# Patient Record
Sex: MALE | Race: BLACK OR AFRICAN AMERICAN | Employment: OTHER | URBAN - METROPOLITAN AREA
[De-identification: names, ages, dates, MRNs, and addresses within clinical notes are randomized per-mention and may not be internally consistent; named-entity substitution may affect disease eponyms.]

---

## 2017-11-13 ENCOUNTER — HOSPITAL ENCOUNTER (OUTPATIENT)
Age: 37
Setting detail: SPECIMEN
Discharge: HOME | End: 2017-11-13
Payer: MEDICARE

## 2017-11-13 LAB
ABSOLUTE EOS #: 0.18 K/UL (ref 0–0.44)
ABSOLUTE IMMATURE GRANULOCYTE: 0.03 K/UL (ref 0–0.3)
ABSOLUTE LYMPH #: 1.77 K/UL (ref 1.1–3.7)
ABSOLUTE MONO #: 0.84 K/UL (ref 0.1–1.2)
ANION GAP SERPL CALCULATED.3IONS-SCNC: 9 MMOL/L (ref 9–17)
BASOPHILS # BLD: 0 % (ref 0–2)
BASOPHILS ABSOLUTE: 0.03 K/UL (ref 0–0.2)
BUN BLDV-MCNC: 11 MG/DL (ref 6–20)
BUN/CREAT BLD: ABNORMAL (ref 9–20)
CALCIUM SERPL-MCNC: 8.6 MG/DL (ref 8.6–10.4)
CHLORIDE BLD-SCNC: 100 MMOL/L (ref 98–107)
CHOLESTEROL/HDL RATIO: 4.2
CHOLESTEROL: 191 MG/DL
CO2: 28 MMOL/L (ref 20–31)
CREAT SERPL-MCNC: 0.83 MG/DL (ref 0.7–1.2)
DIFFERENTIAL TYPE: ABNORMAL
EOSINOPHILS RELATIVE PERCENT: 2 % (ref 1–4)
GFR AFRICAN AMERICAN: >60 ML/MIN
GFR NON-AFRICAN AMERICAN: >60 ML/MIN
GFR SERPL CREATININE-BSD FRML MDRD: ABNORMAL ML/MIN/{1.73_M2}
GFR SERPL CREATININE-BSD FRML MDRD: ABNORMAL ML/MIN/{1.73_M2}
GLUCOSE BLD-MCNC: 69 MG/DL (ref 70–99)
HCT VFR BLD CALC: 36.9 % (ref 40.7–50.3)
HDLC SERPL-MCNC: 45 MG/DL
HEMOGLOBIN: 11 G/DL (ref 13–17)
IMMATURE GRANULOCYTES: 0 %
LDL CHOLESTEROL: 131 MG/DL (ref 0–130)
LYMPHOCYTES # BLD: 24 % (ref 24–43)
MCH RBC QN AUTO: 29.3 PG (ref 25.2–33.5)
MCHC RBC AUTO-ENTMCNC: 29.8 G/DL (ref 28.4–34.8)
MCV RBC AUTO: 98.4 FL (ref 82.6–102.9)
MONOCYTES # BLD: 11 % (ref 3–12)
PDW BLD-RTO: 14.4 % (ref 11.8–14.4)
PLATELET # BLD: 311 K/UL (ref 138–453)
PLATELET ESTIMATE: ABNORMAL
PMV BLD AUTO: 9.2 FL (ref 8.1–13.5)
POTASSIUM SERPL-SCNC: 4.5 MMOL/L (ref 3.7–5.3)
RBC # BLD: 3.75 M/UL (ref 4.21–5.77)
RBC # BLD: ABNORMAL 10*6/UL
SEG NEUTROPHILS: 62 % (ref 36–65)
SEGMENTED NEUTROPHILS ABSOLUTE COUNT: 4.63 K/UL (ref 1.5–8.1)
SODIUM BLD-SCNC: 137 MMOL/L (ref 135–144)
TRIGL SERPL-MCNC: 73 MG/DL
VLDLC SERPL CALC-MCNC: ABNORMAL MG/DL (ref 1–30)
WBC # BLD: 7.5 K/UL (ref 3.5–11.3)
WBC # BLD: ABNORMAL 10*3/UL

## 2017-11-13 PROCEDURE — 36415 COLL VENOUS BLD VENIPUNCTURE: CPT

## 2017-11-13 PROCEDURE — P9603 ONE-WAY ALLOW PRORATED MILES: HCPCS

## 2017-11-13 PROCEDURE — 80048 BASIC METABOLIC PNL TOTAL CA: CPT

## 2017-11-13 PROCEDURE — 80061 LIPID PANEL: CPT

## 2017-11-13 PROCEDURE — 85025 COMPLETE CBC W/AUTO DIFF WBC: CPT

## 2017-12-21 ENCOUNTER — HOSPITAL ENCOUNTER (OUTPATIENT)
Age: 37
Setting detail: SPECIMEN
Discharge: HOME | End: 2017-12-21
Payer: MEDICARE

## 2017-12-21 LAB
LITHIUM DATE LAST DOSE: NORMAL
LITHIUM DOSE AMOUNT: NORMAL
LITHIUM DOSE TIME: NORMAL
LITHIUM LEVEL: 1.1 MMOL/L (ref 0.6–1.2)

## 2017-12-21 PROCEDURE — 36415 COLL VENOUS BLD VENIPUNCTURE: CPT

## 2017-12-21 PROCEDURE — 80178 ASSAY OF LITHIUM: CPT

## 2017-12-21 PROCEDURE — P9603 ONE-WAY ALLOW PRORATED MILES: HCPCS

## 2018-02-19 ENCOUNTER — HOSPITAL ENCOUNTER (OUTPATIENT)
Age: 38
Setting detail: SPECIMEN
Discharge: HOME | End: 2018-02-19
Payer: MEDICARE

## 2018-02-19 LAB
LITHIUM DATE LAST DOSE: NORMAL
LITHIUM DOSE AMOUNT: NORMAL
LITHIUM DOSE TIME: NORMAL
LITHIUM LEVEL: 0.7 MMOL/L (ref 0.6–1.2)

## 2018-02-19 PROCEDURE — 80178 ASSAY OF LITHIUM: CPT

## 2018-02-19 PROCEDURE — 36415 COLL VENOUS BLD VENIPUNCTURE: CPT

## 2018-02-19 PROCEDURE — P9603 ONE-WAY ALLOW PRORATED MILES: HCPCS

## 2018-04-16 ENCOUNTER — HOSPITAL ENCOUNTER (OUTPATIENT)
Age: 38
Setting detail: SPECIMEN
Discharge: HOME | End: 2018-04-16
Payer: MEDICARE

## 2018-04-16 LAB
ANION GAP SERPL CALCULATED.3IONS-SCNC: 11 MMOL/L (ref 9–17)
BUN BLDV-MCNC: 10 MG/DL (ref 6–20)
BUN/CREAT BLD: ABNORMAL (ref 9–20)
CALCIUM SERPL-MCNC: 9.4 MG/DL (ref 8.6–10.4)
CHLORIDE BLD-SCNC: 101 MMOL/L (ref 98–107)
CO2: 27 MMOL/L (ref 20–31)
CREAT SERPL-MCNC: 0.88 MG/DL (ref 0.7–1.2)
GFR AFRICAN AMERICAN: >60 ML/MIN
GFR NON-AFRICAN AMERICAN: >60 ML/MIN
GFR SERPL CREATININE-BSD FRML MDRD: ABNORMAL ML/MIN/{1.73_M2}
GFR SERPL CREATININE-BSD FRML MDRD: ABNORMAL ML/MIN/{1.73_M2}
GLUCOSE BLD-MCNC: 67 MG/DL (ref 70–99)
HCT VFR BLD CALC: 37.4 % (ref 40.7–50.3)
HEMOGLOBIN: 11.4 G/DL (ref 13–17)
MCH RBC QN AUTO: 29.2 PG (ref 25.2–33.5)
MCHC RBC AUTO-ENTMCNC: 30.5 G/DL (ref 28.4–34.8)
MCV RBC AUTO: 95.7 FL (ref 82.6–102.9)
NRBC AUTOMATED: 0 PER 100 WBC
PDW BLD-RTO: 14.2 % (ref 11.8–14.4)
PLATELET # BLD: 304 K/UL (ref 138–453)
PMV BLD AUTO: 8.8 FL (ref 8.1–13.5)
POTASSIUM SERPL-SCNC: 4.5 MMOL/L (ref 3.7–5.3)
RBC # BLD: 3.91 M/UL (ref 4.21–5.77)
SODIUM BLD-SCNC: 139 MMOL/L (ref 135–144)
WBC # BLD: 8.4 K/UL (ref 3.5–11.3)

## 2018-04-16 PROCEDURE — 85027 COMPLETE CBC AUTOMATED: CPT

## 2018-04-16 PROCEDURE — P9603 ONE-WAY ALLOW PRORATED MILES: HCPCS

## 2018-04-16 PROCEDURE — 80048 BASIC METABOLIC PNL TOTAL CA: CPT

## 2018-04-16 PROCEDURE — 36415 COLL VENOUS BLD VENIPUNCTURE: CPT

## 2019-03-21 ENCOUNTER — HOSPITAL ENCOUNTER (OUTPATIENT)
Age: 39
Setting detail: SPECIMEN
Discharge: HOME | End: 2019-03-21
Payer: MEDICARE

## 2019-03-21 LAB
LITHIUM DATE LAST DOSE: NORMAL
LITHIUM DOSE AMOUNT: NORMAL
LITHIUM DOSE TIME: NORMAL
LITHIUM LEVEL: 1.2 MMOL/L (ref 0.6–1.2)

## 2019-03-21 PROCEDURE — 36415 COLL VENOUS BLD VENIPUNCTURE: CPT

## 2019-03-21 PROCEDURE — 80178 ASSAY OF LITHIUM: CPT

## 2019-03-21 PROCEDURE — P9603 ONE-WAY ALLOW PRORATED MILES: HCPCS

## 2019-06-21 ENCOUNTER — HOSPITAL ENCOUNTER (OUTPATIENT)
Age: 39
Setting detail: SPECIMEN
Discharge: HOME | End: 2019-06-21
Payer: MEDICARE

## 2019-06-21 LAB
LITHIUM DATE LAST DOSE: NORMAL
LITHIUM DOSE AMOUNT: NORMAL
LITHIUM DOSE TIME: NORMAL
LITHIUM LEVEL: 1 MMOL/L (ref 0.6–1.2)

## 2019-06-21 PROCEDURE — 80178 ASSAY OF LITHIUM: CPT

## 2019-06-21 PROCEDURE — P9603 ONE-WAY ALLOW PRORATED MILES: HCPCS

## 2019-06-21 PROCEDURE — 36415 COLL VENOUS BLD VENIPUNCTURE: CPT

## 2019-10-25 ENCOUNTER — HOSPITAL ENCOUNTER (OUTPATIENT)
Age: 39
Setting detail: SPECIMEN
Discharge: HOME | End: 2019-10-25
Payer: COMMERCIAL

## 2019-10-25 LAB
LITHIUM DATE LAST DOSE: NORMAL
LITHIUM DOSE AMOUNT: NORMAL
LITHIUM DOSE TIME: NORMAL
LITHIUM LEVEL: 0.8 MMOL/L (ref 0.6–1.2)

## 2019-10-25 PROCEDURE — P9603 ONE-WAY ALLOW PRORATED MILES: HCPCS

## 2019-10-25 PROCEDURE — 36415 COLL VENOUS BLD VENIPUNCTURE: CPT

## 2019-10-25 PROCEDURE — 80178 ASSAY OF LITHIUM: CPT

## 2020-01-21 ENCOUNTER — HOSPITAL ENCOUNTER (OUTPATIENT)
Age: 40
Setting detail: SPECIMEN
Discharge: HOME | End: 2020-01-21
Payer: COMMERCIAL

## 2020-01-21 PROCEDURE — 36415 COLL VENOUS BLD VENIPUNCTURE: CPT

## 2020-01-21 PROCEDURE — 80178 ASSAY OF LITHIUM: CPT

## 2020-01-21 PROCEDURE — P9603 ONE-WAY ALLOW PRORATED MILES: HCPCS

## 2020-02-14 ENCOUNTER — HOSPITAL ENCOUNTER (OUTPATIENT)
Age: 40
Setting detail: SPECIMEN
Discharge: HOME | End: 2020-02-14
Payer: COMMERCIAL

## 2020-02-14 LAB
ABSOLUTE EOS #: 0.35 K/UL (ref 0–0.44)
ABSOLUTE IMMATURE GRANULOCYTE: 0.03 K/UL (ref 0–0.3)
ABSOLUTE LYMPH #: 2.59 K/UL (ref 1.1–3.7)
ABSOLUTE MONO #: 1.02 K/UL (ref 0.1–1.2)
BASOPHILS # BLD: 1 % (ref 0–2)
BASOPHILS ABSOLUTE: 0.05 K/UL (ref 0–0.2)
DIFFERENTIAL TYPE: ABNORMAL
EOSINOPHILS RELATIVE PERCENT: 4 % (ref 1–4)
HCT VFR BLD CALC: 35.6 % (ref 40.7–50.3)
HEMOGLOBIN: 10.8 G/DL (ref 13–17)
IMMATURE GRANULOCYTES: 0 %
LYMPHOCYTES # BLD: 28 % (ref 24–43)
MCH RBC QN AUTO: 28.4 PG (ref 25.2–33.5)
MCHC RBC AUTO-ENTMCNC: 30.3 G/DL (ref 28.4–34.8)
MCV RBC AUTO: 93.7 FL (ref 82.6–102.9)
MONOCYTES # BLD: 11 % (ref 3–12)
NRBC AUTOMATED: 0 PER 100 WBC
PDW BLD-RTO: 13.8 % (ref 11.8–14.4)
PLATELET # BLD: 447 K/UL (ref 138–453)
PLATELET ESTIMATE: ABNORMAL
PMV BLD AUTO: 8.6 FL (ref 8.1–13.5)
RBC # BLD: 3.8 M/UL (ref 4.21–5.77)
RBC # BLD: ABNORMAL 10*6/UL
SEG NEUTROPHILS: 56 % (ref 36–65)
SEGMENTED NEUTROPHILS ABSOLUTE COUNT: 5.21 K/UL (ref 1.5–8.1)
WBC # BLD: 9.3 K/UL (ref 3.5–11.3)
WBC # BLD: ABNORMAL 10*3/UL

## 2020-02-14 PROCEDURE — 36415 COLL VENOUS BLD VENIPUNCTURE: CPT

## 2020-02-14 PROCEDURE — P9603 ONE-WAY ALLOW PRORATED MILES: HCPCS

## 2020-02-14 PROCEDURE — 85025 COMPLETE CBC W/AUTO DIFF WBC: CPT

## 2020-02-20 ENCOUNTER — HOSPITAL ENCOUNTER (OUTPATIENT)
Age: 40
Setting detail: SPECIMEN
Discharge: HOME | End: 2020-02-20
Payer: COMMERCIAL

## 2020-02-20 LAB
ABSOLUTE EOS #: 0.1 K/UL (ref 0–0.44)
ABSOLUTE IMMATURE GRANULOCYTE: 0.1 K/UL (ref 0–0.3)
ABSOLUTE LYMPH #: 1.94 K/UL (ref 1.1–3.7)
ABSOLUTE MONO #: 0.67 K/UL (ref 0.1–1.2)
BASOPHILS # BLD: 1 % (ref 0–2)
BASOPHILS ABSOLUTE: 0.05 K/UL (ref 0–0.2)
DIFFERENTIAL TYPE: ABNORMAL
EOSINOPHILS RELATIVE PERCENT: 2 % (ref 1–4)
HCT VFR BLD CALC: 39.3 % (ref 40.7–50.3)
HEMOGLOBIN: 12.4 G/DL (ref 13–17)
IMMATURE GRANULOCYTES: 2 %
LYMPHOCYTES # BLD: 31 % (ref 24–43)
MCH RBC QN AUTO: 32.7 PG (ref 25.2–33.5)
MCHC RBC AUTO-ENTMCNC: 31.6 G/DL (ref 28.4–34.8)
MCV RBC AUTO: 103.7 FL (ref 82.6–102.9)
MONOCYTES # BLD: 11 % (ref 3–12)
NRBC AUTOMATED: 0 PER 100 WBC
PDW BLD-RTO: 14.5 % (ref 11.8–14.4)
PLATELET # BLD: 192 K/UL (ref 138–453)
PLATELET ESTIMATE: ABNORMAL
PMV BLD AUTO: 11.1 FL (ref 8.1–13.5)
RBC # BLD: 3.79 M/UL (ref 4.21–5.77)
RBC # BLD: ABNORMAL 10*6/UL
SEG NEUTROPHILS: 54 % (ref 36–65)
SEGMENTED NEUTROPHILS ABSOLUTE COUNT: 3.36 K/UL (ref 1.5–8.1)
WBC # BLD: 6.2 K/UL (ref 3.5–11.3)
WBC # BLD: ABNORMAL 10*3/UL

## 2020-02-20 PROCEDURE — 36415 COLL VENOUS BLD VENIPUNCTURE: CPT

## 2020-02-20 PROCEDURE — 85025 COMPLETE CBC W/AUTO DIFF WBC: CPT

## 2020-02-20 PROCEDURE — P9603 ONE-WAY ALLOW PRORATED MILES: HCPCS

## 2020-02-24 ENCOUNTER — HOSPITAL ENCOUNTER (OUTPATIENT)
Age: 40
Setting detail: SPECIMEN
Discharge: HOME | End: 2020-02-24
Payer: COMMERCIAL

## 2020-02-24 LAB
ABSOLUTE EOS #: 0.32 K/UL (ref 0–0.44)
ABSOLUTE IMMATURE GRANULOCYTE: 0.04 K/UL (ref 0–0.3)
ABSOLUTE LYMPH #: 2.42 K/UL (ref 1.1–3.7)
ABSOLUTE MONO #: 0.89 K/UL (ref 0.1–1.2)
BASOPHILS # BLD: 0 % (ref 0–2)
BASOPHILS ABSOLUTE: 0.03 K/UL (ref 0–0.2)
DIFFERENTIAL TYPE: ABNORMAL
EOSINOPHILS RELATIVE PERCENT: 4 % (ref 1–4)
HCT VFR BLD CALC: 34.9 % (ref 40.7–50.3)
HEMOGLOBIN: 10.1 G/DL (ref 13–17)
IMMATURE GRANULOCYTES: 0 %
LYMPHOCYTES # BLD: 27 % (ref 24–43)
MCH RBC QN AUTO: 27.3 PG (ref 25.2–33.5)
MCHC RBC AUTO-ENTMCNC: 28.9 G/DL (ref 28.4–34.8)
MCV RBC AUTO: 94.3 FL (ref 82.6–102.9)
MONOCYTES # BLD: 10 % (ref 3–12)
NRBC AUTOMATED: 0 PER 100 WBC
PDW BLD-RTO: 13.8 % (ref 11.8–14.4)
PLATELET # BLD: 425 K/UL (ref 138–453)
PLATELET ESTIMATE: ABNORMAL
PMV BLD AUTO: 8.6 FL (ref 8.1–13.5)
RBC # BLD: 3.7 M/UL (ref 4.21–5.77)
RBC # BLD: ABNORMAL 10*6/UL
SEG NEUTROPHILS: 59 % (ref 36–65)
SEGMENTED NEUTROPHILS ABSOLUTE COUNT: 5.35 K/UL (ref 1.5–8.1)
WBC # BLD: 9.1 K/UL (ref 3.5–11.3)
WBC # BLD: ABNORMAL 10*3/UL

## 2020-02-24 PROCEDURE — 36415 COLL VENOUS BLD VENIPUNCTURE: CPT

## 2020-02-24 PROCEDURE — 85025 COMPLETE CBC W/AUTO DIFF WBC: CPT

## 2020-02-24 PROCEDURE — P9603 ONE-WAY ALLOW PRORATED MILES: HCPCS

## 2020-02-27 ENCOUNTER — HOSPITAL ENCOUNTER (OUTPATIENT)
Age: 40
Setting detail: SPECIMEN
Discharge: HOME | End: 2020-02-27
Payer: COMMERCIAL

## 2020-02-27 LAB
ABSOLUTE EOS #: 0.28 K/UL (ref 0–0.44)
ABSOLUTE IMMATURE GRANULOCYTE: 0.03 K/UL (ref 0–0.3)
ABSOLUTE LYMPH #: 2.25 K/UL (ref 1.1–3.7)
ABSOLUTE MONO #: 1.13 K/UL (ref 0.1–1.2)
BASOPHILS # BLD: 1 % (ref 0–2)
BASOPHILS ABSOLUTE: 0.05 K/UL (ref 0–0.2)
DIFFERENTIAL TYPE: ABNORMAL
EOSINOPHILS RELATIVE PERCENT: 3 % (ref 1–4)
HCT VFR BLD CALC: 35.4 % (ref 40.7–50.3)
HEMOGLOBIN: 10.7 G/DL (ref 13–17)
IMMATURE GRANULOCYTES: 0 %
LYMPHOCYTES # BLD: 23 % (ref 24–43)
MCH RBC QN AUTO: 28.2 PG (ref 25.2–33.5)
MCHC RBC AUTO-ENTMCNC: 30.2 G/DL (ref 28.4–34.8)
MCV RBC AUTO: 93.2 FL (ref 82.6–102.9)
MONOCYTES # BLD: 11 % (ref 3–12)
NRBC AUTOMATED: 0 PER 100 WBC
PDW BLD-RTO: 13.7 % (ref 11.8–14.4)
PLATELET # BLD: 424 K/UL (ref 138–453)
PLATELET ESTIMATE: ABNORMAL
PMV BLD AUTO: 8.6 FL (ref 8.1–13.5)
RBC # BLD: 3.8 M/UL (ref 4.21–5.77)
RBC # BLD: ABNORMAL 10*6/UL
SEG NEUTROPHILS: 62 % (ref 36–65)
SEGMENTED NEUTROPHILS ABSOLUTE COUNT: 6.27 K/UL (ref 1.5–8.1)
WBC # BLD: 10 K/UL (ref 3.5–11.3)
WBC # BLD: ABNORMAL 10*3/UL

## 2020-02-27 PROCEDURE — 85025 COMPLETE CBC W/AUTO DIFF WBC: CPT

## 2020-02-27 PROCEDURE — 36415 COLL VENOUS BLD VENIPUNCTURE: CPT

## 2020-02-27 PROCEDURE — P9603 ONE-WAY ALLOW PRORATED MILES: HCPCS

## 2020-03-02 ENCOUNTER — HOSPITAL ENCOUNTER (OUTPATIENT)
Age: 40
Setting detail: SPECIMEN
Discharge: HOME | End: 2020-03-02
Payer: COMMERCIAL

## 2020-03-02 LAB
ABSOLUTE EOS #: 0.44 K/UL (ref 0–0.44)
ABSOLUTE IMMATURE GRANULOCYTE: 0.04 K/UL (ref 0–0.3)
ABSOLUTE LYMPH #: 2.18 K/UL (ref 1.1–3.7)
ABSOLUTE MONO #: 1.27 K/UL (ref 0.1–1.2)
BASOPHILS # BLD: 1 % (ref 0–2)
BASOPHILS ABSOLUTE: 0.05 K/UL (ref 0–0.2)
DIFFERENTIAL TYPE: ABNORMAL
EOSINOPHILS RELATIVE PERCENT: 4 % (ref 1–4)
HCT VFR BLD CALC: 34.3 % (ref 40.7–50.3)
HEMOGLOBIN: 10.1 G/DL (ref 13–17)
IMMATURE GRANULOCYTES: 0 %
LYMPHOCYTES # BLD: 21 % (ref 24–43)
MCH RBC QN AUTO: 27.7 PG (ref 25.2–33.5)
MCHC RBC AUTO-ENTMCNC: 29.4 G/DL (ref 28.4–34.8)
MCV RBC AUTO: 94.2 FL (ref 82.6–102.9)
MONOCYTES # BLD: 12 % (ref 3–12)
NRBC AUTOMATED: 0 PER 100 WBC
PDW BLD-RTO: 13.9 % (ref 11.8–14.4)
PLATELET # BLD: 407 K/UL (ref 138–453)
PLATELET ESTIMATE: ABNORMAL
PMV BLD AUTO: 8.6 FL (ref 8.1–13.5)
RBC # BLD: 3.64 M/UL (ref 4.21–5.77)
RBC # BLD: ABNORMAL 10*6/UL
SEG NEUTROPHILS: 62 % (ref 36–65)
SEGMENTED NEUTROPHILS ABSOLUTE COUNT: 6.63 K/UL (ref 1.5–8.1)
WBC # BLD: 10.6 K/UL (ref 3.5–11.3)
WBC # BLD: ABNORMAL 10*3/UL

## 2020-03-02 PROCEDURE — 85025 COMPLETE CBC W/AUTO DIFF WBC: CPT

## 2020-03-02 PROCEDURE — 36415 COLL VENOUS BLD VENIPUNCTURE: CPT

## 2020-03-02 PROCEDURE — P9603 ONE-WAY ALLOW PRORATED MILES: HCPCS

## 2020-03-09 ENCOUNTER — HOSPITAL ENCOUNTER (OUTPATIENT)
Age: 40
Setting detail: SPECIMEN
Discharge: HOME | End: 2020-03-09
Payer: COMMERCIAL

## 2020-03-09 LAB
ABSOLUTE EOS #: 0.39 K/UL (ref 0–0.44)
ABSOLUTE IMMATURE GRANULOCYTE: 0.04 K/UL (ref 0–0.3)
ABSOLUTE LYMPH #: 2.56 K/UL (ref 1.1–3.7)
ABSOLUTE MONO #: 1.19 K/UL (ref 0.1–1.2)
BASOPHILS # BLD: 1 % (ref 0–2)
BASOPHILS ABSOLUTE: 0.06 K/UL (ref 0–0.2)
DIFFERENTIAL TYPE: ABNORMAL
EOSINOPHILS RELATIVE PERCENT: 4 % (ref 1–4)
HCT VFR BLD CALC: 33.1 % (ref 40.7–50.3)
HEMOGLOBIN: 9.8 G/DL (ref 13–17)
IMMATURE GRANULOCYTES: 0 %
LYMPHOCYTES # BLD: 24 % (ref 24–43)
MCH RBC QN AUTO: 27.8 PG (ref 25.2–33.5)
MCHC RBC AUTO-ENTMCNC: 29.6 G/DL (ref 28.4–34.8)
MCV RBC AUTO: 94 FL (ref 82.6–102.9)
MONOCYTES # BLD: 11 % (ref 3–12)
NRBC AUTOMATED: 0 PER 100 WBC
PDW BLD-RTO: 14.2 % (ref 11.8–14.4)
PLATELET # BLD: 423 K/UL (ref 138–453)
PLATELET ESTIMATE: ABNORMAL
PMV BLD AUTO: 8.6 FL (ref 8.1–13.5)
RBC # BLD: 3.52 M/UL (ref 4.21–5.77)
RBC # BLD: ABNORMAL 10*6/UL
SEG NEUTROPHILS: 60 % (ref 36–65)
SEGMENTED NEUTROPHILS ABSOLUTE COUNT: 6.37 K/UL (ref 1.5–8.1)
WBC # BLD: 10.6 K/UL (ref 3.5–11.3)
WBC # BLD: ABNORMAL 10*3/UL

## 2020-03-09 PROCEDURE — P9603 ONE-WAY ALLOW PRORATED MILES: HCPCS

## 2020-03-09 PROCEDURE — 36415 COLL VENOUS BLD VENIPUNCTURE: CPT

## 2020-03-09 PROCEDURE — 85025 COMPLETE CBC W/AUTO DIFF WBC: CPT

## 2020-03-16 ENCOUNTER — HOSPITAL ENCOUNTER (OUTPATIENT)
Age: 40
Setting detail: SPECIMEN
Discharge: HOME | End: 2020-03-16
Payer: COMMERCIAL

## 2020-03-16 LAB
ABSOLUTE EOS #: 0.3 K/UL (ref 0–0.44)
ABSOLUTE IMMATURE GRANULOCYTE: 0.03 K/UL (ref 0–0.3)
ABSOLUTE LYMPH #: 2.29 K/UL (ref 1.1–3.7)
ABSOLUTE MONO #: 1.12 K/UL (ref 0.1–1.2)
BASOPHILS # BLD: 1 % (ref 0–2)
BASOPHILS ABSOLUTE: 0.05 K/UL (ref 0–0.2)
DIFFERENTIAL TYPE: ABNORMAL
EOSINOPHILS RELATIVE PERCENT: 3 % (ref 1–4)
HCT VFR BLD CALC: 36 % (ref 40.7–50.3)
HEMOGLOBIN: 10.5 G/DL (ref 13–17)
IMMATURE GRANULOCYTES: 0 %
LYMPHOCYTES # BLD: 21 % (ref 24–43)
MCH RBC QN AUTO: 27.1 PG (ref 25.2–33.5)
MCHC RBC AUTO-ENTMCNC: 29.2 G/DL (ref 28.4–34.8)
MCV RBC AUTO: 92.8 FL (ref 82.6–102.9)
MONOCYTES # BLD: 10 % (ref 3–12)
NRBC AUTOMATED: 0 PER 100 WBC
PDW BLD-RTO: 13.8 % (ref 11.8–14.4)
PLATELET # BLD: 484 K/UL (ref 138–453)
PLATELET ESTIMATE: ABNORMAL
PMV BLD AUTO: 8.7 FL (ref 8.1–13.5)
RBC # BLD: 3.88 M/UL (ref 4.21–5.77)
RBC # BLD: ABNORMAL 10*6/UL
SEG NEUTROPHILS: 65 % (ref 36–65)
SEGMENTED NEUTROPHILS ABSOLUTE COUNT: 7.15 K/UL (ref 1.5–8.1)
WBC # BLD: 10.9 K/UL (ref 3.5–11.3)
WBC # BLD: ABNORMAL 10*3/UL

## 2020-03-16 PROCEDURE — 36415 COLL VENOUS BLD VENIPUNCTURE: CPT

## 2020-03-16 PROCEDURE — P9603 ONE-WAY ALLOW PRORATED MILES: HCPCS

## 2020-03-16 PROCEDURE — 85025 COMPLETE CBC W/AUTO DIFF WBC: CPT

## 2020-03-23 ENCOUNTER — HOSPITAL ENCOUNTER (OUTPATIENT)
Age: 40
Setting detail: SPECIMEN
Discharge: HOME | End: 2020-03-23
Payer: COMMERCIAL

## 2020-03-23 LAB
ABSOLUTE EOS #: 0.32 K/UL (ref 0–0.44)
ABSOLUTE IMMATURE GRANULOCYTE: 0.03 K/UL (ref 0–0.3)
ABSOLUTE LYMPH #: 2.61 K/UL (ref 1.1–3.7)
ABSOLUTE MONO #: 1.05 K/UL (ref 0.1–1.2)
BASOPHILS # BLD: 1 % (ref 0–2)
BASOPHILS ABSOLUTE: 0.05 K/UL (ref 0–0.2)
DIFFERENTIAL TYPE: ABNORMAL
EOSINOPHILS RELATIVE PERCENT: 3 % (ref 1–4)
HCT VFR BLD CALC: 32.8 % (ref 40.7–50.3)
HEMOGLOBIN: 9.6 G/DL (ref 13–17)
IMMATURE GRANULOCYTES: 0 %
LYMPHOCYTES # BLD: 28 % (ref 24–43)
MCH RBC QN AUTO: 27.3 PG (ref 25.2–33.5)
MCHC RBC AUTO-ENTMCNC: 29.3 G/DL (ref 28.4–34.8)
MCV RBC AUTO: 93.2 FL (ref 82.6–102.9)
MONOCYTES # BLD: 11 % (ref 3–12)
NRBC AUTOMATED: 0 PER 100 WBC
PDW BLD-RTO: 14 % (ref 11.8–14.4)
PLATELET # BLD: 469 K/UL (ref 138–453)
PLATELET ESTIMATE: ABNORMAL
PMV BLD AUTO: 8.5 FL (ref 8.1–13.5)
RBC # BLD: 3.52 M/UL (ref 4.21–5.77)
RBC # BLD: ABNORMAL 10*6/UL
SEG NEUTROPHILS: 57 % (ref 36–65)
SEGMENTED NEUTROPHILS ABSOLUTE COUNT: 5.35 K/UL (ref 1.5–8.1)
WBC # BLD: 9.4 K/UL (ref 3.5–11.3)
WBC # BLD: ABNORMAL 10*3/UL

## 2020-03-23 PROCEDURE — 36415 COLL VENOUS BLD VENIPUNCTURE: CPT

## 2020-03-23 PROCEDURE — 85025 COMPLETE CBC W/AUTO DIFF WBC: CPT

## 2020-03-23 PROCEDURE — P9603 ONE-WAY ALLOW PRORATED MILES: HCPCS

## 2020-03-30 ENCOUNTER — HOSPITAL ENCOUNTER (OUTPATIENT)
Age: 40
Setting detail: SPECIMEN
Discharge: HOME | End: 2020-03-30
Payer: COMMERCIAL

## 2020-03-30 LAB
ABSOLUTE EOS #: 0.31 K/UL (ref 0–0.44)
ABSOLUTE IMMATURE GRANULOCYTE: 0.04 K/UL (ref 0–0.3)
ABSOLUTE LYMPH #: 1.81 K/UL (ref 1.1–3.7)
ABSOLUTE MONO #: 1.11 K/UL (ref 0.1–1.2)
BASOPHILS # BLD: 0 % (ref 0–2)
BASOPHILS ABSOLUTE: 0.04 K/UL (ref 0–0.2)
DIFFERENTIAL TYPE: ABNORMAL
EOSINOPHILS RELATIVE PERCENT: 3 % (ref 1–4)
HCT VFR BLD CALC: 36.3 % (ref 40.7–50.3)
HEMOGLOBIN: 10.6 G/DL (ref 13–17)
IMMATURE GRANULOCYTES: 0 %
LYMPHOCYTES # BLD: 18 % (ref 24–43)
MCH RBC QN AUTO: 27.2 PG (ref 25.2–33.5)
MCHC RBC AUTO-ENTMCNC: 29.2 G/DL (ref 28.4–34.8)
MCV RBC AUTO: 93.3 FL (ref 82.6–102.9)
MONOCYTES # BLD: 11 % (ref 3–12)
NRBC AUTOMATED: 0 PER 100 WBC
PDW BLD-RTO: 14 % (ref 11.8–14.4)
PLATELET # BLD: 485 K/UL (ref 138–453)
PLATELET ESTIMATE: ABNORMAL
PMV BLD AUTO: 8.7 FL (ref 8.1–13.5)
RBC # BLD: 3.89 M/UL (ref 4.21–5.77)
RBC # BLD: ABNORMAL 10*6/UL
SEG NEUTROPHILS: 68 % (ref 36–65)
SEGMENTED NEUTROPHILS ABSOLUTE COUNT: 6.76 K/UL (ref 1.5–8.1)
WBC # BLD: 10.1 K/UL (ref 3.5–11.3)
WBC # BLD: ABNORMAL 10*3/UL

## 2020-03-30 PROCEDURE — 36415 COLL VENOUS BLD VENIPUNCTURE: CPT

## 2020-03-30 PROCEDURE — 85025 COMPLETE CBC W/AUTO DIFF WBC: CPT

## 2020-03-30 PROCEDURE — P9603 ONE-WAY ALLOW PRORATED MILES: HCPCS

## 2020-04-07 ENCOUNTER — HOSPITAL ENCOUNTER (OUTPATIENT)
Age: 40
Setting detail: SPECIMEN
Discharge: HOME | End: 2020-04-07
Payer: COMMERCIAL

## 2020-04-07 LAB
ABSOLUTE EOS #: 0 K/UL (ref 0–0.4)
ABSOLUTE IMMATURE GRANULOCYTE: 0 K/UL (ref 0–0.3)
ABSOLUTE LYMPH #: 0.39 K/UL (ref 1–4.8)
ABSOLUTE MONO #: 0.78 K/UL (ref 0.1–0.8)
BASOPHILS # BLD: 1 % (ref 0–2)
BASOPHILS ABSOLUTE: 0.1 K/UL (ref 0–0.2)
DIFFERENTIAL TYPE: ABNORMAL
EOSINOPHILS RELATIVE PERCENT: 0 % (ref 1–4)
HCT VFR BLD CALC: 33 % (ref 40.7–50.3)
HEMOGLOBIN: 9.9 G/DL (ref 13–17)
IMMATURE GRANULOCYTES: 0 %
LYMPHOCYTES # BLD: 4 % (ref 24–44)
MCH RBC QN AUTO: 27.1 PG (ref 25.2–33.5)
MCHC RBC AUTO-ENTMCNC: 30 G/DL (ref 28.4–34.8)
MCV RBC AUTO: 90.4 FL (ref 82.6–102.9)
MONOCYTES # BLD: 8 % (ref 1–7)
MORPHOLOGY: NORMAL
NRBC AUTOMATED: 0 PER 100 WBC
PDW BLD-RTO: 14.1 % (ref 11.8–14.4)
PLATELET # BLD: 430 K/UL (ref 138–453)
PLATELET ESTIMATE: ABNORMAL
PMV BLD AUTO: 8.6 FL (ref 8.1–13.5)
RBC # BLD: 3.65 M/UL (ref 4.21–5.77)
RBC # BLD: ABNORMAL 10*6/UL
SEG NEUTROPHILS: 87 % (ref 36–66)
SEGMENTED NEUTROPHILS ABSOLUTE COUNT: 8.43 K/UL (ref 1.8–7.7)
WBC # BLD: 9.7 K/UL (ref 3.5–11.3)
WBC # BLD: ABNORMAL 10*3/UL

## 2020-04-07 PROCEDURE — 36415 COLL VENOUS BLD VENIPUNCTURE: CPT

## 2020-04-07 PROCEDURE — P9603 ONE-WAY ALLOW PRORATED MILES: HCPCS

## 2020-04-07 PROCEDURE — 85025 COMPLETE CBC W/AUTO DIFF WBC: CPT

## 2020-04-09 ENCOUNTER — HOSPITAL ENCOUNTER (OUTPATIENT)
Age: 40
Setting detail: SPECIMEN
Discharge: HOME | End: 2020-04-09
Payer: COMMERCIAL

## 2020-04-09 LAB
LITHIUM DATE LAST DOSE: NORMAL
LITHIUM DOSE AMOUNT: NORMAL
LITHIUM DOSE TIME: NORMAL
LITHIUM LEVEL: 0.6 MMOL/L (ref 0.6–1.2)

## 2020-04-09 PROCEDURE — 36415 COLL VENOUS BLD VENIPUNCTURE: CPT

## 2020-04-09 PROCEDURE — P9603 ONE-WAY ALLOW PRORATED MILES: HCPCS

## 2020-04-09 PROCEDURE — 80178 ASSAY OF LITHIUM: CPT

## 2020-04-14 ENCOUNTER — HOSPITAL ENCOUNTER (OUTPATIENT)
Age: 40
Setting detail: SPECIMEN
Discharge: HOME | End: 2020-04-14
Payer: COMMERCIAL

## 2020-04-14 LAB
ABSOLUTE EOS #: 0.13 K/UL (ref 0–0.44)
ABSOLUTE IMMATURE GRANULOCYTE: 0.1 K/UL (ref 0–0.3)
ABSOLUTE LYMPH #: 2.05 K/UL (ref 1.1–3.7)
ABSOLUTE MONO #: 1.07 K/UL (ref 0.1–1.2)
BASOPHILS # BLD: 0 % (ref 0–2)
BASOPHILS ABSOLUTE: <0.03 K/UL (ref 0–0.2)
DIFFERENTIAL TYPE: ABNORMAL
EOSINOPHILS RELATIVE PERCENT: 2 % (ref 1–4)
HCT VFR BLD CALC: 32.8 % (ref 40.7–50.3)
HEMOGLOBIN: 9.6 G/DL (ref 13–17)
IMMATURE GRANULOCYTES: 1 %
LYMPHOCYTES # BLD: 27 % (ref 24–43)
MCH RBC QN AUTO: 26.7 PG (ref 25.2–33.5)
MCHC RBC AUTO-ENTMCNC: 29.3 G/DL (ref 28.4–34.8)
MCV RBC AUTO: 91.1 FL (ref 82.6–102.9)
MONOCYTES # BLD: 14 % (ref 3–12)
NRBC AUTOMATED: 0 PER 100 WBC
PDW BLD-RTO: 14.2 % (ref 11.8–14.4)
PLATELET # BLD: 362 K/UL (ref 138–453)
PLATELET ESTIMATE: ABNORMAL
PMV BLD AUTO: 9 FL (ref 8.1–13.5)
RBC # BLD: 3.6 M/UL (ref 4.21–5.77)
RBC # BLD: ABNORMAL 10*6/UL
SEG NEUTROPHILS: 56 % (ref 36–65)
SEGMENTED NEUTROPHILS ABSOLUTE COUNT: 4.3 K/UL (ref 1.5–8.1)
WBC # BLD: 7.7 K/UL (ref 3.5–11.3)
WBC # BLD: ABNORMAL 10*3/UL

## 2020-04-14 PROCEDURE — 85025 COMPLETE CBC W/AUTO DIFF WBC: CPT

## 2020-04-14 PROCEDURE — 36415 COLL VENOUS BLD VENIPUNCTURE: CPT

## 2020-04-14 PROCEDURE — P9603 ONE-WAY ALLOW PRORATED MILES: HCPCS

## 2020-04-21 ENCOUNTER — HOSPITAL ENCOUNTER (OUTPATIENT)
Age: 40
Setting detail: SPECIMEN
Discharge: HOME | End: 2020-04-21
Payer: COMMERCIAL

## 2020-04-21 LAB
ABSOLUTE EOS #: 0 K/UL (ref 0–0.4)
ABSOLUTE IMMATURE GRANULOCYTE: 0.31 K/UL (ref 0–0.3)
ABSOLUTE LYMPH #: 1.03 K/UL (ref 1–4.8)
ABSOLUTE MONO #: 1.03 K/UL (ref 0.1–0.8)
BASOPHILS # BLD: 0 % (ref 0–2)
BASOPHILS ABSOLUTE: 0 K/UL (ref 0–0.2)
DIFFERENTIAL TYPE: ABNORMAL
EOSINOPHILS RELATIVE PERCENT: 0 % (ref 1–4)
HCT VFR BLD CALC: 29.1 % (ref 40.7–50.3)
HEMOGLOBIN: 8.4 G/DL (ref 13–17)
IMMATURE GRANULOCYTES: 3 %
LYMPHOCYTES # BLD: 10 % (ref 24–44)
MCH RBC QN AUTO: 26.5 PG (ref 25.2–33.5)
MCHC RBC AUTO-ENTMCNC: 28.9 G/DL (ref 28.4–34.8)
MCV RBC AUTO: 91.8 FL (ref 82.6–102.9)
MONOCYTES # BLD: 10 % (ref 1–7)
MORPHOLOGY: ABNORMAL
NRBC AUTOMATED: 0.6 PER 100 WBC
PDW BLD-RTO: 14.9 % (ref 11.8–14.4)
PLATELET # BLD: 600 K/UL (ref 138–453)
PLATELET ESTIMATE: ABNORMAL
PMV BLD AUTO: 8.3 FL (ref 8.1–13.5)
RBC # BLD: 3.17 M/UL (ref 4.21–5.77)
RBC # BLD: ABNORMAL 10*6/UL
SEG NEUTROPHILS: 77 % (ref 36–66)
SEGMENTED NEUTROPHILS ABSOLUTE COUNT: 7.93 K/UL (ref 1.8–7.7)
WBC # BLD: 10.3 K/UL (ref 3.5–11.3)
WBC # BLD: ABNORMAL 10*3/UL

## 2020-04-21 PROCEDURE — 85025 COMPLETE CBC W/AUTO DIFF WBC: CPT

## 2020-04-21 PROCEDURE — 36415 COLL VENOUS BLD VENIPUNCTURE: CPT

## 2020-04-21 PROCEDURE — P9603 ONE-WAY ALLOW PRORATED MILES: HCPCS

## 2020-04-28 ENCOUNTER — HOSPITAL ENCOUNTER (OUTPATIENT)
Age: 40
Setting detail: SPECIMEN
Discharge: HOME | End: 2020-04-28
Payer: COMMERCIAL

## 2020-04-28 LAB
ABSOLUTE EOS #: 0.19 K/UL (ref 0–0.44)
ABSOLUTE IMMATURE GRANULOCYTE: 0.06 K/UL (ref 0–0.3)
ABSOLUTE LYMPH #: 2.02 K/UL (ref 1.1–3.7)
ABSOLUTE MONO #: 0.86 K/UL (ref 0.1–1.2)
BASOPHILS # BLD: 0 % (ref 0–2)
BASOPHILS ABSOLUTE: <0.03 K/UL (ref 0–0.2)
DIFFERENTIAL TYPE: ABNORMAL
EOSINOPHILS RELATIVE PERCENT: 2 % (ref 1–4)
HCT VFR BLD CALC: 33.4 % (ref 40.7–50.3)
HEMOGLOBIN: 9.8 G/DL (ref 13–17)
IMMATURE GRANULOCYTES: 1 %
LYMPHOCYTES # BLD: 24 % (ref 24–43)
MCH RBC QN AUTO: 27.6 PG (ref 25.2–33.5)
MCHC RBC AUTO-ENTMCNC: 29.3 G/DL (ref 28.4–34.8)
MCV RBC AUTO: 94.1 FL (ref 82.6–102.9)
MONOCYTES # BLD: 10 % (ref 3–12)
NRBC AUTOMATED: 0 PER 100 WBC
PDW BLD-RTO: 18.3 % (ref 11.8–14.4)
PLATELET # BLD: 629 K/UL (ref 138–453)
PLATELET ESTIMATE: ABNORMAL
PMV BLD AUTO: 8.6 FL (ref 8.1–13.5)
RBC # BLD: 3.55 M/UL (ref 4.21–5.77)
RBC # BLD: ABNORMAL 10*6/UL
SEG NEUTROPHILS: 63 % (ref 36–65)
SEGMENTED NEUTROPHILS ABSOLUTE COUNT: 5.39 K/UL (ref 1.5–8.1)
WBC # BLD: 8.5 K/UL (ref 3.5–11.3)
WBC # BLD: ABNORMAL 10*3/UL

## 2020-04-28 PROCEDURE — 85025 COMPLETE CBC W/AUTO DIFF WBC: CPT

## 2020-04-28 PROCEDURE — P9603 ONE-WAY ALLOW PRORATED MILES: HCPCS

## 2020-05-05 ENCOUNTER — HOSPITAL ENCOUNTER (OUTPATIENT)
Age: 40
Setting detail: SPECIMEN
Discharge: HOME | End: 2020-05-05
Payer: COMMERCIAL

## 2020-05-05 LAB
ABSOLUTE EOS #: 0.2 K/UL (ref 0–0.44)
ABSOLUTE IMMATURE GRANULOCYTE: <0.03 K/UL (ref 0–0.3)
ABSOLUTE LYMPH #: 1.97 K/UL (ref 1.1–3.7)
ABSOLUTE MONO #: 0.72 K/UL (ref 0.1–1.2)
BASOPHILS # BLD: 0 % (ref 0–2)
BASOPHILS ABSOLUTE: <0.03 K/UL (ref 0–0.2)
DIFFERENTIAL TYPE: ABNORMAL
EOSINOPHILS RELATIVE PERCENT: 3 % (ref 1–4)
HCT VFR BLD CALC: 36.4 % (ref 40.7–50.3)
HEMOGLOBIN: 10.5 G/DL (ref 13–17)
IMMATURE GRANULOCYTES: 0 %
LYMPHOCYTES # BLD: 31 % (ref 24–43)
MCH RBC QN AUTO: 27.5 PG (ref 25.2–33.5)
MCHC RBC AUTO-ENTMCNC: 28.8 G/DL (ref 28.4–34.8)
MCV RBC AUTO: 95.3 FL (ref 82.6–102.9)
MONOCYTES # BLD: 12 % (ref 3–12)
NRBC AUTOMATED: 0 PER 100 WBC
PDW BLD-RTO: 19.5 % (ref 11.8–14.4)
PLATELET # BLD: 475 K/UL (ref 138–453)
PLATELET ESTIMATE: ABNORMAL
PMV BLD AUTO: 8.8 FL (ref 8.1–13.5)
RBC # BLD: 3.82 M/UL (ref 4.21–5.77)
RBC # BLD: ABNORMAL 10*6/UL
SEG NEUTROPHILS: 54 % (ref 36–65)
SEGMENTED NEUTROPHILS ABSOLUTE COUNT: 3.34 K/UL (ref 1.5–8.1)
WBC # BLD: 6.3 K/UL (ref 3.5–11.3)
WBC # BLD: ABNORMAL 10*3/UL

## 2020-05-05 PROCEDURE — P9603 ONE-WAY ALLOW PRORATED MILES: HCPCS

## 2020-05-05 PROCEDURE — 85025 COMPLETE CBC W/AUTO DIFF WBC: CPT

## 2020-05-05 PROCEDURE — 36415 COLL VENOUS BLD VENIPUNCTURE: CPT

## 2020-05-12 ENCOUNTER — HOSPITAL ENCOUNTER (OUTPATIENT)
Age: 40
Setting detail: SPECIMEN
Discharge: HOME | End: 2020-05-12
Payer: COMMERCIAL

## 2020-05-12 LAB
ABSOLUTE EOS #: 0.39 K/UL (ref 0–0.44)
ABSOLUTE IMMATURE GRANULOCYTE: <0.03 K/UL (ref 0–0.3)
ABSOLUTE LYMPH #: 1.85 K/UL (ref 1.1–3.7)
ABSOLUTE MONO #: 1.01 K/UL (ref 0.1–1.2)
BASOPHILS # BLD: 1 % (ref 0–2)
BASOPHILS ABSOLUTE: 0.04 K/UL (ref 0–0.2)
DIFFERENTIAL TYPE: ABNORMAL
EOSINOPHILS RELATIVE PERCENT: 6 % (ref 1–4)
HCT VFR BLD CALC: 35.6 % (ref 40.7–50.3)
HEMOGLOBIN: 10.6 G/DL (ref 13–17)
IMMATURE GRANULOCYTES: 0 %
LYMPHOCYTES # BLD: 27 % (ref 24–43)
MCH RBC QN AUTO: 28.3 PG (ref 25.2–33.5)
MCHC RBC AUTO-ENTMCNC: 29.8 G/DL (ref 28.4–34.8)
MCV RBC AUTO: 94.9 FL (ref 82.6–102.9)
MONOCYTES # BLD: 15 % (ref 3–12)
NRBC AUTOMATED: 0 PER 100 WBC
PDW BLD-RTO: 18.8 % (ref 11.8–14.4)
PLATELET # BLD: 374 K/UL (ref 138–453)
PLATELET ESTIMATE: ABNORMAL
PMV BLD AUTO: 8.9 FL (ref 8.1–13.5)
RBC # BLD: 3.75 M/UL (ref 4.21–5.77)
RBC # BLD: ABNORMAL 10*6/UL
SEG NEUTROPHILS: 51 % (ref 36–65)
SEGMENTED NEUTROPHILS ABSOLUTE COUNT: 3.62 K/UL (ref 1.5–8.1)
WBC # BLD: 6.9 K/UL (ref 3.5–11.3)
WBC # BLD: ABNORMAL 10*3/UL

## 2020-05-12 PROCEDURE — P9603 ONE-WAY ALLOW PRORATED MILES: HCPCS

## 2020-05-12 PROCEDURE — 85025 COMPLETE CBC W/AUTO DIFF WBC: CPT

## 2020-05-12 PROCEDURE — 36415 COLL VENOUS BLD VENIPUNCTURE: CPT

## 2020-05-19 ENCOUNTER — HOSPITAL ENCOUNTER (OUTPATIENT)
Age: 40
Setting detail: SPECIMEN
Discharge: HOME | End: 2020-05-19
Payer: COMMERCIAL

## 2020-05-19 LAB
ABSOLUTE EOS #: 0.27 K/UL (ref 0–0.44)
ABSOLUTE IMMATURE GRANULOCYTE: 0.03 K/UL (ref 0–0.3)
ABSOLUTE LYMPH #: 2.18 K/UL (ref 1.1–3.7)
ABSOLUTE MONO #: 0.59 K/UL (ref 0.1–1.2)
BASOPHILS # BLD: 0 % (ref 0–2)
BASOPHILS ABSOLUTE: 0.04 K/UL (ref 0–0.2)
DIFFERENTIAL TYPE: ABNORMAL
EOSINOPHILS RELATIVE PERCENT: 3 % (ref 1–4)
HCT VFR BLD CALC: 39.5 % (ref 40.7–50.3)
HEMOGLOBIN: 11.6 G/DL (ref 13–17)
IMMATURE GRANULOCYTES: 0 %
LITHIUM DATE LAST DOSE: NORMAL
LITHIUM DOSE AMOUNT: NORMAL
LITHIUM DOSE TIME: NORMAL
LITHIUM LEVEL: 1 MMOL/L (ref 0.6–1.2)
LYMPHOCYTES # BLD: 21 % (ref 24–43)
MCH RBC QN AUTO: 27.7 PG (ref 25.2–33.5)
MCHC RBC AUTO-ENTMCNC: 29.4 G/DL (ref 28.4–34.8)
MCV RBC AUTO: 94.3 FL (ref 82.6–102.9)
MONOCYTES # BLD: 6 % (ref 3–12)
NRBC AUTOMATED: 0 PER 100 WBC
PDW BLD-RTO: 17.8 % (ref 11.8–14.4)
PLATELET # BLD: 520 K/UL (ref 138–453)
PLATELET ESTIMATE: ABNORMAL
PMV BLD AUTO: 8.7 FL (ref 8.1–13.5)
RBC # BLD: 4.19 M/UL (ref 4.21–5.77)
RBC # BLD: ABNORMAL 10*6/UL
SEG NEUTROPHILS: 70 % (ref 36–65)
SEGMENTED NEUTROPHILS ABSOLUTE COUNT: 7.13 K/UL (ref 1.5–8.1)
WBC # BLD: 10.2 K/UL (ref 3.5–11.3)
WBC # BLD: ABNORMAL 10*3/UL

## 2020-05-19 PROCEDURE — 36415 COLL VENOUS BLD VENIPUNCTURE: CPT

## 2020-05-19 PROCEDURE — 80178 ASSAY OF LITHIUM: CPT

## 2020-05-19 PROCEDURE — P9603 ONE-WAY ALLOW PRORATED MILES: HCPCS

## 2020-05-19 PROCEDURE — 85025 COMPLETE CBC W/AUTO DIFF WBC: CPT

## 2020-05-29 ENCOUNTER — HOSPITAL ENCOUNTER (OUTPATIENT)
Age: 40
Setting detail: SPECIMEN
Discharge: HOME | End: 2020-05-29
Payer: COMMERCIAL

## 2020-05-29 LAB
ABSOLUTE EOS #: 0.3 K/UL (ref 0–0.44)
ABSOLUTE IMMATURE GRANULOCYTE: 0.03 K/UL (ref 0–0.3)
ABSOLUTE LYMPH #: 1.94 K/UL (ref 1.1–3.7)
ABSOLUTE MONO #: 0.94 K/UL (ref 0.1–1.2)
BASOPHILS # BLD: 0 % (ref 0–2)
BASOPHILS ABSOLUTE: 0.04 K/UL (ref 0–0.2)
DIFFERENTIAL TYPE: ABNORMAL
EOSINOPHILS RELATIVE PERCENT: 3 % (ref 1–4)
HCT VFR BLD CALC: 36 % (ref 40.7–50.3)
HEMOGLOBIN: 10.7 G/DL (ref 13–17)
IMMATURE GRANULOCYTES: 0 %
LYMPHOCYTES # BLD: 18 % (ref 24–43)
MCH RBC QN AUTO: 27.2 PG (ref 25.2–33.5)
MCHC RBC AUTO-ENTMCNC: 29.7 G/DL (ref 28.4–34.8)
MCV RBC AUTO: 91.4 FL (ref 82.6–102.9)
MONOCYTES # BLD: 9 % (ref 3–12)
NRBC AUTOMATED: 0 PER 100 WBC
PDW BLD-RTO: 16.7 % (ref 11.8–14.4)
PLATELET # BLD: 578 K/UL (ref 138–453)
PLATELET ESTIMATE: ABNORMAL
PMV BLD AUTO: 8.6 FL (ref 8.1–13.5)
RBC # BLD: 3.94 M/UL (ref 4.21–5.77)
RBC # BLD: ABNORMAL 10*6/UL
SEG NEUTROPHILS: 70 % (ref 36–65)
SEGMENTED NEUTROPHILS ABSOLUTE COUNT: 7.62 K/UL (ref 1.5–8.1)
WBC # BLD: 10.9 K/UL (ref 3.5–11.3)
WBC # BLD: ABNORMAL 10*3/UL

## 2020-05-29 PROCEDURE — 85025 COMPLETE CBC W/AUTO DIFF WBC: CPT

## 2020-05-29 PROCEDURE — 36415 COLL VENOUS BLD VENIPUNCTURE: CPT

## 2020-05-29 PROCEDURE — P9603 ONE-WAY ALLOW PRORATED MILES: HCPCS

## 2020-06-05 ENCOUNTER — HOSPITAL ENCOUNTER (OUTPATIENT)
Age: 40
Setting detail: SPECIMEN
Discharge: HOME | End: 2020-06-05
Payer: COMMERCIAL

## 2020-06-05 LAB
ABSOLUTE EOS #: 0.39 K/UL (ref 0–0.44)
ABSOLUTE IMMATURE GRANULOCYTE: 0.04 K/UL (ref 0–0.3)
ABSOLUTE LYMPH #: 2.29 K/UL (ref 1.1–3.7)
ABSOLUTE MONO #: 1.25 K/UL (ref 0.1–1.2)
BASOPHILS # BLD: 1 % (ref 0–2)
BASOPHILS ABSOLUTE: 0.06 K/UL (ref 0–0.2)
DIFFERENTIAL TYPE: ABNORMAL
EOSINOPHILS RELATIVE PERCENT: 3 % (ref 1–4)
HCT VFR BLD CALC: 34.6 % (ref 40.7–50.3)
HEMOGLOBIN: 10.2 G/DL (ref 13–17)
IMMATURE GRANULOCYTES: 0 %
LYMPHOCYTES # BLD: 20 % (ref 24–43)
MCH RBC QN AUTO: 27 PG (ref 25.2–33.5)
MCHC RBC AUTO-ENTMCNC: 29.5 G/DL (ref 28.4–34.8)
MCV RBC AUTO: 91.5 FL (ref 82.6–102.9)
MONOCYTES # BLD: 11 % (ref 3–12)
NRBC AUTOMATED: 0 PER 100 WBC
PDW BLD-RTO: 16.7 % (ref 11.8–14.4)
PLATELET # BLD: 552 K/UL (ref 138–453)
PLATELET ESTIMATE: ABNORMAL
PMV BLD AUTO: 8.7 FL (ref 8.1–13.5)
RBC # BLD: 3.78 M/UL (ref 4.21–5.77)
RBC # BLD: ABNORMAL 10*6/UL
SEG NEUTROPHILS: 65 % (ref 36–65)
SEGMENTED NEUTROPHILS ABSOLUTE COUNT: 7.64 K/UL (ref 1.5–8.1)
WBC # BLD: 11.7 K/UL (ref 3.5–11.3)
WBC # BLD: ABNORMAL 10*3/UL

## 2020-06-05 PROCEDURE — 85025 COMPLETE CBC W/AUTO DIFF WBC: CPT

## 2020-06-05 PROCEDURE — 36415 COLL VENOUS BLD VENIPUNCTURE: CPT

## 2020-06-05 PROCEDURE — P9603 ONE-WAY ALLOW PRORATED MILES: HCPCS

## 2020-06-12 ENCOUNTER — HOSPITAL ENCOUNTER (OUTPATIENT)
Age: 40
Setting detail: SPECIMEN
Discharge: HOME | End: 2020-06-12
Payer: COMMERCIAL

## 2020-06-12 LAB
ABSOLUTE EOS #: 0.41 K/UL (ref 0–0.44)
ABSOLUTE IMMATURE GRANULOCYTE: 0.06 K/UL (ref 0–0.3)
ABSOLUTE LYMPH #: 2.26 K/UL (ref 1.1–3.7)
ABSOLUTE MONO #: 1.13 K/UL (ref 0.1–1.2)
BASOPHILS # BLD: 0 % (ref 0–2)
BASOPHILS ABSOLUTE: 0.04 K/UL (ref 0–0.2)
DIFFERENTIAL TYPE: ABNORMAL
EOSINOPHILS RELATIVE PERCENT: 4 % (ref 1–4)
HCT VFR BLD CALC: 34.8 % (ref 40.7–50.3)
HEMOGLOBIN: 10.2 G/DL (ref 13–17)
IMMATURE GRANULOCYTES: 1 %
LYMPHOCYTES # BLD: 21 % (ref 24–43)
MCH RBC QN AUTO: 26.9 PG (ref 25.2–33.5)
MCHC RBC AUTO-ENTMCNC: 29.3 G/DL (ref 28.4–34.8)
MCV RBC AUTO: 91.8 FL (ref 82.6–102.9)
MONOCYTES # BLD: 11 % (ref 3–12)
NRBC AUTOMATED: 0 PER 100 WBC
PDW BLD-RTO: 16.5 % (ref 11.8–14.4)
PLATELET # BLD: 518 K/UL (ref 138–453)
PLATELET ESTIMATE: ABNORMAL
PMV BLD AUTO: 8.5 FL (ref 8.1–13.5)
RBC # BLD: 3.79 M/UL (ref 4.21–5.77)
RBC # BLD: ABNORMAL 10*6/UL
SEG NEUTROPHILS: 63 % (ref 36–65)
SEGMENTED NEUTROPHILS ABSOLUTE COUNT: 6.79 K/UL (ref 1.5–8.1)
WBC # BLD: 10.7 K/UL (ref 3.5–11.3)
WBC # BLD: ABNORMAL 10*3/UL

## 2020-06-12 PROCEDURE — 85025 COMPLETE CBC W/AUTO DIFF WBC: CPT

## 2020-06-12 PROCEDURE — 36415 COLL VENOUS BLD VENIPUNCTURE: CPT

## 2020-06-12 PROCEDURE — P9603 ONE-WAY ALLOW PRORATED MILES: HCPCS

## 2020-06-17 ENCOUNTER — HOSPITAL ENCOUNTER (OUTPATIENT)
Age: 40
Setting detail: SPECIMEN
Discharge: HOME | End: 2020-06-17
Payer: COMMERCIAL

## 2020-06-17 LAB
ANION GAP SERPL CALCULATED.3IONS-SCNC: 16 MMOL/L (ref 9–17)
BUN BLDV-MCNC: 9 MG/DL (ref 6–20)
BUN/CREAT BLD: ABNORMAL (ref 9–20)
CALCIUM SERPL-MCNC: 9.5 MG/DL (ref 8.6–10.4)
CHLORIDE BLD-SCNC: 101 MMOL/L (ref 98–107)
CO2: 22 MMOL/L (ref 20–31)
CREAT SERPL-MCNC: 0.82 MG/DL (ref 0.7–1.2)
GFR AFRICAN AMERICAN: >60 ML/MIN
GFR NON-AFRICAN AMERICAN: >60 ML/MIN
GFR SERPL CREATININE-BSD FRML MDRD: ABNORMAL ML/MIN/{1.73_M2}
GFR SERPL CREATININE-BSD FRML MDRD: ABNORMAL ML/MIN/{1.73_M2}
GLUCOSE BLD-MCNC: 117 MG/DL (ref 70–99)
POTASSIUM SERPL-SCNC: 4.6 MMOL/L (ref 3.7–5.3)
SODIUM BLD-SCNC: 139 MMOL/L (ref 135–144)

## 2020-06-17 PROCEDURE — 36415 COLL VENOUS BLD VENIPUNCTURE: CPT

## 2020-06-17 PROCEDURE — P9603 ONE-WAY ALLOW PRORATED MILES: HCPCS

## 2020-06-17 PROCEDURE — 80048 BASIC METABOLIC PNL TOTAL CA: CPT

## 2020-06-19 ENCOUNTER — HOSPITAL ENCOUNTER (OUTPATIENT)
Age: 40
Setting detail: SPECIMEN
Discharge: HOME | End: 2020-06-19
Payer: COMMERCIAL

## 2020-06-19 LAB
ABSOLUTE EOS #: 0.33 K/UL (ref 0–0.44)
ABSOLUTE IMMATURE GRANULOCYTE: 0.04 K/UL (ref 0–0.3)
ABSOLUTE LYMPH #: 1.86 K/UL (ref 1.1–3.7)
ABSOLUTE MONO #: 0.9 K/UL (ref 0.1–1.2)
BASOPHILS # BLD: 0 % (ref 0–2)
BASOPHILS ABSOLUTE: 0.04 K/UL (ref 0–0.2)
DIFFERENTIAL TYPE: ABNORMAL
EOSINOPHILS RELATIVE PERCENT: 3 % (ref 1–4)
HCT VFR BLD CALC: 33.7 % (ref 40.7–50.3)
HEMOGLOBIN: 9.9 G/DL (ref 13–17)
IMMATURE GRANULOCYTES: 0 %
LITHIUM DATE LAST DOSE: NORMAL
LITHIUM DOSE AMOUNT: NORMAL
LITHIUM DOSE TIME: NORMAL
LITHIUM LEVEL: 1 MMOL/L (ref 0.6–1.2)
LYMPHOCYTES # BLD: 18 % (ref 24–43)
MCH RBC QN AUTO: 26.6 PG (ref 25.2–33.5)
MCHC RBC AUTO-ENTMCNC: 29.4 G/DL (ref 28.4–34.8)
MCV RBC AUTO: 90.6 FL (ref 82.6–102.9)
MONOCYTES # BLD: 9 % (ref 3–12)
NRBC AUTOMATED: 0 PER 100 WBC
PDW BLD-RTO: 16.7 % (ref 11.8–14.4)
PLATELET # BLD: 504 K/UL (ref 138–453)
PLATELET ESTIMATE: ABNORMAL
PMV BLD AUTO: 8.5 FL (ref 8.1–13.5)
RBC # BLD: 3.72 M/UL (ref 4.21–5.77)
RBC # BLD: ABNORMAL 10*6/UL
SEG NEUTROPHILS: 70 % (ref 36–65)
SEGMENTED NEUTROPHILS ABSOLUTE COUNT: 7.23 K/UL (ref 1.5–8.1)
WBC # BLD: 10.4 K/UL (ref 3.5–11.3)
WBC # BLD: ABNORMAL 10*3/UL

## 2020-06-19 PROCEDURE — 36415 COLL VENOUS BLD VENIPUNCTURE: CPT

## 2020-06-19 PROCEDURE — P9603 ONE-WAY ALLOW PRORATED MILES: HCPCS

## 2020-06-19 PROCEDURE — 80178 ASSAY OF LITHIUM: CPT

## 2020-06-19 PROCEDURE — 85025 COMPLETE CBC W/AUTO DIFF WBC: CPT

## 2020-06-26 ENCOUNTER — HOSPITAL ENCOUNTER (OUTPATIENT)
Age: 40
Setting detail: SPECIMEN
Discharge: HOME | End: 2020-06-26
Payer: COMMERCIAL

## 2020-06-26 LAB
ABSOLUTE EOS #: 0.47 K/UL (ref 0–0.44)
ABSOLUTE IMMATURE GRANULOCYTE: 0.05 K/UL (ref 0–0.3)
ABSOLUTE LYMPH #: 2.53 K/UL (ref 1.1–3.7)
ABSOLUTE MONO #: 1.11 K/UL (ref 0.1–1.2)
BASOPHILS # BLD: 0 % (ref 0–2)
BASOPHILS ABSOLUTE: 0.05 K/UL (ref 0–0.2)
DIFFERENTIAL TYPE: ABNORMAL
EOSINOPHILS RELATIVE PERCENT: 4 % (ref 1–4)
HCT VFR BLD CALC: 37.8 % (ref 40.7–50.3)
HEMOGLOBIN: 11.1 G/DL (ref 13–17)
IMMATURE GRANULOCYTES: 0 %
LYMPHOCYTES # BLD: 22 % (ref 24–43)
MCH RBC QN AUTO: 26.2 PG (ref 25.2–33.5)
MCHC RBC AUTO-ENTMCNC: 29.4 G/DL (ref 28.4–34.8)
MCV RBC AUTO: 89.2 FL (ref 82.6–102.9)
MONOCYTES # BLD: 9 % (ref 3–12)
NRBC AUTOMATED: 0 PER 100 WBC
PDW BLD-RTO: 16.6 % (ref 11.8–14.4)
PLATELET # BLD: 630 K/UL (ref 138–453)
PLATELET ESTIMATE: ABNORMAL
PMV BLD AUTO: 8.4 FL (ref 8.1–13.5)
RBC # BLD: 4.24 M/UL (ref 4.21–5.77)
RBC # BLD: ABNORMAL 10*6/UL
SEG NEUTROPHILS: 65 % (ref 36–65)
SEGMENTED NEUTROPHILS ABSOLUTE COUNT: 7.55 K/UL (ref 1.5–8.1)
WBC # BLD: 11.8 K/UL (ref 3.5–11.3)
WBC # BLD: ABNORMAL 10*3/UL

## 2020-06-26 PROCEDURE — 85025 COMPLETE CBC W/AUTO DIFF WBC: CPT

## 2020-06-26 PROCEDURE — P9603 ONE-WAY ALLOW PRORATED MILES: HCPCS

## 2020-06-26 PROCEDURE — 36415 COLL VENOUS BLD VENIPUNCTURE: CPT

## 2020-07-15 ENCOUNTER — HOSPITAL ENCOUNTER (OUTPATIENT)
Dept: WOUND CARE | Age: 40
Discharge: HOME | End: 2020-07-15

## 2020-07-16 ENCOUNTER — HOSPITAL ENCOUNTER (OUTPATIENT)
Dept: WOUND CARE | Age: 40
Discharge: HOME | End: 2020-07-16
Payer: COMMERCIAL

## 2020-07-16 VITALS
DIASTOLIC BLOOD PRESSURE: 73 MMHG | WEIGHT: 211 LBS | BODY MASS INDEX: 27.84 KG/M2 | HEART RATE: 101 BPM | TEMPERATURE: 98.2 F | SYSTOLIC BLOOD PRESSURE: 100 MMHG

## 2020-07-16 PROBLEM — S31.109A WOUND OF LEFT GROIN: Status: ACTIVE | Noted: 2020-07-16

## 2020-07-16 PROBLEM — Z72.0 TOBACCO ABUSE: Status: ACTIVE | Noted: 2020-07-16

## 2020-07-16 PROBLEM — S31.109A WOUND OF RIGHT GROIN: Status: ACTIVE | Noted: 2020-07-16

## 2020-07-16 PROCEDURE — 11042 DBRDMT SUBQ TIS 1ST 20SQCM/<: CPT | Performed by: NURSE PRACTITIONER

## 2020-07-16 PROCEDURE — 11042 DBRDMT SUBQ TIS 1ST 20SQCM/<: CPT

## 2020-07-16 PROCEDURE — 99203 OFFICE O/P NEW LOW 30 MIN: CPT | Performed by: NURSE PRACTITIONER

## 2020-07-16 PROCEDURE — 99213 OFFICE O/P EST LOW 20 MIN: CPT

## 2020-07-16 RX ORDER — QUETIAPINE FUMARATE 200 MG/1
200 TABLET, FILM COATED ORAL 2 TIMES DAILY
Status: ON HOLD | COMMUNITY
End: 2022-08-12 | Stop reason: HOSPADM

## 2020-07-16 RX ORDER — LIDOCAINE HYDROCHLORIDE 40 MG/ML
SOLUTION TOPICAL ONCE
Status: CANCELLED | OUTPATIENT
Start: 2020-07-16

## 2020-07-16 RX ORDER — LIDOCAINE 50 MG/G
OINTMENT TOPICAL ONCE
Status: CANCELLED | OUTPATIENT
Start: 2020-07-16

## 2020-07-16 RX ORDER — LIDOCAINE 40 MG/G
CREAM TOPICAL ONCE
Status: CANCELLED | OUTPATIENT
Start: 2020-07-16

## 2020-07-16 RX ORDER — LITHIUM CARBONATE 300 MG
450 TABLET ORAL 3 TIMES DAILY
Status: ON HOLD | COMMUNITY
End: 2020-10-28

## 2020-07-16 RX ORDER — LIDOCAINE HYDROCHLORIDE 20 MG/ML
JELLY TOPICAL PRN
Status: DISCONTINUED | OUTPATIENT
Start: 2020-07-16 | End: 2020-07-17 | Stop reason: HOSPADM

## 2020-07-16 RX ORDER — QUETIAPINE 300 MG/1
300 TABLET, FILM COATED, EXTENDED RELEASE ORAL DAILY
Status: ON HOLD | COMMUNITY
End: 2022-08-12 | Stop reason: HOSPADM

## 2020-07-16 RX ORDER — LIDOCAINE HYDROCHLORIDE 20 MG/ML
JELLY TOPICAL ONCE
Status: DISCONTINUED | OUTPATIENT
Start: 2020-07-16 | End: 2020-07-17 | Stop reason: HOSPADM

## 2020-07-16 RX ORDER — CITALOPRAM 10 MG/1
10 TABLET ORAL DAILY
COMMUNITY
End: 2020-09-09

## 2020-07-16 RX ORDER — LIDOCAINE HYDROCHLORIDE 20 MG/ML
JELLY TOPICAL ONCE
Status: CANCELLED
Start: 2020-07-16

## 2020-07-16 RX ORDER — ACETAMINOPHEN 325 MG/1
650 TABLET ORAL EVERY 6 HOURS PRN
Status: ON HOLD | COMMUNITY
End: 2022-08-12 | Stop reason: HOSPADM

## 2020-07-16 NOTE — PROGRESS NOTES
status: Current Every Day Smoker     Packs/day: 1.00     Years: 22.00     Pack years: 22.00     Types: Cigarettes    Smokeless tobacco: Never Used   Substance Use Topics    Alcohol use: Yes     Comment: occasional    Drug use: Yes     Comment: history of marijuana & Opiate abue, claims that he is currently clean & sober. ALLERGIES    Allergies   Allergen Reactions    Depakene [Valproic Acid]     Restoril [Temazepam]     Risperidone And Related      Seizure      Thorazine [Chlorpromazine]        MEDICATIONS    Current Outpatient Medications on File Prior to Encounter   Medication Sig Dispense Refill    acetaminophen (TYLENOL) 325 MG tablet Take 650 mg by mouth every 6 hours as needed for Pain      QUEtiapine (SEROQUEL) 200 MG tablet Take 200 mg by mouth 2 times daily      QUEtiapine (SEROQUEL XR) 300 MG extended release tablet Take 300 mg by mouth daily      lithium 300 MG tablet Take 300 mg by mouth 3 times daily      citalopram (CELEXA) 10 MG tablet Take 10 mg by mouth daily      benztropine (COGENTIN) 1 MG tablet Take 1 tablet by mouth 2 times daily 60 tablet 0    docusate sodium (COLACE, DULCOLAX) 100 MG CAPS Take 100 mg by mouth 2 times daily 60 capsule 0    pantoprazole (PROTONIX) 40 MG tablet Take 1 tablet by mouth every morning (before breakfast) 30 tablet 3    Multiple Vitamins-Minerals (THERAPEUTIC MULTIVITAMIN-MINERALS) tablet Take 1 tablet by mouth daily. 30 tablet 0    albuterol (PROVENTIL HFA;VENTOLIN HFA) 108 (90 BASE) MCG/ACT inhaler Inhale 2 puffs into the lungs 4 times daily as needed for Wheezing.       lamoTRIgine (LAMICTAL) 100 MG tablet Take 1 tablet by mouth nightly 30 tablet 0    traZODone (DESYREL) 100 MG tablet Take 1 tablet by mouth nightly as needed for Sleep 30 tablet 0    ARIPiprazole (ABILIFY) 30 MG tablet Take 1 tablet by mouth daily 30 tablet 0    Cariprazine HCl 3 MG CAPS Take 2 capsules by mouth daily 30 capsule 0    ferrous sulfate 325 (65 FE) MG tablet Take 1 tablet by mouth 2 times daily (with meals) 30 tablet 3     No current facility-administered medications on file prior to encounter. REVIEW OF SYSTEMS    Pertinent items are noted in HPI.     Objective:      /73   Pulse 101   Temp 98.2 °F (36.8 °C) (Oral)   Wt 211 lb (95.7 kg)   BMI 27.84 kg/m²     Wt Readings from Last 3 Encounters:   07/16/20 211 lb (95.7 kg)   08/30/16 165 lb (74.8 kg)   07/12/16 160 lb (72.6 kg)       PHYSICAL EXAM    General Appearance: alert and oriented to person, place and time, well developed and well- nourished, in no acute distress  Skin: warm and dry, no rash or erythema, bilateral groin wounds   Head: normocephalic and atraumatic  Eyes: pupils equal, round, and conjunctivae normal  Pulmonary/Chest: normal air movement, no respiratory distress  Cardiovascular: normal rate, regular rhythm, normal S1 and S2, no murmurs  Abdomen: soft, non-tender, non-distended, normal bowel sounds  Extremities: no cyanosis, clubbing or edema  Musculoskeletal: no joint swelling, deformity or tenderness  Neurologic: gait, coordination and speech normal      Assessment:     Problem List Items Addressed This Visit     Hidradenitis suppurativa    Relevant Medications    lidocaine (XYLOCAINE) 2 % uro-jet (Start on 7/16/2020 11:15 AM)    Other Relevant Orders    Supply: Wound Cleanser    Supply: Wound Dressings    Hidradenitis axillaris    Schizoaffective disorder, bipolar type (HCC)    Relevant Medications    lidocaine (XYLOCAINE) 2 % uro-jet (Start on 7/16/2020 11:15 AM)    Other Relevant Orders    Supply: Wound Cleanser    Supply: Wound Dressings    Tobacco abuse    Relevant Medications    lidocaine (XYLOCAINE) 2 % uro-jet (Start on 7/16/2020 11:15 AM)    Other Relevant Orders    Supply: Wound Cleanser    Supply: Wound Dressings    Wound of left groin    Relevant Medications    lidocaine (XYLOCAINE) 2 % uro-jet (Start on 7/16/2020 11:15 AM)    Other Relevant Orders    Supply: Wound Cleanser    Supply: Wound Dressings    Wound of right groin - Primary    Relevant Medications    lidocaine (XYLOCAINE) 2 % uro-jet (Start on 7/16/2020 11:15 AM)    Other Relevant Orders    Supply: Wound Cleanser    Supply: Wound Dressings           Procedure Note  Indications:  Based on my examination of this patient's wound(s)/ulcer(s) today, debridement is required to promote healing and evaluate the wound base. Performed by: APURVA Hartmann CNP    Consent obtained:  Yes    Time out taken:  Yes    Pain Control:   Anesthetic  Anesthetic: 2% Lidocaine Gel Topical    Debridement:Excisional Debridement    Using curette the wound(s)/ulcer(s) was/were sharply debrided down through and including the removal of subcutaneous tissue. Devitalized Tissue Debrided:  fibrin, biofilm and slough    Pre Debridement Measurements:  Are located in the [place]  Documentation Flow Sheet    Wound/Ulcer #: 1, 2 and 3    Post Debridement Measurements:  Wound/Ulcer Descriptions are Pre Debridement except measurements:    Wound 03/31/14 Coccyx Medial (Active)   Number of days: 3889       Wound 03/31/14 Left (Active)   Number of days: 2299       Incision 11/01/13 Back Lower (Active)   Number of days: 5155       Incision 11/01/13 Groin Left (Active)   Number of days: 1332       Incision 04/11/14 Sacrum (Active)   Number of days: 2287       Wound 07/16/20 Groin Upper (Active)   Wound Image   07/16/20 1014   Wound Other 07/16/20 1014   Dressing Status New drainage; Old drainage 07/16/20 1014   Dressing Changed Changed/New 07/16/20 1014   Wound Cleansed Soap and water 07/16/20 1014   Wound Length (cm) 9.5 cm 07/16/20 1014   Wound Width (cm) 16 cm 07/16/20 1014   Wound Depth (cm) 0.2 cm 07/16/20 1014   Wound Surface Area (cm^2) 152 cm^2 07/16/20 1014   Wound Volume (cm^3) 30.4 cm^3 07/16/20 1014   Post-Procedure Length (cm) 9.5 cm 07/16/20 1014   Post-Procedure Width (cm) 16 cm 07/16/20 1014   Post-Procedure Depth (cm) 0.2 cm 07/16/20 1014   Post-Procedure Surface Area (cm^2) 152 cm^2 07/16/20 1014   Post-Procedure Volume (cm^3) 30.4 cm^3 07/16/20 1014   Wound Assessment Drainage;Red;Pink;Painful 07/16/20 1014   Drainage Amount Large 07/16/20 1014   Drainage Description Serous 07/16/20 1014   Odor Mild 07/16/20 1014   Margins Defined edges 07/16/20 1014   Blaire-wound Assessment Painful; Intact 07/16/20 1014   [place]%Wound Bed 50 07/16/20 1014   Red%Wound Bed 50 07/16/20 1014   Number of days: 0       Wound 07/16/20 Thigh Right;Upper #3 (Active)   Wound Image   07/16/20 1014   Wound Other 07/16/20 1014   Dressing Status New drainage; Old drainage 07/16/20 1014   Dressing Changed Changed/New 07/16/20 1014   Wound Length (cm) 11 cm 07/16/20 1014   Wound Width (cm) 11 cm 07/16/20 1014   Wound Depth (cm) 0.1 cm 07/16/20 1014   Wound Surface Area (cm^2) 121 cm^2 07/16/20 1014   Wound Volume (cm^3) 12.1 cm^3 07/16/20 1014   Post-Procedure Length (cm) 11 cm 07/16/20 1014   Post-Procedure Width (cm) 11 cm 07/16/20 1014   Post-Procedure Depth (cm) 0.1 cm 07/16/20 1014   Post-Procedure Surface Area (cm^2) 121 cm^2 07/16/20 1014   Post-Procedure Volume (cm^3) 12.1 cm^3 07/16/20 1014   Wound Assessment Painful;Pink;Red 07/16/20 1014   Drainage Amount Moderate 07/16/20 1014   Drainage Description Serous 07/16/20 1014   Odor Mild 07/16/20 1014   Margins Defined edges 07/16/20 1014   Blaire-wound Assessment Painful; Intact 07/16/20 1014   [place]%Wound Bed 50 07/16/20 1014   Red%Wound Bed 50 07/16/20 1014   Number of days: 0       Wound 07/16/20 Thigh Left;Upper #2 (Active)   Wound Image   07/16/20 1014   Wound Other 07/16/20 1014   Dressing Status New drainage; Old drainage 07/16/20 1014   Dressing Changed Changed/New 07/16/20 1014   Wound Cleansed Soap and water 07/16/20 1014   Wound Length (cm) 0.5 cm 07/16/20 1014   Wound Width (cm) 1.5 cm 07/16/20 1014   Wound Depth (cm) 0.5 cm 07/16/20 1014   Wound Surface Area (cm^2) 0.75 cm^2 07/16/20 1014   Wound Volume (cm^3) 0.38 cm^3 07/16/20 1014   Post-Procedure Length (cm) 0.5 cm 07/16/20 1014   Post-Procedure Width (cm) 1.5 cm 07/16/20 1014   Post-Procedure Depth (cm) 0.5 cm 07/16/20 1014   Post-Procedure Surface Area (cm^2) 0.75 cm^2 07/16/20 1014   Post-Procedure Volume (cm^3) 0.38 cm^3 07/16/20 1014   Wound Assessment Drainage;Painful;Pink;Red 07/16/20 1014   Drainage Amount Moderate 07/16/20 1014   Drainage Description Serous 07/16/20 1014   Odor Mild 07/16/20 1014   Blaire-wound Assessment Intact;Painful 07/16/20 1014   [place]%Wound Bed 50 07/16/20 1014   Red%Wound Bed 50 07/16/20 1014   Number of days: 0        Percent of Wound(s)/Ulcer(s) Debrided: 10%    Total Surface Area Debrided: 10.00  sq cm       Diabetic/Pressure/Non Pressure Ulcers only:  Ulcer: Non-Pressure ulcer, fat layer exposed      Estimated Blood Loss:  Minimal    Hemostasis Achieved:  by pressure    Procedural Pain:  0  / 10     Post Procedural Pain:  0 / 10     Response to treatment:  Well tolerated by patient. Plan:     Treatment Note please see attached Discharge Instructions    Written patient dismissal instructions given to patient and signed by patient or POA. Discharge Instructions          Devante Sanford -Phone: [phone] Fax: [phone]             Visit  Discharge Instructions / Physician Orders     DATE: 7/16/2020     Home Care:  [facility]-Daily dressing change     SUPPLIES ORDERED THRU: Home Care     Wound Location:  Groin, Lower Abdomen     Cleanse with: Liquid antibacterial soap and water, rinse well      Dressing Orders: Silvercel to wounds, cover with ABD, Mefix Tape     Frequency: Change Daily     Additional Orders: Increase protein to diet (meat, cheese, eggs, fish, peanut butter, nuts and beans)  Multivitamin daily  ELEVATE LEGS AS MUCH AS POSSIBLE     Your next appointment with [address] [place] [address] is in 1 week                                                                                                 ROOM TYPE   []? CHAIR     [x]?  STRETCHER  []? EITHER             (Please note your next appointment above and if you are unable to keep, kindly give a 24 hour notice. Thank you.)     If you experience any of the following, please call the [facility] during business hours:  [phone]     * Increase in Pain  * Temperature over 101  * Increase in drainage from your wound  * Drainage with a foul odor  * Bleeding  * Increase in swelling  * Need for compression bandage changes due to slippage, breakthrough drainage.     If you need medical attention outside of the business hours of the [facility] please contact your PCP or go to the nearest emergency room. The information contained in the After Visit Summary has been reviewed with me, the patient and/or responsible adult, by my health care provider(s). I had the opportunity to ask questions regarding this information. I have elected to receive;      []? After Visit Summary  [x]? Comprehensive Discharge Instruction        Patient signature______________________________________Date:________  Electronically signed by Dyana Durand RN on 7/16/2020 at 10:27 AM  Electronically signed by APURVA Tavares CNP on 7/16/2020 at 10:37 AM          Electronically signed by APURVA Tavares CNP on 7/16/2020 at 10:46 AM

## 2020-07-22 ENCOUNTER — HOSPITAL ENCOUNTER (OUTPATIENT)
Dept: WOUND CARE | Age: 40
Discharge: HOME | End: 2020-07-22
Payer: COMMERCIAL

## 2020-07-22 VITALS
BODY MASS INDEX: 27.84 KG/M2 | HEART RATE: 99 BPM | SYSTOLIC BLOOD PRESSURE: 103 MMHG | DIASTOLIC BLOOD PRESSURE: 52 MMHG | WEIGHT: 211 LBS | TEMPERATURE: 97.9 F

## 2020-07-22 PROBLEM — S31.104D NON-HEALING LEFT GROIN OPEN WOUND, SUBSEQUENT ENCOUNTER: Status: ACTIVE | Noted: 2020-07-16

## 2020-07-22 PROBLEM — S31.109D RIGHT GROIN WOUND, SUBSEQUENT ENCOUNTER: Status: ACTIVE | Noted: 2020-07-16

## 2020-07-22 PROCEDURE — 11042 DBRDMT SUBQ TIS 1ST 20SQCM/<: CPT | Performed by: NURSE PRACTITIONER

## 2020-07-22 PROCEDURE — 11042 DBRDMT SUBQ TIS 1ST 20SQCM/<: CPT

## 2020-07-22 PROCEDURE — 6370000000 HC RX 637 (ALT 250 FOR IP): Performed by: NURSE PRACTITIONER

## 2020-07-22 RX ORDER — LIDOCAINE 40 MG/G
CREAM TOPICAL ONCE
Status: CANCELLED | OUTPATIENT
Start: 2020-07-22

## 2020-07-22 RX ORDER — LIDOCAINE 50 MG/G
OINTMENT TOPICAL ONCE
Status: CANCELLED | OUTPATIENT
Start: 2020-07-22

## 2020-07-22 RX ORDER — LIDOCAINE HYDROCHLORIDE 20 MG/ML
JELLY TOPICAL ONCE
Status: CANCELLED
Start: 2020-07-22

## 2020-07-22 RX ORDER — LIDOCAINE HYDROCHLORIDE 40 MG/ML
SOLUTION TOPICAL ONCE
Status: CANCELLED | OUTPATIENT
Start: 2020-07-22

## 2020-07-22 RX ORDER — LIDOCAINE HYDROCHLORIDE 20 MG/ML
JELLY TOPICAL ONCE
Status: COMPLETED | OUTPATIENT
Start: 2020-07-22 | End: 2020-07-22

## 2020-07-22 RX ADMIN — LIDOCAINE HYDROCHLORIDE: 20 JELLY TOPICAL at 14:17

## 2020-07-22 NOTE — PROGRESS NOTES
[address]   Progress Note and Procedure Note      [address] RECORD NUMBER:  [id number]  AGE: 41 y.o. GENDER: male  : 1980  EPISODE DATE:  2020    Subjective:     Chief Complaint   Patient presents with    Wound Check         HISTORY of PRESENT ILLNESS HPI     Waleska Pagan is a 41 y.o. male who presents today for wound/ulcer evaluation.    History of Wound Context: bilateral groin wounds d/t hidradenitis suppurativa  He saw PCP since last visit and was started on ambien which is helping with his sleep    Wound/Ulcer Pain Timing/Severity: none  Quality of pain: N/A  Severity:  0 / 10   Modifying Factors: None  Associated Signs/Symptoms: none    Ulcer Identification:  Ulcer Type: non-healing/non-surgical and hidradenitis suppurativa   Contributing Factors: smoking, poor hygiene and mental health    Wound: N/A        PAST MEDICAL HISTORY        Diagnosis Date    Bipolar 1 disorder ([address]     GERD (gastroesophageal reflux disease) 2016    Hidradenitis suppurativa     [facility]     Schizoaffective disorder ([facility]        PAST SURGICAL HISTORY    Past Surgical History:   Procedure Laterality Date    ABSCESS DRAINAGE  2013    left inguinal hydrodenitis/pilondal cyst    INFECTED SKIN DEBRIDEMENT  2011       FAMILY HISTORY    Family History   Problem Relation Age of Onset    Cancer Mother         breast    High Blood Pressure Maternal Grandfather     Mental Illness Paternal Aunt     Substance Abuse Paternal Uncle     Substance Abuse Brother     Alcohol Abuse Father     Cancer Father         pancrease       SOCIAL HISTORY    Social History     Tobacco Use    Smoking status: Current Every Day Smoker     Packs/day: 1.00     Years: 22.00     Pack years: 22.00     Types: Cigarettes    Smokeless tobacco: Never Used   Substance Use Topics    Alcohol use: Yes     Comment: occasional    Drug use: Yes     Comment: history of marijuana & Opiate abue, claims that he is currently clean & sober. ALLERGIES    Allergies   Allergen Reactions    Depakene [Valproic Acid]     Restoril [Temazepam]     Risperidone And Related      Seizure      Thorazine [Chlorpromazine]        MEDICATIONS    Current Outpatient Medications on File Prior to Encounter   Medication Sig Dispense Refill    ZOLPIDEM TARTRATE PO Take by mouth nightly as needed for Sleep      acetaminophen (TYLENOL) 325 MG tablet Take 650 mg by mouth every 6 hours as needed for Pain      QUEtiapine (SEROQUEL) 200 MG tablet Take 200 mg by mouth 2 times daily      QUEtiapine (SEROQUEL XR) 300 MG extended release tablet Take 300 mg by mouth daily      lithium 300 MG tablet Take 300 mg by mouth 3 times daily      citalopram (CELEXA) 10 MG tablet Take 10 mg by mouth daily      lamoTRIgine (LAMICTAL) 100 MG tablet Take 1 tablet by mouth nightly 30 tablet 0    traZODone (DESYREL) 100 MG tablet Take 1 tablet by mouth nightly as needed for Sleep 30 tablet 0    benztropine (COGENTIN) 1 MG tablet Take 1 tablet by mouth 2 times daily 60 tablet 0    ARIPiprazole (ABILIFY) 30 MG tablet Take 1 tablet by mouth daily 30 tablet 0    Cariprazine HCl 3 MG CAPS Take 2 capsules by mouth daily 30 capsule 0    docusate sodium (COLACE, DULCOLAX) 100 MG CAPS Take 100 mg by mouth 2 times daily 60 capsule 0    ferrous sulfate 325 (65 FE) MG tablet Take 1 tablet by mouth 2 times daily (with meals) 30 tablet 3    pantoprazole (PROTONIX) 40 MG tablet Take 1 tablet by mouth every morning (before breakfast) 30 tablet 3    Multiple Vitamins-Minerals (THERAPEUTIC MULTIVITAMIN-MINERALS) tablet Take 1 tablet by mouth daily. 30 tablet 0    albuterol (PROVENTIL HFA;VENTOLIN HFA) 108 (90 BASE) MCG/ACT inhaler Inhale 2 puffs into the lungs 4 times daily as needed for Wheezing. No current facility-administered medications on file prior to encounter.         REVIEW OF SYSTEMS    Pertinent items are noted in HPI. Objective:      BP (!) 103/52   Pulse 99   Temp 97.9 °F (36.6 °C) (Oral)   Wt 211 lb (95.7 kg)   BMI 27.84 kg/m²     Wt Readings from Last 3 Encounters:   07/22/20 211 lb (95.7 kg)   07/16/20 211 lb (95.7 kg)   08/30/16 165 lb (74.8 kg)       PHYSICAL EXAM    General Appearance: alert and oriented to person, place and time, well-developed and well-nourished, in no acute distress  Skin: warm and dry, no rash or erythema, bilateral groin wounds   Head: normocephalic and atraumatic  Eyes: pupils equal, round, and conjunctivae normal  Pulmonary/Chest: normal air movement, no respiratory distress  Extremities: no cyanosis and no clubbing or edema   Musculoskeletal: no joint swelling, deformity or tenderness  Neurologic: gait, coordination normal and speech normal      Assessment:     Problem List Items Addressed This Visit     Hidradenitis suppurativa    Relevant Orders    Supply: Wound Cleanser    Supply: Wound Dressings    Supply: Cover and Secure    Schizoaffective disorder, bipolar type (HCC)    Relevant Orders    Supply: Wound Cleanser    Supply: Wound Dressings    Supply: Cover and Secure    Tobacco abuse - Primary    Relevant Orders    Supply: Wound Cleanser    Supply: Wound Dressings    Supply: Cover and Secure    Non-healing left groin open wound, subsequent encounter    Right groin wound, subsequent encounter           Procedure Note  Indications:  Based on my examination of this patient's wound(s)/ulcer(s) today, debridement is required to promote healing and evaluate the wound base. Performed by: APURVA Hurd CNP    Consent obtained:  Yes    Time out taken:  Yes    Pain Control: Anesthetic  Anesthetic: 2% Lidocaine Gel Topical        Debridement:Excisional Debridement    Using curette the wound(s)/ulcer(s) was/were sharply debrided down through and including the removal of subcutaneous tissue.         Devitalized Tissue Debrided:  fibrin, biofilm and slough    Pre Debridement Measurements:  Are located in the [place]  Documentation Flow Sheet    Wound/Ulcer #: 1, 2, 3 and 4    Post Debridement Measurements:  Wound/Ulcer Descriptions are Pre Debridement except measurements:    Wound 03/31/14 Coccyx Medial (Active)   Number of days: 4811       Wound 03/31/14 Left (Active)   Number of days: 3047       Incision 11/01/13 Back Lower (Active)   Number of days: 0497       Incision 11/01/13 Groin Left (Active)   Number of days: 7385       Incision 04/11/14 Sacrum (Active)   Number of days: 7226       Wound 07/16/20 Groin Upper #1 (Active)   Wound Image   07/16/20 1014   Wound Other 07/22/20 1355   Dressing Status New drainage; Old drainage 07/22/20 1355   Dressing Changed Changed/New 07/22/20 1355   Wound Cleansed Soap and water 07/22/20 1355   Wound Length (cm) 2.5 cm 07/22/20 1355   Wound Width (cm) 13.2 cm 07/22/20 1355   Wound Depth (cm) 0.2 cm 07/22/20 1355   Wound Surface Area (cm^2) 33 cm^2 07/22/20 1355   Change in Wound Size % (l*w) 78.29 07/22/20 1355   Wound Volume (cm^3) 6.6 cm^3 07/22/20 1355   Wound Healing % 78 07/22/20 1355   Post-Procedure Length (cm) 2.5 cm 07/22/20 1355   Post-Procedure Width (cm) 13.2 cm 07/22/20 1355   Post-Procedure Depth (cm) 0.2 cm 07/22/20 1355   Post-Procedure Surface Area (cm^2) 33 cm^2 07/22/20 1355   Post-Procedure Volume (cm^3) 6.6 cm^3 07/22/20 1355   Wound Assessment Drainage;Red;Pink;Painful 07/22/20 1355   Drainage Amount Moderate 07/22/20 1355   Drainage Description Serosanguinous 07/22/20 1355   Odor None 07/22/20 1355   Margins Defined edges 07/22/20 1355   Blaire-wound Assessment Painful; Intact 07/22/20 1355   [place]%Wound Bed 50 07/22/20 1355   Red%Wound Bed 50 07/22/20 1355   Number of days: 6       Wound 07/16/20 Thigh Right;Upper #3 (Active)   Wound Image   07/16/20 1014   Wound Other 07/22/20 1355   Dressing Status New drainage; Old drainage 07/22/20 1355   Dressing Changed Changed/New 07/22/20 1355   Wound Cleansed Soap and Assessment Intact;Painful 07/22/20 1355   [place]%Wound Bed 50 07/22/20 1355   Red%Wound Bed 50 07/22/20 1355   Number of days: 6       Wound 07/22/20 Groin Lower;Right #4 (Active)   Wound Image   07/22/20 1355   Wound Other 07/22/20 1355   Dressing Status New drainage; Old drainage 07/22/20 1355   Dressing Changed Changed/New 07/22/20 1355   Wound Cleansed Soap and water 07/22/20 1355   Wound Length (cm) 0.6 cm 07/22/20 1355   Wound Width (cm) 0.6 cm 07/22/20 1355   Wound Depth (cm) 1 cm 07/22/20 1355   Wound Surface Area (cm^2) 0.36 cm^2 07/22/20 1355   Wound Volume (cm^3) 0.36 cm^3 07/22/20 1355   Post-Procedure Length (cm) 0.6 cm 07/22/20 1355   Post-Procedure Width (cm) 0.6 cm 07/22/20 1355   Post-Procedure Depth (cm) 1 cm 07/22/20 1355   Post-Procedure Surface Area (cm^2) 0.36 cm^2 07/22/20 1355   Post-Procedure Volume (cm^3) 0.36 cm^3 07/22/20 1355   Wound Assessment Drainage;Yellow 07/22/20 1355   Drainage Amount Moderate 07/22/20 1355   Drainage Description Serosanguinous 07/22/20 1355   Odor None 07/22/20 1355   Margins Defined edges 07/22/20 1355   Bla[place]-wound Assessment Painful; Intact 07/22/20 1355   [place]%Wound Bed 100 07/22/20 1355   Number of days: 0          Percent of Wound(s)/Ulcer(s) Debrided: 10%    Total Surface Area Debrided:  8.00 sq cm       Diabetic/Pressure/Non Pressure Ulcers only:  Ulcer: Non-Pressure ulcer, fat layer exposed      Estimated Blood Loss:  Minimal    Hemostasis Achieved:  by pressure    Procedural Pain:  0  / 10     Post Procedural Pain:  0 / 10     Response to treatment:  Well tolerated by patient. Plan:     Treatment Note please see attached Discharge Instructions    Written patient dismissal instructions given to patient and signed by patient or POA. Discharge Instructions          [address]: [phone] Fax: [phone]             Visit Adrian Instructions / Physician Orders     DATE: 7/22/2020     Home Care:  [facility] dressing change     SUPPLIES ORDERED THRU: Home Care     Wound Location:  Groin, Lower Abdomen     Cleanse with: Liquid antibacterial soap and water, rinse well      Dressing Orders: Silvercel to wounds, cover with ABD, Mefix Tape     Frequency: Change Daily     Additional Orders: Increase protein to diet (meat, cheese, eggs, fish, peanut butter, nuts and beans)  Multivitamin daily  ELEVATE LEGS AS MUCH AS POSSIBLE     Your next appointment with [facility] is in 1 week                                                                                                 ROOM TYPE   []? ? CHAIR     [x]? ? STRETCHER  []?? EITHER             (Please note your next appointment above and if you are unable to keep, kindly give a 24 hour notice. Thank you.)     If you experience any of the following, please call the [facility] during business hours:  [phone]     * Increase in Pain  * Temperature over 101  * Increase in drainage from your wound  * Drainage with a foul odor  * Bleeding  * Increase in swelling  * Need for compression bandage changes due to slippage, breakthrough drainage.     If you need medical attention outside of the business hours of the [facility] please contact your PCP or go to the nearest emergency room.     The information contained in the After Visit Summary has been reviewed with me, the patient and/or responsible adult, by my health care provider(s). I had the opportunity to ask questions regarding this information. I have elected to receive;      []? ? After Visit Summary  [x]? ? Comprehensive Discharge Instruction        Patient signature______________________________________Date:________  Electronically signed by Theron Yao RN on 7/22/2020 at 2:25 PM  Electronically signed by APURVA Anders CNP on 7/22/2020 at 2:27 PM          Electronically signed by APURVA Anders CNP on 7/22/2020 at 2:37 PM

## 2020-07-29 ENCOUNTER — TELEPHONE (OUTPATIENT)
Dept: WOUND CARE | Age: 40
End: 2020-07-29

## 2020-07-29 ENCOUNTER — HOSPITAL ENCOUNTER (OUTPATIENT)
Dept: WOUND CARE | Age: 40
Discharge: HOME | End: 2020-07-29

## 2020-07-29 NOTE — TELEPHONE ENCOUNTER
Patient's mother calling and leaving voice mail canceling patients sta wound care appointment for today stating that he was admitted to the hospital.

## 2020-09-02 ENCOUNTER — HOSPITAL ENCOUNTER (EMERGENCY)
Age: 40
Discharge: HOME | End: 2020-09-02
Attending: EMERGENCY MEDICINE
Payer: COMMERCIAL

## 2020-09-02 VITALS
TEMPERATURE: 97.2 F | DIASTOLIC BLOOD PRESSURE: 70 MMHG | RESPIRATION RATE: 18 BRPM | OXYGEN SATURATION: 99 % | BODY MASS INDEX: 31.99 KG/M2 | HEART RATE: 113 BPM | WEIGHT: 216 LBS | HEIGHT: 69 IN | SYSTOLIC BLOOD PRESSURE: 112 MMHG

## 2020-09-02 LAB
ABSOLUTE EOS #: 0.26 K/UL (ref 0–0.44)
ABSOLUTE IMMATURE GRANULOCYTE: 0.07 K/UL (ref 0–0.3)
ABSOLUTE LYMPH #: 2.55 K/UL (ref 1.1–3.7)
ABSOLUTE MONO #: 1.1 K/UL (ref 0.1–1.2)
ANION GAP SERPL CALCULATED.3IONS-SCNC: 9 MMOL/L (ref 9–17)
BASOPHILS # BLD: 0 % (ref 0–2)
BASOPHILS ABSOLUTE: 0.05 K/UL (ref 0–0.2)
BUN BLDV-MCNC: 12 MG/DL (ref 6–20)
BUN/CREAT BLD: 12 (ref 9–20)
CALCIUM SERPL-MCNC: 9.2 MG/DL (ref 8.6–10.4)
CHLORIDE BLD-SCNC: 101 MMOL/L (ref 98–107)
CO2: 27 MMOL/L (ref 20–31)
CREAT SERPL-MCNC: 1.04 MG/DL (ref 0.7–1.2)
DIFFERENTIAL TYPE: ABNORMAL
EOSINOPHILS RELATIVE PERCENT: 2 % (ref 1–4)
GFR AFRICAN AMERICAN: >60 ML/MIN
GFR NON-AFRICAN AMERICAN: >60 ML/MIN
GFR SERPL CREATININE-BSD FRML MDRD: ABNORMAL ML/MIN/{1.73_M2}
GFR SERPL CREATININE-BSD FRML MDRD: ABNORMAL ML/MIN/{1.73_M2}
GLUCOSE BLD-MCNC: 106 MG/DL (ref 70–99)
HCT VFR BLD CALC: 32.6 % (ref 40.7–50.3)
HEMOGLOBIN: 9.4 G/DL (ref 13–17)
IMMATURE GRANULOCYTES: 1 %
LACTIC ACID: 1.2 MMOL/L (ref 0.5–2.2)
LYMPHOCYTES # BLD: 20 % (ref 24–43)
MCH RBC QN AUTO: 25.1 PG (ref 25.2–33.5)
MCHC RBC AUTO-ENTMCNC: 28.8 G/DL (ref 28.4–34.8)
MCV RBC AUTO: 87.2 FL (ref 82.6–102.9)
MONOCYTES # BLD: 9 % (ref 3–12)
NRBC AUTOMATED: 0 PER 100 WBC
PDW BLD-RTO: 16.2 % (ref 11.8–14.4)
PLATELET # BLD: 450 K/UL (ref 138–453)
PLATELET ESTIMATE: ABNORMAL
PMV BLD AUTO: 7.8 FL (ref 8.1–13.5)
POTASSIUM SERPL-SCNC: 3.8 MMOL/L (ref 3.7–5.3)
RBC # BLD: 3.74 M/UL (ref 4.21–5.77)
RBC # BLD: ABNORMAL 10*6/UL
SEG NEUTROPHILS: 68 % (ref 36–65)
SEGMENTED NEUTROPHILS ABSOLUTE COUNT: 8.82 K/UL (ref 1.5–8.1)
SODIUM BLD-SCNC: 137 MMOL/L (ref 135–144)
WBC # BLD: 12.9 K/UL (ref 3.5–11.3)
WBC # BLD: ABNORMAL 10*3/UL

## 2020-09-02 PROCEDURE — 2580000003 HC RX 258: Performed by: EMERGENCY MEDICINE

## 2020-09-02 PROCEDURE — 6370000000 HC RX 637 (ALT 250 FOR IP): Performed by: EMERGENCY MEDICINE

## 2020-09-02 PROCEDURE — 99283 EMERGENCY DEPT VISIT LOW MDM: CPT

## 2020-09-02 PROCEDURE — 80048 BASIC METABOLIC PNL TOTAL CA: CPT

## 2020-09-02 PROCEDURE — 85025 COMPLETE CBC W/AUTO DIFF WBC: CPT

## 2020-09-02 PROCEDURE — 83605 ASSAY OF LACTIC ACID: CPT

## 2020-09-02 RX ORDER — DOXYCYCLINE 100 MG/1
100 CAPSULE ORAL ONCE
Status: COMPLETED | OUTPATIENT
Start: 2020-09-02 | End: 2020-09-02

## 2020-09-02 RX ORDER — 0.9 % SODIUM CHLORIDE 0.9 %
1000 INTRAVENOUS SOLUTION INTRAVENOUS ONCE
Status: COMPLETED | OUTPATIENT
Start: 2020-09-02 | End: 2020-09-02

## 2020-09-02 RX ORDER — DOXYCYCLINE 100 MG/1
100 TABLET ORAL 2 TIMES DAILY
Qty: 20 TABLET | Refills: 0 | Status: SHIPPED | OUTPATIENT
Start: 2020-09-02 | End: 2020-09-12

## 2020-09-02 RX ADMIN — DOXYCYCLINE 100 MG: 100 CAPSULE ORAL at 15:09

## 2020-09-02 RX ADMIN — SODIUM CHLORIDE 1000 ML: 9 INJECTION, SOLUTION INTRAVENOUS at 13:01

## 2020-09-02 ASSESSMENT — ENCOUNTER SYMPTOMS
COUGH: 0
COLOR CHANGE: 0
SHORTNESS OF BREATH: 0
SORE THROAT: 0
EYE REDNESS: 0
VOMITING: 0
DIARRHEA: 0
NAUSEA: 0
RHINORRHEA: 0
EYE DISCHARGE: 0

## 2020-09-02 ASSESSMENT — PAIN DESCRIPTION - PAIN TYPE: TYPE: ACUTE PAIN

## 2020-09-02 ASSESSMENT — PAIN DESCRIPTION - ONSET: ONSET: SUDDEN

## 2020-09-02 NOTE — CARE COORDINATION
Patient was going to [address] [place] [address] wound care until 2 weeks ago when he was admitted to [address] [place] [address] inpatient psych. Patient is needing continued wound care. Patient and mother agreeable to come back to [facility]. Message left with wound care, awaiting call back.
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
## 2020-09-08 ENCOUNTER — TELEPHONE (OUTPATIENT)
Dept: WOUND CARE | Age: 40
End: 2020-09-08

## 2020-09-08 ENCOUNTER — HOSPITAL ENCOUNTER (OUTPATIENT)
Dept: WOUND CARE | Age: 40
Discharge: HOME | End: 2020-09-08

## 2020-09-09 ENCOUNTER — HOSPITAL ENCOUNTER (OUTPATIENT)
Dept: WOUND CARE | Age: 40
Discharge: HOME | End: 2020-09-09
Payer: COMMERCIAL

## 2020-09-09 VITALS
DIASTOLIC BLOOD PRESSURE: 65 MMHG | HEIGHT: 69 IN | RESPIRATION RATE: 18 BRPM | HEART RATE: 84 BPM | WEIGHT: 216 LBS | BODY MASS INDEX: 31.99 KG/M2 | SYSTOLIC BLOOD PRESSURE: 93 MMHG | TEMPERATURE: 98.3 F

## 2020-09-09 PROBLEM — S01.302A OPEN WOUND OF LEFT EAR: Status: ACTIVE | Noted: 2020-09-09

## 2020-09-09 PROCEDURE — 11042 DBRDMT SUBQ TIS 1ST 20SQCM/<: CPT | Performed by: NURSE PRACTITIONER

## 2020-09-09 PROCEDURE — 11042 DBRDMT SUBQ TIS 1ST 20SQCM/<: CPT

## 2020-09-09 RX ORDER — LIDOCAINE HYDROCHLORIDE 20 MG/ML
JELLY TOPICAL ONCE
Status: CANCELLED
Start: 2020-09-09

## 2020-09-09 RX ORDER — LIDOCAINE HYDROCHLORIDE 20 MG/ML
JELLY TOPICAL ONCE
Status: COMPLETED | OUTPATIENT
Start: 2020-09-09 | End: 2020-09-09

## 2020-09-09 RX ORDER — FLUPHENAZINE HYDROCHLORIDE 2.5 MG/1
2.5 TABLET ORAL 2 TIMES DAILY
Status: ON HOLD | COMMUNITY
End: 2020-10-28

## 2020-09-09 RX ORDER — LIDOCAINE 40 MG/G
CREAM TOPICAL ONCE
Status: CANCELLED | OUTPATIENT
Start: 2020-09-09

## 2020-09-09 RX ORDER — LIDOCAINE HYDROCHLORIDE 40 MG/ML
SOLUTION TOPICAL ONCE
Status: CANCELLED | OUTPATIENT
Start: 2020-09-09

## 2020-09-09 RX ORDER — LIDOCAINE 50 MG/G
OINTMENT TOPICAL ONCE
Status: CANCELLED | OUTPATIENT
Start: 2020-09-09

## 2020-09-09 RX ADMIN — LIDOCAINE HYDROCHLORIDE: 20 JELLY TOPICAL at 15:10

## 2020-09-09 ASSESSMENT — PAIN DESCRIPTION - LOCATION: LOCATION: ABDOMEN

## 2020-09-09 ASSESSMENT — PAIN - FUNCTIONAL ASSESSMENT: PAIN_FUNCTIONAL_ASSESSMENT: ACTIVITIES ARE NOT PREVENTED

## 2020-09-09 ASSESSMENT — PAIN DESCRIPTION - ONSET: ONSET: ON-GOING

## 2020-09-09 ASSESSMENT — PAIN SCALES - GENERAL: PAINLEVEL_OUTOF10: 8

## 2020-09-09 ASSESSMENT — PAIN DESCRIPTION - FREQUENCY: FREQUENCY: CONTINUOUS

## 2020-09-09 ASSESSMENT — PAIN DESCRIPTION - DESCRIPTORS: DESCRIPTORS: SHARP;STABBING

## 2020-09-09 ASSESSMENT — PAIN DESCRIPTION - ORIENTATION: ORIENTATION: MID

## 2020-09-09 ASSESSMENT — PAIN DESCRIPTION - PROGRESSION: CLINICAL_PROGRESSION: NOT CHANGED

## 2020-09-16 ENCOUNTER — HOSPITAL ENCOUNTER (OUTPATIENT)
Dept: WOUND CARE | Age: 40
Discharge: HOME | End: 2020-09-16
Payer: COMMERCIAL

## 2020-09-16 ENCOUNTER — HOSPITAL ENCOUNTER (OUTPATIENT)
Dept: WOUND CARE | Age: 40
Discharge: HOME | End: 2020-09-16

## 2020-09-16 VITALS
SYSTOLIC BLOOD PRESSURE: 105 MMHG | TEMPERATURE: 97.7 F | WEIGHT: 216 LBS | HEART RATE: 92 BPM | DIASTOLIC BLOOD PRESSURE: 70 MMHG | BODY MASS INDEX: 31.99 KG/M2 | HEIGHT: 69 IN | RESPIRATION RATE: 18 BRPM

## 2020-09-16 PROCEDURE — 99214 OFFICE O/P EST MOD 30 MIN: CPT

## 2020-09-16 RX ORDER — LIDOCAINE HYDROCHLORIDE 20 MG/ML
JELLY TOPICAL ONCE
Status: CANCELLED
Start: 2020-09-16

## 2020-09-16 RX ORDER — NICOTINE 21 MG/24HR
1 PATCH, TRANSDERMAL 24 HOURS TRANSDERMAL DAILY
Qty: 14 PATCH | Refills: 0 | Status: ON HOLD | OUTPATIENT
Start: 2020-09-16 | End: 2020-10-28

## 2020-09-16 RX ORDER — LIDOCAINE 50 MG/G
OINTMENT TOPICAL ONCE
Status: CANCELLED | OUTPATIENT
Start: 2020-09-16

## 2020-09-16 RX ORDER — LIDOCAINE HYDROCHLORIDE 20 MG/ML
JELLY TOPICAL ONCE
Status: DISCONTINUED | OUTPATIENT
Start: 2020-09-16 | End: 2020-09-17 | Stop reason: HOSPADM

## 2020-09-16 RX ORDER — LIDOCAINE HYDROCHLORIDE 40 MG/ML
SOLUTION TOPICAL ONCE
Status: CANCELLED | OUTPATIENT
Start: 2020-09-16

## 2020-09-16 RX ORDER — CLINDAMYCIN PHOSPHATE 10 MG/G
GEL TOPICAL
Qty: 60 G | Refills: 5 | Status: SHIPPED | OUTPATIENT
Start: 2020-09-16 | End: 2020-09-23

## 2020-09-16 RX ORDER — LIDOCAINE HYDROCHLORIDE 40 MG/ML
SOLUTION TOPICAL ONCE
Status: COMPLETED | OUTPATIENT
Start: 2020-09-16 | End: 2020-09-16

## 2020-09-16 RX ORDER — LIDOCAINE 40 MG/G
CREAM TOPICAL ONCE
Status: CANCELLED | OUTPATIENT
Start: 2020-09-16

## 2020-09-16 RX ADMIN — LIDOCAINE HYDROCHLORIDE 20 ML: 40 SOLUTION TOPICAL at 10:28

## 2020-09-16 ASSESSMENT — PAIN DESCRIPTION - DESCRIPTORS: DESCRIPTORS: SHARP;BURNING

## 2020-09-16 ASSESSMENT — PAIN DESCRIPTION - PAIN TYPE: TYPE: CHRONIC PAIN

## 2020-09-16 ASSESSMENT — PAIN SCALES - GENERAL: PAINLEVEL_OUTOF10: 10

## 2020-09-16 ASSESSMENT — PAIN DESCRIPTION - PROGRESSION: CLINICAL_PROGRESSION: NOT CHANGED

## 2020-09-16 ASSESSMENT — PAIN DESCRIPTION - ORIENTATION: ORIENTATION: RIGHT;LEFT

## 2020-09-16 ASSESSMENT — PAIN DESCRIPTION - LOCATION: LOCATION: ABDOMEN;GROIN

## 2020-09-16 ASSESSMENT — PAIN DESCRIPTION - FREQUENCY: FREQUENCY: CONTINUOUS

## 2020-09-16 ASSESSMENT — PAIN DESCRIPTION - ONSET: ONSET: ON-GOING

## 2020-09-16 NOTE — PROGRESS NOTES
erythema  Head: normocephalic and atraumatic  Eyes: pupils equal, round, and reactive to light, extraocular eye movements intact, conjunctivae normal  ENT: tympanic membrane, external ear and ear canal normal bilaterally, nose without deformity, nasal mucosa and turbinates normal without polyps  Abdomen: soft, non-tender, non-distended, normal bowel sounds, no masses or organomegaly  Extremities: no cyanosis, clubbing or edema  Musculoskeletal: normal range of motion, no joint swelling, deformity or tenderness  Neurologic: reflexes normal and symmetric, no cranial nerve deficit, gait, coordination and speech normal      Assessment:     Problem List Items Addressed This Visit     Hidradenitis suppurativa    Relevant Orders    Supply: Wound Cleanser    External Referral To Dermatology    Schizoaffective disorder, bipolar type ([facility] [address]    Relevant Orders    Supply: Wound Cleanser    Tobacco abuse - Primary    Relevant Orders    Supply: Wound Cleanser    Non-healing left groin open wound, subsequent encounter    Relevant Orders    Supply: Wound Cleanser    Right groin wound, subsequent encounter    Relevant Orders    Supply: Wound Cleanser    Open wound of left ear    Relevant Orders    Supply: Wound Cleanser           Procedure Note  Indications:  Based on my examination of this patient's wound(s)/ulcer(s) today, debridement is required to promote healing and evaluate the wound base. Performed by: Alina Fleming MD    Consent obtained:  Yes    Time out taken:  Yes    Pain Control: Anesthetic  Anesthetic: 4% Lidocaine Liquid Topical       Debridement:Excisional Debridement    Using curette the wound(s)/ulcer(s) was/were sharply debrided down through and including the removal of subcutaneous tissue.         Devitalized Tissue Debrided:  fibrin, biofilm and slough    Pre Debridement Measurements:  Are located in the [place]  Documentation Flow Sheet    Wound/Ulcer #: 1    Post Debridement Measurements:  Wound/Ulcer Descriptions are Pre Debridement except measurements:    Wound 07/16/20 Groin Upper #1 (cluster) (Active)   Wound Image   09/09/20 1441   Wound Other 09/16/20 1020   Dressing Status New drainage; Old drainage 09/16/20 1020   Dressing Changed Changed/New 09/16/20 1020   Wound Cleansed Rinsed/Irrigated with saline 09/16/20 1020   Wound Length (cm) 5.5 cm 09/16/20 1020   Wound Width (cm) 15 cm 09/16/20 1020   Wound Depth (cm) 0.8 cm 09/16/20 1020   Wound Surface Area (cm^2) 82.5 cm^2 09/16/20 1020   Change in Wound Size % (l*w) 45.72 09/16/20 1020   Wound Volume (cm^3) 66 cm^3 09/16/20 1020   Wound Healing % -117 09/16/20 1020   Post-Procedure Length (cm) 5.5 cm 09/16/20 1020   Post-Procedure Width (cm) 15 cm 09/16/20 1020   Post-Procedure Depth (cm) 0.8 cm 09/16/20 1020   Post-Procedure Surface Area (cm^2) 82.5 cm^2 09/16/20 1020   Post-Procedure Volume (cm^3) 66 cm^3 09/16/20 1020   Wound Assessment Drainage;Red;Pink;Painful 09/16/20 1020   Drainage Amount Moderate 09/16/20 1020   Drainage Description Serosanguinous 09/16/20 1020   Odor Mild 09/16/20 1020   Margins Defined edges 09/16/20 1020   Blaire-wound Assessment Painful; Intact 09/16/20 1020   Pink%Wound Bed 0 09/09/20 1441   Red%Wound Bed 100 09/16/20 1020   Number of days: 62       Wound 07/16/20 Thigh Right;Upper #3 (Active)   Wound Image   09/09/20 1441   Wound Other 09/16/20 1020   Dressing Status New drainage; Old drainage 09/16/20 1020   Dressing Changed Changed/New 09/16/20 1020   Wound Cleansed Rinsed/Irrigated with saline 09/16/20 1020   Wound Length (cm) 0.1 cm 09/16/20 1020   Wound Width (cm) 0.1 cm 09/16/20 1020   Wound Depth (cm) 0.1 cm 09/16/20 1020   Wound Surface Area (cm^2) 0.01 cm^2 09/16/20 1020   Change in Wound Size % (l*w) 99.99 09/16/20 1020   Wound Volume (cm^3) 0 cm^3 09/16/20 1020   Wound Healing % 100 09/16/20 1020   Post-Procedure Length (cm) 0.1 cm 09/16/20 1020   Post-Procedure Width (cm) 0.1 cm 09/16/20 1020   Post-Procedure Depth (cm) 0.1 cm 09/16/20 1020   Post-Procedure Surface Area (cm^2) 0.01 cm^2 09/16/20 1020   Post-Procedure Volume (cm^3) 0 cm^3 09/16/20 1020   Wound Assessment Painful;Red 09/16/20 1020   Drainage Amount Moderate 09/16/20 1020   Drainage Description Serosanguinous 09/16/20 1020   Odor Mild 09/16/20 1020   Margins Defined edges 09/16/20 1020   Blaire-wound Assessment Painful; Intact 09/16/20 1020   Pink%Wound Bed 0 09/09/20 1441   Red%Wound Bed 100 09/16/20 1020   Yellow%Wound Bed 0 09/16/20 1020   Number of days: 62       Wound 07/16/20 Thigh Left;Upper #2 (Active)   Wound Image   09/09/20 1441   Wound Other 09/16/20 1020   Dressing Status New drainage; Old drainage 09/16/20 1020   Dressing Changed Changed/New 09/16/20 1020   Wound Cleansed Rinsed/Irrigated with saline 09/16/20 1020   Wound Length (cm) 0.5 cm 09/16/20 1020   Wound Width (cm) 0.9 cm 09/16/20 1020   Wound Depth (cm) 0.1 cm 09/16/20 1020   Wound Surface Area (cm^2) 0.45 cm^2 09/16/20 1020   Change in Wound Size % (l*w) 40 09/16/20 1020   Wound Volume (cm^3) 0.04 cm^3 09/16/20 1020   Wound Healing % 89 09/16/20 1020   Post-Procedure Length (cm) 0.5 cm 09/16/20 1020   Post-Procedure Width (cm) 0.9 cm 09/16/20 1020   Post-Procedure Depth (cm) 0.1 cm 09/16/20 1020   Post-Procedure Surface Area (cm^2) 0.45 cm^2 09/16/20 1020   Post-Procedure Volume (cm^3) 0.04 cm^3 09/16/20 1020   Wound Assessment Drainage;Painful;Pink;Red 09/16/20 1020   Drainage Amount Moderate 09/16/20 1020   Drainage Description Serosanguinous 09/16/20 1020   Odor Mild 09/16/20 1020   Blaire-wound Assessment Intact;Painful 09/16/20 1020   Pink%Wound Bed 0 09/09/20 1441   Red%Wound Bed 50 09/16/20 1020   Yellow%Wound Bed 50 09/16/20 1020   Number of days: 62       Wound 07/22/20 Groin Lower;Right #4 (Active)   Wound Image   09/09/20 1441   Wound Other 09/16/20 1020   Dressing Status New drainage; Old drainage; Changed 09/16/20 1020   Dressing Changed Changed/New 09/16/20 1020   Wound Cleansed Rinsed/Irrigated with saline 09/16/20 1020   Wound Length (cm) 0.5 cm 09/16/20 1020   Wound Width (cm) 0.4 cm 09/16/20 1020   Wound Depth (cm) 0.1 cm 09/16/20 1020   Wound Surface Area (cm^2) 0.2 cm^2 09/16/20 1020   Change in Wound Size % (l*w) 44.44 09/16/20 1020   Wound Volume (cm^3) 0.02 cm^3 09/16/20 1020   Wound Healing % 94 09/16/20 1020   Post-Procedure Length (cm) 0.5 cm 09/16/20 1020   Post-Procedure Width (cm) 0.4 cm 09/16/20 1020   Post-Procedure Depth (cm) 0.1 cm 09/16/20 1020   Post-Procedure Surface Area (cm^2) 0.2 cm^2 09/16/20 1020   Post-Procedure Volume (cm^3) 0.02 cm^3 09/16/20 1020   Wound Assessment Drainage;Yellow 09/16/20 1020   Drainage Amount Moderate 09/16/20 1020   Drainage Description Serosanguinous 09/16/20 1020   Odor Mild 09/16/20 1020   Margins Defined edges 09/16/20 1020   Blaire-wound Assessment Painful; Intact 09/16/20 1020   Yellow%Wound Bed 100 09/16/20 1020   Number of days: 55       Wound 09/09/20 Groin Anterior;Left;Lower #5 (Active)   Wound Image   09/09/20 1441   Wound Other 09/16/20 1020   Dressing Status New drainage; Old drainage; Changed 09/16/20 1020   Dressing Changed Changed/New 09/16/20 1020   Wound Cleansed Rinsed/Irrigated with saline 09/16/20 1020   Wound Length (cm) 0.5 cm 09/16/20 1020   Wound Width (cm) 0.3 cm 09/16/20 1020   Wound Depth (cm) 0.4 cm 09/16/20 1020   Wound Surface Area (cm^2) 0.15 cm^2 09/16/20 1020   Change in Wound Size % (l*w) 40 09/16/20 1020   Wound Volume (cm^3) 0.06 cm^3 09/16/20 1020   Wound Healing % -20 09/16/20 1020   Post-Procedure Length (cm) 0.5 cm 09/16/20 1020   Post-Procedure Width (cm) 0.3 cm 09/16/20 1020   Post-Procedure Depth (cm) 0.4 cm 09/16/20 1020   Post-Procedure Surface Area (cm^2) 0.15 cm^2 09/16/20 1020   Post-Procedure Volume (cm^3) 0.06 cm^3 09/16/20 1020   Wound Assessment Intact; Red;Pink;Painful 09/16/20 1020   Drainage Amount Moderate 09/16/20 1020   Drainage Description Serosanguinous 09/16/20 1020   Odor Mild 09/16/20 1020   Margins Defined edges 09/16/20 1020   Blaire-wound Assessment Red; Intact 09/16/20 1020   Red%Wound Bed 100 09/16/20 1020   Number of days: 6       Wound 09/09/20 Ear Left;Posterior #8 (Active)   Wound Other 09/16/20 1020   Dressing Status Old drainage; Changed 09/16/20 1020   Dressing Changed Changed/New 09/16/20 1020   Wound Cleansed Rinsed/Irrigated with saline 09/16/20 1020   Wound Length (cm) 2.5 cm 09/16/20 1020   Wound Width (cm) 1 cm 09/16/20 1020   Wound Depth (cm) 0.3 cm 09/16/20 1020   Wound Surface Area (cm^2) 2.5 cm^2 09/16/20 1020   Change in Wound Size % (l*w) 24.24 09/16/20 1020   Wound Volume (cm^3) 0.75 cm^3 09/16/20 1020   Wound Healing % -14 09/16/20 1020   Post-Procedure Length (cm) 2.5 cm 09/16/20 1020   Post-Procedure Width (cm) 1 cm 09/16/20 1020   Post-Procedure Depth (cm) 0.3 cm 09/16/20 1020   Post-Procedure Surface Area (cm^2) 2.5 cm^2 09/16/20 1020   Post-Procedure Volume (cm^3) 0.75 cm^3 09/16/20 1020   Wound Assessment Drainage;[place] 09/16/20 1020   Drainage Amount Moderate 09/16/20 1020   Drainage Description Serosanguinous 09/16/20 1020   Odor None 09/16/20 1020   Margins Defined edges 09/16/20 1020   Blaire-wound Assessment Clean;Dry; Intact 09/16/20 1020   [place]%Wound Bed 100 09/16/20 1020   Number of days: 7          Percent of Wound(s)/Ulcer(s) Debrided: 0%      Plan:     Treatment Note please see attached Discharge Instructions       Patient and mother are counseled extensively on smoking cessation and it correlation to hidradenitis suppurative. I would even be willing to operate on this area if he can quit smoking. Discussed topical antibiotics, clindagel bid. Discussed dermatology consult to consider Humira therapy if he is a candidate. Written patient dismissal instructions given to patient and signed by patient or POA.          Discharge [facility] [address] [facility]      Visit  Discharge Instructions / Physician Abimael De La Cruz RN on 9/16/2020 at 10:46 AM              Electronically signed by Donna Simmons MD on 9/16/2020 at 10:55 AM

## 2020-09-16 NOTE — PROGRESS NOTES
7400 [facility],3Rd Floor:     101 Dates  [address] 1215 [facility] p: [phone] f: 1925 [facility],5Th Floor:     [address] [address]  [phone]  WOUND CARE Dept: [facility]1 [address] NUMBER [phone]    Patient Information:      Jose Mercury  [address]26 [facility]  [address]  [address]   [phone]   : 1980  AGE: 41 y.o.      GENDER: male   EPISODE DATE: 2020    Insurance:      PRIMARY INSURANCE:  Plan: [employer] [place] DUAL  Coverage: [employer] [place]  Effective Date: 10/1/2019  Group Number: [unfilled]  Subscriber Number: [id number] - (Medicare Managed)    SECONDARY INSURANCE:  Plan:   Coverage:   Effective Date:   [unfilled]    [unfilled]   [unfilled]     Patient Wound Information:      Problem List Items Addressed This Visit     Hidradenitis suppurativa    Relevant Medications    lidocaine (XYLOCAINE) 2 % uro-jet    Other Relevant Orders    Supply: Wound Cleanser    External Referral To Dermatology    Supply: Wound Dressings    Supply: Pack Wound    Supply: Cover and Secure    Albumin    Prealbumin    Protein, Total    Glycosylated HgB    Schizoaffective disorder, bipolar type (HCC)    Relevant Medications    lidocaine (XYLOCAINE) 2 % uro-jet    Other Relevant Orders    Supply: Wound Cleanser    Supply: Wound Dressings    Supply: Pack Wound    Supply: Cover and Secure    Tobacco abuse - Primary    Relevant Medications    lidocaine (XYLOCAINE) 2 % uro-jet    Other Relevant Orders    Supply: Wound Cleanser    Supply: Wound Dressings    Supply: Pack Wound    Supply: Cover and Secure    Non-healing left groin open wound, subsequent encounter    Relevant Medications    lidocaine (XYLOCAINE) 2 % uro-jet    Other Relevant Orders    Supply: Wound Cleanser    Supply: Wound Dressings    Supply: Pack Wound    Supply: Cover and Secure    Right groin wound, subsequent encounter    Relevant Medications    lidocaine (XYLOCAINE) 2 % uro-jet    Other Relevant Orders    Supply: Wound Cleanser    Supply: Wound Dressings    Supply: Pack Wound    Supply: Cover and Secure    Open wound of left ear    Relevant Medications    lidocaine (XYLOCAINE) 2 % uro-jet    Other Relevant Orders    Supply: Wound Cleanser    Supply: Wound Dressings    Supply: Pack Wound    Supply: Cover and Secure          WOUNDS REQUIRING DRESSING SUPPLIES:     Wound 07/16/20 Groin Upper #1 (cluster) (Active)   Wound Image   09/09/20 1441   Wound Other 09/16/20 1020   Dressing Status New drainage; Old drainage 09/16/20 1020   Dressing Changed Changed/New 09/16/20 1020   Wound Cleansed Rinsed/Irrigated with saline 09/16/20 1020   Wound Length (cm) 5.5 cm 09/16/20 1020   Wound Width (cm) 15 cm 09/16/20 1020   Wound Depth (cm) 0.8 cm 09/16/20 1020   Wound Surface Area (cm^2) 82.5 cm^2 09/16/20 1020   Change in Wound Size % (l*w) 45.72 09/16/20 1020   Wound Volume (cm^3) 66 cm^3 09/16/20 1020   Wound Healing % -117 09/16/20 1020   Post-Procedure Length (cm) 5.5 cm 09/16/20 1020   Post-Procedure Width (cm) 15 cm 09/16/20 1020   Post-Procedure Depth (cm) 0.8 cm 09/16/20 1020   Post-Procedure Surface Area (cm^2) 82.5 cm^2 09/16/20 1020   Post-Procedure Volume (cm^3) 66 cm^3 09/16/20 1020   Wound Assessment Drainage;Red;Pink;Painful 09/16/20 1020   Drainage Amount Moderate 09/16/20 1020   Drainage Description Serosanguinous 09/16/20 1020   Odor Mild 09/16/20 1020   Margins Defined edges 09/16/20 1020   Blaire-wound Assessment Painful; Intact 09/16/20 1020   Pink%Wound Bed 0 09/09/20 1441   Red%Wound Bed 100 09/16/20 1020   Number of days: 62       Wound 07/16/20 Thigh Right;Upper #3 (Active)   Wound Image   09/09/20 1441   Wound Other 09/16/20 1020   Dressing Status New drainage; Old drainage 09/16/20 1020   Dressing Changed Changed/New 09/16/20 1020   Wound Cleansed Rinsed/Irrigated with saline 09/16/20 1020   Wound Length (cm) 0.1 cm 09/16/20 1020 Wound Width (cm) 0.1 cm 09/16/20 1020   Wound Depth (cm) 0.1 cm 09/16/20 1020   Wound Surface Area (cm^2) 0.01 cm^2 09/16/20 1020   Change in Wound Size % (l*w) 99.99 09/16/20 1020   Wound Volume (cm^3) 0 cm^3 09/16/20 1020   Wound Healing % 100 09/16/20 1020   Post-Procedure Length (cm) 0.1 cm 09/16/20 1020   Post-Procedure Width (cm) 0.1 cm 09/16/20 1020   Post-Procedure Depth (cm) 0.1 cm 09/16/20 1020   Post-Procedure Surface Area (cm^2) 0.01 cm^2 09/16/20 1020   Post-Procedure Volume (cm^3) 0 cm^3 09/16/20 1020   Wound Assessment Painful;Red 09/16/20 1020   Drainage Amount Moderate 09/16/20 1020   Drainage Description Serosanguinous 09/16/20 1020   Odor Mild 09/16/20 1020   Margins Defined edges 09/16/20 1020   Blaire-wound Assessment Painful; Intact 09/16/20 1020   Pink%Wound Bed 0 09/09/20 1441   Red%Wound Bed 100 09/16/20 1020   Yellow%Wound Bed 0 09/16/20 1020   Number of days: 62       Wound 07/16/20 Thigh Left;Upper #2 (Active)   Wound Image   09/09/20 1441   Wound Other 09/16/20 1020   Dressing Status New drainage; Old drainage 09/16/20 1020   Dressing Changed Changed/New 09/16/20 1020   Wound Cleansed Rinsed/Irrigated with saline 09/16/20 1020   Wound Length (cm) 0.5 cm 09/16/20 1020   Wound Width (cm) 0.9 cm 09/16/20 1020   Wound Depth (cm) 0.1 cm 09/16/20 1020   Wound Surface Area (cm^2) 0.45 cm^2 09/16/20 1020   Change in Wound Size % (l*w) 40 09/16/20 1020   Wound Volume (cm^3) 0.04 cm^3 09/16/20 1020   Wound Healing % 89 09/16/20 1020   Post-Procedure Length (cm) 0.5 cm 09/16/20 1020   Post-Procedure Width (cm) 0.9 cm 09/16/20 1020   Post-Procedure Depth (cm) 0.1 cm 09/16/20 1020   Post-Procedure Surface Area (cm^2) 0.45 cm^2 09/16/20 1020   Post-Procedure Volume (cm^3) 0.04 cm^3 09/16/20 1020   Wound Assessment Drainage;Painful;Pink;Red 09/16/20 1020   Drainage Amount Moderate 09/16/20 1020   Drainage Description Serosanguinous 09/16/20 1020   Odor Mild 09/16/20 1020   Blaire-wound Assessment Intact;Painful 09/16/20 1020   [place]%Wound Bed 0 09/09/20 1441   Red%Wound Bed 50 09/16/20 1020   Yellow%Wound Bed 50 09/16/20 1020   Number of days: 62       Wound 07/22/20 Groin Lower;Right #4 (Active)   Wound Image   09/09/20 1441   Wound Other 09/16/20 1020   Dressing Status New drainage; Old drainage; Changed 09/16/20 1020   Dressing Changed Changed/New 09/16/20 1020   Wound Cleansed Rinsed/Irrigated with saline 09/16/20 1020   Wound Length (cm) 0.5 cm 09/16/20 1020   Wound Width (cm) 0.4 cm 09/16/20 1020   Wound Depth (cm) 0.1 cm 09/16/20 1020   Wound Surface Area (cm^2) 0.2 cm^2 09/16/20 1020   Change in Wound Size % (l*w) 44.44 09/16/20 1020   Wound Volume (cm^3) 0.02 cm^3 09/16/20 1020   Wound Healing % 94 09/16/20 1020   Post-Procedure Length (cm) 0.5 cm 09/16/20 1020   Post-Procedure Width (cm) 0.4 cm 09/16/20 1020   Post-Procedure Depth (cm) 0.1 cm 09/16/20 1020   Post-Procedure Surface Area (cm^2) 0.2 cm^2 09/16/20 1020   Post-Procedure Volume (cm^3) 0.02 cm^3 09/16/20 1020   Wound Assessment Drainage;Yellow 09/16/20 1020   Drainage Amount Moderate 09/16/20 1020   Drainage Description Serosanguinous 09/16/20 1020   Odor Mild 09/16/20 1020   Margins Defined edges 09/16/20 1020   Blaire-wound Assessment Painful; Intact 09/16/20 1020   Yellow%Wound Bed 100 09/16/20 1020   Number of days: 55       Wound 09/09/20 Groin Anterior;Left;Lower #5 (Active)   Wound Image   09/09/20 1441   Wound Other 09/16/20 1020   Dressing Status New drainage; Old drainage; Changed 09/16/20 1020   Dressing Changed Changed/New 09/16/20 1020   Wound Cleansed Rinsed/Irrigated with saline 09/16/20 1020   Wound Length (cm) 0.5 cm 09/16/20 1020   Wound Width (cm) 0.3 cm 09/16/20 1020   Wound Depth (cm) 0.4 cm 09/16/20 1020   Wound Surface Area (cm^2) 0.15 cm^2 09/16/20 1020   Change in Wound Size % (l*w) 40 09/16/20 1020   Wound Volume (cm^3) 0.06 cm^3 09/16/20 1020   Wound Healing % -20 09/16/20 1020   Post-Procedure Length (cm) 0.5 cm 09/16/20 1020   Post-Procedure Width (cm) 0.3 cm 09/16/20 1020   Post-Procedure Depth (cm) 0.4 cm 09/16/20 1020   Post-Procedure Surface Area (cm^2) 0.15 cm^2 09/16/20 1020   Post-Procedure Volume (cm^3) 0.06 cm^3 09/16/20 1020   Wound Assessment Intact; Red;Pink;Painful 09/16/20 1020   Drainage Amount Moderate 09/16/20 1020   Drainage Description Serosanguinous 09/16/20 1020   Odor Mild 09/16/20 1020   Margins Defined edges 09/16/20 1020   Blaire-wound Assessment Red; Intact 09/16/20 1020   Red%Wound Bed 100 09/16/20 1020   Number of days: 6       Wound 09/09/20 Ear Left;Posterior #8 (Active)   Wound Other 09/16/20 1020   Dressing Status Old drainage; Changed 09/16/20 1020   Dressing Changed Changed/New 09/16/20 1020   Wound Cleansed Rinsed/Irrigated with saline 09/16/20 1020   Wound Length (cm) 2.5 cm 09/16/20 1020   Wound Width (cm) 1 cm 09/16/20 1020   Wound Depth (cm) 0.3 cm 09/16/20 1020   Wound Surface Area (cm^2) 2.5 cm^2 09/16/20 1020   Change in Wound Size % (l*w) 24.24 09/16/20 1020   Wound Volume (cm^3) 0.75 cm^3 09/16/20 1020   Wound Healing % -14 09/16/20 1020   Post-Procedure Length (cm) 2.5 cm 09/16/20 1020   Post-Procedure Width (cm) 1 cm 09/16/20 1020   Post-Procedure Depth (cm) 0.3 cm 09/16/20 1020   Post-Procedure Surface Area (cm^2) 2.5 cm^2 09/16/20 1020   Post-Procedure Volume (cm^3) 0.75 cm^3 09/16/20 1020   Wound Assessment Drainage;[place] 09/16/20 1020   Drainage Amount Moderate 09/16/20 1020   Drainage Description Serosanguinous 09/16/20 1020   Odor None 09/16/20 1020   Margins Defined edges 09/16/20 1020   Blaire-wound Assessment Clean;Dry; Intact 09/16/20 1020   [place]%Wound Bed 100 09/16/20 1020   Number of days: 7          Supplies Requested :      WOUND #: 1, 2, 3, 4, 5 and 8   PRIMARY DRESSING:  Alginate with silver pad silvercel   Cover and Secure with: ABD pad     FREQUENCY OF DRESSING CHANGES:  Daily Due to drainage and location         ADDITIONAL ITEMS:  [] Gloves Small  [x] Gloves Medium [] Gloves Large [] Gloves Meghan Masker  [] Tape 1\" [x] Tape 2\" [] Tape 3\"  [] Medipore Tape  [x] Saline  [] Skin Prep   [] Adhesive Remover   [] Cotton Tip Applicators   [] Other:    Patient Wound(s) Debrided: [] Yes if yes please add date    [x] No    Debribement Type:  Other none due to wound type    Patient currently being seen by Home Health: [] Yes   [x] No    Duration for needed supplies:  []15  [x]30  []60  []90 Days    Electronically signed by Shirley Valdovinos RN on 9/16/2020 at 11:15 AM     Provider Information:      PROVIDER'S NAME: Jess Mcwilliams MD  [id number]

## 2020-09-16 NOTE — DISCHARGE INSTR - COC
Continuity of Care Form    Patient Name: Akira Csasidy   :  1980  MRN:  [id number]    Admit date:  2020  Discharge date:  ***    Code Status Order: Prior   Advance Directives:   [facility] Documentation     Date/Time Healthcare Directive Type of Healthcare Directive Copy in [facility] [address] Agent's Name Healthcare Agent's Phone Number    20 1036  No, patient does not have an advance directive for healthcare treatment -- -- -- -- --          Admitting Physician:  No admitting provider for patient encounter. PCP: Jonatan Kim MD    Discharging Nurse: [facility] Unit/Room#: No information available for this encounter. Discharging Unit Phone Number: ***    Emergency Contact:   Extended Emergency Contact Information  Primary Emergency Contact: Petar Dubon  Address: [address], [place] [address]  Home Phone: [phone]  Relation: Parent    Past Surgical History:  Past Surgical History:   Procedure Laterality Date    ABSCESS DRAINAGE  2013    left inguinal hydrodenitis/pilondal cyst   [facility] INFECTED SKIN DEBRIDEMENT  2011       Immunization History: There is no immunization history on file for this patient.     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    Hidradenitis axillaris L73.2    Schizoaffective disorder, bipolar type ([facility]) F25.0    Encounter for ostomy nurse consultation Z71.89    Paranoid schizophrenia, chronic condition with acute exacerbation ([facility]) F20.0    Iron deficiency anemia D50.9    GERD (gastroesophageal reflux disease) K21.9    Tobacco abuse Z72.0    Non-healing left groin open wound, subsequent encounter S31.104D    Right groin wound, subsequent encounter S31.109D    Open wound of left ear S01.302A       Isolation/Infection:   Isolation          No Isolation        Patient Infection Status     Infection Onset Added Last Indicated Last Indicated By Review Planned Expiration Resolved Resolved By    MDRO (multi-drug resistant organism)  03/21/16 03/21/16 PHILIP Cárdenas - 3/18/2016 urine          Nurse Assessment:  Last Vital Signs: /70   Pulse 92   Temp 97.7 °F (36.5 °C) (Tympanic)   Resp 18   Ht 5' 9\" (1.753 m)   Wt 216 lb (98 kg)   BMI 31.90 kg/m²     Last documented pain score (0-10 scale): Pain Level: 10  Last Weight:   Wt Readings from Last 1 Encounters:   09/16/20 216 lb (98 kg)     Mental Status:  {IP PT MENTAL STATUS:55630}    IV Access:  { HANNAH IV ACCESS:200804944}    Nursing Mobility/ADLs:  Walking   {P DME KKUO:978937940}  Transfer  {[facility] DME UKOW:175464731}  Bathing  {P DME URMZ:025277122}  Dressing  {P DME SFYK:090093710}  Toileting  {P DME DHRO:410206725}  Feeding  {[facility] DME QIUS:576218268}  Med Admin  {[facility] DME AWJM:252309859}  Med Delivery   {[facility] MED Delivery:608094108}    Wound Care Documentation and Therapy:  Wound 07/16/20 Groin Upper #1 (cluster) (Active)   Wound Image   09/09/20 1441   Wound Other 09/16/20 1020   Dressing Status New drainage; Old drainage 09/16/20 1020   Dressing Changed Changed/New 09/16/20 1020   Wound Cleansed Rinsed/Irrigated with saline 09/16/20 1020   Wound Length (cm) 5.5 cm 09/16/20 1020   Wound Width (cm) 15 cm 09/16/20 1020   Wound Depth (cm) 0.8 cm 09/16/20 1020   Wound Surface Area (cm^2) 82.5 cm^2 09/16/20 1020   Change in Wound Size % (l*w) 45.72 09/16/20 1020   Wound Volume (cm^3) 66 cm^3 09/16/20 1020   Wound Healing % -117 09/16/20 1020   Post-Procedure Length (cm) 5.5 cm 09/16/20 1020   Post-Procedure Width (cm) 15 cm 09/16/20 1020   Post-Procedure Depth (cm) 0.8 cm 09/16/20 1020   Post-Procedure Surface Area (cm^2) 82.5 cm^2 09/16/20 1020   Post-Procedure Volume (cm^3) 66 Yellow%Wound Bed 100 09/16/20 1020   Number of days: 55       Wound 09/09/20 Groin Anterior;Left;Lower #5 (Active)   Wound Image   09/09/20 1441   Wound Other 09/16/20 1020   Dressing Status New drainage; Old drainage; Changed 09/16/20 1020   Dressing Changed Changed/New 09/16/20 1020   Wound Cleansed Rinsed/Irrigated with saline 09/16/20 1020   Wound Length (cm) 0.5 cm 09/16/20 1020   Wound Width (cm) 0.3 cm 09/16/20 1020   Wound Depth (cm) 0.4 cm 09/16/20 1020   Wound Surface Area (cm^2) 0.15 cm^2 09/16/20 1020   Change in Wound Size % (l*w) 40 09/16/20 1020   Wound Volume (cm^3) 0.06 cm^3 09/16/20 1020   Wound Healing % -20 09/16/20 1020   Post-Procedure Length (cm) 0.5 cm 09/16/20 1020   Post-Procedure Width (cm) 0.3 cm 09/16/20 1020   Post-Procedure Depth (cm) 0.4 cm 09/16/20 1020   Post-Procedure Surface Area (cm^2) 0.15 cm^2 09/16/20 1020   Post-Procedure Volume (cm^3) 0.06 cm^3 09/16/20 1020   Wound Assessment Intact; Red;Pink;Painful 09/16/20 1020   Drainage Amount Moderate 09/16/20 1020   Drainage Description Serosanguinous 09/16/20 1020   Odor Mild 09/16/20 1020   Margins Defined edges 09/16/20 1020   Blaire-wound Assessment Red; Intact 09/16/20 1020   Red%Wound Bed 100 09/16/20 1020   Number of days: 6       Wound 09/09/20 Ear Left;Posterior #8 (Active)   Wound Other 09/16/20 1020   Dressing Status Old drainage; Changed 09/16/20 1020   Dressing Changed Changed/New 09/16/20 1020   Wound Cleansed Rinsed/Irrigated with saline 09/16/20 1020   Wound Length (cm) 2.5 cm 09/16/20 1020   Wound Width (cm) 1 cm 09/16/20 1020   Wound Depth (cm) 0.3 cm 09/16/20 1020   Wound Surface Area (cm^2) 2.5 cm^2 09/16/20 1020   Change in Wound Size % (l*w) 24.24 09/16/20 1020   Wound Volume (cm^3) 0.75 cm^3 09/16/20 1020   Wound Healing % -14 09/16/20 1020   Post-Procedure Length (cm) 2.5 cm 09/16/20 1020   Post-Procedure Width (cm) 1 cm 09/16/20 1020   Post-Procedure Depth (cm) 0.3 cm 09/16/20 1020   Post-Procedure Surface Area (cm^2) 2.5 cm^2 20 1020   Post-Procedure Volume (cm^3) 0.75 cm^3 20 1020   Wound Assessment Drainage;[place] 20 1020   Drainage Amount Moderate 20 1020   Drainage Description Serosanguinous 20 1020   Odor None 20 1020   Margins Defined edges 20 1020   Blaire-wound Assessment Clean;Dry; Intact 20 1020   [place]%Wound Bed 100 20 1020   Number of days: 7        Elimination:  Continence:   · Bowel: {YES / UO:11960}  · Bladder: {YES / GQ:86618}  Urinary Catheter: {Urinary Catheter:522752479}   Colostomy/Ileostomy/Ileal Conduit: {YES / KM:59904}       Date of Last BM: ***  No intake or output data in the 24 hours ending 20 1102  No intake/output data recorded.     Safety Concerns:     [address] [email or web address] Safety Concerns:448185409}    Impairments/Disabilities:      [address] [email or web address] Impairments/Disabilities:385757679}    Nutrition Therapy:  Current Nutrition Therapy:   [employer] Diet List:521389008}    Routes of Feeding: {CHP DME Other Feedings:556998937}  Liquids: {Slp liquid thickness:24823}  Daily Fluid Restriction: {CHP DME Yes amt example:316535166}  Last Modified Barium Swallow with Video (Video Swallowing Test): {Done Not Done OUQS:608415687}    Treatments at the Time of Hospital Discharge:   Respiratory Treatments: ***  Oxygen Therapy:  {Therapy; copd oxygen:69060}  Ventilator:    { CC Vent RZFI:040604741}    Rehab Therapies: {THERAPEUTIC INTERVENTION:3464755010}  Weight Bearing Status/Restrictions: [address] [employer] Weight Bearin}  Other Medical Equipment (for information only, NOT a DME order):  {EQUIPMENT:173657679}  Other Treatments: ***    Patient's personal belongings (please select all that are sent with patient):  {CHP DME Belongings:998736913}    RN SIGNATURE:  {Esignature:088775546}    CASE MANAGEMENT/SOCIAL WORK SECTION    Inpatient Status Date: ***    Readmission Risk Assessment Score:  Readmission Risk              Risk of Unplanned Readmission:        0 Discharging to Facility/ Agency   · Name:   · Address:  · Phone:  · Fax:    Dialysis Facility (if applicable)   · Name:  · Address:  · Dialysis Schedule:  · Phone:  · Fax:    / signature: {Esignature:964045604}    PHYSICIAN SECTION    Prognosis: {Prognosis:3460177046}    Condition at Discharge: Kendall Bobo Patient Condition:936993785}    Rehab Potential (if transferring to Rehab): {Prognosis:9553580305}    Recommended Labs or Other Treatments After Discharge: ***    Physician Certification: I certify the above information and transfer of Prema Huggins  is necessary for the continuing treatment of the diagnosis listed and that he requires {Admit to Appropriate Level of Care:28426} for {GREATER/LESS:740868321} 30 days.      Update Admission H&P: {CHP DME Changes in GHVGV:536534832}    PHYSICIAN SIGNATURE:  {Esignature:611235505}

## 2020-10-14 ENCOUNTER — HOSPITAL ENCOUNTER (OUTPATIENT)
Dept: WOUND CARE | Age: 40
Discharge: HOME | End: 2020-10-14
Payer: COMMERCIAL

## 2020-10-14 VITALS
SYSTOLIC BLOOD PRESSURE: 106 MMHG | HEART RATE: 93 BPM | HEIGHT: 69 IN | BODY MASS INDEX: 31.99 KG/M2 | WEIGHT: 216 LBS | RESPIRATION RATE: 18 BRPM | TEMPERATURE: 97.5 F | DIASTOLIC BLOOD PRESSURE: 68 MMHG

## 2020-10-14 PROCEDURE — 11042 DBRDMT SUBQ TIS 1ST 20SQCM/<: CPT | Performed by: NURSE PRACTITIONER

## 2020-10-14 PROCEDURE — 11042 DBRDMT SUBQ TIS 1ST 20SQCM/<: CPT

## 2020-10-14 RX ORDER — LIDOCAINE HYDROCHLORIDE 20 MG/ML
JELLY TOPICAL ONCE
Status: DISCONTINUED | OUTPATIENT
Start: 2020-10-14 | End: 2020-10-15 | Stop reason: HOSPADM

## 2020-10-14 RX ORDER — LIDOCAINE HYDROCHLORIDE 20 MG/ML
JELLY TOPICAL ONCE
Status: CANCELLED
Start: 2020-10-14

## 2020-10-14 RX ORDER — LIDOCAINE HYDROCHLORIDE 40 MG/ML
SOLUTION TOPICAL ONCE
Status: CANCELLED | OUTPATIENT
Start: 2020-10-14

## 2020-10-14 RX ORDER — LIDOCAINE 40 MG/G
CREAM TOPICAL ONCE
Status: CANCELLED | OUTPATIENT
Start: 2020-10-14

## 2020-10-14 RX ORDER — LIDOCAINE 50 MG/G
OINTMENT TOPICAL ONCE
Status: CANCELLED | OUTPATIENT
Start: 2020-10-14

## 2020-10-14 RX ORDER — LIDOCAINE HYDROCHLORIDE 40 MG/ML
SOLUTION TOPICAL ONCE
Status: COMPLETED | OUTPATIENT
Start: 2020-10-14 | End: 2020-10-14

## 2020-10-14 RX ADMIN — LIDOCAINE HYDROCHLORIDE 30 ML: 40 SOLUTION TOPICAL at 11:34

## 2020-10-14 ASSESSMENT — PAIN SCALES - GENERAL: PAINLEVEL_OUTOF10: 0

## 2020-10-14 NOTE — PROGRESS NOTES
[address]   Progress Note and Procedure Note      [address] RECORD NUMBER:  [id number]  AGE: 41 y.o. GENDER: male  : 1980  EPISODE DATE:  10/14/2020    Subjective:     Chief Complaint   Patient presents with    Wound Check     bilateral groin, bilateral thighs, buttocks, left ear         HISTORY of PRESENT ILLNESS REBECA Rosales is a 41 y.o. male who presents today for wound/ulcer evaluation.    History of Wound Context: hidradenitis suppurativa - wounds in bilateral groin, bilateral thighs, buttocks and left ear   Wound/Ulcer Pain Timing/Severity: intermittent  Quality of pain: burning  Severity:  3 / 10   Modifying Factors: Pain worsens with touching  Associated Signs/Symptoms: edema    Ulcer Identification:  Ulcer Type: hidradenitis suppurativa   Contributing Factors: smoking    Wound: N/A        PAST MEDICAL HISTORY        Diagnosis Date    Bipolar 1 disorder (HCC)     GERD (gastroesophageal reflux disease) 2016    Hidradenitis suppurativa     Polysubstance abuse ([address]     Schizoaffective disorder ([address]        PAST SURGICAL HISTORY    Past Surgical History:   Procedure Laterality Date    ABSCESS DRAINAGE  2013    left inguinal hydrodenitis/pilondal cyst    INFECTED SKIN DEBRIDEMENT  2011       FAMILY HISTORY    Family History   Problem Relation Age of Onset    Cancer Mother         breast    High Blood Pressure Maternal Grandfather     Mental Illness Paternal Aunt     Substance Abuse Paternal Uncle     Substance Abuse Brother     Alcohol Abuse Father     Cancer Father         pancrease       SOCIAL HISTORY    Social History     Tobacco Use    Smoking status: Current Every Day Smoker     Packs/day: 1.00     Years: 22.00     Pack years: 22.00     Types: Cigarettes    Smokeless tobacco: Never Used   Substance Use Topics    Alcohol use: Yes     Comment: occasional    Drug use: Yes     Comment: history of marijuana & Opiate abue, claims that he is currently clean & sober. ALLERGIES    Allergies   Allergen Reactions    Depakene [Valproic Acid]     Restoril [Temazepam]     Risperidone And Related      Seizure      Thorazine [Chlorpromazine]        MEDICATIONS    Current Outpatient Medications on File Prior to Encounter   Medication Sig Dispense Refill    nicotine (NICODERM CQ) 14 MG/24HR Place 1 patch onto the skin daily for 14 days 14 patch 0    nicotine (NICODERM CQ) 7 MG/24HR Place 1 patch onto the skin every 24 hours for 14 days 14 patch 0    fluPHENAZine HCl (PROLIXIN) 2.5 MG tablet Take 2.5 mg by mouth 2 times daily      ZOLPIDEM TARTRATE PO Take by mouth nightly as needed for Sleep      acetaminophen (TYLENOL) 325 MG tablet Take 650 mg by mouth every 6 hours as needed for Pain      QUEtiapine (SEROQUEL) 200 MG tablet Take 200 mg by mouth 2 times daily      QUEtiapine (SEROQUEL XR) 300 MG extended release tablet Take 300 mg by mouth daily      lithium 300 MG tablet Take 450 mg by mouth 3 times daily       benztropine (COGENTIN) 1 MG tablet Take 1 tablet by mouth 2 times daily (Patient taking differently: Take 0.5 mg by mouth 2 times daily ) 60 tablet 0    docusate sodium (COLACE, DULCOLAX) 100 MG CAPS Take 100 mg by mouth 2 times daily 60 capsule 0    ferrous sulfate 325 (65 FE) MG tablet Take 1 tablet by mouth 2 times daily (with meals) 30 tablet 3    pantoprazole (PROTONIX) 40 MG tablet Take 1 tablet by mouth every morning (before breakfast) 30 tablet 3    Multiple Vitamins-Minerals (THERAPEUTIC MULTIVITAMIN-MINERALS) tablet Take 1 tablet by mouth daily. 30 tablet 0     No current facility-administered medications on file prior to encounter. REVIEW OF SYSTEMS    Pertinent items are noted in HPI.     Objective:      /68   Pulse 93   Temp 97.5 °F (36.4 °C) (Tympanic)   Resp 18   Ht 5' 9\" (1.753 m)   Wt 216 lb (98 kg)   BMI 31.90 kg/m²     Wt Readings from Last 3 Encounters:   10/14/20 Post-Procedure Volume (cm^3) 24.42 cm^3 10/14/20 1127   Wound Assessment Fibrin;Subcutaneous 10/14/20 1127   Drainage Amount Moderate 10/14/20 1127   Drainage Description Serosanguinous 10/14/20 1127   Odor Moderate 10/14/20 1127   Blaire-wound Assessment Intact 10/14/20 1127   Margins Defined edges 10/14/20 1127   Wound Thickness Description not for Pressure Injury Full thickness 10/14/20 1127   Number of days: 90       Wound 07/16/20 Thigh Right;Upper #3 (Active)   Wound Image   09/09/20 1441   Wound Etiology Other 10/14/20 1127   Dressing Status Old drainage noted 10/14/20 1127   Wound Cleansed Irrigated with saline 10/14/20 1127   Wound Length (cm) 0.1 cm 10/14/20 1127   Wound Width (cm) 0.1 cm 10/14/20 1127   Wound Depth (cm) 0.1 cm 10/14/20 1127   Wound Surface Area (cm^2) 0.01 cm^2 10/14/20 1127   Change in Wound Size % (l*w) 99.99 10/14/20 1127   Wound Volume (cm^3) 0 cm^3 10/14/20 1127   Wound Healing % 100 10/14/20 1127   Post-Procedure Length (cm) 0.1 cm 10/14/20 1127   Post-Procedure Width (cm) 0.1 cm 10/14/20 1127   Post-Procedure Depth (cm) 0.1 cm 10/14/20 1127   Post-Procedure Surface Area (cm^2) 0.01 cm^2 10/14/20 1127   Post-Procedure Volume (cm^3) 0 cm^3 10/14/20 1127   Drainage Amount Moderate 10/14/20 1127   Drainage Description Serosanguinous 10/14/20 1127   Odor Moderate 10/14/20 1127   Blaire-wound Assessment Intact 10/14/20 1127   Margins Defined edges 10/14/20 1127   Wound Thickness Description not for Pressure Injury Partial thickness 10/14/20 1127   Number of days: 90       Wound 07/16/20 Thigh Left;Upper #2 (Active)   Wound Image   09/09/20 1441   Wound Etiology Other 10/14/20 1127   Dressing Status Old drainage noted 10/14/20 1127   Wound Cleansed Irrigated with saline 10/14/20 1127   Wound Length (cm) 1.3 cm 10/14/20 1127   Wound Width (cm) 1.5 cm 10/14/20 1127   Wound Depth (cm) 0.1 cm 10/14/20 1127   Wound Surface Area (cm^2) 1.95 cm^2 10/14/20 1127   Change in Wound Size % (l*w) -160 10/14/20 1127   Wound Volume (cm^3) 0.2 cm^3 10/14/20 1127   Wound Healing % 47 10/14/20 1127   Post-Procedure Length (cm) 1.3 cm 10/14/20 1127   Post-Procedure Width (cm) 1.5 cm 10/14/20 1127   Post-Procedure Depth (cm) 0.1 cm 10/14/20 1127   Post-Procedure Surface Area (cm^2) 1.95 cm^2 10/14/20 1127   Post-Procedure Volume (cm^3) 0.2 cm^3 10/14/20 1127   Wound Assessment Epithelialization 10/14/20 1127   Drainage Amount Moderate 10/14/20 1127   Drainage Description Serosanguinous 10/14/20 1127   Odor Moderate 10/14/20 1127   Blaire-wound Assessment Intact 10/14/20 1127   Margins Defined edges 10/14/20 1127   Number of days: 90       Wound 07/22/20 Groin Lower;Right #4 (Active)   Wound Image   09/09/20 1441   Wound Etiology Other 10/14/20 1127   Dressing Status Old drainage noted 10/14/20 1127   Wound Cleansed Irrigated with saline 10/14/20 1127   Wound Length (cm) 0 cm 10/14/20 1127   Wound Width (cm) 0 cm 10/14/20 1127   Wound Depth (cm) 0 cm 10/14/20 1127   Wound Surface Area (cm^2) 0 cm^2 10/14/20 1127   Change in Wound Size % (l*w) 100 10/14/20 1127   Wound Volume (cm^3) 0 cm^3 10/14/20 1127   Wound Healing % 100 10/14/20 1127   Post-Procedure Length (cm) 0 cm 10/14/20 1127   Post-Procedure Width (cm) 0 cm 10/14/20 1127   Post-Procedure Depth (cm) 0 cm 10/14/20 1127   Post-Procedure Surface Area (cm^2) 0 cm^2 10/14/20 1127   Post-Procedure Volume (cm^3) 0 cm^3 10/14/20 1127   Number of days: 83       Wound 09/09/20 Groin Anterior;Left;Lower #5 (Active)   Wound Image   09/09/20 1441   Wound Etiology Other 10/14/20 1127   Dressing Status Old drainage noted 10/14/20 1127   Wound Cleansed Irrigated with saline 10/14/20 1127   Wound Length (cm) 0 cm 10/14/20 1127   Wound Width (cm) 0 cm 10/14/20 1127   Wound Depth (cm) 0 cm 10/14/20 1127   Wound Surface Area (cm^2) 0 cm^2 10/14/20 1127   Change in Wound Size % (l*w) 100 10/14/20 1127   Wound Volume (cm^3) 0 cm^3 10/14/20 1127   Wound Healing % 100 10/14/20 1127 Post-Procedure Length (cm) 0 cm 10/14/20 1127   Post-Procedure Width (cm) 0 cm 10/14/20 1127   Post-Procedure Depth (cm) 0 cm 10/14/20 1127   Post-Procedure Surface Area (cm^2) 0 cm^2 10/14/20 1127   Post-Procedure Volume (cm^3) 0 cm^3 10/14/20 1127   Number of days: 34       Wound 09/09/20 Ear Left;Posterior #8 (Active)   Wound Etiology Other 10/14/20 1127   Dressing Status Old drainage noted 10/14/20 1127   Wound Cleansed Irrigated with saline 10/14/20 1127   Wound Length (cm) 1.2 cm 10/14/20 1127   Wound Width (cm) 0.5 cm 10/14/20 1127   Wound Depth (cm) 1.1 cm 10/14/20 1127   Wound Surface Area (cm^2) 0.6 cm^2 10/14/20 1127   Change in Wound Size % (l*w) 81.82 10/14/20 1127   Wound Volume (cm^3) 0.66 cm^3 10/14/20 1127   Wound Healing % 0 10/14/20 1127   Post-Procedure Length (cm) 1.2 cm 10/14/20 1127   Post-Procedure Width (cm) 0.5 cm 10/14/20 1127   Post-Procedure Depth (cm) 1.1 cm 10/14/20 1127   Post-Procedure Surface Area (cm^2) 0.6 cm^2 10/14/20 1127   Post-Procedure Volume (cm^3) 0.66 cm^3 10/14/20 1127   Wound Assessment Devitalized tissue; Subcutaneous 10/14/20 1127   Drainage Amount Moderate 10/14/20 1127   Drainage Description Serosanguinous 10/14/20 1127   Odor Moderate 10/14/20 1127   Blaire-wound Assessment Dry/flaky 10/14/20 1127   Margins Defined edges 10/14/20 1127   Wound Thickness Description not for Pressure Injury Full thickness 10/14/20 1127   Number of days: 35       Wound 10/14/20 Buttocks Right #6 (Active)   Wound Image   10/14/20 1127   Wound Etiology Other 10/14/20 1127   Dressing Status Old drainage noted 10/14/20 1127   Wound Cleansed Irrigated with saline 10/14/20 1127   Wound Length (cm) 0.7 cm 10/14/20 1127   Wound Width (cm) 0.7 cm 10/14/20 1127   Wound Depth (cm) 0.5 cm 10/14/20 1127   Wound Surface Area (cm^2) 0.49 cm^2 10/14/20 1127   Wound Volume (cm^3) 0.24 cm^3 10/14/20 1127   Post-Procedure Length (cm) 0.7 cm 10/14/20 1127   Post-Procedure Width (cm) 0.7 cm 10/14/20 1127 Post-Procedure Depth (cm) 0.5 cm 10/14/20 1127   Post-Procedure Surface Area (cm^2) 0.49 cm^2 10/14/20 1127   Post-Procedure Volume (cm^3) 0.24 cm^3 10/14/20 1127   Undermining Starts ___ O'Clock 3 10/14/20 1127   Undermining Ends___ O'Clock 7 10/14/20 1127   Undermining Maxium Distance (cm) [email or web address] 10/14/20 1127   Wound Assessment Devitalized tissue; Subcutaneous; Fibrin 10/14/20 1127   Drainage Amount Moderate 10/14/20 1127   Drainage Description Serosanguinous 10/14/20 1127   Odor Moderate 10/14/20 1127   Blaire-wound Assessment Intact 10/14/20 1127   Margins Defined edges 10/14/20 1127   Wound Thickness Description not for Pressure Injury Full thickness 10/14/20 1127   Number of days: 0          Percent of Wound(s)/Ulcer(s) Debrided: 100%    Total Surface Area Debrided:  3.05 sq cm       Diabetic/Pressure/Non Pressure Ulcers only:  Ulcer: Non-Pressure ulcer, fat layer exposed      Estimated Blood Loss:  Minimal    Hemostasis Achieved:  by pressure    Procedural Pain:  3  / 10     Post Procedural Pain:  0 / 10     Response to treatment:  Well tolerated by patient. Plan:     Treatment Note please see attached Discharge Instructions    Written patient dismissal instructions given to patient and signed by patient or POA. Discharge Instructions         [id number]                                 Visit  Discharge Instructions / Physician Orders  DATE:10/14/20     Home Care: [facility]     SUPPLIES ORDERED THRU:[facility] medical supplies ordered 9/16/20     Wound Location:  Left Groin, Right groin, Right upper thigh                                 Left thigh, left ear     Cleanse with: Liquid antibacterial soap and water, rinse well      Dressing Orders:  Primary dressing  Silvercel to areas       Secondary dressing     abd                      secure with    mefix tape       x 30days     Frequency:  Daily due to drainage     Additional Orders: Increase protein to diet (meat, cheese, eggs, fish, peanut butter, nuts and beans)  Multivitamin daily  Dr. [facility] for Possible HS treatment  Nicoderm patches as directed  Clindamycin Gel to groin, thighs and behind ears 2 x per day  Lab work prior to next visit  MY CHART []?     Smart Device  []?      HYPERBARIC TREATMENT-                [id number] #     Your next appointment with the [address] [place] [address] is in 2 week with Cristina Chery or [facility]                          ROOM TYPE   []? CHAIR     []? BED   []? EITHER        TIME []? 45 MIN     []? 60 MIN     (Please note your next appointment above and if you are unable to keep, kindly give a 24 hour notice. Thank you.)     If you experience any of the following, please call the [address] [place] [address] during business hours:  [phone]     * Increase in Pain  * Temperature over 101  * Increase in drainage from your wound  * Drainage with a foul odor  * Bleeding  * Increase in swelling  * Need for compression bandage changes due to slippage, breakthrough drainage.     If you need medical attention outside of the business hours of the [address] [place] [address] please contact your PCP or go to the nearest emergency room. The information contained in the After Visit Summary has been reviewed with me, the patient and/or responsible adult, by my health care provider(s). I had the opportunity to ask questions regarding this information. I have elected to receive;      []? After Visit Summary  [x]? Comprehensive Discharge Instruction        Patient signature______________________________________Date:________  Electronically signed by Falguni Rowe RN on 10/14/2020 at 11:28 AM  Electronically signed by APURVA Ahumada CNP on 10/14/2020 at 11:41 AM          Electronically signed by APURVA Ahumada CNP on 10/14/2020 at 11:58 AM

## 2020-10-14 NOTE — PLAN OF CARE
Problem: Pain:  Goal: Pain level will decrease  Description: Pain level will decrease  Outcome: Ongoing  Goal: Control of acute pain  Description: Control of acute pain  Outcome: Ongoing  Goal: Control of chronic pain  Description: Control of chronic pain  Outcome: Ongoing     Problem: Wound:  Goal: Will show signs of wound healing; wound closure and no evidence of infection  Description: Will show signs of wound healing; wound closure and no evidence of infection  Outcome: Ongoing     Problem: Falls - Risk of:  Goal: Will remain free from falls  Description: Will remain free from falls  Outcome: Ongoing     Problem: Pain:  Goal: Pain level will decrease  Description: Pain level will decrease  Outcome: Ongoing  Goal: Control of acute pain  Description: Control of acute pain  Outcome: Ongoing  Goal: Control of chronic pain  Description: Control of chronic pain  Outcome: Ongoing

## 2020-10-16 NOTE — PROGRESS NOTES
Call from [facility] stating that Mr. Maikel Silva has been discharged due to compliance issues with patients mother.

## 2020-10-21 NOTE — PROGRESS NOTES
Volume (cm^3) 24.42 cm^3 10/14/20 1127   Wound Assessment Fibrin;Subcutaneous 10/14/20 1127   Drainage Amount Moderate 10/14/20 1127   Drainage Description Serosanguinous 10/14/20 1127   Odor Moderate 10/14/20 1127   Blaire-wound Assessment Intact 10/14/20 1127   Margins Defined edges 10/14/20 1127   Wound Thickness Description not for Pressure Injury Full thickness 10/14/20 1127   Number of days: 97       Wound 07/16/20 Thigh Right;Upper #3 (Active)   Wound Image   09/09/20 1441   Wound Etiology Other 10/14/20 1127   Dressing Status Old drainage noted 10/14/20 1127   Wound Cleansed Irrigated with saline 10/14/20 1127   Wound Length (cm) 0.1 cm 10/14/20 1127   Wound Width (cm) 0.1 cm 10/14/20 1127   Wound Depth (cm) 0.1 cm 10/14/20 1127   Wound Surface Area (cm^2) 0.01 cm^2 10/14/20 1127   Change in Wound Size % (l*w) 99.99 10/14/20 1127   Wound Volume (cm^3) 0 cm^3 10/14/20 1127   Wound Healing % 100 10/14/20 1127   Post-Procedure Length (cm) 0.1 cm 10/14/20 1127   Post-Procedure Width (cm) 0.1 cm 10/14/20 1127   Post-Procedure Depth (cm) 0.1 cm 10/14/20 1127   Post-Procedure Surface Area (cm^2) 0.01 cm^2 10/14/20 1127   Post-Procedure Volume (cm^3) 0 cm^3 10/14/20 1127   Drainage Amount Moderate 10/14/20 1127   Drainage Description Serosanguinous 10/14/20 1127   Odor Moderate 10/14/20 1127   Blaire-wound Assessment Intact 10/14/20 1127   Margins Defined edges 10/14/20 1127   Wound Thickness Description not for Pressure Injury Partial thickness 10/14/20 1127   Number of days: 97       Wound 07/16/20 Thigh Left;Upper #2 (Active)   Wound Image   09/09/20 1441   Wound Etiology Other 10/14/20 1127   Dressing Status Old drainage noted 10/14/20 1127   Wound Cleansed Irrigated with saline 10/14/20 1127   Wound Length (cm) 1.3 cm 10/14/20 1127   Wound Width (cm) 1.5 cm 10/14/20 1127   Wound Depth (cm) 0.1 cm 10/14/20 1127   Wound Surface Area (cm^2) 1.95 cm^2 10/14/20 1127   Change in Wound Size % (l*w) -160 10/14/20 1127 Wound Volume (cm^3) 0.2 cm^3 10/14/20 1127   Wound Healing % 47 10/14/20 1127   Post-Procedure Length (cm) 1.3 cm 10/14/20 1127   Post-Procedure Width (cm) 1.5 cm 10/14/20 1127   Post-Procedure Depth (cm) 0.1 cm 10/14/20 1127   Post-Procedure Surface Area (cm^2) 1.95 cm^2 10/14/20 1127   Post-Procedure Volume (cm^3) 0.2 cm^3 10/14/20 1127   Wound Assessment Epithelialization 10/14/20 1127   Drainage Amount Moderate 10/14/20 1127   Drainage Description Serosanguinous 10/14/20 1127   Odor Moderate 10/14/20 1127   Blaire-wound Assessment Intact 10/14/20 1127   Margins Defined edges 10/14/20 1127   Number of days: 97       Wound 07/22/20 Groin Lower;Right #4 (Active)   Wound Image   09/09/20 1441   Wound Etiology Other 10/14/20 1127   Dressing Status Old drainage noted 10/14/20 1127   Wound Cleansed Irrigated with saline 10/14/20 1127   Wound Length (cm) 0 cm 10/14/20 1127   Wound Width (cm) 0 cm 10/14/20 1127   Wound Depth (cm) 0 cm 10/14/20 1127   Wound Surface Area (cm^2) 0 cm^2 10/14/20 1127   Change in Wound Size % (l*w) 100 10/14/20 1127   Wound Volume (cm^3) 0 cm^3 10/14/20 1127   Wound Healing % 100 10/14/20 1127   Post-Procedure Length (cm) 0 cm 10/14/20 1127   Post-Procedure Width (cm) 0 cm 10/14/20 1127   Post-Procedure Depth (cm) 0 cm 10/14/20 1127   Post-Procedure Surface Area (cm^2) 0 cm^2 10/14/20 1127   Post-Procedure Volume (cm^3) 0 cm^3 10/14/20 1127   Number of days: 90       Wound 09/09/20 Groin Anterior;Left;Lower #5 (Active)   Wound Image   09/09/20 1441   Wound Etiology Other 10/14/20 1127   Dressing Status Old drainage noted 10/14/20 1127   Wound Cleansed Irrigated with saline 10/14/20 1127   Wound Length (cm) 0 cm 10/14/20 1127   Wound Width (cm) 0 cm 10/14/20 1127   Wound Depth (cm) 0 cm 10/14/20 1127   Wound Surface Area (cm^2) 0 cm^2 10/14/20 1127   Change in Wound Size % (l*w) 100 10/14/20 1127   Wound Volume (cm^3) 0 cm^3 10/14/20 1127   Wound Healing % 100 10/14/20 1127   Post-Procedure Length (cm) 0 cm 10/14/20 1127   Post-Procedure Width (cm) 0 cm 10/14/20 1127   Post-Procedure Depth (cm) 0 cm 10/14/20 1127   Post-Procedure Surface Area (cm^2) 0 cm^2 10/14/20 1127   Post-Procedure Volume (cm^3) 0 cm^3 10/14/20 1127   Number of days: 41       Wound 09/09/20 Ear Left;Posterior #8 (Active)   Wound Etiology Other 10/14/20 1127   Dressing Status Old drainage noted 10/14/20 1127   Wound Cleansed Irrigated with saline 10/14/20 1127   Wound Length (cm) 1.2 cm 10/14/20 1127   Wound Width (cm) 0.5 cm 10/14/20 1127   Wound Depth (cm) 1.1 cm 10/14/20 1127   Wound Surface Area (cm^2) 0.6 cm^2 10/14/20 1127   Change in Wound Size % (l*w) 81.82 10/14/20 1127   Wound Volume (cm^3) 0.66 cm^3 10/14/20 1127   Wound Healing % 0 10/14/20 1127   Post-Procedure Length (cm) 1.2 cm 10/14/20 1127   Post-Procedure Width (cm) 0.5 cm 10/14/20 1127   Post-Procedure Depth (cm) 1.1 cm 10/14/20 1127   Post-Procedure Surface Area (cm^2) 0.6 cm^2 10/14/20 1127   Post-Procedure Volume (cm^3) 0.66 cm^3 10/14/20 1127   Wound Assessment Devitalized tissue; Subcutaneous 10/14/20 1127   Drainage Amount Moderate 10/14/20 1127   Drainage Description Serosanguinous 10/14/20 1127   Odor Moderate 10/14/20 1127   Blaire-wound Assessment Dry/flaky 10/14/20 1127   Margins Defined edges 10/14/20 1127   Wound Thickness Description not for Pressure Injury Full thickness 10/14/20 1127   Number of days: 42       Wound 10/14/20 Buttocks Right #6 (Active)   Wound Image   10/14/20 1127   Wound Etiology Other 10/14/20 1127   Dressing Status Old drainage noted 10/14/20 1127   Wound Cleansed Irrigated with saline 10/14/20 1127   Wound Length (cm) 0.7 cm 10/14/20 1127   Wound Width (cm) 0.7 cm 10/14/20 1127   Wound Depth (cm) 0.5 cm 10/14/20 1127   Wound Surface Area (cm^2) 0.49 cm^2 10/14/20 1127   Wound Volume (cm^3) 0.24 cm^3 10/14/20 1127   Post-Procedure Length (cm) 0.7 cm 10/14/20 1127   Post-Procedure Width (cm) 0.7 cm 10/14/20 1127   Post-Procedure

## 2020-10-28 ENCOUNTER — HOSPITAL ENCOUNTER (INPATIENT)
Age: 40
LOS: 5 days | Discharge: HOME | DRG: 581 | End: 2020-11-02
Attending: STUDENT IN AN ORGANIZED HEALTH CARE EDUCATION/TRAINING PROGRAM | Admitting: INTERNAL MEDICINE
Payer: COMMERCIAL

## 2020-10-28 ENCOUNTER — APPOINTMENT (OUTPATIENT)
Dept: GENERAL RADIOLOGY | Age: 40
DRG: 581 | End: 2020-10-28
Payer: COMMERCIAL

## 2020-10-28 ENCOUNTER — APPOINTMENT (OUTPATIENT)
Dept: CT IMAGING | Age: 40
DRG: 581 | End: 2020-10-28
Payer: COMMERCIAL

## 2020-10-28 PROBLEM — L02.91 ABSCESS: Status: ACTIVE | Noted: 2020-10-28

## 2020-10-28 LAB
ABSOLUTE EOS #: 0.32 K/UL (ref 0–0.4)
ABSOLUTE IMMATURE GRANULOCYTE: ABNORMAL K/UL (ref 0–0.3)
ABSOLUTE LYMPH #: 2.35 K/UL (ref 1–4.8)
ABSOLUTE MONO #: 0.96 K/UL (ref 0.1–1.3)
ANION GAP SERPL CALCULATED.3IONS-SCNC: 15 MMOL/L (ref 9–17)
BASOPHILS # BLD: 1 % (ref 0–2)
BASOPHILS ABSOLUTE: 0.11 K/UL (ref 0–0.2)
BILIRUBIN URINE: NEGATIVE
BUN BLDV-MCNC: 12 MG/DL (ref 6–20)
BUN/CREAT BLD: ABNORMAL (ref 9–20)
CALCIUM SERPL-MCNC: 8.8 MG/DL (ref 8.6–10.4)
CHLORIDE BLD-SCNC: 95 MMOL/L (ref 98–107)
CO2: 23 MMOL/L (ref 20–31)
COLOR: YELLOW
COMMENT UA: NORMAL
CREAT SERPL-MCNC: 1 MG/DL (ref 0.7–1.2)
DIFFERENTIAL TYPE: ABNORMAL
EOSINOPHILS RELATIVE PERCENT: 3 % (ref 0–4)
GFR AFRICAN AMERICAN: >60 ML/MIN
GFR NON-AFRICAN AMERICAN: >60 ML/MIN
GFR SERPL CREATININE-BSD FRML MDRD: ABNORMAL ML/MIN/{1.73_M2}
GFR SERPL CREATININE-BSD FRML MDRD: ABNORMAL ML/MIN/{1.73_M2}
GLUCOSE BLD-MCNC: 93 MG/DL (ref 70–99)
GLUCOSE URINE: NEGATIVE
HCT VFR BLD CALC: 28.7 % (ref 41–53)
HEMOGLOBIN: 9 G/DL (ref 13.5–17.5)
IMMATURE GRANULOCYTES: ABNORMAL %
KETONES, URINE: NEGATIVE
LACTIC ACID: 1.3 MMOL/L (ref 0.5–2.2)
LACTIC ACID: 4.4 MMOL/L (ref 0.5–2.2)
LEUKOCYTE ESTERASE, URINE: NEGATIVE
LYMPHOCYTES # BLD: 22 % (ref 24–44)
MCH RBC QN AUTO: 25.8 PG (ref 26–34)
MCHC RBC AUTO-ENTMCNC: 31.5 G/DL (ref 31–37)
MCV RBC AUTO: 81.8 FL (ref 80–100)
MONOCYTES # BLD: 9 % (ref 1–7)
MORPHOLOGY: ABNORMAL
MORPHOLOGY: ABNORMAL
NITRITE, URINE: NEGATIVE
NRBC AUTOMATED: ABNORMAL PER 100 WBC
PDW BLD-RTO: 16.4 % (ref 11.5–14.9)
PH UA: 6.5 (ref 5–8)
PLATELET # BLD: 571 K/UL (ref 150–450)
PLATELET ESTIMATE: ABNORMAL
PMV BLD AUTO: 5.8 FL (ref 6–12)
POTASSIUM SERPL-SCNC: 3.9 MMOL/L (ref 3.7–5.3)
PROTEIN UA: NEGATIVE
RBC # BLD: 3.51 M/UL (ref 4.5–5.9)
RBC # BLD: ABNORMAL 10*6/UL
SEG NEUTROPHILS: 65 % (ref 36–66)
SEGMENTED NEUTROPHILS ABSOLUTE COUNT: 6.96 K/UL (ref 1.3–9.1)
SODIUM BLD-SCNC: 133 MMOL/L (ref 135–144)
SPECIFIC GRAVITY UA: 1.02 (ref 1–1.03)
TURBIDITY: CLEAR
URINE HGB: NEGATIVE
UROBILINOGEN, URINE: NORMAL
WBC # BLD: 10.7 K/UL (ref 3.5–11)
WBC # BLD: ABNORMAL 10*3/UL

## 2020-10-28 PROCEDURE — 99284 EMERGENCY DEPT VISIT MOD MDM: CPT

## 2020-10-28 PROCEDURE — 80048 BASIC METABOLIC PNL TOTAL CA: CPT

## 2020-10-28 PROCEDURE — 96372 THER/PROPH/DIAG INJ SC/IM: CPT

## 2020-10-28 PROCEDURE — 85025 COMPLETE CBC W/AUTO DIFF WBC: CPT

## 2020-10-28 PROCEDURE — 6360000004 HC RX CONTRAST MEDICATION: Performed by: STUDENT IN AN ORGANIZED HEALTH CARE EDUCATION/TRAINING PROGRAM

## 2020-10-28 PROCEDURE — 81003 URINALYSIS AUTO W/O SCOPE: CPT

## 2020-10-28 PROCEDURE — 2580000003 HC RX 258: Performed by: STUDENT IN AN ORGANIZED HEALTH CARE EDUCATION/TRAINING PROGRAM

## 2020-10-28 PROCEDURE — 36415 COLL VENOUS BLD VENIPUNCTURE: CPT

## 2020-10-28 PROCEDURE — 6360000002 HC RX W HCPCS: Performed by: STUDENT IN AN ORGANIZED HEALTH CARE EDUCATION/TRAINING PROGRAM

## 2020-10-28 PROCEDURE — 6370000000 HC RX 637 (ALT 250 FOR IP): Performed by: STUDENT IN AN ORGANIZED HEALTH CARE EDUCATION/TRAINING PROGRAM

## 2020-10-28 PROCEDURE — 87086 URINE CULTURE/COLONY COUNT: CPT

## 2020-10-28 PROCEDURE — 74177 CT ABD & PELVIS W/CONTRAST: CPT

## 2020-10-28 PROCEDURE — 71045 X-RAY EXAM CHEST 1 VIEW: CPT

## 2020-10-28 PROCEDURE — 83605 ASSAY OF LACTIC ACID: CPT

## 2020-10-28 PROCEDURE — 2060000000 HC ICU INTERMEDIATE R&B

## 2020-10-28 RX ORDER — LITHIUM CARBONATE 450 MG
450 TABLET, EXTENDED RELEASE ORAL 2 TIMES DAILY
Status: DISCONTINUED | OUTPATIENT
Start: 2020-10-28 | End: 2020-11-02 | Stop reason: HOSPADM

## 2020-10-28 RX ORDER — SODIUM CHLORIDE 9 MG/ML
INJECTION, SOLUTION INTRAVENOUS CONTINUOUS
Status: DISCONTINUED | OUTPATIENT
Start: 2020-10-28 | End: 2020-11-02 | Stop reason: HOSPADM

## 2020-10-28 RX ORDER — POTASSIUM CHLORIDE 7.45 MG/ML
10 INJECTION INTRAVENOUS PRN
Status: DISCONTINUED | OUTPATIENT
Start: 2020-10-28 | End: 2020-11-02 | Stop reason: HOSPADM

## 2020-10-28 RX ORDER — ACETAMINOPHEN 650 MG/1
650 SUPPOSITORY RECTAL EVERY 6 HOURS PRN
Status: DISCONTINUED | OUTPATIENT
Start: 2020-10-28 | End: 2020-11-02 | Stop reason: HOSPADM

## 2020-10-28 RX ORDER — 0.9 % SODIUM CHLORIDE 0.9 %
1000 INTRAVENOUS SOLUTION INTRAVENOUS ONCE
Status: COMPLETED | OUTPATIENT
Start: 2020-10-28 | End: 2020-10-28

## 2020-10-28 RX ORDER — QUETIAPINE 300 MG/1
300 TABLET, FILM COATED, EXTENDED RELEASE ORAL NIGHTLY
Status: DISCONTINUED | OUTPATIENT
Start: 2020-10-28 | End: 2020-11-02 | Stop reason: HOSPADM

## 2020-10-28 RX ORDER — POLYETHYLENE GLYCOL 3350 17 G/17G
17 POWDER, FOR SOLUTION ORAL DAILY PRN
Status: DISCONTINUED | OUTPATIENT
Start: 2020-10-28 | End: 2020-11-02 | Stop reason: HOSPADM

## 2020-10-28 RX ORDER — SODIUM CHLORIDE 0.9 % (FLUSH) 0.9 %
10 SYRINGE (ML) INJECTION PRN
Status: DISCONTINUED | OUTPATIENT
Start: 2020-10-28 | End: 2020-11-02 | Stop reason: HOSPADM

## 2020-10-28 RX ORDER — ONDANSETRON 2 MG/ML
4 INJECTION INTRAMUSCULAR; INTRAVENOUS EVERY 6 HOURS PRN
Status: DISCONTINUED | OUTPATIENT
Start: 2020-10-28 | End: 2020-11-02 | Stop reason: HOSPADM

## 2020-10-28 RX ORDER — HALOPERIDOL 5 MG/ML
5 INJECTION INTRAMUSCULAR ONCE
Status: COMPLETED | OUTPATIENT
Start: 2020-10-28 | End: 2020-10-28

## 2020-10-28 RX ORDER — ACETAMINOPHEN 325 MG/1
650 TABLET ORAL EVERY 6 HOURS PRN
Status: DISCONTINUED | OUTPATIENT
Start: 2020-10-28 | End: 2020-11-02 | Stop reason: HOSPADM

## 2020-10-28 RX ORDER — M-VIT,TX,IRON,MINS/CALC/FOLIC 27MG-0.4MG
1 TABLET ORAL DAILY
Status: DISCONTINUED | OUTPATIENT
Start: 2020-10-29 | End: 2020-11-02 | Stop reason: HOSPADM

## 2020-10-28 RX ORDER — CITALOPRAM 20 MG/1
20 TABLET ORAL DAILY
Status: ON HOLD | COMMUNITY
End: 2022-09-16 | Stop reason: HOSPADM

## 2020-10-28 RX ORDER — CLINDAMYCIN PHOSPHATE 11.9 MG/ML
SOLUTION TOPICAL 2 TIMES DAILY
Status: DISCONTINUED | OUTPATIENT
Start: 2020-10-28 | End: 2020-11-02 | Stop reason: HOSPADM

## 2020-10-28 RX ORDER — CITALOPRAM 20 MG/1
20 TABLET ORAL DAILY
Status: DISCONTINUED | OUTPATIENT
Start: 2020-10-29 | End: 2020-11-02 | Stop reason: HOSPADM

## 2020-10-28 RX ORDER — DOXYCYCLINE 100 MG/1
100 CAPSULE ORAL EVERY 12 HOURS SCHEDULED
Status: DISCONTINUED | OUTPATIENT
Start: 2020-10-28 | End: 2020-10-29

## 2020-10-28 RX ORDER — DOCUSATE SODIUM 100 MG/1
100 CAPSULE, LIQUID FILLED ORAL 2 TIMES DAILY
Status: DISCONTINUED | OUTPATIENT
Start: 2020-10-28 | End: 2020-11-02 | Stop reason: HOSPADM

## 2020-10-28 RX ORDER — NICOTINE 21 MG/24HR
1 PATCH, TRANSDERMAL 24 HOURS TRANSDERMAL DAILY
Status: DISCONTINUED | OUTPATIENT
Start: 2020-10-28 | End: 2020-11-02 | Stop reason: HOSPADM

## 2020-10-28 RX ORDER — NICOTINE 21 MG/24HR
1 PATCH, TRANSDERMAL 24 HOURS TRANSDERMAL DAILY PRN
Status: DISCONTINUED | OUTPATIENT
Start: 2020-10-28 | End: 2020-10-29 | Stop reason: SDUPTHER

## 2020-10-28 RX ORDER — BENZTROPINE MESYLATE 0.5 MG/1
0.5 TABLET ORAL 2 TIMES DAILY
Status: DISCONTINUED | OUTPATIENT
Start: 2020-10-28 | End: 2020-11-02 | Stop reason: HOSPADM

## 2020-10-28 RX ORDER — QUETIAPINE FUMARATE 200 MG/1
200 TABLET, FILM COATED ORAL 2 TIMES DAILY
Status: DISCONTINUED | OUTPATIENT
Start: 2020-10-28 | End: 2020-11-02 | Stop reason: HOSPADM

## 2020-10-28 RX ORDER — LITHIUM CARBONATE 450 MG
450 TABLET, EXTENDED RELEASE ORAL 2 TIMES DAILY
Status: ON HOLD | COMMUNITY
End: 2022-08-12 | Stop reason: HOSPADM

## 2020-10-28 RX ORDER — ZOLPIDEM TARTRATE 5 MG/1
5 TABLET ORAL NIGHTLY PRN
Status: DISCONTINUED | OUTPATIENT
Start: 2020-10-29 | End: 2020-11-02 | Stop reason: HOSPADM

## 2020-10-28 RX ORDER — 0.9 % SODIUM CHLORIDE 0.9 %
80 INTRAVENOUS SOLUTION INTRAVENOUS ONCE
Status: COMPLETED | OUTPATIENT
Start: 2020-10-28 | End: 2020-10-28

## 2020-10-28 RX ORDER — 0.9 % SODIUM CHLORIDE 0.9 %
1000 INTRAVENOUS SOLUTION INTRAVENOUS ONCE
Status: DISCONTINUED | OUTPATIENT
Start: 2020-10-28 | End: 2020-10-28

## 2020-10-28 RX ORDER — DIPHENHYDRAMINE HYDROCHLORIDE 50 MG/ML
50 INJECTION INTRAMUSCULAR; INTRAVENOUS ONCE
Status: COMPLETED | OUTPATIENT
Start: 2020-10-28 | End: 2020-10-28

## 2020-10-28 RX ORDER — LORAZEPAM 2 MG/ML
2 INJECTION INTRAMUSCULAR ONCE
Status: COMPLETED | OUTPATIENT
Start: 2020-10-28 | End: 2020-10-28

## 2020-10-28 RX ORDER — DIPHENHYDRAMINE HCL 25 MG
25 TABLET ORAL NIGHTLY PRN
Status: DISCONTINUED | OUTPATIENT
Start: 2020-10-29 | End: 2020-11-02 | Stop reason: HOSPADM

## 2020-10-28 RX ORDER — POTASSIUM CHLORIDE 20 MEQ/1
40 TABLET, EXTENDED RELEASE ORAL PRN
Status: DISCONTINUED | OUTPATIENT
Start: 2020-10-28 | End: 2020-11-02 | Stop reason: HOSPADM

## 2020-10-28 RX ORDER — MAGNESIUM SULFATE 1 G/100ML
1 INJECTION INTRAVENOUS PRN
Status: DISCONTINUED | OUTPATIENT
Start: 2020-10-28 | End: 2020-11-02 | Stop reason: HOSPADM

## 2020-10-28 RX ORDER — CLINDAMYCIN PHOSPHATE 10 MG/G
GEL TOPICAL 2 TIMES DAILY
Status: ON HOLD | COMMUNITY
End: 2022-08-12 | Stop reason: HOSPADM

## 2020-10-28 RX ORDER — DIPHENHYDRAMINE HCL 25 MG
25 CAPSULE ORAL NIGHTLY PRN
Status: ON HOLD | COMMUNITY
End: 2022-08-12 | Stop reason: HOSPADM

## 2020-10-28 RX ADMIN — DIPHENHYDRAMINE HYDROCHLORIDE 50 MG: 50 INJECTION, SOLUTION INTRAMUSCULAR; INTRAVENOUS at 19:04

## 2020-10-28 RX ADMIN — LORAZEPAM 2 MG: 2 INJECTION INTRAMUSCULAR; INTRAVENOUS at 19:03

## 2020-10-28 RX ADMIN — Medication 10 ML: at 20:33

## 2020-10-28 RX ADMIN — HALOPERIDOL LACTATE 5 MG: 5 INJECTION, SOLUTION INTRAMUSCULAR at 19:03

## 2020-10-28 RX ADMIN — SODIUM CHLORIDE 1000 ML: 9 INJECTION, SOLUTION INTRAVENOUS at 19:29

## 2020-10-28 RX ADMIN — SODIUM CHLORIDE 80 ML: 9 INJECTION, SOLUTION INTRAVENOUS at 20:33

## 2020-10-28 RX ADMIN — DOXYCYCLINE 100 MG: 100 CAPSULE ORAL at 22:10

## 2020-10-28 RX ADMIN — IOVERSOL 75 ML: 741 INJECTION INTRA-ARTERIAL; INTRAVENOUS at 20:32

## 2020-10-28 ASSESSMENT — PAIN SCALES - GENERAL: PAINLEVEL_OUTOF10: 0

## 2020-10-28 NOTE — ED PROVIDER NOTES
[address] [facility]  Emergency Department Encounter  EmergencyMedicine Resident     Pt Name:Naveen Hanson  MRN: [id number]  [place] 1980  Date of evaluation: 10/28/20  PCP:  MD Horacio Valles       Chief Complaint   Patient presents with    Wound Check       HISTORY OF PRESENT ILLNESS  (Location/Symptom, Timing/Onset, Context/Setting, Quality, Duration, Modifying Factors, Severity.)      Alirio Neely is a 41 y.o. male who presents with chief complaint wound check over anterior pelvis. History of hidradenitis suppurativa requiring surgical treatment in 2016 of the suprapubic area, surgery of the bilateral axilla in 2013 skin grafting, chronic severely decompensated schizophrenia. Home health was during her visit and directed the patient to be evaluated emergency department due to concerns for wound infection. Presents with mother who  has POA paperwork for the son as he is not able to make his own decisions. Mother says patient has been incontinent of urine for the past 3 weeks, has normal bowel movements. Patient has had worsening of his hallucinations and overall global function, mother thinks this may be due to the wounds. States his appetite is strong with food intake and liquid intake. Patient himself denies any issues says he feels fine but he does not want to be here. Not taking any p.o. antibiotics, being treated with topical clindamycin. PAST MEDICAL / SURGICAL / SOCIAL / FAMILY HISTORY      has a past medical history of Bipolar 1 disorder ([facility] [address] GERD (gastroesophageal reflux disease), Hidradenitis suppurativa, Polysubstance abuse ([address] and Schizoaffective disorder ([facility] [address] has a past surgical history that includes Infected skin debridement (july 2011) and Abscess Drainage (11/1/2013).     Social History     Socioeconomic History    Marital status: Single     Spouse name: Not on file    Number of children: Not on file    Years of education: Not on file  Highest education level: Not on file   Occupational History    Not on file   Social Needs    Financial resource strain: Not on file    Food insecurity     Worry: Not on file     Inability: Not on file    Transportation needs     Medical: Not on file     Non-medical: Not on file   Tobacco Use    Smoking status: Current Every Day Smoker     Packs/day: 1.00     Years: 22.00     Pack years: 22.00     Types: Cigarettes    Smokeless tobacco: Never Used   Substance and Sexual Activity    Alcohol use: Yes     Comment: occasional    Drug use: Yes     Comment: history of marijuana & Opiate abue, claims that he is currently clean & sober.  Sexual activity: Not on file   Lifestyle    Physical activity     Days per week: Not on file     Minutes per session: Not on file    Stress: Not on file   Relationships    Social connections     Talks on phone: Not on file     Gets together: Not on file     Attends Hindu service: Not on file     Active member of club or organization: Not on file     Attends meetings of clubs or organizations: Not on file     Relationship status: Not on file    Intimate partner violence     Fear of current or ex partner: Not on file     Emotionally abused: Not on file     Physically abused: Not on file     Forced sexual activity: Not on file   Other Topics Concern    Not on file   Social History Narrative    Not on file       Family History   Problem Relation Age of Onset    Cancer Mother         breast    High Blood Pressure Maternal Grandfather     Mental Illness Paternal Aunt     Substance Abuse Paternal Uncle     Substance Abuse Brother     Alcohol Abuse Father     Cancer Father         pancrease       Allergies:  Depakene [valproic acid]; Restoril [temazepam]; Risperidone and related; and Thorazine [chlorpromazine]    Home Medications:  Prior to Admission medications    Medication Sig Start Date End Date Taking?  Authorizing Provider   nicotine (NICODERM CQ) 14 MG/24HR Place 1 patch onto the skin daily for 14 days 9/16/20 9/30/20  Elaine Hand MD   nicotine (NICODERM CQ) 7 MG/24HR Place 1 patch onto the skin every 24 hours for 14 days 9/16/20 9/30/20  Elaine Hand MD   fluPHENAZine HCl (PROLIXIN) 2.5 MG tablet Take 2.5 mg by mouth 2 times daily    Historical Provider, MD   ZOLPIDEM TARTRATE PO Take by mouth nightly as needed for Sleep    Historical Provider, MD   acetaminophen (TYLENOL) 325 MG tablet Take 650 mg by mouth every 6 hours as needed for Pain    Historical Provider, MD   QUEtiapine (SEROQUEL) 200 MG tablet Take 200 mg by mouth 2 times daily    Historical Provider, MD   QUEtiapine (SEROQUEL XR) 300 MG extended release tablet Take 300 mg by mouth daily    Historical Provider, MD   lithium 300 MG tablet Take 450 mg by mouth 3 times daily     Historical Provider, MD   benztropine (COGENTIN) 1 MG tablet Take 1 tablet by mouth 2 times daily  Patient taking differently: Take 0.5 mg by mouth 2 times daily  8/4/16   Angelia Yanes MD   docusate sodium (COLACE, DULCOLAX) 100 MG CAPS Take 100 mg by mouth 2 times daily 7/11/16   Bill Neely MD   ferrous sulfate 325 (65 FE) MG tablet Take 1 tablet by mouth 2 times daily (with meals) 5/16/16   Armando Zuniga MD   pantoprazole (PROTONIX) 40 MG tablet Take 1 tablet by mouth every morning (before breakfast) 5/16/16   Armando Zuniga MD   Multiple Vitamins-Minerals (THERAPEUTIC MULTIVITAMIN-MINERALS) tablet Take 1 tablet by mouth daily. 4/18/14   Shannon Marroquin       REVIEW OF SYSTEMS    (2-9 systems for level 4, 10 or more for level 5)      Review of Systems   Constitutional: Negative for fever. HENT: Negative for congestion. Eyes: Negative for photophobia. Respiratory: Negative for shortness of breath. Cardiovascular: Negative for chest pain. Gastrointestinal: Negative for abdominal pain and vomiting. Endocrine: Negative for polyuria. Genitourinary: Urinary incontinence  Musculoskeletal: Negative for arthralgias. Skin: Positive for wound  Allergic/Immunologic: Negative for immunocompromised state. Neurological: Negative for dizziness. Hematological: Does not bruise/bleed easily. Psychiatric/Behavioral: Negative for agitation. PHYSICAL EXAM   (up to 7 for level 4, 8 or more for level 5)      INITIAL VITALS:   /75   Pulse 105   Temp 98.9 °F (37.2 °C) (Oral)   Resp 13   Ht 5' 9\" (1.753 m)   Wt 216 lb (98 kg)   SpO2 98%   BMI 31.90 kg/m²     Physical Exam  Constitutional:       General: He/She is not in acute distress. Appearance: Normal appearance. He/She is normal weight. He/She is not toxic-appearing. HENT:      Head: Normocephalic and atraumatic. Nose: Nose normal.      Mouth/Throat: Mucous membranes are moist.  Uvula midline no oropharyngeal edema. Pharynx: Oropharynx is clear. Eyes:      Extraocular Movements: Extraocular movements intact. Conjunctiva/sclera: Conjunctivae normal.      Pupils: Pupils are equal, round, and reactive to light. Neck:      Musculoskeletal: Normal range of motion and neck supple. No neck rigidity. Cardiovascular:      Rate and Rhythm: Normal rate and regular rhythm. Pulses: Normal pulses. Heart sounds: Normal heart sounds. No murmur. Pulmonary:      Effort: Pulmonary effort is normal.      Breath sounds: Normal breath sounds. No wheezing. Abdominal:      General: Abdomen is flat. Bowel sounds are normal.      Tenderness: There is no abdominal tenderness. Musculoskeletal: Normal range of motion. General: No swelling or tenderness. No LE edema  Skin:     General: Skin is warm. Suprapubic area under pannus is concerning for tracking abscess, multiple skin tracks that are draining. Perianal area also has abscess skin tract, cellulitis. Scrotum absent. Capillary Refill: Capillary refill takes less than 2 seconds. Coloration: Skin is not jaundiced. Neurological:      General: No focal deficit present.       Mental Status: He is alert and oriented to person, place, and time. Mental status is at baseline. Motor: No weakness. Psychiatric: Having active visual and auditory hallucinations.     DIFFERENTIAL  DIAGNOSIS     PLAN (LABS / IMAGING / EKG):  Orders Placed This Encounter   Procedures    Culture, Urine    CT ABDOMEN PELVIS W IV CONTRAST Additional Contrast? None    XR CHEST PORTABLE    CBC Auto Differential    Basic Metabolic Panel w/ Reflex to MG    Lactic Acid    Urinalysis, reflex to microscopic    Lactic Acid    Inpatient consult to General Surgery    Contact isolation    Insert peripheral IV    PATIENT STATUS (FROM ED OR OR/PROCEDURAL) Inpatient    Restraints violent or self-destructive adult (older than 18yo)       MEDICATIONS ORDERED:  Orders Placed This Encounter   Medications    0.9 % sodium chloride bolus    haloperidol lactate (HALDOL) injection 5 mg    diphenhydrAMINE (BENADRYL) injection 50 mg    LORazepam (ATIVAN) injection 2 mg    0.9 % sodium chloride bolus    sodium chloride flush 0.9 % injection 10 mL    ioversol (OPTIRAY) 74 % injection 75 mL    doxycycline monohydrate (MONODOX) capsule 100 mg    DISCONTD: 0.9 % sodium chloride bolus           DIAGNOSTIC RESULTS / EMERGENCY DEPARTMENT COURSE / MDM     LABS:  Results for orders placed or performed during the hospital encounter of 10/28/20   CBC Auto Differential   Result Value Ref Range    WBC 10.7 3.5 - 11.0 k/uL    RBC 3.51 (L) 4.5 - 5.9 m/uL    Hemoglobin 9.0 (L) 13.5 - 17.5 g/dL    Hematocrit 28.7 (L) 41 - 53 %    MCV 81.8 80 - 100 fL    MCH 25.8 (L) 26 - 34 pg    MCHC 31.5 31 - 37 g/dL    RDW 16.4 (H) 11.5 - 14.9 %    Platelets 501 (H) 122 - 450 k/uL    MPV 5.8 (L) 6.0 - 12.0 fL    NRBC Automated NOT REPORTED per 100 WBC    Differential Type NOT REPORTED     Immature Granulocytes NOT REPORTED 0 %    Absolute Immature Granulocyte NOT REPORTED 0.00 - 0.30 k/uL    WBC Morphology NOT REPORTED     RBC Morphology NOT REPORTED Platelet Estimate NOT REPORTED     Seg Neutrophils 65 36 - 66 %    Lymphocytes 22 (L) 24 - 44 %    Monocytes 9 (H) 1 - 7 %    Eosinophils % 3 0 - 4 %    Basophils 1 0 - 2 %    Segs Absolute 6.96 1.3 - 9.1 k/uL    Absolute Lymph # 2.35 1.0 - 4.8 k/uL    Absolute Mono # 0.96 0.1 - 1.3 k/uL    Absolute Eos # 0.32 0.0 - 0.4 k/uL    Basophils Absolute 0.11 0.0 - 0.2 k/uL    Morphology ANISOCYTOSIS PRESENT     Morphology HYPOCHROMIA PRESENT    Basic Metabolic Panel w/ Reflex to MG   Result Value Ref Range    Glucose 93 70 - 99 mg/dL    BUN 12 6 - 20 mg/dL    CREATININE 1.00 0.70 - 1.20 mg/dL    Bun/Cre Ratio NOT REPORTED 9 - 20    Calcium 8.8 8.6 - 10.4 mg/dL    Sodium 133 (L) 135 - 144 mmol/L    Potassium 3.9 3.7 - 5.3 mmol/L    Chloride 95 (L) 98 - 107 mmol/L    CO2 23 20 - 31 mmol/L    Anion Gap 15 9 - 17 mmol/L    GFR Non-African American >60 >60 mL/min    GFR African American >60 >60 mL/min    GFR Comment          GFR Staging NOT REPORTED    Lactic Acid   Result Value Ref Range    Lactic Acid 4.4 (H) 0.5 - 2.2 mmol/L   Urinalysis, reflex to microscopic   Result Value Ref Range    Color, UA YELLOW YELLOW    Turbidity UA CLEAR CLEAR    Glucose, Ur NEGATIVE NEGATIVE    Bilirubin Urine NEGATIVE NEGATIVE    Ketones, Urine NEGATIVE NEGATIVE    Specific [place], UA 1.016 1.000 - 1.030    Urine Hgb NEGATIVE NEGATIVE    pH, UA 6.5 5.0 - 8.0    Protein, UA NEGATIVE NEGATIVE    Urobilinogen, Urine Normal Normal    Nitrite, Urine NEGATIVE NEGATIVE    Leukocyte Esterase, Urine NEGATIVE NEGATIVE    Urinalysis Comments       Microscopic exam not performed based on chemical results unless requested in original order.    Lactic Acid   Result Value Ref Range    Lactic Acid 1.3 0.5 - 2.2 mmol/L         RADIOLOGY:  None    EKG  None    All EKG's are interpreted by the Emergency Department Physician who either signs or Co-signs this chart in the absence of a cardiologist.    EMERGENCY DEPARTMENT COURSE:  Patient is alert and oriented x3, breathing quietly and unlabored on room air. Speech is normal and speaking in full sentences without requiring to pause to take a breath. Recommended that the patient do blood work and have CT abdomen pelvis. Patient did not want to see immediately got up from the bed and left to go to the ED lobby. Verified mother's paperwork that she is POA and has custody over the patient's decision making, notified security and the patient was returned to the ED room. He was agitated and combative, required four-point restraint, chemical restraint with 5 mg Haldol IM, 50 mg Benadryl IM, 2 mg Ativan IM. Will complete blood work CT abdomen pelvis to rule out infection, poor understanding. Lactate elevated, likely due to agitation. White count normal.  Will recheck lactate after IV fluids. Will consult surgery. Spoke with Dr. Roel De La Garza, he said he will see the patient in the morning. Repeat lactate normal.  Will treat with doxycycline and admit to medicine. PROCEDURES:  None    CONSULTS:  IP CONSULT TO GENERAL SURGERY    CRITICAL CARE:  None    FINAL IMPRESSION      1. Hidradenitis suppurativa          DISPOSITION / PLAN     DISPOSITION Admitted 10/28/2020 10:01:31 PM      PATIENT REFERRED TO:  No follow-up provider specified.     DISCHARGE MEDICATIONS:  Current Discharge Medication List          Jack Jones MD  Emergency Medicine Resident    (Please note that portions of thisnote were completed with a voice recognition program.  Efforts were made to edit the dictations but occasionally words are mis-transcribed.)       Jack Jones MD  Resident  10/28/20 9090

## 2020-10-28 NOTE — ED NOTES
Patient was brought to ED by his mother, she reports that patients homehealth nurse advised her to bring pt to ED with concerns for worsening infection of his wounds. Once patient was brought to room 8 from waiting room, he stated that he wanted to go home and doesn't want anything done. Patient is alert and oriented x3 (disoriented to year) and his mother reports this is normal for him. Patient's mother is his legal guardian and she states that with the concerns for an infection she does not feel comfortable taking him home and wants him to be evaluated. She reports that when patient has an infection he becomes increasingly agitated and mumbles frequently to himself. Patient walked out of room and to waiting room, security was called to help get patient back to his room. Charge nurse Dennys Maurer., this RN , and security go to waiting room to talk to patient. While attempting to talk and redirect patient he becomes agitated/aggressive and then threatens staff. Patient was threatening lethal violence as evidence by patient threatening staff, stating to [employer] \" Do you want your fucking jaw broke? \" and clenching hands into fist. Prior to patient threatening lethal violence we attempted to verbally deescalate patient. Were IM/PO emergency medications given? yes    Was the patient placed in restraints? Four-point Bilateral wrists and Bilateral ankles    Patient behavior is now resting in bed, mumbling to himself.        Early Greenhouse 7:22 PM 10/28/2020      Dany River RN  10/28/20 1939

## 2020-10-29 LAB
ANION GAP SERPL CALCULATED.3IONS-SCNC: 8 MMOL/L (ref 9–17)
BUN BLDV-MCNC: 9 MG/DL (ref 6–20)
BUN/CREAT BLD: ABNORMAL (ref 9–20)
CALCIUM SERPL-MCNC: 8.8 MG/DL (ref 8.6–10.4)
CHLORIDE BLD-SCNC: 104 MMOL/L (ref 98–107)
CO2: 25 MMOL/L (ref 20–31)
CREAT SERPL-MCNC: 0.77 MG/DL (ref 0.7–1.2)
CULTURE: NORMAL
FERRITIN: 41 UG/L (ref 30–400)
GFR AFRICAN AMERICAN: >60 ML/MIN
GFR NON-AFRICAN AMERICAN: >60 ML/MIN
GFR SERPL CREATININE-BSD FRML MDRD: ABNORMAL ML/MIN/{1.73_M2}
GFR SERPL CREATININE-BSD FRML MDRD: ABNORMAL ML/MIN/{1.73_M2}
GLUCOSE BLD-MCNC: 95 MG/DL (ref 70–99)
HCT VFR BLD CALC: 27.5 % (ref 41–53)
HEMOGLOBIN: 8.8 G/DL (ref 13.5–17.5)
INR BLD: 1.1
IRON SATURATION: 5 % (ref 20–55)
IRON: 13 UG/DL (ref 59–158)
Lab: NORMAL
MCH RBC QN AUTO: 25.7 PG (ref 26–34)
MCHC RBC AUTO-ENTMCNC: 31.8 G/DL (ref 31–37)
MCV RBC AUTO: 80.9 FL (ref 80–100)
NRBC AUTOMATED: ABNORMAL PER 100 WBC
PDW BLD-RTO: 16.5 % (ref 11.5–14.9)
PLATELET # BLD: 579 K/UL (ref 150–450)
PMV BLD AUTO: 6 FL (ref 6–12)
POTASSIUM SERPL-SCNC: 4.2 MMOL/L (ref 3.7–5.3)
PROTHROMBIN TIME: 13.7 SEC (ref 11.8–14.6)
RBC # BLD: 3.4 M/UL (ref 4.5–5.9)
SODIUM BLD-SCNC: 137 MMOL/L (ref 135–144)
SPECIMEN DESCRIPTION: NORMAL
TOTAL IRON BINDING CAPACITY: 269 UG/DL (ref 250–450)
UNSATURATED IRON BINDING CAPACITY: 256 UG/DL (ref 112–347)
WBC # BLD: 10.7 K/UL (ref 3.5–11)

## 2020-10-29 PROCEDURE — 83540 ASSAY OF IRON: CPT

## 2020-10-29 PROCEDURE — 6360000002 HC RX W HCPCS: Performed by: INTERNAL MEDICINE

## 2020-10-29 PROCEDURE — 82728 ASSAY OF FERRITIN: CPT

## 2020-10-29 PROCEDURE — 85610 PROTHROMBIN TIME: CPT

## 2020-10-29 PROCEDURE — 2580000003 HC RX 258: Performed by: NURSE PRACTITIONER

## 2020-10-29 PROCEDURE — 36415 COLL VENOUS BLD VENIPUNCTURE: CPT

## 2020-10-29 PROCEDURE — 83550 IRON BINDING TEST: CPT

## 2020-10-29 PROCEDURE — 2060000000 HC ICU INTERMEDIATE R&B

## 2020-10-29 PROCEDURE — 80048 BASIC METABOLIC PNL TOTAL CA: CPT

## 2020-10-29 PROCEDURE — 87040 BLOOD CULTURE FOR BACTERIA: CPT

## 2020-10-29 PROCEDURE — 85027 COMPLETE CBC AUTOMATED: CPT

## 2020-10-29 PROCEDURE — 99223 1ST HOSP IP/OBS HIGH 75: CPT | Performed by: INTERNAL MEDICINE

## 2020-10-29 PROCEDURE — 6370000000 HC RX 637 (ALT 250 FOR IP): Performed by: NURSE PRACTITIONER

## 2020-10-29 PROCEDURE — 2580000003 HC RX 258: Performed by: INTERNAL MEDICINE

## 2020-10-29 RX ADMIN — QUETIAPINE FUMARATE 300 MG: 300 TABLET, EXTENDED RELEASE ORAL at 00:39

## 2020-10-29 RX ADMIN — CLINDAMYCIN PHOSPHATE: 10 SOLUTION TOPICAL at 20:16

## 2020-10-29 RX ADMIN — LITHIUM CARBONATE 450 MG: 450 TABLET, EXTENDED RELEASE ORAL at 00:39

## 2020-10-29 RX ADMIN — CEFEPIME HYDROCHLORIDE 2 G: 2 INJECTION, POWDER, FOR SOLUTION INTRAVENOUS at 21:12

## 2020-10-29 RX ADMIN — LITHIUM CARBONATE 450 MG: 450 TABLET, EXTENDED RELEASE ORAL at 20:15

## 2020-10-29 RX ADMIN — VANCOMYCIN HYDROCHLORIDE 2500 MG: 1.5 INJECTION, POWDER, LYOPHILIZED, FOR SOLUTION INTRAVENOUS at 17:32

## 2020-10-29 RX ADMIN — QUETIAPINE FUMARATE 300 MG: 300 TABLET, EXTENDED RELEASE ORAL at 20:16

## 2020-10-29 RX ADMIN — SODIUM CHLORIDE: 9 INJECTION, SOLUTION INTRAVENOUS at 01:12

## 2020-10-29 RX ADMIN — BENZTROPINE MESYLATE 0.5 MG: 0.5 TABLET ORAL at 20:16

## 2020-10-29 RX ADMIN — QUETIAPINE FUMARATE 200 MG: 200 TABLET ORAL at 20:15

## 2020-10-29 RX ADMIN — DOCUSATE SODIUM 100 MG: 100 CAPSULE ORAL at 00:39

## 2020-10-29 RX ADMIN — CLINDAMYCIN PHOSPHATE: 10 SOLUTION TOPICAL at 00:40

## 2020-10-29 RX ADMIN — SODIUM CHLORIDE: 9 INJECTION, SOLUTION INTRAVENOUS at 13:31

## 2020-10-29 RX ADMIN — QUETIAPINE FUMARATE 200 MG: 200 TABLET ORAL at 00:39

## 2020-10-29 RX ADMIN — CITALOPRAM HYDROBROMIDE 20 MG: 20 TABLET ORAL at 20:15

## 2020-10-29 RX ADMIN — BENZTROPINE MESYLATE 0.5 MG: 0.5 TABLET ORAL at 00:40

## 2020-10-29 RX ADMIN — DOCUSATE SODIUM 100 MG: 100 CAPSULE ORAL at 20:15

## 2020-10-29 ASSESSMENT — ENCOUNTER SYMPTOMS
VOMITING: 0
SORE THROAT: 0
NAUSEA: 0
DIARRHEA: 0
ABDOMINAL PAIN: 0
WHEEZING: 0
COUGH: 0
SHORTNESS OF BREATH: 0
CONSTIPATION: 0
BACK PAIN: 0

## 2020-10-29 NOTE — ED NOTES
Pt taken out of all restraints- pt mother states he seems back to his baseline. Pt is cooperating with RN. Pt was incont of urine, pt cleaned and bedding changed.         Milly Del Rio RN  10/28/20 4847

## 2020-10-29 NOTE — CARE COORDINATION
DISCHARGE PLANNING NOTE:    Attempted to call pt's mother to discuss discharge planning, however had to leave voicemail message. Upon investigation of the chart, it appears that pt lives at home with his mother. Follows at [place] [id number], and was discharged from [facility] on 10/12/2020 d/t compliance issues.     Electronically signed by Loren Sevilla RN on 10/29/2020 at 4:56 PM

## 2020-10-29 NOTE — ED NOTES
Pt back from [address] [place] [address], [address] [place] [address]  10/28/20 5896

## 2020-10-29 NOTE — PROGRESS NOTES
Pharmacy Note  Vancomycin Consult    Rohit Askew is a 41 y.o. male started on Vancomycin for abdominal wall/anterior pelvis abscesses; consult received from Dr. Marielle Amaya to manage therapy. Also receiving the following antibiotics: cefepime. Patient Active Problem List   Diagnosis    Hidradenitis suppurativa    Abscess of axilla    Abscess of buttock    Blood per rectum    Hidradenitis    Trochanteric bursitis    Adrenal mass, right (HCC)    Hidradenitis axillaris    Schizoaffective disorder, bipolar type ([facility] [address]    Encounter for ostomy nurse consultation    Paranoid schizophrenia, chronic condition with acute exacerbation ([facility] [address]    Iron deficiency anemia    GERD (gastroesophageal reflux disease)    Tobacco abuse    Non-healing left groin open wound, subsequent encounter    Right groin wound, subsequent encounter    Open wound of left ear    Abscess       Allergies:  Depakene [valproic acid]; Restoril [temazepam]; Risperidone and related; and Thorazine [chlorpromazine]     Temp max: 99.1    Recent Labs     10/28/20  1923 10/29/20  0410   BUN 12 9       Recent Labs     10/28/20  1923 10/29/20  0410   CREATININE 1.00 0.77       Recent Labs     10/28/20  1923 10/29/20  0410   WBC 10.7 10.7         Intake/Output Summary (Last 24 hours) at 10/29/2020 1419  Last data filed at 10/29/2020 1158  Gross per 24 hour   Intake 1327 ml   Output 650 ml   Net 677 ml       Culture Date      Source                       Results  See micro    Ht Readings from Last 1 Encounters:   10/28/20 5' 9\" (1.753 m)        Wt Readings from Last 1 Encounters:   10/29/20 218 lb 1.6 oz (98.9 kg)         Body mass index is 32.21 kg/m². Estimated Creatinine Clearance: 145 mL/min (based on SCr of 0.77 mg/dL). Goal Trough Level: 10-20 mcg/mL    Assessment/Plan:  Will initiate Vancomycin with a one time loading dose of 2500 mg x1, followed by 1500 mg IV every 12 hours.   Timing of trough level will be determined based on culture results, renal function, and clinical response. Thank you for the consult. Will continue to follow.    Ramon Mcgowan RPh  10/29/2020  2:20 PM

## 2020-10-29 NOTE — ED NOTES
Report given to Jodee Matute from [facility]. Report method by phone   The following was reviewed with receiving RN:   Current vital signs:  /75   Pulse 105   Temp 98.9 °F (37.2 °C) (Oral)   Resp 13   Ht 5' 9\" (1.753 m)   Wt 216 lb (98 kg)   SpO2 98%   BMI 31.90 kg/m²                MEWS Score: 1     Any medication or safety alerts were reviewed. Any pending diagnostics and notifications were also reviewed, as well as any safety concerns or issues, abnormal labs, abnormal imaging, and abnormal assessment findings. Questions were answered.           Regis Mukherjee RN  10/28/20 0960

## 2020-10-29 NOTE — PLAN OF CARE
Problem: Restraint Use - Violent/Self-Destructive Behavior:  Goal: Absence of restraint indications  Description: Absence of restraint indications  10/29/2020 1738 by Ashley Miller RN  Outcome: Met This Shift  10/29/2020 1738 by Ashley Miller RN  Outcome: Ongoing  10/29/2020 0526 by Edie Akins RN  Outcome: Met This Shift  Goal: Absence of restraint-related injury  Description: Absence of restraint-related injury  10/29/2020 1738 by Ashley Miller RN  Outcome: Met This Shift  10/29/2020 1738 by Ashley Miller RN  Outcome: Ongoing  10/29/2020 0526 by Edie Akins RN  Outcome: Met This Shift     Problem: Pain:  Goal: Patient's pain/discomfort is manageable  Description: Patient's pain/discomfort is manageable  10/29/2020 1738 by Ashley Miller RN  Outcome: Met This Shift  10/29/2020 1738 by Ashley Miller RN  Outcome: Ongoing  10/29/2020 0526 by Edie Akins RN  Outcome: Met This Shift

## 2020-10-29 NOTE — CONSULTS
General Surgery Consult      Pt Name: Manny Braga  MRN: [id number]  YOB: 1980  Date of evaluation: 10/29/2020  Primary Care Physician: Raul Hunter MD   Patient evaluated at the request of  Dr. Marcos Rushing  Reason for evaluation: Abdominal wound    SUBJECTIVE:   History of Chief Complaint:    Manny Braga is a 41 y.o. male with PMHx hidradenitis suppurativa, schizoaffective disorder, and polysubstance abuse who presents with abdominopelvic wound. Patient has had several abscesses drained in the past due to his HS, including low anterior pelvis. Patient has home health nurse who noticed drainage and redness of anterior pelvis, recommending ED evaluation. Mother accompanied patient to ED as she is POA. Per mother, hallucinations have been worsening over past couple of weeks as well. Denies fevers/chills, SOB, abdominal pain, melena, hematochezia, change in bowel habits, dysuria. Per ED notes perianal abscess also present. CT abdomen/pelvis revealed large stool burden in rectal vault and 2.3cm fluid collection in midline anterior pelvic soft tissues. Symptom onset has been acute on chronic for a time period of unknown. Severity is described as moderate. Course of his symptoms over time is recurrent. Past Medical History   has a past medical history of Bipolar 1 disorder ([facility] [address] GERD (gastroesophageal reflux disease), Hidradenitis suppurativa, Polysubstance abuse ([facility] [address] and Schizoaffective disorder ([facility] [address] Past Surgical History   has a past surgical history that includes Infected skin debridement (july 2011) and Abscess Drainage (11/1/2013). Medications  Prior to Admission medications    Medication Sig Start Date End Date Taking?  Authorizing Provider   diphenhydrAMINE (BENADRYL) 25 MG capsule Take 25 mg by mouth nightly as needed for Sleep   Yes Historical Provider, MD   citalopram (CELEXA) 20 MG tablet Take 20 mg by mouth daily   Yes Historical Provider, MD   lithium (ESKALITH) 450 MG extended release tablet Take 450 mg by mouth 2 times daily   Yes Historical Provider, MD   clindamycin (CLINDAGEL) 1 % gel Apply topically 2 times daily Apply topically 2 times daily. Yes Historical Provider, MD   ZOLPIDEM TARTRATE PO Take 5 mg by mouth nightly as needed (sleep)    Yes Historical Provider, MD   QUEtiapine (SEROQUEL) 200 MG tablet Take 200 mg by mouth 2 times daily Bid -  with 300 mg at bedtime to equal 500 mg at hs   Yes Historical Provider, MD   QUEtiapine (SEROQUEL XR) 300 MG extended release tablet Take 300 mg by mouth daily   Yes Historical Provider, MD   benztropine (COGENTIN) 1 MG tablet Take 1 tablet by mouth 2 times daily  Patient taking differently: Take 0.5 mg by mouth 2 times daily  8/4/16  Yes Gisele Capellan MD   docusate sodium (COLACE, DULCOLAX) 100 MG CAPS Take 100 mg by mouth 2 times daily 7/11/16  Yes Madelyn TRIMBLE MD   Multiple Vitamins-Minerals (THERAPEUTIC MULTIVITAMIN-MINERALS) tablet Take 1 tablet by mouth daily. 4/18/14  Yes Shannon Marroquin   acetaminophen (TYLENOL) 325 MG tablet Take 650 mg by mouth every 6 hours as needed for Pain    Historical Provider, MD   ferrous sulfate 325 (65 FE) MG tablet Take 1 tablet by mouth 2 times daily (with meals) 5/16/16   Hector Perry MD     Allergies  is allergic to depakene [valproic acid]; restoril [temazepam]; risperidone and related; and thorazine [chlorpromazine]. Family History  family history includes Alcohol Abuse in his father; Cancer in his father and mother; High Blood Pressure in his maternal grandfather; Mental Illness in his paternal aunt; Substance Abuse in his brother and paternal uncle. Social History   reports that he has been smoking cigarettes. He has a 22.00 pack-year smoking history. He has never used smokeless tobacco. He reports current alcohol use. He reports current drug use.     Review of Systems:  General Denies any fever or chills  HEENT Denies any diplopia, tinnitus or vertigo  Resp Denies any shortness of breath, cough or wheezing  Cardiac Denies any chest pain, palpitations, claudication or edema  GI Denies any melena, hematochezia, hematemesis or pyrosis   Denies any frequency, urgency, hesitancy or incontinence  Heme Denies bruising or bleeding easily  Endocrine Denies any history of diabetes or thyroid disease  Neuro Denies any focal motor or sensory deficits    OBJECTIVE:   VITALS:  height is 5' 9\" (1.753 m) and weight is 218 lb 1.6 oz (98.9 kg). His oral temperature is 98.1 °F (36.7 °C). His blood pressure is 107/71 and his pulse is 96. His respiration is 16 and oxygen saturation is 97%. CONSTITUTIONAL: Alert and oriented times 3, no acute distress and cooperative to majority of examination. Flat affect. SKIN: Anterior pelvic wound with purulent drainage. Surrounding erythema. No other wounds identified at this time. Patient supposedly has gluteal/perineal draining wounds however refused examination. LYMPH: no cervical nodes, no inguinal nodes  HEENT: Head is normocephalic, atraumatic. EOMI, PERRLA  NECK: Supple, symmetrical, trachea midline, no adenopathy, thyroid symmetric, not enlarged and no tenderness, skin normal  CHEST/LUNGS: chest symmetric with normal A/P diameter, normal respiratory rate and rhythm, lungs clear to auscultation without wheezes, rales or rhonchi. No accessory muscle use. Scars None   CARDIOVASCULAR: Heart regular rate and rhythm Normal S1 and S2. . Carotid and femoral pulses 2+/4 and equal bilaterally  ABDOMEN: Normal shape. . No scar(s) present. Normal bowel sounds. No bruits. Soft, nondistended, no masses or organomegaly. no evidence of hernia. Percussion: Normal without hepatosplenomegally. Tenderness: over abdominal wounds  RECTAL: deferred, not clinically indicated  NEUROLOGIC: There are no focalizing motor or sensory deficits. CN II-XII are grossly intact.   EXTREMITIES: no cyanosis, no clubbing and no edema    LABS:   CBC with Differential:    Lab Results   Component Value Date    WBC 10.7 10/29/2020    RBC 3.40 10/29/2020    RBC 3.46 02/09/2012    HGB 8.8 10/29/2020    HCT 27.5 10/29/2020     10/29/2020     02/09/2012    MCV 80.9 10/29/2020    MCH 25.7 10/29/2020    MCHC 31.8 10/29/2020    RDW 16.5 10/29/2020    LYMPHOPCT 22 10/28/2020    MONOPCT 9 10/28/2020    BASOPCT 1 10/28/2020    MONOSABS 0.96 10/28/2020    LYMPHSABS 2.35 10/28/2020    EOSABS 0.32 10/28/2020    BASOSABS 0.11 10/28/2020    DIFFTYPE NOT REPORTED 10/28/2020     BMP:    Lab Results   Component Value Date     10/29/2020    K 4.2 10/29/2020     10/29/2020    CO2 25 10/29/2020    BUN 9 10/29/2020    LABALBU 3.9 08/30/2016    CREATININE 0.77 10/29/2020    CALCIUM 8.8 10/29/2020    GFRAA >60 10/29/2020    LABGLOM >60 10/29/2020    GLUCOSE 95 10/29/2020    GLUCOSE 89 02/09/2012     Hepatic Function Panel:    Lab Results   Component Value Date    ALKPHOS 113 08/30/2016    ALT 20 08/30/2016    AST 20 08/30/2016    PROT 7.9 08/30/2016    BILITOT 0.31 08/30/2016    LABALBU 3.9 08/30/2016     Calcium:    Lab Results   Component Value Date    CALCIUM 8.8 10/29/2020     Magnesium:    Lab Results   Component Value Date    MG 1.7 02/16/2016     Phosphorus:  No results found for: PHOS  PT/INR:    Lab Results   Component Value Date    PROTIME 13.7 10/29/2020    INR 1.1 10/29/2020     ABG:  No results found for: PHART, PH, EQR2FRR, PCO2, PO2ART, PO2, YCZ8QGO, HCO3, BEART, BE, THGBART, THB, YVY6HCP, B1HIZUCE, O2SAT  Urine Culture:  No components found for: CURINE  Blood Culture:  No components found for: CBLOOD, CFUNGUSBL  Stool Culture:  No components found for: CSTOOL    RADIOLOGY:   I have personally reviewed the following films:  Ct Abdomen Pelvis W Iv Contrast Additional Contrast? None    Result Date: 10/29/2020  EXAMINATION: CT OF THE ABDOMEN AND PELVIS WITH CONTRAST 10/28/2020 8:21 pm TECHNIQUE: CT of the abdomen and pelvis was performed with the administration of intravenous contrast. Multiplanar reformatted images are provided for review. Dose modulation, iterative reconstruction, and/or weight based adjustment of the mA/kV was utilized to reduce the radiation dose to as low as reasonably achievable. COMPARISON: 07/26/2012 HISTORY: ORDERING SYSTEM PROVIDED HISTORY: Hiadrenitis suppurativa TECHNOLOGIST PROVIDED HISTORY: Hiadrenitis suppurativa Reason for Exam: patient has a wound under belly button that he has had from a surgery one year ago , patients home nurse has noticed increased drainage from wound FINDINGS: Lower Chest:  Visualized portion of the lower chest demonstrates no acute abnormality. Organs: The liver, spleen, pancreas, kidneys and adrenal glands are without acute findings. Unchanged asymmetric right renal atrophy with peripherally calcified renal cysts. GI/Bowel: There is a large colonic stool burden with a large amount of stool in the rectal vault. No mechanical bowel obstruction. No evidence of appendicitis. Pelvis: The urinary bladder is partially distended without contour abnormality. The prostate and seminal vesicles are within normal limits. Peritoneum/Retroperitoneum: No retroperitoneal or mesenteric lymphadenopathy. Mild atherosclerosis. Bones/Soft Tissues: There is anterior low abdomen skin thickening with a 2.3 x 1.2 x 1.6 cm rim enhancing fluid collection in the anterior pelvic soft tissues at midline. There is adjacent scarring but no additional fluid collections within the imaged soft tissues. No acute or aggressive osseous lesions. Small fat containing umbilical hernia. 1. 2.3 cm rim enhancing fluid collection in the midline anterior pelvic soft tissues with overlying skin thickening. 2. Large colonic stool burden with a significant amount of stool in the rectal vault. The patient may benefit from enema or disimpaction. 3. Unchanged asymmetric right renal atrophy with peripherally calcified right renal cysts.      Xr Chest Portable    Result Date: 10/28/2020  EXAMINATION: ONE XRAY VIEW OF THE CHEST 10/28/2020 6:45 pm COMPARISON: 03/23/2014 HISTORY: ORDERING SYSTEM PROVIDED HISTORY: tachycardia, ams TECHNOLOGIST PROVIDED HISTORY: tachycardia, ams Reason for Exam: Altered mental status, tachycardia, wound check. Acuity: Unknown Type of Exam: Unknown Additional signs and symptoms: Altered mental status, tachycardia, wound check. FINDINGS: Cardiomediastinal silhouette is upper limits of normal in size. No pulmonary consolidation, pleural effusion, or pneumothorax. No acute osseous abnormality. No acute cardiopulmonary abnormality. IMPRESSION:   Hidradenitis suppurativa   Abdominal wall/anterior pelvis abscesses  Schizoaffective disorder    does not have any pertinent problems on file. PLAN:   Diet as tolerated  Daily CBC, BMP  IV antibiotics and fluids  GI and DVT prophylaxis  Pain and nausea management  Patient will likely need I&D of abscesses however will likely need to be performed at tertiary center by plastic surgery  Continue local wound care for now  Continue medical management   Patient was seen and examined. Family at the bedside. Significant hidradenitis lower abdomen suprapubic region. Some drainage. CT scan results noted. Blood work noted. Patient needs plastic surgery evaluation for definitive surgical intervention. There is nothing I can offer from my standpoint other than local care IV antibiotics and wound care referral.  Discussed with POA. Will start IV antibiotics. Will consult wound care. Thank you for this interesting consult and for allowing us to participate in the care of this patient. If you have any questions please don't hesitate to call.       Electronically signed by BALAJI Paiz  on 10/29/2020 at 8:24 AM

## 2020-10-29 NOTE — H&P
[address] [facility]     HISTORY AND PHYSICAL EXAMINATION            Date:   10/29/2020  Patientname:  Juan Antonio Randall  Date of admission:  10/28/2020  6:27 PM  MRN:   [id number]  Account:  [de-identified]  YOB: 1980  PCP:    Hedy Turcios MD  Room:   2125/2125-  Code Status:    Full Code    CHIEF COMPLAINT     Chief Complaint   Patient presents with    Wound Check       HISTORY OF PRESENT ILLNESS  (Character, Onset, Location, Duration,  Exacerbating/RelievingFactors, Radiation,   Associated Symptoms, Severity )      The patient is a 41 y.o. -American male, with a history of hydradenitis suppurativa, paranoid schizophrenia, schizoaffective disorder, iron deficiency anemia, and polysubstance abuse, who presents for a wound check. At time of exam, patient appears to be actively hallucinating and provides very little input into HPI. According to patient's mother, he has a longstanding history of hydradenitis and has had skin graft to bilateral axilla in the past.  States that patient had been in a nursing facility but has now been living with her for the past several months with home care nursing services. Mother reports that nurse was concerned about open abscess over patient's symphysis pubis area and recommended patient come to the ED for an evaluation. Symptoms are associated with multiple small open wounds in groin/buttocks. Denies fever, chills, chest pain, cough, abdominal pain, nausea, vomiting, diarrhea, and urinary symptoms, the mother reports that patient has been intermittently incontinent of urine for the past 3 weeks  There are no known aggravating or alleviating factors. Symptoms are reported as constant and moderate to severe. PAST MEDICAL HISTORY   Patient  has a past medical history of Bipolar 1 disorder ([address] GERD (gastroesophageal reflux disease), Hidradenitis suppurativa, Polysubstance abuse ([facility] [address] and Schizoaffective disorder ([address]     PAST SURGICAL HISTORY    Patient  has a past surgical history that includes Infected skin debridement (july 2011) and Abscess Drainage (11/1/2013). FAMILY HISTORY    Patient family history includes Alcohol Abuse in his father; Cancer in his father and mother; High Blood Pressure in his maternal grandfather; Mental Illness in his paternal aunt; Substance Abuse in his brother and paternal uncle. SOCIAL HISTORY    Patient  reports that he has been smoking cigarettes. He has a 22.00 pack-year smoking history. He has never used smokeless tobacco. He reports current alcohol use. He reports current drug use. HOME MEDICATIONS        Prior to Admission medications    Medication Sig Start Date End Date Taking? Authorizing Provider   diphenhydrAMINE (BENADRYL) 25 MG capsule Take 25 mg by mouth nightly as needed for Sleep   Yes Historical Provider, MD   citalopram (CELEXA) 20 MG tablet Take 20 mg by mouth daily   Yes Historical Provider, MD   lithium (ESKALITH) 450 MG extended release tablet Take 450 mg by mouth 2 times daily   Yes Historical Provider, MD   clindamycin (CLINDAGEL) 1 % gel Apply topically 2 times daily Apply topically 2 times daily. Yes Historical Provider, MD   ZOLPIDEM TARTRATE PO Take 5 mg by mouth nightly as needed (sleep)    Yes Historical Provider, MD   QUEtiapine (SEROQUEL) 200 MG tablet Take 200 mg by mouth 2 times daily Bid -  with 300 mg at bedtime to equal 500 mg at hs   Yes Historical Provider, MD   QUEtiapine (SEROQUEL XR) 300 MG extended release tablet Take 300 mg by mouth daily   Yes Historical Provider, MD   benztropine (COGENTIN) 1 MG tablet Take 1 tablet by mouth 2 times daily  Patient taking differently: Take 0.5 mg by mouth 2 times daily  8/4/16  Yes Bennie Mckeon MD   docusate sodium (COLACE, DULCOLAX) 100 MG CAPS Take 100 mg by mouth 2 times daily 7/11/16  Yes Aisha TRIMBLE MD   Multiple Vitamins-Minerals (THERAPEUTIC MULTIVITAMIN-MINERALS) tablet Take 1 tablet by mouth daily. 4/18/14  Yes Shannon Marroquin   acetaminophen (TYLENOL) 325 MG tablet Take 650 mg by mouth every 6 hours as needed for Pain    Historical Provider, MD   ferrous sulfate 325 (65 FE) MG tablet Take 1 tablet by mouth 2 times daily (with meals) 5/16/16   Sri Bonilla MD       ALLERGIES      Depakene [valproic acid]; Restoril [temazepam]; Risperidone and related; and Thorazine [chlorpromazine]    REVIEW OF SYSTEMS     Review of Systems   Constitutional: Negative for chills, diaphoresis and fever. HENT: Negative for congestion and sore throat. Open draining wound behind left ear   Respiratory: Negative for cough, shortness of breath and wheezing. Cardiovascular: Negative for chest pain, palpitations and leg swelling. Gastrointestinal: Negative for abdominal pain, constipation, diarrhea, nausea and vomiting. Genitourinary: Negative for dysuria, frequency and urgency. Musculoskeletal: Negative for back pain and myalgias. Skin: Positive for wound. Negative for rash. Neurological: Negative for dizziness, weakness and headaches. Psychiatric/Behavioral: Positive for hallucinations. The patient is not nervous/anxious. PHYSICAL EXAM      /74   Pulse 106   Temp 99.1 °F (37.3 °C) (Oral)   Resp 16   Ht 5' 9\" (1.753 m)   Wt 218 lb 1.6 oz (98.9 kg)   SpO2 97%   BMI 32.21 kg/m²  Body mass index is 32.21 kg/m². Physical Exam  Constitutional:       General: He is not in acute distress. Appearance: He is well-developed. He is not diaphoretic. HENT:      Head: Normocephalic and atraumatic. Eyes:      Conjunctiva/sclera: Conjunctivae normal.      Pupils: Pupils are equal, round, and reactive to light. Neck:      Musculoskeletal: Normal range of motion and neck supple. Trachea: No tracheal deviation. Cardiovascular:      Rate and Rhythm: Normal rate and regular rhythm. Heart sounds: Normal heart sounds. No murmur. No friction rub. No gallop.     Pulmonary:      Effort: Pulmonary effort is normal. No respiratory distress. Breath sounds: Normal breath sounds. No wheezing or rales. Chest:      Chest wall: No tenderness. Abdominal:      General: Bowel sounds are normal. There is no distension. Palpations: Abdomen is soft. Tenderness: There is no abdominal tenderness. There is no guarding. Musculoskeletal: Normal range of motion. General: No tenderness. Lymphadenopathy:      Cervical: No cervical adenopathy. Skin:     General: Skin is warm and dry. Coloration: Skin is not pale. Findings: Lesion present. No erythema or rash. Comments: Multiple open cystlike areas over symphysis pubis, groin and buttocks, consistent with hydradenitis. Foul-smelling purulent drainage noted from wounds. Neurological:      Mental Status: He is alert. Mental status is at baseline. Motor: No seizure activity. Coordination: Coordination normal.      Comments: Actively responding to internal stimuli   Psychiatric:         Behavior: Behavior normal.         Thought Content: Thought content normal.       DIAGNOSTICS      EKG: None    Labs:  CBC:   Recent Labs     10/28/20  1923 10/29/20  0410   WBC 10.7 10.7   HGB 9.0* 8.8*   * 579*     BMP:    Recent Labs     10/28/20  1923 10/29/20  0410   * 137   K 3.9 4.2   CL 95* 104   CO2 23 25   BUN 12 9   CREATININE 1.00 0.77   GLUCOSE 93 95     S. Calcium:  Recent Labs     10/29/20  0410   CALCIUM 8.8     S. Ionized Calcium:No results for input(s): IONCA in the last 72 hours. S. Magnesium:No results for input(s): MG in the last 72 hours. S. Phosphorus:No results for input(s): PHOS in the last 72 hours. S. Glucose:No results for input(s): POCGLU in the last 72 hours. Glycosylated hemoglobin A1C:   Lab Results   Component Value Date    LABA1C 5.6 03/03/2014     Hepatic: No results for input(s): AST, ALT, ALB, ALKPHOS in the last 72 hours.     Invalid input(s):  PROT,  BILITOT,  BILIDIR  CARDIAC ENZY: No results for input(s): CKTOTAL, CKMB, CKMBINDEX, TROPHS, MYOGLOBIN in the last 72 hours. INR:   Recent Labs     10/29/20  0410   INR 1.1     BNP: No results for input(s): PROBNP in the last 72 hours. ABGs: No results for input(s): PH, PCO2, PO2, HCO3, O2SAT in the last 72 hours. Lipids: No results for input(s): CHOL, TRIG, HDL, LDLCALC in the last 72 hours. Invalid input(s): LDL  Pancreatic functions:No results for input(s): LIPASE, AMYLASE in the last 72 hours. Lorrane Arn:   Recent Labs     10/28/20  1923 10/28/20  2149   LACTA 4.4* 1.3     Thyroid functions:   Lab Results   Component Value Date    TSH 0.75 03/03/2014      U/A:  Recent Labs     10/28/20  [facility] 1.016   LEUKOCYTESUR NEGATIVE   GLUCOSEU NEGATIVE       Imaging/Diagonstics:     Ct Abdomen Pelvis W Iv Contrast Additional Contrast? None    Result Date: 10/28/2020  EXAMINATION: CT OF THE ABDOMEN AND PELVIS WITH CONTRAST 10/28/2020 8:21 pm TECHNIQUE: CT of the abdomen and pelvis was performed with the administration of intravenous contrast. Multiplanar reformatted images are provided for review. Dose modulation, iterative reconstruction, and/or weight based adjustment of the mA/kV was utilized to reduce the radiation dose to as low as reasonably achievable. COMPARISON: 07/26/2012 HISTORY: ORDERING SYSTEM PROVIDED HISTORY: Hiadrenitis suppurativa TECHNOLOGIST PROVIDED HISTORY: Hiadrenitis suppurativa Reason for Exam: patient has a wound under belly button that he has had from a surgery one year ago , patients home nurse has noticed increased drainage from wound FINDINGS: Lower Chest:  Visualized portion of the lower chest demonstrates no acute abnormality. Organs: The liver, spleen, pancreas, kidneys and adrenal glands are without acute findings. Unchanged asymmetric right renal atrophy with peripherally calcified renal cysts. GI/Bowel:  There is a large colonic stool burden with a large amount of stool in the rectal vault. No mechanical bowel obstruction. No evidence of appendicitis. Pelvis: The urinary bladder is partially distended without contour abnormality. The prostate and seminal vesicles are within normal limits. Peritoneum/Retroperitoneum: No retroperitoneal or mesenteric lymphadenopathy. Mild atherosclerosis. Bones/Soft Tissues: There is anterior low abdomen soft tissue thickening with a 2.3 x 1.2 x 1.6 cm rim enhancing fluid collection in the anterior pelvic soft tissues at midline. There is adjacent scarring but no additional fluid collections within the imaged soft tissues. No acute or aggressive osseous lesions. Small fat containing umbilical hernia. 1. 2.3 cm rim enhancing fluid collection in the midline anterior pelvic soft tissues with overlying skin thickening. 2. Large colonic stool burden with a significant amount of stool in the rectal vault. The patient may benefit from enema or disimpaction. 3. Unchanged asymmetric right renal atrophy with peripherally calcified right renal cyst.     Xr Chest Portable    Result Date: 10/28/2020  EXAMINATION: ONE XRAY VIEW OF THE CHEST 10/28/2020 6:45 pm COMPARISON: 03/23/2014 HISTORY: ORDERING SYSTEM PROVIDED HISTORY: tachycardia, ams TECHNOLOGIST PROVIDED HISTORY: tachycardia, ams Reason for Exam: Altered mental status, tachycardia, wound check. Acuity: Unknown Type of Exam: Unknown Additional signs and symptoms: Altered mental status, tachycardia, wound check. FINDINGS: Cardiomediastinal silhouette is upper limits of normal in size. No pulmonary consolidation, pleural effusion, or pneumothorax. No acute osseous abnormality. No acute cardiopulmonary abnormality. ASSESSMENT  and  PLAN     Principal Problem:    Abscess  Active Problems:    Hidradenitis suppurativa    Schizoaffective disorder, bipolar type ([facility] [address]    Tobacco abuse  Resolved Problems:    * No resolved hospital problems.  *    Plan:    Abscess  -Hidradenitis suppurativa  -Multiple open draining areas in groin and buttocks  -Continue home dose clindamycin gel  -Doxycycline 100 mg p.o. every 12 hours initiated in ED  -General surgery consult    Schizoaffective disorder, bipolar type  -Continue home meds  -Patient actively hallucinating  --Mother (POA) reports this is patient's baseline  --- Does not feel that patient needs inpatient psych treatment at this time    Tobacco use   -smoking cessation education  -nicotine patch daily    Consultations:     [address] [facility], APURVA - CNP   10/29/2020  5:02 AM    Aleja Noel [address] [place] [address] [address], [address] [facility].    Phone [phone]

## 2020-10-29 NOTE — PROGRESS NOTES
Patient is arousable but sleepy. He does not wish to participate in care at this time. Patient incontinent of urine. Bed found wet; linens changed.

## 2020-10-29 NOTE — FLOWSHEET NOTE
It was hard to carry a conversation as he was mumbling, but he asked the writer when he would be released.         10/29/20 1538   Encounter Summary   Services provided to: Patient   Referral/Consult From: [facility]   (10-29-20)   Complexity of Encounter Low   Length of Encounter 15 minutes   Spiritual Assessment Completed Yes   Spiritual/Scientology   Type Spiritual support   Assessment Anxious   Intervention Explored feelings, thoughts, concerns;Prayer;Provided reading materials/devotional materials;Sustaining presence/ Ministry of presence   Outcome Engaged in conversation
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
## 2020-10-30 LAB
ANION GAP SERPL CALCULATED.3IONS-SCNC: 8 MMOL/L (ref 9–17)
BUN BLDV-MCNC: 10 MG/DL (ref 6–20)
BUN/CREAT BLD: ABNORMAL (ref 9–20)
CALCIUM SERPL-MCNC: 8.8 MG/DL (ref 8.6–10.4)
CHLORIDE BLD-SCNC: 103 MMOL/L (ref 98–107)
CO2: 26 MMOL/L (ref 20–31)
CREAT SERPL-MCNC: 0.87 MG/DL (ref 0.7–1.2)
GFR AFRICAN AMERICAN: >60 ML/MIN
GFR NON-AFRICAN AMERICAN: >60 ML/MIN
GFR SERPL CREATININE-BSD FRML MDRD: ABNORMAL ML/MIN/{1.73_M2}
GFR SERPL CREATININE-BSD FRML MDRD: ABNORMAL ML/MIN/{1.73_M2}
GLUCOSE BLD-MCNC: 94 MG/DL (ref 70–99)
HCT VFR BLD CALC: 27 % (ref 41–53)
HEMOGLOBIN: 8.6 G/DL (ref 13.5–17.5)
MCH RBC QN AUTO: 25.7 PG (ref 26–34)
MCHC RBC AUTO-ENTMCNC: 31.8 G/DL (ref 31–37)
MCV RBC AUTO: 81 FL (ref 80–100)
NRBC AUTOMATED: ABNORMAL PER 100 WBC
PDW BLD-RTO: 16.2 % (ref 11.5–14.9)
PLATELET # BLD: 528 K/UL (ref 150–450)
PMV BLD AUTO: 5.8 FL (ref 6–12)
POTASSIUM SERPL-SCNC: 3.8 MMOL/L (ref 3.7–5.3)
RBC # BLD: 3.33 M/UL (ref 4.5–5.9)
SODIUM BLD-SCNC: 137 MMOL/L (ref 135–144)
WBC # BLD: 10.4 K/UL (ref 3.5–11)

## 2020-10-30 PROCEDURE — 99213 OFFICE O/P EST LOW 20 MIN: CPT

## 2020-10-30 PROCEDURE — 1200000000 HC SEMI PRIVATE

## 2020-10-30 PROCEDURE — 99233 SBSQ HOSP IP/OBS HIGH 50: CPT | Performed by: INTERNAL MEDICINE

## 2020-10-30 PROCEDURE — 6360000002 HC RX W HCPCS: Performed by: INTERNAL MEDICINE

## 2020-10-30 PROCEDURE — 80048 BASIC METABOLIC PNL TOTAL CA: CPT

## 2020-10-30 PROCEDURE — 85027 COMPLETE CBC AUTOMATED: CPT

## 2020-10-30 PROCEDURE — 6370000000 HC RX 637 (ALT 250 FOR IP): Performed by: NURSE PRACTITIONER

## 2020-10-30 PROCEDURE — 2580000003 HC RX 258: Performed by: INTERNAL MEDICINE

## 2020-10-30 PROCEDURE — 2500000003 HC RX 250 WO HCPCS: Performed by: INTERNAL MEDICINE

## 2020-10-30 PROCEDURE — 99222 1ST HOSP IP/OBS MODERATE 55: CPT | Performed by: INTERNAL MEDICINE

## 2020-10-30 PROCEDURE — 36415 COLL VENOUS BLD VENIPUNCTURE: CPT

## 2020-10-30 PROCEDURE — 2580000003 HC RX 258: Performed by: NURSE PRACTITIONER

## 2020-10-30 RX ADMIN — DOCUSATE SODIUM 100 MG: 100 CAPSULE ORAL at 11:05

## 2020-10-30 RX ADMIN — QUETIAPINE FUMARATE 200 MG: 200 TABLET ORAL at 22:52

## 2020-10-30 RX ADMIN — BENZTROPINE MESYLATE 0.5 MG: 0.5 TABLET ORAL at 22:53

## 2020-10-30 RX ADMIN — LITHIUM CARBONATE 450 MG: 450 TABLET, EXTENDED RELEASE ORAL at 22:52

## 2020-10-30 RX ADMIN — SODIUM CHLORIDE: 9 INJECTION, SOLUTION INTRAVENOUS at 11:08

## 2020-10-30 RX ADMIN — QUETIAPINE FUMARATE 200 MG: 200 TABLET ORAL at 11:05

## 2020-10-30 RX ADMIN — CLINDAMYCIN PHOSPHATE: 10 SOLUTION TOPICAL at 11:04

## 2020-10-30 RX ADMIN — CEFEPIME HYDROCHLORIDE 2 G: 2 INJECTION, POWDER, FOR SOLUTION INTRAVENOUS at 03:21

## 2020-10-30 RX ADMIN — QUETIAPINE FUMARATE 300 MG: 300 TABLET, EXTENDED RELEASE ORAL at 22:53

## 2020-10-30 RX ADMIN — VANCOMYCIN HYDROCHLORIDE 1500 MG: 1.5 INJECTION, POWDER, LYOPHILIZED, FOR SOLUTION INTRAVENOUS at 22:51

## 2020-10-30 RX ADMIN — LITHIUM CARBONATE 450 MG: 450 TABLET, EXTENDED RELEASE ORAL at 11:07

## 2020-10-30 RX ADMIN — DOCUSATE SODIUM 100 MG: 100 CAPSULE ORAL at 22:51

## 2020-10-30 RX ADMIN — METRONIDAZOLE 500 MG: 500 INJECTION, SOLUTION INTRAVENOUS at 18:10

## 2020-10-30 RX ADMIN — MULTIPLE VITAMINS W/ MINERALS TAB 1 TABLET: TAB at 11:05

## 2020-10-30 RX ADMIN — CITALOPRAM HYDROBROMIDE 20 MG: 20 TABLET ORAL at 11:05

## 2020-10-30 RX ADMIN — BENZTROPINE MESYLATE 0.5 MG: 0.5 TABLET ORAL at 11:07

## 2020-10-30 RX ADMIN — VANCOMYCIN HYDROCHLORIDE 1500 MG: 1.5 INJECTION, POWDER, LYOPHILIZED, FOR SOLUTION INTRAVENOUS at 06:04

## 2020-10-30 RX ADMIN — CEFEPIME HYDROCHLORIDE 2 G: 2 INJECTION, POWDER, FOR SOLUTION INTRAVENOUS at 17:00

## 2020-10-30 RX ADMIN — SODIUM CHLORIDE 200 MG: 9 INJECTION, SOLUTION INTRAVENOUS at 19:23

## 2020-10-30 NOTE — PROGRESS NOTES
Dr Alvino Resendez notified by phone of consult and will see tomorrow 10/31    Called at 12:24pm 10/30

## 2020-10-30 NOTE — PROGRESS NOTES
Patient seen and examined. Afebrile, VSS. Hemoglobin stable. Patient appears to be responding to internal stimuli, not answering questions. He was cooperative with examination. Abdomen soft, benign. Abdominal wounds still draining purulent fluid. Continue local wound care. WOC RN to assess today. Antibiotics per ID. Surgically stable. Continue medical management.      Pari Watson PA-C  [facility]

## 2020-10-30 NOTE — PLAN OF CARE
Safety maintained, call light is within reach, no s/s or c/o distress, bed is low/locked, side rails are up x2, bed alarm remains on  Problem: Restraint Use - Violent/Self-Destructive Behavior:  Goal: Absence of restraint indications  Description: Absence of restraint indications  Outcome: Ongoing  Goal: Absence of restraint-related injury  Description: Absence of restraint-related injury  Outcome: Ongoing     Problem: Skin Integrity:  Goal: Will show no infection signs and symptoms  Description: Will show no infection signs and symptoms  10/30/2020 1715 by Lakshmi Lomas RN  Outcome: Ongoing  10/30/2020 0359 by Yaritza Dumont RN  Outcome: Ongoing  Note: Pt shows no signs or symptoms of infection at this time. VS stable, alert and oriented x4. Hand hygiene utilized upon entry and exit. Will continue to monitor. Goal: Absence of new skin breakdown  Description: Absence of new skin breakdown  10/30/2020 1715 by Lakshmi Lomas RN  Outcome: Ongoing  10/30/2020 0359 by Yaritza Dumont RN  Outcome: Ongoing  Note: RN noted no new skin breakdown on patient. Will continue to monitor      Problem: Infection:  Goal: Will remain free from infection  Description: Will remain free from infection  Outcome: Ongoing     Problem: Safety:  Goal: Free from accidental physical injury  Description: Free from accidental physical injury  10/30/2020 1715 by Lakshmi Lomas RN  Outcome: Ongoing  10/30/2020 0359 by Yaritza Dumont RN  Outcome: Ongoing  Note: Pt remained free from any accident injury for this RN.  Will continue to monitor   Goal: Free from intentional harm  Description: Free from intentional harm  Outcome: Ongoing     Problem: Daily Care:  Goal: Daily care needs are met  Description: Daily care needs are met  Outcome: Ongoing     Problem: Pain:  Goal: Patient's pain/discomfort is manageable  Description: Patient's pain/discomfort is manageable  10/30/2020 1715 by Lakshim Lomas RN  Outcome: Ongoing  10/30/2020 0359 by Yovany Calvillo RN  Outcome: Ongoing  Note: Pt has not complained of any pain for this RN.  Will monitor      Problem: Discharge Planning:  Goal: Patients continuum of care needs are met  Description: Patients continuum of care needs are met  Outcome: Ongoing

## 2020-10-30 NOTE — CARE COORDINATION
CASE MANAGEMENT NOTE:    Admission Date:  10/28/2020 Felipa Fountain is a 41 y.o.  male    Admitted for : Abscess [L02.91]    Met with: Mother theodosia    PCP:  Dr Heather Velez:  Arianna Hearing:  at home dependent on family care             Current Services PTA:  Yes, [address]    Is patient agreeable to [facility]: Yes    Freedom of choice provided:  Yes    List of [address] provided: No    [facility] chosen:  Yes    DME:  none    Home Oxygen: No    Nebulizer: No    CPAP/BIPAP: No    Supplier: N/A    Potential Assistance Needed: No    SNF needed: No    Freedom of choice and list provided: NA    Pharmacy:  [address]       Does Patient want to use MEDS to BEDS? No    Is patient currently receiving oral anticoagulation therapy? No    Is the Patient an [facility] with Readmission Risk Score greater than 14%? No  If yes, pt needs a follow up appointment made within 7 days. Family Members/Caregivers that pt would like involved in their care:    Yes    If yes, list name here:  brad    Transportation Provider:  Family             Is patient in Isolation/One on One/Altered Mental Status? Yes  If yes, skip next question. If no, would they like an I-Pad to  use? No  If yes, call [phone]. Discharge Plan:  10/30/20 - Jacob Kent - Patient from home lives with his mother. Current with [facility] and wants to resume services. Has no DME:s,   HX of schizophrenia and bipolar and hidradenitis suppurativa. , Plan is to return home with [facility], HANNAH needs signed and completed. .will follow . //pf              Electronically signed by: Eros Vega RN on 10/30/2020 at 12:02 PM

## 2020-10-30 NOTE — CONSULTS
Depakene [Valproic Acid]     Restoril [Temazepam]     Risperidone And Related      Seizure      Thorazine [Chlorpromazine]            Objective:      /80   Pulse 91   Temp 98.1 °F (36.7 °C) (Oral)   Resp 18   Ht 5' 9\" (1.753 m)   Wt 218 lb 1.6 oz (98.9 kg)   SpO2 97%   BMI 32.21 kg/m²       LABS    CBC:   Lab Results   Component Value Date    WBC 10.4 10/30/2020    RBC 3.33 10/30/2020    RBC 3.46 02/09/2012    HGB 8.6 10/30/2020     CMP:  Albumin:    Lab Results   Component Value Date    LABALBU 3.9 08/30/2016     PT/INR:    Lab Results   Component Value Date    PROTIME 13.7 10/29/2020    INR 1.1 10/29/2020     HgBA1c:    Lab Results   Component Value Date    LABA1C 5.6 03/03/2014     PTT: No components found for: LABPTT    Review of Systems    Assessment:     Physical Exam      Roosevelt Risk Score: Roosevelt Scale Score: 20    Patient Active Problem List   Diagnosis Code    Hidradenitis suppurativa L73.2    Abscess of axilla L02.419    Abscess of buttock L02.31    Blood per rectum K62.5    Hidradenitis L73.2    Trochanteric bursitis M70.60    Adrenal mass, 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    Hidradenitis axillaris L73.2    Schizoaffective disorder, bipolar type ([facility]) F25.0    Encounter for ostomy nurse consultation Z71.89    Paranoid schizophrenia, chronic condition with acute exacerbation ([facility]) F20.0    Iron deficiency anemia D50.9    GERD (gastroesophageal reflux disease) K21.9    Tobacco abuse Z72.0    Non-healing left groin open wound, subsequent encounter S31.104D    Right groin wound, subsequent encounter S31.109D    Open wound of left ear S01.302A    Abscess L02.91       Patient Active Problem List   Diagnosis    Hidradenitis suppurativa    Abscess of axilla    Abscess of buttock    Blood per rectum    Hidradenitis    Trochanteric bursitis    Adrenal mass, right (HCC)    Hidradenitis axillaris    Schizoaffective disorder, bipolar type ([facility] [address]    Encounter for ostomy nurse consultation  Paranoid schizophrenia, chronic condition with acute exacerbation (HCC)    Iron deficiency anemia    GERD (gastroesophageal reflux disease)    Tobacco abuse    Non-healing left groin open wound, subsequent encounter    Right groin wound, subsequent encounter    Open wound of left ear    Abscess       Multiple open wound noted to left ear, suprapubic area, right and left groin areas, posterior upper thighs, perianal. See pictures. Adequate assessment was difficult due to patient discomfort. The patient would benefit most from using CHG cleanser and managing the moisture adequately. Silver (Opticell) dressing can be used in areas that are not exposed to incontinence. ABD pads or brief can be used to manage moisture in other areas. Response to treatment:  Well tolerated by patient. Plan:     Plan of Care:     -Cleanse open wound areas with CHG foam and dry well. Use ordered antibiotic liquid to open wound areas.    -Use Opticell to wounds in areas that are not affected by incontinence. Use ABD pads/brief to manage moisture in other areas.    -Turn every 2 hours    -Float heels of of bed with pillows under      -Routine incontinence care with Aleksandr Kessler barrier cloths and zinc oxide cream. Apply zinc oxide cream BID and prn incontinence. Demi moisture wicking under pads vs cloth backed briefs    -Static air overlay. Check inflation each shift by sliding hand under the air overlay. Feel for the patient's heaviest/ most dependant body part. Ideally 1/2 to 1\" of air will be between your hand and the patient's body. Add air prn.     Specialty Bed Required : Yes   [] Low Air Loss   [x] Pressure Redistribution  [] Fluid Immersion  [] Bariatric  [] Total Pressure Relief  [] Other:     Current Diet: DIET GENERAL;  Dietician consult:  Yes    Discharge Plan:  Placement for patient upon discharge: home with support   Patient appropriate for Outpatient [address] [place] [address] Street: Yes    Patient/Caregiver Teaching:  Level of patientunderstanding able to:     [x] Indicates understanding       [] Needs reinforcement  [] Unsuccessful      [x] Verbal Understanding  [] Demonstrated understanding       [] No evidence of learning  [] Refused teaching         [] N/A       Electronically signed by Reynaldo Montano RN on  10/30/2020 at 12:14 PM

## 2020-10-30 NOTE — PLAN OF CARE
Problem: Skin Integrity:  Goal: Will show no infection signs and symptoms  Description: Will show no infection signs and symptoms  10/30/2020 0359 by Nohemi eWbb RN  Outcome: Ongoing  Note: Pt shows no signs or symptoms of infection at this time. VS stable, alert and oriented x4. Hand hygiene utilized upon entry and exit. Will continue to monitor. Problem: Skin Integrity:  Goal: Absence of new skin breakdown  Description: Absence of new skin breakdown  10/30/2020 0359 by Nohemi Webb RN  Outcome: Ongoing  Note: RN noted no new skin breakdown on patient. Will continue to monitor      Problem: Safety:  Goal: Free from accidental physical injury  Description: Free from accidental physical injury  10/30/2020 0359 by Nohemi Webb RN  Outcome: Ongoing  Note: Pt remained free from any accidental injury for this RN. Will continue to monitor      Problem: Pain:  Goal: Patient's pain/discomfort is manageable  Description: Patient's pain/discomfort is manageable  10/30/2020 0359 by Nohemi Webb RN  Outcome: Ongoing  Note: Pt has not complained of any pain for this RN.  Will monitor

## 2020-10-30 NOTE — DISCHARGE INSTR - COC
Continuity of Care Form    Patient Name: Manny Braga   :  1980  MRN:  [id number]    Admit date:  10/28/2020  Discharge date:  2020    Code Status Order: Full Code   Advance Directives:   [facility] Documentation       Date/Time Healthcare Directive Type of Healthcare Directive Copy in [facility] [address] Agent's Name Healthcare Agent's Phone Number    10/28/20 2336  No, patient does not have an advance directive for healthcare treatment -- -- -- -- --            Admitting Physician:  Meagan Eddy MD  PCP: Raul Hunter MD    Discharging Nurse: [facility] Unit/Room#: 2125/2125-01  Discharging Unit Phone Number: [phone]    Emergency Contact:   Extended Emergency Contact Information  Primary Emergency Contact: Qamar Shirley  Address: [address] [place] [id number]  Home Phone: [phone]  Relation: Parent    Past Surgical History:  Past Surgical History:   Procedure Laterality Date    ABSCESS DRAINAGE  2013    left inguinal hydrodenitis/pilondal cyst   [place] [facility] INFECTED SKIN DEBRIDEMENT  2011       Immunization History: There is no immunization history on file for this patient.     Active Problems:  Patient Active Problem List   Diagnosis Code    Hidradenitis suppurativa L73.2    Abscess of axilla L02.419    Abscess of buttock L02.31    Blood per rectum K62.5    Hidradenitis L73.2    Trochanteric bursitis M70.60    Adrenal mass, right ([facility]) E27.8    Hidradenitis axillaris L73.2    Schizoaffective disorder, bipolar type ([facility]) F25.0    Encounter for ostomy nurse consultation Z71.89    Paranoid schizophrenia, chronic condition with acute exacerbation ([facility]) F20.0    Iron deficiency anemia D50.9    GERD (gastroesophageal reflux disease) K21.9    Tobacco abuse Z72.0    Non-healing left groin open wound, subsequent encounter S31.104D    Right groin wound, subsequent encounter S31.109D    Open wound of left ear S01.302A    Abscess L02.91       Isolation/Infection:   Isolation            Contact          Patient Infection Status       Infection Onset Added Last Indicated Last Indicated By Review Planned Expiration Resolved Resolved By    MDRO (multi-drug resistant organism)  03/21/16 03/21/16 PHIILP Gallardo - 3/18/2016 urine            Nurse Assessment:  Last Vital Signs: /80   Pulse 91   Temp 98.1 °F (36.7 °C) (Oral)   Resp 18   Ht 5' 9\" (1.753 m)   Wt 218 lb 1.6 oz (98.9 kg)   SpO2 97%   BMI 32.21 kg/m²     Last documented pain score (0-10 scale): Pain Level: 0  Last Weight:   Wt Readings from Last 1 Encounters:   10/29/20 218 lb 1.6 oz (98.9 kg)     Mental Status:  alert    IV Access:  - None    Nursing Mobility/ADLs:  Walking   Assisted  Transfer  Assisted  Bathing  Assisted  Dressing  Assisted  Toileting  Assisted  Feeding  Independent  Med Admin  Assisted  Med Delivery   whole    Wound Care Documentation and Therapy:  Wound 07/16/20 Groin Upper #1 (cluster) (Active)   Wound Etiology Other 10/30/20 0058   Dressing Status Old drainage noted 10/30/20 1000   Wound Cleansed Irrigated with saline 10/14/20 1127   Wound Length (cm) 5.5 cm 10/14/20 1127   Wound Width (cm) 14.8 cm 10/14/20 1127   Wound Depth (cm) 0.3 cm 10/14/20 1127   Wound Surface Area (cm^2) 81.4 cm^2 10/14/20 1127   Change in Wound Size % (l*w) 46.45 10/14/20 1127   Wound Volume (cm^3) 24.42 cm^3 10/14/20 1127   Wound Healing % 20 10/14/20 1127   Post-Procedure Length (cm) 5.5 cm 10/14/20 1127   Post-Procedure Width (cm) 14.8 cm 10/14/20 1127   Post-Procedure Depth (cm) 0.3 cm 10/14/20 1127   Post-Procedure Surface Area (cm^2) 81.4 cm^2 10/14/20 1127   Post-Procedure Volume (cm^3) 24.42 cm^3 10/14/20 1127   Wound Assessment Fibrin;Subcutaneous 10/30/20 1000   Drainage Amount Small 10/30/20 1000   Drainage Description Serosanguinous 10/30/20 1000   Odor Moderate 10/30/20 1000   Blaire-wound Assessment Intact 10/30/20 1000 Margins Defined edges 10/14/20 1127   Wound Thickness Description not for Pressure Injury Full thickness 10/14/20 1127   Number of days: 106       Wound 07/16/20 Thigh Right;Upper #3 (Active)   Wound Etiology Other 10/30/20 0058   Dressing Status Old drainage noted 10/30/20 1000   Wound Cleansed Irrigated with saline 10/14/20 1127   Wound Length (cm) 0.1 cm 10/14/20 1127   Wound Width (cm) 0.1 cm 10/14/20 1127   Wound Depth (cm) 0.1 cm 10/14/20 1127   Wound Surface Area (cm^2) 0.01 cm^2 10/14/20 1127   Change in Wound Size % (l*w) 99.99 10/14/20 1127   Wound Volume (cm^3) 0 cm^3 10/14/20 1127   Wound Healing % 100 10/14/20 1127   Post-Procedure Length (cm) 0.1 cm 10/14/20 1127   Post-Procedure Width (cm) 0.1 cm 10/14/20 1127   Post-Procedure Depth (cm) 0.1 cm 10/14/20 1127   Post-Procedure Surface Area (cm^2) 0.01 cm^2 10/14/20 1127   Post-Procedure Volume (cm^3) 0 cm^3 10/14/20 1127   Drainage Amount Small 10/30/20 1000   Drainage Description Serosanguinous 10/30/20 1000   Odor Moderate 10/30/20 1000   Blaire-wound Assessment Intact 10/30/20 1000   Margins Defined edges 10/30/20 1000   Wound Thickness Description not for Pressure Injury Partial thickness 10/14/20 1127   Number of days: 106       Wound 07/16/20 Thigh Left;Upper #2 (Active)   Wound Etiology Other 10/30/20 0058   Dressing Status Old drainage noted 10/30/20 1000   Wound Cleansed Irrigated with saline 10/14/20 1127   Wound Length (cm) 1.3 cm 10/14/20 1127   Wound Width (cm) 1.5 cm 10/14/20 1127   Wound Depth (cm) 0.1 cm 10/14/20 1127   Wound Surface Area (cm^2) 1.95 cm^2 10/14/20 1127   Change in Wound Size % (l*w) -160 10/14/20 1127   Wound Volume (cm^3) 0.2 cm^3 10/14/20 1127   Wound Healing % 47 10/14/20 1127   Post-Procedure Length (cm) 1.3 cm 10/14/20 1127   Post-Procedure Width (cm) 1.5 cm 10/14/20 1127   Post-Procedure Depth (cm) 0.1 cm 10/14/20 1127   Post-Procedure Surface Area (cm^2) 1.95 cm^2 10/14/20 1127   Post-Procedure Volume (cm^3) 0.2 cm^3 10/14/20 1127   Wound Assessment Epithelialization 10/30/20 1000   Drainage Amount Small 10/30/20 1000   Drainage Description Serosanguinous 10/30/20 1000   Odor Moderate 10/30/20 1000   Blaire-wound Assessment Intact 10/30/20 1000   Margins Defined edges 10/30/20 1000   Number of days: 106       Wound 07/22/20 Groin Lower;Right #4 (Active)   Wound Etiology Other 10/30/20 0058   Dressing Status Old drainage noted 10/30/20 1000   Wound Cleansed Irrigated with saline 10/14/20 1127   Wound Length (cm) 0 cm 10/14/20 1127   Wound Width (cm) 0 cm 10/14/20 1127   Wound Depth (cm) 0 cm 10/14/20 1127   Wound Surface Area (cm^2) 0 cm^2 10/14/20 1127   Change in Wound Size % (l*w) 100 10/14/20 1127   Wound Volume (cm^3) 0 cm^3 10/14/20 1127   Wound Healing % 100 10/14/20 1127   Post-Procedure Length (cm) 0 cm 10/14/20 1127   Post-Procedure Width (cm) 0 cm 10/14/20 1127   Post-Procedure Depth (cm) 0 cm 10/14/20 1127   Post-Procedure Surface Area (cm^2) 0 cm^2 10/14/20 1127   Post-Procedure Volume (cm^3) 0 cm^3 10/14/20 1127   Number of days: 99       Wound 09/09/20 Groin Anterior;Left;Lower #5 (Active)   Wound Etiology Other 10/30/20 0058   Dressing Status Old drainage noted 10/30/20 1000   Wound Cleansed Irrigated with saline 10/14/20 1127   Wound Length (cm) 0 cm 10/14/20 1127   Wound Width (cm) 0 cm 10/14/20 1127   Wound Depth (cm) 0 cm 10/14/20 1127   Wound Surface Area (cm^2) 0 cm^2 10/14/20 1127   Change in Wound Size % (l*w) 100 10/14/20 1127   Wound Volume (cm^3) 0 cm^3 10/14/20 1127   Wound Healing % 100 10/14/20 1127   Post-Procedure Length (cm) 0 cm 10/14/20 1127   Post-Procedure Width (cm) 0 cm 10/14/20 1127   Post-Procedure Depth (cm) 0 cm 10/14/20 1127   Post-Procedure Surface Area (cm^2) 0 cm^2 10/14/20 1127   Post-Procedure Volume (cm^3) 0 cm^3 10/14/20 1127   Number of days: 50       Wound 09/09/20 Ear Left;Posterior #8 (Active)   Wound Etiology Other 10/30/20 0058   Dressing Status Old drainage noted 10/30/20 1000   Wound Cleansed Irrigated with saline 10/14/20 1127   Wound Length (cm) 1.2 cm 10/14/20 1127   Wound Width (cm) 0.5 cm 10/14/20 1127   Wound Depth (cm) 1.1 cm 10/14/20 1127   Wound Surface Area (cm^2) 0.6 cm^2 10/14/20 1127   Change in Wound Size % (l*w) 81.82 10/14/20 1127   Wound Volume (cm^3) 0.66 cm^3 10/14/20 1127   Wound Healing % 0 10/14/20 1127   Post-Procedure Length (cm) 1.2 cm 10/14/20 1127   Post-Procedure Width (cm) 0.5 cm 10/14/20 1127   Post-Procedure Depth (cm) 1.1 cm 10/14/20 1127   Post-Procedure Surface Area (cm^2) 0.6 cm^2 10/14/20 1127   Post-Procedure Volume (cm^3) 0.66 cm^3 10/14/20 1127   Wound Assessment Devitalized tissue; Subcutaneous 10/30/20 1000   Drainage Amount Moderate 10/30/20 1000   Drainage Description Serosanguinous 10/30/20 1000   Odor Moderate 10/30/20 1000   Blaire-wound Assessment Dry/flaky 10/30/20 1000   Margins Defined edges 10/30/20 1000   Wound Thickness Description not for Pressure Injury Full thickness 10/30/20 1000   Number of days: 51       Wound 10/14/20 Buttocks Right #6 (Active)   Wound Image   10/14/20 1127   Wound Etiology Other 10/30/20 0058   Dressing Status Old drainage noted 10/30/20 1000   Wound Cleansed Irrigated with saline 10/14/20 1127   Wound Length (cm) 0.7 cm 10/14/20 1127   Wound Width (cm) 0.7 cm 10/14/20 1127   Wound Depth (cm) 0.5 cm 10/14/20 1127   Wound Surface Area (cm^2) 0.49 cm^2 10/14/20 1127   Wound Volume (cm^3) 0.24 cm^3 10/14/20 1127   Post-Procedure Length (cm) 0.7 cm 10/14/20 1127   Post-Procedure Width (cm) 0.7 cm 10/14/20 1127   Post-Procedure Depth (cm) 0.5 cm 10/14/20 1127   Post-Procedure Surface Area (cm^2) 0.49 cm^2 10/14/20 1127   Post-Procedure Volume (cm^3) 0.24 cm^3 10/14/20 1127   Undermining Starts ___ O'Clock 3 10/14/20 1127   Undermining Ends___ O'Clock 7 10/14/20 1127   Undermining Maxium Distance (cm) [email or web address] 10/14/20 1127   Wound Assessment Devitalized tissue;Fibrin;Subcutaneous 10/30/20 1000   Drainage Amount Small 10/30/20 1000   Drainage Description Serosanguinous 10/30/20 1000   Odor Moderate 10/30/20 1000   Blaire-wound Assessment Intact 10/30/20 1000   Margins Defined edges 10/30/20 1000   Wound Thickness Description not for Pressure Injury Full thickness 10/14/20 1127   Number of days: 16          Safety Concerns: At Risk for Falls    Impairments/Disabilities:      None    Nutrition Therapy:  Current Nutrition Therapy:   - Oral Diet:  General    Routes of Feeding: Oral  Liquids: No Restrictions  Daily Fluid Restriction: no  Last Modified Barium Swallow with Video (Video Swallowing Test): not done    Treatments at the Time of Hospital Discharge:   Respiratory Treatments: n/a  Oxygen Therapy:  is not on home oxygen therapy. Ventilator:    - No ventilator support    Rehab Therapies: Physical Therapy and Occupational Therapy  Weight Bearing Status/Restrictions: No weight bearing restirctions  Other Medical Equipment (for information only, NOT a DME order): Other Treatments: Skilled Nursing assessment and monitoring,medication education,  Wound Care: -Cleanse open wound areas with CHG foam and dry well. Use ordered antibiotic liquid to open wound areas.                             -Use Opticell to wounds in areas that are not affected by incontinence. Use ABD pads/brief to manage moisture in other areas.     Patient's personal belongings (please select all that are sent with patient):  None    RN SIGNATURE:  Electronically signed by Johny Taveras RN on 11/2/20 at 2:01 PM EST    CASE MANAGEMENT/SOCIAL WORK SECTION    Inpatient Status Date: 10/28/2020    Readmission Risk Assessment Score:  Readmission Risk              Risk of Unplanned Readmission:        15           Discharging to Facility/ [address]  P: [phone]  F: [phone]    / signature: Electronically signed by Eros Vega RN on 10/30/20 at 12:06 PM EDT    PHYSICIAN SECTION    Prognosis: Fair    Condition at Discharge: Stable    Rehab Potential (if transferring to Rehab): Fair    Recommended Labs or Other Treatments After Discharge: see above    Physician Certification: I certify the above information and transfer of Soheila Shine  is necessary for the continuing treatment of the diagnosis listed and that he requires [address] for less 30 days.      Update Admission H&P: No change in H&P    PHYSICIAN SIGNATURE:  Electronically signed by Tatum Meza MD on 11/2/2020 at 11:26 AM

## 2020-10-30 NOTE — CONSULTS
[facility] -   Infectious diseases evaluation  admission date 10/28/2020    reason for consultation:   Abdominal wall abscess    Impression :   Current:  · Anterior pelvic abscesses  · Hidradenitis suppurativa  · Bipolar disorder  · Schizoaffective disorder  · Polysubstance abuse  · GERD      Recommendations   · Continue IV vancomycin and cefepime  · Add Flagyl  · Surgical debridement needed, followed by surgery who recommended transfer to tertiary center   · Follow CBC renal function  · Follow blood and wound cultures  · Covid 19 test pending        History of Present Illness:   Initial history:  Skinny Ferrara is a 41y.o.-year-old male was sent to the hospital by home health nurse due to increased redness and swelling to the anterior pelvic area with purulent drainage associated with increased hallucinations for 2 weeks. CT abdomen pelvis showed anterior pelvic abscess and large stool burden. He is very poor historian, denied pain, denied cough or shortness of breath, no complaints  History of hidradenitis suppurativa, history of abscesses drainage and skin grafts. Interval changes  10/30/2020             I have personally reviewed the past medical history, past surgical history, medications, social history, and family history, and I haveupdated the database accordingly.   Past Medical History:     Past Medical History:   Diagnosis Date    Bipolar 1 [facility] [address]     GERD (gastroesophageal reflux disease) 8/13/2016    Hidradenitis suppurativa     Polysubstance abuse ([facility] [address]     Schizoaffective disorder ([facility] [address]        Past Surgical  History:     Past Surgical History:   Procedure Laterality Date    ABSCESS DRAINAGE  11/1/2013    left inguinal hydrodenitis/pilondal cyst    INFECTED SKIN DEBRIDEMENT  july 2011       Medications:      cefepime  2 g Intravenous Q12H    vancomycin (VANCOCIN) intermittent dosing (placeholder)   Other RX Placeholder    vancomycin 1,500 mg Intravenous Q12H    benztropine  0.5 mg Oral BID    citalopram  20 mg Oral Daily    clindamycin   Topical BID    docusate sodium  100 mg Oral BID    lithium  450 mg Oral BID    therapeutic multivitamin-minerals  1 tablet Oral Daily    QUEtiapine  300 mg Oral Nightly    QUEtiapine  200 mg Oral BID    enoxaparin  40 mg Subcutaneous Daily    nicotine  1 patch Transdermal Daily       Social History:     Social History     Socioeconomic History    Marital status: Single     Spouse name: Not on file    Number of children: Not on file    Years of education: Not on file    Highest education level: Not on file   Occupational History    Not on file   Social Needs    Financial resource strain: Not on file    Food insecurity     Worry: Not on file     Inability: Not on file   [employer] needs     Medical: Not on file     Non-medical: Not on file   Tobacco Use    Smoking status: Current Every Day Smoker     Packs/day: 1.00     Years: 22.00     Pack years: 22.00     Types: Cigarettes    Smokeless tobacco: Never Used   Substance and Sexual Activity    Alcohol use: Yes     Comment: occasional    Drug use: Yes     Comment: history of marijuana & Opiate abue, claims that he is currently clean & sober.     Sexual activity: Not on file   Lifestyle    Physical activity     Days per week: Not on file     Minutes per session: Not on file    Stress: Not on file   Relationships    Social connections     Talks on phone: Not on file     Gets together: Not on file     Attends Islam service: Not on file     Active member of club or organization: Not on file     Attends meetings of clubs or organizations: Not on file     Relationship status: Not on file    Intimate partner violence     Fear of current or ex partner: Not on file     Emotionally abused: Not on file     Physically abused: Not on file     Forced sexual activity: Not on file   Other Topics Concern    Not on file   Social History Narrative  Not on file       Family History:     Family History   Problem Relation Age of Onset    Cancer Mother         breast    High Blood Pressure Maternal Grandfather     Mental Illness Paternal Aunt     Substance Abuse Paternal Uncle     Substance Abuse Brother     Alcohol Abuse Father     Cancer Father         pancrease        Allergies:   Depakene [valproic acid]; Restoril [temazepam]; Risperidone and related; and Thorazine [chlorpromazine]     Review of Systems:     Review of Systems  As per history recent illness, other than above 14 system reviewed were negative  Physical Examination :     Patient Vitals for the past 8 hrs:   BP Temp Temp src Pulse Resp SpO2   10/30/20 1000 116/80 98.1 °F (36.7 °C) Oral 91 18 97 %       Physical Exam  Constitutional:       General: He is not in acute distress. HENT:      Head: Normocephalic and atraumatic. Mouth/Throat:      Pharynx: Oropharynx is clear. No posterior oropharyngeal erythema. Eyes:      General: No scleral icterus. Conjunctiva/sclera: Conjunctivae normal.   Neck:      Musculoskeletal: Neck supple. No neck rigidity. Cardiovascular:      Rate and Rhythm: Normal rate and regular rhythm. Heart sounds: Normal heart sounds. Pulmonary:      Effort: Pulmonary effort is normal.      Breath sounds: Normal breath sounds. No wheezing. Abdominal:      General: There is no distension. Palpations: Abdomen is soft. Musculoskeletal:      Right lower leg: No edema. Left lower leg: No edema. Skin:     General: Skin is warm. Comments: Anterior pelvic open wound with purulent drainage, fluctuation and erythema, no crepitance  Multiple scars and old skin grafts. Neurological:      General: No focal deficit present. Mental Status: He is alert.            Medical Decision Making:   I have independently reviewed/ordered the following labs:    CBC with Differential:   Recent Labs     10/28/20  1923 10/29/20  0410 10/30/20  0413   WBC

## 2020-10-30 NOTE — PROGRESS NOTES
[place] [id number] [facility]    Progress Note     10/30/2020    11:47 AM    Name:   Aspen Smith  MRN:     [id number]     Acct:      [de-identified]   Room:   [address] [place] [address] Day:  2  Admit Date:  10/28/2020  6:27 PM    PCP:   Kasey Cabrera MD  Code Status:  Full Code    Subjective:     C/C:   Chief Complaint   Patient presents with    Wound Check     Principal Problem:    Abscess  Active Problems:    Hidradenitis suppurativa    Schizoaffective disorder, bipolar type ([address]    Tobacco abuse  Resolved Problems:    * No resolved hospital problems. *      Interval History Status: not changed. Patient has advanced schizophrenia, history is extremely limited  Has hidradenitis, underwent multiple surgeries  Patient was admitted since her home health aide, noticed increased drainage from pelvic area  Patient is actively hallucinating, history is extremely limited     Significant last 24 hr data reviewed ;   Vitals:    10/29/20 1737 10/29/20 1950 10/30/20 0055 10/30/20 1000   BP: 115/74 105/67 105/70 116/80   Pulse: 99 109 100 91   Resp: 16 16 18 18   Temp: 98 °F (36.7 °C) 98.3 °F (36.8 °C) 98.2 °F (36.8 °C) 98.1 °F (36.7 °C)   TempSrc:  Axillary Axillary Oral   SpO2: 98% 99% 97% 97%   Weight:       Height:          Recent Results (from the past 24 hour(s))   Culture, Blood 1    Collection Time: 10/29/20  2:10 PM    Specimen: Blood   Result Value Ref Range    Specimen Description . BLOOD     Special Requests L AC 10 RED 10 PURPLE     Culture NO GROWTH 15 HOURS    IRON AND TIBC    Collection Time: 10/29/20  2:15 PM   Result Value Ref Range    Iron 13 (L) 59 - 158 ug/dL    TIBC 269 250 - 450 ug/dL    Iron Saturation 5 (L) 20 - 55 %    UIBC 256 112 - 347 ug/dL   FERRITIN    Collection Time: 10/29/20  2:15 PM   Result Value Ref Range    Ferritin 41 30 - 400 ug/L   Basic Metabolic Panel w/ Reflex to MG    Collection Time: 10/30/20  4:13 AM   Result Value Ref Range    Glucose 94 70 - 99 mg/dL    BUN 10 6 - 20 mg/dL    CREATININE 0.87 0.70 - 1.20 mg/dL    Bun/Cre Ratio NOT REPORTED 9 - 20    Calcium 8.8 8.6 - 10.4 mg/dL    Sodium 137 135 - 144 mmol/L    Potassium 3.8 3.7 - 5.3 mmol/L    Chloride 103 98 - 107 mmol/L    CO2 26 20 - 31 mmol/L    Anion Gap 8 (L) 9 - 17 mmol/L    GFR Non-African American >60 >60 mL/min    GFR African American >60 >60 mL/min    GFR Comment          GFR Staging NOT REPORTED    CBC    Collection Time: 10/30/20  4:13 AM   Result Value Ref Range    WBC 10.4 3.5 - 11.0 k/uL    RBC 3.33 (L) 4.5 - 5.9 m/uL    Hemoglobin 8.6 (L) 13.5 - 17.5 g/dL    Hematocrit 27.0 (L) 41 - 53 %    MCV 81.0 80 - 100 fL    MCH 25.7 (L) 26 - 34 pg    MCHC 31.8 31 - 37 g/dL    RDW 16.2 (H) 11.5 - 14.9 %    Platelets 603 (H) 530 - 450 k/uL    MPV 5.8 (L) 6.0 - 12.0 fL    NRBC Automated NOT REPORTED per 100 WBC     No results for input(s): POCGLU in the last 72 hours. Ct Abdomen Pelvis W Iv Contrast Additional Contrast? None    Result Date: 10/29/2020  EXAMINATION: CT OF THE ABDOMEN AND PELVIS WITH CONTRAST 10/28/2020 8:21 pm TECHNIQUE: CT of the abdomen and pelvis was performed with the administration of intravenous contrast. Multiplanar reformatted images are provided for review. Dose modulation, iterative reconstruction, and/or weight based adjustment of the mA/kV was utilized to reduce the radiation dose to as low as reasonably achievable. COMPARISON: 07/26/2012 HISTORY: ORDERING SYSTEM PROVIDED HISTORY: Hiadrenitis suppurativa TECHNOLOGIST PROVIDED HISTORY: Hiadrenitis suppurativa Reason for Exam: patient has a wound under belly button that he has had from a surgery one year ago , patients home nurse has noticed increased drainage from wound FINDINGS: Lower Chest:  Visualized portion of the lower chest demonstrates no acute abnormality. Organs: The liver, spleen, pancreas, kidneys and adrenal glands are without acute findings.   Unchanged asymmetric right renal atrophy with peripherally calcified renal cysts. GI/Bowel: There is a large colonic stool burden with a large amount of stool in the rectal vault. No mechanical bowel obstruction. No evidence of appendicitis. Pelvis: The urinary bladder is partially distended without contour abnormality. The prostate and seminal vesicles are within normal limits. Peritoneum/Retroperitoneum: No retroperitoneal or mesenteric lymphadenopathy. Mild atherosclerosis. Bones/Soft Tissues: There is anterior low abdomen skin thickening with a 2.3 x 1.2 x 1.6 cm rim enhancing fluid collection in the anterior pelvic soft tissues at midline. There is adjacent scarring but no additional fluid collections within the imaged soft tissues. No acute or aggressive osseous lesions. Small fat containing umbilical hernia. 1. 2.3 cm rim enhancing fluid collection in the midline anterior pelvic soft tissues with overlying skin thickening. 2. Large colonic stool burden with a significant amount of stool in the rectal vault. The patient may benefit from enema or disimpaction. 3. Unchanged asymmetric right renal atrophy with peripherally calcified right renal cysts. Xr Chest Portable    Result Date: 10/28/2020  EXAMINATION: ONE XRAY VIEW OF THE CHEST 10/28/2020 6:45 pm COMPARISON: 03/23/2014 HISTORY: ORDERING SYSTEM PROVIDED HISTORY: tachycardia, ams TECHNOLOGIST PROVIDED HISTORY: tachycardia, ams Reason for Exam: Altered mental status, tachycardia, wound check. Acuity: Unknown Type of Exam: Unknown Additional signs and symptoms: Altered mental status, tachycardia, wound check. FINDINGS: Cardiomediastinal silhouette is upper limits of normal in size. No pulmonary consolidation, pleural effusion, or pneumothorax. No acute osseous abnormality. No acute cardiopulmonary abnormality.              HPI:   See history in H and P      Review of Systems:     Cannot be done due to patient condition  Data:     Past Medical History:  no change     Social History:  no change    Family History: @no change    Vitals:      I/O (24Hr):     Intake/Output Summary (Last 24 hours) at 10/30/2020 1147  Last data filed at 10/30/2020 0606  Gross per 24 hour   Intake 2267.5 ml   Output 1000 ml   Net 1267.5 ml       Labs:    URINE ANALYSIS: No results found for: LABURIN     CBC:  Lab Results   Component Value Date    WBC 10.4 10/30/2020    HGB 8.6 10/30/2020     10/30/2020     02/09/2012        BMP:    Lab Results   Component Value Date     10/30/2020    K 3.8 10/30/2020     10/30/2020    CO2 26 10/30/2020    BUN 10 10/30/2020    CREATININE 0.87 10/30/2020    GLUCOSE 94 10/30/2020    GLUCOSE 89 02/09/2012      LIVER PROFILE:  Lab Results   Component Value Date    ALT 20 08/30/2016    AST 20 08/30/2016    PROT 7.9 08/30/2016    BILITOT 0.31 08/30/2016    LABALBU 3.9 08/30/2016               Radiology:      Physical Examination:        General appearance: Schizophrenia, hallucinating  Mental Status: Not oriented to person, place and time and normal affect  Lungs:  clear to auscultation bilaterally, normal effort  Heart:  regular rate and rhythm, no murmur  Abdomen: Area, cystic lesions, present in pelvic area, consistent with hydradenitis, foul-smelling drainage present  Extremities:  no edema, redness, tenderness in the calves  Skin:  no gross lesions, rashes, induration    Assessment:        Primary Problem  Abscess    Active Hospital Problems    Diagnosis Date Noted    Abscess [L02.91] 10/28/2020    Tobacco abuse [Z72.0] 07/16/2020    Schizoaffective disorder, bipolar type ([facility] [F25.0] 05/04/2016    Hidradenitis suppurativa [L73.2] 03/06/2012       Plan:        Hydradenitis suppurativa, patient need incision and drainage, consulted plastic surgery   Schizophrenia, restarted home psych medication  On IV vancomycin, Flagyl and cefepime, ID following  DVT prophylaxis Lovenox  Iron deficiency anemia started on IV Venofer, will help in healing, need to follow-up with PCP outpatient for work-up of her injections anemia  On lithium, kidney functions are okay    Medications: Allergies:     Allergies   Allergen Reactions    Depakene [Valproic Acid]     Restoril [Temazepam]     Risperidone And Related      Seizure      Thorazine [Chlorpromazine]        Current Meds:   Scheduled Meds:    metroNIDAZOLE  500 mg Intravenous Q8H    iron sucrose  200 mg Intravenous Q24H    cefepime  2 g Intravenous Q12H    vancomycin (VANCOCIN) intermittent dosing (placeholder)   Other RX Placeholder    vancomycin  1,500 mg Intravenous Q12H    benztropine  0.5 mg Oral BID    citalopram  20 mg Oral Daily    clindamycin   Topical BID    docusate sodium  100 mg Oral BID    lithium  450 mg Oral BID    therapeutic multivitamin-minerals  1 tablet Oral Daily    QUEtiapine  300 mg Oral Nightly    QUEtiapine  200 mg Oral BID    enoxaparin  40 mg Subcutaneous Daily    nicotine  1 patch Transdermal Daily     Continuous Infusions:    sodium chloride 75 mL/hr at 10/30/20 1108     PRN Meds: sodium chloride flush, diphenhydrAMINE, zolpidem, potassium chloride **OR** potassium alternative oral replacement **OR** potassium chloride, magnesium sulfate, acetaminophen **OR** acetaminophen, polyethylene glycol, ondansetron       Lex Cowden, MD  10/30/2020  11:47 AM

## 2020-10-31 LAB
ANION GAP SERPL CALCULATED.3IONS-SCNC: 8 MMOL/L (ref 9–17)
BUN BLDV-MCNC: 7 MG/DL (ref 6–20)
BUN/CREAT BLD: ABNORMAL (ref 9–20)
CALCIUM SERPL-MCNC: 9 MG/DL (ref 8.6–10.4)
CHLORIDE BLD-SCNC: 105 MMOL/L (ref 98–107)
CO2: 26 MMOL/L (ref 20–31)
CREAT SERPL-MCNC: 0.89 MG/DL (ref 0.7–1.2)
GFR AFRICAN AMERICAN: >60 ML/MIN
GFR NON-AFRICAN AMERICAN: >60 ML/MIN
GFR SERPL CREATININE-BSD FRML MDRD: ABNORMAL ML/MIN/{1.73_M2}
GFR SERPL CREATININE-BSD FRML MDRD: ABNORMAL ML/MIN/{1.73_M2}
GLUCOSE BLD-MCNC: 97 MG/DL (ref 70–99)
HCT VFR BLD CALC: 26.8 % (ref 41–53)
HEMOGLOBIN: 8.6 G/DL (ref 13.5–17.5)
MCH RBC QN AUTO: 26 PG (ref 26–34)
MCHC RBC AUTO-ENTMCNC: 31.9 G/DL (ref 31–37)
MCV RBC AUTO: 81.3 FL (ref 80–100)
NRBC AUTOMATED: ABNORMAL PER 100 WBC
PDW BLD-RTO: 15.6 % (ref 11.5–14.9)
PLATELET # BLD: 503 K/UL (ref 150–450)
PMV BLD AUTO: 5.6 FL (ref 6–12)
POTASSIUM SERPL-SCNC: 4 MMOL/L (ref 3.7–5.3)
RBC # BLD: 3.3 M/UL (ref 4.5–5.9)
SODIUM BLD-SCNC: 139 MMOL/L (ref 135–144)
VANCOMYCIN TROUGH DATE LAST DOSE: NORMAL
VANCOMYCIN TROUGH DOSE AMOUNT: NORMAL
VANCOMYCIN TROUGH TIME LAST DOSE: 2251
VANCOMYCIN TROUGH: 12.9 UG/ML (ref 10–20)
WBC # BLD: 9.9 K/UL (ref 3.5–11)

## 2020-10-31 PROCEDURE — 2500000003 HC RX 250 WO HCPCS: Performed by: INTERNAL MEDICINE

## 2020-10-31 PROCEDURE — 99232 SBSQ HOSP IP/OBS MODERATE 35: CPT | Performed by: INTERNAL MEDICINE

## 2020-10-31 PROCEDURE — 99221 1ST HOSP IP/OBS SF/LOW 40: CPT | Performed by: PLASTIC SURGERY

## 2020-10-31 PROCEDURE — 1200000000 HC SEMI PRIVATE

## 2020-10-31 PROCEDURE — 6360000002 HC RX W HCPCS: Performed by: INTERNAL MEDICINE

## 2020-10-31 PROCEDURE — 6370000000 HC RX 637 (ALT 250 FOR IP): Performed by: NURSE PRACTITIONER

## 2020-10-31 PROCEDURE — 2580000003 HC RX 258: Performed by: INTERNAL MEDICINE

## 2020-10-31 PROCEDURE — 36415 COLL VENOUS BLD VENIPUNCTURE: CPT

## 2020-10-31 PROCEDURE — 80048 BASIC METABOLIC PNL TOTAL CA: CPT

## 2020-10-31 PROCEDURE — 85027 COMPLETE CBC AUTOMATED: CPT

## 2020-10-31 PROCEDURE — 2580000003 HC RX 258: Performed by: NURSE PRACTITIONER

## 2020-10-31 PROCEDURE — 80202 ASSAY OF VANCOMYCIN: CPT

## 2020-10-31 RX ADMIN — ACETAMINOPHEN 650 MG: 325 TABLET, FILM COATED ORAL at 20:36

## 2020-10-31 RX ADMIN — METRONIDAZOLE 500 MG: 500 INJECTION, SOLUTION INTRAVENOUS at 18:07

## 2020-10-31 RX ADMIN — CEFEPIME HYDROCHLORIDE 2 G: 2 INJECTION, POWDER, FOR SOLUTION INTRAVENOUS at 15:50

## 2020-10-31 RX ADMIN — MULTIPLE VITAMINS W/ MINERALS TAB 1 TABLET: TAB at 08:48

## 2020-10-31 RX ADMIN — CLINDAMYCIN PHOSPHATE: 10 SOLUTION TOPICAL at 03:22

## 2020-10-31 RX ADMIN — DOCUSATE SODIUM 100 MG: 100 CAPSULE ORAL at 20:36

## 2020-10-31 RX ADMIN — VANCOMYCIN HYDROCHLORIDE 1500 MG: 1.5 INJECTION, POWDER, LYOPHILIZED, FOR SOLUTION INTRAVENOUS at 23:23

## 2020-10-31 RX ADMIN — LITHIUM CARBONATE 450 MG: 450 TABLET, EXTENDED RELEASE ORAL at 20:36

## 2020-10-31 RX ADMIN — LITHIUM CARBONATE 450 MG: 450 TABLET, EXTENDED RELEASE ORAL at 08:49

## 2020-10-31 RX ADMIN — DOCUSATE SODIUM 100 MG: 100 CAPSULE ORAL at 08:48

## 2020-10-31 RX ADMIN — METRONIDAZOLE 500 MG: 500 INJECTION, SOLUTION INTRAVENOUS at 02:45

## 2020-10-31 RX ADMIN — SODIUM CHLORIDE: 9 INJECTION, SOLUTION INTRAVENOUS at 10:36

## 2020-10-31 RX ADMIN — VANCOMYCIN HYDROCHLORIDE 1500 MG: 1.5 INJECTION, POWDER, LYOPHILIZED, FOR SOLUTION INTRAVENOUS at 13:11

## 2020-10-31 RX ADMIN — METRONIDAZOLE 500 MG: 500 INJECTION, SOLUTION INTRAVENOUS at 10:36

## 2020-10-31 RX ADMIN — SODIUM CHLORIDE 200 MG: 9 INJECTION, SOLUTION INTRAVENOUS at 16:33

## 2020-10-31 RX ADMIN — QUETIAPINE FUMARATE 200 MG: 200 TABLET ORAL at 08:48

## 2020-10-31 RX ADMIN — CLINDAMYCIN PHOSPHATE: 10 SOLUTION TOPICAL at 12:52

## 2020-10-31 RX ADMIN — CLINDAMYCIN PHOSPHATE: 10 SOLUTION TOPICAL at 20:38

## 2020-10-31 RX ADMIN — CITALOPRAM HYDROBROMIDE 20 MG: 20 TABLET ORAL at 08:48

## 2020-10-31 RX ADMIN — BENZTROPINE MESYLATE 0.5 MG: 0.5 TABLET ORAL at 08:49

## 2020-10-31 RX ADMIN — QUETIAPINE FUMARATE 300 MG: 300 TABLET, EXTENDED RELEASE ORAL at 20:36

## 2020-10-31 RX ADMIN — BENZTROPINE MESYLATE 0.5 MG: 0.5 TABLET ORAL at 20:36

## 2020-10-31 RX ADMIN — QUETIAPINE FUMARATE 200 MG: 200 TABLET ORAL at 20:36

## 2020-10-31 RX ADMIN — CEFEPIME HYDROCHLORIDE 2 G: 2 INJECTION, POWDER, FOR SOLUTION INTRAVENOUS at 04:38

## 2020-10-31 ASSESSMENT — PAIN DESCRIPTION - DESCRIPTORS: DESCRIPTORS: BURNING

## 2020-10-31 ASSESSMENT — PAIN SCALES - GENERAL
PAINLEVEL_OUTOF10: 10
PAINLEVEL_OUTOF10: 10
PAINLEVEL_OUTOF10: 0

## 2020-10-31 ASSESSMENT — PAIN DESCRIPTION - LOCATION: LOCATION: OTHER (COMMENT)

## 2020-10-31 ASSESSMENT — PAIN DESCRIPTION - PAIN TYPE: TYPE: ACUTE PAIN

## 2020-10-31 NOTE — PLAN OF CARE
monitor. 10/31/2020 0527 by Jordyn Alfaro RN  Outcome: Ongoing  Note: Pt does not show signs of needing prn pain medication this shift.       Problem: Discharge Planning:  Goal: Patients continuum of care needs are met  Description: Patients continuum of care needs are met  Outcome: Ongoing

## 2020-10-31 NOTE — PLAN OF CARE
Problem: Skin Integrity:  Goal: Will show no infection signs and symptoms  Description: Will show no infection signs and symptoms  10/31/2020 0527 by Robin Salinas RN  Outcome: Ongoing  Note: Patient displays no new signs of infection during this shift. Problem: Skin Integrity:  Goal: Absence of new skin breakdown  Description: Absence of new skin breakdown  10/31/2020 0527 by Robin Salinas RN  Outcome: Ongoing  Note: No new occurrence of skin breakdown noted during this shift. Waffle mattress placed this shift. Problem: Pain:  Goal: Patient's pain/discomfort is manageable  Description: Patient's pain/discomfort is manageable  10/31/2020 0527 by Robin Salinas RN  Outcome: Ongoing  Note: Pt does not show signs of needing prn pain medication this shift.

## 2020-10-31 NOTE — PROGRESS NOTES
[facility] -   Infectious diseases evaluation  admission date 10/28/2020    reason for consultation:   Abdominal wall abscess    Impression :   Current:  · Anterior pelvic abscesses  · Hidradenitis suppurativa  · Bipolar disorder  · Schizoaffective disorder  · Polysubstance abuse  · GERD      Recommendations   · Continue IV vancomycin ,cefepime and Flagyl  · The patient was evaluated by plastic surgery Dr. Trent Nova who spoke to the patient's mother  POA and apparently she wishes for no surgical intervention   · Follow CBC renal function  · Follow blood  cultures          History of Present Illness:   Initial history:  Adarsh Guerrero is a 41y.o.-year-old male was sent to the hospital by home health nurse due to increased redness and swelling to the anterior pelvic area with purulent drainage associated with increased hallucinations for 2 weeks. CT abdomen pelvis showed anterior pelvic abscess and large stool burden. History of hidradenitis suppurativa, history of abscesses drainage and skin grafts. Interval changes  10/31/2020   Is poor historian, no acute events          I have personally reviewed the past medical history, past surgical history, medications, social history, and family history, and I haveupdated the database accordingly.   Past Medical History:     Past Medical History:   Diagnosis Date    Bipolar 1 disorder ([address]     GERD (gastroesophageal reflux disease) 8/13/2016    Hidradenitis suppurativa     [facility]ub[facility] ([facility]     Schizoaffective disorder (H[facility])        Past Surgical  History:     Past Surgical History:   Procedure Laterality Date    ABSCESS DRAINAGE  11/1/2013    left inguinal hydrodenitis/pilondal cyst    INFECTED SKIN DEBRIDEMENT  july 2011       Medications:      metroNIDAZOLE  500 mg Intravenous Q8H    iron sucrose  200 mg Intravenous Q24H    cefepime  2 g Intravenous Q12H    vancomycin (VANCOCIN) intermittent dosing (placeholder)   Other RX Placeholder    vancomycin  1,500 mg Intravenous Q12H    benztropine  0.5 mg Oral BID    citalopram  20 mg Oral Daily    clindamycin   Topical BID    docusate sodium  100 mg Oral BID    lithium  450 mg Oral BID    therapeutic multivitamin-minerals  1 tablet Oral Daily    QUEtiapine  300 mg Oral Nightly    QUEtiapine  200 mg Oral BID    enoxaparin  40 mg Subcutaneous Daily    nicotine  1 patch Transdermal Daily       Social History:     Social History     Socioeconomic History    Marital status: Single     Spouse name: Not on file    Number of children: Not on file    Years of education: Not on file    Highest education level: Not on file   Occupational History    Not on file   Social Needs    Financial resource strain: Not on file    Food insecurity     Worry: Not on file     Inability: Not on file   [employer] needs     Medical: Not on file     Non-medical: Not on file   Tobacco Use    Smoking status: Current Every Day Smoker     Packs/day: 1.00     Years: 22.00     Pack years: 22.00     Types: Cigarettes    Smokeless tobacco: Never Used   Substance and Sexual Activity    Alcohol use: Yes     Comment: occasional    Drug use: Yes     Comment: history of marijuana & Opiate abue, claims that he is currently clean & sober.     Sexual activity: Not on file   Lifestyle    Physical activity     Days per week: Not on file     Minutes per session: Not on file    Stress: Not on file   Relationships    Social connections     Talks on phone: Not on file     Gets together: Not on file     Attends Mandaen service: Not on file     Active member of club or organization: Not on file     Attends meetings of clubs or organizations: Not on file     Relationship status: Not on file    Intimate partner violence     Fear of current or ex partner: Not on file     Emotionally abused: Not on file     Physically abused: Not on file     Forced sexual activity: Not on file   Other Topics Concern    Not on file   Social History Narrative    Not on file       Family History:     Family History   Problem Relation Age of Onset    Cancer Mother         breast    High Blood Pressure Maternal Grandfather     Mental Illness Paternal Aunt     Substance Abuse Paternal Uncle     Substance Abuse Brother     Alcohol Abuse Father     Cancer Father         pancrease        Allergies:   Depakene [valproic acid]; Restoril [temazepam]; Risperidone and related; and Thorazine [chlorpromazine]     Review of Systems:     Review of Systems  As per history recent illness, other than above 14 system reviewed were negative  Physical Examination :     Patient Vitals for the past 8 hrs:   BP Temp Temp src Pulse Resp SpO2   10/31/20 1417 117/64 97.7 °F (36.5 °C) Oral 87 16 96 %       Physical Exam  Constitutional:       General: He is not in acute distress. HENT:      Head: Normocephalic and atraumatic. Mouth/Throat:      Pharynx: Oropharynx is clear. No posterior oropharyngeal erythema. Eyes:      General: No scleral icterus. Conjunctiva/sclera: Conjunctivae normal.   Neck:      Musculoskeletal: Neck supple. No neck rigidity. Cardiovascular:      Rate and Rhythm: Normal rate and regular rhythm. Heart sounds: Normal heart sounds. Pulmonary:      Effort: Pulmonary effort is normal.      Breath sounds: Normal breath sounds. No wheezing. Abdominal:      General: There is no distension. Palpations: Abdomen is soft. Musculoskeletal:      Right lower leg: No edema. Left lower leg: No edema. Skin:     General: Skin is warm. Comments: Anterior pelvic open wound with purulent drainage, fluctuation and erythema, no crepitance  Multiple scars and old skin grafts. Neurological:      General: No focal deficit present. Mental Status: He is alert.            Medical Decision Making:   I have independently reviewed/ordered the following labs:    CBC with Differential:   Recent

## 2020-10-31 NOTE — PROGRESS NOTES
[unfilled]       History and Physical     Chief Complaint   Patient presents with    Wound Check        HPI:   Codie Jiang is a 41 y.o. male who presents with a long history of hidradenitis. Patient has had multiple debridements and skin grafts. Patient has a flareup. I was asked to evaluate the patient for the new pelvic area with an abscess.     Medications:     Current Facility-Administered Medications   Medication Dose Route Frequency Provider Last Rate Last Dose    metronidazole (FLAGYL) 500 mg in NaCl 100 mL IVPB premix  500 mg Intravenous Q8H Matilda Landis MD   Stopped at 10/31/20 1136    iron sucrose (VENOFER) 200 mg in sodium chloride 0.9 % 100 mL IVPB  200 mg Intravenous Q24H Isaura Wilkinson MD   Stopped at 10/30/20 2023    cefepime (MAXIPIME) 2 g IVPB minibag  2 g Intravenous Q12H Isaura Wilkinson MD   Stopped at 10/31/20 0508    vancomycin (VANCOCIN) intermittent dosing (placeholder)   Other RX Placeholder Isaura Wilkinson MD        vancomycin (VANCOCIN) 1,500 mg in dextrose 5 % 250 mL IVPB (ADDAVIAL)  1,500 mg Intravenous Q12H Isaura Wilkinson MD   1,500 mg at 10/31/20 1311    sodium chloride flush 0.9 % injection 10 mL  10 mL Intravenous PRN Ralph Yanez MD   10 mL at 10/28/20 2033    benztropine (COGENTIN) tablet 0.5 mg  0.5 mg Oral BID APURVA Stratton - CNP   0.5 mg at 10/31/20 8078    citalopram (CELEXA) tablet 20 mg  20 mg Oral Daily APURVA Stratton - CNP   20 mg at 10/31/20 0848    clindamycin (CLEOCIN T) 1 % external solution   Topical BID APURVA Stratton CNP        diphenhydrAMINE (BENADRYL) tablet 25 mg  25 mg Oral Nightly PRN APURVA Stratton CNP        docusate sodium (COLACE) capsule 100 mg  100 mg Oral BID APURVA Stratton - CNP   100 mg at 10/31/20 0848    lithium (ESKALITH) extended release tablet 450 mg  450 mg Oral BID Jan Bennett APRN - CNP   450 mg at 10/31/20 0851    therapeutic multivitamin-minerals 1 tablet  1 tablet Oral Daily Sary Garcia Neto Bynum - CNP   1 tablet at 10/31/20 0848    QUEtiapine (SEROQUEL XR) extended release tablet 300 mg  300 mg Oral Nightly Cranberry Comas, APRN - CNP   300 mg at 10/30/20 2253    QUEtiapine (SEROQUEL) tablet 200 mg  200 mg Oral BID Cranberry Comas, APRN - CNP   200 mg at 10/31/20 0848    zolpidem (AMBIEN) tablet 5 mg  5 mg Oral Nightly PRN Cranberry Comas, APRN - CNP        potassium chloride (KLOR-CON M) extended release tablet 40 mEq  40 mEq Oral PRN Cranberry Comas, APRN - CNP        Or    potassium bicarb-citric acid (EFFER-K) effervescent tablet 40 mEq  40 mEq Oral PRN Cranberry Comas, APRN - CNP        Or    potassium chloride 10 mEq/100 mL IVPB (Peripheral Line)  10 mEq Intravenous PRN Cranberry Comas, APRN - CNP        magnesium sulfate 1 g in dextrose 5% 100 mL IVPB  1 g Intravenous PRN Cranberry Comas, APRN - CNP        acetaminophen (TYLENOL) tablet 650 mg  650 mg Oral Q6H PRN Cranberry Comas, APRN - CNP        Or    acetaminophen (TYLENOL) suppository 650 mg  650 mg Rectal Q6H PRN Cranberry Comas, APRN - CNP        polyethylene glycol (GLYCOLAX) packet 17 g  17 g Oral Daily PRN Cranberry Comas, APRN - CNP        [facility]ron [facility] injection 4 mg  4 mg Intravenous Q6H PRN Cranberry Comas, APRN - CNP        enoxaparin (LOVENOX) injection 40 mg  40 mg Subcutaneous Daily Cranberry Comas, APRN - CNP        0.9 % sodium chloride infusion   Intravenous Continuous Cranberry Comas, APRN - CNP 75 mL/hr at 10/31/20 1036      nicotine (NICODERM CQ) 21 MG/24HR 1 patch  1 patch Transdermal Daily Cranberry Comas, APRN - CNP   1 patch at 10/29/20 0797      Allergies: Allergies   Allergen Reactions    Depakene [Valproic Acid]     Restoril [Temazepam]     Risperidone And Related      Seizure      Thorazine [Chlorpromazine]      Review of Systems:   Constitutional: Negative for fever, chills, fatigue and unexpected weight change. HENT: Negative for hearing loss, sore throat and facial swelling.     Eyes: Negative for pain and discharge. Respiratory: Negative for cough and shortness of breath. Cardiovascular: Negative for chest pain. Gastrointestinal: Patient with a history of GERD. Skin: Negative for pallor and rash. Neurological: Negative for seizures, syncope and numbness. Hematological: Does not bruise/bleed easily. Psychiatric/Behavioral: Patient with schizophrenia. Patient with a history of polysubstance abuse. Patient with history of bipolar disorder. Past Medical History:   Diagnosis Date    Bipolar 1 disorder ([facility]     GERD (gastroesophageal reflux disease) 8/13/2016    Hidradenitis suppurativa     Polysubstance abuse ([facility]     Schizoaffective disorder (HCC)      Past Surgical History:   Procedure Laterality Date    ABSCESS DRAINAGE  11/1/2013    left inguinal hydrodenitis/pilondal cyst    INFECTED SKIN DEBRIDEMENT  july 2011     Social History     Socioeconomic History    Marital status: Single     Spouse name: Not on file    Number of children: Not on file    Years of education: Not on file    Highest education level: Not on file   Occupational History    Not on file   Social Needs    Financial resource strain: Not on file    Food insecurity     Worry: Not on file     Inability: Not on file    Transportation needs     Medical: Not on file     Non-medical: Not on file   Tobacco Use    Smoking status: Current Every Day Smoker     Packs/day: 1.00     Years: 22.00     Pack years: 22.00     Types: Cigarettes    Smokeless tobacco: Never Used   Substance and Sexual Activity    Alcohol use: Yes     Comment: occasional    Drug use: Yes     Comment: history of marijuana & Opiate abue, claims that he is currently clean & sober.     Sexual activity: Not on file   Lifestyle    Physical activity     Days per week: Not on file     Minutes per session: Not on file    Stress: Not on file   Relationships    Social connections     Talks on phone: Not on file     Gets together: Not on file     Attends Alevism service: Not on file     Active member of club or organization: Not on file     Attends meetings of clubs or organizations: Not on file     Relationship status: Not on file    Intimate partner violence     Fear of current or ex partner: Not on file     Emotionally abused: Not on file     Physically abused: Not on file     Forced sexual activity: Not on file   Other Topics Concern    Not on file   Social History Narrative    Not on file     Family History   Problem Relation Age of Onset    Cancer Mother         breast    High Blood Pressure Maternal Grandfather     Mental Illness Paternal Aunt     Substance Abuse Paternal Uncle     Substance Abuse Brother     Alcohol Abuse Father     Cancer Father         pancrease     Physical Exam:   /71   Pulse 86   Temp 97.4 °F (36.3 °C) (Axillary)   Resp 16   Ht 5' 9\" (1.753 m)   Wt 218 lb 3.2 oz (99 kg)   SpO2 94%   BMI 32.22 kg/m²    Body mass index is 32.22 kg/m². Physical Exam   Nursing note and vitals reviewed. Constitutional: Patient is awake and alert. Head: Normocephalic and atraumatic. Eyes: Conjunctivae and EOM are normal.   Pulmonary/Chest: Effort normal. No respiratory distress. Neurological: Alert and oriented to person, place, and time. Skin: There is hidradenitis present in the pelvic region above the pubic area with purulent drainage. Psychiatric: Patient is awake.     Imaging:   CT ABDOMEN PELVIS W IV CONTRAST Additional Contrast? None   Status: Final result    Order Providers     Authorizing  Billing    MD Qi Castillo MD            Signed by     Signed  Date/Time   Phone  Pager    [facility]  10/29/2020  07:12  [phone]     Reading Providers     Read  Date  Phone  Pager    [facility] Room  Oct 28, 2020  [phone]     Radiation Dose Estimates     No radiation information found for this patient    Narrative    EXAMINATION:    CT OF THE ABDOMEN AND PELVIS WITH CONTRAST 10/28/2020 8:21 pm      TECHNIQUE:    CT of the abdomen and pelvis was performed with the administration of    intravenous contrast. Multiplanar reformatted images are provided for review. Dose modulation, iterative reconstruction, and/or weight based adjustment of    the mA/kV was utilized to reduce the radiation dose to as low as reasonably    achievable.         COMPARISON:    07/26/2012         HISTORY:    ORDERING SYSTEM PROVIDED HISTORY: Hiadrenitis suppurativa    TECHNOLOGIST PROVIDED HISTORY:    Hiadrenitis suppurativa         Reason for Exam: patient has a wound under belly button that he has had from    a surgery one year ago , patients home nurse has noticed increased drainage    from wound         FINDINGS:    Lower Chest:  Visualized portion of the lower chest demonstrates no acute    abnormality.         Organs: The liver, spleen, pancreas, kidneys and adrenal glands are without    acute findings.  Unchanged asymmetric right renal atrophy with peripherally    calcified renal cysts.         GI/Bowel: There is a large colonic stool burden with a large amount of stool    in the rectal vault.  No mechanical bowel obstruction.  No evidence of    appendicitis.         Pelvis: The urinary bladder is partially distended without contour    abnormality.  The prostate and seminal vesicles are within normal limits.         Peritoneum/Retroperitoneum: No retroperitoneal or mesenteric lymphadenopathy. Mild atherosclerosis.         Bones/Soft Tissues:  There is anterior low abdomen skin thickening with a 2.3    x 1.2 x 1.6 cm rim enhancing fluid collection in the anterior pelvic soft    tissues at midline.  There is adjacent scarring but no additional fluid    collections within the imaged soft tissues.  No acute or aggressive osseous    lesions.  Small fat containing umbilical hernia.              Impression    1. 2.3 cm rim enhancing fluid collection in the midline anterior pelvic soft    tissues with overlying skin thickening. 2. Large colonic stool burden with a significant amount of stool in the    rectal vault.  The patient may benefit from enema or disimpaction. 3. Unchanged asymmetric right renal atrophy with peripherally calcified right    renal cysts. Impression/Plan:   [unfilled]  Patient Active Problem List   Diagnosis    Hidradenitis suppurativa    Abscess of axilla    Abscess of buttock    Blood per rectum    Hidradenitis    Trochanteric bursitis    Adrenal mass, right (HCC)    Hidradenitis axillaris    Schizoaffective disorder, bipolar type ([address]    Encounter for ostomy nurse consultation    Paranoid schizophrenia, chronic condition with acute exacerbation ([address]    Iron deficiency anemia    GERD (gastroesophageal reflux disease)    Tobacco abuse    Non-healing left groin open wound, subsequent encounter    Right groin wound, subsequent encounter    Open wound of left ear    Abscess       Plan:  Patient with a personal history of hidradenitis. Patient has had multiple debridements and skin grafts. Patient has a new flareup in the pelvic region. The CT scan indicates an abscess collection. I spoke to Mr. Colton Sotelo mother who is his power of . Several options were discussed with the mother Ms. Vern Akers at [phone]. Patient's mother wishes not to have any surgeries at this time. We will order Cleocin T.  I will follow the patient.        Electronically signed by:  Jamir Jones MD 10/31/2020

## 2020-10-31 NOTE — PROGRESS NOTES
[place] [id number] Internal Medicine    Progress Note     10/31/2020    2:13 PM    Name:   Chidi Nguyen  MRN:     [id number]     Acct:      [de-identified]   Room:   [facility]:  3  Admit Date:  10/28/2020  6:27 PM    PCP:   Jennifer Jean-Baptiste MD  Code Status:  Full Code    Subjective:     C/C:   Chief Complaint   Patient presents with    Wound Check     Principal Problem:    Abscess  Active Problems:    Hidradenitis suppurativa    Schizoaffective disorder, bipolar type ([address]    Tobacco abuse  Resolved Problems:    * No resolved hospital problems. *      Interval History Status: not changed.        Patient has advanced schizophrenia, history is extremely limited  Has hidradenitis, underwent multiple surgeries  Patient was admitted since her home health aide, noticed increased drainage from pelvic area  Patient is actively hallucinating, history is extremely limited     Significant last 24 hr data reviewed ;   Vitals:    10/30/20 1000 10/30/20 1500 10/30/20 1929 10/31/20 0644   BP: 116/80 112/68 123/72 110/71   Pulse: 91 94 89 86   Resp: 18 16 16 16   Temp: 98.1 °F (36.7 °C) 98.2 °F (36.8 °C) 98 °F (36.7 °C) 97.4 °F (36.3 °C)   TempSrc: Oral Oral Oral Axillary   SpO2: 97% 98% 100% 94%   Weight:    218 lb 3.2 oz (99 kg)   Height:          Recent Results (from the past 24 hour(s))   Basic Metabolic Panel w/ Reflex to MG    Collection Time: 10/31/20  5:41 AM   Result Value Ref Range    Glucose 97 70 - 99 mg/dL    BUN 7 6 - 20 mg/dL    CREATININE 0.89 0.70 - 1.20 mg/dL    Bun/Cre Ratio NOT REPORTED 9 - 20    Calcium 9.0 8.6 - 10.4 mg/dL    Sodium 139 135 - 144 mmol/L    Potassium 4.0 3.7 - 5.3 mmol/L    Chloride 105 98 - 107 mmol/L    CO2 26 20 - 31 mmol/L    Anion Gap 8 (L) 9 - 17 mmol/L    GFR Non-African American >60 >60 mL/min    GFR African American >60 >60 mL/min    GFR Comment          GFR Staging NOT REPORTED    CBC    Collection Time: 10/31/20  5:41 AM   Result Value Ref Range WBC 9.9 3.5 - 11.0 k/uL    RBC 3.30 (L) 4.5 - 5.9 m/uL    Hemoglobin 8.6 (L) 13.5 - 17.5 g/dL    Hematocrit 26.8 (L) 41 - 53 %    MCV 81.3 80 - 100 fL    MCH 26.0 26 - 34 pg    MCHC 31.9 31 - 37 g/dL    RDW 15.6 (H) 11.5 - 14.9 %    Platelets 525 (H) 863 - 450 k/uL    MPV 5.6 (L) 6.0 - 12.0 fL    NRBC Automated NOT REPORTED per 100 WBC   Vancomycin, trough    Collection Time: 10/31/20  9:48 AM   Result Value Ref Range    Vancomycin Tr 12.9 10.0 - 20.0 ug/mL    Vancomycin Trough Dose amount 1500 MG     Vancomycin Trough Date last dose 10,302,020     Vancomycin Trough Time last dose 2,251      No results for input(s): POCGLU in the last 72 hours. Ct Abdomen Pelvis W Iv Contrast Additional Contrast? None    Result Date: 10/29/2020  EXAMINATION: CT OF THE ABDOMEN AND PELVIS WITH CONTRAST 10/28/2020 8:21 pm TECHNIQUE: CT of the abdomen and pelvis was performed with the administration of intravenous contrast. Multiplanar reformatted images are provided for review. Dose modulation, iterative reconstruction, and/or weight based adjustment of the mA/kV was utilized to reduce the radiation dose to as low as reasonably achievable. COMPARISON: 07/26/2012 HISTORY: ORDERING SYSTEM PROVIDED HISTORY: Hiadrenitis suppurativa TECHNOLOGIST PROVIDED HISTORY: Hiadrenitis suppurativa Reason for Exam: patient has a wound under belly button that he has had from a surgery one year ago , patients home nurse has noticed increased drainage from wound FINDINGS: Lower Chest:  Visualized portion of the lower chest demonstrates no acute abnormality. Organs: The liver, spleen, pancreas, kidneys and adrenal glands are without acute findings. Unchanged asymmetric right renal atrophy with peripherally calcified renal cysts. GI/Bowel: There is a large colonic stool burden with a large amount of stool in the rectal vault. No mechanical bowel obstruction. No evidence of appendicitis. Pelvis:  The urinary bladder is partially distended without contour abnormality. The prostate and seminal vesicles are within normal limits. Peritoneum/Retroperitoneum: No retroperitoneal or mesenteric lymphadenopathy. Mild atherosclerosis. Bones/Soft Tissues: There is anterior low abdomen skin thickening with a 2.3 x 1.2 x 1.6 cm rim enhancing fluid collection in the anterior pelvic soft tissues at midline. There is adjacent scarring but no additional fluid collections within the imaged soft tissues. No acute or aggressive osseous lesions. Small fat containing umbilical hernia. 1. 2.3 cm rim enhancing fluid collection in the midline anterior pelvic soft tissues with overlying skin thickening. 2. Large colonic stool burden with a significant amount of stool in the rectal vault. The patient may benefit from enema or disimpaction. 3. Unchanged asymmetric right renal atrophy with peripherally calcified right renal cysts. Xr Chest Portable    Result Date: 10/28/2020  EXAMINATION: ONE XRAY VIEW OF THE CHEST 10/28/2020 6:45 pm COMPARISON: 03/23/2014 HISTORY: ORDERING SYSTEM PROVIDED HISTORY: tachycardia, ams TECHNOLOGIST PROVIDED HISTORY: tachycardia, ams Reason for Exam: Altered mental status, tachycardia, wound check. Acuity: Unknown Type of Exam: Unknown Additional signs and symptoms: Altered mental status, tachycardia, wound check. FINDINGS: Cardiomediastinal silhouette is upper limits of normal in size. No pulmonary consolidation, pleural effusion, or pneumothorax. No acute osseous abnormality. No acute cardiopulmonary abnormality. HPI:   See history in H and P      Review of Systems:     Cannot be done due to patient condition  Data:     Past Medical History:  no change     Social History:  no change    Family History: @no change    Vitals:      I/O (24Hr):     Intake/Output Summary (Last 24 hours) at 10/31/2020 1413  Last data filed at 10/31/2020 0741  Gross per 24 hour   Intake 1020 ml   Output --   Net 1020 ml       Labs:    URINE ANALYSIS: No results found for: LABURIN     CBC:  Lab Results   Component Value Date    WBC 9.9 10/31/2020    HGB 8.6 10/31/2020     10/31/2020     02/09/2012        BMP:    Lab Results   Component Value Date     10/31/2020    K 4.0 10/31/2020     10/31/2020    CO2 26 10/31/2020    BUN 7 10/31/2020    CREATININE 0.89 10/31/2020    GLUCOSE 97 10/31/2020    GLUCOSE 89 02/09/2012      LIVER PROFILE:  Lab Results   Component Value Date    ALT 20 08/30/2016    AST 20 08/30/2016    PROT 7.9 08/30/2016    BILITOT 0.31 08/30/2016    LABALBU 3.9 08/30/2016               Radiology:      Physical Examination:        General appearance: Schizophrenia, hallucinating  Mental Status: Not oriented to person, place and time and normal affect  Lungs:  clear to auscultation bilaterally, normal effort  Heart:  regular rate and rhythm, no murmur  Abdomen: Area, cystic lesions, present in pelvic area, consistent with hydradenitis, foul-smelling drainage present  Extremities:  no edema, redness, tenderness in the calves  Skin:  no gross lesions, rashes, induration    Assessment:        Primary Problem  Abscess    Active Hospital Problems    Diagnosis Date Noted    Abscess [L02.91] 10/28/2020    Tobacco abuse [Z72.0] 07/16/2020    [facility], [facility] [F25.0] 05/04/2016    Hidradenitis suppurativa [L73.2] 03/06/2012       Plan:        Hydradenitis suppurativa, patient need incision and drainage, consulted plastic surgery   Schizophrenia, restarted home psych medication  On IV vancomycin, Flagyl and cefepime, ID following  DVT prophylaxis Lovenox  Iron deficiency anemia started on IV Venofer, will help in healing, need to follow-up with PCP outpatient for work-up of her injections anemia  On lithium, kidney functions are okay    10/31  Discussed with Dr. Olga Sharpe for incision and drainage      Medications: Allergies:     Allergies   Allergen Reactions    Depakene [Valproic Acid]  Restoril [Temazepam]     Risperidone And Related      Seizure      Thorazine [Chlorpromazine]        Current Meds:   Scheduled Meds:    metroNIDAZOLE  500 mg Intravenous Q8H    iron sucrose  200 mg Intravenous Q24H    cefepime  2 g Intravenous Q12H    vancomycin (VANCOCIN) intermittent dosing (placeholder)   Other RX Placeholder    vancomycin  1,500 mg Intravenous Q12H    benztropine  0.5 mg Oral BID    citalopram  20 mg Oral Daily    clindamycin   Topical BID    docusate sodium  100 mg Oral BID    lithium  450 mg Oral BID    therapeutic multivitamin-minerals  1 tablet Oral Daily    QUEtiapine  300 mg Oral Nightly    QUEtiapine  200 mg Oral BID    enoxaparin  40 mg Subcutaneous Daily    nicotine  1 patch Transdermal Daily     Continuous Infusions:    sodium chloride 75 mL/hr at 10/31/20 1036     PRN Meds: sodium chloride flush, diphenhydrAMINE, zolpidem, potassium chloride **OR** potassium alternative oral replacement **OR** potassium chloride, magnesium sulfate, acetaminophen **OR** acetaminophen, polyethylene glycol, ondansetron       Seema Antonio MD  10/31/2020  2:13 PM

## 2020-10-31 NOTE — PROGRESS NOTES
Pharmacy Vancomycin Consult     Vancomycin Day: 3  Current Dosin mg IVPB Q12H    Temp max:  97.4    Recent Labs     10/30/20  0413 10/31/20  0541   BUN 10 7       Recent Labs     10/30/20  0413 10/31/20  0541   CREATININE 0.87 0.89       Recent Labs     10/30/20  0413 10/31/20  0541   WBC 10.4 9.9         Intake/Output Summary (Last 24 hours) at 10/31/2020 1148  Last data filed at 10/31/2020 0741  Gross per 24 hour   Intake 1020 ml   Output 300 ml   Net 720 ml       Culture Date      Source                       Results  10/28                   Urine                          Neg  10/29                   Blood                         Pending    Ht Readings from Last 1 Encounters:   10/28/20 5' 9\" (1.753 m)        Wt Readings from Last 1 Encounters:   10/31/20 218 lb 3.2 oz (99 kg)         Body mass index is 32.22 kg/m². Estimated Creatinine Clearance: 125 mL/min (based on SCr of 0.89 mg/dL).     Trough: 12.9    Assessment/Plan:  Cont Vanco 1500 mg IVPB Q12H  Ana Maria Schneider MS   10/31/2020  11:52 AM

## 2020-10-31 NOTE — CARE COORDINATION
DISCHARGE PLANNING NOTE:    Plan is for this patient to return to home and will have services through [facility] [address] resumed. Will need wound care. Wound care nurse on board. Per surgery, needs I&D and possible transfer to tertiary care center. ID on board - On IV cfepime, IV vanco.    Awaiting input from plastic surgery. Will continue to follow along.      Electronically signed by Raul Zamudio RN on 10/31/2020 at 10:28 AM

## 2020-10-31 NOTE — PROGRESS NOTES
Pharmacy Vancomycin Consult     Vancomycin Day: 3  Current Dosin mg IVPB Q12H    Temp max:  97.4    Recent Labs     10/30/20  0413 10/31/20  0541   BUN 10 7       Recent Labs     10/30/20  0413 10/31/20  0541   CREATININE 0.87 0.89       Recent Labs     10/30/20  0413 10/31/20  0541   WBC 10.4 9.9         Intake/Output Summary (Last 24 hours) at 10/31/2020 1148  Last data filed at 10/31/2020 0741  Gross per 24 hour   Intake 1020 ml   Output 300 ml   Net 720 ml       Culture Date      Source                       Results  10/28                   Urine                          Neg  10/29                   Blood                         Pending    Ht Readings from Last 1 Encounters:   10/28/20 5' 9\" (1.753 m)        Wt Readings from Last 1 Encounters:   10/31/20 218 lb 3.2 oz (99 kg)         Body mass index is 32.22 kg/m². Estimated Creatinine Clearance: 125 mL/min (based on SCr of 0.89 mg/dL).     Trough: 12.9    Assessment/Plan:  Cont Vanco 1500 mg IVPB Q12H  Aron Dahl, MS   10/31/2020  11:52 AM

## 2020-11-01 LAB
ANION GAP SERPL CALCULATED.3IONS-SCNC: 10 MMOL/L (ref 9–17)
BUN BLDV-MCNC: 7 MG/DL (ref 6–20)
BUN/CREAT BLD: NORMAL (ref 9–20)
CALCIUM SERPL-MCNC: 8.9 MG/DL (ref 8.6–10.4)
CHLORIDE BLD-SCNC: 103 MMOL/L (ref 98–107)
CO2: 24 MMOL/L (ref 20–31)
CREAT SERPL-MCNC: 0.77 MG/DL (ref 0.7–1.2)
GFR AFRICAN AMERICAN: >60 ML/MIN
GFR NON-AFRICAN AMERICAN: >60 ML/MIN
GFR SERPL CREATININE-BSD FRML MDRD: NORMAL ML/MIN/{1.73_M2}
GFR SERPL CREATININE-BSD FRML MDRD: NORMAL ML/MIN/{1.73_M2}
GLUCOSE BLD-MCNC: 83 MG/DL (ref 70–99)
HCT VFR BLD CALC: 26.9 % (ref 41–53)
HEMOGLOBIN: 8.5 G/DL (ref 13.5–17.5)
MCH RBC QN AUTO: 26.2 PG (ref 26–34)
MCHC RBC AUTO-ENTMCNC: 31.6 G/DL (ref 31–37)
MCV RBC AUTO: 82.8 FL (ref 80–100)
NRBC AUTOMATED: ABNORMAL PER 100 WBC
PDW BLD-RTO: 16.1 % (ref 11.5–14.9)
PLATELET # BLD: 514 K/UL (ref 150–450)
PMV BLD AUTO: 5.7 FL (ref 6–12)
POTASSIUM SERPL-SCNC: 3.7 MMOL/L (ref 3.7–5.3)
RBC # BLD: 3.25 M/UL (ref 4.5–5.9)
SODIUM BLD-SCNC: 137 MMOL/L (ref 135–144)
WBC # BLD: 11 K/UL (ref 3.5–11)

## 2020-11-01 PROCEDURE — 6370000000 HC RX 637 (ALT 250 FOR IP): Performed by: NURSE PRACTITIONER

## 2020-11-01 PROCEDURE — 2580000003 HC RX 258: Performed by: NURSE PRACTITIONER

## 2020-11-01 PROCEDURE — 6360000002 HC RX W HCPCS: Performed by: INTERNAL MEDICINE

## 2020-11-01 PROCEDURE — 2580000003 HC RX 258: Performed by: INTERNAL MEDICINE

## 2020-11-01 PROCEDURE — 99232 SBSQ HOSP IP/OBS MODERATE 35: CPT | Performed by: INTERNAL MEDICINE

## 2020-11-01 PROCEDURE — 80048 BASIC METABOLIC PNL TOTAL CA: CPT

## 2020-11-01 PROCEDURE — 36415 COLL VENOUS BLD VENIPUNCTURE: CPT

## 2020-11-01 PROCEDURE — 1200000000 HC SEMI PRIVATE

## 2020-11-01 PROCEDURE — 85027 COMPLETE CBC AUTOMATED: CPT

## 2020-11-01 PROCEDURE — 2500000003 HC RX 250 WO HCPCS: Performed by: INTERNAL MEDICINE

## 2020-11-01 RX ADMIN — CLINDAMYCIN PHOSPHATE: 10 SOLUTION TOPICAL at 08:41

## 2020-11-01 RX ADMIN — METRONIDAZOLE 500 MG: 500 INJECTION, SOLUTION INTRAVENOUS at 19:48

## 2020-11-01 RX ADMIN — LITHIUM CARBONATE 450 MG: 450 TABLET, EXTENDED RELEASE ORAL at 08:41

## 2020-11-01 RX ADMIN — METRONIDAZOLE 500 MG: 500 INJECTION, SOLUTION INTRAVENOUS at 10:37

## 2020-11-01 RX ADMIN — CEFEPIME HYDROCHLORIDE 2 G: 2 INJECTION, POWDER, FOR SOLUTION INTRAVENOUS at 17:40

## 2020-11-01 RX ADMIN — QUETIAPINE FUMARATE 200 MG: 200 TABLET ORAL at 08:41

## 2020-11-01 RX ADMIN — LITHIUM CARBONATE 450 MG: 450 TABLET, EXTENDED RELEASE ORAL at 21:43

## 2020-11-01 RX ADMIN — DOCUSATE SODIUM 100 MG: 100 CAPSULE ORAL at 08:41

## 2020-11-01 RX ADMIN — CLINDAMYCIN PHOSPHATE: 10 SOLUTION TOPICAL at 22:37

## 2020-11-01 RX ADMIN — CEFEPIME HYDROCHLORIDE 2 G: 2 INJECTION, POWDER, FOR SOLUTION INTRAVENOUS at 04:30

## 2020-11-01 RX ADMIN — DOCUSATE SODIUM 100 MG: 100 CAPSULE ORAL at 21:42

## 2020-11-01 RX ADMIN — QUETIAPINE FUMARATE 200 MG: 200 TABLET ORAL at 21:42

## 2020-11-01 RX ADMIN — ACETAMINOPHEN 650 MG: 325 TABLET, FILM COATED ORAL at 13:55

## 2020-11-01 RX ADMIN — QUETIAPINE FUMARATE 300 MG: 300 TABLET, EXTENDED RELEASE ORAL at 21:43

## 2020-11-01 RX ADMIN — MULTIPLE VITAMINS W/ MINERALS TAB 1 TABLET: TAB at 08:41

## 2020-11-01 RX ADMIN — BENZTROPINE MESYLATE 0.5 MG: 0.5 TABLET ORAL at 21:43

## 2020-11-01 RX ADMIN — BENZTROPINE MESYLATE 0.5 MG: 0.5 TABLET ORAL at 08:41

## 2020-11-01 RX ADMIN — METRONIDAZOLE 500 MG: 500 INJECTION, SOLUTION INTRAVENOUS at 01:05

## 2020-11-01 RX ADMIN — CITALOPRAM HYDROBROMIDE 20 MG: 20 TABLET ORAL at 08:41

## 2020-11-01 RX ADMIN — SODIUM CHLORIDE 200 MG: 9 INJECTION, SOLUTION INTRAVENOUS at 13:55

## 2020-11-01 RX ADMIN — VANCOMYCIN HYDROCHLORIDE 1500 MG: 1.5 INJECTION, POWDER, LYOPHILIZED, FOR SOLUTION INTRAVENOUS at 16:03

## 2020-11-01 RX ADMIN — SODIUM CHLORIDE: 9 INJECTION, SOLUTION INTRAVENOUS at 06:07

## 2020-11-01 ASSESSMENT — PAIN SCALES - GENERAL: PAINLEVEL_OUTOF10: 5

## 2020-11-01 NOTE — PROGRESS NOTES
Wounds below abdomen, groin and inner thigh cleansed as ordered and redressed as ordered. Old dressings with large amount purulent tan drainage.

## 2020-11-01 NOTE — PROGRESS NOTES
Vancomycin Day: 4    Patient's labs, cultures, vitals, and vancomycin regimen reviewed. No changes today.    Ana Leal, MS   11/1/2020  11:11 AM

## 2020-11-01 NOTE — PLAN OF CARE
Problem: Restraint Use - Violent/Self-Destructive Behavior:  Goal: Absence of restraint indications  Description: Absence of restraint indications  10/31/2020 2322 by Arnie Khan RN  Outcome: Ongoing     Problem: Restraint Use - Violent/Self-Destructive Behavior:  Goal: Absence of restraint-related injury  Description: Absence of restraint-related injury  Outcome: Ongoing     Problem: Skin Integrity:  Goal: Will show no infection signs and symptoms  Description: Will show no infection signs and symptoms  10/31/2020 2322 by Arnie Khan RN  Outcome: Ongoing     Problem: Skin Integrity:  Goal: Absence of new skin breakdown  Description: Absence of new skin breakdown  Outcome: Ongoing     Problem: Infection:  Goal: Will remain free from infection  Description: Will remain free from infection  10/31/2020 2322 by Arnie Khan RN  Outcome: Ongoing     Problem: Safety:  Goal: Free from accidental physical injury  Description: Free from accidental physical injury  10/31/2020 2322 by Arnie Khan RN  Outcome: Ongoing     Problem: Safety:  Goal: Free from intentional harm  Description: Free from intentional harm  Outcome: Ongoing     Problem: Daily Care:  Goal: Daily care needs are met  Description: Daily care needs are met  10/31/2020 2322 by Arnie Khan RN  Outcome: Ongoing     Problem: Pain:  Goal: Patient's pain/discomfort is manageable  Description: Patient's pain/discomfort is manageable  10/31/2020 2322 by Arnie Khan RN  Outcome: Ongoing

## 2020-11-01 NOTE — PROGRESS NOTES
[facility]    Progress Note     11/1/2020    11:54 AM    Name:   Yamile Whitney  MRN:     [id number]     Acct:      [de-identified]   Room:   2064/2064-01   Day:  4  Admit Date:  10/28/2020  6:27 PM    PCP:   Raegan Coburn MD  Code Status:  Full Code    Subjective:     C/C:   Chief Complaint   Patient presents with    Wound Check     Principal Problem:    Abscess  Active Problems:    Hidradenitis suppurativa    Schizoaffective disorder, bipolar type ([address]    Tobacco abuse  Resolved Problems:    * No resolved hospital problems. *      Interval History Status: not changed.        Patient has advanced schizophrenia, history is extremely limited  Has hidradenitis, underwent multiple surgeries  Patient was admitted since her home health aide, noticed increased drainage from pelvic area  Patient is actively hallucinating, history is extremely limited     Significant last 24 hr data reviewed ;   Vitals:    10/31/20 0644 10/31/20 1417 10/31/20 2123 11/01/20 0728   BP: 110/71 117/64 116/64 120/70   Pulse: 86 87 87 85   Resp: 16 16 16 16   Temp: 97.4 °F (36.3 °C) 97.7 °F (36.5 °C) 97.8 °F (36.6 °C) 98.1 °F (36.7 °C)   TempSrc: Axillary Oral Oral Oral   SpO2: 94% 96% 97% 98%   Weight: 218 lb 3.2 oz (99 kg)      Height:          Recent Results (from the past 24 hour(s))   Basic Metabolic Panel w/ Reflex to MG    Collection Time: 11/01/20  6:25 AM   Result Value Ref Range    Glucose 83 70 - 99 mg/dL    BUN 7 6 - 20 mg/dL    CREATININE 0.77 0.70 - 1.20 mg/dL    Bun/Cre Ratio NOT REPORTED 9 - 20    Calcium 8.9 8.6 - 10.4 mg/dL    Sodium 137 135 - 144 mmol/L    Potassium 3.7 3.7 - 5.3 mmol/L    Chloride 103 98 - 107 mmol/L    CO2 24 20 - 31 mmol/L    Anion Gap 10 9 - 17 mmol/L    GFR Non-African American >60 >60 mL/min    GFR African American >60 >60 mL/min    GFR Comment          GFR Staging NOT REPORTED    CBC    Collection Time: 11/01/20  6:25 AM   Result Value Ref Range WBC 11.0 3.5 - 11.0 k/uL    RBC 3.25 (L) 4.5 - 5.9 m/uL    Hemoglobin 8.5 (L) 13.5 - 17.5 g/dL    Hematocrit 26.9 (L) 41 - 53 %    MCV 82.8 80 - 100 fL    MCH 26.2 26 - 34 pg    MCHC 31.6 31 - 37 g/dL    RDW 16.1 (H) 11.5 - 14.9 %    Platelets 376 (H) 014 - 450 k/uL    MPV 5.7 (L) 6.0 - 12.0 fL    NRBC Automated NOT REPORTED per 100 WBC     No results for input(s): POCGLU in the last 72 hours. Ct Abdomen Pelvis W Iv Contrast Additional Contrast? None    Result Date: 10/29/2020  EXAMINATION: CT OF THE ABDOMEN AND PELVIS WITH CONTRAST 10/28/2020 8:21 pm TECHNIQUE: CT of the abdomen and pelvis was performed with the administration of intravenous contrast. Multiplanar reformatted images are provided for review. Dose modulation, iterative reconstruction, and/or weight based adjustment of the mA/kV was utilized to reduce the radiation dose to as low as reasonably achievable. COMPARISON: 07/26/2012 HISTORY: ORDERING SYSTEM PROVIDED HISTORY: Hiadrenitis suppurativa TECHNOLOGIST PROVIDED HISTORY: Hiadrenitis suppurativa Reason for Exam: patient has a wound under belly button that he has had from a surgery one year ago , patients home nurse has noticed increased drainage from wound FINDINGS: Lower Chest:  Visualized portion of the lower chest demonstrates no acute abnormality. Organs: The liver, spleen, pancreas, kidneys and adrenal glands are without acute findings. Unchanged asymmetric right renal atrophy with peripherally calcified renal cysts. GI/Bowel: There is a large colonic stool burden with a large amount of stool in the rectal vault. No mechanical bowel obstruction. No evidence of appendicitis. Pelvis: The urinary bladder is partially distended without contour abnormality. The prostate and seminal vesicles are within normal limits. Peritoneum/Retroperitoneum: No retroperitoneal or mesenteric lymphadenopathy. Mild atherosclerosis. Bones/Soft Tissues:  There is anterior low abdomen skin thickening with a 2.3 x 1.2 x 1.6 cm rim enhancing fluid collection in the anterior pelvic soft tissues at midline. There is adjacent scarring but no additional fluid collections within the imaged soft tissues. No acute or aggressive osseous lesions. Small fat containing umbilical hernia. 1. 2.3 cm rim enhancing fluid collection in the midline anterior pelvic soft tissues with overlying skin thickening. 2. Large colonic stool burden with a significant amount of stool in the rectal vault. The patient may benefit from enema or disimpaction. 3. Unchanged asymmetric right renal atrophy with peripherally calcified right renal cysts. Xr Chest Portable    Result Date: 10/28/2020  EXAMINATION: ONE XRAY VIEW OF THE CHEST 10/28/2020 6:45 pm COMPARISON: 03/23/2014 HISTORY: ORDERING SYSTEM PROVIDED HISTORY: tachycardia, ams TECHNOLOGIST PROVIDED HISTORY: tachycardia, ams Reason for Exam: Altered mental status, tachycardia, wound check. Acuity: Unknown Type of Exam: Unknown Additional signs and symptoms: Altered mental status, tachycardia, wound check. FINDINGS: Cardiomediastinal silhouette is upper limits of normal in size. No pulmonary consolidation, pleural effusion, or pneumothorax. No acute osseous abnormality. No acute cardiopulmonary abnormality. HPI:   See history in H and P      Review of Systems:     Cannot be done due to patient condition  Data:     Past Medical History:  no change     Social History:  no change    Family History: @no change    Vitals:      I/O (24Hr):     Intake/Output Summary (Last 24 hours) at 11/1/2020 1154  Last data filed at 10/31/2020 2200  Gross per 24 hour   Intake 1934 ml   Output --   Net 1934 ml       Labs:    URINE ANALYSIS: No results found for: LABURIN     CBC:  Lab Results   Component Value Date    WBC 11.0 11/01/2020    HGB 8.5 11/01/2020     11/01/2020     02/09/2012        BMP:    Lab Results   Component Value Date     11/01/2020    K 3.7 11/01/2020    CL 103 11/01/2020    CO2 24 11/01/2020    BUN 7 11/01/2020    CREATININE 0.77 11/01/2020    GLUCOSE 83 11/01/2020    GLUCOSE 89 02/09/2012      LIVER PROFILE:  Lab Results   Component Value Date    ALT 20 08/30/2016    AST 20 08/30/2016    PROT 7.9 08/30/2016    BILITOT 0.31 08/30/2016    LABALBU 3.9 08/30/2016               Radiology:      Physical Examination:        General appearance: Schizophrenia, hallucinating  Mental Status: Not oriented to person, place and time and normal affect  Lungs:  clear to auscultation bilaterally, normal effort  Heart:  regular rate and rhythm, no murmur  Abdomen: Area, cystic lesions, present in pelvic area, consistent with hydradenitis, foul-smelling drainage present  Extremities:  no edema, redness, tenderness in the calves  Skin:  no gross lesions, rashes, induration    Assessment:        Primary Problem  Abscess    Active Hospital Problems    Diagnosis Date Noted    Abscess [L02.91] 10/28/2020    Tobacco abuse [Z72.0] 07/16/2020    Schizoaffective disorder, bipolar type ([facility] [address] [F25.0] 05/04/2016    Hidradenitis suppurativa [L73.2] 03/06/2012       Plan:        Hydradenitis suppurativa, patient need incision and drainage, consulted plastic surgery Dr.Eslami Adorno, restarted home psych medication  On IV vancomycin, Flagyl and cefepime, ID following  DVT prophylaxis Lovenox  Iron deficiency anemia started on IV Venofer, will help in healing, need to follow-up with PCP outpatient for work-up of her injections anemia  On lithium, kidney functions are okay    10/31  Discussed with Dr. nA Mayberry for incision and drainage    11/1  Patient evaluated by plastic surgery   Patient mother, who is POA does not want any kind of surgery  We will discuss with ID  Likely discharge on oral antibiotics      Medications: Allergies:     Allergies   Allergen Reactions    Depakene [Valproic Acid]     Restoril [Temazepam]     Risperidone And Related      Seizure      Thorazine [Chlorpromazine]        Current Meds:   Scheduled Meds:    metroNIDAZOLE  500 mg Intravenous Q8H    iron sucrose  200 mg Intravenous Q24H    cefepime  2 g Intravenous Q12H    vancomycin (VANCOCIN) intermittent dosing (placeholder)   Other RX Placeholder    vancomycin  1,500 mg Intravenous Q12H    benztropine  0.5 mg Oral BID    citalopram  20 mg Oral Daily    clindamycin   Topical BID    docusate sodium  100 mg Oral BID    lithium  450 mg Oral BID    therapeutic multivitamin-minerals  1 tablet Oral Daily    QUEtiapine  300 mg Oral Nightly    QUEtiapine  200 mg Oral BID    enoxaparin  40 mg Subcutaneous Daily    nicotine  1 patch Transdermal Daily     Continuous Infusions:    sodium chloride 75 mL/hr at 11/01/20 0607     PRN Meds: sodium chloride flush, diphenhydrAMINE, zolpidem, potassium chloride **OR** potassium alternative oral replacement **OR** potassium chloride, magnesium sulfate, acetaminophen **OR** acetaminophen, polyethylene glycol, ondansetron       Carmita Jensen MD  11/1/2020  11:54 AM

## 2020-11-01 NOTE — CARE COORDINATION
DISCHARGE PLANNING NOTE:    Writer spoke with Dr. Erin Spain who states patient needs 1 more day of IV antibiotics and then will most likely discharge on PO. She would like gen surgery to consider bedside conchita of abscess  - Possibly on Monday as Dr. Merle Cruz is back on case.      Electronically signed by Marino Rosa RN on 11/1/2020 at 3:11 PM

## 2020-11-01 NOTE — CARE COORDINATION
DISCHARGE PLANNING NOTE:    I spoke with patients mother in regards to discharge plans. She states she is prepared for him to come home at any time and she will provide his transportation. They are current with [facility] Mendez and patient will be needing wound care - Will communicate this with Sanna Devine from [facility]. No surgical plans per plastic surgery. Remains on IV vanco, IV cefepime and IV flagyl.      Electronically signed by Em Krishna RN on 11/1/2020 at 11:02 AM

## 2020-11-01 NOTE — PROGRESS NOTES
metroNIDAZOLE  500 mg Intravenous Q8H    iron sucrose  200 mg Intravenous Q24H    cefepime  2 g Intravenous Q12H    vancomycin (VANCOCIN) intermittent dosing (placeholder)   Other RX Placeholder    vancomycin  1,500 mg Intravenous Q12H    benztropine  0.5 mg Oral BID    citalopram  20 mg Oral Daily    clindamycin   Topical BID    docusate sodium  100 mg Oral BID    lithium  450 mg Oral BID    therapeutic multivitamin-minerals  1 tablet Oral Daily    QUEtiapine  300 mg Oral Nightly    QUEtiapine  200 mg Oral BID    enoxaparin  40 mg Subcutaneous Daily    nicotine  1 patch Transdermal Daily       Social History:     Social History     Socioeconomic History    Marital status: Single     Spouse name: Not on file    Number of children: Not on file    Years of education: Not on file    Highest education level: Not on file   Occupational History    Not on file   Social Needs    Financial resource strain: Not on file    Food insecurity     Worry: Not on file     Inability: Not on file    Transportation needs     Medical: Not on file     Non-medical: Not on file   Tobacco Use    Smoking status: Current Every Day Smoker     Packs/day: 1.00     Years: 22.00     Pack years: 22.00     Types: Cigarettes    Smokeless tobacco: Never Used   Substance and Sexual Activity    Alcohol use: Yes     Comment: occasional    Drug use: Yes     Comment: history of marijuana & Opiate abue, claims that he is currently clean & sober.     Sexual activity: Not on file   Lifestyle    Physical activity     Days per week: Not on file     Minutes per session: Not on file    Stress: Not on file   Relationships    Social connections     Talks on phone: Not on file     Gets together: Not on file     Attends [employer] service: Not on file     Active member of club or organization: Not on file     Attends meetings of clubs or organizations: Not on file     Relationship status: Not on file    Intimate partner violence     Fear of current or ex partner: Not on file     Emotionally abused: Not on file     Physically abused: Not on file     Forced sexual activity: Not on file   Other Topics Concern    Not on file   Social History Narrative    Not on file       Family History:     Family History   Problem Relation Age of Onset    Cancer Mother         breast    High Blood Pressure Maternal Grandfather     Mental Illness Paternal Aunt     Substance Abuse Paternal Uncle     Substance Abuse Brother     Alcohol Abuse Father     Cancer Father         pancrease        Allergies:   Depakene [valproic acid]; Restoril [temazepam]; Risperidone and related; and Thorazine [chlorpromazine]     Review of Systems:     Review of Systems  Limited as per history recent illness, other than above 12 system reviewed were negative  Physical Examination :     Patient Vitals for the past 8 hrs:   BP Temp Temp src Pulse Resp SpO2   11/01/20 1253 115/61 97.6 °F (36.4 °C) Oral 93 18 97 %   11/01/20 0728 120/70 98.1 °F (36.7 °C) Oral 85 16 98 %       Physical Exam  Constitutional:       General: He is not in acute distress. HENT:      Head: Normocephalic and atraumatic. Mouth/Throat:      Pharynx: Oropharynx is clear. No posterior oropharyngeal erythema. Eyes:      General: No scleral icterus. Conjunctiva/sclera: Conjunctivae normal.   Neck:      Musculoskeletal: Neck supple. No neck rigidity. Cardiovascular:      Rate and Rhythm: Normal rate and regular rhythm. Heart sounds: Normal heart sounds. Pulmonary:      Effort: Pulmonary effort is normal.      Breath sounds: Normal breath sounds. No wheezing. Abdominal:      General: There is no distension. Palpations: Abdomen is soft. Musculoskeletal:      Right lower leg: No edema. Left lower leg: No edema. Skin:     General: Skin is warm. Comments: Anterior pelvic open wound with purulent drainage, fluctuation and erythema, no crepitance  Multiple scars and old skin grafts. Neurological:      General: No focal deficit present. Mental Status: He is alert. Medical Decision Making:   I have independently reviewed/ordered the following labs:    CBC with Differential:   Recent Labs     10/31/20  0541 11/01/20  0625   WBC 9.9 11.0   HGB 8.6* 8.5*   HCT 26.8* 26.9*   * 514*     BMP:  Recent Labs     10/31/20  0541 11/01/20  0625    137   K 4.0 3.7    103   CO2 26 24   BUN 7 7   CREATININE 0.89 0.77     Hepatic Function Panel: No results for input(s): PROT, LABALBU, BILIDIR, IBILI, BILITOT, ALKPHOS, ALT, AST in the last 72 hours. No results for input(s): RPR in the last 72 hours. No results for input(s): HIV in the last 72 hours. No results for input(s): BC in the last 72 hours. Lab Results   Component Value Date    CREATININE 0.77 11/01/2020    GLUCOSE 83 11/01/2020    GLUCOSE 89 02/09/2012       Detailed results: Thank you for allowing us to participate in the care of this patient. Please call with questions. This note is created with the assistance of a speech recognition program.  While intending to generate adocument that actually reflects the content of the visit, the document can still have some errors including those of syntax and sound a like substitutions which may escape proof reading. It such instances, actual meaningcan be extrapolated by contextual diversion.     Usha President, MD  Office: [phone]  Perfect serve / office [phone]

## 2020-11-02 VITALS
RESPIRATION RATE: 16 BRPM | WEIGHT: 228.6 LBS | TEMPERATURE: 98.5 F | OXYGEN SATURATION: 95 % | SYSTOLIC BLOOD PRESSURE: 117 MMHG | DIASTOLIC BLOOD PRESSURE: 80 MMHG | HEART RATE: 86 BPM | HEIGHT: 69 IN | BODY MASS INDEX: 33.86 KG/M2

## 2020-11-02 LAB
ANION GAP SERPL CALCULATED.3IONS-SCNC: 7 MMOL/L (ref 9–17)
BUN BLDV-MCNC: 9 MG/DL (ref 6–20)
BUN/CREAT BLD: ABNORMAL (ref 9–20)
CALCIUM SERPL-MCNC: 9 MG/DL (ref 8.6–10.4)
CHLORIDE BLD-SCNC: 103 MMOL/L (ref 98–107)
CO2: 26 MMOL/L (ref 20–31)
CREAT SERPL-MCNC: 0.87 MG/DL (ref 0.7–1.2)
GFR AFRICAN AMERICAN: >60 ML/MIN
GFR NON-AFRICAN AMERICAN: >60 ML/MIN
GFR SERPL CREATININE-BSD FRML MDRD: ABNORMAL ML/MIN/{1.73_M2}
GFR SERPL CREATININE-BSD FRML MDRD: ABNORMAL ML/MIN/{1.73_M2}
GLUCOSE BLD-MCNC: 102 MG/DL (ref 70–99)
HCT VFR BLD CALC: 27 % (ref 41–53)
HEMOGLOBIN: 8.5 G/DL (ref 13.5–17.5)
MCH RBC QN AUTO: 25.9 PG (ref 26–34)
MCHC RBC AUTO-ENTMCNC: 31.5 G/DL (ref 31–37)
MCV RBC AUTO: 82.3 FL (ref 80–100)
NRBC AUTOMATED: ABNORMAL PER 100 WBC
PDW BLD-RTO: 15.9 % (ref 11.5–14.9)
PLATELET # BLD: 519 K/UL (ref 150–450)
PMV BLD AUTO: 5.4 FL (ref 6–12)
POTASSIUM SERPL-SCNC: 3.7 MMOL/L (ref 3.7–5.3)
RBC # BLD: 3.28 M/UL (ref 4.5–5.9)
SODIUM BLD-SCNC: 136 MMOL/L (ref 135–144)
WBC # BLD: 11.6 K/UL (ref 3.5–11)

## 2020-11-02 PROCEDURE — 85027 COMPLETE CBC AUTOMATED: CPT

## 2020-11-02 PROCEDURE — 2500000003 HC RX 250 WO HCPCS: Performed by: INTERNAL MEDICINE

## 2020-11-02 PROCEDURE — 99232 SBSQ HOSP IP/OBS MODERATE 35: CPT | Performed by: INTERNAL MEDICINE

## 2020-11-02 PROCEDURE — 6370000000 HC RX 637 (ALT 250 FOR IP): Performed by: NURSE PRACTITIONER

## 2020-11-02 PROCEDURE — 2580000003 HC RX 258: Performed by: INTERNAL MEDICINE

## 2020-11-02 PROCEDURE — 80048 BASIC METABOLIC PNL TOTAL CA: CPT

## 2020-11-02 PROCEDURE — 6360000002 HC RX W HCPCS: Performed by: INTERNAL MEDICINE

## 2020-11-02 PROCEDURE — 2500000003 HC RX 250 WO HCPCS: Performed by: PLASTIC SURGERY

## 2020-11-02 PROCEDURE — 2580000003 HC RX 258: Performed by: NURSE PRACTITIONER

## 2020-11-02 PROCEDURE — 0J9C0ZZ DRAINAGE OF PELVIC REGION SUBCUTANEOUS TISSUE AND FASCIA, OPEN APPROACH: ICD-10-PCS | Performed by: PLASTIC SURGERY

## 2020-11-02 PROCEDURE — 10061 I&D ABSCESS COMP/MULTIPLE: CPT | Performed by: PLASTIC SURGERY

## 2020-11-02 PROCEDURE — 36415 COLL VENOUS BLD VENIPUNCTURE: CPT

## 2020-11-02 RX ORDER — DOXYCYCLINE HYCLATE 100 MG/1
100 CAPSULE ORAL 2 TIMES DAILY
Qty: 14 CAPSULE | Refills: 0 | Status: SHIPPED | OUTPATIENT
Start: 2020-11-02 | End: 2020-11-09

## 2020-11-02 RX ORDER — LIDOCAINE HYDROCHLORIDE AND EPINEPHRINE 10; 10 MG/ML; UG/ML
20 INJECTION, SOLUTION INFILTRATION; PERINEURAL ONCE
Status: COMPLETED | OUTPATIENT
Start: 2020-11-02 | End: 2020-11-02

## 2020-11-02 RX ORDER — AMOXICILLIN AND CLAVULANATE POTASSIUM 125; 31.25 MG/5ML; MG/5ML
875 POWDER, FOR SUSPENSION ORAL 2 TIMES DAILY
Qty: 490 ML | Refills: 0 | Status: SHIPPED | OUTPATIENT
Start: 2020-11-02 | End: 2020-11-09

## 2020-11-02 RX ADMIN — QUETIAPINE FUMARATE 200 MG: 200 TABLET ORAL at 09:15

## 2020-11-02 RX ADMIN — LIDOCAINE HYDROCHLORIDE AND EPINEPHRINE 20 ML: 10; 10 INJECTION, SOLUTION INFILTRATION; PERINEURAL at 14:37

## 2020-11-02 RX ADMIN — SODIUM CHLORIDE: 9 INJECTION, SOLUTION INTRAVENOUS at 04:22

## 2020-11-02 RX ADMIN — CITALOPRAM HYDROBROMIDE 20 MG: 20 TABLET ORAL at 09:15

## 2020-11-02 RX ADMIN — MULTIPLE VITAMINS W/ MINERALS TAB 1 TABLET: TAB at 09:15

## 2020-11-02 RX ADMIN — CLINDAMYCIN PHOSPHATE: 10 SOLUTION TOPICAL at 11:06

## 2020-11-02 RX ADMIN — VANCOMYCIN HYDROCHLORIDE 1500 MG: 1.5 INJECTION, POWDER, LYOPHILIZED, FOR SOLUTION INTRAVENOUS at 04:23

## 2020-11-02 RX ADMIN — METRONIDAZOLE 500 MG: 500 INJECTION, SOLUTION INTRAVENOUS at 09:15

## 2020-11-02 RX ADMIN — CEFEPIME HYDROCHLORIDE 2 G: 2 INJECTION, POWDER, FOR SOLUTION INTRAVENOUS at 03:46

## 2020-11-02 RX ADMIN — SODIUM CHLORIDE 200 MG: 9 INJECTION, SOLUTION INTRAVENOUS at 11:05

## 2020-11-02 RX ADMIN — LITHIUM CARBONATE 450 MG: 450 TABLET, EXTENDED RELEASE ORAL at 09:16

## 2020-11-02 RX ADMIN — BENZTROPINE MESYLATE 0.5 MG: 0.5 TABLET ORAL at 09:16

## 2020-11-02 RX ADMIN — DOCUSATE SODIUM 100 MG: 100 CAPSULE ORAL at 09:15

## 2020-11-02 RX ADMIN — METRONIDAZOLE 500 MG: 500 INJECTION, SOLUTION INTRAVENOUS at 02:30

## 2020-11-02 ASSESSMENT — PAIN SCALES - GENERAL
PAINLEVEL_OUTOF10: 0
PAINLEVEL_OUTOF10: 0

## 2020-11-02 NOTE — PROGRESS NOTES
D/c instructions discussed with pt's mother, all questions answered. Mother given scripts for abx and information regarding when next dose is due.

## 2020-11-02 NOTE — PROGRESS NOTES
chloride, magnesium sulfate, acetaminophen **OR** acetaminophen, polyethylene glycol, ondansetron    Data:     Past Medical History:   has a past medical history of Bipolar 1 disorder ([facility] [address] GERD (gastroesophageal reflux disease), Hidradenitis suppurativa, Polysubstance abuse ([facility] [address] and Schizoaffective disorder ([facility] [address] Social History:   reports that he has been smoking cigarettes. He has a 22.00 pack-year smoking history. He has never used smokeless tobacco. He reports current alcohol use. He reports current drug use. Family History:   Family History   Problem Relation Age of Onset   [facility] Cancer Mother         breast    High Blood Pressure Maternal Grandfather     Mental Illness Paternal Aunt     Substance Abuse Paternal Uncle     Substance Abuse Brother     Alcohol Abuse Father     Cancer Father         pancrease       Vitals:  /80   Pulse 86   Temp 98.5 °F (36.9 °C) (Oral)   Resp 16   Ht 5' 9\" (1.753 m)   Wt 228 lb 9.6 oz (103.7 kg)   SpO2 95%   BMI 33.76 kg/m²   Temp (24hrs), Av.1 °F (36.7 °C), Min:97.6 °F (36.4 °C), Max:98.5 °F (36.9 °C)    No results for input(s): POCGLU in the last 72 hours. I/O (24Hr):     Intake/Output Summary (Last 24 hours) at 2020 1119  Last data filed at 2020 0444  Gross per 24 hour   Intake 1090 ml   Output --   Net 1090 ml       Labs:    Lab Results   Component Value Date    WBC 11.6 (H) 2020    HGB 8.5 (L) 2020    HCT 27.0 (L) 2020    MCV 82.3 2020     (H) 2020     Lab Results   Component Value Date     2020    K 3.7 2020     2020    CO2 26 2020    BUN 9 2020    CREATININE 0.87 2020    GLUCOSE 102 2020    GLUCOSE 89 2012    CALCIUM 9.0 2020          Lab Results   Component Value Date/Time    SPECIAL L AC 10 RED 10 PURPLE 10/29/2020 02:10 PM     Lab Results   Component Value Date/Time    CULTURE NO GROWTH 4 DAYS 10/29/2020 02:10 PM Radiology:    Recent data reviewed    Physical Examination:        General appearance:  alert, cooperative and no distress  Eyes: Anicteric sclera. Pupils are equally round and reactive to light. Extraocular movements are intact. Lungs:  clear to auscultation bilaterally, normal effort  Heart:  regular rate and rhythm, no murmur  Abdomen: Soft nontender, lower abdominal skin sores  Extremities:  no edema, redness, tenderness in the calves  Skin: Hidradenitis lesions lower abdomen  Neuro:  Alert, oriented X 3, Gait normal. Non-focal.  Assessment:        Primary Problem  Abscess    Active Hospital Problems    Diagnosis Date Noted    Abscess [L02.91] 10/28/2020    Tobacco abuse [Z72.0] 07/16/2020    [facility], [facility] type [facility] [F25.0] 05/04/2016    Hidradenitis suppurativa [L73.2] 03/06/2012           DVT prophylaxis: Lovenox  Antibiotics:    Plan:          Anterior pelvic abscesses secondary to hidradenitis suppurativa-plastic surgery consulted, started on IV vancomycin Flagyl and cefepime, ID following. Iron deficiency anemia-started on IV Venofer-will need outpatient work-up. Schizophrenia-continue with lithium    11/2  Apparently surgery was discussed with patient and family, patient's mother who is POA did not want any kind of surgery-plan is to discharge on oral antibiotics     Discharge once we have final antibiotic recommendations per ID.     Dispo: home with mother who is POA,  Home care    Arie Swan MD  11/2/2020  11:19 AM

## 2020-11-02 NOTE — CARE COORDINATION
ONGOING DISCHARGE PLAN:    Plan remains for patient to be discharged home with Leonela Fischer. Pt will be discharged home today. Notified Mera Mustache from [address] of this. Awaiting antibiotic recommendations from ID. Currently on IV Cefepime, Flagyl, and Vanco.    Will continue to follow for additional discharge needs.     Electronically signed by Logan Patiño RN on 11/2/2020 at 12:30 PM

## 2020-11-02 NOTE — PLAN OF CARE
Problem: Skin Integrity:  Goal: Will show no infection signs and symptoms  Description: Will show no infection signs and symptoms  Outcome: Ongoing  Note: Patient displays no new signs of infection during this shift. Problem: Skin Integrity:  Goal: Absence of new skin breakdown  Description: Absence of new skin breakdown  Outcome: Ongoing  Note: No new occurrence of skin breakdown noted during this shift. Waffle in use. Pt turned at least every two hours. Problem: Pain:  Goal: Patient's pain/discomfort is manageable  Description: Patient's pain/discomfort is manageable  Outcome: Ongoing  Note: Pt shows no signs of needing pain medication this shift.

## 2020-11-02 NOTE — PROGRESS NOTES
[facility] -   Infectious diseases evaluation  admission date 10/28/2020    reason for consultation:   Abdominal wall abscess    Impression :   Current:  · Anterior pelvic abscesses  · Hidradenitis suppurativa  · Bipolar disorder  · Schizoaffective disorder  · Polysubstance abuse  · GERD      Recommendations   · Continue IV vancomycin ,cefepime and Flagyl  · Case was discussed with Dr. John Khanna regarding bedside debridement, she will  contact patient mother for consent. · Follow CBC renal function  · Follow blood  culture no growth to date          History of Present Illness:   Initial history:  Yesika Lizarraga is a 41y.o.-year-old male was sent to the hospital by home health nurse due to increased redness and swelling to the anterior pelvic area with purulent drainage associated with increased hallucinations for 2 weeks. CT abdomen pelvis showed anterior pelvic abscess and large stool burden. History of hidradenitis suppurativa, history of abscesses drainage and skin grafts. Interval changes  11/2/2020   He is poor historian, denied any fever or chills, denied pain, no new events          I have personally reviewed the past medical history, past surgical history, medications, social history, and family history, and I haveupdated the database accordingly.   Past Medical History:     Past Medical History:   Diagnosis Date    Bipolar 1 disorder ([facility] [place] [address]     GERD (gastroesophageal reflux disease) 8/13/2016    Hidradenitis suppurativa     Polysubstance abuse ([facility] [place] [address]     Schizoaffective disorder (HCC)        Past Surgical  History:     Past Surgical History:   Procedure Laterality Date    ABSCESS DRAINAGE  11/1/2013    left inguinal hydrodenitis/pilondal cyst    INFECTED SKIN DEBRIDEMENT  july 2011       Medications:      metroNIDAZOLE  500 mg Intravenous Q8H    iron sucrose  200 mg Intravenous Q24H    cefepime  2 g Intravenous Q12H    vancomycin (VANCOCIN) on file   Other Topics Concern    Not on file   Social History Narrative    Not on file       Family History:     Family History   Problem Relation Age of Onset    Cancer Mother         breast    High Blood Pressure Maternal Grandfather     Mental Illness Paternal Aunt     Substance Abuse Paternal Uncle     Substance Abuse Brother     Alcohol Abuse Father     Cancer Father         pancrease        Allergies:   Depakene [valproic acid]; Restoril [temazepam]; Risperidone and related; and Thorazine [chlorpromazine]     Review of Systems:     Review of Systems  Limited as per history recent illness, other than above 12 system reviewed were negative  Physical Examination :     No data found. Physical Exam  Constitutional:       General: He is not in acute distress. HENT:      Head: Normocephalic and atraumatic. Mouth/Throat:      Pharynx: Oropharynx is clear. No posterior oropharyngeal erythema. Eyes:      General: No scleral icterus. Conjunctiva/sclera: Conjunctivae normal.   Neck:      Musculoskeletal: Neck supple. No neck rigidity. Cardiovascular:      Rate and Rhythm: Normal rate and regular rhythm. Heart sounds: Normal heart sounds. Pulmonary:      Effort: Pulmonary effort is normal.      Breath sounds: Normal breath sounds. No wheezing. Abdominal:      General: There is no distension. Palpations: Abdomen is soft. Musculoskeletal:      Right lower leg: No edema. Left lower leg: No edema. Skin:     General: Skin is warm. Comments: Anterior pelvic open wound with purulent drainage, fluctuation and erythema, no crepitance  Multiple scars and old skin grafts. Neurological:      General: No focal deficit present. Mental Status: He is alert.            Medical Decision Making:   I have independently reviewed/ordered the following labs:    CBC with Differential:   Recent Labs     11/01/20  0625 11/02/20  0544   WBC 11.0 11.6*   HGB 8.5* 8.5*   HCT 26.9* 27.0* * 519*     BMP:  Recent Labs     11/01/20  0625 11/02/20  0544    136   K 3.7 3.7    103   CO2 24 26   BUN 7 9   CREATININE 0.77 0.87     Hepatic Function Panel: No results for input(s): PROT, LABALBU, BILIDIR, IBILI, BILITOT, ALKPHOS, ALT, AST in the last 72 hours. No results for input(s): RPR in the last 72 hours. No results for input(s): HIV in the last 72 hours. No results for input(s): BC in the last 72 hours. Lab Results   Component Value Date    CREATININE 0.87 11/02/2020    GLUCOSE 102 11/02/2020    GLUCOSE 89 02/09/2012       Detailed results: Thank you for allowing us to participate in the care of this patient. Please call with questions. This note is created with the assistance of a speech recognition program.  While intending to generate adocument that actually reflects the content of the visit, the document can still have some errors including those of syntax and sound a like substitutions which may escape proof reading. It such instances, actual meaningcan be extrapolated by contextual diversion.     Neno Wang MD  Office: [phone]  Perfect serve / office [phone]

## 2020-11-02 NOTE — PROGRESS NOTES
Writer spoke with dr. Zoe Nieves regarding d/c, ok for RN to enter d/c order and pt to go home this evening.

## 2020-11-02 NOTE — PROGRESS NOTES
[unfilled]        Surgery Note     Name: Tracey Zuniga Date/Time of Admission: 10/28/2020  6:27 PM   MRN: [id number] Attending Provider: Ambrose Koch MD   Room/Bed: 5282/4571-52 /Age: 1980/40 y.o. Date of Surgery: [unfilled] Surgeon: Joshua Oneal MD   Facility: [facility] PCP: Tara Alexander MD     Pre-Op DX:   Suprapubic abscess with purulent drainage    Post-Op DX:   Suprapubic abscess with purulent drainage    Operative Procedure:   I&D of complex suprapubic abscess. ANESTHESIA:  Local 4 cc of 1% lidocaine with epinephrine was used. ESTIMATED BLOOD LOSS: 40 cc  SPECIMENS:   None    INDICATION FOR PROCEDURE:  The patient is 41 y.o. male    All the risks, benefits, and alternative treatments were explained to the patient and an informed consent was obtained. DESCRIPTION OF PROCEDURE: This is a bedside procedure. The risks and the benefits were discussed with his mother who is his POA. This was a bedside procedure. All areas were prepped and draped in usual manner. A timeout was performed. Then local anesthetic was injected. All areas were tested. After it was determined there was adequate local anesthesia. The attention was turned to suprapubic area. Then an incision was made over the area. This was done using a #15 blade. Then dissection was made through the abscess. There was multiple loculated abscesses which were opened up. After the abscesses were opened up and drained. Hemostasis was achieved. Then Aquacel Ag 4 x 4 ABDs were placed as a pressure dressing. Patient tolerated the procedure well.

## 2020-11-02 NOTE — PROGRESS NOTES
Pt's Aretha Welsh are manager Jeny Freedman would like a copy of discharge orders sent to her at [phone].

## 2020-11-03 ENCOUNTER — CARE COORDINATION (OUTPATIENT)
Dept: CASE MANAGEMENT | Age: 40
End: 2020-11-03

## 2020-11-03 PROCEDURE — 1111F DSCHRG MED/CURRENT MED MERGE: CPT

## 2020-11-03 NOTE — CARE COORDINATION
parent who verbalized understanding. Parent given an opportunity to ask questions and does not have any further questions or concerns at this time. Were discharge instructions available to patient? Yes. Reviewed appropriate site of care based on symptoms and resources available to patient including: PCP, Specialist, Urgent care clinics, Home health and When to call 911. The parent agrees to contact the PCP office for questions related to their healthcare. Medication reconciliation was performed with parent, who verbalizes understanding of administration of home medications. Advised obtaining a 90-day supply of all daily and as-needed medications. Covid Risk Education    Patient has following risk factors of: no known risk factors. Education provided regarding infection prevention, and signs and symptoms of COVID-19 and when to seek medical attention with parent who verbalized understanding. Discussed exposure protocols and quarantine From CDC: Are you at higher risk for severe illness?   and given an opportunity for questions and concerns. The parent agrees to contact the COVID-19 hotline [phone] or PCP office for questions related to COVID-19. For more information on steps you can take to protect yourself, see CDC's How to Protect Yourself     Patient/family/caregiver given information for [facility] and agrees to enroll no  Patient's preferred e-mail: declines  Patient's preferred phone number: declines    Discussed follow-up appointments. If no appointment was previously scheduled, appointment scheduling offered: Yes. Is follow up appointment scheduled within 7 days of discharge? Yes  Non-[facility] follow up appointment(s):     Plan for follow-up call in 3-5 days based on severity of symptoms and risk factors. Plan for next call: routine follow up  CTN provided contact information for future needs.           Care Transitions 24 Hour Call    Schedule Follow Up Appointment with PCP:  Completed  Do you have any ongoing symptoms?:  Yes  Patient-reported symptoms:  Fatigue, Other (Comment: incontinent)  Do you have a copy of your discharge instructions?:  Yes  Do you have all of your prescriptions and are they filled?:  No  Have you been contacted by a [address]?:  No  Have you scheduled your follow up appointment?:  Yes  How are you going to get to your appointment?:  Car - family or friend to transport  Were you discharged with any Home Care or Post Acute Services:  Yes  Post Acute Services:  [address] [place] [address] Wilton Leroy (Comment: Naz Alejandro)  Do you feel like you have everything you need to keep you well at home?:  Yes  Care Transitions Interventions         Follow Up  Future Appointments   Date Time Provider Autumn Thakkar   11/10/2020 12:45 PM Frantz Kelley [facility]

## 2020-11-04 LAB
CULTURE: NORMAL
Lab: NORMAL
SPECIMEN DESCRIPTION: NORMAL

## 2020-11-06 ENCOUNTER — CARE COORDINATION (OUTPATIENT)
Dept: CASE MANAGEMENT | Age: 40
End: 2020-11-06

## 2020-11-06 NOTE — CARE COORDINATION
Date/Time:  11/6/2020 2:16 PM  Attempted to reach patient by telephone. Call within 2 business days of discharge: Yes Left HIPPA compliant message requesting a return call. Will attempt to reach patient again.

## 2020-11-06 NOTE — DISCHARGE SUMMARY
[place] [address] Internal Medicine    Discharge Summary     Patient ID: Roxie Gerardo  :  1980   MRN: [id number]     ACCOUNT:  [de-identified]   Patient's PCP: Maria T Russo MD  Admit Date: 10/28/2020   Discharge Date: 2020    Length of Stay: 5  Code Status:  Prior  Admitting Physician: Praveen Howard MD  Discharge Physician: Irene Nelson MD     Active Discharge Diagnoses:     Primary Problem  Abscess      [place] Problems    Diagnosis Date Noted    Abscess [L02.91] 10/28/2020    Tobacco abuse [Z72.0] 2020    Schizoaffective disorder, bipolar type ([facility] [address] [F25.0] 2016    Hidradenitis suppurativa [L73.2] 2012       Admission Condition:  fair     Discharged Condition: fair    Hospital Stay:     Hospital Course:  Roxie Gerardo is a 41 y.o. male who was admitted for the management of Abscess , presented to ER with Wound Check    Patient has advanced schizophrenia, history is extremely limited  Has hidradenitis, underwent multiple surgeries  Patient was admitted since her home health aide, noticed increased drainage from pelvic area  Anterior abdominal wall abscesses secondary to hidradenitis suppurativa-plastic surgery consulted, started on IV vancomycin Flagyl and cefepime, ID following. Iron deficiency anemia-started on IV Venofer-will need outpatient work-up. Schizophrenia-continue with lithium    On  bedside debridement was performed by Plastic surgery, and pt discharged on oral antibiotics.      Significant therapeutic interventions: As above    Significant Diagnostic Studies:   Labs / Micro:    Lab Results   Component Value Date    WBC 11.6 (H) 2020    HGB 8.5 (L) 2020    HCT 27.0 (L) 2020    MCV 82.3 2020     (H) 2020          Lab Results   Component Value Date     2020    K 3.7 2020     2020    CO2 26 2020    BUN 9 2020    CREATININE 0.87 11/02/2020    GLUCOSE 102 11/02/2020    GLUCOSE 89 02/09/2012    CALCIUM 9.0 11/02/2020          Radiology:    Ct Abdomen Pelvis W Iv Contrast Additional Contrast? None    Result Date: 10/29/2020  EXAMINATION: CT OF THE ABDOMEN AND PELVIS WITH CONTRAST 10/28/2020 8:21 pm TECHNIQUE: CT of the abdomen and pelvis was performed with the administration of intravenous contrast. Multiplanar reformatted images are provided for review. Dose modulation, iterative reconstruction, and/or weight based adjustment of the mA/kV was utilized to reduce the radiation dose to as low as reasonably achievable. COMPARISON: 07/26/2012 HISTORY: ORDERING SYSTEM PROVIDED HISTORY: Hiadrenitis suppurativa TECHNOLOGIST PROVIDED HISTORY: Hiadrenitis suppurativa Reason for Exam: patient has a wound under belly button that he has had from a surgery one year ago , patients home nurse has noticed increased drainage from wound FINDINGS: Lower Chest:  Visualized portion of the lower chest demonstrates no acute abnormality. Organs: The liver, spleen, pancreas, kidneys and adrenal glands are without acute findings. Unchanged asymmetric right renal atrophy with peripherally calcified renal cysts. GI/Bowel: There is a large colonic stool burden with a large amount of stool in the rectal vault. No mechanical bowel obstruction. No evidence of appendicitis. Pelvis: The urinary bladder is partially distended without contour abnormality. The prostate and seminal vesicles are within normal limits. Peritoneum/Retroperitoneum: No retroperitoneal or mesenteric lymphadenopathy. Mild atherosclerosis. Bones/Soft Tissues: There is anterior low abdomen skin thickening with a 2.3 x 1.2 x 1.6 cm rim enhancing fluid collection in the anterior pelvic soft tissues at midline. There is adjacent scarring but no additional fluid collections within the imaged soft tissues. No acute or aggressive osseous lesions. Small fat containing umbilical hernia.      1. 2.3 cm rim enhancing fluid collection in the midline anterior pelvic soft tissues with overlying skin thickening. 2. Large colonic stool burden with a significant amount of stool in the rectal vault. The patient may benefit from enema or disimpaction. 3. Unchanged asymmetric right renal atrophy with peripherally calcified right renal cysts. Xr Chest Portable    Result Date: 10/28/2020  EXAMINATION: ONE XRAY VIEW OF THE CHEST 10/28/2020 6:45 pm COMPARISON: 03/23/2014 HISTORY: ORDERING SYSTEM PROVIDED HISTORY: tachycardia, ams TECHNOLOGIST PROVIDED HISTORY: tachycardia, ams Reason for Exam: Altered mental status, tachycardia, wound check. Acuity: Unknown Type of Exam: Unknown Additional signs and symptoms: Altered mental status, tachycardia, wound check. FINDINGS: Cardiomediastinal silhouette is upper limits of normal in size. No pulmonary consolidation, pleural effusion, or pneumothorax. No acute osseous abnormality. No acute cardiopulmonary abnormality. Consultations:    Consults:     Final Specialist Recommendations/Findings:   IP CONSULT TO GENERAL SURGERY  PHARMACY TO DOSE VANCOMYCIN  IP CONSULT TO INFECTIOUS DISEASES  IP CONSULT TO PLASTIC SURGERY      The patient was seen and examined on day of discharge and this discharge summary is in conjunction with any daily progress note from day of discharge.     Discharge plan:     Disposition: home with [facility]    Instructions to Patient: Keep follow-up appointments      Requiring Further Evaluation/Follow Up POST HOSPITALIZATION/Incidental Findings:    Physician Follow Up:     [address]  [address] [place] [address] [address] [address]  In 1 week  For wound re-check       Diet: regular    Activity: As tolerated    Discharge Medications:      Medication List      START taking these medications    Augmentin 125-31.25 MG/5ML suspension  Generic drug: amoxicillin-clavulanate  Take 35 mLs by mouth 2 times daily for 7 days     doxycycline hyclate 100 MG capsule  Commonly known as:  VIBRAMYCIN  Take 1 capsule by mouth 2 times daily for 7 days        CHANGE how you take these medications    benztropine 1 MG tablet  Commonly known as:  COGENTIN  Take 1 tablet by mouth 2 times daily  What changed:  how much to take        CONTINUE taking these medications    acetaminophen 325 MG tablet  Commonly known as:  TYLENOL     citalopram 20 MG tablet  Commonly known as:  CELEXA     clindamycin 1 % gel  Commonly known as:  CLINDAGEL     diphenhydrAMINE 25 MG capsule  Commonly known as:  BENADRYL     docusate 100 MG Caps  Commonly known as:  COLACE, DULCOLAX  Take 100 mg by mouth 2 times daily     ferrous sulfate 325 (65 Fe) MG tablet  Commonly known as:  IRON 325  Take 1 tablet by mouth 2 times daily (with meals)     lithium 450 MG extended release tablet  Commonly known as:  ESKALITH     * QUEtiapine 200 MG tablet  Commonly known as:  SEROQUEL     * QUEtiapine 300 MG extended release tablet  Commonly known as:  SEROQUEL XR     therapeutic multivitamin-minerals tablet  Take 1 tablet by mouth daily. ZOLPIDEM TARTRATE PO         * This list has 2 medication(s) that are the same as other medications prescribed for you. Read the directions carefully, and ask your doctor or other care provider to review them with you. Where to Get Your Medications      You can get these medications from any pharmacy    Bring a paper prescription for each of these medications  Augmentin 125-31.25 MG/5ML suspension  doxycycline hyclate 100 MG capsule         Time Spent on discharge is  35 mins in patient examination, evaluation, counseling as well as medication reconciliation, prescriptions for required medications, discharge plan and follow up.     Electronically signed by   Irene Nelson MD  11/6/2020  8:48 AM      Thank you Dr. Maria T Russo MD for the opportunity to be

## 2020-11-10 ENCOUNTER — HOSPITAL ENCOUNTER (OUTPATIENT)
Dept: WOUND CARE | Age: 40
Discharge: HOME | End: 2020-11-10

## 2020-11-10 ENCOUNTER — TELEPHONE (OUTPATIENT)
Dept: WOUND CARE | Age: 40
End: 2020-11-10

## 2020-11-10 NOTE — TELEPHONE ENCOUNTER
Patient's mother calling to cancel his [facility] wound care appointment stating she could not get him in the car due to his mental state, she did not want to reschedule at this time due to his state and that he may go into the hospital.

## 2020-11-13 ENCOUNTER — CARE COORDINATION (OUTPATIENT)
Dept: CASE MANAGEMENT | Age: 40
End: 2020-11-13

## 2020-11-18 ENCOUNTER — TELEPHONE (OUTPATIENT)
Dept: WOUND CARE | Age: 40
End: 2020-11-18

## 2020-11-18 NOTE — TELEPHONE ENCOUNTER
Patient's mother calling to cancel patient's appointment for today stating he is refusing to come, will call back if he needs another appointment.

## 2020-11-20 ENCOUNTER — CARE COORDINATION (OUTPATIENT)
Dept: CASE MANAGEMENT | Age: 40
End: 2020-11-20

## 2020-11-20 NOTE — CARE COORDINATION
Attempted to reach pt for follow up call. Left message requesting call back.     Valery Maki RN  Care Transition Coordinator  [phone]

## 2020-11-23 ENCOUNTER — CARE COORDINATION (OUTPATIENT)
Dept: CASE MANAGEMENT | Age: 40
End: 2020-11-23

## 2020-11-23 NOTE — CARE COORDINATION
[facility] Transitions Follow Up Call    2020    Patient: Yesika Lizarraga  Patient : 1980   MRN: [id number]  Reason for Admission: Hydradenitis suppravata  Discharge Date: 20 RARS: Readmission Risk Score: 12         Spoke with: Patient's mother    Spoke with patients mother who said patient is doing ok. She is doing his dressing changes and states he has had no s/s of infection. She said that his PCP placed him on 2 more ATB but did not know the name of them and has to pick them up today. She said he has had some urinary incontinence for the past month as well. He has visiting physician that sees him. He has had no viral type symptoms. Needs to be reviewed by the provider   Additional needs identified to be addressed with provider No  none  Discussed COVID-19 related testing which was available at this time. Test results were negative. Patient informed of results, if available? Yes         Method of communication with provider : none    Care Transition Nurse (CTN) contacted the family by telephone to follow up after admission on 10/28. Verified name and  with parent as identifiers. Addressed changes since last contact: medication management-2 new ATB added  Discharged needs reviewed: none  Follow up appointment completed? Yes        CTN reviewed discharge instructions, medical action plan and red flags with parent and discussed any barriers to care and/or understanding of plan of care after discharge. Discussed appropriate site of care based on symptoms and resources available to patient including: PCP and Specialist. The parent agrees to contact the PCP office for questions related to their healthcare. Patients top risk factors for readmission: medical condition  Interventions to address risk factors: Obtained and reviewed discharge summary and/or continuity of care documents    Discussed follow-up appointments.    final calll based on severity of symptoms and risk factors. Plan for next call: final call  CTN provided contact information for future needs. Care Transitions Subsequent and Final Call    Schedule Follow Up Appointment with PCP:  Completed  Subsequent and Final Calls  Do you have any ongoing symptoms?:  Yes  Onset of Patient-reported symptoms: In the past 7 days  Patient-reported symptoms:  Other  Have your medications changed?:  Yes  Patient Reports:  2 new ATB, unsure of name, has not picked up yet  Do you have any questions related to your medications?:  No  Do you currently have any active services?:  Yes  Are you currently active with any services?:  Home Health  Do you have any needs or concerns that I can assist you with?:  No  Identified Barriers:  Lack of Education  Care Transitions Interventions  Other Interventions: Follow Up  No future appointments.     Gurvinder Dowell RN

## 2021-03-27 ENCOUNTER — HOSPITAL ENCOUNTER (EMERGENCY)
Age: 41
Discharge: HOME | End: 2021-03-27
Attending: EMERGENCY MEDICINE
Payer: COMMERCIAL

## 2021-03-27 VITALS
WEIGHT: 228 LBS | TEMPERATURE: 98 F | HEIGHT: 72 IN | HEART RATE: 80 BPM | OXYGEN SATURATION: 98 % | SYSTOLIC BLOOD PRESSURE: 127 MMHG | BODY MASS INDEX: 30.88 KG/M2 | RESPIRATION RATE: 18 BRPM | DIASTOLIC BLOOD PRESSURE: 88 MMHG

## 2021-03-27 DIAGNOSIS — R44.3 HALLUCINATIONS: Primary | ICD-10-CM

## 2021-03-27 DIAGNOSIS — L73.2 HIDRADENITIS SUPPURATIVA: ICD-10-CM

## 2021-03-27 LAB
ABSOLUTE EOS #: 0.1 K/UL (ref 0–0.4)
ABSOLUTE IMMATURE GRANULOCYTE: ABNORMAL K/UL (ref 0–0.3)
ABSOLUTE LYMPH #: 1.7 K/UL (ref 1–4.8)
ABSOLUTE MONO #: 1 K/UL (ref 0.1–1.3)
ANION GAP SERPL CALCULATED.3IONS-SCNC: 8 MMOL/L (ref 9–17)
BASOPHILS # BLD: 1 % (ref 0–2)
BASOPHILS ABSOLUTE: 0.1 K/UL (ref 0–0.2)
BUN BLDV-MCNC: 10 MG/DL (ref 6–20)
BUN/CREAT BLD: ABNORMAL (ref 9–20)
CALCIUM SERPL-MCNC: 9.4 MG/DL (ref 8.6–10.4)
CHLORIDE BLD-SCNC: 102 MMOL/L (ref 98–107)
CO2: 26 MMOL/L (ref 20–31)
CREAT SERPL-MCNC: 0.73 MG/DL (ref 0.7–1.2)
DIFFERENTIAL TYPE: ABNORMAL
EOSINOPHILS RELATIVE PERCENT: 1 % (ref 0–4)
GFR AFRICAN AMERICAN: >60 ML/MIN
GFR NON-AFRICAN AMERICAN: >60 ML/MIN
GFR SERPL CREATININE-BSD FRML MDRD: ABNORMAL ML/MIN/{1.73_M2}
GFR SERPL CREATININE-BSD FRML MDRD: ABNORMAL ML/MIN/{1.73_M2}
GLUCOSE BLD-MCNC: 101 MG/DL (ref 70–99)
HCT VFR BLD CALC: 34 % (ref 41–53)
HEMOGLOBIN: 11 G/DL (ref 13.5–17.5)
IMMATURE GRANULOCYTES: ABNORMAL %
LYMPHOCYTES # BLD: 14 % (ref 24–44)
MCH RBC QN AUTO: 27.5 PG (ref 26–34)
MCHC RBC AUTO-ENTMCNC: 32.3 G/DL (ref 31–37)
MCV RBC AUTO: 85 FL (ref 80–100)
MONOCYTES # BLD: 8 % (ref 1–7)
NRBC AUTOMATED: ABNORMAL PER 100 WBC
PDW BLD-RTO: 16.9 % (ref 11.5–14.9)
PLATELET # BLD: 444 K/UL (ref 150–450)
PLATELET ESTIMATE: ABNORMAL
PMV BLD AUTO: 6.3 FL (ref 6–12)
POTASSIUM SERPL-SCNC: 4.1 MMOL/L (ref 3.7–5.3)
RBC # BLD: 4 M/UL (ref 4.5–5.9)
RBC # BLD: ABNORMAL 10*6/UL
SEG NEUTROPHILS: 76 % (ref 36–66)
SEGMENTED NEUTROPHILS ABSOLUTE COUNT: 9.2 K/UL (ref 1.3–9.1)
SODIUM BLD-SCNC: 136 MMOL/L (ref 135–144)
WBC # BLD: 12 K/UL (ref 3.5–11)
WBC # BLD: ABNORMAL 10*3/UL

## 2021-03-27 PROCEDURE — 36415 COLL VENOUS BLD VENIPUNCTURE: CPT

## 2021-03-27 PROCEDURE — 80048 BASIC METABOLIC PNL TOTAL CA: CPT

## 2021-03-27 PROCEDURE — 85025 COMPLETE CBC W/AUTO DIFF WBC: CPT

## 2021-03-27 PROCEDURE — 6370000000 HC RX 637 (ALT 250 FOR IP): Performed by: EMERGENCY MEDICINE

## 2021-03-27 PROCEDURE — 99285 EMERGENCY DEPT VISIT HI MDM: CPT

## 2021-03-27 RX ORDER — SULFAMETHOXAZOLE AND TRIMETHOPRIM 800; 160 MG/1; MG/1
1 TABLET ORAL ONCE
Status: COMPLETED | OUTPATIENT
Start: 2021-03-27 | End: 2021-03-27

## 2021-03-27 RX ORDER — SULFAMETHOXAZOLE AND TRIMETHOPRIM 800; 160 MG/1; MG/1
1 TABLET ORAL 2 TIMES DAILY
Qty: 14 TABLET | Refills: 0 | Status: SHIPPED | OUTPATIENT
Start: 2021-03-27 | End: 2021-04-03

## 2021-03-27 RX ORDER — CEPHALEXIN 500 MG/1
500 CAPSULE ORAL 4 TIMES DAILY
Qty: 28 CAPSULE | Refills: 0 | Status: SHIPPED | OUTPATIENT
Start: 2021-03-27 | End: 2021-04-03

## 2021-03-27 RX ORDER — CEPHALEXIN 250 MG/1
500 CAPSULE ORAL ONCE
Status: COMPLETED | OUTPATIENT
Start: 2021-03-27 | End: 2021-03-27

## 2021-03-27 RX ADMIN — SULFAMETHOXAZOLE AND TRIMETHOPRIM 1 TABLET: 800; 160 TABLET ORAL at 13:48

## 2021-03-27 RX ADMIN — CEPHALEXIN 500 MG: 250 CAPSULE ORAL at 13:48

## 2021-03-27 ASSESSMENT — ENCOUNTER SYMPTOMS
COLOR CHANGE: 0
BACK PAIN: 0
VOMITING: 0
BLOOD IN STOOL: 0
DIARRHEA: 0
COUGH: 0
TROUBLE SWALLOWING: 0
NAUSEA: 0
ABDOMINAL PAIN: 0
SHORTNESS OF BREATH: 0
SORE THROAT: 0
CONSTIPATION: 0
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
## 2021-03-27 NOTE — ED PROVIDER NOTES
[address]  EMERGENCY DEPARTMENT ENCOUNTER    Pt Name: Kalyan Hassan  MRN: [id number]  YOB: 1980  Date of evaluation:3/27/21  PCP: Belén Milan MD    [address] [place] [address]       Chief Complaint   Patient presents with   [facility] Wound Check    Psychiatric Evaluation       HISTORY OF PRESENT ILLNESS    Kalyan Hassan is a 35 y.o. male who presents for an evaluation. Patient was apparently just released from [facility]. He tells me that he was sent in because he \"needs a shot. \"  He is not sure what kind of shot he needs. He has no real complaints right now. No thoughts of suicide or homicide. He does acknowledge a wound on his lower abdomen but states this has been there chronically. No abdominal pain. He has been eating and drinking as normally. Having normal bowel movements. No fevers or chills. Symptoms are chronic. Symptoms are mild. Nothing makes symptoms better or worse. Patient has no other complaints at this time. REVIEW OF SYSTEMS       Review of Systems   Constitutional: Negative for chills, fatigue and fever. HENT: Negative for congestion, ear pain, sore throat and trouble swallowing. Eyes: Negative for visual disturbance. Respiratory: Negative for cough and shortness of breath. Cardiovascular: Negative for chest pain, palpitations and leg swelling. Gastrointestinal: Negative for abdominal pain, blood in stool, constipation, diarrhea, nausea and vomiting. Genitourinary: Negative for dysuria and flank pain. Musculoskeletal: Negative for arthralgias, back pain, myalgias and neck pain. Skin: Positive for wound. Negative for color change and rash. Neurological: Negative for dizziness, weakness, light-headedness, numbness and headaches. Psychiatric/Behavioral: Negative for confusion. All other systems reviewed and are negative. Negative in 10 essential Systems except as mentioned above and in the HPI.         PAST MEDICAL HISTORY     Past Medical History: Diagnosis Date    Bipolar 1 disorder ([facility]     GERD (gastroesophageal reflux disease) 8/13/2016    Hidradenitis suppurativa     Polysubstance abuse ([facility]     Schizoaffective disorder ([facility]          SURGICAL HISTORY      has a past surgical history that includes Infected skin debridement (july 2011) and Abscess Drainage (11/1/2013). CURRENT MEDICATIONS       Previous Medications    ACETAMINOPHEN (TYLENOL) 325 MG TABLET    Take 650 mg by mouth every 6 hours as needed for Pain    BENZTROPINE (COGENTIN) 1 MG TABLET    Take 1 tablet by mouth 2 times daily    CITALOPRAM (CELEXA) 20 MG TABLET    Take 20 mg by mouth daily    CLINDAMYCIN (CLINDAGEL) 1 % GEL    Apply topically 2 times daily Apply topically 2 times daily. DIPHENHYDRAMINE (BENADRYL) 25 MG CAPSULE    Take 25 mg by mouth nightly as needed for Sleep    DOCUSATE SODIUM (COLACE, DULCOLAX) 100 MG CAPS    Take 100 mg by mouth 2 times daily    FERROUS SULFATE 325 (65 FE) MG TABLET    Take 1 tablet by mouth 2 times daily (with meals)    LITHIUM (ESKALITH) 450 MG EXTENDED RELEASE TABLET    Take 450 mg by mouth 2 times daily    MULTIPLE VITAMINS-MINERALS (THERAPEUTIC MULTIVITAMIN-MINERALS) TABLET    Take 1 tablet by mouth daily. QUETIAPINE (SEROQUEL XR) 300 MG EXTENDED RELEASE TABLET    Take 300 mg by mouth daily    QUETIAPINE (SEROQUEL) 200 MG TABLET    Take 200 mg by mouth 2 times daily Bid -  with 300 mg at bedtime to equal 500 mg at hs    ZOLPIDEM TARTRATE PO    Take 5 mg by mouth nightly as needed (sleep)        ALLERGIES     is allergic to depakene [valproic acid]; restoril [temazepam]; risperidone and related; and thorazine [chlorpromazine]. FAMILY HISTORY     He indicated that the status of his mother is unknown. He indicated that the status of his father is unknown. He indicated that the status of his brother is unknown. He indicated that the status of his maternal grandfather is unknown.  He indicated that the status of his paternal aunt is unknown. He indicated that the status of his paternal uncle is unknown.     family history includes Alcohol Abuse in his father; Cancer in his father and mother; High Blood Pressure in his maternal grandfather; Mental Illness in his paternal aunt; Substance Abuse in his brother and paternal uncle. SOCIAL HISTORY      reports that he has been smoking cigarettes. He has a 22.00 pack-year smoking history. He has never used smokeless tobacco. He reports current alcohol use. He reports current drug use. PHYSICAL EXAM     INITIAL VITALS:  height is 6' (1.829 m) and weight is 228 lb (103.4 kg). His oral temperature is 98.6 °F (37 °C). His blood pressure is 116/69 and his pulse is 87. His respiration is 18 and oxygen saturation is 97%. Physical Exam  Vitals signs and nursing note reviewed. Constitutional:       General: He is not in acute distress. HENT:      Head: Normocephalic and atraumatic. Eyes:      Conjunctiva/sclera: Conjunctivae normal.      Pupils: Pupils are equal, round, and reactive to light. Neck:      Musculoskeletal: Neck supple. Cardiovascular:      Rate and Rhythm: Normal rate and regular rhythm. Heart sounds: Normal heart sounds. No murmur. Pulmonary:      Effort: Pulmonary effort is normal. No respiratory distress. Breath sounds: Normal breath sounds. Abdominal:      General: Bowel sounds are normal. There is no distension. Palpations: Abdomen is soft. Tenderness: There is no abdominal tenderness. Musculoskeletal:         General: No tenderness. Lymphadenopathy:      Cervical: No cervical adenopathy. Skin:     General: Skin is warm and dry. Findings: Lesion (Multiple open wounds under pannus, foul-smelling and purulent drainage) present. No rash. Neurological:      Mental Status: He is alert and oriented to person, place, and time.    Psychiatric:         Judgment: Judgment normal.           DIFFERENTIAL DIAGNOSIS/MDM:   61-year-old male presents for an evaluation after being [facility] from [facility] care. He tells me he needs a shot. Currently is afebrile, nontoxic, normal vital signs. No acute distress. Has no thoughts of suicide or homicide. He is alert and orient x4. Does not seem to be hallucinating. We will have  contact his guardian. As far as the wound on his abdomen it appears chronic. Has a long history of hidradenitis. He was admitted for the same thing back in October. Based on chart review wounds appear to be chronic. There is some purulent drainage right now but there is no significant erythema, no crepitance, really no tenderness on exam.  He is unsure how long its been draining like this. He is not on antibiotics at this time. Does not appear to be septic. He appears very comfortable. At minimum I do want to start him on oral antibiotics for this. He most likely does need follow-up with general surgery as an outpatient. DIAGNOSTIC RESULTS     EKG: All EKG's are interpreted by the Emergency Department Physician who either signs or Co-signs this chart in the absence of a cardiologist.        RADIOLOGY:   I directly visualized the following  images and reviewed the radiologist interpretations:  No orders to display           ED BEDSIDE ULTRASOUND:      LABS:  Labs Reviewed - No data to display      EMERGENCY DEPARTMENT COURSE:   Vitals:    Vitals:    03/27/21 1305   BP: 116/69   Pulse: 87   Resp: 18   Temp: 98.6 °F (37 °C)   TempSrc: Oral   SpO2: 97%   Weight: 228 lb (103.4 kg)   Height: 6' (1.829 m)     1:44 PM EDT   spoke with the patient's guardian and also evaluated patient. There are concerns by the guardian that the patient has been responding to internal stimuli, has been hallucinating but this has been going on for at least the past 8 months. Does not seem to be a new issue today. After discussion we do not think the patient needs admission to the [facility] today.   I am going to start him on oral antibiotics for his hidradenitis. Strongly advised close follow-up with PCP as well as general surgery as an outpatient. Recommended that he make a follow-up appointment with [facility] surgery clinic. Patient does have psychiatric follow-up next week. Advised patient to return if any symptoms worsen. He is agreeable with plan will be discharged home today. 2:35 PM EDT  Updated by  that patient's mother is requesting that he have basic labs performed as he has been refusing to go and get this done. We will send his basic labs so they have them available when he follows up. CRITICALCARE:    CONSULTS:  None      PROCEDURES:      FINAL IMPRESSION      1. Hallucinations    2.  Hidradenitis suppurativa            DISPOSITION/PLAN   DISPOSITION Decision To Discharge 03/27/2021 02:01:50 PM          PATIENT REFERRED TO:  Corrinne Lease, MD  [place] [phone]    Schedule an appointment as soon as possible for a visit       [address] [address] [phone]  Schedule an appointment as soon as possible for a visit       [facility]  [place] [id number] [phone]    As needed, If symptoms worsen      DISCHARGE MEDICATIONS:  New Prescriptions    CEPHALEXIN (KEFLEX) 500 MG CAPSULE    Take 1 capsule by mouth 4 times daily for 7 days    SULFAMETHOXAZOLE-TRIMETHOPRIM (BACTRIM DS) 800-160 MG PER TABLET    Take 1 tablet by mouth 2 times daily for 7 days       (Please note that portions ofthis note were completed with a voice recognition program.  Efforts were made to edit the dictations but occasionally words are mis-transcribed.)    Noemí Khanna DO  Attending Emergency Physician          Noemí Khanna, DO  03/27/21 [address], DO  03/27/21 [address], DO  03/27/21 [id number]

## 2021-11-03 ENCOUNTER — HOSPITAL ENCOUNTER (OUTPATIENT)
Age: 41
Setting detail: SPECIMEN
Discharge: HOME | End: 2021-11-03
Payer: MEDICAID

## 2021-11-03 LAB
ABSOLUTE EOS #: 0.2 K/UL (ref 0–0.4)
ABSOLUTE IMMATURE GRANULOCYTE: 0 K/UL (ref 0–0.3)
ABSOLUTE LYMPH #: 3.04 K/UL (ref 1–4.8)
ABSOLUTE MONO #: 0.78 K/UL (ref 0.2–0.8)
ALBUMIN SERPL-MCNC: 3.7 G/DL (ref 3.5–5.2)
ALBUMIN/GLOBULIN RATIO: ABNORMAL (ref 1–2.5)
ALP BLD-CCNC: 111 U/L (ref 40–129)
ALT SERPL-CCNC: 23 U/L (ref 5–41)
ANION GAP SERPL CALCULATED.3IONS-SCNC: 12 MMOL/L (ref 9–17)
AST SERPL-CCNC: 15 U/L
BASOPHILS # BLD: 1 %
BASOPHILS ABSOLUTE: 0.1 K/UL (ref 0–0.2)
BILIRUB SERPL-MCNC: 0.2 MG/DL (ref 0.3–1.2)
BUN BLDV-MCNC: 11 MG/DL (ref 6–20)
BUN/CREAT BLD: 14 (ref 9–20)
CALCIUM SERPL-MCNC: 9.2 MG/DL (ref 8.6–10.4)
CHLORIDE BLD-SCNC: 103 MMOL/L (ref 98–107)
CO2: 23 MMOL/L (ref 20–31)
CREAT SERPL-MCNC: 0.78 MG/DL (ref 0.7–1.2)
DIFFERENTIAL TYPE: ABNORMAL
EOSINOPHILS RELATIVE PERCENT: 2 % (ref 1–4)
GFR AFRICAN AMERICAN: >60 ML/MIN
GFR NON-AFRICAN AMERICAN: >60 ML/MIN
GFR SERPL CREATININE-BSD FRML MDRD: ABNORMAL ML/MIN/{1.73_M2}
GFR SERPL CREATININE-BSD FRML MDRD: ABNORMAL ML/MIN/{1.73_M2}
GLUCOSE BLD-MCNC: 93 MG/DL (ref 70–99)
HCT VFR BLD CALC: 42.7 % (ref 40.7–50.3)
HEMOGLOBIN: 13.4 G/DL (ref 13–17)
IMMATURE GRANULOCYTES: 0 %
LYMPHOCYTES # BLD: 31 % (ref 24–44)
MCH RBC QN AUTO: 29.1 PG (ref 25.2–33.5)
MCHC RBC AUTO-ENTMCNC: 31.4 G/DL (ref 28.4–34.8)
MCV RBC AUTO: 92.8 FL (ref 82.6–102.9)
MONOCYTES # BLD: 8 % (ref 1–7)
NRBC AUTOMATED: 0 PER 100 WBC
PDW BLD-RTO: 14.4 % (ref 11.8–14.4)
PLATELET # BLD: 313 K/UL (ref 138–453)
PLATELET ESTIMATE: ABNORMAL
PMV BLD AUTO: 8.8 FL (ref 8.1–13.5)
POTASSIUM SERPL-SCNC: 4.3 MMOL/L (ref 3.7–5.3)
RBC # BLD: 4.6 M/UL (ref 4.21–5.77)
RBC # BLD: ABNORMAL 10*6/UL
SEG NEUTROPHILS: 58 % (ref 36–66)
SEGMENTED NEUTROPHILS ABSOLUTE COUNT: 5.68 K/UL (ref 1.8–7.7)
SODIUM BLD-SCNC: 138 MMOL/L (ref 135–144)
TOTAL PROTEIN: 7.9 G/DL (ref 6.4–8.3)
WBC # BLD: 9.8 K/UL (ref 3.5–11.3)
WBC # BLD: ABNORMAL 10*3/UL

## 2021-11-03 PROCEDURE — 85025 COMPLETE CBC W/AUTO DIFF WBC: CPT

## 2021-11-03 PROCEDURE — 80053 COMPREHEN METABOLIC PANEL: CPT

## 2022-01-06 ENCOUNTER — HOSPITAL ENCOUNTER (OUTPATIENT)
Age: 42
Setting detail: SPECIMEN
Discharge: HOME | End: 2022-01-06
Payer: MEDICAID

## 2022-01-06 LAB
-: NORMAL
AMORPHOUS: NORMAL
BACTERIA: NORMAL
BILIRUBIN URINE: NEGATIVE
CASTS UA: NORMAL /LPF (ref 0–8)
COLOR: YELLOW
COMMENT UA: ABNORMAL
CRYSTALS, UA: NORMAL /HPF
EPITHELIAL CELLS UA: NORMAL /HPF (ref 0–5)
GLUCOSE URINE: NEGATIVE
KETONES, URINE: NEGATIVE
LEUKOCYTE ESTERASE, URINE: ABNORMAL
MUCUS: NORMAL
NITRITE, URINE: NEGATIVE
OTHER OBSERVATIONS UA: NORMAL
PH UA: 6.5 (ref 5–8)
PROTEIN UA: NEGATIVE
RBC UA: NORMAL /HPF (ref 0–4)
RENAL EPITHELIAL, UA: NORMAL /HPF
SPECIFIC GRAVITY UA: 1.02 (ref 1–1.03)
TRICHOMONAS: NORMAL
TURBIDITY: CLEAR
URINE HGB: NEGATIVE
UROBILINOGEN, URINE: NORMAL
WBC UA: NORMAL /HPF (ref 0–5)
YEAST: NORMAL

## 2022-01-06 PROCEDURE — 81001 URINALYSIS AUTO W/SCOPE: CPT

## 2022-08-10 ENCOUNTER — APPOINTMENT (OUTPATIENT)
Dept: CT IMAGING | Age: 42
DRG: 872 | End: 2022-08-10
Payer: COMMERCIAL

## 2022-08-10 ENCOUNTER — HOSPITAL ENCOUNTER (INPATIENT)
Age: 42
LOS: 1 days | Discharge: HOME | DRG: 872 | End: 2022-08-12
Attending: EMERGENCY MEDICINE | Admitting: STUDENT IN AN ORGANIZED HEALTH CARE EDUCATION/TRAINING PROGRAM
Payer: COMMERCIAL

## 2022-08-10 DIAGNOSIS — L02.31 ABSCESS AND CELLULITIS OF GLUTEAL REGION: Primary | ICD-10-CM

## 2022-08-10 DIAGNOSIS — L03.317 ABSCESS AND CELLULITIS OF GLUTEAL REGION: Primary | ICD-10-CM

## 2022-08-10 LAB
ABSOLUTE EOS #: 0.23 K/UL (ref 0–0.44)
ABSOLUTE IMMATURE GRANULOCYTE: 0.05 K/UL (ref 0–0.3)
ABSOLUTE LYMPH #: 3.66 K/UL (ref 1.1–3.7)
ABSOLUTE MONO #: 1.23 K/UL (ref 0.1–1.2)
ALBUMIN SERPL-MCNC: 3.2 G/DL (ref 3.5–5.2)
ALBUMIN/GLOBULIN RATIO: 0.7 (ref 1–2.5)
ALP BLD-CCNC: 108 U/L (ref 40–129)
ALT SERPL-CCNC: 38 U/L (ref 5–41)
ANION GAP SERPL CALCULATED.3IONS-SCNC: 8 MMOL/L (ref 9–17)
AST SERPL-CCNC: 18 U/L
BASOPHILS # BLD: 0 % (ref 0–2)
BASOPHILS ABSOLUTE: 0.04 K/UL (ref 0–0.2)
BILIRUB SERPL-MCNC: <0.1 MG/DL (ref 0.3–1.2)
BILIRUBIN URINE: NEGATIVE
BUN BLDV-MCNC: 12 MG/DL (ref 6–20)
C-REACTIVE PROTEIN: 35.5 MG/L (ref 0–5)
CALCIUM SERPL-MCNC: 9 MG/DL (ref 8.6–10.4)
CHLORIDE BLD-SCNC: 104 MMOL/L (ref 98–107)
CO2: 27 MMOL/L (ref 20–31)
COLOR: YELLOW
CREAT SERPL-MCNC: 1.09 MG/DL (ref 0.7–1.2)
EOSINOPHILS RELATIVE PERCENT: 2 % (ref 1–4)
EPITHELIAL CELLS UA: ABNORMAL /HPF (ref 0–5)
GFR AFRICAN AMERICAN: >60 ML/MIN
GFR NON-AFRICAN AMERICAN: >60 ML/MIN
GFR SERPL CREATININE-BSD FRML MDRD: ABNORMAL ML/MIN/{1.73_M2}
GLUCOSE BLD-MCNC: 93 MG/DL (ref 70–99)
GLUCOSE URINE: NEGATIVE
HCT VFR BLD CALC: 39.3 % (ref 40.7–50.3)
HEMOGLOBIN: 12.3 G/DL (ref 13–17)
IMMATURE GRANULOCYTES: 0 %
INR BLD: 0.9
KETONES, URINE: NEGATIVE
LACTIC ACID, SEPSIS WHOLE BLOOD: 1 MMOL/L (ref 0.5–1.9)
LEUKOCYTE ESTERASE, URINE: NEGATIVE
LYMPHOCYTES # BLD: 31 % (ref 24–43)
MCH RBC QN AUTO: 28.5 PG (ref 25.2–33.5)
MCHC RBC AUTO-ENTMCNC: 31.3 G/DL (ref 28.4–34.8)
MCV RBC AUTO: 91 FL (ref 82.6–102.9)
MONOCYTES # BLD: 10 % (ref 3–12)
NITRITE, URINE: NEGATIVE
NRBC AUTOMATED: 0 PER 100 WBC
PARTIAL THROMBOPLASTIN TIME: 29.6 SEC (ref 20.5–30.5)
PDW BLD-RTO: 15.2 % (ref 11.8–14.4)
PH UA: 6 (ref 5–8)
PLATELET # BLD: 386 K/UL (ref 138–453)
PMV BLD AUTO: 8.3 FL (ref 8.1–13.5)
POTASSIUM SERPL-SCNC: 4 MMOL/L (ref 3.7–5.3)
PROCALCITONIN: 0.04 NG/ML
PROTEIN UA: NEGATIVE
PROTHROMBIN TIME: 10.2 SEC (ref 9.1–12.3)
RBC # BLD: 4.32 M/UL (ref 4.21–5.77)
RBC # BLD: ABNORMAL 10*6/UL
RBC UA: ABNORMAL /HPF (ref 0–4)
SEG NEUTROPHILS: 57 % (ref 36–65)
SEGMENTED NEUTROPHILS ABSOLUTE COUNT: 6.71 K/UL (ref 1.5–8.1)
SODIUM BLD-SCNC: 139 MMOL/L (ref 135–144)
SPECIFIC GRAVITY UA: 1.04 (ref 1–1.03)
TOTAL PROTEIN: 8 G/DL (ref 6.4–8.3)
TROPONIN, HIGH SENSITIVITY: <6 NG/L (ref 0–22)
TURBIDITY: CLEAR
URINE HGB: ABNORMAL
UROBILINOGEN, URINE: NORMAL
WBC # BLD: 11.9 K/UL (ref 3.5–11.3)
WBC UA: ABNORMAL /HPF (ref 0–5)

## 2022-08-10 PROCEDURE — 81001 URINALYSIS AUTO W/SCOPE: CPT

## 2022-08-10 PROCEDURE — 80053 COMPREHEN METABOLIC PANEL: CPT

## 2022-08-10 PROCEDURE — 2500000003 HC RX 250 WO HCPCS: Performed by: STUDENT IN AN ORGANIZED HEALTH CARE EDUCATION/TRAINING PROGRAM

## 2022-08-10 PROCEDURE — 87086 URINE CULTURE/COLONY COUNT: CPT

## 2022-08-10 PROCEDURE — 2580000003 HC RX 258: Performed by: STUDENT IN AN ORGANIZED HEALTH CARE EDUCATION/TRAINING PROGRAM

## 2022-08-10 PROCEDURE — 74177 CT ABD & PELVIS W/CONTRAST: CPT

## 2022-08-10 PROCEDURE — 83605 ASSAY OF LACTIC ACID: CPT

## 2022-08-10 PROCEDURE — 36415 COLL VENOUS BLD VENIPUNCTURE: CPT

## 2022-08-10 PROCEDURE — 96367 TX/PROPH/DG ADDL SEQ IV INF: CPT

## 2022-08-10 PROCEDURE — 6360000002 HC RX W HCPCS: Performed by: STUDENT IN AN ORGANIZED HEALTH CARE EDUCATION/TRAINING PROGRAM

## 2022-08-10 PROCEDURE — 6360000004 HC RX CONTRAST MEDICATION: Performed by: STUDENT IN AN ORGANIZED HEALTH CARE EDUCATION/TRAINING PROGRAM

## 2022-08-10 PROCEDURE — 85730 THROMBOPLASTIN TIME PARTIAL: CPT

## 2022-08-10 PROCEDURE — 85025 COMPLETE CBC W/AUTO DIFF WBC: CPT

## 2022-08-10 PROCEDURE — 84145 PROCALCITONIN (PCT): CPT

## 2022-08-10 PROCEDURE — 99285 EMERGENCY DEPT VISIT HI MDM: CPT

## 2022-08-10 PROCEDURE — 96365 THER/PROPH/DIAG IV INF INIT: CPT

## 2022-08-10 PROCEDURE — 87040 BLOOD CULTURE FOR BACTERIA: CPT

## 2022-08-10 PROCEDURE — 85610 PROTHROMBIN TIME: CPT

## 2022-08-10 PROCEDURE — 96375 TX/PRO/DX INJ NEW DRUG ADDON: CPT

## 2022-08-10 PROCEDURE — 86140 C-REACTIVE PROTEIN: CPT

## 2022-08-10 PROCEDURE — 84484 ASSAY OF TROPONIN QUANT: CPT

## 2022-08-10 RX ORDER — CLINDAMYCIN PHOSPHATE 600 MG/50ML
600 INJECTION INTRAVENOUS ONCE
Status: COMPLETED | OUTPATIENT
Start: 2022-08-10 | End: 2022-08-11

## 2022-08-10 RX ORDER — METOCLOPRAMIDE HYDROCHLORIDE 5 MG/ML
10 INJECTION INTRAMUSCULAR; INTRAVENOUS ONCE
Status: COMPLETED | OUTPATIENT
Start: 2022-08-10 | End: 2022-08-10

## 2022-08-10 RX ORDER — VANCOMYCIN HYDROCHLORIDE 1 G/200ML
1000 INJECTION, SOLUTION INTRAVENOUS EVERY 12 HOURS
Status: DISCONTINUED | OUTPATIENT
Start: 2022-08-10 | End: 2022-08-12

## 2022-08-10 RX ORDER — 0.9 % SODIUM CHLORIDE 0.9 %
1000 INTRAVENOUS SOLUTION INTRAVENOUS ONCE
Status: COMPLETED | OUTPATIENT
Start: 2022-08-10 | End: 2022-08-10

## 2022-08-10 RX ORDER — FAMOTIDINE 10 MG/ML
20 INJECTION, SOLUTION INTRAVENOUS ONCE
Status: COMPLETED | OUTPATIENT
Start: 2022-08-10 | End: 2022-08-10

## 2022-08-10 RX ADMIN — METOCLOPRAMIDE 10 MG: 5 INJECTION, SOLUTION INTRAMUSCULAR; INTRAVENOUS at 21:06

## 2022-08-10 RX ADMIN — IOPAMIDOL 75 ML: 755 INJECTION, SOLUTION INTRAVENOUS at 21:33

## 2022-08-10 RX ADMIN — PIPERACILLIN AND TAZOBACTAM 4500 MG: 4; .5 INJECTION, POWDER, FOR SOLUTION INTRAVENOUS at 23:10

## 2022-08-10 RX ADMIN — FAMOTIDINE 20 MG: 10 INJECTION, SOLUTION INTRAVENOUS at 21:13

## 2022-08-10 RX ADMIN — SODIUM CHLORIDE 1000 ML: 9 INJECTION, SOLUTION INTRAVENOUS at 21:07

## 2022-08-10 ASSESSMENT — PAIN - FUNCTIONAL ASSESSMENT: PAIN_FUNCTIONAL_ASSESSMENT: NONE - DENIES PAIN

## 2022-08-10 ASSESSMENT — ENCOUNTER SYMPTOMS: ABDOMINAL PAIN: 1

## 2022-08-11 PROBLEM — A41.9 SEPSIS (HCC): Status: ACTIVE | Noted: 2022-08-11

## 2022-08-11 PROBLEM — K59.00 CONSTIPATION: Status: ACTIVE | Noted: 2022-08-11

## 2022-08-11 LAB
CULTURE: NORMAL
SPECIMEN DESCRIPTION: NORMAL

## 2022-08-11 PROCEDURE — 6360000002 HC RX W HCPCS: Performed by: NURSE PRACTITIONER

## 2022-08-11 PROCEDURE — 2580000003 HC RX 258: Performed by: NURSE PRACTITIONER

## 2022-08-11 PROCEDURE — 99223 1ST HOSP IP/OBS HIGH 75: CPT | Performed by: INTERNAL MEDICINE

## 2022-08-11 PROCEDURE — 6370000000 HC RX 637 (ALT 250 FOR IP)

## 2022-08-11 PROCEDURE — 83605 ASSAY OF LACTIC ACID: CPT

## 2022-08-11 PROCEDURE — 36415 COLL VENOUS BLD VENIPUNCTURE: CPT

## 2022-08-11 PROCEDURE — 2060000000 HC ICU INTERMEDIATE R&B

## 2022-08-11 PROCEDURE — 99222 1ST HOSP IP/OBS MODERATE 55: CPT | Performed by: INTERNAL MEDICINE

## 2022-08-11 PROCEDURE — 6360000002 HC RX W HCPCS: Performed by: STUDENT IN AN ORGANIZED HEALTH CARE EDUCATION/TRAINING PROGRAM

## 2022-08-11 PROCEDURE — 2700000000 HC OXYGEN THERAPY PER DAY

## 2022-08-11 PROCEDURE — 94761 N-INVAS EAR/PLS OXIMETRY MLT: CPT

## 2022-08-11 PROCEDURE — 6370000000 HC RX 637 (ALT 250 FOR IP): Performed by: NURSE PRACTITIONER

## 2022-08-11 PROCEDURE — 2500000003 HC RX 250 WO HCPCS

## 2022-08-11 RX ORDER — LANOLIN ALCOHOL/MO/W.PET/CERES
325 CREAM (GRAM) TOPICAL 2 TIMES DAILY WITH MEALS
Status: DISCONTINUED | OUTPATIENT
Start: 2022-08-11 | End: 2022-08-12 | Stop reason: HOSPADM

## 2022-08-11 RX ORDER — FLUPHENAZINE DECANOATE 25 MG/ML
37.5 INJECTION, SOLUTION INTRAMUSCULAR; SUBCUTANEOUS ONCE
Status: COMPLETED | OUTPATIENT
Start: 2022-08-11 | End: 2022-08-12

## 2022-08-11 RX ORDER — POLYETHYLENE GLYCOL 3350 17 G/17G
17 POWDER, FOR SOLUTION ORAL DAILY
Status: DISCONTINUED | OUTPATIENT
Start: 2022-08-11 | End: 2022-08-12 | Stop reason: HOSPADM

## 2022-08-11 RX ORDER — OLANZAPINE 10 MG/1
20 TABLET ORAL 2 TIMES DAILY
Status: ON HOLD | COMMUNITY
End: 2022-09-16 | Stop reason: HOSPADM

## 2022-08-11 RX ORDER — DOCUSATE SODIUM 100 MG/1
100 CAPSULE, LIQUID FILLED ORAL 2 TIMES DAILY
Status: DISCONTINUED | OUTPATIENT
Start: 2022-08-11 | End: 2022-08-12 | Stop reason: HOSPADM

## 2022-08-11 RX ORDER — 0.9 % SODIUM CHLORIDE 0.9 %
1100 INTRAVENOUS SOLUTION INTRAVENOUS ONCE
Status: COMPLETED | OUTPATIENT
Start: 2022-08-11 | End: 2022-08-11

## 2022-08-11 RX ORDER — FLUPHENAZINE DECANOATE 25 MG/ML
25 INJECTION, SOLUTION INTRAMUSCULAR; SUBCUTANEOUS WEEKLY
Status: ON HOLD | COMMUNITY
End: 2022-09-16 | Stop reason: HOSPADM

## 2022-08-11 RX ORDER — M-VIT,TX,IRON,MINS/CALC/FOLIC 27MG-0.4MG
1 TABLET ORAL DAILY
Status: DISCONTINUED | OUTPATIENT
Start: 2022-08-11 | End: 2022-08-12 | Stop reason: HOSPADM

## 2022-08-11 RX ORDER — HYDROXYZINE HYDROCHLORIDE 25 MG/1
25 TABLET, FILM COATED ORAL EVERY 8 HOURS PRN
Status: ON HOLD | COMMUNITY
End: 2022-08-12 | Stop reason: HOSPADM

## 2022-08-11 RX ORDER — SENNA AND DOCUSATE SODIUM 50; 8.6 MG/1; MG/1
2 TABLET, FILM COATED ORAL DAILY PRN
Status: DISCONTINUED | OUTPATIENT
Start: 2022-08-11 | End: 2022-08-12 | Stop reason: HOSPADM

## 2022-08-11 RX ORDER — POLYETHYLENE GLYCOL 3350 17 G/17G
17 POWDER, FOR SOLUTION ORAL DAILY
Status: DISCONTINUED | OUTPATIENT
Start: 2022-08-11 | End: 2022-08-11 | Stop reason: SDUPTHER

## 2022-08-11 RX ORDER — CITALOPRAM 20 MG/1
20 TABLET ORAL DAILY
Status: DISCONTINUED | OUTPATIENT
Start: 2022-08-11 | End: 2022-08-12 | Stop reason: HOSPADM

## 2022-08-11 RX ORDER — QUETIAPINE FUMARATE 200 MG/1
200 TABLET, FILM COATED ORAL 2 TIMES DAILY
Status: DISCONTINUED | OUTPATIENT
Start: 2022-08-11 | End: 2022-08-12 | Stop reason: HOSPADM

## 2022-08-11 RX ORDER — SODIUM CHLORIDE 0.9 % (FLUSH) 0.9 %
5-40 SYRINGE (ML) INJECTION EVERY 12 HOURS SCHEDULED
Status: DISCONTINUED | OUTPATIENT
Start: 2022-08-11 | End: 2022-08-12 | Stop reason: HOSPADM

## 2022-08-11 RX ORDER — ACETAMINOPHEN 650 MG/1
650 SUPPOSITORY RECTAL EVERY 6 HOURS PRN
Status: DISCONTINUED | OUTPATIENT
Start: 2022-08-11 | End: 2022-08-12 | Stop reason: HOSPADM

## 2022-08-11 RX ORDER — SODIUM CHLORIDE 9 MG/ML
INJECTION, SOLUTION INTRAVENOUS PRN
Status: DISCONTINUED | OUTPATIENT
Start: 2022-08-11 | End: 2022-08-12 | Stop reason: HOSPADM

## 2022-08-11 RX ORDER — ZOLPIDEM TARTRATE 5 MG/1
5 TABLET ORAL NIGHTLY PRN
Status: DISCONTINUED | OUTPATIENT
Start: 2022-08-11 | End: 2022-08-12 | Stop reason: HOSPADM

## 2022-08-11 RX ORDER — SODIUM CHLORIDE 0.9 % (FLUSH) 0.9 %
5-40 SYRINGE (ML) INJECTION PRN
Status: DISCONTINUED | OUTPATIENT
Start: 2022-08-11 | End: 2022-08-12 | Stop reason: HOSPADM

## 2022-08-11 RX ORDER — LITHIUM CARBONATE 450 MG
450 TABLET, EXTENDED RELEASE ORAL 2 TIMES DAILY
Status: DISCONTINUED | OUTPATIENT
Start: 2022-08-11 | End: 2022-08-12 | Stop reason: HOSPADM

## 2022-08-11 RX ORDER — ENOXAPARIN SODIUM 100 MG/ML
30 INJECTION SUBCUTANEOUS 2 TIMES DAILY
Status: DISCONTINUED | OUTPATIENT
Start: 2022-08-11 | End: 2022-08-12 | Stop reason: HOSPADM

## 2022-08-11 RX ORDER — CLINDAMYCIN PHOSPHATE 600 MG/50ML
INJECTION INTRAVENOUS
Status: COMPLETED
Start: 2022-08-11 | End: 2022-08-11

## 2022-08-11 RX ORDER — ACETAMINOPHEN 325 MG/1
650 TABLET ORAL EVERY 6 HOURS PRN
Status: DISCONTINUED | OUTPATIENT
Start: 2022-08-11 | End: 2022-08-12 | Stop reason: HOSPADM

## 2022-08-11 RX ORDER — QUETIAPINE 300 MG/1
300 TABLET, FILM COATED, EXTENDED RELEASE ORAL DAILY
Status: DISCONTINUED | OUTPATIENT
Start: 2022-08-11 | End: 2022-08-12 | Stop reason: HOSPADM

## 2022-08-11 RX ADMIN — VANCOMYCIN HYDROCHLORIDE 1000 MG: 1 INJECTION, SOLUTION INTRAVENOUS at 12:41

## 2022-08-11 RX ADMIN — CLINDAMYCIN PHOSPHATE 600 MG: 600 INJECTION INTRAVENOUS at 00:15

## 2022-08-11 RX ADMIN — PIPERACILLIN AND TAZOBACTAM 4500 MG: 4; .5 INJECTION, POWDER, FOR SOLUTION INTRAVENOUS at 05:31

## 2022-08-11 RX ADMIN — LITHIUM CARBONATE 450 MG: 450 TABLET, EXTENDED RELEASE ORAL at 22:17

## 2022-08-11 RX ADMIN — DOCUSATE SODIUM 100 MG: 100 CAPSULE ORAL at 12:50

## 2022-08-11 RX ADMIN — SODIUM CHLORIDE 1100 ML: 9 INJECTION, SOLUTION INTRAVENOUS at 05:08

## 2022-08-11 RX ADMIN — CITALOPRAM 20 MG: 20 TABLET, FILM COATED ORAL at 12:47

## 2022-08-11 RX ADMIN — VANCOMYCIN HYDROCHLORIDE 1000 MG: 1 INJECTION, SOLUTION INTRAVENOUS at 23:33

## 2022-08-11 RX ADMIN — DOCUSATE SODIUM 100 MG: 100 CAPSULE ORAL at 20:31

## 2022-08-11 RX ADMIN — FERROUS SULFATE TAB EC 325 MG (65 MG FE EQUIVALENT) 325 MG: 325 (65 FE) TABLET DELAYED RESPONSE at 12:41

## 2022-08-11 RX ADMIN — FERROUS SULFATE TAB EC 325 MG (65 MG FE EQUIVALENT) 325 MG: 325 (65 FE) TABLET DELAYED RESPONSE at 17:44

## 2022-08-11 RX ADMIN — ZOLPIDEM TARTRATE 5 MG: 5 TABLET ORAL at 23:32

## 2022-08-11 RX ADMIN — LITHIUM CARBONATE 450 MG: 450 TABLET, EXTENDED RELEASE ORAL at 12:43

## 2022-08-11 RX ADMIN — PIPERACILLIN AND TAZOBACTAM 4500 MG: 4; .5 INJECTION, POWDER, FOR SOLUTION INTRAVENOUS at 20:32

## 2022-08-11 RX ADMIN — CLINDAMYCIN PHOSPHATE 600 MG: 600 INJECTION, SOLUTION INTRAVENOUS at 00:15

## 2022-08-11 RX ADMIN — QUETIAPINE FUMARATE 300 MG: 300 TABLET, EXTENDED RELEASE ORAL at 12:49

## 2022-08-11 RX ADMIN — ENOXAPARIN SODIUM 30 MG: 100 INJECTION SUBCUTANEOUS at 20:26

## 2022-08-11 RX ADMIN — ENOXAPARIN SODIUM 30 MG: 100 INJECTION SUBCUTANEOUS at 12:47

## 2022-08-11 RX ADMIN — DOCUSATE SODIUM 50 MG AND SENNOSIDES 8.6 MG 2 TABLET: 8.6; 5 TABLET, FILM COATED ORAL at 12:42

## 2022-08-11 RX ADMIN — Medication 1 TABLET: at 12:41

## 2022-08-11 RX ADMIN — VANCOMYCIN HYDROCHLORIDE 1000 MG: 1 INJECTION, SOLUTION INTRAVENOUS at 01:20

## 2022-08-11 RX ADMIN — PIPERACILLIN AND TAZOBACTAM 4500 MG: 4; .5 INJECTION, POWDER, FOR SOLUTION INTRAVENOUS at 13:57

## 2022-08-11 RX ADMIN — QUETIAPINE FUMARATE 200 MG: 200 TABLET ORAL at 20:26

## 2022-08-11 RX ADMIN — QUETIAPINE FUMARATE 200 MG: 200 TABLET ORAL at 12:48

## 2022-08-11 ASSESSMENT — ENCOUNTER SYMPTOMS
EYE PAIN: 0
EYE DISCHARGE: 0
ABDOMINAL PAIN: 0
CHEST TIGHTNESS: 0
SINUS PAIN: 0
BACK PAIN: 0
SHORTNESS OF BREATH: 0
COUGH: 0
VOMITING: 0
NAUSEA: 0

## 2022-08-11 NOTE — ED PROVIDER NOTES
Faculty Sign-Out Attestation  Handoff taken on the following patient from prior Attending Physician: Nancy Roach    I was available and discussed any additional care issues that arose and coordinated the management plans with the resident(s) caring for the patient during my duty period. Any areas of disagreement with residents documentation of care or procedures are noted on the chart. I was personally present for the key portions of any/all procedures during my duty period. I have documented in the chart those procedures where I was not present during the key portions.     Gluteal abscess, surgery consulted,   Will need admit    Lourena Najjar, DO  Attending Physician       Lourena Najjar, DO  08/11/22 0006    Admitted      Lourena Najjar, DO  08/11/22 [id number]

## 2022-08-11 NOTE — PROGRESS NOTES
Patient's nurse from [facility] called to set up injection for prolixin that is due tomorrow. Writer gave nurse our fax number to fax over the dosage and to our inpatient pharmacy.   I told her I would contact the attending for further instructions and approval.

## 2022-08-11 NOTE — CONSULTS
disease) 8/13/2016    Hidradenitis suppurativa     Polysubstance abuse ([facility]     Schizoaffective disorder [facility])        Past Surgical History:        Procedure Laterality Date    ABCESS DRAINAGE  11/1/2013    left inguinal hydrodenitis/pilondal cyst    INFECTED SKIN DEBRIDEMENT  july 2011       Medications Prior to Admission:   Not in a hospital admission. Allergies:  Depakene [valproic acid], Restoril [temazepam], Risperidone and related, and Thorazine [chlorpromazine]    Social History:   Social History     Socioeconomic History    Marital status: Single     Spouse name: Not on file    Number of children: Not on file    Years of education: Not on file    Highest education level: Not on file   Occupational History    Not on file   Tobacco Use    Smoking status: Every Day     Packs/day: 1.00     Years: 22.00     Pack years: 22.00     Types: Cigarettes    Smokeless tobacco: Never   Vaping Use    Vaping Use: Never used   Substance and Sexual Activity    Alcohol use: Yes     Comment: occasional    Drug use: Yes     Comment: history of marijuana & Opiate abue, claims that he is currently clean & sober. Sexual activity: Not on file   Other Topics Concern    Not on file   Social History Narrative    Not on file     Social Determinants of Health     Financial Resource Strain: Not on file   Food Insecurity: Not on file   Transportation Needs: Not on file   Physical Activity: Not on file   Stress: Not on file   Social Connections: Not on file   Intimate Partner Violence: Not on file   Housing Stability: Not on file       Family History:       Problem Relation Age of Onset    Cancer Mother         breast    High Blood Pressure Maternal Grandfather     Mental Illness Paternal Aunt     Substance Abuse Paternal Uncle     Substance Abuse Brother     Alcohol Abuse Father     Cancer Father         pancrease       REVIEW OF SYSTEMS:    CONSTITUTIONAL: Denies recent weight loss, fatigue, fevers, chills.   HEENT: Denies rhinorrhea, dysphagia, odynphagia. CARDIOVASCULAR: Denies history of MI, recent chest pain. RESPIRATORY: Denies recent history of shortness of breath or history of PE. GASTROINTESTINAL: Endorses vague abdominal discomfort, denies nausea vomiting  GENITOURINARY: Denies increased frequency or dysuria. HEMATOLOGIC/LYMPHATIC: Denies history of anemia or DVTs. ENDOCRINE: Denies history of thyroid problems or diabetes. NEURO: Denies history of CVA, TIA. Review of systems negative unless listed above. PHYSICAL EXAM:    VITALS:  BP (!) 138/93   Pulse (!) 111   Temp 99.3 °F (37.4 °C) (Oral)   Resp 18   Ht 5' 9\" (1.753 m)   Wt 285 lb (129.3 kg)   SpO2 99%   BMI 42.09 kg/m²   INTAKE/OUTPUT:   No intake or output data in the 24 hours ending 08/11/22 0026    CONSTITUTIONAL:  awake, alert, mumbling to himself, not distressed and morbidly obese  HEENT: Normocephalic/atraumatic, without obvious abnormality. NECK:  Supple, symmetrical, trachea midline   CARDIOVASCULAR: Regular rate and rhythm  LUNGS: equal chest rise and fall, no increased WOB, no audible stridor or wheeze  ABDOMEN: Soft nontender, nondistended, obese, surgical scars consistent with history, pannus with cystic tracts draining  MUSCULOSKELETAL: Muscle strength intact in all extremities bilaterally. NEUROLOGIC: Gross motor intact without focal weakness.   SKIN: Numerous connected scarring and intermingled lesions with fistulous tracts draining cystic fluid, area of induration at the right inferior gluteal fold without fluctuance erythema but positive for chronic thickening  Orientation:   oriented to person, place, and time    IV Access: Peripheral IVs      CBC with Differential:    Lab Results   Component Value Date/Time    WBC 11.9 08/10/2022 09:07 PM    RBC 4.32 08/10/2022 09:07 PM    RBC 3.46 02/09/2012 06:19 AM    HGB 12.3 08/10/2022 09:07 PM    HCT 39.3 08/10/2022 09:07 PM     08/10/2022 09:07 PM     02/09/2012 06:19 AM    MCV 91.0 08/10/2022 09:07 PM    MCH 28.5 08/10/2022 09:07 PM    MCHC 31.3 08/10/2022 09:07 PM    RDW 15.2 08/10/2022 09:07 PM    LYMPHOPCT 31 08/10/2022 09:07 PM    MONOPCT 10 08/10/2022 09:07 PM    BASOPCT 0 08/10/2022 09:07 PM    MONOSABS 1.23 08/10/2022 09:07 PM    LYMPHSABS 3.66 08/10/2022 09:07 PM    EOSABS 0.23 08/10/2022 09:07 PM    BASOSABS 0.04 08/10/2022 09:07 PM    DIFFTYPE NOT REPORTED 11/03/2021 09:08 AM     BMP:    Lab Results   Component Value Date/Time     08/10/2022 09:07 PM    K 4.0 08/10/2022 09:07 PM     08/10/2022 09:07 PM    CO2 27 08/10/2022 09:07 PM    BUN 12 08/10/2022 09:07 PM    LABALBU 3.2 08/10/2022 09:07 PM    CREATININE 1.09 08/10/2022 09:07 PM    CALCIUM 9.0 08/10/2022 09:07 PM    GFRAA >60 08/10/2022 09:07 PM    LABGLOM >60 08/10/2022 09:07 PM    GLUCOSE 93 08/10/2022 09:07 PM    GLUCOSE 89 02/09/2012 06:19 AM       Pertinent Radiology:   CT ABDOMEN PELVIS W IV CONTRAST Additional Contrast? None    Result Date: 8/10/2022  EXAMINATION: CT OF THE ABDOMEN AND PELVIS WITH CONTRAST 8/10/2022 9:33 pm TECHNIQUE: CT of the abdomen and pelvis was performed with the administration of intravenous contrast. Multiplanar reformatted images are provided for review. Automated exposure control, iterative reconstruction, and/or weight based adjustment of the mA/kV was utilized to reduce the radiation dose to as low as reasonably achievable. 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 Organs: The liver, spleen, pancreas, kidneys and adrenal glands are without acute findings.   Stable atrophic appearance of the right kidney with peripherally calcified renal cysts. The gallbladder is present without radiopaque cholelithiasis. GI/Bowel: No mechanical bowel obstruction. Normal appendix. Stable postoperative appearance status post sigmoid colectomy. Moderate to large amount of stool in the rectosigmoid colon. Metallic density within the rectal fecal ball, possibly an ingested foreign body. Pelvis: The urinary bladder is partially distended without contour abnormality. The prostate and seminal vesicles are without acute findings. Peritoneum/Retroperitoneum: Mild atherosclerotic plaque. No ascites or pneumoperitoneum. No retroperitoneal or mesenteric lymphadenopathy. The aorta and IVC are normal in caliber. Mild atherosclerotic plaque. Bones/Soft Tissues: Soft tissue enhancement is slightly suboptimal.  There are multiple pelvic abscesses with adjacent skin thickening. The most notable area is seen in the right buttock and in conglomerate measures approximately 14 x 4.7 x 11 cm. Low-attenuation focus in the left inguinal canal likely reflects a high position of the left testicle within the inguinal canal rather than abscess. Left gluteal skin thickening without overt abscess. Skin thickening is also noted along the midline posterior low back unchanged areas of skin thickening in the lower anterior abdominal wall. Subcentimeter bilateral inguinal lymph nodes are likely reactive. No abnormal gas density. No osseous erosive change to indicate acute osteomyelitis. No acute bony abnormality. 1. Large area of abscess/phlegmonous change centered in the right buttock which measures up to 14 cm. There is extension of skin thickening to the left buttock and midline low back as well as a fistulous connection to the skin in the medial right buttock. No subcutaneous gas to indicate necrotizing infection/Opal's gangrene. No evidence of acute osteomyelitis.  2. Area of low attenuation in the left inguinal canal is favored to reflect high position of the left testicle within the inguinal canal, rather than infection. This can be corroborated with physical exam. 3. Moderate to large amount of stool in the rectosigmoid colon. A small metallic density within a fecal ball in the rectal wall is favored to reflect an ingested foreign body. 4. Unchanged chronic findings outlined in the body of the report. ASSESSMENT:    Hidradenitis suppurativa, Katie Rockch class III  CT scan showing phlegmonous and evolving abscess of right inferior gluteal fold    Plan:  No plans for acute surgical intervention at this time, CT showing phlegmon and early evolving abscess. Patient admitted to medicine, will transition to oral antibiotics on discharge  Recommend bowel regimen, enema for large stool burden on CT, constipation  Okay for general diet from surgical perspective  Strict record urine output  Okay for DVT prophylaxis  We will continue to monitor      Electronically signed by Daniela Denton DO  on 8/11/2022 at 12:26 AM   I attest that I was present with the resident in the emergency department at [facility] during the patient's evaluation and agree with the description of findings and plan as dictated above.   Daylin Olmos MD

## 2022-08-11 NOTE — PROGRESS NOTES
[facility] Pharmacokinetic Monitoring Service - Vancomycin     Susan Cooney is a 38 y.o. male starting on vancomycin therapy for skin and soft tissue infection. Pharmacy consulted by Petra Jaime MD for monitoring and adjustment. Target Concentration: Goal AUC/LISA 400-600 mg*hr/L    Additional Antimicrobials: zosyn    Pertinent Laboratory Values: Wt Readings from Last 1 Encounters:   08/10/22 285 lb (129.3 kg)     Temp Readings from Last 1 Encounters:   08/10/22 99.3 °F (37.4 °C) (Oral)     Estimated Creatinine Clearance: 115 mL/min (based on SCr of 1.09 mg/dL). Recent Labs     08/10/22  2107   CREATININE 1.09   WBC 11.9*     Procalcitonin: n/a    Pertinent Cultures:  Culture Date Source Results        MRSA Nasal Swab: N/A. Non-respiratory infection. Plan:  Dosing recommendations based on Bayesian software  Start vancomycin 1000mg IVPB every 12 hours  Anticipated AUC of 463 and trough concentration of 13 at steady state  Renal labs as indicated   Vancomycin concentration not ordered.    Pharmacy will continue to monitor patient and adjust therapy as indicated    Thank you for the consult,  Kevin Sapp, [facility]  8/10/2022 11:07 PM

## 2022-08-11 NOTE — ED PROVIDER NOTES
[facility] ED  Emergency Department Encounter  EmergencyMedicineResident     This patient was seen during the COVID-19 crisis. There were limited resources and those resources we did have had to be conserved for the sickest of patients. Pt Name: Zuri Hoyt  MRN: [id number]  [place] 1980  Date of evaluation: 8/10/22  PCP: Tiffanie Sampson MD    [address]       Chief Complaint   Patient presents with    Gastroesophageal Reflux       HISTORY OF PRESENT ILLNESS  (Location/Symptom, Timing/Onset, Context/Setting, Quality, Duration, Modifying Factors, Severity.)      Zuri Hoyt is a 38 y.o. male who presents evaluation today of abdominal pain. Seen for past medical history of hidradenitis suppurativa. On [facility]. Following with dermatology at [facility]. Significant past medical history of bipolar 1 and schizoaffective disorder. In the room he is mumbling to himself. His mom is his legal guardian who is speaking for him. He does complain of abdominal pain. But the other history from him is limited. PAST MEDICAL / SURGICAL /SOCIAL / FAMILY HISTORY      has a past medical history of Bipolar 1 disorder ([facility] [address] GERD (gastroesophageal reflux disease), Hidradenitis suppurativa, Polysubstance abuse ([facility] [address] and Schizoaffective disorder ([facility] [address] has a past surgical history that includes Infected skin debridement (july 2011) and Abscess Drainage (11/1/2013). Social History     Socioeconomic History    Marital status: Single     Spouse name: Not on file    Number of children: Not on file    Years of education: Not on file    Highest education level: Not on file   Occupational History    Not on file   Tobacco Use    Smoking status: Every Day     Packs/day: 1.00     Years: 22.00     Pack years: 22.00     Types: Cigarettes    Smokeless tobacco: Never   Vaping Use    Vaping Use: Never used   Substance and Sexual Activity    Alcohol use: Yes     Comment: occasional    Drug use:  Yes Comment: history of marijuana & Opiate abue, claims that he is currently clean & sober. Sexual activity: Not on file   Other Topics Concern    Not on file   Social History Narrative    Not on file     Social Determinants of Health     Financial Resource Strain: Not on file   Food Insecurity: Not on file   Transportation Needs: Not on file   Physical Activity: Not on file   Stress: Not on file   Social Connections: Not on file   Intimate Partner Violence: Not on file   Housing Stability: Not on file       Family History   Problem Relation Age of Onset    Cancer Mother         breast    High Blood Pressure Maternal Grandfather     Mental Illness Paternal Aunt     Substance Abuse Paternal Uncle     Substance Abuse Brother     Alcohol Abuse Father     Cancer Father         pancrease       Allergies:  Depakene [valproic acid], Restoril [temazepam], Risperidone and related, and Thorazine [chlorpromazine]    Home Medications:  Prior to Admission medications    Medication Sig Start Date End Date Taking? Authorizing Provider   diphenhydrAMINE (BENADRYL) 25 MG capsule Take 25 mg by mouth nightly as needed for Sleep    Historical Provider, MD   citalopram (CELEXA) 20 MG tablet Take 20 mg by mouth daily    Historical Provider, MD   lithium (ESKALITH) 450 MG extended release tablet Take 450 mg by mouth 2 times daily    Historical Provider, MD   clindamycin (CLINDAGEL) 1 % gel Apply topically 2 times daily Apply topically 2 times daily.     Historical Provider, MD   ZOLPIDEM TARTRATE PO Take 5 mg by mouth nightly as needed (sleep)     Historical Provider, MD   acetaminophen (TYLENOL) 325 MG tablet Take 650 mg by mouth every 6 hours as needed for Pain    Historical Provider, MD   QUEtiapine (SEROQUEL) 200 MG tablet Take 200 mg by mouth 2 times daily Bid -  with 300 mg at bedtime to equal 500 mg at hs    Historical Provider, MD   QUEtiapine (SEROQUEL XR) 300 MG extended release tablet Take 300 mg by mouth daily    Historical Provider, MD   benztropine (COGENTIN) 1 MG tablet Take 1 tablet by mouth 2 times daily  Patient taking differently: Take 0.5 mg by mouth 2 times daily  8/4/16   Kristie Rudd MD   docusate sodium (COLACE, DULCOLAX) 100 MG CAPS Take 100 mg by mouth 2 times daily 7/11/16   Anjel Infante MD   ferrous sulfate 325 (65 FE) MG tablet Take 1 tablet by mouth 2 times daily (with meals) 5/16/16   Saud Cook MD   Multiple Vitamins-Minerals (THERAPEUTIC MULTIVITAMIN-MINERALS) tablet Take 1 tablet by mouth daily. 4/18/14   Shannon Marroquin       REVIEW OF SYSTEMS    (2-9 systems for level 4, 10 or more forlevel 5)      Review of Systems   Reason unable to perform ROS: Psychiatric history. Gastrointestinal:  Positive for abdominal pain. PHYSICAL EXAM   (up to 7 for level 4, 8 or more forlevel 5)      ED TRIAGE VITALS BP: (!) 140/96, Temp: 99.3 °F (37.4 °C), Heart Rate: (!) 111, Resp: 18, SpO2: 98 %    Vitals:    08/10/22 2118 08/10/22 2123 08/10/22 2124 08/10/22 2148   BP: (!) 139/96   (!) 138/93   Pulse:       Resp:       Temp:       TempSrc:       SpO2:  100% 99%    Weight:       Height:             Physical Exam  Vitals and nursing note reviewed. Constitutional:       Appearance: Normal appearance. He is obese. He is ill-appearing. HENT:      Head: Normocephalic and atraumatic. Nose: Nose normal.      Mouth/Throat:      Mouth: Mucous membranes are moist.   Eyes:      Extraocular Movements: Extraocular movements intact. Pupils: Pupils are equal, round, and reactive to light. Cardiovascular:      Rate and Rhythm: Regular rhythm. Tachycardia present. Pulses: Normal pulses. Heart sounds: Normal heart sounds. Pulmonary:      Effort: Pulmonary effort is normal. No respiratory distress. Breath sounds: Normal breath sounds. No wheezing. Abdominal:      General: Abdomen is flat. Palpations: Abdomen is soft. Tenderness: There is abdominal tenderness.    Musculoskeletal: General: Normal range of motion. Cervical back: Normal range of motion. Skin:     General: Skin is warm and dry. Capillary Refill: Capillary refill takes less than 2 seconds. Comments: Diffuse soft tissue changes of the abdomen specifically at the level of the pannus going into the gluteal region and throughout the scrotum and gluteal cleft. There is actually an open wound of the abdominal pannus with draining fluctuant fluid. Foul-smelling. Neurological:      General: No focal deficit present. Mental Status: He is alert and oriented to person, place, and time.    Psychiatric:         Mood and Affect: Mood normal.         Behavior: Behavior normal.         DIFFERENTIAL  DIAGNOSIS     PLAN (LABS / IMAGING / EKG):  Orders Placed This Encounter   Procedures    Culture, Blood 1    Culture, Blood 1    Culture, Urine    CT ABDOMEN PELVIS W IV CONTRAST Additional Contrast? None    CBC with Auto Differential    Comprehensive Metabolic Panel    C-Reactive Protein    Lactate, Sepsis    Procalcitonin    Protime-INR    APTT    Troponin    Urinalysis with Microscopic    Inpatient consult to General Surgery    Pharmacy to Dose: Vancomycin       MEDICATIONS ORDERED:  ED Medication Orders (From admission, onward)      Start Ordered     Status Ordering Provider    08/10/22 2315 08/10/22 2307  vancomycin (VANCOCIN) 1000 mg in dextrose 5% 200 mL IVPB  EVERY 12 HOURS        Question Answer Comment   Antimicrobial Indications Skin and Soft Tissue Infection    Skin duration of therapy 7 days        Ordered FRANKLIN BENNETT    08/10/22 2306 08/10/22 2307  vancomycin (VANCOCIN) intermittent dosing (placeholder)  Rx Placeholder        Question Answer Comment   Antimicrobial Indications Skin and Soft Tissue Infection    Skin duration of therapy 7 days        Ordered FRANKLIN BENNETT    08/10/22 2300 08/10/22 2249  piperacillin-tazobactam (ZOSYN) 4,500 mg in dextrose 5 % 100 mL IVPB (mini-bag)  ONCE        Question: Antimicrobial Indications  Answer:  Skin and Soft Tissue Infection    Acknowledged FRANKLIN BENNETT    08/10/22 2300 08/10/22 2249  clindamycin (CLEOCIN) 600 mg in dextrose 5 % 50 mL IVPB  ONCE        Question:  Antimicrobial Indications  Answer:  Skin and Soft Tissue Infection    Acknowledged FRANKLIN BENNETT    08/10/22 2121 08/10/22 2121  iopamidol (ISOVUE-370) 76 % injection 75 mL  IMG ONCE PRN         Last MAR action: Given - by Vernon Cook on 08/10/22 at 2133 Wray Fabry EM    08/10/22 2100 08/10/22 2050  metoclopramide (REGLAN) injection 10 mg  ONCE         Last MAR action: Given - by Ean Darden on 08/10/22 at 2106 NIVIA BENNETTLYDIA STRICKLAND    08/10/22 2100 08/10/22 2050  0.9 % sodium chloride bolus  ONCE         Last MAR action: New Bag - by Ean Darden on 08/10/22 at 2107 Mirtha BENNETT EM    08/10/22 2100 08/10/22 2050  famotidine (PEPCID) injection 20 mg  ONCE         Last MAR action: Given - by Ean Darden on 08/10/22 at 2113 BENNETT, FRANKLIN STRICKLAND            DIAGNOSTIC RESULTS / EMERGENCY DEPARTMENT COURSE / MDM     IMPRESSION & INITIAL PLAN:  On examination today the patient has open wounds of his lower abdomen this draining foul-smelling purulent fluid. This fluids also present throughout the inguinal region. He has marked soft tissue changes throughout the lower abdomen and inguinal region and over the gluteal cleft. I am quite concerned for the risk of Opal gangrene. CT abdomen was ordered which showed diffuse soft tissue changes including a 14 cm left gluteal cleft abscess with fistula. Patient started on broad-spectrum antibiotics including clindamycin. General surgery consulted for evaluation and admission. LABS:  Results for orders placed or performed during the hospital encounter of 08/10/22   Culture, Blood 1    Specimen: Blood   Result Value Ref Range    Specimen Description . BLOOD     Special Requests RT ACUB 10ML     Culture NO GROWTH <24 HRS    Culture, Blood 1 Specimen: Blood   Result Value Ref Range    Specimen Description . BLOOD     Special Requests LT ACUB 10ML     Culture NO GROWTH <24 HRS    CBC with Auto Differential   Result Value Ref Range    WBC 11.9 (H) 3.5 - 11.3 k/uL    RBC 4.32 4.21 - 5.77 m/uL    Hemoglobin 12.3 (L) 13.0 - 17.0 g/dL    Hematocrit 39.3 (L) 40.7 - 50.3 %    MCV 91.0 82.6 - 102.9 fL    MCH 28.5 25.2 - 33.5 pg    MCHC 31.3 28.4 - 34.8 g/dL    RDW 15.2 (H) 11.8 - 14.4 %    Platelets 635 113 - 275 k/uL    MPV 8.3 8.1 - 13.5 fL    NRBC Automated 0.0 0.0 per 100 WBC    Seg Neutrophils 57 36 - 65 %    Lymphocytes 31 24 - 43 %    Monocytes 10 3 - 12 %    Eosinophils % 2 1 - 4 %    Basophils 0 0 - 2 %    Immature Granulocytes 0 0 %    Segs Absolute 6.71 1.50 - 8.10 k/uL    Absolute Lymph # 3.66 1.10 - 3.70 k/uL    Absolute Mono # 1.23 (H) 0.10 - 1.20 k/uL    Absolute Eos # 0.23 0.00 - 0.44 k/uL    Basophils Absolute 0.04 0.00 - 0.20 k/uL    Absolute Immature Granulocyte 0.05 0.00 - 0.30 k/uL    RBC Morphology ANISOCYTOSIS PRESENT    Comprehensive Metabolic Panel   Result Value Ref Range    Glucose 93 70 - 99 mg/dL    BUN 12 6 - 20 mg/dL    Creatinine 1.09 0.70 - 1.20 mg/dL    Calcium 9.0 8.6 - 10.4 mg/dL    Sodium 139 135 - 144 mmol/L    Potassium 4.0 3.7 - 5.3 mmol/L    Chloride 104 98 - 107 mmol/L    CO2 27 20 - 31 mmol/L    Anion Gap 8 (L) 9 - 17 mmol/L    Alkaline Phosphatase 108 40 - 129 U/L    ALT 38 5 - 41 U/L    AST 18 <40 U/L    Total Bilirubin <0.10 (L) 0.3 - 1.2 mg/dL    Total Protein 8.0 6.4 - 8.3 g/dL    Albumin 3.2 (L) 3.5 - 5.2 g/dL    Albumin/Globulin Ratio 0.7 (L) 1.0 - 2.5    GFR Non-African American >60 >60 mL/min    GFR African American >60 >60 mL/min    GFR Comment         C-Reactive Protein   Result Value Ref Range    CRP 35.5 (H) 0.0 - 5.0 mg/L   Lactate, Sepsis   Result Value Ref Range    Lactic Acid, Sepsis, Whole Blood 1.0 0.5 - 1.9 mmol/L   Procalcitonin   Result Value Ref Range    Procalcitonin 0.04 <0.09 ng/mL   Protime-INR Result Value Ref Range    Protime 10.2 9.1 - 12.3 sec    INR 0.9    APTT   Result Value Ref Range    PTT 29.6 20.5 - 30.5 sec   Troponin   Result Value Ref Range    Troponin, High Sensitivity <6 0 - 22 ng/L       RADIOLOGY:  CT ABDOMEN PELVIS W IV CONTRAST Additional Contrast? None   Preliminary Result   1. Large area of abscess/phlegmonous change centered in the right buttock   which measures up to 14 cm. There is extension of skin thickening to the   left buttock and midline low back as well as a fistulous connection to the   skin in the medial right buttock. No subcutaneous gas to indicate   necrotizing infection/Opal's gangrene. No evidence of acute   osteomyelitis. 2. Area of low attenuation in the left inguinal canal is favored to reflect   high position of the left testicle within the inguinal canal, rather than   infection. This can be corroborated with physical exam.   3. Moderate to large amount of stool in the rectosigmoid colon. A small   metallic density within a fecal ball in the rectal wall is favored to reflect   an ingested foreign body. 4. Unchanged chronic findings outlined in the body of the report. CONSULTS:  IP CONSULT TO GENERAL SURGERY  PHARMACY TO DOSE VANCOMYCIN    CRITICAL CARE:  See attending physician note    FINAL IMPRESSION      1. Abscess and cellulitis of gluteal region          DISPOSITION / PLAN     DISPOSITION Decision To Admit 08/10/2022 10:56:20 PM      PATIENT REFERRED TO:  No follow-up provider specified.     DISCHARGE MEDICATIONS:  New Prescriptions    No medications on file     Modified Medications    No medications on file        Sean Mcmahon MD  Emergency Medicine Resident    (Please note that portions of this note were completed with a voice recognition program.  Efforts were made to edit the dictations but occasionally words are mis-transcribed.)       Sean Mcmahon MD  Resident  08/10/22 [id number]

## 2022-08-11 NOTE — PROGRESS NOTES
Ct reviewed by IR attending. Buttock abscess is very superficial with micro loculations which makes it not a good candidate for percutaneous drain placement. Most likely needs surgical I&D.

## 2022-08-11 NOTE — ED NOTES
The following labs labeled with pt sticker and tubed to lab:     [x] Blue     [x] Lavender   [] on ice  [x] Green/yellow  [x] Green/black [x] on ice  [] Yellow  [] Red  [] Pink      [] COVID-19 swab    [] Rapid  [] PCR  [] Flu swab  [] Peds Viral Panel     [] Urine Sample  [] Pelvic Cultures  [x] Blood Cultures            Ruchi Kelsey RN  08/10/22 4501

## 2022-08-11 NOTE — H&P
[facility]  Office: [address], DO, Romero Clara, DO, Ingrid Snow, DO, Raffi Randall, DO, Ashley Mckeon MD, Benson Todd MD, Carmel Montalvo MD, Paola Domingo MD,  Chinyere Portillo MD, Josefina Warren MD, Frank Simmonds, DO, Yobani Erickson MD,  Lynda Espinal MD, Misael Bolanos MD, Bruno Barnes DO, Erickson Ortega MD, Maggy Downey MD, Marcela Colón MD, Rhonda Branch DO, Marciano Bernheim, MD, Elvira Servin MD, Cindy Ortega, CNP,  Parirs Mcgovern, CNP, Brigette Cleary, CNP, Devonte Ch, CNP, Eduardo Weller PA-C, Radha Burk, [facility], Woodward Brittle, CNP, Ashley Oliver, CNP, Charity Aguilar, [facility], Farooq Gomez, CNP, Juli Brink, CNP, Demetrio Flannery, CNS, Rodríguez Wallace, [facility], Giovanny Akins, CNP, Caitlyn Steiner, CNP, Mckenzie Landis, [facility]    HISTORY AND PHYSICAL EXAMINATION            Date:   8/11/2022  Patient name:  Jalen Bragg  Date of admission:  8/10/2022  8:24 PM  MRN:   [id number]  Account:  [de-identified]  YOB: 1980  PCP:    Sarmad Tan MD  Room:   11/11  Code Status:    Full Code    Chief Complaint:     Chief Complaint   Patient presents with    Gastroesophageal Reflux   \"I need to pee. . . I don't feel good\"    History Obtained From:     patient    History of Present Illness:     Jalen Bragg is a 38 y.o.  male with history of hydradenitis suppurative with recurrent skin infections , prior history of Opal's maintained on Humira with history of schizophrenia/bipolar currently on who presents with worsening buttock pain and is admitted to the hospital for the management of Sepsis ([facility] [address] Patient is a limited historian but does describe 1 week history of worsening buttock pain with subjective fevers and chills over the past few days. Denies abdominal pain, no nausea or vomiting. Denies perianal drainage.   Denies recent antibiotics  Past Medical History:     Past Medical History:   Diagnosis Date    Bipolar 1 disorder ([facility] [address]     GERD (gastroesophageal reflux disease) 8/13/2016    Hidradenitis suppurativa     Polysubstance abuse ([facility] [address]     Schizoaffective disorder [facility])         Past Surgical History:     Past Surgical History:   Procedure Laterality Date    ABCESS DRAINAGE  11/1/2013    left inguinal hydrodenitis/pilondal cyst    INFECTED SKIN DEBRIDEMENT  july 2011        Medications Prior to Admission:     Prior to Admission medications    Medication Sig Start Date End Date Taking? Authorizing Provider   diphenhydrAMINE (BENADRYL) 25 MG capsule Take 25 mg by mouth nightly as needed for Sleep    Historical Provider, MD   citalopram (CELEXA) 20 MG tablet Take 20 mg by mouth daily    Historical Provider, MD   lithium (ESKALITH) 450 MG extended release tablet Take 450 mg by mouth 2 times daily    Historical Provider, MD   clindamycin (CLINDAGEL) 1 % gel Apply topically 2 times daily Apply topically 2 times daily.     Historical Provider, MD   ZOLPIDEM TARTRATE PO Take 5 mg by mouth nightly as needed (sleep)     Historical Provider, MD   acetaminophen (TYLENOL) 325 MG tablet Take 650 mg by mouth every 6 hours as needed for Pain    Historical Provider, MD   QUEtiapine (SEROQUEL) 200 MG tablet Take 200 mg by mouth 2 times daily Bid -  with 300 mg at bedtime to equal 500 mg at hs    Historical Provider, MD   QUEtiapine (SEROQUEL XR) 300 MG extended release tablet Take 300 mg by mouth daily    Historical Provider, MD   benztropine (COGENTIN) 1 MG tablet Take 1 tablet by mouth 2 times daily  Patient taking differently: Take 0.5 mg by mouth 2 times daily  8/4/16   Rhonda Vaughan MD   docusate sodium (COLACE, DULCOLAX) 100 MG CAPS Take 100 mg by mouth 2 times daily 7/11/16   Jerrel Epley, MD   ferrous sulfate 325 (65 FE) MG tablet Take 1 tablet by mouth 2 times daily (with meals) 5/16/16   Kelly Harper MD   Multiple Vitamins-Minerals (THERAPEUTIC MULTIVITAMIN-MINERALS) tablet Take 1 tablet by mouth daily. 14   Shannon Marroquin        Allergies:     Depakene [valproic acid], Restoril [temazepam], Risperidone and related, and Thorazine [chlorpromazine]    Social History:     Tobacco:    reports that he has been smoking cigarettes. He has a 22.00 pack-year smoking history. He has never used smokeless tobacco.  Alcohol:      reports current alcohol use. Drug Use:  reports current drug use. Lives with family, continues to smoke, drinks alcohol on occasion but denies excessive binge drinking, denies drug use. Family History:     Family History   Problem Relation Age of Onset    Cancer Mother         breast    High Blood Pressure Maternal Grandfather     Mental Illness Paternal Aunt     Substance Abuse Paternal Uncle     Substance Abuse Brother     Alcohol Abuse Father     Cancer Father         pancrease       Review of Systems:     Positive and Negative as described in HPI. Review of Systems  Limited historian, denies chest pain shortness of breath difficulty breathing, denies URI symptoms, denies headaches, denies palpitations irregular heartbeat. Denies diarrhea constipation abdominal symptoms no indigestion or heartburn. Denies myalgias or arthralgias lower back pain. Denies easy bruising or bleeding issues. Denies rectal bleeding or bright red blood per rectum. Denies falls or injuries. Denies dysuria hematuria frequency or urgency with urination, no flank pain or back pain. Does struggle with hallucinations but denies any active symptoms. Denies history of MI CHF TIA stroke sleep apnea or diabetes .   review of systems otherwise -12 system reviewed otherwise negative  Physical Exam:   /72   Pulse 61   Temp 99.3 °F (37.4 °C) (Oral)   Resp 12   Ht 5' 9\" (1.753 m)   Wt 285 lb (129.3 kg)   SpO2 96%   BMI 42.09 kg/m²   Temp (24hrs), Av.3 °F (37.4 °C), Min:99.3 °F (37.4 °C), Max:99.3 °F (37.4 °C)    No results for input(s): POCGLU in the last 72 hours. No intake or output data in the 24 hours ending 08/11/22 0707    Physical Exam  General laying in left fetal position awake appropriate follows commands  Eye exam pupils equally round reactive sclera nonicteric normal horizontal gaze  ENT oropharynx with dry mucous membranes face symmetric neck supple  Cardiac regular rate and rhythm normal S1-S2 without murmurs rubs or gallops no JVD  Lungs poor inspiratory effort nonlabored no tachypnea no accessory muscle use no wheezing or rhonchi  GI soft obese nontender nondistended bowel sounds present  Musculoskeletal passive range of motion of upper and lower limbs unremarkable no synovitis or joint effusions  Derm chronic trophic changes in groin intertriginous area and perianal with extensive scar tissue. Significant induration fullness with tenderness palpation over the right medial buttock with erythema. No drainage but exam limited by acute pain.   Scar tissue over bilateral axilla  Neuro nonfocal normal speech and cognition strength symmetric in upper and lower limbs  Vascular intact dorsalis pedis and posterior tibialis without edema    Investigations:      Laboratory Testing:  Recent Results (from the past 24 hour(s))   CBC with Auto Differential    Collection Time: 08/10/22  9:07 PM   Result Value Ref Range    WBC 11.9 (H) 3.5 - 11.3 k/uL    RBC 4.32 4.21 - 5.77 m/uL    Hemoglobin 12.3 (L) 13.0 - 17.0 g/dL    Hematocrit 39.3 (L) 40.7 - 50.3 %    MCV 91.0 82.6 - 102.9 fL    MCH 28.5 25.2 - 33.5 pg    MCHC 31.3 28.4 - 34.8 g/dL    RDW 15.2 (H) 11.8 - 14.4 %    Platelets 323 797 - 249 k/uL    MPV 8.3 8.1 - 13.5 fL    NRBC Automated 0.0 0.0 per 100 WBC    Seg Neutrophils 57 36 - 65 %    Lymphocytes 31 24 - 43 %    Monocytes 10 3 - 12 %    Eosinophils % 2 1 - 4 %    Basophils 0 0 - 2 %    Immature Granulocytes 0 0 %    Segs Absolute 6.71 1.50 - 8.10 k/uL    Absolute Lymph # 3.66 1.10 - 3.70 k/uL    Absolute Mono # 1.23 (H) 0.10 - 1.20 k/uL Absolute Eos # 0.23 0.00 - 0.44 k/uL    Basophils Absolute 0.04 0.00 - 0.20 k/uL    Absolute Immature Granulocyte 0.05 0.00 - 0.30 k/uL    RBC Morphology ANISOCYTOSIS PRESENT    Comprehensive Metabolic Panel    Collection Time: 08/10/22  9:07 PM   Result Value Ref Range    Glucose 93 70 - 99 mg/dL    BUN 12 6 - 20 mg/dL    Creatinine 1.09 0.70 - 1.20 mg/dL    Calcium 9.0 8.6 - 10.4 mg/dL    Sodium 139 135 - 144 mmol/L    Potassium 4.0 3.7 - 5.3 mmol/L    Chloride 104 98 - 107 mmol/L    CO2 27 20 - 31 mmol/L    Anion Gap 8 (L) 9 - 17 mmol/L    Alkaline Phosphatase 108 40 - 129 U/L    ALT 38 5 - 41 U/L    AST 18 <40 U/L    Total Bilirubin <0.10 (L) 0.3 - 1.2 mg/dL    Total Protein 8.0 6.4 - 8.3 g/dL    Albumin 3.2 (L) 3.5 - 5.2 g/dL    Albumin/Globulin Ratio 0.7 (L) 1.0 - 2.5    GFR Non-African American >60 >60 mL/min    GFR African American >60 >60 mL/min    GFR Comment         C-Reactive Protein    Collection Time: 08/10/22  9:07 PM   Result Value Ref Range    CRP 35.5 (H) 0.0 - 5.0 mg/L   Lactate, Sepsis    Collection Time: 08/10/22  9:07 PM   Result Value Ref Range    Lactic Acid, Sepsis, Whole Blood 1.0 0.5 - 1.9 mmol/L   Procalcitonin    Collection Time: 08/10/22  9:07 PM   Result Value Ref Range    Procalcitonin 0.04 <0.09 ng/mL   Protime-INR    Collection Time: 08/10/22  9:07 PM   Result Value Ref Range    Protime 10.2 9.1 - 12.3 sec    INR 0.9    APTT    Collection Time: 08/10/22  9:07 PM   Result Value Ref Range    PTT 29.6 20.5 - 30.5 sec   Troponin    Collection Time: 08/10/22  9:07 PM   Result Value Ref Range    Troponin, High Sensitivity <6 0 - 22 ng/L   Culture, Blood 1    Collection Time: 08/10/22  9:08 PM    Specimen: Blood   Result Value Ref Range    Specimen Description . BLOOD     Special Requests RT ACUB 10ML     Culture NO GROWTH <24 HRS    Culture, Blood 1    Collection Time: 08/10/22  9:09 PM    Specimen: Blood   Result Value Ref Range    Specimen Description . BLOOD     Special Requests LT ACUB 10ML     Culture NO GROWTH <24 HRS    Urinalysis with Microscopic    Collection Time: 08/10/22 11:35 PM   Result Value Ref Range    Color, UA Yellow Yellow    Turbidity UA Clear Clear    Glucose, Ur NEGATIVE NEGATIVE    Bilirubin Urine NEGATIVE NEGATIVE    Ketones, Urine NEGATIVE NEGATIVE    Specific Gravity, UA 1.039 (H) 1.005 - 1.030    Urine Hgb SMALL (A) NEGATIVE    pH, UA 6.0 5.0 - 8.0    Protein, UA NEGATIVE NEGATIVE    Urobilinogen, Urine Normal Normal    Nitrite, Urine NEGATIVE NEGATIVE    Leukocyte Esterase, Urine NEGATIVE NEGATIVE    WBC, UA None 0 - 5 /HPF    RBC, UA 2 TO 5 0 - 4 /HPF    Epithelial Cells UA None 0 - 5 /HPF       Imaging/Diagnostics:  CT ABDOMEN PELVIS W IV CONTRAST Additional Contrast? None    Result Date: 8/10/2022  1. Large area of abscess/phlegmonous change centered in the right buttock which measures up to 14 cm. There is extension of skin thickening to the left buttock and midline low back as well as a fistulous connection to the skin in the medial right buttock. No subcutaneous gas to indicate necrotizing infection/Opal's gangrene. No evidence of acute osteomyelitis. 2. Area of low attenuation in the left inguinal canal is favored to reflect high position of the left testicle within the inguinal canal, rather than infection. This can be corroborated with physical exam. 3. Moderate to large amount of stool in the rectosigmoid colon. A small metallic density within a fecal ball in the rectal wall is favored to reflect an ingested foreign body. 4. Unchanged chronic findings outlined in the body of the report.        Assessment :      Hospital Problems             Last Modified POA    * (Principal) Sepsis ([address] 8/11/2022 Yes    Abscess and cellulitis of gluteal region 8/11/2022 Yes    Constipation 8/11/2022 Yes    Hidradenitis axillaris 8/11/2022 Yes    Paranoid schizophrenia, chronic condition with acute exacerbation ([address] (Chronic) 8/11/2022 Yes    GERD (gastroesophageal reflux disease) 8/11/2022 Yes    Tobacco abuse 8/11/2022 Yes       Plan:     Patient status inpatient in the Med/Surge    Right gluteal abscess with phlegmon/cellulitis /sepsis with history of hidradenitis suppurative on Humira/immunosuppressed with recurrent skin infections. Status post surgery evaluation recommending conservative treatment for now. Consult ID for assistance with antibiotics with recurrent infections. Continue with supportive care. Paranoid schizophrenia stable  Tobacco abuse ongoing encourage cessation  Constipation start bowel regimen. CT imaging does suggest possible foreign body ingestion. Clarify with surgery if further work-up required  Discussed with ED nursing. No family available during evaluation. Questions and concerns and treatment plan discussed with patient in detail. Consultations:   IP CONSULT TO GENERAL SURGERY  PHARMACY TO DOSE VANCOMYCIN  IP CONSULT TO HOSPITALIST  PHARMACY TO DOSE VANCOMYCIN  IP CONSULT TO INFECTIOUS DISEASES    Patient is admitted as inpatient status because of co-morbidities listed above, severity of signs and symptoms as outlined, requirement for current medical therapies and most importantly because of direct risk to patient if care not provided in a hospital setting. Expected length of stay > 48 hours.     Maureen Ho MD  8/11/2022  7:07 AM    Copy sent to Dr. Romelia Cabrera MD

## 2022-08-11 NOTE — ED NOTES
Surgery team gave the okay for pt to eat. Pt provided a box lunch.      Cheryl Ward RN  08/11/22 [id number]

## 2022-08-11 NOTE — ED NOTES
Pt resting on stretcher. Alert. RR even and unlabored. No distress noted. Call light within reach. Family at bedside.        Ambar Hinojosa RN  08/10/22 [id number]

## 2022-08-11 NOTE — ED TRIAGE NOTES
Pt presents to the ED with c/o acid reflux. Pt states that he would like to get his abd checked. Mom/cargiver at bedside. Pt resting on stretcher with call light within reach. RR even and unlabored.

## 2022-08-11 NOTE — ED PROVIDER NOTES
[facility] [address] [facility]     Emergency Department     Faculty Attestation        I performed a history and physical examination of the patient and discussed management with the resident. I reviewed the residents note and agree with the documented findings and plan of care. Any areas of disagreement are noted on the chart. I was personally present for the key portions of any procedures. I have documented in the chart those procedures where I was not present during the key portions. I have reviewed the emergency nurses triage note. I agree with the chief complaint, past medical history, past surgical history, allergies, medications, social and family history as documented unless otherwise noted below. For mid-level providers such as nurse practitioners as well as physicians assistants:    I have personally seen and evaluated the patient. I find the patient's history and physical exam are consistent with NP/PA documentation. I agree with the care provided, treatment rendered, disposition, & follow-up plan. Additional findings are as noted.     Vital Signs: BP (!) 140/96   Pulse (!) 111   Temp 99.3 °F (37.4 °C) (Oral)   Resp 18   Ht 5' 9\" (1.753 m)   Wt 285 lb (129.3 kg)   SpO2 98%   BMI 42.09 kg/m²   PCP:  Bettina Wasserman MD    Pertinent Comments:           Critical Care  None          Bret Mayorga MD    Attending Emergency Medicine Physician            Christophe Daly MD  08/10/22 2045

## 2022-08-11 NOTE — CONSULTS
[facility] -   Infectious diseases evaluation  admission date 8/10/2022    reason for consultation:   Evaluation of buttock abscess    Impression :   Current:  Right buttock abscess with fistulous connection to the skin in the medial right buttock  Hx Hydradenitis suppurativa  Schizophrenia  Bipolar    Other:    Discussion / summary of stay / plan of care     Recommendations   Abscess drain by IR requested, IR found the collection very suprerficial and would need surg drainage  Consult G surg  Keep vanco and zosyn for now -adjust to final cx  Disc w pt and RN    Infection Control Recommendations   [place] Precautions  Contact Isolation       Antimicrobial Stewardship Recommendations   Simplification of therapy  Targeted therapy  History of Present Illness:   Initial history: Patient is a poor historian, history obtained from mother  Dorita Alamo is a 38y.o.-year-old male schizophrenia, hidradenitis suppurative with recurrent skin infection presented to the hospital with complaint of abdominal discomfort and imbalance for past 3 weeks. No dizziness or abdominal pain. Patient has a stool and urine incontinence for past 2 years and requires adult diapers. Patient lives with her mother. Patient follows dermatology for diffuse hidradenitis-currently on Humira. he has multiple I&D done for hidradenitis of bilateral axilla and bilateral groin inguinal and gluteal fold. 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 in the ED patient was afebrile and hemodynamically stable. WBC 11.9, CRP 35.5. BMP unremarkable. CT abdomen and pelvis showed large area of abscess in the right buttock measuring up to 14 cm with fistulous connection to the skin in the medial right buttock. ID was consulted for the evaluation of right buttock wound.     On exam his R buttock is indurated and tender but not red    Interval changes  8/11/2022   Patient Vitals for the past 8 hrs:   BP Temp Temp src Pulse Resp SpO2   08/11/22 0838 131/87 98.4 °F (36.9 °C) Temporal 72 13 93 %   08/11/22 0600 -- -- -- 61 12 96 %   08/11/22 0532 128/72 -- -- 56 12 98 %   08/11/22 0417 108/62 -- -- 71 11 92 %   08/11/22 0402 (!) 104/58 -- -- 81 11 94 %   08/11/22 0347 123/69 -- -- 73 11 96 %   08/11/22 0344 -- -- -- 75 12 --   08/11/22 0342 -- -- -- -- -- 97 %   08/11/22 0332 123/64 -- -- -- -- --   08/11/22 0317 117/64 -- -- -- -- --       Summary of relevant labs:  Labs:  CRP-35.5  WBC 11.9  Creat-1.09  Micro:  Blood culture 8/10- negative  Urine culture - pending  Urinalysis unremarkable  Imaging:    CT abdomen and pelvis 8/10    Impression   1. Large area of abscess/phlegmonous change centered in the right buttock   which measures up to 14 cm. There is extension of skin thickening to the   left buttock and midline low back, as well as a fistulous connection to the   skin in the medial right buttock. No subcutaneous gas to indicate   necrotizing infection/Opal's gangrene. No evidence of acute   osteomyelitis. 2. Area of low attenuation in the left inguinal canal is favored to reflect   high position of the left testicle within the inguinal canal, rather than   infection. This can be corroborated with physical exam.   3. Moderate to large amount of stool in the rectosigmoid colon. A small   metallic density within a fecal ball in the rectal vault is favored to   reflect an ingested foreign body. 4. Unchanged chronic findings outlined in the body of the report. I have personally reviewed the past medical history, past surgical history, medications, social history, and family history, and I haveupdated the database accordingly. Allergies:   Depakene [valproic acid], Restoril [temazepam], Risperidone and related, and Thorazine [chlorpromazine]     Review of Systems:     Review of Systems   Constitutional:  Negative for appetite change and fever.    HENT:  Negative for mouth sores and sinus pain. Eyes:  Negative for pain and discharge. Respiratory:  Negative for cough, chest tightness and shortness of breath. Cardiovascular:  Negative for chest pain and leg swelling. Gastrointestinal:  Negative for abdominal pain, nausea and vomiting. Vague abdominal discomfort   Endocrine: Negative for polyuria. Genitourinary:  Negative for difficulty urinating, dysuria and urgency. Musculoskeletal:  Negative for arthralgias, back pain and joint swelling. Neurological:  Negative for facial asymmetry and headaches. Hematological:  Negative for adenopathy. Psychiatric/Behavioral:  Negative for suicidal ideas. The patient is not nervous/anxious. Physical Examination :       Physical Exam  Constitutional:       Appearance: Normal appearance. He is not ill-appearing. Comments: Appears sleepy   HENT:      Head: Normocephalic and atraumatic. Nose: Nose normal.      Mouth/Throat:      Mouth: Mucous membranes are moist.   Eyes:      General: No scleral icterus. Pupils: Pupils are equal, round, and reactive to light. Cardiovascular:      Rate and Rhythm: Normal rate. Pulses: Normal pulses. Heart sounds: Normal heart sounds. Pulmonary:      Breath sounds: Normal breath sounds. No wheezing or rhonchi. Abdominal:      General: There is no distension. Palpations: There is no mass. Tenderness: There is no abdominal tenderness. Genitourinary:     Comments: No hunter  Musculoskeletal:         General: No swelling. Cervical back: Normal range of motion and neck supple. Skin:     Coloration: Skin is not pale. Findings: Lesion present. No bruising. Comments: Multiple areas of scarring over groin and perineal area  Lesion with fistulous tract - no drainage   Neurological:      General: No focal deficit present. Mental Status: He is alert and oriented to person, place, and time.    Psychiatric:         Mood and Affect: Mood normal. Behavior: Behavior normal.       Past Medical History:     Past Medical History:   Diagnosis Date    Bipolar 1 disorder ([facility]     GERD (gastroesophageal reflux disease) 8/13/2016    Hidradenitis suppurativa     Polysubstance abuse ([facility]     Schizoaffective disorder (HCC)        Past Surgical  History:     Past Surgical History:   Procedure Laterality Date    ABCESS DRAINAGE  11/1/2013    left inguinal hydrodenitis/pilondal cyst    INFECTED SKIN DEBRIDEMENT  july 2011       Medications:      citalopram  20 mg Oral Daily    docusate sodium  100 mg Oral BID    ferrous sulfate  325 mg Oral BID WC    lithium  450 mg Oral BID    therapeutic multivitamin-minerals  1 tablet Oral Daily    QUEtiapine  300 mg Oral Daily    QUEtiapine  200 mg Oral BID    sodium chloride flush  5-40 mL IntraVENous 2 times per day    enoxaparin  30 mg SubCUTAneous BID    piperacillin-tazobactam  4,500 mg IntraVENous Q8H    polyethylene glycol  17 g Oral Daily    vancomycin  1,000 mg IntraVENous Q12H    vancomycin (VANCOCIN) intermittent dosing (placeholder)   Other RX Placeholder       Social History:     Social History     Socioeconomic History    Marital status: Single     Spouse name: Not on file    Number of children: Not on file    Years of education: Not on file    Highest education level: Not on file   Occupational History    Not on file   Tobacco Use    Smoking status: Every Day     Packs/day: 1.00     Years: 22.00     Pack years: 22.00     Types: Cigarettes    Smokeless tobacco: Never   Vaping Use    Vaping Use: Never used   Substance and Sexual Activity    Alcohol use: Yes     Comment: occasional    Drug use: Yes     Comment: history of marijuana & Opiate abue, claims that he is currently clean & sober.     Sexual activity: Not on file   Other Topics Concern    Not on file   Social History Narrative    Not on file     Social Determinants of Health     Financial Resource Strain: Not on file   Food Insecurity: Not on file Transportation Needs: Not on file   Physical Activity: Not on file   Stress: Not on file   Social Connections: Not on file   Intimate Partner Violence: Not on file   Housing Stability: Not on file       Family History:     Family History   Problem Relation Age of Onset    Cancer Mother         breast    High Blood Pressure Maternal Grandfather     Mental Illness Paternal Aunt     Substance Abuse Paternal Uncle     Substance Abuse Brother     Alcohol Abuse Father     Cancer Father         pancrease      Medical Decision Making:   I have independently reviewed/ordered the following labs:    CBC with Differential:   Recent Labs     08/10/22  2107   WBC 11.9*   HGB 12.3*   HCT 39.3*      LYMPHOPCT 31   MONOPCT 10     BMP:  Recent Labs     08/10/22  2107      K 4.0      CO2 27   BUN 12   CREATININE 1.09     Hepatic Function Panel:   Recent Labs     08/10/22  2107   PROT 8.0   LABALBU 3.2*   BILITOT <0.10*   ALKPHOS 108   ALT 38   AST 18     No results for input(s): RPR in the last 72 hours. No results for input(s): HIV in the last 72 hours. No results for input(s): BC in the last 72 hours. Lab Results   Component Value Date/Time    CREATININE 1.09 08/10/2022 09:07 PM    GLUCOSE 93 08/10/2022 09:07 PM    GLUCOSE 89 02/09/2012 06:19 AM       Detailed results: Thank you for allowing us to participate in the care of this patient. Please call with questions. This note is created with the assistance of a speech recognition program.  While intending to generate adocument that actually reflects the content of the visit, the document can still have some errors including those of syntax and sound a like substitutions which may escape proof reading. It such instances, actual meaningcan be extrapolated by contextual diversion.     Krish Rosario MD  Office: [phone]  Perfect serve / office [phone]      I have discussed the care of the patient, including pertinent history and exam findings,  with the resident. I have seen and examined the patient and the key elements of all parts of the encounter have been performed by me. I agree with the assessment, plan and orders as documented by the resident.     Kathy Rivera, Infectious Diseases

## 2022-08-12 VITALS
HEART RATE: 106 BPM | HEIGHT: 69 IN | OXYGEN SATURATION: 94 % | SYSTOLIC BLOOD PRESSURE: 114 MMHG | DIASTOLIC BLOOD PRESSURE: 76 MMHG | WEIGHT: 267.2 LBS | RESPIRATION RATE: 14 BRPM | TEMPERATURE: 98.1 F | BODY MASS INDEX: 39.58 KG/M2

## 2022-08-12 LAB
ABSOLUTE EOS #: 0.26 K/UL (ref 0–0.44)
ABSOLUTE IMMATURE GRANULOCYTE: 0.06 K/UL (ref 0–0.3)
ABSOLUTE LYMPH #: 3.02 K/UL (ref 1.1–3.7)
ABSOLUTE MONO #: 1.22 K/UL (ref 0.1–1.2)
ALBUMIN SERPL-MCNC: 3 G/DL (ref 3.5–5.2)
ALBUMIN/GLOBULIN RATIO: 0.6 (ref 1–2.5)
ALP BLD-CCNC: 99 U/L (ref 40–129)
ALT SERPL-CCNC: 31 U/L (ref 5–41)
ANION GAP SERPL CALCULATED.3IONS-SCNC: 12 MMOL/L (ref 9–17)
AST SERPL-CCNC: 14 U/L
BASOPHILS # BLD: 1 % (ref 0–2)
BASOPHILS ABSOLUTE: 0.06 K/UL (ref 0–0.2)
BILIRUB SERPL-MCNC: 0.25 MG/DL (ref 0.3–1.2)
BUN BLDV-MCNC: 7 MG/DL (ref 6–20)
CALCIUM SERPL-MCNC: 9.3 MG/DL (ref 8.6–10.4)
CHLORIDE BLD-SCNC: 104 MMOL/L (ref 98–107)
CO2: 23 MMOL/L (ref 20–31)
CREAT SERPL-MCNC: 1.01 MG/DL (ref 0.7–1.2)
EOSINOPHILS RELATIVE PERCENT: 2 % (ref 1–4)
GFR AFRICAN AMERICAN: >60 ML/MIN
GFR NON-AFRICAN AMERICAN: >60 ML/MIN
GFR SERPL CREATININE-BSD FRML MDRD: ABNORMAL ML/MIN/{1.73_M2}
GLUCOSE BLD-MCNC: 92 MG/DL (ref 70–99)
HCT VFR BLD CALC: 39.1 % (ref 40.7–50.3)
HEMOGLOBIN: 12.1 G/DL (ref 13–17)
IMMATURE GRANULOCYTES: 1 %
INR BLD: 1
LACTIC ACID, SEPSIS WHOLE BLOOD: 0.8 MMOL/L (ref 0.5–1.9)
LACTIC ACID, SEPSIS WHOLE BLOOD: 1 MMOL/L (ref 0.5–1.9)
LACTIC ACID, SEPSIS WHOLE BLOOD: 1 MMOL/L (ref 0.5–1.9)
LACTIC ACID, SEPSIS WHOLE BLOOD: 1.2 MMOL/L (ref 0.5–1.9)
LYMPHOCYTES # BLD: 26 % (ref 24–43)
MCH RBC QN AUTO: 28.1 PG (ref 25.2–33.5)
MCHC RBC AUTO-ENTMCNC: 30.9 G/DL (ref 28.4–34.8)
MCV RBC AUTO: 90.9 FL (ref 82.6–102.9)
MONOCYTES # BLD: 11 % (ref 3–12)
NRBC AUTOMATED: 0 PER 100 WBC
PDW BLD-RTO: 15.6 % (ref 11.8–14.4)
PLATELET # BLD: 368 K/UL (ref 138–453)
PMV BLD AUTO: 8.4 FL (ref 8.1–13.5)
POTASSIUM SERPL-SCNC: 4.5 MMOL/L (ref 3.7–5.3)
PROTHROMBIN TIME: 10.3 SEC (ref 9.1–12.3)
RBC # BLD: 4.3 M/UL (ref 4.21–5.77)
RBC # BLD: ABNORMAL 10*6/UL
SEG NEUTROPHILS: 59 % (ref 36–65)
SEGMENTED NEUTROPHILS ABSOLUTE COUNT: 6.86 K/UL (ref 1.5–8.1)
SODIUM BLD-SCNC: 139 MMOL/L (ref 135–144)
TOTAL PROTEIN: 8.3 G/DL (ref 6.4–8.3)
VANCOMYCIN RANDOM: 12.3 UG/ML
WBC # BLD: 11.5 K/UL (ref 3.5–11.3)

## 2022-08-12 PROCEDURE — 85025 COMPLETE CBC W/AUTO DIFF WBC: CPT

## 2022-08-12 PROCEDURE — 97530 THERAPEUTIC ACTIVITIES: CPT

## 2022-08-12 PROCEDURE — 85610 PROTHROMBIN TIME: CPT

## 2022-08-12 PROCEDURE — 99232 SBSQ HOSP IP/OBS MODERATE 35: CPT | Performed by: INTERNAL MEDICINE

## 2022-08-12 PROCEDURE — 6370000000 HC RX 637 (ALT 250 FOR IP): Performed by: NURSE PRACTITIONER

## 2022-08-12 PROCEDURE — 6360000002 HC RX W HCPCS: Performed by: NURSE PRACTITIONER

## 2022-08-12 PROCEDURE — 83605 ASSAY OF LACTIC ACID: CPT

## 2022-08-12 PROCEDURE — 97535 SELF CARE MNGMENT TRAINING: CPT

## 2022-08-12 PROCEDURE — 36415 COLL VENOUS BLD VENIPUNCTURE: CPT

## 2022-08-12 PROCEDURE — 2580000003 HC RX 258: Performed by: INTERNAL MEDICINE

## 2022-08-12 PROCEDURE — 6360000002 HC RX W HCPCS: Performed by: INTERNAL MEDICINE

## 2022-08-12 PROCEDURE — 2500000003 HC RX 250 WO HCPCS

## 2022-08-12 PROCEDURE — 99232 SBSQ HOSP IP/OBS MODERATE 35: CPT | Performed by: STUDENT IN AN ORGANIZED HEALTH CARE EDUCATION/TRAINING PROGRAM

## 2022-08-12 PROCEDURE — 97162 PT EVAL MOD COMPLEX 30 MIN: CPT

## 2022-08-12 PROCEDURE — 2580000003 HC RX 258: Performed by: NURSE PRACTITIONER

## 2022-08-12 PROCEDURE — 80053 COMPREHEN METABOLIC PANEL: CPT

## 2022-08-12 PROCEDURE — 80202 ASSAY OF VANCOMYCIN: CPT

## 2022-08-12 PROCEDURE — 97166 OT EVAL MOD COMPLEX 45 MIN: CPT

## 2022-08-12 PROCEDURE — 0H98XZZ DRAINAGE OF BUTTOCK SKIN, EXTERNAL APPROACH: ICD-10-PCS

## 2022-08-12 RX ORDER — BENZTROPINE MESYLATE 1 MG/1
0.5 TABLET ORAL 2 TIMES DAILY
Qty: 90 TABLET | Refills: 3 | Status: ON HOLD | OUTPATIENT
Start: 2022-08-12 | End: 2022-09-16 | Stop reason: HOSPADM

## 2022-08-12 RX ORDER — AMOXICILLIN AND CLAVULANATE POTASSIUM 875; 125 MG/1; MG/1
1 TABLET, FILM COATED ORAL 2 TIMES DAILY
Qty: 28 TABLET | Refills: 0 | Status: SHIPPED | OUTPATIENT
Start: 2022-08-12 | End: 2022-08-26

## 2022-08-12 RX ORDER — LIDOCAINE HYDROCHLORIDE 10 MG/ML
5 INJECTION, SOLUTION INFILTRATION; PERINEURAL ONCE
Status: COMPLETED | OUTPATIENT
Start: 2022-08-12 | End: 2022-08-12

## 2022-08-12 RX ADMIN — VANCOMYCIN HYDROCHLORIDE 1250 MG: 10 INJECTION, POWDER, LYOPHILIZED, FOR SOLUTION INTRAVENOUS at 11:57

## 2022-08-12 RX ADMIN — Medication 1 TABLET: at 08:42

## 2022-08-12 RX ADMIN — DOCUSATE SODIUM 100 MG: 100 CAPSULE ORAL at 08:41

## 2022-08-12 RX ADMIN — SODIUM CHLORIDE, PRESERVATIVE FREE 10 ML: 5 INJECTION INTRAVENOUS at 08:41

## 2022-08-12 RX ADMIN — QUETIAPINE FUMARATE 300 MG: 300 TABLET, EXTENDED RELEASE ORAL at 08:42

## 2022-08-12 RX ADMIN — LITHIUM CARBONATE 450 MG: 450 TABLET, EXTENDED RELEASE ORAL at 08:41

## 2022-08-12 RX ADMIN — CITALOPRAM 20 MG: 20 TABLET, FILM COATED ORAL at 08:41

## 2022-08-12 RX ADMIN — FLUPHENAZINE DECANOATE 37.5 MG: 25 INJECTION, SOLUTION INTRAMUSCULAR; SUBCUTANEOUS at 08:40

## 2022-08-12 RX ADMIN — PIPERACILLIN AND TAZOBACTAM 4500 MG: 4; .5 INJECTION, POWDER, FOR SOLUTION INTRAVENOUS at 15:12

## 2022-08-12 RX ADMIN — QUETIAPINE FUMARATE 200 MG: 200 TABLET ORAL at 08:40

## 2022-08-12 RX ADMIN — LIDOCAINE HYDROCHLORIDE 5 ML: 10 INJECTION, SOLUTION INFILTRATION; PERINEURAL at 11:27

## 2022-08-12 RX ADMIN — ENOXAPARIN SODIUM 30 MG: 100 INJECTION SUBCUTANEOUS at 08:40

## 2022-08-12 RX ADMIN — FERROUS SULFATE TAB EC 325 MG (65 MG FE EQUIVALENT) 325 MG: 325 (65 FE) TABLET DELAYED RESPONSE at 08:41

## 2022-08-12 RX ADMIN — PIPERACILLIN AND TAZOBACTAM 4500 MG: 4; .5 INJECTION, POWDER, FOR SOLUTION INTRAVENOUS at 05:20

## 2022-08-12 ASSESSMENT — ENCOUNTER SYMPTOMS
CHEST TIGHTNESS: 0
BACK PAIN: 0
APNEA: 0
ABDOMINAL PAIN: 0
EYE PAIN: 0
EYE DISCHARGE: 0
VOMITING: 0
SHORTNESS OF BREATH: 0
NAUSEA: 0
SINUS PAIN: 0
COUGH: 0

## 2022-08-12 NOTE — PLAN OF CARE
Problem: Discharge Planning  Goal: Discharge to home or other facility with appropriate resources  Outcome: Progressing     Problem: Neurosensory - Adult  Goal: Achieves stable or improved neurological status  Outcome: Progressing  Goal: Achieves maximal functionality and self care  Outcome: Progressing     Problem: ABCDS Injury Assessment  Goal: Absence of physical injury  Outcome: Progressing     Problem: Chronic Conditions and Co-morbidities  Goal: Patient's chronic conditions and co-morbidity symptoms are monitored and maintained or improved  Outcome: Progressing

## 2022-08-12 NOTE — PROGRESS NOTES
[facility] -   Infectious diseases evaluation  admission date 8/10/2022    reason for consultation:   Evaluation of buttock abscess    Impression :   Current:  Right buttock abscess with fistulous connection to the skin in the medial right buttock - possibly just hidradenitis  Hx Hydradenitis suppurativa and used to take humira for that  Schizophrenia  Bipolar    Other:    Discussion / summary of stay / plan of care     Recommendations   Abscess drain by IR requested, IR found the collection very suprerficial and would need surg drainage as the abscess is loculated and of big size- may need wide I&D. Tremaine Garcia, Dr Paz Moreira feels the lesions are very superficial and defers to plastic- aspirate of a small collection of pus done but no sample sent. Dr Socrates Evangelista, would do wound care as outpt - disc w medicine, will DC on po augmentin  Dc vanco and zosyn for now -  FU in office  Disc w GS, plartic, medicine, IR    Infection Control Recommendations   [place] Precautions  Contact Isolation       Antimicrobial Stewardship Recommendations   Simplification of therapy  Targeted therapy  History of Present Illness:   Initial history: Patient is a poor historian, history obtained from mother  Clearance Lundborg is a 38y.o.-year-old male schizophrenia, hidradenitis suppurative with recurrent skin infection presented to the hospital with complaint of abdominal discomfort and imbalance for past 3 weeks. No dizziness or abdominal pain. Patient has a stool and urine incontinence for past 2 years and requires adult diapers. Patient lives with her mother. Patient follows dermatology for diffuse hidradenitis-currently on Humira. he has multiple I&D done for hidradenitis of bilateral axilla and bilateral groin inguinal and gluteal fold. 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 in the ED patient was afebrile and hemodynamically stable.   WBC 11.9, CRP 35.5.  BMP unremarkable. CT abdomen and pelvis showed large area of abscess in the right buttock measuring up to 14 cm with fistulous connection to the skin in the medial right buttock. ID was consulted for the evaluation of right buttock wound. On exam his R buttock is indurated and tender but not red    Interval changes  8/12/2022   Patient Vitals for the past 8 hrs:   BP Temp Temp src Pulse Resp SpO2 Weight   08/12/22 0523 -- -- -- -- -- -- 267 lb 3.2 oz (121.2 kg)   08/12/22 0434 107/86 97.7 °F (36.5 °C) Temporal 80 18 92 % --     Current evaluation:    -No acute events overnight. Patient remained afebrile and hemodynamically stable  -No new acute complaints  -Labs pending today  -Surgery to reevaluate for I&D of the abscess in the OR as the abscess is the loculated  -Lactate normal    Summary of relevant labs:  Labs:  CRP-35.5  WBC 11.9  Creat-1.09  Micro:  Blood culture 8/10- negative  Urine culture -negative  Urinalysis unremarkable  Imaging:    CT abdomen and pelvis 8/10    Impression   1. Large area of abscess/phlegmonous change centered in the right buttock   which measures up to 14 cm. There is extension of skin thickening to the   left buttock and midline low back, as well as a fistulous connection to the   skin in the medial right buttock. No subcutaneous gas to indicate   necrotizing infection/Opal's gangrene. No evidence of acute   osteomyelitis. 2. Area of low attenuation in the left inguinal canal is favored to reflect   high position of the left testicle within the inguinal canal, rather than   infection. This can be corroborated with physical exam.   3. Moderate to large amount of stool in the rectosigmoid colon. A small   metallic density within a fecal ball in the rectal vault is favored to   reflect an ingested foreign body. 4. Unchanged chronic findings outlined in the body of the report.        I have personally reviewed the past medical history, past surgical history, medications, social history, and family history, and I haveupdated the database accordingly. Allergies:   Depakene [valproic acid], Restoril [temazepam], Risperidone and related, and Thorazine [chlorpromazine]     Review of Systems:     Review of Systems   Constitutional:  Positive for activity change. Negative for appetite change and fever. HENT:  Negative for mouth sores and sinus pain. Eyes:  Negative for pain and discharge. Respiratory:  Negative for apnea, cough, chest tightness and shortness of breath. Cardiovascular:  Negative for chest pain and leg swelling. Gastrointestinal:  Negative for abdominal pain, nausea and vomiting. Vague abdominal discomfort   Endocrine: Negative for polyuria. Genitourinary:  Negative for difficulty urinating, dysuria and urgency. Musculoskeletal:  Negative for arthralgias, back pain and joint swelling. Neurological:  Negative for facial asymmetry and headaches. Hematological:  Negative for adenopathy. Psychiatric/Behavioral:  Negative for suicidal ideas. The patient is not nervous/anxious. Physical Examination :       Physical Exam  Constitutional:       Appearance: Normal appearance. He is not ill-appearing. Comments: Appears sleepy   HENT:      Head: Normocephalic and atraumatic. Nose: Nose normal.      Mouth/Throat:      Mouth: Mucous membranes are moist.   Eyes:      General: No scleral icterus. Pupils: Pupils are equal, round, and reactive to light. Cardiovascular:      Rate and Rhythm: Normal rate. Pulses: Normal pulses. Heart sounds: Normal heart sounds. Pulmonary:      Breath sounds: Normal breath sounds. No wheezing or rhonchi. Abdominal:      General: There is no distension. Palpations: There is no mass. Tenderness: There is no abdominal tenderness. Genitourinary:     Comments: No hunter  Musculoskeletal:         General: No swelling.       Cervical back: Normal range of motion and neck adocument that actually reflects the content of the visit, the document can still have some errors including those of syntax and sound a like substitutions which may escape proof reading. It such instances, actual meaningcan be extrapolated by contextual diversion. Cabrera Jimenez MD  Office: [phone]  Perfect serve / office [phone]      I have discussed the care of the patient, including pertinent history and exam findings,  with the resident. I have seen and examined the patient and the key elements of all parts of the encounter have been performed by me. I agree with the assessment, plan and orders as documented by the resident.     Kathy Rivera, Infectious Diseases

## 2022-08-12 NOTE — CARE COORDINATION
Discharge 1 [facility] [place] Case Management Department  Written by: Amy Gutierres RN    Patient Name: Jalen Bragg  Attending Provider: Maggy Downey MD  Admit Date: 8/10/2022  8:24 PM  MRN: [id number]  Account: [de-identified]                     : 1980  Discharge Date: 22      Disposition: home, no needs    Amy Gutierres RN

## 2022-08-12 NOTE — PROGRESS NOTES
General Surgery:  Daily Progress Note            PATIENT NAME: Vandana Samson     TODAY'S DATE: 8/12/2022, 5:51 AM  CC:  GERD    SUBJECTIVE:     Pt seen and examined at bedside. Pt somnolent and difficult to awake. Report obtained from the RN. No acute events. Pt remained afebrile and normotensive overnight. Rn states patients overall pain is well controlled. Denies fever, chills, nausea, vomiting, chest pain, and SOB. Tolerating regular diet. Pt last BM 8/11/22. UOP 1.0 ml/kg/hr overnight. Lactic acid remained 1.0 on 8/11/22. AM labs pending. OBJECTIVE:   VITALS:  /86   Pulse 80   Temp 97.7 °F (36.5 °C) (Temporal)   Resp 18   Ht 5' 9\" (1.753 m)   Wt 267 lb 3.2 oz (121.2 kg)   SpO2 92%   BMI 39.46 kg/m²      INTAKE/OUTPUT:      Intake/Output Summary (Last 24 hours) at 8/12/2022 0551  Last data filed at 8/12/2022 0434  Gross per 24 hour   Intake --   Output 1450 ml   Net -1450 ml       PHYSICAL EXAM:  General Appearance: not distressed  HEENT:  Normocephalic, atraumatic, mucus membranes moist   Skin:  Numerous connected scarring and intermingled lesions with fistulous tracts draining cystic fluid, area of induration at the right inferior gluteal fold without fluctuance erythema, chronic thickening of the skin on R gluteal fold and medial thigh  Lungs:  Normal expansion. Clear to auscultation. No rales, rhonchi, or wheezing. Heart:  Heart regular rate and rhythm  Abdomen:  soft, non distended, non tender  Extremities: Extremities warm to touch, pink, with no edema.       IV Access:  PIV    Data:  CBC with Differential:    Lab Results   Component Value Date/Time    WBC 11.9 08/10/2022 09:07 PM    RBC 4.32 08/10/2022 09:07 PM    RBC 3.46 02/09/2012 06:19 AM    HGB 12.3 08/10/2022 09:07 PM    HCT 39.3 08/10/2022 09:07 PM     08/10/2022 09:07 PM     02/09/2012 06:19 AM    MCV 91.0 08/10/2022 09:07 PM    MCH 28.5 08/10/2022 09:07 PM    MCHC 31.3 08/10/2022 09:07 PM    RDW 15.2 08/10/2022 09:07 PM    LYMPHOPCT 31 08/10/2022 09:07 PM    MONOPCT 10 08/10/2022 09:07 PM    BASOPCT 0 08/10/2022 09:07 PM    MONOSABS 1.23 08/10/2022 09:07 PM    LYMPHSABS 3.66 08/10/2022 09:07 PM    EOSABS 0.23 08/10/2022 09:07 PM    BASOSABS 0.04 08/10/2022 09:07 PM    DIFFTYPE NOT REPORTED 11/03/2021 09:08 AM     CMP:    Lab Results   Component Value Date/Time     08/10/2022 09:07 PM    K 4.0 08/10/2022 09:07 PM     08/10/2022 09:07 PM    CO2 27 08/10/2022 09:07 PM    BUN 12 08/10/2022 09:07 PM    CREATININE 1.09 08/10/2022 09:07 PM    GFRAA >60 08/10/2022 09:07 PM    LABGLOM >60 08/10/2022 09:07 PM    GLUCOSE 93 08/10/2022 09:07 PM    GLUCOSE 89 02/09/2012 06:19 AM    PROT 8.0 08/10/2022 09:07 PM    LABALBU 3.2 08/10/2022 09:07 PM    CALCIUM 9.0 08/10/2022 09:07 PM    BILITOT <0.10 08/10/2022 09:07 PM    ALKPHOS 108 08/10/2022 09:07 PM    AST 18 08/10/2022 09:07 PM    ALT 38 08/10/2022 09:07 PM     U/A:    Lab Results   Component Value Date/Time    COLORU Yellow 08/10/2022 11:35 PM    PROTEINU NEGATIVE 08/10/2022 11:35 PM    PHUR 6.0 08/10/2022 11:35 PM    WBCUA None 08/10/2022 11:35 PM    RBCUA 2 TO 5 08/10/2022 11:35 PM    MUCUS NOT REPORTED 01/06/2022 04:20 PM    TRICHOMONAS NOT REPORTED 01/06/2022 04:20 PM    YEAST NOT REPORTED 01/06/2022 04:20 PM    BACTERIA NOT REPORTED 01/06/2022 04:20 PM    SPECGRAV 1.039 08/10/2022 11:35 PM    LEUKOCYTESUR NEGATIVE 08/10/2022 11:35 PM    UROBILINOGEN Normal 08/10/2022 11:35 PM    BILIRUBINUR NEGATIVE 08/10/2022 11:35 PM    GLUCOSEU NEGATIVE 08/10/2022 11:35 PM    AMORPHOUS NOT REPORTED 01/06/2022 04:20 PM       Radiology Review:   CT ABDOMEN PELVIS W IV CONTRAST Additional Contrast? None    Result Date: 8/11/2022  1. Large area of abscess/phlegmonous change centered in the right buttock which measures up to 14 cm.   There is extension of skin thickening to the left buttock and midline low back, as well as a fistulous connection to the skin in the medial right buttock. No subcutaneous gas to indicate necrotizing infection/Opal's gangrene. No evidence of acute osteomyelitis. 2. Area of low attenuation in the left inguinal canal is favored to reflect high position of the left testicle within the inguinal canal, rather than infection. This can be corroborated with physical exam. 3. Moderate to large amount of stool in the rectosigmoid colon. A small metallic density within a fecal ball in the rectal vault is favored to reflect an ingested foreign body. 4. Unchanged chronic findings outlined in the body of the report. ASSESSMENT:  Active Hospital Problems    Diagnosis Date Noted    [facility] [A41.9] 08/11/2022     Priority: Medium    Constipation [K59.00] 08/11/2022     Priority: Medium    Tobacco abuse [Z72.0] 07/16/2020    GERD (gastroesophageal reflux disease) [K21.9] 08/13/2016    Paranoid schizophrenia, chronic condition with acute exacerbation ([facility] [address] [F20.0] 06/23/2016    Hidradenitis axillaris [L73.2] 08/02/2012    Abscess and cellulitis of gluteal region [L02.31, O45.861] 04/25/2012       38 y.o. male with hidradenitis suppurartive and abscess of right inferior gluteal fold     Plan:  Continue to monitor strict I/Os  Continue abx recommendations from infectious disease  Diet: Regular  Bowel regimen:  Glycolax  DVT prophylaxis:  Lovenox   Activity:  Continue to encourage ambulation/activity   ID - recommends Surgery team to drain abscess, will reevaluate for possible I&D    Electronically signed by Marta Coker  on 8/12/2022 at 5:51 AM     Addendum:    Patient seen and examined with Medical Student and Surgical Team.  Agree with assessment and plan with changes made accordingly.       Alina Almonte DO  PGY-1 General Surgery  08/12/22  7:07 AM  [phone]

## 2022-08-12 NOTE — PROCEDURES
Procedure Note    Patient: Shayan Pretty  MRN: [id number]    Date of Procedure: 8/12/2022    Proceduralist: Billie Tamayo DO    Pre-procedure Diagnosis: Hydradenitis supuritiva    Post-procedure Diagnosis: same    Procedure: Bedside incision and drainage of R buttock abscess    Estimated Blood Loss: Minimal    Complications: None    Specimens: None    Indications: Shayan Pretty is a 78-year-old male with a medical history of hidradenitis suppurativa. He presents the hospital with concern for sepsis. Upon presentation CT scan demonstrated a small loculated fluid collection in the posterior medial right buttock concerning for abscess. General surgery was consulted for possible incision and drainage of the abscess. Patient's mother was contacted discussed the procedure. Procedure was explained all risks, benefits, alternatives were explained to which she expressed understanding and agreement. Decision was made to move forward with incision and drainage at bedside. Procedure:   Procedure was begun by verifying correct patient and procedure. Patient was placed in the right lateral decubitus position. Area of suspected lesion was prepped with ChloraPrep. Skin and surrounding soft tissue was anesthetized using 4 mL of 1% lidocaine without epi. Due to patient's chronic hidradenitis suppurativa, there there was much chronic scar tissue making it difficult to palpate any areas of fluctuance. Two cruciate incisions were then made in the posterior medial right buttock and thigh over areas of suspected abscess. Minimal purulent fluid was able to be expressed. Adequate hemostasis was achieved with pressure. Procedure was concluded by dressing the incisions with 4 x 4 gauze and tape.

## 2022-08-12 NOTE — CARE COORDINATION
Patients plan is to return , no needs, ID note does not indicate IV ATB, patient states he will walk when questioned on transportation

## 2022-08-12 NOTE — PROGRESS NOTES
[facility] Pharmacokinetic Monitoring Service - Vancomycin    Consulting Provider: Matt Marquez   Indication: SEPSIS/SSTI  Target Concentration: Goal AUC/LISA 400-600 mg*hr/L  Day of Therapy: 2  Additional Antimicrobials:     Pertinent Laboratory Values: Wt Readings from Last 1 Encounters:   08/12/22 267 lb 3.2 oz (121.2 kg)     Temp Readings from Last 1 Encounters:   08/12/22 97.6 °F (36.4 °C) (Oral)     Estimated Creatinine Clearance: 120 mL/min (based on SCr of 1.01 mg/dL). Recent Labs     08/10/22  2107 08/12/22  0829   CREATININE 1.09 1.01   WBC 11.9* 11.5*     Procalcitonin:     Pertinent Cultures:  Culture Date Source Results   8/10 BLOOD X2 NO GROWTH X1D   8/10 URINE NO GROWTH   MRSA Nasal Swab: not ordered. Order placed by pharmacy.     Recent vancomycin administrations                     vancomycin (VANCOCIN) 1000 mg in dextrose 5% 200 mL IVPB (mg) 1,000 mg New Bag 08/11/22 2333     1,000 mg New Bag  1241     1,000 mg New Bag  0120                    Assessment:  Date/Time Current Dose Concentration Timing of Concentration (h) AUC   8/12 1000 MG Q 12 HR  12.3 8 HRS POST DOSE  381   Note: Serum concentrations collected for AUC dosing may appear elevated if collected in close proximity to the dose administered, this is not necessarily an indication of toxicity    Plan:  Current dosing regimen is sub-therapeutic  Increase dose to 1250 MG EVERY 12 HOURS   Repeat vancomycin concentration PRN    Pharmacy will continue to monitor patient and adjust therapy as indicated    Thank you for the consult,  ARETHA Yuen CHENG [facility]  8/12/2022 10:49 AM

## 2022-08-12 NOTE — PROGRESS NOTES
Occupational Therapy  [facility] [address] [facility]  [facility]upational Therapy Initial Assessment    Name: Maria E Devine  : 1980  MRN: [id number]  Date of Service: 2022    Copied from Emergency Medicine: Maria E Devine is a 38 y.o. male who presents evaluation today of abdominal pain. Seen for past medical history of hidradenitis suppurativa. On Humira. Following with dermatology at [facility]. Significant past medical history of bipolar 1 and schizoaffective disorder. In the room he is mumbling to himself. His mom is his legal guardian who is speaking for him. He does complain of abdominal pain. But the other history from him is limited. Discharge Recommendations:  Patient would benefit from continued therapy after discharge  OT Equipment Recommendations  Equipment Needed: No       Patient Diagnosis(es): The encounter diagnosis was Abscess and cellulitis of gluteal region. Past Medical History:  has a past medical history of Bipolar 1 disorder ([address] GERD (gastroesophageal reflux disease), Hidradenitis suppurativa, Polysubstance abuse ([address] and Schizoaffective disorder ([facility] Past Surgical History:  has a past surgical history that includes Infected skin debridement (2011) and Abscess Drainage (2013). Assessment   Performance deficits / Impairments: Decreased functional mobility ; Decreased ADL status; Decreased safe awareness;Decreased cognition;Decreased balance;Decreased endurance    Assessment: RN ok'd OT evaluation. Pt agreeable to evaluation. Pt sat EOB with CGA to ensure safety with increased amount of time to complete task. Pt sat EOB for approx 10 mins during oral hygiene tasks. Pt completed with SBA for sequencing of task with increased amount of time. Pt requesting to look at view out window, impulsively stood up with CGA requiring cues for pacing. Pt stood in place x2 for 2-3 mins with no AD and no LOB. Pt to return to supine in bed with CGA.  Pt completed bed mobility by entering knee first into prone position then to side-lying position despite education for safety with line management and risk of falls. Pt side lying in bed with bed alarm on and call light in reach post session. Skilled OT services required to increase independence with mobility, safety awareness, ADL's, and balance. Prognosis: Good  Decision Making: Medium Complexity  REQUIRES OT FOLLOW-UP: Yes  Activity Tolerance  Activity Tolerance: Patient limited by fatigue        Plan   Plan  Times per Week: 3-4x/wk  Current Treatment Recommendations: Balance training, Functional mobility training, Endurance training, Patient/Caregiver education & training, Safety education & training, Self-Care / ADL, Cognitive reorientation     Restrictions  Restrictions/Precautions  Restrictions/Precautions: Up as Tolerated  Required Braces or Orthoses?: No  Position Activity Restriction  Other position/activity restrictions: Up with assist    Subjective   General  Patient assessed for rehabilitation services?: Yes  Family / Caregiver Present: No  General Comment  Comments: RN ok'd OT evaluation. Pt agreeable. Pt denies pain     Social/Functional History  Social/Functional History  Lives With: Family (Mother and son)  Type of Home: House  Home Layout: Two level  Home Access: Stairs to enter without rails  Entrance Stairs - Number of Steps: 3  Bathroom Shower/Tub: Tub/Shower unit  Bathroom Toilet: Standard  Bathroom Equipment: Shower chair  Home Equipment: Grab bars  Receives Help From: Family (Mother assists with ADL's/IADL's extent is undetermined.  Pt states \"ask my mom\")  ADL Assistance: Needs assistance  Homemaking Assistance: Needs assistance  Ambulation Assistance: Independent  Transfer Assistance: Independent  Active : No  Occupation: Unemployed  Additional Comments: Pt is a poor historian       Objective           Safety Devices  Type of Devices: Bed alarm in place;Call light within reach;Gait belt;Left in bed;Patient at risk for falls  Restraints  Restraints Initially in Place: No  Bed Mobility Training  Bed Mobility Training: Yes  Overall Level of Assistance: Contact-guard assistance;Assist X1 (CGA required for all aspects of mobility due to decreased cognition for step by step sequencing and decreased balance)  Interventions: Verbal cues  Supine to Sit: Contact-guard assistance;Assist X1;Additional time  Sit to Supine: Contact-guard assistance;Assist X1;Additional time  Balance  Sitting: Impaired  Sitting - Static: Fair (occasional) (Intermittent CGA when sitting with decreased balance when reaching to floor level. Pt sat EOB for approx 10 min in preparation for transfer)  Sitting - Dynamic: Fair (occasional)  Standing: Impaired  Standing - Static: Fair (Fair standing balance without use of AD. Pt stood in place for 2-3 mins x2 with no LOB during task)  Standing - Dynamic: Fair  Transfer Training  Transfer Training: Yes  Overall Level of Assistance: Contact-guard assistance;Assist X1 (CGA required for all standing/sitting tasks. Pt impulsive at times with decreased balance)  Sit to Stand: Contact-guard assistance;Assist X1  Stand to Sit: Contact-guard assistance;Assist X1  Gait  Overall Level of Assistance: Contact-guard assistance;Assist X1  Interventions: Verbal cues; Safety awareness training; Tactile cues  Assistive Device: Gait belt (Pt does not utilize AD at baseline. Pt completed functional mobility with CGA and use of gait belt short functional distance in room)  Wheelchair Management  Wheelchair Management: No     AROM: Within functional limits  Strength:  Within functional limits (BUE's grossly 4/5)  Coordination: Within functional limits  Tone: Normal  Sensation:  (Unable to formally assess)  ADL  Feeding: Modified independent ;Setup  Grooming: Setup;Verbal cueing;Stand by assistance  Grooming Skilled Clinical Factors: Pt completed oral hygiene while seated EOB with SBA for initation of task and sequenicing. UE Bathing: Stand by assistance;Setup;Verbal cueing  LE Bathing: Increased time to complete;Verbal cueing;Setup;Contact guard assistance  UE Dressing: Stand by assistance;Verbal cueing; Increased time to complete;Setup  LE Dressing: Contact guard assistance;Verbal cueing; Increased time to complete;Setup  Toileting: Contact guard assistance; Increased time to complete;Setup     Activity Tolerance  Activity Tolerance: Patient limited by fatigue;Patient limited by endurance;Treatment limited secondary to decreased cognition        Vision  Vision: Impaired  Vision Exceptions: Wears glasses at all times  Hearing  Hearing: Exceptions to [facility]  Hearing Exceptions: Hard of hearing/hearing concerns (Pt reports deficits in CLAUDIA ears)  Cognition  Overall Cognitive Status: Exceptions  Arousal/Alertness: Delayed responses to stimuli;Inconsistent responses to stimuli  Following Commands: Follows one step commands with increased time; Follows one step commands with repetition  Safety Judgement: Decreased awareness of need for safety;Decreased awareness of need for assistance  Problem Solving: Assistance required to implement solutions;Decreased awareness of errors;Assistance required to correct errors made;Assistance required to generate solutions  Insights: Decreased awareness of deficits  Initiation: Requires cues for some  Sequencing: Requires cues for some  Orientation  Overall Orientation Status: Impaired  Orientation Level: Oriented to person;Disoriented to situation;Disoriented to time;Disoriented to place                  Education Given To: Patient  Education Provided: Role of Therapy;Plan of Care  Education Provided Comments: Pt educated on Role of OT, POC, and safey awareness with mobility  Education Method: Verbal  Barriers to Learning: Cognition; Hearing  Education Outcome: Continued education needed  LUE AROM (degrees)  LUE AROM : WFL  Left Hand AROM (degrees)  Left Hand AROM: WFL  RUE AROM (degrees)  RUE AROM : WFL  Right Hand AROM (degrees)  Right Hand AROM: WFL        Hand Dominance  Hand Dominance: Left                AM-PAC Score        AM-PAC Inpatient Daily Activity Raw Score: 19 (08/12/22 1522)  AM-PAC Inpatient ADL T-Scale Score : 40.22 (08/12/22 1522)  ADL Inpatient CMS 0-100% Score: 42.8 (08/12/22 1522)  ADL Inpatient CMS G-Code Modifier : CK (08/12/22 1522)      Goals  Short Term Goals  Time Frame for Short term goals: By discharge, pt will  Short Term Goal 1: complete all ADL tasks IND to increase overall functional indpendence  Short Term Goal 2: complete transfers and functional mobility IND with good safety awareness for greater participation with ADL tasks  Short Term Goal 3: demonstrate 15 mins of static/dynamic standing activity IND to increase participation in ADL tasks  Short Term Goal 4: identify 3 leisure activities to increase community/independent engagement to improve quality of life  Short Term Goal 5: demonstrate ability to retrieve household items in preparation for ADL tasks IND with good safety awareness       Therapy Time   Individual Concurrent Group Co-treatment   Time In 1416         Time Out 1442         Minutes 26         Timed Code Treatment Minutes: 23 Minutes       Dora [address] [place] [address], S/OT

## 2022-08-12 NOTE — PROGRESS NOTES
Physical Therapy  Facility/Department: [address] [facility]  Physical Therapy Initial Assessment    Name: Jefrey Najjar  : 1980  MRN: [id number]  Date of Service: 2022  Chief Complaint   Patient presents with    Gastroesophageal Reflux      Discharge Recommendations:  Patient would benefit from continued therapy after discharge     Patient Diagnosis(es): The encounter diagnosis was Abscess and cellulitis of gluteal region. Past Medical History:  has a past medical history of Bipolar 1 disorder ([facility] GERD (gastroesophageal reflux disease), Hidradenitis suppurativa, Polysubstance abuse ([facility] [facility] and [facility]ffective disorder ([facility] Past Surgical History:  has a past surgical history that includes Infected skin debridement (2011) and Abscess Drainage (2013). Assessment   Body Structures, Functions, Activity Limitations Requiring Skilled Therapeutic Intervention: Decreased functional mobility ; Decreased strength;Decreased safe awareness;Decreased endurance  Assessment: The pt ambulated 20 ft with a RW x min assist. He was unsteady and lacked safety awareness while mobilizing.  He could benefit from a continuation of PT for gait and strengthening following his DC  Therapy Prognosis: Good  Decision Making: Medium Complexity  Requires PT Follow-Up: Yes  Activity Tolerance  Activity Tolerance: Patient limited by fatigue;Patient limited by endurance;Treatment limited secondary to decreased cognition     Plan   Plan  Plan:  (5-6x wk)  Current Treatment Recommendations: Strengthening, Balance training, Functional mobility training, Endurance training, Gait training, Stair training, Cognitive reorientation, Safety education & training  Safety Devices  Type of Devices: Bed alarm in place, Call light within reach, Gait belt, Left in bed, Patient at risk for falls  Restraints  Restraints Initially in Place: No     Restrictions  Restrictions/Precautions  Restrictions/Precautions: Up as Tolerated  Required Braces

## 2022-08-12 NOTE — PROGRESS NOTES
[facility]  Office: [address], DO, Bakarijasmyn Coyle, DO, Rahulphilippegrady Oconnor, DO, Jillian Kariecamilo Randall, DO, Nory Barcenas MD, Maritza Kerr MD, Park Mckeon MD, Kaitlin Webb MD,  Jose Luu MD, Tami Santos MD, Carmela Aguilar, DO, Vamsi Patel MD,  Maninder Walker MD, Hakan Belle MD, Alma Vaughn, DO, Carmel Najera MD, Kaci Fuller MD, Dominick Pederson MD, Johnathan Hilario DO, Ginny Mckeon MD, Osmany Preston MD, Malika Holman, CNP,  Tootie Brain, CNP, Blanche Heart, CNP, Aj Cruz, CNP, Christian Nicole PATrangC, Georgina Singleton, DNP, Gilda Lema, CNP, Kourtney Mayfield, CNP, Harrison Barron, CNP, Kuldeep Del Rosario, CNP, Anastasiya Mcdaniels, CNP, April Oakley, CNS, Amilcar Brandon, DNP, eKndy Mena, CNP, Indiana Counter, CNP, Vicki Canjemima, [address] [place] [address]    Progress Note    8/12/2022    10:55 AM    Name:   Amada Daniels  MRN:     [id number]     Acct:      [de-identified]   Room:   [id number] Day:  1  Admit Date:  8/10/2022  8:24 PM    PCP:   Vianca Calles MD  Code Status:  Full Code    Subjective:     C/C:   Chief Complaint   Patient presents with    Gastroesophageal Reflux     Interval History Status: not changed. Brief History:     Unable to obtain intelligble history from patient however he is here for recurrent hidradenitis suppurative of the buttocks    Surgery may perform I&D    ID following    He was previously on humira    Review of Systems:     12 pt ros could not be ascertained bc of pt current cognitive status and lack of desire to communicate with me during bedside rounds    Medications: Allergies:     Allergies   Allergen Reactions    Depakene [Valproic Acid]     Restoril [Temazepam]     Risperidone And Related      Seizure      Thorazine [Chlorpromazine]        Current Meds:   Scheduled Meds:    lidocaine  5 mL IntraDERmal Once    vancomycin  1,250 mg IntraVENous Q12H    citalopram  20 mg Oral Daily    docusate sodium  100 mg Oral BID    ferrous sulfate  325 mg Oral BID WC    lithium  450 mg Oral BID    therapeutic multivitamin-minerals  1 tablet Oral Daily    QUEtiapine  300 mg Oral Daily    QUEtiapine  200 mg Oral BID    sodium chloride flush  5-40 mL IntraVENous 2 times per day    enoxaparin  30 mg SubCUTAneous BID    piperacillin-tazobactam  4,500 mg IntraVENous Q8H    polyethylene glycol  17 g Oral Daily    vancomycin (VANCOCIN) intermittent dosing (placeholder)   Other RX Placeholder     Continuous Infusions:    sodium chloride       PRN Meds: zolpidem, sodium chloride flush, sodium chloride, acetaminophen **OR** acetaminophen, sennosides-docusate sodium    Data:     Past Medical History:   has a past medical history of Bipolar 1 disorder ([facility] GERD (gastroesophageal reflux disease), Hidradenitis suppurativa, Polysubstance abuse ([facility] and Schizoaffective disorder ([facility] Social History:   reports that he has been smoking cigarettes. He has a 22.00 pack-year smoking history. He has never used smokeless tobacco. He reports current alcohol use. He reports current drug use. Family History:   Family History   Problem Relation Age of Onset    Cancer Mother         breast    High Blood Pressure Maternal Grandfather     Mental Illness Paternal Aunt     Substance Abuse Paternal Uncle     Substance Abuse Brother     Alcohol Abuse Father     Cancer Father         pancrease       Vitals:  /71   Pulse 84   Temp 97.6 °F (36.4 °C) (Oral)   Resp 10   Ht 5' 9\" (1.753 m)   Wt 267 lb 3.2 oz (121.2 kg)   SpO2 (!) 86%   BMI 39.46 kg/m²   Temp (24hrs), Av.1 °F (36.7 °C), Min:97.4 °F (36.3 °C), Max:100.2 °F (37.9 °C)    No results for input(s): POCGLU in the last 72 hours. I/O (24Hr):     Intake/Output Summary (Last 24 hours) at 2022 1055  Last data filed at 2022 0902  Gross per 24 hour   Intake 1249.27 ml   Output 1700 ml   Net -450.73 ml Labs:  Hematology:  Recent Labs     08/10/22  2107 08/12/22  0829   WBC 11.9* 11.5*   RBC 4.32 4.30   HGB 12.3* 12.1*   HCT 39.3* 39.1*   MCV 91.0 90.9   MCH 28.5 28.1   MCHC 31.3 30.9   RDW 15.2* 15.6*    368   MPV 8.3 8.4   CRP 35.5*  --    INR 0.9 1.0     Chemistry:  Recent Labs     08/10/22  2107 08/12/22  0829    139   K 4.0 4.5    104   CO2 27 23   GLUCOSE 93 92   BUN 12 7   CREATININE 1.09 1.01   ANIONGAP 8* 12   LABGLOM >60 >60   GFRAA >60 >60   CALCIUM 9.0 9.3   TROPHS <6  --      Recent Labs     08/10/22  2107 08/12/22  0829   PROT 8.0 8.3   LABALBU 3.2* 3.0*   AST 18 14   ALT 38 31   ALKPHOS 108 99   BILITOT <0.10* 0.25*     ABG:No results found for: POCPH, PHART, PH, POCPCO2, LTA2OSU, PCO2, POCPO2, PO2ART, PO2, POCHCO3, HNI3UYF, HCO3, NBEA, PBEA, BEART, BE, THGBART, THB, EXN2DWR, HKSR7HTM, J2YUHIGZ, O2SAT, FIO2  Lab Results   Component Value Date/Time    SPECIAL LT ACUB 10ML 08/10/2022 09:09 PM     Lab Results   Component Value Date/Time    CULTURE NO SIGNIFICANT GROWTH 08/10/2022 11:35 PM       Radiology:  CT ABDOMEN PELVIS W IV CONTRAST Additional Contrast? None    Result Date: 8/11/2022  1. Large area of abscess/phlegmonous change centered in the right buttock which measures up to 14 cm. There is extension of skin thickening to the left buttock and midline low back, as well as a fistulous connection to the skin in the medial right buttock. No subcutaneous gas to indicate necrotizing infection/Opal's gangrene. No evidence of acute osteomyelitis. 2. Area of low attenuation in the left inguinal canal is favored to reflect high position of the left testicle within the inguinal canal, rather than infection. This can be corroborated with physical exam. 3. Moderate to large amount of stool in the rectosigmoid colon. A small metallic density within a fecal ball in the rectal vault is favored to reflect an ingested foreign body.  4. Unchanged chronic findings outlined in the body of the report.        Physical Examination:        General appearance:  alert, cooperative and no distress  Mental Status:  oriented to person, place and time and normal affect  Lungs:  clear to auscultation bilaterally, normal effort  Heart:  regular rate and rhythm, no murmur  Abdomen:  soft, nontender, nondistended, normal bowel sounds, no masses, hepatomegaly, splenomegaly  Extremities:  no edema, redness, tenderness in the calves  Skin:  no gross lesions, rashes, induration    Assessment:        Hospital Problems             Last Modified POA    * (Principal) Sepsis ([address] 8/11/2022 Yes    Constipation 8/11/2022 Yes    Abscess and cellulitis of gluteal region 8/11/2022 Yes    Hidradenitis axillaris 8/11/2022 Yes    Paranoid schizophrenia, chronic condition with acute exacerbation (HCC) (Chronic) 8/11/2022 Yes    GERD (gastroesophageal reflux disease) 8/11/2022 Yes    Tobacco abuse 8/11/2022 Yes       Plan:        ID following  Surgery decision to drain or not  Iv abx per ID  Rest of home meds restarted  Will consult psych as well  Dispo is pending what surgery decides to do from debridement standpoint and whether pt will need long term abx per ID  Procalcitonin and BC are unremarkable  Lactate unremarkable  No evidence of sepsis at this time  Renal function WNL    OK to dc after d/w ID  Augmentin 14 days  F/u ID and plastic surgery  Dermatology for cont Alverta MD Jordin  8/12/2022  10:55 AM

## 2022-08-13 NOTE — DISCHARGE SUMMARY
[facility]  Office: [address], DO, Sarah Rupert, DO, Juan Anderson, DO, Daphne Li Randall, DO, Deepti Lobo MD, Andrea Schwartz MD, Kate Hernadez MD, Nain Lozano MD,  Mariana Portillo MD, Keny Weaver MD, Alcira Choudhary, DO, Meghana Groves MD,  Mariama Albarran MD, Rima Collins MD, Sylvester Delgadillo, DO, Lowell Berger MD, Dayne Luna MD, Ranulfo Moreno MD, Mary Omalley DO, Yenni Braga MD, Rosy Schirmer, MD, Rosa Maria Coleman, CNP,  Cole Pete, CNP, Danyelle Yip, CNP, Jessica Cartwright, CNP, Turner Don PA-C, Otf Gunderson, [facility], Altagracia Rivera, CNP, Paola Dunaway, CNP, Leah Dowling, CNP, Eulalia Nuñez, CNP, Isabel Mireles, CNP, Yobani Lerner, CNS, Becca Hawley, [facility], Jose Alfredo Vizcaino, CNP, Ruma Wang, CNP, [facility], [facility]    Discharge Summary     Patient ID: Salomon Calvo  :  1980   MRN: [id number]     ACCOUNT:  [de-identified]   Patient's PCP: Tor Waters MD  Admit Date: 8/10/2022   Discharge Date: 22    Length of Stay: 1  Code Status:  Prior  Admitting Physician: No admitting provider for patient encounter. Discharge Physician: Dayne Luna MD     Active Discharge Diagnoses:     Hospital Problem Lists:  Principal Problem:    Sepsis [facility])  Active Problems:    Constipation    Abscess and cellulitis of gluteal region    Hidradenitis axillaris    Paranoid schizophrenia, chronic condition with acute exacerbation ([facility] [address]    GERD (gastroesophageal reflux disease)    Tobacco abuse  Resolved Problems:    * No resolved hospital problems.  *      Admission Condition:  fair     Discharged Condition: fair    Hospital Stay:     Hospital Course:  Salomon Calvo is a 38 y.o. male who was admitted for the management of   Sepsis [facility]) , presented to ER with Gastroesophageal Reflux      Admitted for recurrent soft tissue infection in setting of hidradenitis    ID recommended iv abx and debridement  Surgery declined to perform bedside I&D and recommended that plastic surgery be consulted - they also declined and recommended outpatient f/u with Dr. Mariah Jones for dc was augmentin 14 days and to see Dr. Li Benito as well as Dr. Jeremie Babb and Dr. Dustin Morocho outpatient    Significant therapeutic interventions:     Significant Diagnostic Studies:   Labs / Micro:  CBC:   Lab Results   Component Value Date/Time    WBC 11.5 08/12/2022 08:29 AM    RBC 4.30 08/12/2022 08:29 AM    RBC 3.46 02/09/2012 06:19 AM    HGB 12.1 08/12/2022 08:29 AM    HCT 39.1 08/12/2022 08:29 AM    MCV 90.9 08/12/2022 08:29 AM    MCH 28.1 08/12/2022 08:29 AM    MCHC 30.9 08/12/2022 08:29 AM    RDW 15.6 08/12/2022 08:29 AM     08/12/2022 08:29 AM     02/09/2012 06:19 AM        Radiology:  CT ABDOMEN PELVIS W IV CONTRAST Additional Contrast? None    Result Date: 8/11/2022  1. Large area of abscess/phlegmonous change centered in the right buttock which measures up to 14 cm. There is extension of skin thickening to the left buttock and midline low back, as well as a fistulous connection to the skin in the medial right buttock. No subcutaneous gas to indicate necrotizing infection/Opal's gangrene. No evidence of acute osteomyelitis. 2. Area of low attenuation in the left inguinal canal is favored to reflect high position of the left testicle within the inguinal canal, rather than infection. This can be corroborated with physical exam. 3. Moderate to large amount of stool in the rectosigmoid colon. A small metallic density within a fecal ball in the rectal vault is favored to reflect an ingested foreign body. 4. Unchanged chronic findings outlined in the body of the report.        Consultations:    Consults:     Final Specialist Recommendations/Findings:   IP CONSULT TO GENERAL SURGERY  PHARMACY TO DOSE VANCOMYCIN  IP CONSULT TO HOSPITALIST  PHARMACY TO DOSE VANCOMYCIN  IP CONSULT QUEtiapine 300 MG extended release tablet  Commonly known as: SEROQUEL XR     therapeutic multivitamin-minerals tablet     ZOLPIDEM TARTRATE PO               Where to Get Your Medications        These medications were sent to [facility] [address] [facility], [address] [facility]  [address], [facility] [address]      Phone: [phone]   amoxicillin-clavulanate 875-125 MG per tablet  benztropine 1 MG tablet         No discharge procedures on file. Time Spent on discharge is  31 mins in patient examination, evaluation, counseling as well as medication reconciliation, prescriptions for required medications, discharge plan and follow up. Electronically signed by   Winsome Morse MD  8/13/2022  12:18 PM      Thank you Dr. Jose Jeter MD for the opportunity to be involved in this patient's care.

## 2022-08-15 ENCOUNTER — TELEPHONE (OUTPATIENT)
Dept: SURGERY | Age: 42
End: 2022-08-15

## 2022-08-15 LAB
CULTURE: NORMAL
CULTURE: NORMAL
Lab: NORMAL
Lab: NORMAL
SPECIMEN DESCRIPTION: NORMAL
SPECIMEN DESCRIPTION: NORMAL

## 2022-08-15 NOTE — TELEPHONE ENCOUNTER
Message  Received: Today  MD Hebert Rodriguez  I believe this is a wound care patient.   However if he does have hidradenitis he needs a referral to dermatology

## 2022-08-15 NOTE — TELEPHONE ENCOUNTER
Lvm for patient's legal guardian, Jillian Prakash that Dr. Tiana Moreira does not treat patient medical condition and that he needs to be followed back by PCP and referred to dermatology.

## 2022-08-22 ENCOUNTER — TELEPHONE (OUTPATIENT)
Dept: DERMATOLOGY | Age: 42
End: 2022-08-22

## 2022-09-09 ENCOUNTER — HOSPITAL ENCOUNTER (INPATIENT)
Age: 42
LOS: 7 days | Discharge: HOME | DRG: 885 | End: 2022-09-16
Attending: EMERGENCY MEDICINE | Admitting: PSYCHIATRY & NEUROLOGY
Payer: COMMERCIAL

## 2022-09-09 DIAGNOSIS — F23 ACUTE PSYCHOSIS (HCC): Primary | ICD-10-CM

## 2022-09-09 PROBLEM — F20.0 ACUTE EXACERBATION OF CHRONIC PARANOID SCHIZOPHRENIA (HCC): Status: ACTIVE | Noted: 2022-09-09

## 2022-09-09 LAB
ABSOLUTE EOS #: 0.1 K/UL (ref 0–0.4)
ABSOLUTE LYMPH #: 1.9 K/UL (ref 1–4.8)
ABSOLUTE MONO #: 0.7 K/UL (ref 0.1–1.3)
ACETAMINOPHEN LEVEL: <5 UG/ML (ref 10–30)
ALBUMIN SERPL-MCNC: 3.8 G/DL (ref 3.5–5.2)
ALP BLD-CCNC: 116 U/L (ref 40–129)
ALT SERPL-CCNC: 19 U/L (ref 5–41)
AMPHETAMINE SCREEN URINE: NEGATIVE
ANION GAP SERPL CALCULATED.3IONS-SCNC: 7 MMOL/L (ref 9–17)
AST SERPL-CCNC: 14 U/L
BARBITURATE SCREEN URINE: NEGATIVE
BASOPHILS # BLD: 1 % (ref 0–2)
BASOPHILS ABSOLUTE: 0.1 K/UL (ref 0–0.2)
BENZODIAZEPINE SCREEN, URINE: NEGATIVE
BILIRUB SERPL-MCNC: 0.2 MG/DL (ref 0.3–1.2)
BUN BLDV-MCNC: 10 MG/DL (ref 6–20)
CALCIUM SERPL-MCNC: 9.7 MG/DL (ref 8.6–10.4)
CANNABINOID SCREEN URINE: NEGATIVE
CHLORIDE BLD-SCNC: 102 MMOL/L (ref 98–107)
CO2: 30 MMOL/L (ref 20–31)
COCAINE METABOLITE, URINE: NEGATIVE
CREAT SERPL-MCNC: 0.98 MG/DL (ref 0.7–1.2)
EOSINOPHILS RELATIVE PERCENT: 1 % (ref 0–4)
ETHANOL PERCENT: <0.01 %
ETHANOL: <10 MG/DL
FENTANYL URINE: NEGATIVE
GFR AFRICAN AMERICAN: >60 ML/MIN
GFR NON-AFRICAN AMERICAN: >60 ML/MIN
GFR SERPL CREATININE-BSD FRML MDRD: ABNORMAL ML/MIN/{1.73_M2}
GLUCOSE BLD-MCNC: 92 MG/DL (ref 70–99)
HCT VFR BLD CALC: 39 % (ref 41–53)
HEMOGLOBIN: 12.9 G/DL (ref 13.5–17.5)
LYMPHOCYTES # BLD: 20 % (ref 24–44)
MAGNESIUM: 1.9 MG/DL (ref 1.6–2.6)
MCH RBC QN AUTO: 29 PG (ref 26–34)
MCHC RBC AUTO-ENTMCNC: 33.1 G/DL (ref 31–37)
MCV RBC AUTO: 87.8 FL (ref 80–100)
METHADONE SCREEN, URINE: NEGATIVE
MONOCYTES # BLD: 8 % (ref 1–7)
OPIATES, URINE: NEGATIVE
OXYCODONE SCREEN URINE: NEGATIVE
PDW BLD-RTO: 16.9 % (ref 11.5–14.9)
PHENCYCLIDINE, URINE: NEGATIVE
PLATELET # BLD: 386 K/UL (ref 150–450)
PMV BLD AUTO: 6.4 FL (ref 6–12)
POTASSIUM SERPL-SCNC: 4.1 MMOL/L (ref 3.7–5.3)
RBC # BLD: 4.44 M/UL (ref 4.5–5.9)
SALICYLATE LEVEL: <1 MG/DL (ref 3–10)
SEG NEUTROPHILS: 70 % (ref 36–66)
SEGMENTED NEUTROPHILS ABSOLUTE COUNT: 6.8 K/UL (ref 1.3–9.1)
SODIUM BLD-SCNC: 139 MMOL/L (ref 135–144)
TEST INFORMATION: NORMAL
TOTAL PROTEIN: 9.1 G/DL (ref 6.4–8.3)
WBC # BLD: 9.6 K/UL (ref 3.5–11)

## 2022-09-09 PROCEDURE — 1240000000 HC EMOTIONAL WELLNESS R&B

## 2022-09-09 PROCEDURE — 80179 DRUG ASSAY SALICYLATE: CPT

## 2022-09-09 PROCEDURE — G0480 DRUG TEST DEF 1-7 CLASSES: HCPCS

## 2022-09-09 PROCEDURE — 99285 EMERGENCY DEPT VISIT HI MDM: CPT

## 2022-09-09 PROCEDURE — 80053 COMPREHEN METABOLIC PANEL: CPT

## 2022-09-09 PROCEDURE — 85025 COMPLETE CBC W/AUTO DIFF WBC: CPT

## 2022-09-09 PROCEDURE — 80307 DRUG TEST PRSMV CHEM ANLYZR: CPT

## 2022-09-09 PROCEDURE — 83735 ASSAY OF MAGNESIUM: CPT

## 2022-09-09 PROCEDURE — 80143 DRUG ASSAY ACETAMINOPHEN: CPT

## 2022-09-09 PROCEDURE — 36415 COLL VENOUS BLD VENIPUNCTURE: CPT

## 2022-09-09 RX ORDER — HALOPERIDOL 5 MG/ML
5 INJECTION INTRAMUSCULAR EVERY 6 HOURS PRN
Status: DISCONTINUED | OUTPATIENT
Start: 2022-09-09 | End: 2022-09-16 | Stop reason: HOSPADM

## 2022-09-09 RX ORDER — HALOPERIDOL 5 MG
5 TABLET ORAL EVERY 6 HOURS PRN
Status: DISCONTINUED | OUTPATIENT
Start: 2022-09-09 | End: 2022-09-16 | Stop reason: HOSPADM

## 2022-09-09 RX ORDER — POLYETHYLENE GLYCOL 3350 17 G/17G
17 POWDER, FOR SOLUTION ORAL DAILY PRN
Status: DISCONTINUED | OUTPATIENT
Start: 2022-09-09 | End: 2022-09-16 | Stop reason: HOSPADM

## 2022-09-09 RX ORDER — HYDROXYZINE 50 MG/1
50 TABLET, FILM COATED ORAL 3 TIMES DAILY PRN
Status: DISCONTINUED | OUTPATIENT
Start: 2022-09-09 | End: 2022-09-16 | Stop reason: HOSPADM

## 2022-09-09 RX ORDER — ACETAMINOPHEN 325 MG/1
650 TABLET ORAL EVERY 6 HOURS PRN
Status: DISCONTINUED | OUTPATIENT
Start: 2022-09-09 | End: 2022-09-16 | Stop reason: HOSPADM

## 2022-09-09 RX ORDER — DIPHENHYDRAMINE HYDROCHLORIDE 50 MG/ML
50 INJECTION INTRAMUSCULAR; INTRAVENOUS EVERY 6 HOURS PRN
Status: DISCONTINUED | OUTPATIENT
Start: 2022-09-09 | End: 2022-09-16 | Stop reason: HOSPADM

## 2022-09-09 RX ORDER — MAGNESIUM HYDROXIDE/ALUMINUM HYDROXICE/SIMETHICONE 120; 1200; 1200 MG/30ML; MG/30ML; MG/30ML
30 SUSPENSION ORAL EVERY 6 HOURS PRN
Status: DISCONTINUED | OUTPATIENT
Start: 2022-09-09 | End: 2022-09-16 | Stop reason: HOSPADM

## 2022-09-09 RX ORDER — IBUPROFEN 400 MG/1
400 TABLET ORAL EVERY 6 HOURS PRN
Status: DISCONTINUED | OUTPATIENT
Start: 2022-09-09 | End: 2022-09-16 | Stop reason: HOSPADM

## 2022-09-09 RX ORDER — LORAZEPAM 1 MG/1
2 TABLET ORAL EVERY 6 HOURS PRN
Status: DISCONTINUED | OUTPATIENT
Start: 2022-09-09 | End: 2022-09-16 | Stop reason: HOSPADM

## 2022-09-09 RX ORDER — LORAZEPAM 2 MG/ML
2 INJECTION INTRAMUSCULAR EVERY 6 HOURS PRN
Status: DISCONTINUED | OUTPATIENT
Start: 2022-09-09 | End: 2022-09-16 | Stop reason: HOSPADM

## 2022-09-09 RX ORDER — TRAZODONE HYDROCHLORIDE 50 MG/1
50 TABLET ORAL NIGHTLY PRN
Status: DISCONTINUED | OUTPATIENT
Start: 2022-09-09 | End: 2022-09-16 | Stop reason: HOSPADM

## 2022-09-09 ASSESSMENT — PAIN - FUNCTIONAL ASSESSMENT: PAIN_FUNCTIONAL_ASSESSMENT: NONE - DENIES PAIN

## 2022-09-09 ASSESSMENT — ENCOUNTER SYMPTOMS
EYE PAIN: 0
COLOR CHANGE: 0
BACK PAIN: 0
SHORTNESS OF BREATH: 0
ABDOMINAL PAIN: 0

## 2022-09-09 ASSESSMENT — SLEEP AND FATIGUE QUESTIONNAIRES
AVERAGE NUMBER OF SLEEP HOURS: 6
DO YOU HAVE DIFFICULTY SLEEPING: COMMENT
DO YOU USE A SLEEP AID: COMMENT

## 2022-09-09 ASSESSMENT — LIFESTYLE VARIABLES
HOW OFTEN DO YOU HAVE A DRINK CONTAINING ALCOHOL: PATIENT DECLINED
HOW MANY STANDARD DRINKS CONTAINING ALCOHOL DO YOU HAVE ON A TYPICAL DAY: PATIENT DECLINED
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
## 2022-09-09 NOTE — BH NOTE
Patient given tobacco quitline number [phone] at this time. With nurse observation patient called number for information and follow up. Continue to reinforce the dangers of long term tobacco use and why tobacco cessation is important to patient.
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
## 2022-09-10 PROCEDURE — 6370000000 HC RX 637 (ALT 250 FOR IP)

## 2022-09-10 PROCEDURE — 90792 PSYCH DIAG EVAL W/MED SRVCS: CPT | Performed by: PSYCHIATRY & NEUROLOGY

## 2022-09-10 PROCEDURE — 1240000000 HC EMOTIONAL WELLNESS R&B

## 2022-09-10 PROCEDURE — 99223 1ST HOSP IP/OBS HIGH 75: CPT | Performed by: INTERNAL MEDICINE

## 2022-09-10 PROCEDURE — APPSS60 APP SPLIT SHARED TIME 46-60 MINUTES

## 2022-09-10 RX ORDER — HALOPERIDOL 1 MG/1
1 TABLET ORAL 2 TIMES DAILY
Status: DISCONTINUED | OUTPATIENT
Start: 2022-09-10 | End: 2022-09-16 | Stop reason: HOSPADM

## 2022-09-10 RX ORDER — CARBAMAZEPINE 200 MG/1
200 TABLET ORAL 2 TIMES DAILY
Status: DISCONTINUED | OUTPATIENT
Start: 2022-09-10 | End: 2022-09-16 | Stop reason: HOSPADM

## 2022-09-10 RX ADMIN — CARBAMAZEPINE 200 MG: 200 TABLET ORAL at 22:40

## 2022-09-10 RX ADMIN — HALOPERIDOL 1 MG: 1 TABLET ORAL at 14:49

## 2022-09-10 RX ADMIN — HALOPERIDOL 1 MG: 1 TABLET ORAL at 22:40

## 2022-09-10 RX ADMIN — CARBAMAZEPINE 200 MG: 200 TABLET ORAL at 14:49
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
## 2022-09-10 NOTE — PROGRESS NOTES
RT ASSESSMENT TREATMENT GOALS    []Pt Goal: Pt will identify 1-2 positive coping skills by time of discharge. []Pt Goal: Pt will identify 1-2 positive aspects of self by time of discharge. []Pt Goal: Pt will remain on task/topic for 15-30 minutes during group by time of discharge. []Pt Goal: Pt will identify 1-2 aspects of relapse prevention plan by time of discharge. [x]Pt Goal: Pt will join in harmonious conversation with peers 1-2 times per group by time of discharge. []Pt Goal: Pt will identify 1-2 new leisure interests by time of discharge. [x]Pt Goal: Pt will not voice any delusional content by time of discharge.

## 2022-09-10 NOTE — CARE COORDINATION
GUARDIANSHIP:  - Writer confirms on [facility] case management web site that Pt's mother, Joshua Whittaker at [phone] is his legal guardian and payee. She provides verbal approval for treatment and if nursing needs anything to contact her. - She is coming to the [facility] to sign paperwork either later today or tomorrow. Writer provides her with [place] phone number to call and writer will take paperwork downstairs for her to sign.  - Writer leaves a voicemail at [facility] to be advised she is coming in if she forgets writers name or phone to direct her to [address] [place] [address] on Anna [address]. - While speaking with Ms. Nicci Faroqo she states, \"this episode with him has been the worst and she was concerned for her safety. \"  At this time, she does not want to make any decisions about his return to her home. - She states he has been getting medication adjustments by his doctor at [facility]. She states the doctor has titrated him off of lithium first, and then started to titrate him off of Seroquel. She is unsure if this is what caused his behaviors but she wanted to advise the psychiatrist here at the [facility].

## 2022-09-10 NOTE — PLAN OF CARE
[facility]  Initial Interdisciplinary Treatment Plan NO      Original treatment plan Date & Time: 9/10/2022  0910    Admission Type:  Admission Type: Involuntary    Reason for admission:   Reason for Admission: Pinked by Gabe Peterson, aggression with family, off medications    Estimated Length of Stay:  5-7days  Estimated Discharge Date: to be determined by physician    PATIENT STRENGTHS:  Patient Strengths:   Patient Strengths and Limitations:   Addictive Behavior: Addictive Behavior  In the Past 3 Months, Have You Felt or Has Someone Told You That You Have a Problem With  : None  Medical Problems:  Past Medical History:   Diagnosis Date    Bipolar 1 disorder ([facility]     GERD (gastroesophageal reflux disease) 8/13/2016    Hidradenitis suppurativa     Polysubstance abuse ([facility].)     Schizoaffective disorder ([facility].)      Status EXAM:Mental Status and Behavioral Exam  Normal: No  Level of Assistance: Independent/Self  Facial Expression: Flat, Avoids Gaze  Affect: Appropriate, Blunt  Level of Consciousness: Alert  Frequency of Checks: 4 times per hour, close  Mood:Normal: Yes  Mood: Depressed, Anxious  Motor Activity:Normal: Yes  Motor Activity: Decreased  Eye Contact: Fair  Observed Behavior: Cooperative, Withdrawn, Preoccupied  Sexual Misconduct History: Current - no  Preception: [place] to person, [place] to place  Attention:Normal: Yes  Attention: Unable to concentrate  Thought Processes: Circumstantial  Thought Content:Normal: Yes  Thought Content: Preoccupations  Depression Symptoms: Isolative, Feelings of helplessness, Feelings of hopelessess  Anxiety Symptoms: Generalized  Rebekah Symptoms: No problems reported or observed.   Hallucinations: None  Delusions: No  Memory:Normal: Yes  Memory: Poor recent, Poor remote  Insight and Judgment: No  Insight and Judgment: Poor judgment, Poor insight, Unmotivated    EDUCATION:   Learner Progress Toward Treatment Goals: reviewed group plans and strategies for care    Method:group therapy, medication compliance, individualized assessments and care planning    Outcome: needs reinforcement    PATIENT GOALS: to be discussed with patient within 72 hours    PLAN/TREATMENT RECOMMENDATIONS:     continue group therapy , medications compliance, goal setting, individualized assessments and care, continue to monitor pt on unit      SHORT-TERM GOALS: Patient declined to attend treatment team at this time, no short term goals were discussed. Time frame for Short-Term Goals: 5-7 days    LONG-TERM GOALS: Patient declined to attend treatment team at this time, no long term goals were discussed.    Time frame for Long-Term Goals: 6 months  Members Present in Team Meeting: See Signature Sheet    Lily Jackson RN

## 2022-09-10 NOTE — H&P
[facility] Internal Medicine  Diego Ochoa MD; Ese Ramsey MD; Jalen Pacheco MD; MD Dwain Posey MD; MD ALEXIA Fraire [facility] Internal Medicine   [facility]    HISTORY AND PHYSICAL EXAMINATION            Date:   9/10/2022  Patient name:  Christina Ballard  Date of admission:  9/9/2022  2:17 PM  MRN:   [id number]  Account:  [de-identified]  YOB: 1980  PCP:    Bryson Lomas MD  Room:   [facility]021-  Code Status:    Full Code    Chief Complaint:     Chief Complaint   Patient presents with    Mental Health Problem     Pt to ED accompanied by [facility] care partner with concern for aggressive behavior towards mother. Reportedly, pt has stopped taking lithium approx 1 month ago and has noticed changes since then. Pt denies any SI/HI, denies hallucinations but caregiver states pt has been responding to internal stimuli  Pt calm and cooperative at present. Pt states he is upset with his mother that she handles his money and health affairs- lives at home with her     Hidradenitis suppurativa    History Obtained From:     Medical record nursing staff    History of Present Illness:     Christina Ballard is a 38 y.o. Non- / non  male who presents with Mental Health Problem (Pt to ED accompanied by [facility] care partner with concern for aggressive behavior towards mother. Reportedly, pt has stopped taking lithium approx 1 month ago and has noticed changes since then./Pt denies any SI/HI, denies hallucinations but caregiver states pt has been responding to internal stimuli/Pt calm and cooperative at present./Pt states he is upset with his mother that she handles his money and health affairs- lives at home with her/)   and is admitted to the hospital for the management of Acute exacerbation of chronic paranoid schizophrenia ([facility]     25-year-old -American gentleman with a history of extensive hidradenitis suppurativa status post both armpit surgeries with a extensive scar tissue but no active infection recent history of gluteal abscess improved off antibiotics now denies any fever chills nausea vomiting    Past Medical History:     Past Medical History:   Diagnosis Date    Bipolar 1 disorder ([facility]     GERD (gastroesophageal reflux disease) 8/13/2016    Hidradenitis suppurativa     Polysubstance abuse ([facility]     Schizoaffective disorder ([facility]         Past Surgical History:     Past Surgical History:   Procedure Laterality Date    ABSCESS DRAINAGE  11/1/2013    left inguinal hydrodenitis/pilondal cyst    INFECTED SKIN DEBRIDEMENT  july 2011        Medications Prior to Admission:     Prior to Admission medications    Medication Sig Start Date End Date Taking? Authorizing Provider   benztropine (COGENTIN) 1 MG tablet Take 0.5 tablets by mouth in the morning and 0.5 tablets before bedtime. 8/12/22   Jesse Barrett MD   OLANZapine (ZYPREXA) 10 MG tablet Take 20 mg by mouth in the morning and 20 mg in the evening. Historical Provider, MD   fluPHENAZine decanoate (PROLIXIN) 25 MG/ML injection Inject 25 mg into the muscle once a week Every 2 week injection    Historical Provider, MD   adalimumab (HUMIRA) 40 MG/0.8ML injection Inject 40 mg into the skin once Patient takes 80 mg every two weeks; due this week  8/12/22  Historical Provider, MD   citalopram (CELEXA) 20 MG tablet Take 20 mg by mouth daily    Historical Provider, MD   lithium (ESKALITH) 450 MG extended release tablet Take 450 mg by mouth 2 times daily  Patient not taking: Reported on 8/11/2022 8/12/22  Historical Provider, MD   Multiple Vitamins-Minerals (THERAPEUTIC MULTIVITAMIN-MINERALS) tablet Take 1 tablet by mouth daily.  4/18/14 8/12/22  Shannon Marroquin        Allergies:     Depakene [valproic acid], Restoril [temazepam], Risperidone and related, and Thorazine [chlorpromazine]    Social History:     Tobacco:    reports that he has been smoking cigarettes. He has a 22.00 pack-year smoking history. He has never used smokeless tobacco.  Alcohol:      reports current alcohol use. Drug Use:  reports current drug use. Family History:     Family History   Problem Relation Age of Onset    Cancer Mother         breast    High Blood Pressure Maternal Grandfather     Mental Illness Paternal Aunt     Substance Abuse Paternal Uncle     Substance Abuse Brother     Alcohol Abuse Father     Cancer Father         pancrease       Review of Systems:     Positive and Negative as described in HPI. CONSTITUTIONAL:  negative for fevers, chills, sweats, fatigue, weight loss  HEENT:  negative for vision, hearing changes, runny nose, throat pain  RESPIRATORY:  negative for shortness of breath, cough, congestion, wheezing  CARDIOVASCULAR:  negative for chest pain, palpitations  GASTROINTESTINAL:  negative for nausea, vomiting, diarrhea, constipation, change in bowel habits, abdominal pain   GENITOURINARY:  negative for difficulty of urination, burning with urination, frequency   INTEGUMENT:  negative for rash, skin lesions, easy bruising   HEMATOLOGIC/LYMPHATIC:  negative for swelling/edema   ALLERGIC/IMMUNOLOGIC:  negative for urticaria , itching  ENDOCRINE:  negative increase in drinking, increase in urination, hot or cold intolerance  MUSCULOSKELETAL:  negative joint pains, muscle aches, swelling of joints  NEUROLOGICAL:  negative for headaches, dizziness, lightheadedness,     Physical Exam:   BP (!) 142/81   Pulse 79   Temp 97.3 °F (36.3 °C)   Resp 14   Ht 5' 9\" (1.753 m)   Wt 236 lb (107 kg)   SpO2 99%   BMI 34.85 kg/m²   Temp (24hrs), Av.7 °F (36.5 °C), Min:97.3 °F (36.3 °C), Max:98.4 °F (36.9 °C)    No results for input(s): POCGLU in the last 72 hours.   No intake or output data in the 24 hours ending 09/10/22 1523    General Appearance: alert, well appearing, and in no acute distress  Mental status: oriented to person, place, and time  Head: normocephalic, atraumatic  Eye: no icterus, redness, pupils equal and reactive, extraocular eye movements intact, conjunctiva clear  Ear: normal external ear, no discharge, hearing intact  Nose: no drainage noted  Mouth: mucous membranes moist  Neck: supple, no carotid bruits, thyroid not palpable  Lungs: Bilateral equal air entry, clear to ausculation, no wheezing, rales or rhonchi, normal effort  Cardiovascular: normal rate, regular rhythm, no murmur, gallop, rub  Abdomen: Soft, nontender, nondistended, normal bowel sounds, no hepatomegaly or splenomegaly  Neurologic: There are no new focal motor or sensory deficits, normal muscle tone and bulk, no abnormal sensation, normal speech, cranial nerves II through XII grossly intact  Skin: No gross lesions, rashes, bruising or bleeding on exposed skin area  Extremities: peripheral pulses palpable, no pedal edema or calf pain with palpation  Bilateral axilla history of surgery noted for hidradenitis suppurativa  Extensive scar tissue noted  Investigations:      Laboratory Testing:  Recent Results (from the past 24 hour(s))   CBC with Auto Differential    Collection Time: 09/09/22  3:26 PM   Result Value Ref Range    WBC 9.6 3.5 - 11.0 k/uL    RBC 4.44 (L) 4.5 - 5.9 m/uL    Hemoglobin 12.9 (L) 13.5 - 17.5 g/dL    Hematocrit 39.0 (L) 41 - 53 %    MCV 87.8 80 - 100 fL    MCH 29.0 26 - 34 pg    MCHC 33.1 31 - 37 g/dL    RDW 16.9 (H) 11.5 - 14.9 %    Platelets 037 229 - 358 k/uL    MPV 6.4 6.0 - 12.0 fL    Seg Neutrophils 70 (H) 36 - 66 %    Lymphocytes 20 (L) 24 - 44 %    Monocytes 8 (H) 1 - 7 %    Eosinophils % 1 0 - 4 %    Basophils 1 0 - 2 %    Segs Absolute 6.80 1.3 - 9.1 k/uL    Absolute Lymph # 1.90 1.0 - 4.8 k/uL    Absolute Mono # 0.70 0.1 - 1.3 k/uL    Absolute Eos # 0.10 0.0 - 0.4 k/uL    Basophils Absolute 0.10 0.0 - 0.2 k/uL   CMP    Collection Time: 09/09/22  3:26 PM   Result Value Ref Range    Glucose 92 70 - 99 mg/dL    BUN 10 6 - 20 mg/dL    Creatinine 0.98 history of hidradenitis suppurativa status post bilateral axillary surgery with the scar tissue no active infection  Labs and vitals reviewed Will follow        Alan Rosenbaum MD  9/10/2022  3:23 PM    Copy sent to Dr. Lucia Quinones MD    Please note that this chart was generated using voice recognition Dragon dictation software. Although every effort was made to ensure the accuracy of this automated transcription, some errors in transcription may have occurred.
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
## 2022-09-10 NOTE — PLAN OF CARE
Problem: Behavior  Goal: Pt/Family maintain appropriate behavior and adhere to behavioral management agreement, if implemented  Description: INTERVENTIONS:  1. Assess patient/family's coping skills and  non-compliant behavior (including use of illegal substances)  2. Notify security of behavior or suspected illegal substances which indicate the need for search of the family and/or belongings  3. Encourage verbalization of thoughts and concerns in a socially appropriate manner  4. Utilize positive, consistent limit setting strategies supporting safety of patient, staff and others  5. Encourage participation in the decision making process about the behavioral management agreement  6. If a visitor's behavior poses a threat to safety call refer to organization policy. 7. Initiate consult with , Psychosocial CNS, Spiritual Care as appropriate  9/10/2022 1630 by Natalia Retana LPN  Outcome: Progressing  Flowsheets (Taken 9/10/2022 1630)  Patient/family maintains appropriate behavior and adheres to behavioral management agreement, if implemented:   Assess patient/familys coping skills and  non-compliant behavior (including use of illegal substances)   Notify security of behavior or suspected illegal substances which indicate the need for search of the patient and/or belongings   Encourage verbalization of thoughts and concerns in a socially appropriate manner   Utilize positive, consistent limit setting strategies supporting safety of patient, staff and others   Encourage participation in the decision making process about the behavioral management agreement  Note: Patient has been isolative to his room. He has come out for meals. He was encouraged to shower and he was able to shower independently. He also brushed his teeth. He denies hallucination but is found in his room responding. He has been cooperative with his care and took scheduled medications. He has asked several times when can he go home.  He states he shouldn't be here. Problem: Involuntary Admit  Goal: Will cooperate with staff recommendations and doctor's orders and will demonstrate appropriate behavior  Description: INTERVENTIONS:  1. Treat underlying conditions and offer medication as ordered  2. Educate regarding involuntary admission procedures and rules  3. Contain excessive/inappropriate behavior per unit and hospital policies  [id number] by Natalia Retana LPN  Outcome: Progressing  Flowsheets (Taken 9/10/2022 1630)  Will cooperate with staff recommendations and doctor's orders and will demonstrate appropriate behavior:   Treat underlying conditions and offer medication as ordered   Educate regarding involuntary admission procedures and rules   Contain excessive/inappropriate behavior per unit and hospital policies  Note: Patient has been isolative to his room. He has come out for meals. He was encouraged to shower and he was able to shower independently. He also brushed his teeth. He denies hallucination but is found in his room responding. He has been cooperative with his care and took scheduled medications. He has asked several times when can he go home. He states he shouldn't be here.

## 2022-09-10 NOTE — PROGRESS NOTES
Behavioral Services  Medicare Certification Upon Admission    I certify that this patient's inpatient psychiatric hospital admission is medically necessary for:    [x] (1) Treatment which could reasonably be expected to improve this patient's condition,       [x] (2) Or for diagnostic study;     AND     [x](2) The inpatient psychiatric services are provided while the individual is under the care of a physician and are included in the individualized plan of care.     Estimated length of stay/service 3-5 days    Plan for post-hospital care hc    Electronically signed by Miriam Peterson MD on 9/10/2022 at 9:14 AM

## 2022-09-11 PROCEDURE — 6370000000 HC RX 637 (ALT 250 FOR IP)

## 2022-09-11 PROCEDURE — 6370000000 HC RX 637 (ALT 250 FOR IP): Performed by: PSYCHIATRY & NEUROLOGY

## 2022-09-11 PROCEDURE — 99232 SBSQ HOSP IP/OBS MODERATE 35: CPT | Performed by: PSYCHIATRY & NEUROLOGY

## 2022-09-11 PROCEDURE — 99232 SBSQ HOSP IP/OBS MODERATE 35: CPT | Performed by: INTERNAL MEDICINE

## 2022-09-11 PROCEDURE — 1240000000 HC EMOTIONAL WELLNESS R&B

## 2022-09-11 RX ADMIN — CARBAMAZEPINE 200 MG: 200 TABLET ORAL at 20:30

## 2022-09-11 RX ADMIN — NICOTINE POLACRILEX 2 MG: 2 GUM, CHEWING BUCCAL at 13:25

## 2022-09-11 RX ADMIN — HALOPERIDOL 1 MG: 1 TABLET ORAL at 09:37

## 2022-09-11 RX ADMIN — CARBAMAZEPINE 200 MG: 200 TABLET ORAL at 09:37

## 2022-09-11 RX ADMIN — HALOPERIDOL 1 MG: 1 TABLET ORAL at 20:30

## 2022-09-11 NOTE — PROGRESS NOTES
[facility] Internal Medicine  Talha Harrison MD; Vinnie Lobo MD; Gabriel Gallardo MD; MD Margarita Jackson MD; MD ALEXIA Saunders [facility] Internal Medicine   [facility]    HISTORY AND PHYSICAL EXAMINATION            Date:   9/11/2022  Patient name:  Jemma Wilburn  Date of admission:  9/9/2022  2:17 PM  MRN:   [id number]  Account:  [de-identified]  YOB: 1980  PCP:    Carolee Johnson MD  Room:   [address] [place] [address]  Code Status:    Full Code    Chief Complaint:     Chief Complaint   Patient presents with    Mental Health Problem     Pt to ED accompanied by [facility] care partner with concern for aggressive behavior towards mother. Reportedly, pt has stopped taking lithium approx 1 month ago and has noticed changes since then. Pt denies any SI/HI, denies hallucinations but caregiver states pt has been responding to internal stimuli  Pt calm and cooperative at present. Pt states he is upset with his mother that she handles his money and health affairs- lives at home with her     Hidradenitis suppurativa    History Obtained From:     Medical record nursing staff    History of Present Illness:     Jemma Wilburn is a 38 y.o. Non- / non  male who presents with Mental Health Problem (Pt to ED accompanied by [facility] care partner with concern for aggressive behavior towards mother. Reportedly, pt has stopped taking lithium approx 1 month ago and has noticed changes since then./Pt denies any SI/HI, denies hallucinations but caregiver states pt has been responding to internal stimuli/Pt calm and cooperative at present./Pt states he is upset with his mother that she handles his money and health affairs- lives at home with her/)   and is admitted to the hospital for the management of Acute exacerbation of chronic paranoid schizophrenia ([facility] [address]     49-year-old -American gentleman with a history of extensive hidradenitis suppurativa status post both armpit surgeries with a extensive scar tissue but no active infection recent history of gluteal abscess improved off antibiotics now denies any fever chills nausea vomiting    Past Medical History:     Past Medical History:   Diagnosis Date    Bipolar 1 disorder ([facility] [address]     GERD (gastroesophageal reflux disease) 8/13/2016    Hidradenitis suppurativa     Polysubstance abuse ([facility] [address]     Schizoaffective disorder [facility])         Past Surgical History:     Past Surgical History:   Procedure Laterality Date    ABSCESS DRAINAGE  11/1/2013    left inguinal hydrodenitis/pilondal cyst    INFECTED SKIN DEBRIDEMENT  july 2011        Medications Prior to Admission:     Prior to Admission medications    Medication Sig Start Date End Date Taking? Authorizing Provider   benztropine (COGENTIN) 1 MG tablet Take 0.5 tablets by mouth in the morning and 0.5 tablets before bedtime. 8/12/22   Meghana Patricia MD   OLANZapine (ZYPREXA) 10 MG tablet Take 20 mg by mouth in the morning and 20 mg in the evening. Historical Provider, MD   fluPHENAZine decanoate (PROLIXIN) 25 MG/ML injection Inject 25 mg into the muscle once a week Every 2 week injection    Historical Provider, MD   adalimumab (HUMIRA) 40 MG/0.8ML injection Inject 40 mg into the skin once Patient takes 80 mg every two weeks; due this week  8/12/22  Historical Provider, MD   citalopram (CELEXA) 20 MG tablet Take 20 mg by mouth daily    Historical Provider, MD   lithium (ESKALITH) 450 MG extended release tablet Take 450 mg by mouth 2 times daily  Patient not taking: Reported on 8/11/2022 8/12/22  Historical Provider, MD   Multiple Vitamins-Minerals (THERAPEUTIC MULTIVITAMIN-MINERALS) tablet Take 1 tablet by mouth daily.  4/18/14 8/12/22  Shannon Marroquin        Allergies:     Depakene [valproic acid], Restoril [temazepam], Risperidone and related, Thorazine [chlorpromazine], and Vancomycin    Social History:     Tobacco:    reports that he has been smoking cigarettes. He has a 22.00 pack-year smoking history. He has never used smokeless tobacco.  Alcohol:      reports current alcohol use. Drug Use:  reports current drug use. Family History:     Family History   Problem Relation Age of Onset    Cancer Mother         breast    High Blood Pressure Maternal Grandfather     Mental Illness Paternal Aunt     Substance Abuse Paternal Uncle     Substance Abuse Brother     Alcohol Abuse Father     Cancer Father         pancrease       Review of Systems:     Positive and Negative as described in HPI. CONSTITUTIONAL:  negative for fevers, chills, sweats, fatigue, weight loss  HEENT:  negative for vision, hearing changes, runny nose, throat pain  RESPIRATORY:  negative for shortness of breath, cough, congestion, wheezing  CARDIOVASCULAR:  negative for chest pain, palpitations  GASTROINTESTINAL:  negative for nausea, vomiting, diarrhea, constipation, change in bowel habits, abdominal pain   GENITOURINARY:  negative for difficulty of urination, burning with urination, frequency   INTEGUMENT:  negative for rash, skin lesions, easy bruising   HEMATOLOGIC/LYMPHATIC:  negative for swelling/edema   ALLERGIC/IMMUNOLOGIC:  negative for urticaria , itching  ENDOCRINE:  negative increase in drinking, increase in urination, hot or cold intolerance  MUSCULOSKELETAL:  negative joint pains, muscle aches, swelling of joints  NEUROLOGICAL:  negative for headaches, dizziness, lightheadedness,     Physical Exam:   BP (!) 151/92   Pulse 65   Temp 98.6 °F (37 °C) (Temporal)   Resp 14   Ht 5' 9\" (1.753 m)   Wt 236 lb (107 kg)   SpO2 99%   BMI 34.85 kg/m²   Temp (24hrs), Av.4 °F (36.9 °C), Min:98.2 °F (36.8 °C), Max:98.6 °F (37 °C)    No results for input(s): POCGLU in the last 72 hours.   No intake or output data in the 24 hours ending 22 1623    General Appearance: alert, well appearing, and in no acute distress  Mental status: oriented to person, place, and time  Head: normocephalic, atraumatic  Eye: no icterus, redness, pupils equal and reactive, extraocular eye movements intact, conjunctiva clear  Ear: normal external ear, no discharge, hearing intact  Nose: no drainage noted  Mouth: mucous membranes moist  Neck: supple, no carotid bruits, thyroid not palpable  Lungs: Bilateral equal air entry, clear to ausculation, no wheezing, rales or rhonchi, normal effort  Cardiovascular: normal rate, regular rhythm, no murmur, gallop, rub  Abdomen: Soft, nontender, nondistended, normal bowel sounds, no hepatomegaly or splenomegaly  Neurologic: There are no new focal motor or sensory deficits, normal muscle tone and bulk, no abnormal sensation, normal speech, cranial nerves II through XII grossly intact  Skin: No gross lesions, rashes, bruising or bleeding on exposed skin area  Extremities: peripheral pulses palpable, no pedal edema or calf pain with palpation  Bilateral axilla history of surgery noted for hidradenitis suppurativa  Extensive scar tissue noted  Investigations:      Laboratory Testing:  No results found for this or any previous visit (from the past 24 hour(s)). Imaging/Diagnostics:  No results found. Assessment :      Hospital Problems             Last Modified POA    * (Principal) Acute exacerbation of chronic paranoid schizophrenia ([facility] [address] 9/9/2022 Yes    Acute psychosis ([facility] [address] 9/10/2022 Yes    GERD (gastroesophageal reflux disease) 9/10/2022 Yes     Plan:     58-year-old -American gentleman with history of hidradenitis suppurativa status post bilateral axillary surgery with the scar tissue no active infection  Labs and vitals reviewed Will follow        Noble Lynn MD  9/11/2022  4:23 PM    Copy sent to Dr. Sarmad Tan MD    Please note that this chart was generated using voice recognition Dragon dictation software. Although every effort was made to ensure the accuracy of this automated transcription, some errors in transcription may have occurred.

## 2022-09-11 NOTE — CARE COORDINATION
Pt's mother and guardian, Cole Caputo [phone] signed all required paperwork for admission. She did state on the med list it did not show he is due for Humira \"pen\" injection this coming week. She states she gets it directly in the mail from the pharmaceutical company and she gives him the injection.     She states it is prescribed by his dermatologist from [facility] and RN will check into this prescription  Lorrie Callahan ([id number] [phone] (Work)  [address] [place] [address], [address] [place] [address]  Dermatology

## 2022-09-11 NOTE — PROGRESS NOTES
Daily Progress Note  9/11/2022    Patient Name: Brooklyn Drummond    CHIEF COMPLAINT: Acute psychosis         SUBJECTIVE:      Patient is seen today for a follow up assessment. 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 He remains disheveled and somewhat malodorous, he did discuss with attending the plan to take medications and shower daily. He was unable to express any insight into the reason behind his hospitalization. When asked about aggression towards his mother he was minimizing and denied it. He states \"I am just trying to find my people\". We did discuss whether or not he had remained medication compliant and he states \"probably not\". He was agreeable to resuming his medications and denies any side effects with the past 2 days of med administration. Appetite:  [x] Adequate/Unchanged  [] Increased  [] Decreased      Sleep:       [x] Adequate/Unchanged  [] Fair  [] Poor      Group Attendance on Unit:   [] Yes   [] Selectively    [x] No    Compliant with scheduled medications: [x] Yes  [] No    Received emergency medications in past 24 hrs: [] Yes   [x] No    Medication Side Effects: Denies          Mental Status Exam  Level of consciousness: Alert and awake   Appearance: Appropriate attire for setting, resting in bed, with fair  grooming and hygiene, wearing hospital pants  Behavior/Motor: Approachable, appears suspicious initially, mostly minimizing  Attitude toward examiner: Somewhat cooperative, attentive, good eye contact  Speech: Loud, normal rate and tone  Mood:  \"Fine\"  Affect: Incongruent, guarded, somewhat evasive  Thought processes: Linear to short closed ended questions, unable to engage in any meaningful conversation regarding the events that led to hospitalization  Thought content: No targeted aggression towards staff, denies aggression towards mother  Suicidal Ideation: Denies suicidal ideations, contracts for safety on the unit. Delusions: Paranoia  Perceptual Disturbance: Does not appear to attend to internal stimuli during interview  Cognition: Oriented to person and location  Memory: Impaired  Insight: poor   Judgement: poor       Data   height is 5' 9\" (1.753 m) and weight is 236 lb (107 kg). His temporal temperature is 98.6 °F (37 °C). His blood pressure is 151/92 (abnormal) and his pulse is 65. His respiration is 14 and oxygen saturation is 99%.    Labs:   Admission on 09/09/2022   Component Date Value Ref Range Status    WBC 09/09/2022 9.6  3.5 - 11.0 k/uL Final    RBC 09/09/2022 4.44 (A) 4.5 - 5.9 m/uL Final    Hemoglobin 09/09/2022 12.9 (A) 13.5 - 17.5 g/dL Final    Hematocrit 09/09/2022 39.0 (A) 41 - 53 % Final    MCV 09/09/2022 87.8  80 - 100 fL Final    MCH 09/09/2022 29.0  26 - 34 pg Final    MCHC 09/09/2022 33.1  31 - 37 g/dL Final    RDW 09/09/2022 16.9 (A) 11.5 - 14.9 % Final    Platelets 51/78/3389 386  150 - 450 k/uL Final    MPV 09/09/2022 6.4  6.0 - 12.0 fL Final    Seg Neutrophils 09/09/2022 70 (A) 36 - 66 % Final    Lymphocytes 09/09/2022 20 (A) 24 - 44 % Final    Monocytes 09/09/2022 8 (A) 1 - 7 % Final    Eosinophils % 09/09/2022 1  0 - 4 % Final    Basophils 09/09/2022 1  0 - 2 % Final    Segs Absolute 09/09/2022 6.80  1.3 - 9.1 k/uL Final    Absolute Lymph # 09/09/2022 1.90  1.0 - 4.8 k/uL Final    Absolute Mono # 09/09/2022 0.70  0.1 - 1.3 k/uL Final    Absolute Eos # 09/09/2022 0.10  0.0 - 0.4 k/uL Final    Basophils Absolute 09/09/2022 0.10  0.0 - 0.2 k/uL Final    Glucose 09/09/2022 92  70 - 99 mg/dL Final    BUN 09/09/2022 10  6 - 20 mg/dL Final    Creatinine 09/09/2022 0.98  0.70 - 1.20 mg/dL Final    Calcium 09/09/2022 9.7  8.6 - 10.4 mg/dL Final    Sodium 09/09/2022 139  135 - 144 mmol/L Final    Potassium 09/09/2022 4.1  3.7 - 5.3 mmol/L Final    Chloride 09/09/2022 102  98 - 107 mmol/L Final    CO2 09/09/2022 30  20 - 31 mmol/L Final    Anion Gap 09/09/2022 7 (A) 9 - 17 mmol/L Final    Alkaline Phosphatase 09/09/2022 116  40 - 129 U/L Final    ALT 09/09/2022 19  5 - 41 U/L Final    AST 09/09/2022 14  <40 U/L Final    Total Bilirubin 09/09/2022 0.2 (A) 0.3 - 1.2 mg/dL Final    Total Protein 09/09/2022 9.1 (A) 6.4 - 8.3 g/dL Final    Albumin 09/09/2022 3.8  3.5 - 5.2 g/dL Final    GFR Non- 09/09/2022 >60  >60 mL/min Final    GFR  09/09/2022 >60  >60 mL/min Final    GFR Comment 09/09/2022        Final    Comment: Average GFR for 38-51 years old:   80 mL/min/1.73sq m  Chronic Kidney Disease:   <60 mL/min/1.73sq m  Kidney failure:   <15 mL/min/1.73sq m              eGFR calculated using average adult body mass.  Additional eGFR calculator available at:        [employer]            Salicylate Lvl 31/44/9994 <1 (A) 3 - 10 mg/dL Final    Acetaminophen Level 09/09/2022 <5 (A) 10 - 30 ug/mL Final    Ethanol 09/09/2022 <10  <10 mg/dL Final    Ethanol percent 09/09/2022 <0.010  % Final    Magnesium 09/09/2022 1.9  1.6 - 2.6 mg/dL Final    Amphetamine Screen, Ur 09/09/2022 NEGATIVE  NEGATIVE Final    Comment:       (Positive cutoff 1000 ng/mL)                  Barbiturate Screen, Ur 09/09/2022 NEGATIVE  NEGATIVE Final    Comment:       (Positive cutoff 200 ng/mL)                  Benzodiazepine Screen, Urine 09/09/2022 NEGATIVE   Final    Comment:       (Positive cutoff 200 ng/mL)                  Cocaine Metabolite, Urine 09/09/2022 NEGATIVE  NEGATIVE Final    Comment:       (Positive cutoff 300 ng/mL)                  Methadone Screen, Urine 09/09/2022 NEGATIVE  NEGATIVE Final    Comment:       (Positive cutoff 300 ng/mL)                  Opiates, Urine 09/09/2022 NEGATIVE  NEGATIVE Final    Comment:       (Positive cutoff 300 ng/mL)                  Phencyclidine, Urine 09/09/2022 NEGATIVE  NEGATIVE Final    Comment:       (Positive cutoff 25 ng/mL)                  Cannabinoid Scrn, Ur 09/09/2022 NEGATIVE NEGATIVE Final    Comment:       (Positive cutoff 50 ng/mL)                  Oxycodone Screen, Ur 09/09/2022 NEGATIVE  NEGATIVE Final    Comment:       (Positive cutoff 100 ng/mL)                  Fentanyl, Ur 09/09/2022 NEGATIVE  NEGATIVE Final    Comment:       (Positive cutoff  5 ng/ml)            Test Information 09/09/2022 Assay provides medical screening only. The absence of expected drug(s) and/or metabolite(s) may indicate diluted or adulterated urine, limitations of testing or timing of collection. Final    Comment: Testing for legal purposes should be confirmed by another method. To request confirmation   of test result, please call the lab within 7 days of sample submission. Reviewed patient's current plan of care and vital signs with nursing staff.     Labs reviewed: [x] Yes    Medications  Current Facility-Administered Medications: carBAMazepine (TEGRETOL) tablet 200 mg, 200 mg, Oral, BID  haloperidol (HALDOL) tablet 1 mg, 1 mg, Oral, BID  acetaminophen (TYLENOL) tablet 650 mg, 650 mg, Oral, Q6H PRN  ibuprofen (ADVIL;MOTRIN) tablet 400 mg, 400 mg, Oral, Q6H PRN  hydrOXYzine HCl (ATARAX) tablet 50 mg, 50 mg, Oral, TID PRN  traZODone (DESYREL) tablet 50 mg, 50 mg, Oral, Nightly PRN  polyethylene glycol (GLYCOLAX) packet 17 g, 17 g, Oral, Daily PRN  aluminum & magnesium hydroxide-simethicone (MAALOX) 200-200-20 MG/5ML suspension 30 mL, 30 mL, Oral, Q6H PRN  nicotine polacrilex (NICORETTE) gum 2 mg, 2 mg, Oral, Q2H PRN  haloperidol lactate (HALDOL) injection 5 mg, 5 mg, IntraMUSCular, Q6H PRN **AND** LORazepam (ATIVAN) injection 2 mg, 2 mg, IntraMUSCular, Q6H PRN **AND** diphenhydrAMINE (BENADRYL) injection 50 mg, 50 mg, IntraMUSCular, Q6H PRN  haloperidol (HALDOL) tablet 5 mg, 5 mg, Oral, Q6H PRN **AND** LORazepam (ATIVAN) tablet 2 mg, 2 mg, Oral, Q6H PRN    ASSESSMENT  Acute exacerbation of chronic paranoid schizophrenia (HCC)         HANDOFF  Patient symptoms are: Minimal improvement  Medications as determined by attending physician  Encourage participation in groups and milieu. Probable discharge is to be determined by MD    Electronically signed by APURVA Garduno CNP on 9/11/2022 at 5:16 PM    **This report has been created using voice recognition software. It may contain minor errors which are inherent in voice recognition technology. **                                          Psychiatry Attending Attestation     I independently saw and evaluated the patient. I reviewed the Advance Practice Provider's documentation above. Any additional comments or changes to the Advance Practice Provider's documentation are stated below otherwise agree with assessment. Patient is able to show behavioral control here on the unit. Encouraged showering today which patient agreed to. Continues to appear very disheveled and malodorous in his room. Has been compliant on his medications and denies any side effect. Has been somewhat discharged focused however discussed with the patient that his mother is a guardian and we will have to discuss further with her. Encouraged to have a discussion with his mother about his finances. Patient appears to have limited insight into this at this time. Continues to respond actively to internal stimuli at times however is able to show behavioral control. We will continue same medication today and observe. PLAN  Patient s symptoms are improving  Continue same medication today and observe  Attempt to develop insight  Psycho-education conducted. Supportive Therapy conducted.   Probable discharge is tbd  Follow-up TBD    Electronically signed by Manish Salomon MD on 9/11/22 at 5:56 PM EDT

## 2022-09-11 NOTE — BH NOTE
Community Meeting Group Note        Date: September 11, 2022 Start Time: 9am  End Time: 9:30      Number of Participants in Group & Unit Census:  7/12    Topic: Goals Group    Goal of Group:Verbalize goals for today and for when discharged. Comments:     Patient did not participate in [employer] group, despite staff encouragement and explanation of benefits. Patient remain seclusive to self. Q15 minute safety checks maintained for patient safety and will continue to encourage patient to attend unit programming.

## 2022-09-11 NOTE — GROUP NOTE
Group Therapy Note    Date: 9/11/2022    Group Start Time: 1800  Group End Time: 1072  Group Topic: Healthy Living/Wellness    [facility]    Luly Sanders RN; Rosezena Prader, LPN        Group Therapy Note    Attendees: 12/12       Safety checks were completed at this date and time. Patient was cooperative with safety checks. No items found.      Signature:  Luly Sanders RN

## 2022-09-11 NOTE — PLAN OF CARE
Problem: Behavior  Goal: Pt/Family maintain appropriate behavior and adhere to behavioral management agreement, if implemented  Description: INTERVENTIONS:  1. Assess patient/family's coping skills and  non-compliant behavior (including use of illegal substances)  2. Notify security of behavior or suspected illegal substances which indicate the need for search of the family and/or belongings  3. Encourage verbalization of thoughts and concerns in a socially appropriate manner  4. Utilize positive, consistent limit setting strategies supporting safety of patient, staff and others  5. Encourage participation in the decision making process about the behavioral management agreement  6. If a visitor's behavior poses a threat to safety call refer to organization policy. 7. Initiate consult with , Psychosocial CNS, Spiritual Care as appropriate  Outcome: Progressing  Note: Patient isolative to room and bed. Refused treatment team. Compliant with medications. Out for meals. Patient smelled strongly of urine. Took shower after encouragement from staff. Problem: Involuntary Admit  Goal: Will cooperate with staff recommendations and doctor's orders and will demonstrate appropriate behavior  Description: INTERVENTIONS:  1. Treat underlying conditions and offer medication as ordered  2. Educate regarding involuntary admission procedures and rules  3. Contain excessive/inappropriate behavior per unit and hospital policies  Outcome: Progressing  Flowsheets (Taken 9/11/2022 4836)  Will cooperate with staff recommendations and doctor's orders and will demonstrate appropriate behavior: Treat underlying conditions and offer medication as ordered  Note: Patient compliant with meds.  Refused all groups and treatment team.

## 2022-09-11 NOTE — PLAN OF CARE
Problem: Behavior  Goal: Pt/Family maintain appropriate behavior and adhere to behavioral management agreement, if implemented  Description: INTERVENTIONS:  1. Assess patient/family's coping skills and  non-compliant behavior (including use of illegal substances)  2. Notify security of behavior or suspected illegal substances which indicate the need for search of the family and/or belongings  3. Encourage verbalization of thoughts and concerns in a socially appropriate manner  4. Utilize positive, consistent limit setting strategies supporting safety of patient, staff and others  5. Encourage participation in the decision making process about the behavioral management agreement  6. If a visitor's behavior poses a threat to safety call refer to organization policy. 7. Initiate consult with , Psychosocial CNS, Spiritual Care as appropriate  9/10/2022 2310 by Emilia French LPN  Outcome: Progressing     Problem: Involuntary Admit  Goal: Will cooperate with staff recommendations and doctor's orders and will demonstrate appropriate behavior  Description: INTERVENTIONS:  1. Treat underlying conditions and offer medication as ordered  2. Educate regarding involuntary admission procedures and rules  3. Contain excessive/inappropriate behavior per unit and hospital policies  [id number] by Emilia French LPN  Outcome: Progressing   Remains isolate to room t/o the evening. Unwilling to talk about suicidal thoughts or hallucination  accepting of bed time medications  declines adl's at this time.

## 2022-09-11 NOTE — BH NOTE
Health/Wellness Group Note        Date: September 11, 2022   Start Time: 2:30pm  End Time: 3pm      Number of Participants in Group & Unit Census:  5 / 11      Topic: Be able to identify emotions for self and others. Comments:     Patient did not participate in Health/Wellness group, despite staff encouragement and explanation of benefits. Patient remain seclusive to self. Q15 minute safety checks maintained for patient safety and will continue to encourage patient to attend unit programming.

## 2022-09-12 PROCEDURE — APPSS30 APP SPLIT SHARED TIME 16-30 MINUTES: Performed by: NURSE PRACTITIONER

## 2022-09-12 PROCEDURE — 6370000000 HC RX 637 (ALT 250 FOR IP)

## 2022-09-12 PROCEDURE — 99232 SBSQ HOSP IP/OBS MODERATE 35: CPT | Performed by: PSYCHIATRY & NEUROLOGY

## 2022-09-12 PROCEDURE — 1240000000 HC EMOTIONAL WELLNESS R&B

## 2022-09-12 PROCEDURE — 6370000000 HC RX 637 (ALT 250 FOR IP): Performed by: PSYCHIATRY & NEUROLOGY

## 2022-09-12 RX ADMIN — HALOPERIDOL 1 MG: 1 TABLET ORAL at 09:45

## 2022-09-12 RX ADMIN — HYDROXYZINE HYDROCHLORIDE 50 MG: 50 TABLET, FILM COATED ORAL at 20:54

## 2022-09-12 RX ADMIN — HALOPERIDOL 1 MG: 1 TABLET ORAL at 20:54

## 2022-09-12 RX ADMIN — TRAZODONE HYDROCHLORIDE 50 MG: 50 TABLET ORAL at 20:54

## 2022-09-12 RX ADMIN — CARBAMAZEPINE 200 MG: 200 TABLET ORAL at 20:54

## 2022-09-12 RX ADMIN — CARBAMAZEPINE 200 MG: 200 TABLET ORAL at 09:45

## 2022-09-12 NOTE — PLAN OF CARE
Problem: Behavior  Goal: Pt/Family maintain appropriate behavior and adhere to behavioral management agreement, if implemented  Description: INTERVENTIONS:  1. Assess patient/family's coping skills and  non-compliant behavior (including use of illegal substances)  2. Notify security of behavior or suspected illegal substances which indicate the need for search of the family and/or belongings  3. Encourage verbalization of thoughts and concerns in a socially appropriate manner  4. Utilize positive, consistent limit setting strategies supporting safety of patient, staff and others  5. Encourage participation in the decision making process about the behavioral management agreement  6. If a visitor's behavior poses a threat to safety call refer to organization policy. 7. Initiate consult with , Psychosocial CNS, Spiritual Care as appropriate  9/11/2022 2120 by Faustina Beaulieu RN  Flowsheets (Taken 9/10/2022 1630 by Natalia Retana LPN)  Patient/family maintains appropriate behavior and adheres to behavioral management agreement, if implemented:   Assess patient/familys coping skills and  non-compliant behavior (including use of illegal substances)   Notify security of behavior or suspected illegal substances which indicate the need for search of the patient and/or belongings   Encourage verbalization of thoughts and concerns in a socially appropriate manner   Utilize positive, consistent limit setting strategies supporting safety of patient, staff and others   Encourage participation in the decision making process about the behavioral management agreement  Note: Patient is isolative to room during this shift. He can be heard responding to internal stimuli in his room. He is medication compliant and in behavior control. Staff maintains Q 15 minute safety checks.       Problem: Involuntary Admit  Goal: Will cooperate with staff recommendations and doctor's orders and will demonstrate appropriate behavior  Description: INTERVENTIONS:  1. Treat underlying conditions and offer medication as ordered  2. Educate regarding involuntary admission procedures and rules  3. Contain excessive/inappropriate behavior per unit and hospital policies  5/72/8512 [id number] by Remy Jolley RN  Outcome: Progressing  9/11/2022 1621 by Aishwarya Chambers RN  Outcome: Progressing  Flowsheets (Taken 9/11/2022 1605)  Will cooperate with staff recommendations and doctor's orders and will demonstrate appropriate behavior: Treat underlying conditions and offer medication as ordered  Note: Patient compliant with meds.  Refused all groups and treatment team.

## 2022-09-12 NOTE — BH NOTE
Relaxation Group Note  Relaxation      Date: September 11, 2022 Start Time: 2030  End Time: 2050      Number of Participants in [address]:  6/12    Topic: Relaxation    Goal of Group:Relaxation      Comments:     Patient did not participate in Relaxation group, despite staff encouragement and explanation of benefits. Patient remain seclusive to self.   Q15 minute safety checks maintained for patient safety and will continue to encourage patient to attend unit programming

## 2022-09-12 NOTE — PROGRESS NOTES
Daily Progress Note  9/12/2022    Patient Name: Dorita Alamo    CHIEF COMPLAINT: Acute psychosis         SUBJECTIVE:      Patient is seen today for a follow up assessment. Staff reports that Nathan Purcell remains mostly seclusive to his room, they report that he comes out for needs at times but otherwise retreats and remains isolative. He is noted to have his breakfast tray sitting on the floor in his room, their food containers strewn about. He appears disheveled though does lift his head to engage somewhat with this author. Upon approach she states \"can you discharge me\". This Carlota Jordan tried to engage him in meaningful conversation regarding his irritability and agitation with his mother. He is dismissive of this and denies any maltreatment. He is somewhat evasive and short. He denies all depression, anxiety, paranoia or hallucinations though it is clear he is doing so in a dismissive manner. He reports that he is tolerating resumption of medication, he has not required any emergency medication.     Appetite:  [] Adequate/Unchanged  [] Increased  [x] Decreased      Sleep:       [x] Adequate/Unchanged  [] Fair  [] Poor      Group Attendance on Unit:   [] Yes   [] Selectively    [x] No    Compliant with scheduled medications: [x] Yes  [] No    Received emergency medications in past 24 hrs: [] Yes   [x] No    Medication Side Effects: Denies          Mental Status Exam  Level of consciousness: Alert and awake   Appearance: Appropriate attire for setting, resting in bed, with poor grooming and hygiene, room is disheveled   behavior/Motor: Somewhat approachable, indifferent to interviewer, minimizing all symptoms  Attitude toward examiner: Minimally cooperative, mostly non attentive, poor eye contact  Speech: Loud, short and irritable tone  Mood: \"Okay\"  Affect: Incongruent, evasive, dismissive  Thought processes: Linear to short closed ended questions, does not engage in any meaningful conversation regarding the events that led to hospitalization  Thought content: No targeted aggression towards staff, denies aggression towards mother  Suicidal Ideation: Denies suicidal ideations, contracts for safety on the unit. Delusions: Some paranoia  Perceptual Disturbance: Staff reports that patient is heard responding to internal stimuli in his room  Cognition: Oriented to person and location  Memory: Impaired  Insight: poor   Judgement: poor       Data   height is 5' 9\" (1.753 m) and weight is 236 lb (107 kg). His temperature is 32 °F (0 °C) (abnormal). His blood pressure is 130/69 and his pulse is 85. His respiration is 14 and oxygen saturation is 99%.    Labs:   Admission on 09/09/2022   Component Date Value Ref Range Status    WBC 09/09/2022 9.6  3.5 - 11.0 k/uL Final    RBC 09/09/2022 4.44 (A) 4.5 - 5.9 m/uL Final    Hemoglobin 09/09/2022 12.9 (A) 13.5 - 17.5 g/dL Final    Hematocrit 09/09/2022 39.0 (A) 41 - 53 % Final    MCV 09/09/2022 87.8  80 - 100 fL Final    MCH 09/09/2022 29.0  26 - 34 pg Final    MCHC 09/09/2022 33.1  31 - 37 g/dL Final    RDW 09/09/2022 16.9 (A) 11.5 - 14.9 % Final    Platelets 23/10/0079 386  150 - 450 k/uL Final    MPV 09/09/2022 6.4  6.0 - 12.0 fL Final    Seg Neutrophils 09/09/2022 70 (A) 36 - 66 % Final    Lymphocytes 09/09/2022 20 (A) 24 - 44 % Final    Monocytes 09/09/2022 8 (A) 1 - 7 % Final    Eosinophils % 09/09/2022 1  0 - 4 % Final    Basophils 09/09/2022 1  0 - 2 % Final    Segs Absolute 09/09/2022 6.80  1.3 - 9.1 k/uL Final    Absolute Lymph # 09/09/2022 1.90  1.0 - 4.8 k/uL Final    Absolute Mono # 09/09/2022 0.70  0.1 - 1.3 k/uL Final    Absolute Eos # 09/09/2022 0.10  0.0 - 0.4 k/uL Final    Basophils Absolute 09/09/2022 0.10  0.0 - 0.2 k/uL Final    Glucose 09/09/2022 92  70 - 99 mg/dL Final    BUN 09/09/2022 10  6 - 20 mg/dL Final    Creatinine 09/09/2022 0.98  0.70 - 1.20 mg/dL Final    Calcium 09/09/2022 9.7  8.6 - 10.4 mg/dL Final    Sodium 09/09/2022 139  135 - 144 mmol/L Final Potassium 09/09/2022 4.1  3.7 - 5.3 mmol/L Final    Chloride 09/09/2022 102  98 - 107 mmol/L Final    CO2 09/09/2022 30  20 - 31 mmol/L Final    Anion Gap 09/09/2022 7 (A) 9 - 17 mmol/L Final    Alkaline Phosphatase 09/09/2022 116  40 - 129 U/L Final    ALT 09/09/2022 19  5 - 41 U/L Final    AST 09/09/2022 14  <40 U/L Final    Total Bilirubin 09/09/2022 0.2 (A) 0.3 - 1.2 mg/dL Final    Total Protein 09/09/2022 9.1 (A) 6.4 - 8.3 g/dL Final    Albumin 09/09/2022 3.8  3.5 - 5.2 g/dL Final    GFR Non- 09/09/2022 >60  >60 mL/min Final    GFR  09/09/2022 >60  >60 mL/min Final    GFR Comment 09/09/2022        Final    Comment: Average GFR for 38-51 years old:   80 mL/min/1.73sq m  Chronic Kidney Disease:   <60 mL/min/1.73sq m  Kidney failure:   <15 mL/min/1.73sq m              eGFR calculated using average adult body mass.  Additional eGFR calculator available at:        [email or web address]            Salicylate Lvl 25/09/5254 <1 (A) 3 - 10 mg/dL Final    Acetaminophen Level 09/09/2022 <5 (A) 10 - 30 ug/mL Final    Ethanol 09/09/2022 <10  <10 mg/dL Final    Ethanol percent 09/09/2022 <0.010  % Final    Magnesium 09/09/2022 1.9  1.6 - 2.6 mg/dL Final    Amphetamine Screen, Ur 09/09/2022 NEGATIVE  NEGATIVE Final    Comment:       (Positive cutoff 1000 ng/mL)                  Barbiturate Screen, Ur 09/09/2022 NEGATIVE  NEGATIVE Final    Comment:       (Positive cutoff 200 ng/mL)                  Benzodiazepine Screen, Urine 09/09/2022 NEGATIVE   Final    Comment:       (Positive cutoff 200 ng/mL)                  Cocaine Metabolite, Urine 09/09/2022 NEGATIVE  NEGATIVE Final    Comment:       (Positive cutoff 300 ng/mL)                  Methadone Screen, Urine 09/09/2022 NEGATIVE  NEGATIVE Final    Comment:       (Positive cutoff 300 ng/mL)                  Opiates, Urine 09/09/2022 NEGATIVE  NEGATIVE Final    Comment:       (Positive cutoff 300 ng/mL) Phencyclidine, Urine 09/09/2022 NEGATIVE  NEGATIVE Final    Comment:       (Positive cutoff 25 ng/mL)                  Cannabinoid Scrn, Ur 09/09/2022 NEGATIVE  NEGATIVE Final    Comment:       (Positive cutoff 50 ng/mL)                  Oxycodone Screen, Ur 09/09/2022 NEGATIVE  NEGATIVE Final    Comment:       (Positive cutoff 100 ng/mL)                  Fentanyl, Ur 09/09/2022 NEGATIVE  NEGATIVE Final    Comment:       (Positive cutoff  5 ng/ml)            Test Information 09/09/2022 Assay provides medical screening only. The absence of expected drug(s) and/or metabolite(s) may indicate diluted or adulterated urine, limitations of testing or timing of collection. Final    Comment: Testing for legal purposes should be confirmed by another method. To request confirmation   of test result, please call the lab within 7 days of sample submission. Reviewed patient's current plan of care and vital signs with nursing staff.     Labs reviewed: [x] Yes    Medications  Current Facility-Administered Medications: carBAMazepine (TEGRETOL) tablet 200 mg, 200 mg, Oral, BID  haloperidol (HALDOL) tablet 1 mg, 1 mg, Oral, BID  acetaminophen (TYLENOL) tablet 650 mg, 650 mg, Oral, Q6H PRN  ibuprofen (ADVIL;MOTRIN) tablet 400 mg, 400 mg, Oral, Q6H PRN  hydrOXYzine HCl (ATARAX) tablet 50 mg, 50 mg, Oral, TID PRN  traZODone (DESYREL) tablet 50 mg, 50 mg, Oral, Nightly PRN  polyethylene glycol (GLYCOLAX) packet 17 g, 17 g, Oral, Daily PRN  aluminum & magnesium hydroxide-simethicone (MAALOX) 200-200-20 MG/5ML suspension 30 mL, 30 mL, Oral, Q6H PRN  nicotine polacrilex (NICORETTE) gum 2 mg, 2 mg, Oral, Q2H PRN  haloperidol lactate (HALDOL) injection 5 mg, 5 mg, IntraMUSCular, Q6H PRN **AND** LORazepam (ATIVAN) injection 2 mg, 2 mg, IntraMUSCular, Q6H PRN **AND** diphenhydrAMINE (BENADRYL) injection 50 mg, 50 mg, IntraMUSCular, Q6H PRN  haloperidol (HALDOL) tablet 5 mg, 5 mg, Oral, Q6H PRN **AND** LORazepam (ATIVAN) tablet 2 mg, 2 mg, Oral, Q6H PRN    ASSESSMENT  Acute exacerbation of chronic paranoid schizophrenia ([facility] [address]         HANDOFF  Patient symptoms are: Minimal improvement  Medications as determined by attending physician  Encourage participation in groups and milieu. Probable discharge is to be determined by MD    Electronically signed by APURVA Pastrana CNP on 9/12/2022 at 5:03 PM    **This report has been created using voice recognition software. It may contain minor errors which are inherent in voice recognition technology. **   I independently saw and evaluated the patient. I reviewed the nurse practioner's documentation above. Any additional comments or changes to the    documentation are stated below otherwise agree with assessment. The patient was admitted to the hospital on 9/9/2022. The patient has a history of schizoaffective disorder and was brought to the hospital by the [facility] Department due to concerns about increasing aggressive behavior towards his mother. The patient has expressed delusional believes about his mother and unhappiness about how she handles his money and health. The patient has been treated with haloperidol and carbamazepine. He has not required as needed medications. His dose of haloperidol is low at 1 Mg twice daily. I have noted that the patient does have a history of seizures while taking antipsychotic medications because of which his dose is Too low. The patient was seen face-to-face. He is lying in bed. He is disheveled. His room is disorganized. He is minimizing all symptoms. He denies auditory hallucinations. He gives brief and short answers and does not elaborate. He is currently denying any auditory or visual hallucinations. No changes have been made to his medications      PLAN  Medications as noted above  Attempt to develop insight  Expected Length of Stay is 3-6 days. Psycho-education conducted. Supportive Therapy conducted.   Follow-up daily while on inpatient unit    Electronically signed by Dany Coker MD on 9/12/22 at 8:12 PM EDT

## 2022-09-12 NOTE — PLAN OF CARE
Problem: Behavior  Goal: Pt/Family maintain appropriate behavior and adhere to behavioral management agreement, if implemented  Description: INTERVENTIONS:  1. Assess patient/family's coping skills and  non-compliant behavior (including use of illegal substances)  2. Notify security of behavior or suspected illegal substances which indicate the need for search of the family and/or belongings  3. Encourage verbalization of thoughts and concerns in a socially appropriate manner  4. Utilize positive, consistent limit setting strategies supporting safety of patient, staff and others  5. Encourage participation in the decision making process about the behavioral management agreement  6. If a visitor's behavior poses a threat to safety call refer to organization policy. 7. Initiate consult with , Psychosocial CNS, Spiritual Care as appropriate  9/12/2022 1322 by Lacy Palomino RN  Outcome: Progressing     Problem: Involuntary Admit  Goal: Will cooperate with staff recommendations and doctor's orders and will demonstrate appropriate behavior  Description: INTERVENTIONS:  1. Treat underlying conditions and offer medication as ordered  2. Educate regarding involuntary admission procedures and rules  3. Contain excessive/inappropriate behavior per unit and hospital policies  9/18/8822 7992 by Lacy Palomino RN  Outcome: Progressing     Patient denied suicidal ideations. Safety checks every 15 minutes continued per unit policy. Patient rated his anxiety a 4/10. Patient verbalizes that he is ready for discharged. Patient rated his depression a 2/10. Writer provided 1:1 interaction with patient to discuss concerns. Patient denied auditory, visual, tactile, olfactory, and gustatory hallucinations. Patient is observed responding to internal stimuli. Patient is isolative to room, not attend group activities. Writer encouraged patient to participate in group activities.

## 2022-09-12 NOTE — PLAN OF CARE
[address] [place] [address]  Day 3 Interdisciplinary Treatment Plan NOTE    Review Date & Time: 9/12/2022  0920    Admission Type:   Admission Type:  Involuntary    Reason for admission:  Reason for Admission: Pinked by Josee Sidhu, aggression with family, off medications  Estimated Length of Stay:  5-7 days  Estimated Discharge Date Update:   to be determined by physician    PATIENT STRENGTHS:  Patient Strengths:   Patient Strengths and Limitations:Limitations: Difficult relationships / poor social skills, Tendency to isolate self, Difficulty problem solving/relies on others to help solve problems, Unrealistic self-view, Multiple barriers to leisure interests, External locus of control  Addictive Behavior:Addictive Behavior  In the Past 3 Months, Have You Felt or Has Someone Told You That You Have a Problem With  : None  Medical Problems:  Past Medical History:   Diagnosis Date    Bipolar 1 disorder ([facility] [address]     GERD (gastroesophageal reflux disease) 8/13/2016    Hidradenitis suppurativa     Polysubstance abuse ([facility]     Schizoaffective disorder ([facility]        Risk:  Fall Risk   Roosevelt Scale Roosevelt Scale Score: 21  BVC    Change in scores:  No Changes to plan of Care:  No    Status EXAM:   Mental Status and Behavioral Exam  Normal: No  Level of Assistance: Independent/Self  Facial Expression: Avoids Gaze  Affect: Blunt  Level of Consciousness: Alert  Frequency of Checks: 4 times per hour, close  Mood:Normal: No  Mood: Depressed, Anxious  Motor Activity:Normal: No  Motor Activity: Decreased  Eye Contact: Fair  Observed Behavior: Threatening, Preoccupied  Sexual Misconduct History: Current - no  Preception: [place] to person, [place] to time, [place] to place, [place] to situation  Attention:Normal: No  Attention: Distractible  Thought Processes: Circumstantial  Thought Content:Normal: No  Thought Content: Preoccupations  Depression Symptoms: Isolative, Impaired concentration, Loss of interest  Anxiety Symptoms: Generalized  Rebekah Symptoms: No problems reported or observed. Hallucinations: Other (comment) (appears to respond)  Delusions: No  Memory:Normal: No  Memory: Poor recent, Poor remote  Insight and Judgment: No  Insight and Judgment: Unmotivated    Daily Assessment Last Entry:   Daily Sleep (WDL): Within Defined Limits            Daily Nutrition (WDL): Within Defined Limits  Level of Assistance: Independent/Self    Patient Monitoring:  Frequency of Checks: 4 times per hour, close    Psychiatric Symptoms:   Depression Symptoms  Depression Symptoms: Isolative, Impaired concentration, Loss of interest  Anxiety Symptoms  Anxiety Symptoms: Generalized  Rebekah Symptoms  Rebekah Symptoms: No problems reported or observed. Suicide Risk CSSR-S:  1) Within the past month, have you wished you were dead or wished you could go to sleep and not wake up? : No  2) Have you actually had any thoughts of killing yourself? : No  6) Have you ever done anything, started to do anything, or prepared to do anything to end your life?: No  Change in Result:  No Change in Plan of care:  No      EDUCATION:   Learner Progress Toward Treatment Goals:   Reviewed results and recommendations of this team, Reviewed group plan and strategies, Reviewed signs, symptoms and risk of self harm and violent behavior, Reviewed goals and plan of care    Method:  small group, individual verbal education    Outcome:   Verbalized by patient but needs reinforcement to obtain goals    PATIENT GOALS:  Short term:  Patient declined to attend treatment team at this time, no short term goals were discussed at this time. Long term:  Patient declined to attend treatment team at this time, no long term goals were discussed at this time.      PLAN/TREATMENT RECOMMENDATIONS UPDATE:  continue with group therapies, increased socialization, continue planning for after discharge goals, continue with medication compliance    SHORT-TERM GOALS UPDATE:   Time frame for Short-Term Goals:  5-7 days    LONG-TERM GOALS UPDATE:   Time frame for Long-Term Goals:  6 months    Members Present in Team Meeting:   See Signatures Sheet   Shade Brooks RN

## 2022-09-12 NOTE — GROUP NOTE
Group Therapy Note    Date: 9/12/2022    Group Start Time: 1000  Group End Time: 1427  Group Topic: Psychotherapy    [address] [place], MSW, LSW        Group Therapy Note    Attendees: 8/12       Patient was offered group therapy today but declined to participate despite encouragement from staff. 1:1 was offered.         Signature:  JEFE Pfeiffer, MICHAEL

## 2022-09-12 NOTE — GROUP NOTE
Group Therapy Note    Date: 9/12/2022    Group Start Time: 1100  Group End Time: 2184  Group Topic: Recreational    [facility]    Heather Click     Group Note        Date: 9/12/2022   Start Time: 1100  End Time: 7570      Number of Participants in Group & Unit Census:  5/13    Topic: 5/13    Goal of Group:engage in peer support, increase sense of community, increase socialization; Increase self-expression      Comments:     Patient did not participate in Music therapy group, despite staff encouragement and explanation of benefits. Patient remain seclusive to self. Q15 minute safety checks maintained for patient safety and will continue to encourage patient to attend unit programming.

## 2022-09-13 PROCEDURE — 1240000000 HC EMOTIONAL WELLNESS R&B

## 2022-09-13 PROCEDURE — 6370000000 HC RX 637 (ALT 250 FOR IP)

## 2022-09-13 PROCEDURE — 99232 SBSQ HOSP IP/OBS MODERATE 35: CPT | Performed by: PSYCHIATRY & NEUROLOGY

## 2022-09-13 PROCEDURE — APPSS30 APP SPLIT SHARED TIME 16-30 MINUTES: Performed by: PSYCHIATRY & NEUROLOGY

## 2022-09-13 PROCEDURE — 6370000000 HC RX 637 (ALT 250 FOR IP): Performed by: PSYCHIATRY & NEUROLOGY

## 2022-09-13 RX ADMIN — HYDROXYZINE HYDROCHLORIDE 50 MG: 50 TABLET, FILM COATED ORAL at 20:48

## 2022-09-13 RX ADMIN — TRAZODONE HYDROCHLORIDE 50 MG: 50 TABLET ORAL at 20:47

## 2022-09-13 RX ADMIN — HALOPERIDOL 1 MG: 1 TABLET ORAL at 09:50

## 2022-09-13 RX ADMIN — HALOPERIDOL 1 MG: 1 TABLET ORAL at 20:47

## 2022-09-13 RX ADMIN — CARBAMAZEPINE 200 MG: 200 TABLET ORAL at 09:50

## 2022-09-13 RX ADMIN — CARBAMAZEPINE 200 MG: 200 TABLET ORAL at 20:48

## 2022-09-13 NOTE — GROUP NOTE
Group Therapy Note    Date: 9/13/2022    Group Start Time: 1330  Group End Time: 1400  Group Topic: Psychotherapy    [facility] Conrado Buerger, MSW, JULIO CESARW        Group Therapy Note    Attendees: 6/12       Patient was offered group therapy today but declined to participate despite encouragement from staff. 1:1 was offered.       Signature:  JEFE Thorne, MICHAEL

## 2022-09-13 NOTE — PLAN OF CARE
Problem: Behavior  Goal: Pt/Family maintain appropriate behavior and adhere to behavioral management agreement, if implemented  Description: INTERVENTIONS:  1. Assess patient/family's coping skills and  non-compliant behavior (including use of illegal substances)  2. Notify security of behavior or suspected illegal substances which indicate the need for search of the family and/or belongings  3. Encourage verbalization of thoughts and concerns in a socially appropriate manner  4. Utilize positive, consistent limit setting strategies supporting safety of patient, staff and others  5. Encourage participation in the decision making process about the behavioral management agreement  6. If a visitor's behavior poses a threat to safety call refer to organization policy. 7. Initiate consult with , Psychosocial CNS, Spiritual Care as appropriate  9/13/2022 1723 by Lu Betts LPN  Outcome: Progressing  Flowsheets (Taken 9/13/2022 1723)  Patient/family maintains appropriate behavior and adheres to behavioral management agreement, if implemented:   Assess patient/familys coping skills and  non-compliant behavior (including use of illegal substances)   Utilize positive, consistent limit setting strategies supporting safety of patient, staff and others   Encourage participation in the decision making process about the behavioral management agreement  Note: Patient alert and orientated X3. Patient denies thoughts of harming self or harming others. Patient states that he is not having auditory/visual hallucinations, but is observed taking to himself. Patient is anxious, depressed, isolative and withdrawn. Patient is observed being out in the dayroom for needs only. Patient has been encouraged to shower and brush teeth. Patient has been medication and behavioral compliant.       Problem: Involuntary Admit  Goal: Will cooperate with staff recommendations and doctor's orders and will demonstrate appropriate behavior  Description: INTERVENTIONS:  1. Treat underlying conditions and offer medication as ordered  2. Educate regarding involuntary admission procedures and rules  3. Contain excessive/inappropriate behavior per unit and hospital policies  Outcome: Progressing  Flowsheets (Taken 9/13/2022 1723)  Will cooperate with staff recommendations and doctor's orders and will demonstrate appropriate behavior:   Treat underlying conditions and offer medication as ordered   Educate regarding involuntary admission procedures and rules  Note: Patient alert and orientated X3. Patient denies thoughts of harming self or harming others. Patient states that he is not having auditory/visual hallucinations, but is observed taking to himself. Patient is anxious, depressed, isolative and withdrawn. Patient is observed being out in the dayroom for needs only. Patient has been encouraged to shower and brush teeth. Patient has been medication and behavioral compliant.

## 2022-09-13 NOTE — GROUP NOTE
Group Therapy Note    Date: 9/13/2022    Group Start Time: 9105  Group End Time: 3694  Group Topic: Cognitive Skills    [facility] Harika Meehan    Cognitive Skills Group Note        Date: September 13, 2022 Start Time:  10:45 am  End Time:  11:25am      Number of Participants in Group & Unit Census:  6/12    Topic: cognitive skills     Goal of Group: to improve coping skills /improve socialization       Comments:     Patient did not participate in Cognitive Skills group, despite staff encouragement and explanation of benefits. Patient remain seclusive to self. Q15 minute safety checks maintained for patient safety and will continue to encourage patient to attend unit programming.               Signature:  Aime Hubbard

## 2022-09-13 NOTE — CARE COORDINATION
Social work attempted to connect with patient's guardian/mother today to coordinate plans for discharge. She did not answer and a voice message was left requesting a return call.

## 2022-09-13 NOTE — BH NOTE
Patient's guardian is at nurses station during visiting hours inquiring about Humira injection which patient receives every two weeks. Patient's guardian states he was suppose to have it 9/9, but he did not receive it as he was admitted into the [facility] unit. Guardian states that she administers the injection at patient's residence.

## 2022-09-13 NOTE — BH NOTE
Fany Mcmahon ACT team nurse, who is part of patient's treatment team when in community called to report that patient's last injection of Prolixen Decanoate 37.5 mg was administered on 9/9/22. Patient's next dose is expected at [facility] [place] on 9/23.

## 2022-09-13 NOTE — PROGRESS NOTES
Daily Progress Note  9/13/2022    Patient Name: Brooklyn Drummond    CHIEF COMPLAINT: Acute psychosis         SUBJECTIVE:    Nursing staff report the patient has maintained medication adherence and did utilize hydroxyzine and trazodone last evening related to her anxiety and insomnia. He is agreeable to assessment in his room where he has remained isolative overall. There is a strong body odor present and he has food wrappers scattered. He is discharge focus offering that he wants to go home, explaining that he resides with his mother. He does provide an address but is not able to provide the date time or location. He is extremely guarded and denies depression, anxiety auditory or visual hallucinations. He is extremely paranoid but willing to confirm that he has had adequate food, sleep and denies side effects related to his medications. He declines offer to ambulate million. Writer encouraged patient to attend groups on the unit. At this time, the patient is not appropriate for a lower level of care. There is risk of decompensation and patient warrants further hospitalization for safety and stabilization. Appetite:  [] Normal/Adequate/Unchanged  [] Increased  [x] Decreased      Sleep:       [x] Normal/Adequate/Unchanged  [] Fair  [] Poor      Group Attendance on Unit:   [] Yes  [] Selectively    [x] No    Medication Side Effects: Patient denies any medication side effects at the time of assessment. Mental Status Exam  Level of consciousness: Alert and awake. Appearance: Appropriate attire for setting, resting in bed, with extremely poor grooming and hygiene. Behavior/Motor: Approachable, guardedno psychomotor abnormalities. Attitude toward examiner: Somewhat cooperative, suspicious, avoids eye contact. Speech: Delayed rate, decreased volume, irritable tone with poor articulation   Mood:  Patient reports \" okay\".    Affect: Incongruent, constricted  Thought processes: Linear, slow, and poverty of thought. Thought content: Denies homicidal ideation. Suicidal Ideation: Denies suicidal ideations, without current plan or intent, contracts for safety on the unit. Delusions: No evidence of delusions. evident paranoia. Perceptual Disturbance: Patient does appear to be responding to internal stimuli. Denies auditory hallucinations. Denies visual hallucinations. Cognition: Oriented to self only. Memory: Impaired. Insight & Judgement: Poor. Data   height is 5' 9\" (1.753 m) and weight is 236 lb (107 kg). His temporal temperature is 97.5 °F (36.4 °C). His blood pressure is 123/78 and his pulse is 83. His respiration is 14 and oxygen saturation is 99%.    Labs:   Admission on 09/09/2022   Component Date Value Ref Range Status    WBC 09/09/2022 9.6  3.5 - 11.0 k/uL Final    RBC 09/09/2022 4.44 (A) 4.5 - 5.9 m/uL Final    Hemoglobin 09/09/2022 12.9 (A) 13.5 - 17.5 g/dL Final    Hematocrit 09/09/2022 39.0 (A) 41 - 53 % Final    MCV 09/09/2022 87.8  80 - 100 fL Final    MCH 09/09/2022 29.0  26 - 34 pg Final    MCHC 09/09/2022 33.1  31 - 37 g/dL Final    RDW 09/09/2022 16.9 (A) 11.5 - 14.9 % Final    Platelets 95/85/4503 386  150 - 450 k/uL Final    MPV 09/09/2022 6.4  6.0 - 12.0 fL Final    Seg Neutrophils 09/09/2022 70 (A) 36 - 66 % Final    Lymphocytes 09/09/2022 20 (A) 24 - 44 % Final    Monocytes 09/09/2022 8 (A) 1 - 7 % Final    Eosinophils % 09/09/2022 1  0 - 4 % Final    Basophils 09/09/2022 1  0 - 2 % Final    Segs Absolute 09/09/2022 6.80  1.3 - 9.1 k/uL Final    Absolute Lymph # 09/09/2022 1.90  1.0 - 4.8 k/uL Final    Absolute Mono # 09/09/2022 0.70  0.1 - 1.3 k/uL Final    Absolute Eos # 09/09/2022 0.10  0.0 - 0.4 k/uL Final    Basophils Absolute 09/09/2022 0.10  0.0 - 0.2 k/uL Final    Glucose 09/09/2022 92  70 - 99 mg/dL Final    BUN 09/09/2022 10  6 - 20 mg/dL Final    Creatinine 09/09/2022 0.98  0.70 - 1.20 mg/dL Final    Calcium 09/09/2022 9.7  8.6 - 10.4 mg/dL Final    Sodium 09/09/2022 139  135 - 144 mmol/L Final    Potassium 09/09/2022 4.1  3.7 - 5.3 mmol/L Final    Chloride 09/09/2022 102  98 - 107 mmol/L Final    CO2 09/09/2022 30  20 - 31 mmol/L Final    Anion Gap 09/09/2022 7 (A) 9 - 17 mmol/L Final    Alkaline Phosphatase 09/09/2022 116  40 - 129 U/L Final    ALT 09/09/2022 19  5 - 41 U/L Final    AST 09/09/2022 14  <40 U/L Final    Total Bilirubin 09/09/2022 0.2 (A) 0.3 - 1.2 mg/dL Final    Total Protein 09/09/2022 9.1 (A) 6.4 - 8.3 g/dL Final    Albumin 09/09/2022 3.8  3.5 - 5.2 g/dL Final    GFR Non- 09/09/2022 >60  >60 mL/min Final    GFR  09/09/2022 >60  >60 mL/min Final    GFR Comment 09/09/2022        Final    Comment: Average GFR for 38-51 years old:   80 mL/min/1.73sq m  Chronic Kidney Disease:   <60 mL/min/1.73sq m  Kidney failure:   <15 mL/min/1.73sq m              eGFR calculated using average adult body mass.  Additional eGFR calculator available at:        [employer]            Salicylate Lvl 91/83/2359 <1 (A) 3 - 10 mg/dL Final    Acetaminophen Level 09/09/2022 <5 (A) 10 - 30 ug/mL Final    Ethanol 09/09/2022 <10  <10 mg/dL Final    Ethanol percent 09/09/2022 <0.010  % Final    Magnesium 09/09/2022 1.9  1.6 - 2.6 mg/dL Final    Amphetamine Screen, Ur 09/09/2022 NEGATIVE  NEGATIVE Final    Comment:       (Positive cutoff 1000 ng/mL)                  Barbiturate Screen, Ur 09/09/2022 NEGATIVE  NEGATIVE Final    Comment:       (Positive cutoff 200 ng/mL)                  Benzodiazepine Screen, Urine 09/09/2022 NEGATIVE   Final    Comment:       (Positive cutoff 200 ng/mL)                  Cocaine Metabolite, Urine 09/09/2022 NEGATIVE  NEGATIVE Final    Comment:       (Positive cutoff 300 ng/mL)                  Methadone Screen, Urine 09/09/2022 NEGATIVE  NEGATIVE Final    Comment:       (Positive cutoff 300 ng/mL)                  Opiates, Urine 09/09/2022 NEGATIVE  NEGATIVE Final    Comment: (Positive cutoff 300 ng/mL)                  Phencyclidine, Urine 09/09/2022 NEGATIVE  NEGATIVE Final    Comment:       (Positive cutoff 25 ng/mL)                  Cannabinoid Scrn, Ur 09/09/2022 NEGATIVE  NEGATIVE Final    Comment:       (Positive cutoff 50 ng/mL)                  Oxycodone Screen, Ur 09/09/2022 NEGATIVE  NEGATIVE Final    Comment:       (Positive cutoff 100 ng/mL)                  Fentanyl, Ur 09/09/2022 NEGATIVE  NEGATIVE Final    Comment:       (Positive cutoff  5 ng/ml)            Test Information 09/09/2022 Assay provides medical screening only. The absence of expected drug(s) and/or metabolite(s) may indicate diluted or adulterated urine, limitations of testing or timing of collection. Final    Comment: Testing for legal purposes should be confirmed by another method. To request confirmation   of test result, please call the lab within 7 days of sample submission. Reviewed patient's current plan of care and vital signs with nursing staff.     Labs reviewed: [x] Yes  EKG not available however once therapeutic alliance is established we will consider updating    Medications  Current Facility-Administered Medications: carBAMazepine (TEGRETOL) tablet 200 mg, 200 mg, Oral, BID  haloperidol (HALDOL) tablet 1 mg, 1 mg, Oral, BID  acetaminophen (TYLENOL) tablet 650 mg, 650 mg, Oral, Q6H PRN  ibuprofen (ADVIL;MOTRIN) tablet 400 mg, 400 mg, Oral, Q6H PRN  hydrOXYzine HCl (ATARAX) tablet 50 mg, 50 mg, Oral, TID PRN  traZODone (DESYREL) tablet 50 mg, 50 mg, Oral, Nightly PRN  polyethylene glycol (GLYCOLAX) packet 17 g, 17 g, Oral, Daily PRN  aluminum & magnesium hydroxide-simethicone (MAALOX) 200-200-20 MG/5ML suspension 30 mL, 30 mL, Oral, Q6H PRN  nicotine polacrilex (NICORETTE) gum 2 mg, 2 mg, Oral, Q2H PRN  haloperidol lactate (HALDOL) injection 5 mg, 5 mg, IntraMUSCular, Q6H PRN **AND** LORazepam (ATIVAN) injection 2 mg, 2 mg, IntraMUSCular, Q6H PRN **AND** diphenhydrAMINE (BENADRYL) injection 50 mg, 50 mg, IntraMUSCular, Q6H PRN  haloperidol (HALDOL) tablet 5 mg, 5 mg, Oral, Q6H PRN **AND** LORazepam (ATIVAN) tablet 2 mg, 2 mg, Oral, Q6H PRN    ASSESSMENT  Acute exacerbation of chronic paranoid schizophrenia ([facility] [address]         HANDOFF  Patient symptoms remain unstable  Medications as determined by attending physician  Consider EKG once paranoia has decreased  Encourage participation in groups and milieu. Probable discharge is to be determined by MD    Electronically signed by APURVA Peraza CNP on 9/13/2022 at 4:50 PM    **This report has been created using voice recognition software. It may contain minor errors which are inherent in voice recognition technology. **  I independently saw and evaluated the patient. I reviewed the  documentation above. Any additional comments or changes to the   documentation are stated below otherwise agree with assessment. The patient remains disheveled. He is discharged focused. He has poor insight. His medications have been left unchanged. His dose of haloperidol is low. He may benefit from an alternative antipsychotic. We will have to get consent from his mother      PLAN  Medications as noted above  Attempt to develop insight  Psycho-education conducted. Estimated Length of Stay is 3-6 days  Supportive Therapy conducted.   Follow-up daily while on inpatient unit    Electronically signed by Chasity Tang MD on 9/13/22 at 9:50 PM EDT

## 2022-09-13 NOTE — BH NOTE
Date: September 12, 2022       Start Time: 20:30                     End Time: 20:45        Number of Participants in Group & Unit Census: 5/12     Topic: Wrap-Up Goals Group     Goal of Group: Patient will discuss their goal for the day and what steps they took to achieve their goal. If the patient did not achieve their goal, patient will discuss barriers that prevented them and what interventions they will take to have a better outcome. Comments:      Patient did not participate in wrap-up goals group, despite staff encouragement and explanation of benefits. Patient remain seclusive to self. Q15 minute safety checks maintained for patient safety and will continue to encourage patient to attend unit programming.

## 2022-09-14 PROCEDURE — 6370000000 HC RX 637 (ALT 250 FOR IP)

## 2022-09-14 PROCEDURE — 1240000000 HC EMOTIONAL WELLNESS R&B

## 2022-09-14 PROCEDURE — 99232 SBSQ HOSP IP/OBS MODERATE 35: CPT | Performed by: PSYCHIATRY & NEUROLOGY

## 2022-09-14 PROCEDURE — APPSS30 APP SPLIT SHARED TIME 16-30 MINUTES: Performed by: PSYCHIATRY & NEUROLOGY

## 2022-09-14 RX ADMIN — CARBAMAZEPINE 200 MG: 200 TABLET ORAL at 09:19

## 2022-09-14 RX ADMIN — HALOPERIDOL 1 MG: 1 TABLET ORAL at 09:19

## 2022-09-14 NOTE — CARE COORDINATION
Social work called patient's mother to begin discussion of discharge planning and offer support. Patient's mother stated he can return if he is stable on his medications but she is not familiar with a new medication he is taking therefore she is unsure of his return home at this time. She is open to exploring other options for living arrangements after discussing concerns of safety for her at home in regards to patient's behaviors. Social work will communicate this with [facility] team to help explore options in the community. She stated he has lived in group homes before but he just walks away.

## 2022-09-14 NOTE — GROUP NOTE
Group Therapy Note    Date: 9/14/2022    Group Start Time: 1105  Group End Time: 2131  Group Topic: Recreational    KATE No    Recreation Group Note        Date: 9/14/2022   Start Time: 1105  End Time: 9341      Number of Participants in Group & Unit Census:  4/11    Topic: Recreation/game group - Josué Ortiz! Goal of Group:increase cognitive stimulation; Increase socialization; Normalization of the environment      Comments:     Patient did not participate in Recreation group, despite staff encouragement and explanation of benefits. Patient remain seclusive to self. Q15 minute safety checks maintained for patient safety and will continue to encourage patient to attend unit programming.

## 2022-09-14 NOTE — GROUP NOTE
Group Therapy Note    Date: 9/14/2022    Group Start Time: 1335  Group End Time: 3539  Group Topic: Music Therapy    KATE Ortiz     Group Note        Date: 9/14/2022   Start Time: 3185  End Time: 3672      Number of Participants in Group & Unit Census:  5/11    Topic: Music therapy group - Patients worked together to create a playlist of music going from [employer], to gradually becoming more energetic music. Following playthrough of patients play-list, patients discussed how externals factors can have internal effects on aspects of energy, mood, and motivation. Patients identified other factors that could effect these things on a daily basis (Nutrition, sleep, work, relationship, weather, leisure activities, and more) and their level of control over these things. Goal of Group: Goals to engage in peer support; increase socialization; Increase self-expression; Increased motivation; Increase experience of control; Normalization of the environment        Comments:     Patient did not participate in Music therapy group, despite staff encouragement and explanation of benefits. Patient remain seclusive to self. Q15 minute safety checks maintained for patient safety and will continue to encourage patient to attend unit programming.

## 2022-09-14 NOTE — PLAN OF CARE
Problem: Behavior  Goal: Pt/Family maintain appropriate behavior and adhere to behavioral management agreement, if implemented  Description: INTERVENTIONS:  1. Assess patient/family's coping skills and  non-compliant behavior (including use of illegal substances)  2. Notify security of behavior or suspected illegal substances which indicate the need for search of the family and/or belongings  3. Encourage verbalization of thoughts and concerns in a socially appropriate manner  4. Utilize positive, consistent limit setting strategies supporting safety of patient, staff and others  5. Encourage participation in the decision making process about the behavioral management agreement  6. If a visitor's behavior poses a threat to safety call refer to organization policy. 7. Initiate consult with , Psychosocial CNS, Spiritual Care as appropriate  Outcome: Progressing  Flowsheets (Taken 9/14/2022 1013)  Patient/family maintains appropriate behavior and adheres to behavioral management agreement, if implemented:   Encourage verbalization of thoughts and concerns in a socially appropriate manner   Encourage participation in the decision making process about the behavioral management agreement  Note: Patient alert and oriented x3. Patient denies auditory/visual hallucinations, but is often observed talking to self. Patient denies depression/anxiety, but is withdrawn, isolative, and aloof of others around. Patient is out for needs only and then goes back to room. Patient encouraged to wash self, change clothes, and brush teeth. Patient has been medication and behavioral compliant. Problem: Involuntary Admit  Goal: Will cooperate with staff recommendations and doctor's orders and will demonstrate appropriate behavior  Description: INTERVENTIONS:  1. Treat underlying conditions and offer medication as ordered  2. Educate regarding involuntary admission procedures and rules  3.  Contain excessive/inappropriate behavior per unit and hospital policies  Outcome: Progressing  Flowsheets (Taken 9/14/2022 1013)  Will cooperate with staff recommendations and doctor's orders and will demonstrate appropriate behavior:   Treat underlying conditions and offer medication as ordered   Contain excessive/inappropriate behavior per unit and hospital policies  Note: Patient alert and oriented x3. Patient denies auditory/visual hallucinations, but is often observed talking to self. Patient denies depression/anxiety, but is withdrawn, isolative, and aloof of others around. Patient is out for needs only and then goes back to room. Patient encouraged to wash self, change clothes, and brush teeth. Patient has been medication and behavioral compliant.

## 2022-09-14 NOTE — PLAN OF CARE
and orientated X3. Patient denies thoughts of harming self or harming others. Patient states that he is not having auditory/visual hallucinations, but is observed taking to himself. Patient is anxious, depressed, isolative and withdrawn. Patient is observed being out in the dayroom for needs only. Patient has been encouraged to shower and brush teeth. Patient has been medication and behavioral compliant. 9/13/2022 1647 by Mitch Ramirez LPN  Flowsheets (Taken 9/13/2022 1647)  Patient/family maintains appropriate behavior and adheres to behavioral management agreement, if implemented:   Assess patient/familys coping skills and  non-compliant behavior (including use of illegal substances)   Encourage verbalization of thoughts and concerns in a socially appropriate manner   Utilize positive, consistent limit setting strategies supporting safety of patient, staff and others     Problem: Involuntary Admit  Goal: Will cooperate with staff recommendations and doctor's orders and will demonstrate appropriate behavior  Description: INTERVENTIONS:  1. Treat underlying conditions and offer medication as ordered  2. Educate regarding involuntary admission procedures and rules  3. Contain excessive/inappropriate behavior per unit and hospital policies  [id number] by Pete Del Toro RN  Outcome: Progressing  Flowsheets (Taken 9/13/2022 1959)  Will cooperate with staff recommendations and doctor's orders and will demonstrate appropriate behavior: Contain excessive/inappropriate behavior per unit and hospital policies  6/81/2094 [id number] by Mitch Ramirez LPN  Outcome: Progressing  Flowsheets (Taken 9/13/2022 1723)  Will cooperate with staff recommendations and doctor's orders and will demonstrate appropriate behavior:   Treat underlying conditions and offer medication as ordered   Educate regarding involuntary admission procedures and rules  Note: Patient alert and orientated X3.  Patient denies thoughts of harming self or harming others. Patient states that he is not having auditory/visual hallucinations, but is observed taking to himself. Patient is anxious, depressed, isolative and withdrawn. Patient is observed being out in the dayroom for needs only. Patient has been encouraged to shower and brush teeth. Patient has been medication and behavioral compliant.

## 2022-09-14 NOTE — BH NOTE
Wrap-Up Group Note        Date: September 13, 2022 Start Time: 8pm  End Time: 830pm      Number of Participants in Group & Unit Census:  5    Topic: wrap up    Goal of Group:accomplish goals set at beginning of day      Comments:     Patient did not participate in Wrap-Up group, despite staff encouragement and explanation of benefits. Patient remain seclusive to self. Q15 minute safety checks maintained for patient safety and will continue to encourage patient to attend unit programming.

## 2022-09-14 NOTE — PROGRESS NOTES
Daily Progress Note  9/14/2022    Patient Name: Tico Gardner    CHIEF COMPLAINT: Acute psychosis         SUBJECTIVE:    Nursing staff report the patient has maintained medication adherence and continues to come to the milieu only for personal and nutritional needs. He remains isolative to his room that is scattered with food wrappers and smells strongly of body odor. Patient presents very paranoid and offers 1-3 word responses only. He is discharge focus and denying auditory or visual hallucinations. He denies suicidal or homicidal ideations and denies having questions or concerns outside of the topic of discharge. Nursing staff report that he continues to exhibit self dialogue. He is unwilling to discuss the possibility of auditory hallucinations and states \"want to go home to my ma's\". Writer encouraged patient to consider attending  groups on the unit. At this time, the patient is not appropriate for a lower level of care. It is clear that he is unable to maintain basic activities of daily living. There is risk of decompensation and patient warrants further hospitalization for safety and stabilization. Appetite:  [] Normal/Adequate/Unchanged  [] Increased  [x] Decreased      Sleep:       [x] Normal/Adequate/Unchanged  [] Fair  [] Poor      Group Attendance on Unit:   [] Yes  [] Selectively    [x] No    Medication Side Effects: Patient denies any medication side effects at the time of assessment. Mental Status Exam  Level of consciousness: Alert and awake. Appearance: Appropriate attire for setting, resting in bed, with extremely poor grooming and hygiene. Behavior/Motor: Approachable, guarded, no psychomotor abnormalities. Attitude toward examiner: Minimally cooperative, suspicious, avoids eye contact. Speech: Delayed rate, decreased volume, irritable tone with poor articulation   Mood:  Patient reports \" fine\".    Affect: Incongruent, constricted  Thought processes: Linear, slow, and poverty of thought. Thought content: Denies homicidal ideation. Suicidal Ideation: Denies suicidal ideations, without current plan or intent, contracts for safety on the unit. Delusions: No evidence of delusions. evident paranoia. Perceptual Disturbance: Patient does appear to be responding to internal stimuli. Nursing staff report that he exhibits self dialogue frequently. Denies auditory hallucinations. Denies visual hallucinations. Cognition: Oriented to self only. Memory: Impaired. Insight & Judgement: Poor/limited    Data   height is 5' 9\" (1.753 m) and weight is 236 lb (107 kg). His oral temperature is 98.5 °F (36.9 °C). His blood pressure is 113/96 (abnormal) and his pulse is 86. His respiration is 16 and oxygen saturation is 99%.    Labs:   Admission on 09/09/2022   Component Date Value Ref Range Status    WBC 09/09/2022 9.6  3.5 - 11.0 k/uL Final    RBC 09/09/2022 4.44 (A) 4.5 - 5.9 m/uL Final    Hemoglobin 09/09/2022 12.9 (A) 13.5 - 17.5 g/dL Final    Hematocrit 09/09/2022 39.0 (A) 41 - 53 % Final    MCV 09/09/2022 87.8  80 - 100 fL Final    MCH 09/09/2022 29.0  26 - 34 pg Final    MCHC 09/09/2022 33.1  31 - 37 g/dL Final    RDW 09/09/2022 16.9 (A) 11.5 - 14.9 % Final    Platelets 68/52/2918 386  150 - 450 k/uL Final    MPV 09/09/2022 6.4  6.0 - 12.0 fL Final    Seg Neutrophils 09/09/2022 70 (A) 36 - 66 % Final    Lymphocytes 09/09/2022 20 (A) 24 - 44 % Final    Monocytes 09/09/2022 8 (A) 1 - 7 % Final    Eosinophils % 09/09/2022 1  0 - 4 % Final    Basophils 09/09/2022 1  0 - 2 % Final    Segs Absolute 09/09/2022 6.80  1.3 - 9.1 k/uL Final    Absolute Lymph # 09/09/2022 1.90  1.0 - 4.8 k/uL Final    Absolute Mono # 09/09/2022 0.70  0.1 - 1.3 k/uL Final    Absolute Eos # 09/09/2022 0.10  0.0 - 0.4 k/uL Final    Basophils Absolute 09/09/2022 0.10  0.0 - 0.2 k/uL Final    Glucose 09/09/2022 92  70 - 99 mg/dL Final    BUN 09/09/2022 10  6 - 20 mg/dL Final    Creatinine 09/09/2022 0.98  0.70 - 1.20 mg/dL Final    Calcium 09/09/2022 9.7  8.6 - 10.4 mg/dL Final    Sodium 09/09/2022 139  135 - 144 mmol/L Final    Potassium 09/09/2022 4.1  3.7 - 5.3 mmol/L Final    Chloride 09/09/2022 102  98 - 107 mmol/L Final    CO2 09/09/2022 30  20 - 31 mmol/L Final    Anion Gap 09/09/2022 7 (A) 9 - 17 mmol/L Final    Alkaline Phosphatase 09/09/2022 116  40 - 129 U/L Final    ALT 09/09/2022 19  5 - 41 U/L Final    AST 09/09/2022 14  <40 U/L Final    Total Bilirubin 09/09/2022 0.2 (A) 0.3 - 1.2 mg/dL Final    Total Protein 09/09/2022 9.1 (A) 6.4 - 8.3 g/dL Final    Albumin 09/09/2022 3.8  3.5 - 5.2 g/dL Final    GFR Non- 09/09/2022 >60  >60 mL/min Final    GFR  09/09/2022 >60  >60 mL/min Final    GFR Comment 09/09/2022        Final    Comment: Average GFR for 38-51 years old:   80 mL/min/1.73sq m  Chronic Kidney Disease:   <60 mL/min/1.73sq m  Kidney failure:   <15 mL/min/1.73sq m              eGFR calculated using average adult body mass.  Additional eGFR calculator available at:        [employer]            Salicylate Lvl 32/93/5941 <1 (A) 3 - 10 mg/dL Final    Acetaminophen Level 09/09/2022 <5 (A) 10 - 30 ug/mL Final    Ethanol 09/09/2022 <10  <10 mg/dL Final    Ethanol percent 09/09/2022 <0.010  % Final    Magnesium 09/09/2022 1.9  1.6 - 2.6 mg/dL Final    Amphetamine Screen, Ur 09/09/2022 NEGATIVE  NEGATIVE Final    Comment:       (Positive cutoff 1000 ng/mL)                  Barbiturate Screen, Ur 09/09/2022 NEGATIVE  NEGATIVE Final    Comment:       (Positive cutoff 200 ng/mL)                  Benzodiazepine Screen, Urine 09/09/2022 NEGATIVE   Final    Comment:       (Positive cutoff 200 ng/mL)                  Cocaine Metabolite, Urine 09/09/2022 NEGATIVE  NEGATIVE Final    Comment:       (Positive cutoff 300 ng/mL)                  Methadone Screen, Urine 09/09/2022 NEGATIVE  NEGATIVE Final    Comment:       (Positive cutoff 300 ng/mL) Opiates, Urine 09/09/2022 NEGATIVE  NEGATIVE Final    Comment:       (Positive cutoff 300 ng/mL)                  Phencyclidine, Urine 09/09/2022 NEGATIVE  NEGATIVE Final    Comment:       (Positive cutoff 25 ng/mL)                  Cannabinoid Scrn, Ur 09/09/2022 NEGATIVE  NEGATIVE Final    Comment:       (Positive cutoff 50 ng/mL)                  Oxycodone Screen, Ur 09/09/2022 NEGATIVE  NEGATIVE Final    Comment:       (Positive cutoff 100 ng/mL)                  Fentanyl, Ur 09/09/2022 NEGATIVE  NEGATIVE Final    Comment:       (Positive cutoff  5 ng/ml)            Test Information 09/09/2022 Assay provides medical screening only. The absence of expected drug(s) and/or metabolite(s) may indicate diluted or adulterated urine, limitations of testing or timing of collection. Final    Comment: Testing for legal purposes should be confirmed by another method. To request confirmation   of test result, please call the lab within 7 days of sample submission. Reviewed patient's current plan of care and vital signs with nursing staff.     Labs reviewed: [x] Yes  EKG not available however once therapeutic alliance is established we will consider updating    Medications  Current Facility-Administered Medications: carBAMazepine (TEGRETOL) tablet 200 mg, 200 mg, Oral, BID  haloperidol (HALDOL) tablet 1 mg, 1 mg, Oral, BID  acetaminophen (TYLENOL) tablet 650 mg, 650 mg, Oral, Q6H PRN  ibuprofen (ADVIL;MOTRIN) tablet 400 mg, 400 mg, Oral, Q6H PRN  hydrOXYzine HCl (ATARAX) tablet 50 mg, 50 mg, Oral, TID PRN  traZODone (DESYREL) tablet 50 mg, 50 mg, Oral, Nightly PRN  polyethylene glycol (GLYCOLAX) packet 17 g, 17 g, Oral, Daily PRN  aluminum & magnesium hydroxide-simethicone (MAALOX) 200-200-20 MG/5ML suspension 30 mL, 30 mL, Oral, Q6H PRN  nicotine polacrilex (NICORETTE) gum 2 mg, 2 mg, Oral, Q2H PRN  haloperidol lactate (HALDOL) injection 5 mg, 5 mg, IntraMUSCular, Q6H PRN **AND** LORazepam (ATIVAN) injection 2 mg, 2 mg, IntraMUSCular, Q6H PRN **AND** diphenhydrAMINE (BENADRYL) injection 50 mg, 50 mg, IntraMUSCular, Q6H PRN  haloperidol (HALDOL) tablet 5 mg, 5 mg, Oral, Q6H PRN **AND** LORazepam (ATIVAN) tablet 2 mg, 2 mg, Oral, Q6H PRN    ASSESSMENT  Acute exacerbation of chronic paranoid schizophrenia ([facility] [address]         HANDOFF  Patient symptoms remain unstable  Medications as determined by attending physician  Consider EKG once paranoia has decreased  Encourage participation in groups and milieu. Probable discharge is to be determined by MD    Electronically signed by APURVA Mario CNP on 9/14/2022 at 4:21 PM    **This report has been created using voice recognition software. It may contain minor errors which are inherent in voice recognition technology. **    I independently saw and evaluated the patient. I reviewed the  documentation above. Any additional comments or changes to the   documentation are stated below otherwise agree with assessment. The patient is disheveled and unkempt. He shows poor self-care. He is pleasant on approach and has maintained behavioral control. He denies any suicidal thoughts. He has been compliant with medication. PLAN  Medications as noted above  Attempt to develop insight  Psycho-education conducted. Estimated Length of Stay is 2-3 days  Supportive Therapy conducted.   Follow-up daily while on inpatient unit    Electronically signed by Frederic Quispe MD on 9/14/22 at 7:33 PM EDT

## 2022-09-14 NOTE — GROUP NOTE
Group Therapy Note    Date: 9/14/2022    Group Start Time: 1000  Group End Time: 0291  Group Topic: Psychotherapy    [address], MSW, LSW        Group Therapy Note    Attendees: 7/11       Patient was offered group therapy today but declined to participate despite encouragement from staff. 1:1 was offered.         Signature:  Ciarra Campa MSW, LSW

## 2022-09-15 PROCEDURE — 99232 SBSQ HOSP IP/OBS MODERATE 35: CPT | Performed by: PSYCHIATRY & NEUROLOGY

## 2022-09-15 PROCEDURE — 6370000000 HC RX 637 (ALT 250 FOR IP)

## 2022-09-15 PROCEDURE — 1240000000 HC EMOTIONAL WELLNESS R&B

## 2022-09-15 PROCEDURE — APPSS30 APP SPLIT SHARED TIME 16-30 MINUTES: Performed by: NURSE PRACTITIONER

## 2022-09-15 PROCEDURE — 6370000000 HC RX 637 (ALT 250 FOR IP): Performed by: PSYCHIATRY & NEUROLOGY

## 2022-09-15 RX ORDER — NICOTINE 21 MG/24HR
1 PATCH, TRANSDERMAL 24 HOURS TRANSDERMAL DAILY
Status: DISCONTINUED | OUTPATIENT
Start: 2022-09-15 | End: 2022-09-16 | Stop reason: HOSPADM

## 2022-09-15 RX ADMIN — CARBAMAZEPINE 200 MG: 200 TABLET ORAL at 08:43

## 2022-09-15 RX ADMIN — CARBAMAZEPINE 200 MG: 200 TABLET ORAL at 20:32

## 2022-09-15 RX ADMIN — HALOPERIDOL 1 MG: 1 TABLET ORAL at 08:43

## 2022-09-15 RX ADMIN — HALOPERIDOL 1 MG: 1 TABLET ORAL at 20:32

## 2022-09-15 NOTE — PLAN OF CARE
Problem: Involuntary Admit  Goal: Will cooperate with staff recommendations and doctor's orders and will demonstrate appropriate behavior  Description: INTERVENTIONS:  1. Treat underlying conditions and offer medication as ordered  2. Educate regarding involuntary admission procedures and rules  3. Contain excessive/inappropriate behavior per unit and hospital policies  [id number] by Jyoti Jackson RN  Outcome: Progressing  Note: Patient cooperative with request to shower, still responding, aloof while in day room. Problem: Behavior  Goal: Pt/Family maintain appropriate behavior and adhere to behavioral management agreement, if implemented  Description: INTERVENTIONS:  1. Assess patient/family's coping skills and  non-compliant behavior (including use of illegal substances)  2. Notify security of behavior or suspected illegal substances which indicate the need for search of the family and/or belongings  3. Encourage verbalization of thoughts and concerns in a socially appropriate manner  4. Utilize positive, consistent limit setting strategies supporting safety of patient, staff and others  5. Encourage participation in the decision making process about the behavioral management agreement  6. If a visitor's behavior poses a threat to safety call refer to organization policy.   7. Initiate consult with , Psychosocial CNS, Spiritual Care as appropriate  9/14/2022 2022 by Jyoti Jackson RN  Outcome: Progressing

## 2022-09-15 NOTE — BH NOTE
Relaxation Group Note        Date: September 14, 2022 Start Time: 2030 End Time: 2050      Number of Participants in Group & Unit Census:  3/10    Topic: 3/10    Goal of Group:Relaxation      Comments:     Patient did not participate in Relaxation group, despite staff encouragement and explanation of benefits. Patient remain seclusive to self. Q15 minute safety checks maintained for patient safety and will continue to encourage patient to attend unit programming.

## 2022-09-15 NOTE — GROUP NOTE
Group Therapy Note    Date: 9/15/2022    Group Start Time: 0900  Group End Time: 0920  Group Topic: Community Meeting    KATE RUIZFERNY VIRGEN    Ray Flannery RN        Group Therapy Note    patient refused to attend community meeting group at 0900 after encouragement from staff. 1:1 talk time provided as alternative to group session        Patient's Goal:      Notes:      Status After Intervention:      Participation Level: None    Participation Quality:       Speech:         Thought Process/Content:       Affective Functioning:       Mood:       Level of consciousness:        Response to Learning:       Endings:     Modes of Intervention:       Discipline Responsible: Registered Nurse      Signature:  Ray Flannery RN

## 2022-09-15 NOTE — PROGRESS NOTES
Daily Progress Note  9/15/2022    Patient Name: Abigail Jensen    CHIEF COMPLAINT: Acute psychosis         SUBJECTIVE:      Patient seen face-to-face for follow-up assessment. He is lying in his room and is cooperative with discussion. Discussed events that led to admission. Patient reports that his mother is playing tricks on him and his verbalizing that it is mother's fault that he is admitted to this unit. Does note that he is experiencing auditory hallucinations but will not describe them. He does appear to be internally preoccupied throughout our discussion. His thought process is slow but he is able to respond appropriately to questions. However, his responses are short in duration and he does not engage in open-ended conversation. Staff note that patient has been engaging in self talk on the unit. He has exhibited less aggression in the last 24 hours and has not required emergency medications today. He was encouraged to shower, but appears not to have washed self as his hygiene is still poor. Appears disheveled. He is irritable when attempting to discuss his hygiene. He has been compliant with his scheduled psychotropic medications. Was started on haloperidol and Tegretol to help with mood and psychosis. We reviewed side effects and patient denies all. He does voice that he would take long-acting injectable Haldol Decanoate. Social work has spoken with patient's mother regarding patient's ability to return back to living in the home. She notes that she is fearful of him and does not want him staying with her again. Patient is linked with [facility] and social work has reached out to explore options in the community. At this time, patient requires inpatient hospitalization for safety.   He has yet to demonstrate stability and presents as a significant risk to self and others if not admitted to hospital.      Appetite:  [] Normal/Adequate/Unchanged  [] Increased  [x] Decreased Sleep:       [x] Normal/Adequate/Unchanged  [] Fair  [] Poor      Group Attendance on Unit:   [] Yes  [] Selectively    [x] No    Medication Side Effects: Patient denies any medication side effects at the time of assessment. Mental Status Exam  Level of consciousness: Alert and awake. Appearance: Appropriate attire for setting, resting in bed, with extremely poor grooming and hygiene. Behavior/Motor: Approachable, guarded, no psychomotor abnormalities. Attitude toward examiner: Minimally cooperative, suspicious, avoids eye contact. Speech: Delayed rate, decreased volume, irritable tone with poor articulation   Mood:  Patient reports \" fine\". Affect: Incongruent, constricted  Thought processes: Linear, slow, and poverty of thought. Thought content: Denies homicidal ideation. Suicidal Ideation: Denies suicidal ideations, without current plan or intent, contracts for safety on the unit. Delusions: Paranoia  Perceptual Disturbance: Engaging in self talk. Appears internally preoccupied though patient denies auditory hallucinations. Cognition: Oriented to self only. Memory: Impaired. Insight & Judgement: Poor/limited    Data   height is 5' 9\" (1.753 m) and weight is 236 lb (107 kg). His temperature is 98.2 °F (36.8 °C). His blood pressure is 103/67 and his pulse is 83. His respiration is 14 and oxygen saturation is 99%.    Labs:   Admission on 09/09/2022   Component Date Value Ref Range Status    WBC 09/09/2022 9.6  3.5 - 11.0 k/uL Final    RBC 09/09/2022 4.44 (A) 4.5 - 5.9 m/uL Final    Hemoglobin 09/09/2022 12.9 (A) 13.5 - 17.5 g/dL Final    Hematocrit 09/09/2022 39.0 (A) 41 - 53 % Final    MCV 09/09/2022 87.8  80 - 100 fL Final    MCH 09/09/2022 29.0  26 - 34 pg Final    MCHC 09/09/2022 33.1  31 - 37 g/dL Final    RDW 09/09/2022 16.9 (A) 11.5 - 14.9 % Final    Platelets 09/63/6050 386  150 - 450 k/uL Final    MPV 09/09/2022 6.4  6.0 - 12.0 fL Final    Seg Neutrophils 09/09/2022 70 (A) 36 - 66 % Final    Lymphocytes 09/09/2022 20 (A) 24 - 44 % Final    Monocytes 09/09/2022 8 (A) 1 - 7 % Final    Eosinophils % 09/09/2022 1  0 - 4 % Final    Basophils 09/09/2022 1  0 - 2 % Final    Segs Absolute 09/09/2022 6.80  1.3 - 9.1 k/uL Final    Absolute Lymph # 09/09/2022 1.90  1.0 - 4.8 k/uL Final    Absolute Mono # 09/09/2022 0.70  0.1 - 1.3 k/uL Final    Absolute Eos # 09/09/2022 0.10  0.0 - 0.4 k/uL Final    Basophils Absolute 09/09/2022 0.10  0.0 - 0.2 k/uL Final    Glucose 09/09/2022 92  70 - 99 mg/dL Final    BUN 09/09/2022 10  6 - 20 mg/dL Final    Creatinine 09/09/2022 0.98  0.70 - 1.20 mg/dL Final    Calcium 09/09/2022 9.7  8.6 - 10.4 mg/dL Final    Sodium 09/09/2022 139  135 - 144 mmol/L Final    Potassium 09/09/2022 4.1  3.7 - 5.3 mmol/L Final    Chloride 09/09/2022 102  98 - 107 mmol/L Final    CO2 09/09/2022 30  20 - 31 mmol/L Final    Anion Gap 09/09/2022 7 (A) 9 - 17 mmol/L Final    Alkaline Phosphatase 09/09/2022 116  40 - 129 U/L Final    ALT 09/09/2022 19  5 - 41 U/L Final    AST 09/09/2022 14  <40 U/L Final    Total Bilirubin 09/09/2022 0.2 (A) 0.3 - 1.2 mg/dL Final    Total Protein 09/09/2022 9.1 (A) 6.4 - 8.3 g/dL Final    Albumin 09/09/2022 3.8  3.5 - 5.2 g/dL Final    GFR Non- 09/09/2022 >60  >60 mL/min Final    GFR  09/09/2022 >60  >60 mL/min Final    GFR Comment 09/09/2022        Final    Comment: Average GFR for 38-51 years old:   80 mL/min/1.73sq m  Chronic Kidney Disease:   <60 mL/min/1.73sq m  Kidney failure:   <15 mL/min/1.73sq m              eGFR calculated using average adult body mass.  Additional eGFR calculator available at:        [employer]            Salicylate Lvl 08/56/3783 <1 (A) 3 - 10 mg/dL Final    Acetaminophen Level 09/09/2022 <5 (A) 10 - 30 ug/mL Final    Ethanol 09/09/2022 <10  <10 mg/dL Final    Ethanol percent 09/09/2022 <0.010  % Final    Magnesium 09/09/2022 1.9  1.6 - 2.6 mg/dL Final Amphetamine Screen, Ur 09/09/2022 NEGATIVE  NEGATIVE Final    Comment:       (Positive cutoff 1000 ng/mL)                  Barbiturate Screen, Ur 09/09/2022 NEGATIVE  NEGATIVE Final    Comment:       (Positive cutoff 200 ng/mL)                  Benzodiazepine Screen, Urine 09/09/2022 NEGATIVE   Final    Comment:       (Positive cutoff 200 ng/mL)                  Cocaine Metabolite, Urine 09/09/2022 NEGATIVE  NEGATIVE Final    Comment:       (Positive cutoff 300 ng/mL)                  Methadone Screen, Urine 09/09/2022 NEGATIVE  NEGATIVE Final    Comment:       (Positive cutoff 300 ng/mL)                  Opiates, Urine 09/09/2022 NEGATIVE  NEGATIVE Final    Comment:       (Positive cutoff 300 ng/mL)                  Phencyclidine, Urine 09/09/2022 NEGATIVE  NEGATIVE Final    Comment:       (Positive cutoff 25 ng/mL)                  Cannabinoid Scrn, Ur 09/09/2022 NEGATIVE  NEGATIVE Final    Comment:       (Positive cutoff 50 ng/mL)                  Oxycodone Screen, Ur 09/09/2022 NEGATIVE  NEGATIVE Final    Comment:       (Positive cutoff 100 ng/mL)                  Fentanyl, Ur 09/09/2022 NEGATIVE  NEGATIVE Final    Comment:       (Positive cutoff  5 ng/ml)            Test Information 09/09/2022 Assay provides medical screening only. The absence of expected drug(s) and/or metabolite(s) may indicate diluted or adulterated urine, limitations of testing or timing of collection. Final    Comment: Testing for legal purposes should be confirmed by another method. To request confirmation   of test result, please call the lab within 7 days of sample submission. Reviewed patient's current plan of care and vital signs with nursing staff.     Labs reviewed: [x] Yes  EKG not available however once therapeutic alliance is established we will consider updating    Medications  Current Facility-Administered Medications: carBAMazepine (TEGRETOL) tablet 200 mg, 200 mg, Oral, BID  haloperidol (HALDOL) tablet 1 mg, 1 mg, Oral, BID  acetaminophen (TYLENOL) tablet 650 mg, 650 mg, Oral, Q6H PRN  ibuprofen (ADVIL;MOTRIN) tablet 400 mg, 400 mg, Oral, Q6H PRN  hydrOXYzine HCl (ATARAX) tablet 50 mg, 50 mg, Oral, TID PRN  traZODone (DESYREL) tablet 50 mg, 50 mg, Oral, Nightly PRN  polyethylene glycol (GLYCOLAX) packet 17 g, 17 g, Oral, Daily PRN  aluminum & magnesium hydroxide-simethicone (MAALOX) 200-200-20 MG/5ML suspension 30 mL, 30 mL, Oral, Q6H PRN  nicotine polacrilex (NICORETTE) gum 2 mg, 2 mg, Oral, Q2H PRN  haloperidol lactate (HALDOL) injection 5 mg, 5 mg, IntraMUSCular, Q6H PRN **AND** LORazepam (ATIVAN) injection 2 mg, 2 mg, IntraMUSCular, Q6H PRN **AND** diphenhydrAMINE (BENADRYL) injection 50 mg, 50 mg, IntraMUSCular, Q6H PRN  haloperidol (HALDOL) tablet 5 mg, 5 mg, Oral, Q6H PRN **AND** LORazepam (ATIVAN) tablet 2 mg, 2 mg, Oral, Q6H PRN    ASSESSMENT  Acute exacerbation of chronic paranoid schizophrenia ([facility] [address]         HANDOFF  Patient symptoms remain unstable  Medications as determined by attending physician  Encourage participation in groups and milieu. Probable discharge is to be determined by MD    Electronically signed by APURVA Chu CNP on 9/15/2022 at 4:27 PM    **This report has been created using voice recognition software. It may contain minor errors which are inherent in voice recognition technology. **    I independently saw and evaluated the patient. I reviewed the  documentation above. Any additional comments or changes to the   documentation are stated below otherwise agree with assessment. The patient continues to be disheveled and has poverty of thought. He appears to have returned to his baseline. His behavior has been well controlled. PLAN  Medications as noted above  Attempt to develop insight  Psycho-education conducted. Estimated Length of Stay is 1-3days  Supportive Therapy conducted.   Follow-up daily while on inpatient unit    Electronically signed by Jing Gerardo MD on 9/15/22 at 7:01 PM EDT

## 2022-09-15 NOTE — GROUP NOTE
Group Therapy Note    Date: 9/15/2022    Group Start Time: 1100  Group End Time: 1021  Group Topic: Cognitive Skills    JAYY Rodriguez    Cognitive Skills Group Note        Date: September 15, 2022 Start Time: 11am  End Time:  11:35 am       Number of Participants in Group & Unit Census:  5/10    Topic: cognitive skills     Goal of Group: to improve coping skills /improve socialization       Comments:     Patient did not participate in Cognitive Skills group, despite staff encouragement and explanation of benefits. Patient remain seclusive to self. Q15 minute safety checks maintained for patient safety and will continue to encourage patient to attend unit programming.             Signature:  Farhan Stewart

## 2022-09-15 NOTE — GROUP NOTE
Group Therapy Note    Date: 9/15/2022    Group Start Time: 1330  Group End Time: 3639  Group Topic: Recreational    [facility]    Lewis Moore, CTRS    Recreation Group Note        Date: September 15, 2022 Start Time: 1:30pm  End Time:  2:15pm      Number of Participants in Group & Unit Census:  3/10    Topic: recreation group     Goal of Group: to improve positive leisure interests, improve coping skills       Comments:     Patient did not participate in Recreation group, despite staff encouragement and explanation of benefits. Patient remain seclusive to self. Q15 minute safety checks maintained for patient safety and will continue to encourage patient to attend unit programming.               Signature:  Brennon Resendiz

## 2022-09-16 VITALS
BODY MASS INDEX: 34.96 KG/M2 | WEIGHT: 236 LBS | OXYGEN SATURATION: 97 % | TEMPERATURE: 97.4 F | HEIGHT: 69 IN | DIASTOLIC BLOOD PRESSURE: 75 MMHG | SYSTOLIC BLOOD PRESSURE: 125 MMHG | HEART RATE: 96 BPM | RESPIRATION RATE: 18 BRPM

## 2022-09-16 PROCEDURE — 99239 HOSP IP/OBS DSCHRG MGMT >30: CPT | Performed by: PSYCHIATRY & NEUROLOGY

## 2022-09-16 PROCEDURE — 6370000000 HC RX 637 (ALT 250 FOR IP)

## 2022-09-16 PROCEDURE — 6370000000 HC RX 637 (ALT 250 FOR IP): Performed by: PSYCHIATRY & NEUROLOGY

## 2022-09-16 RX ORDER — HALOPERIDOL 1 MG/1
1 TABLET ORAL 2 TIMES DAILY
Qty: 120 TABLET | Refills: 3 | Status: SHIPPED | OUTPATIENT
Start: 2022-09-16

## 2022-09-16 RX ORDER — CARBAMAZEPINE 200 MG/1
200 TABLET ORAL 2 TIMES DAILY
Qty: 90 TABLET | Refills: 3 | Status: SHIPPED | OUTPATIENT
Start: 2022-09-16

## 2022-09-16 RX ADMIN — HALOPERIDOL 1 MG: 1 TABLET ORAL at 08:34

## 2022-09-16 RX ADMIN — CARBAMAZEPINE 200 MG: 200 TABLET ORAL at 08:33

## 2022-09-16 NOTE — GROUP NOTE
Group Therapy Note    Date: 9/16/2022    Group Start Time: 1000  Group End Time: 2621  Group Topic: Psychotherapy    [address] [place], MSW, LSW        Group Therapy Note    Attendees: 4/11       Patient was offered group therapy today but declined to participate despite encouragement from staff. 1:1 was offered.       Signature:  JEFE Doe, JULIO CESARW

## 2022-09-16 NOTE — DISCHARGE INSTR - OTHER ORDERS
Make sure to follow up with [facility]     Take all medications as directed    Do not do any illicit drugs or alcohol

## 2022-09-16 NOTE — DISCHARGE INSTRUCTIONS
Information:  Medications:   Medication summary provided   I understand that I should take only the medications on my list.     -why and when I need to take each medicine.     -which side effects to watch for.     -that I should carry my medication information at all times in case of     Emergency situations. I will take all of my medicines to follow up appointments.     -check with my physician or pharmacist before taking any new    Medication, over the counter product or drink alcohol.    -Ask about food, drug or dietary supplement interactions.    -discard old lists and update records with medication providers. Notify Physician:  Notify physician if you notice:   Always call 911 if you feel your life is in danger  In case of an emergency call 911 immediately! If 911 is not available call your local emergency medical system for help    Behavioral Health Follow Up:  Original Referral Source:[facility]  Discharge Diagnosis: Acute exacerbation of chronic paranoid schizophrenia ([facility] [address] [F20.0]  Acute psychosis ([facility] [address] [F23]  Recommendations for Level of Care: Follow up with [facility]   Patient status at discharge: Verbalizes readiness for discharge  [facility]  [facility]  Aftercare plan faxed: Yes   -faxed by: RN   -date: 9/16/22   -time: 1400  Prescriptions: Sent from [address]     Smoking: Quit Smoking. Call the [employer]'s smoking quitline at 3-637-66D-QUIT  Know the signs of a heart attack   If you have any of the following symptoms call 911 immediately, do not wait more    Than five minutes. 1. Pressure, fullness and/ or squeezing in the center of the chest spreading to    The jaw, neck or shoulder. 2. Chest discomfort with light headedness, fainting, sweating, nausea or    Shortness of breath. 3. Upper abdominal pressure or discomfort. 4. Lower chest pain, back pain, unusual fatigue, shortness of breath, nausea   Or dizziness.      General Information:   Questions regarding your bill: Call HELP program [phone]     Suicide Hotline (Sofya Mondragon)  (151) [phone]      Recovery Help line- 523.324.9716      To obtain results of pending studies call Medical Records at: [phone]     For emergencies and 24 hour/7 days a week contact information:  [phone]        Learning About COVID-19 and Flu Symptoms  How can you tell COVID-19 from the flu? COVID-19 and the flu have similar symptoms. The two can be hard to tell apart. The only way to know for sure which illness you have is to be tested. If you have questions about COVID-19 testing, ask your doctor or go to cdc.gov to use the COVID-19 Viral Testing Tool. Since the symptoms are so alike, it makes sense to act as if you have COVID-19 until your test results come back. This means staying home and limiting contact with people in your home. You'll need to wash your hands often and disinfect surfaces that you touch. And be sure to wear a mask when you're around other people. This is also good advice if you think you have the flu. COVID-19 and the flu have these symptoms in common:  Fever or chills  Cough  Shortness of breath  Fatigue (tiredness)  Sore throat  Runny or stuffy nose  Muscle and body aches  Headache  Vomiting and diarrhea (more common in children than adults)  COVID-19 has another symptom that also may occur:  New loss of taste or smell  COVID-19 symptoms may appear from 2 to 14 days after infection. Flu symptoms usually appear 1 to 4 days after infection. Why should you get the flu vaccine? It's important to get your yearly flu vaccine. Both the flu and COVID-19 can be active at the same time. You can get sick with both infections at once. And having both may make you more sick than getting just one. The flu vaccine won't protect you from COVID-19. But it can help prevent the flu or reduce its symptoms.  If fewer people get very ill with the flu, this will help free up medical resources that are needed for people who need urgent care, such as those suffering from COVID-19, heart attacks, and injuries from accidents. Where can you learn more? Go to [email or web address] and sign in to your [employer] account. Enter C123 in the [facility] box to learn more about \"Learning About COVID-19 and Flu Symptoms. \"     If you do not have an account, please click on the \"Sign Up Now\" link. Current as of: July 28, 2022               Content Version: 13.4  © 2006-2022 [employer]. Care instructions adapted under license by [facility] ([facility]). If you have questions about a medical condition or this instruction, always ask your healthcare professional. [address] any warranty or liability for your use of this information. carbamazepine (oral)  Pronunciation:  yogi Brooks  Brand:  Carbatrol, Epitol, Equetro, TEGretol, TEGretol XR  What is the most important information I should know about carbamazepine? You should not take carbamazepine if you have a history of bone marrow suppression, or if you are allergic to carbamazepine or to certain antidepressant medications. Tell your doctor about all your current medicines and any you start or stop using. Many drugs can interact, and some drugs should not be used together. Carbamazepine may cause serious blood problems or a life-threatening skin rash or allergic reaction. Call your doctor if you have a fever, unusual weakness, bleeding, bruising, or a skin rash that causes blistering and peeling. Some people have thoughts about suicide while taking seizure medicine. Stay alert to changes in your mood or symptoms. Report any new or worsening symptoms to your doctor. Do not stop taking carbamazepine without asking your doctor first, even if you feel fine. What is carbamazepine?   Carbamazepine is an anticonvulsant that is used to treat seizures and nerve pain such as trigeminal neuralgia and diabetic neuropathy. Carbamazepine is also used to treat bipolar disorder. Carbamazepine may also be used for purposes not listed in this medication guide. What should I discuss with my healthcare provider before taking carbamazepine? You should not take carbamazepine if you have a history of bone marrow suppression, or if you are allergic to carbamazepine or to an antidepressant such as amitriptyline, desipramine, doxepin, imipramine, or nortriptyline. Do not use carbamazepine if you have taken an MAO inhibitor in the past 14 days. A dangerous drug interaction could occur. MAO inhibitors include furazolidone, isocarboxazid, linezolid, phenelzine, rasagiline, selegiline, and tranylcypromine. Carbamazepine may cause severe or life-threatening skin rash, and especially in people of  ancestry. Your doctor may recommend a blood test before you start the medication to determine your risk. Tell your doctor if you have ever had:  heart problems;  liver or kidney disease;  glaucoma;  porphyria;  depression, mood disorder; or  suicidal thoughts or actions. You may have thoughts about suicide while taking carbamazepine. Your doctor should check your progress at regular visits. Your family or other caregivers should also be alert to changes in your mood or symptoms. Do not start or stop taking seizure medication during pregnancy without your doctor's advice. Carbamazepine may harm an unborn baby, but having a seizure during pregnancy could harm both mother and baby. The benefit of preventing seizures may outweigh any risk. Tell your doctor right away if you become pregnant. If you are pregnant, your name may be listed on a pregnancy registry to track the effects of carbamazepine on the baby. Carbamazepine can make birth control pills or implants less effective. Use a barrier form of birth control (such as a condom or diaphragm with spermicide) to prevent pregnancy.   You should not breastfeed while you are using tanning beds. Wear protective clothing and use sunscreen (SPF 30 or higher) when you are outdoors. What are the possible side effects of carbamazepine? Get emergency medical help if you have signs of an allergic reaction (hives, difficult breathing, swelling in your face or throat) or a severe skin reaction (fever, sore throat, burning in your eyes, skin pain, red or purple skin rash that spreads and causes blistering and peeling). Seek medical treatment if you have a serious drug reaction that can affect many parts of your body. Symptoms may include: skin rash, fever, swollen glands, muscle aches, severe weakness, unusual bruising, or yellowing of your skin or eyes. Report any new or worsening symptoms to your doctor, such as: sudden mood or behavior changes, depression, anxiety, insomnia, or if you feel agitated, hostile, restless, irritable, or have thoughts about suicide or hurting yourself. Call your doctor at once if you have:  a skin rash, no matter how mild;  loss of appetite, right-sided upper stomach pain, dark urine;  slow, fast, or pounding heartbeats;  anemia or other blood problems --fever, chills, sore throat, mouth sores, bleeding gums, nosebleeds, pale skin, easy bruising, unusual tiredness, feeling light-headed or short of breath; or  low levels of sodium in the body --headache, confusion, severe weakness, feeling unsteady, increased seizures. Common side effects may include:  dizziness, loss of coordination, problems with walking;  nausea, vomiting; or  drowsiness. This is not a complete list of side effects and others may occur. Call your doctor for medical advice about side effects. You may report side effects to FDA at 0-141-FDA-5688. What other drugs will affect carbamazepine? Sometimes it is not safe to use certain medications at the same time.  Some drugs can affect your blood levels of other drugs you take, which may increase side effects or make the medications less effective. Using carbamazepine with other drugs that make you drowsy can worsen this effect. Ask your doctor before using opioid medication, a sleeping pill, a muscle relaxer, or medicine for anxiety or seizures. Many drugs can affect carbamazepine, and some drugs should not be used at the same time. Tell your doctor about all your current medicines and any medicine you start or stop using. This includes prescription and over-the-counter medicines, vitamins, and herbal products. Not all possible interactions are listed here. Where can I get more information? Your pharmacist can provide more information about carbamazepine. Remember, keep this and all other medicines out of the reach of children, never share your medicines with others, and use this medication only for the indication prescribed. Every effort has been made to ensure that the information provided by [address] is accurate, up-to-date, and complete, but no guarantee is made to that effect. Drug information contained herein may be time sensitive. [facility] information has been compiled for use by healthcare practitioners and consumers in the [place] and therefore [employer] does not warrant that uses outside of the [place] are appropriate, unless specifically indicated otherwise. [facility]'s drug information does not endorse drugs, diagnose patients or recommend therapy. [employer] drug information is an informational resource designed to assist licensed healthcare practitioners in caring for their patients and/or to serve consumers viewing this service as a supplement to, and not a substitute for, the expertise, skill, knowledge and judgment of healthcare practitioners. The absence of a warning for a given drug or drug combination in no way should be construed to indicate that the drug or drug combination is safe, effective or appropriate for any given patient.  [facility] does not assume any responsibility for any aspect of healthcare administered with the aid of information [facility] provides. The information contained herein is not intended to cover all possible uses, directions, precautions, warnings, drug interactions, allergic reactions, or adverse effects. If you have questions about the drugs you are taking, check with your doctor, nurse or pharmacist.  Copyright 7618-6175 [employer] [place]. Version: 14.01. Revision date: 12/30/2019. Care instructions adapted under license by [facility] ([facility]). If you have questions about a medical condition or this instruction, always ask your healthcare professional. [place] [address] any warranty or liability for your use of this information. haloperidol (oral)  Pronunciation:  BK oh PER i dol  Brand:  Haldol  What is the most important information I should know about haloperidol? Haloperidol is not approved for use in older adults with dementia-related psychosis. What is haloperidol? Haloperidol is an antipsychotic medicine that is used to treat schizophrenia. Haloperidol is also used to control motor and speech tics in people with Tourette's syndrome. Haloperidol may also be used for purposes not listed in this medication guide. What should I discuss with my healthcare provider before taking haloperidol? You should not use haloperidol if you are allergic to it, or if you have:  Parkinson's disease; or  certain conditions that affect your central nervous system (such as severe drowsiness, or slowed thinking caused by taking other medicines or drinking alcohol). Haloperidol may increase the risk of death in older adults with dementia-related psychosis  and is not approved for this use.   Tell your doctor if you have ever had:  heart problems, angina (chest pain);  long QT syndrome (in you or a family member);  low blood pressure;  a seizure;  a thyroid disorder;  breast cancer; or  an electrolyte imbalance (such as low blood levels of potassium or magnesium). Using antipsychotic medicine in the last 3 months of pregnancy may cause serious problems in the . If you get pregnant, tell your doctor right away. Do not stop the medicine without your doctor's advice. Ask a doctor if it is safe to breastfeed while using this medicine. How should I take haloperidol? Follow all directions on your prescription label and read all medication guides or instruction sheets. Your doctor may occasionally change your dose. Use the medicine exactly as directed. Taking too much haloperidol can cause a serious heart rhythm disorder or sudden death. Never take more than your prescribed dose. Measure liquid medicine with the supplied syringe or a dose-measuring device (not a kitchen spoon). Your symptoms may not improve for several weeks. You may have withdrawal symptoms if you stop using haloperidol suddenly. Ask your doctor before stopping the medicine. Store at room temperature away from moisture, heat, and light. Do not allow liquid medicine to freeze. What happens if I miss a dose? Take the medicine as soon as you can, but skip the missed dose if it is almost time for your next dose. Do not take two doses at one time. What happens if I overdose? Seek emergency medical attention or call the Poison Help line at 1-374.625.1028. An overdose of haloperidol can be fatal.  What should I avoid while taking haloperidol? Drinking alcohol with this medicine can cause side effects. Avoid driving or hazardous activity until you know how this medicine will affect you. Your reactions could be impaired. Avoid getting up too fast from a sitting or lying position, or you may feel dizzy. Dizziness or severe drowsiness can cause falls, fractures, or other injuries. What are the possible side effects of haloperidol? Get emergency medical help if you have signs of an allergic reaction:  hives; difficult breathing; swelling of your face, lips, tongue, or throat.   High doses or long-term use of haloperidol can cause a serious movement disorder that may not be reversible. The longer you use haloperidol, the more likely you are to develop this disorder, especially if you are a woman or an older adult. Call your doctor at once if you have:  uncontrolled muscle movements in your face (chewing, lip smacking, frowning, tongue movement, blinking or eye movement);  muscle spasms in your neck, tightness in your throat, trouble swallowing;  rapid changes in mood or behavior;  fast or pounding heartbeats, fluttering in your chest, shortness of breath, and sudden dizziness (like you might pass out);  cough with mucus, chest pain, feeling short of breath;  low white blood cell counts --fever, chills, mouth sores, skin sores, sore throat, cough, trouble breathing; or  severe nervous system reaction --very stiff (rigid) muscles, high fever, sweating, confusion, fast or uneven heartbeats, tremors, feeling like you might pass out. Common side effects may include:  drowsiness;  headache;  dizziness, spinning sensation;  uncontrolled muscle movements;  feeling restless or anxious;  sleep problems (insomnia); or  breast enlargement, irregular menstrual periods. This is not a complete list of side effects and others may occur. Call your doctor for medical advice about side effects. You may report side effects to FDA at 7-380-FDA-2573. What other drugs will affect haloperidol? Haloperidol can cause a serious heart problem. Your risk may be higher if you also use certain other medicines for infections, asthma, heart problems, high blood pressure, depression, mental illness, cancer, malaria, or HIV. Using haloperidol with other drugs that make you drowsy can worsen this effect. Ask your doctor before using opioid medication, a sleeping pill, a muscle relaxer, or medicine for anxiety or seizures.   Tell your doctor about all your other medicines, especially:  lithium;  rifampin;  medicine to treat Parkinson's disease;  seizure medicine; or  a blood thinner --warfarin, Coumadin, Jantoven. This list is not complete. Other drugs may affect haloperidol, including prescription and over-the-counter medicines, vitamins, and herbal products. Not all possible drug interactions are listed here. Where can I get more information? Your pharmacist can provide more information about haloperidol. Remember, keep this and all other medicines out of the reach of children, never share your medicines with others, and use this medication only for the indication prescribed. Every effort has been made to ensure that the information provided by [facility] [address] is accurate, up-to-date, and complete, but no guarantee is made to that effect. Drug information contained herein may be time sensitive. [facility] information has been compiled for use by healthcare practitioners and consumers in the [place] and therefore [facility] does not warrant that uses outside of the [place] are appropriate, unless specifically indicated otherwise. [facility]'s drug information does not endorse drugs, diagnose patients or recommend therapy. [facility] drug information is an informational resource designed to assist licensed healthcare practitioners in caring for their patients and/or to serve consumers viewing this service as a supplement to, and not a substitute for, the expertise, skill, knowledge and judgment of healthcare practitioners. The absence of a warning for a given drug or drug combination in no way should be construed to indicate that the drug or drug combination is safe, effective or appropriate for any given patient. [facility] does not assume any responsibility for any aspect of healthcare administered with the aid of information [facility] provides. The information contained herein is not intended to cover all possible uses, directions, precautions, warnings, drug interactions, allergic reactions, or adverse effects.  If you have

## 2022-09-16 NOTE — GROUP NOTE
Group Therapy Note    Date: 9/16/2022    Group Start Time: 1100  Group End Time: 8620  Group Topic: Cognitive Skills    KATE Smith, [address]    Cognitive Skills Group Note        Date: September 16, 2022 Start Time: 11am  End Time:  11:35am      Number of Participants in Group & Unit Census:  4/10    Topic: cognitive skills     Goal of Group: to improve healthy living skills/ improve coping skills       Comments:     Patient did not participate in Cognitive Skills group, despite staff encouragement and explanation of benefits. Patient remain seclusive to self. Q15 minute safety checks maintained for patient safety and will continue to encourage patient to attend unit programming.             Signature:  Judy Hagan

## 2022-09-16 NOTE — BH NOTE
Patient given tobacco quitline number 5-014-785-676-691-4521 at this time, refusing to call at this time, states \" I just dont want to quit now\"- patient given information as to the dangers of long term tobacco use. Continue to reinforce the importance of tobacco cessation.
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
## 2022-09-16 NOTE — GROUP NOTE
Group Therapy Note    Date: 9/16/2022    Group Start Time: 1330  Group End Time: 8958  Group Topic: Relaxation    [facility] Donzell Sensing, CTRS    Relaxation Group Note        Date: September 16, 2022 Start Time: 1:30pm  End Time:2:15ppm      Number of Participants in Group & Unit Census:  4/8    Topic: Relaxation Group     Goal of Group: to improve relaxation using art       Comments:     Patient did not participate in Relaxation group, despite staff encouragement and explanation of benefits. Patient remain seclusive to self. Q15 minute safety checks maintained for patient safety and will continue to encourage patient to attend unit programming.             Signature:  Chris Cramer

## 2022-09-16 NOTE — CARE COORDINATION
Discharge Arrangements:  Patient was scheduled for follow up with [facility] ACT team.  Mom/guardian was unable to determine any different plans for discharge other then discharging patient back home.     Guardian notified: yes    Discharge destination/address: home address [address] by:  alejandro at 3:00 PM

## 2022-09-16 NOTE — CARE COORDINATION
Name: Marisol Leblanc    : 1980    Discharge Date: 22    Primary Auth/Cert #: [id number]    Destination: Private residence    Discharge Medications:      Medication List        START taking these medications      carBAMazepine 200 MG tablet  Commonly known as: TEGRETOL  Take 1 tablet by mouth 2 times daily  Notes to patient: Helps with thoughts and moods     haloperidol 1 MG tablet  Commonly known as: HALDOL  Take 1 tablet by mouth 2 times daily  Notes to patient: Helps with thoughts and moods            STOP taking these medications      adalimumab 40 MG/0.8ML injection  Commonly known as: HUMIRA     benztropine 1 MG tablet  Commonly known as: COGENTIN     citalopram 20 MG tablet  Commonly known as: CELEXA     fluPHENAZine decanoate 25 MG/ML injection  Commonly known as: PROLIXIN     lithium 450 MG extended release tablet  Commonly known as: ESKALITH     OLANZapine 10 MG tablet  Commonly known as: ZYPREXA     therapeutic multivitamin-minerals tablet               Where to Get Your Medications        These medications were sent to [address], [address] [facility] [address]      Phone: [phone]   carBAMazepine 200 MG tablet  haloperidol 1 MG tablet     Pharmacy Instructions:    Sent from [facility]            Follow Up Appointment: [address] [place] [address] [address] [facility]  [address] [phone]    Follow up on 2022  You are scheduled for follow up with Dr. Jael Friedman at 1:00 PM    His  is [place] [phone]

## 2022-09-16 NOTE — DISCHARGE INSTR - DIET

## 2022-09-16 NOTE — BH NOTE
[facility]  Discharge Note    Pt discharged with followings belongings:   Dental Appliances: None  Vision - Corrective Lenses: None  Hearing Aid: None  Jewelry: None  Body Piercings Removed: N/A  Clothing: Shirt, Shorts, Socks, Footwear  Other Valuables: Other (Comment) (None)   Valuables sent home with patient or returned to patient. Patient & Guardian education on aftercare instructions: yes  Information faxed to Frannie by RN  at 3:15 PM .Patient verbalize understanding of AVS:  yes. Status EXAM upon discharge:  Mental Status and Behavioral Exam  Normal: No  Level of Assistance: Independent/Self  Facial Expression: Avoids Gaze, Flat  Affect: Constricted  Level of Consciousness: Alert  Frequency of Checks: 4 times per hour, close  Mood:Normal: No  Mood: Anxious  Motor Activity:Normal: No  Motor Activity: Decreased  Eye Contact: Poor  Observed Behavior: Guarded, Withdrawn  Sexual Misconduct History: Current - no  Preception: Mandan to person, Mandan to place  Attention:Normal: No  Attention: Distractible  Thought Processes: Blocking  Thought Content:Normal: No  Thought Content: Poverty of content  Depression Symptoms: Change in energy level, Isolative, Impaired concentration  Anxiety Symptoms: Generalized  Rebekah Symptoms: Poor judgment  Hallucinations:  Other (comment) (patient denied, patient is observed responding to internal stimuli)  Delusions: No  Memory:Normal: No  Memory: Poor recent, Poor remote  Insight and Judgment: No  Insight and Judgment: Poor judgment, Poor insight    Tobacco Screening:  Practical Counseling, on admission, latrell X, if applicable and completed (first 3 are required if patient doesn't refuse):            ( ) Recognizing danger situations (included triggers and roadblocks)                    ( ) Coping skills (new ways to manage stress,relaxation techniques, changing routine, distraction)                                                           ( ) Basic information about quitting (benefits of quitting, techniques in how to quit, available resources  ( ) Referral for counseling faxed to [facility]                                                                                                                   ( X) Patient refused counseling  ( ) Patient refused referral  ( ) Patient refused prescription upon discharge  ( ) Patient has not smoked in the last 30 days    Metabolic Screening:    Lab Results   Component Value Date    LABA1C 5.6 03/03/2014       Lab Results   Component Value Date    CHOL 191 11/13/2017    CHOL 137 03/03/2014    CHOL 176 01/09/2012     Lab Results   Component Value Date    TRIG 73 11/13/2017    TRIG 34 03/03/2014    TRIG 59 01/09/2012     Lab Results   Component Value Date    HDL 45 11/13/2017    HDL 42 03/03/2014    HDL 36 (L) 01/09/2012     No components found for: LDLCAL  No results found for: LABVLDL    Patient discharged to private residence with Mom/ Guardian via car. Escorted to discharge doors by two staff ambulatory.      Josh Sawyer RN

## 2022-09-16 NOTE — PLAN OF CARE
Problem: Behavior  Goal: Pt/Family maintain appropriate behavior and adhere to behavioral management agreement, if implemented  Description: INTERVENTIONS:  1. Assess patient/family's coping skills and  non-compliant behavior (including use of illegal substances)  2. Notify security of behavior or suspected illegal substances which indicate the need for search of the family and/or belongings  3. Encourage verbalization of thoughts and concerns in a socially appropriate manner  4. Utilize positive, consistent limit setting strategies supporting safety of patient, staff and others  5. Encourage participation in the decision making process about the behavioral management agreement  6. If a visitor's behavior poses a threat to safety call refer to organization policy. 7. Initiate consult with , Psychosocial CNS, Spiritual Care as appropriate  Outcome: Progressing     Problem: Involuntary Admit  Goal: Will cooperate with staff recommendations and doctor's orders and will demonstrate appropriate behavior  Description: INTERVENTIONS:  1. Treat underlying conditions and offer medication as ordered  2. Educate regarding involuntary admission procedures and rules  3. Contain excessive/inappropriate behavior per unit and hospital policies  Outcome: Progressing     Patient remains free from self harm and injury. Patient remains in behavorial control and compliant with medications. Patient remains isolative to room. Patient out for needs only. Patient has been observed responding to internal stimuli. Patient is unable to engage in a logical conversation. Patient encouraged to shower but refused. A safe environment and Q 15 min checks maintained. Marisol Riggins will continue to monitor the patient's physical and mental status.

## 2022-09-16 NOTE — PROGRESS NOTES
CLINICAL PHARMACY NOTE: MEDS TO BEDS    Total # of Prescriptions Filled: 2   The following medications were delivered to the patient:  Carbamazepine 200mg  Haloperidol 2mg    Additional Documentation:  Delivered Medication to [facility] [facility]

## 2022-10-09 ENCOUNTER — APPOINTMENT (OUTPATIENT)
Dept: CT IMAGING | Age: 42
DRG: 871 | End: 2022-10-09
Payer: COMMERCIAL

## 2022-10-09 ENCOUNTER — HOSPITAL ENCOUNTER (INPATIENT)
Age: 42
LOS: 17 days | Discharge: NURSING, REHAB OR LONG-TERM CARE | DRG: 871 | End: 2022-10-26
Attending: EMERGENCY MEDICINE | Admitting: STUDENT IN AN ORGANIZED HEALTH CARE EDUCATION/TRAINING PROGRAM
Payer: COMMERCIAL

## 2022-10-09 DIAGNOSIS — L03.115 CELLULITIS OF RIGHT THIGH: ICD-10-CM

## 2022-10-09 DIAGNOSIS — L73.2 HIDRADENITIS: ICD-10-CM

## 2022-10-09 DIAGNOSIS — A41.9 SEPTICEMIA (HCC): Primary | ICD-10-CM

## 2022-10-09 LAB
ABSOLUTE EOS #: 0.18 K/UL (ref 0–0.44)
ABSOLUTE IMMATURE GRANULOCYTE: 0.23 K/UL (ref 0–0.3)
ABSOLUTE LYMPH #: 2.67 K/UL (ref 1.1–3.7)
ABSOLUTE MONO #: 1.39 K/UL (ref 0.1–1.2)
ANION GAP SERPL CALCULATED.3IONS-SCNC: 9 MMOL/L (ref 9–17)
BASOPHILS # BLD: 0 % (ref 0–2)
BASOPHILS ABSOLUTE: 0.07 K/UL (ref 0–0.2)
BILIRUBIN URINE: ABNORMAL
BUN BLDV-MCNC: 18 MG/DL (ref 6–20)
C-REACTIVE PROTEIN: 144.4 MG/L (ref 0–5)
CALCIUM SERPL-MCNC: 8.5 MG/DL (ref 8.6–10.4)
CASTS UA: ABNORMAL /LPF (ref 0–8)
CHLORIDE BLD-SCNC: 100 MMOL/L (ref 98–107)
CO2: 23 MMOL/L (ref 20–31)
COLOR: ABNORMAL
CREAT SERPL-MCNC: 0.78 MG/DL (ref 0.7–1.2)
EOSINOPHILS RELATIVE PERCENT: 1 % (ref 1–4)
EPITHELIAL CELLS UA: ABNORMAL /HPF (ref 0–5)
GFR SERPL CREATININE-BSD FRML MDRD: >60 ML/MIN/1.73M2
GLUCOSE BLD-MCNC: 114 MG/DL (ref 70–99)
GLUCOSE URINE: NEGATIVE
HCT VFR BLD CALC: 42.9 % (ref 40.7–50.3)
HEMOGLOBIN: 13.4 G/DL (ref 13–17)
IMMATURE GRANULOCYTES: 1 %
KETONES, URINE: ABNORMAL
LACTIC ACID, WHOLE BLOOD: 0.9 MMOL/L (ref 0.7–2.1)
LACTIC ACID, WHOLE BLOOD: 3.6 MMOL/L (ref 0.7–2.1)
LEUKOCYTE ESTERASE, URINE: ABNORMAL
LYMPHOCYTES # BLD: 14 % (ref 24–43)
MAGNESIUM: 1.7 MG/DL (ref 1.6–2.6)
MCH RBC QN AUTO: 28.6 PG (ref 25.2–33.5)
MCHC RBC AUTO-ENTMCNC: 31.2 G/DL (ref 28.4–34.8)
MCV RBC AUTO: 91.5 FL (ref 82.6–102.9)
MONOCYTES # BLD: 7 % (ref 3–12)
NITRITE, URINE: NEGATIVE
NRBC AUTOMATED: 0 PER 100 WBC
PDW BLD-RTO: 14.7 % (ref 11.8–14.4)
PH UA: 5 (ref 5–8)
PLATELET # BLD: 531 K/UL (ref 138–453)
PMV BLD AUTO: 9.4 FL (ref 8.1–13.5)
POTASSIUM SERPL-SCNC: 3.7 MMOL/L (ref 3.7–5.3)
PROTEIN UA: ABNORMAL
RBC # BLD: 4.69 M/UL (ref 4.21–5.77)
RBC # BLD: ABNORMAL 10*6/UL
RBC UA: ABNORMAL /HPF (ref 0–4)
REASON FOR REJECTION: NORMAL
SEDIMENTATION RATE, ERYTHROCYTE: 52 MM/HR (ref 0–15)
SEG NEUTROPHILS: 77 % (ref 36–65)
SEGMENTED NEUTROPHILS ABSOLUTE COUNT: 15.15 K/UL (ref 1.5–8.1)
SODIUM BLD-SCNC: 132 MMOL/L (ref 135–144)
SPECIFIC GRAVITY UA: 1.03 (ref 1–1.03)
TURBIDITY: ABNORMAL
URINE HGB: ABNORMAL
UROBILINOGEN, URINE: ABNORMAL
WBC # BLD: 19.7 K/UL (ref 3.5–11.3)
WBC UA: ABNORMAL /HPF (ref 0–5)
ZZ NTE CLEAN UP: ORDERED TEST: NORMAL
ZZ NTE WITH NAME CLEAN UP: SPECIMEN SOURCE: NORMAL

## 2022-10-09 PROCEDURE — 85652 RBC SED RATE AUTOMATED: CPT

## 2022-10-09 PROCEDURE — 2580000003 HC RX 258: Performed by: NURSE PRACTITIONER

## 2022-10-09 PROCEDURE — 93005 ELECTROCARDIOGRAM TRACING: CPT | Performed by: STUDENT IN AN ORGANIZED HEALTH CARE EDUCATION/TRAINING PROGRAM

## 2022-10-09 PROCEDURE — 72192 CT PELVIS W/O DYE: CPT

## 2022-10-09 PROCEDURE — 87205 SMEAR GRAM STAIN: CPT

## 2022-10-09 PROCEDURE — 36415 COLL VENOUS BLD VENIPUNCTURE: CPT

## 2022-10-09 PROCEDURE — 83735 ASSAY OF MAGNESIUM: CPT

## 2022-10-09 PROCEDURE — 80048 BASIC METABOLIC PNL TOTAL CA: CPT

## 2022-10-09 PROCEDURE — 74177 CT ABD & PELVIS W/CONTRAST: CPT

## 2022-10-09 PROCEDURE — 99222 1ST HOSP IP/OBS MODERATE 55: CPT | Performed by: STUDENT IN AN ORGANIZED HEALTH CARE EDUCATION/TRAINING PROGRAM

## 2022-10-09 PROCEDURE — 2500000003 HC RX 250 WO HCPCS: Performed by: STUDENT IN AN ORGANIZED HEALTH CARE EDUCATION/TRAINING PROGRAM

## 2022-10-09 PROCEDURE — 87040 BLOOD CULTURE FOR BACTERIA: CPT

## 2022-10-09 PROCEDURE — 99285 EMERGENCY DEPT VISIT HI MDM: CPT

## 2022-10-09 PROCEDURE — 96361 HYDRATE IV INFUSION ADD-ON: CPT

## 2022-10-09 PROCEDURE — 6360000002 HC RX W HCPCS: Performed by: STUDENT IN AN ORGANIZED HEALTH CARE EDUCATION/TRAINING PROGRAM

## 2022-10-09 PROCEDURE — 1200000000 HC SEMI PRIVATE

## 2022-10-09 PROCEDURE — 2580000003 HC RX 258: Performed by: STUDENT IN AN ORGANIZED HEALTH CARE EDUCATION/TRAINING PROGRAM

## 2022-10-09 PROCEDURE — 6360000002 HC RX W HCPCS: Performed by: NURSE PRACTITIONER

## 2022-10-09 PROCEDURE — 81001 URINALYSIS AUTO W/SCOPE: CPT

## 2022-10-09 PROCEDURE — 87086 URINE CULTURE/COLONY COUNT: CPT

## 2022-10-09 PROCEDURE — 6360000004 HC RX CONTRAST MEDICATION: Performed by: STUDENT IN AN ORGANIZED HEALTH CARE EDUCATION/TRAINING PROGRAM

## 2022-10-09 PROCEDURE — 85025 COMPLETE CBC W/AUTO DIFF WBC: CPT

## 2022-10-09 PROCEDURE — 6370000000 HC RX 637 (ALT 250 FOR IP): Performed by: NURSE PRACTITIONER

## 2022-10-09 PROCEDURE — 96365 THER/PROPH/DIAG IV INF INIT: CPT

## 2022-10-09 PROCEDURE — 86140 C-REACTIVE PROTEIN: CPT

## 2022-10-09 PROCEDURE — 86403 PARTICLE AGGLUT ANTBDY SCRN: CPT

## 2022-10-09 PROCEDURE — 96367 TX/PROPH/DG ADDL SEQ IV INF: CPT

## 2022-10-09 PROCEDURE — 96375 TX/PRO/DX INJ NEW DRUG ADDON: CPT

## 2022-10-09 PROCEDURE — 96366 THER/PROPH/DIAG IV INF ADDON: CPT

## 2022-10-09 PROCEDURE — 70450 CT HEAD/BRAIN W/O DYE: CPT

## 2022-10-09 PROCEDURE — 87154 CUL TYP ID BLD PTHGN 6+ TRGT: CPT

## 2022-10-09 PROCEDURE — 6370000000 HC RX 637 (ALT 250 FOR IP): Performed by: STUDENT IN AN ORGANIZED HEALTH CARE EDUCATION/TRAINING PROGRAM

## 2022-10-09 PROCEDURE — 83605 ASSAY OF LACTIC ACID: CPT

## 2022-10-09 RX ORDER — 0.9 % SODIUM CHLORIDE 0.9 %
1000 INTRAVENOUS SOLUTION INTRAVENOUS ONCE
Status: COMPLETED | OUTPATIENT
Start: 2022-10-09 | End: 2022-10-09

## 2022-10-09 RX ORDER — MAGNESIUM SULFATE 1 G/100ML
1000 INJECTION INTRAVENOUS PRN
Status: DISCONTINUED | OUTPATIENT
Start: 2022-10-09 | End: 2022-10-26 | Stop reason: HOSPADM

## 2022-10-09 RX ORDER — SODIUM CHLORIDE 0.9 % (FLUSH) 0.9 %
10 SYRINGE (ML) INJECTION PRN
Status: DISCONTINUED | OUTPATIENT
Start: 2022-10-09 | End: 2022-10-26 | Stop reason: HOSPADM

## 2022-10-09 RX ORDER — DIPHENHYDRAMINE HYDROCHLORIDE 50 MG/ML
25 INJECTION INTRAMUSCULAR; INTRAVENOUS ONCE
Status: COMPLETED | OUTPATIENT
Start: 2022-10-09 | End: 2022-10-09

## 2022-10-09 RX ORDER — POTASSIUM CHLORIDE 20 MEQ/1
40 TABLET, EXTENDED RELEASE ORAL PRN
Status: DISCONTINUED | OUTPATIENT
Start: 2022-10-09 | End: 2022-10-26 | Stop reason: HOSPADM

## 2022-10-09 RX ORDER — SODIUM CHLORIDE 0.9 % (FLUSH) 0.9 %
5-40 SYRINGE (ML) INJECTION EVERY 12 HOURS SCHEDULED
Status: DISCONTINUED | OUTPATIENT
Start: 2022-10-09 | End: 2022-10-26 | Stop reason: HOSPADM

## 2022-10-09 RX ORDER — SODIUM CHLORIDE 9 MG/ML
INJECTION, SOLUTION INTRAVENOUS PRN
Status: DISCONTINUED | OUTPATIENT
Start: 2022-10-09 | End: 2022-10-26 | Stop reason: HOSPADM

## 2022-10-09 RX ORDER — ACETAMINOPHEN 500 MG
1000 TABLET ORAL ONCE
Status: COMPLETED | OUTPATIENT
Start: 2022-10-09 | End: 2022-10-09

## 2022-10-09 RX ORDER — POTASSIUM CHLORIDE 7.45 MG/ML
10 INJECTION INTRAVENOUS PRN
Status: DISCONTINUED | OUTPATIENT
Start: 2022-10-09 | End: 2022-10-26 | Stop reason: HOSPADM

## 2022-10-09 RX ORDER — ACETAMINOPHEN 325 MG/1
650 TABLET ORAL EVERY 6 HOURS PRN
Status: DISCONTINUED | OUTPATIENT
Start: 2022-10-09 | End: 2022-10-26 | Stop reason: HOSPADM

## 2022-10-09 RX ORDER — CLINDAMYCIN PHOSPHATE 600 MG/50ML
600 INJECTION INTRAVENOUS ONCE
Status: COMPLETED | OUTPATIENT
Start: 2022-10-09 | End: 2022-10-09

## 2022-10-09 RX ORDER — CARBAMAZEPINE 200 MG/1
200 TABLET ORAL 2 TIMES DAILY
Status: DISCONTINUED | OUTPATIENT
Start: 2022-10-09 | End: 2022-10-26 | Stop reason: HOSPADM

## 2022-10-09 RX ORDER — ONDANSETRON 4 MG/1
4 TABLET, ORALLY DISINTEGRATING ORAL EVERY 8 HOURS PRN
Status: DISCONTINUED | OUTPATIENT
Start: 2022-10-09 | End: 2022-10-26 | Stop reason: HOSPADM

## 2022-10-09 RX ORDER — HALOPERIDOL 1 MG/1
1 TABLET ORAL 2 TIMES DAILY
Status: DISCONTINUED | OUTPATIENT
Start: 2022-10-09 | End: 2022-10-26 | Stop reason: HOSPADM

## 2022-10-09 RX ORDER — VANCOMYCIN HYDROCHLORIDE 1 G/200ML
1000 INJECTION, SOLUTION INTRAVENOUS ONCE
Status: DISCONTINUED | OUTPATIENT
Start: 2022-10-09 | End: 2022-10-09 | Stop reason: CLARIF

## 2022-10-09 RX ORDER — ENOXAPARIN SODIUM 100 MG/ML
30 INJECTION SUBCUTANEOUS 2 TIMES DAILY
Status: DISCONTINUED | OUTPATIENT
Start: 2022-10-09 | End: 2022-10-26 | Stop reason: HOSPADM

## 2022-10-09 RX ORDER — ACETAMINOPHEN 650 MG/1
650 SUPPOSITORY RECTAL EVERY 6 HOURS PRN
Status: DISCONTINUED | OUTPATIENT
Start: 2022-10-09 | End: 2022-10-26 | Stop reason: HOSPADM

## 2022-10-09 RX ORDER — ONDANSETRON 2 MG/ML
4 INJECTION INTRAMUSCULAR; INTRAVENOUS EVERY 6 HOURS PRN
Status: DISCONTINUED | OUTPATIENT
Start: 2022-10-09 | End: 2022-10-26 | Stop reason: HOSPADM

## 2022-10-09 RX ORDER — POLYETHYLENE GLYCOL 3350 17 G/17G
17 POWDER, FOR SOLUTION ORAL DAILY PRN
Status: DISCONTINUED | OUTPATIENT
Start: 2022-10-09 | End: 2022-10-26 | Stop reason: HOSPADM

## 2022-10-09 RX ADMIN — DIPHENHYDRAMINE HYDROCHLORIDE 25 MG: 50 INJECTION, SOLUTION INTRAMUSCULAR; INTRAVENOUS at 10:41

## 2022-10-09 RX ADMIN — CLINDAMYCIN IN 5 PERCENT DEXTROSE 600 MG: 12 INJECTION, SOLUTION INTRAVENOUS at 10:42

## 2022-10-09 RX ADMIN — ENOXAPARIN SODIUM 30 MG: 100 INJECTION SUBCUTANEOUS at 21:05

## 2022-10-09 RX ADMIN — ACETAMINOPHEN 650 MG: 325 TABLET ORAL at 21:28

## 2022-10-09 RX ADMIN — HALOPERIDOL 1 MG: 1 TABLET ORAL at 21:05

## 2022-10-09 RX ADMIN — CARBAMAZEPINE 200 MG: 200 TABLET ORAL at 21:05

## 2022-10-09 RX ADMIN — IOPAMIDOL 75 ML: 755 INJECTION, SOLUTION INTRAVENOUS at 13:00

## 2022-10-09 RX ADMIN — SODIUM CHLORIDE, PRESERVATIVE FREE 10 ML: 5 INJECTION INTRAVENOUS at 21:26

## 2022-10-09 RX ADMIN — SODIUM CHLORIDE 1000 ML: 9 INJECTION, SOLUTION INTRAVENOUS at 09:52

## 2022-10-09 RX ADMIN — ACETAMINOPHEN 1000 MG: 500 TABLET ORAL at 09:52

## 2022-10-09 RX ADMIN — PIPERACILLIN AND TAZOBACTAM 4500 MG: 4; .5 INJECTION, POWDER, FOR SOLUTION INTRAVENOUS at 09:53

## 2022-10-09 RX ADMIN — Medication 1000 MG: at 12:11

## 2022-10-09 ASSESSMENT — PAIN DESCRIPTION - LOCATION: LOCATION: ABDOMEN

## 2022-10-09 ASSESSMENT — PAIN SCALES - GENERAL: PAINLEVEL_OUTOF10: 1

## 2022-10-09 ASSESSMENT — PAIN - FUNCTIONAL ASSESSMENT: PAIN_FUNCTIONAL_ASSESSMENT: PREVENTS OR INTERFERES SOME ACTIVE ACTIVITIES AND ADLS

## 2022-10-09 ASSESSMENT — PAIN DESCRIPTION - DESCRIPTORS: DESCRIPTORS: DULL

## 2022-10-09 NOTE — CONSULTS
General Surgery:  Consult Note        PATIENT NAME: Esther Boss   YOB: 1980    ADMISSION DATE: 10/9/2022  9:05 AM     Admitting Provider: Donna Love Physician: Chris Treviño DATE: 10/9/2022    Chief Complaint:  groin sores  Consult Regarding:  hydradenitis suppurativa    HISTORY OF PRESENT ILLNESS:  The patient is a 38 y.o. male  with history of Bipolar 1 and schizoaffective disorder who was admitted on 10/922 for evaluation of open, weeping, foul-smelling sores around his groin and perineum. He has a known history of hydradenitis suppurativa and currently [facility] an [facility].        Past Medical History:        Diagnosis Date    Bipolar 1 disorder ([facility] [address]     GERD (gastroesophageal reflux disease) 8/13/2016    Hidradenitis suppurativa     Polysubstance abuse ([facility] [address]     [facility]        Past Surgical History:        Procedure Laterality Date    ABSCESS DRAINAGE  11/1/2013    left inguinal hydrodenitis/pilondal cyst    INFECTED SKIN DEBRIDEMENT  july 2011       Medications Prior to Admission:   Medications Prior to Admission: carBAMazepine (TEGRETOL) 200 MG tablet, Take 1 tablet by mouth 2 times daily  haloperidol (HALDOL) 1 MG tablet, Take 1 tablet by mouth 2 times daily    Allergies:  Depakene [valproic acid], Restoril [temazepam], Risperidone and related, Thorazine [chlorpromazine], and Vancomycin    Social History:   Social History     Socioeconomic History    Marital status: Single     Spouse name: Not on file    Number of children: Not on file    Years of education: Not on file    Highest education level: Not on file   Occupational History    Not on file   Tobacco Use    Smoking status: Every Day     Packs/day: 1.00     Years: 22.00     Pack years: 22.00     Types: Cigarettes    Smokeless tobacco: Never   Vaping Use    Vaping Use: Never used   Substance and Sexual Activity    Alcohol use: Yes     Comment: occasional    Drug use: Yes     Comment: history of marijuana & Opiate abue, claims that he is currently clean & sober. Sexual activity: Not on file   Other Topics Concern    Not on file   Social History Narrative    Not on file     Social Determinants of Health     Financial Resource Strain: Not on file   Food Insecurity: Not on file   Transportation Needs: Not on file   Physical Activity: Not on file   Stress: Not on file   Social Connections: Not on file   Intimate Partner Violence: Not on file   Housing Stability: Not on file       Family History:       Problem Relation Age of Onset    Cancer Mother         breast    High Blood Pressure Maternal Grandfather     Mental Illness Paternal Aunt     Substance Abuse Paternal Uncle     Substance Abuse Brother     Alcohol Abuse Father     Cancer Father         pancrease       REVIEW OF SYSTEMS:    CONSTITUTIONAL: Denies recent weight loss, fatigue, fevers, chills. HEENT: Denies rhinorrhea, dysphagia, odynphagia. CARDIOVASCULAR: Denies history of MI, recent chest pain. RESPIRATORY: Denies recent history of shortness of breath or history of PE. GASTROINTESTINAL: denies nausea, vomiting  GENITOURINARY: Denies increased frequency or dysuria. HEMATOLOGIC/LYMPHATIC: Denies history of anemia or DVTs. ENDOCRINE: Denies history of thyroid problems or diabetes. NEURO: Denies history of CVA, TIA. Review of systems negative unless listed above. PHYSICAL EXAM:    VITALS:  /85   Pulse 95   Temp 97.5 °F (36.4 °C) (Axillary)   Resp 15   Wt 300 lb (136.1 kg)   SpO2 99%   BMI 44.30 kg/m²   INTAKE/OUTPUT:   No intake or output data in the 24 hours ending 10/09/22 1652    CONSTITUTIONAL:  awake, alert, mildly distressed and normal weight  HEENT: Normocephalic/atraumatic, without obvious abnormality. NGT/OGT  NECK:  Supple, symmetrical, trachea midline   CARDIOVASCULAR: Regular rate and rhythm without murmurs. LUNGS: Clear to auscultation bilaterally without evidence of wheezing or tachypnea.   ABDOMEN: Abdomen soft, nondistended,    MUSCULOSKELETAL: Muscle strength intact in all extremities bilaterally. NEUROLOGIC: CN II- XII intact. Mumbling. Gross motor intact without focal weakness. SKIN: bilateral groins, buttocks, perineum, scrotum, and pneis involved in thickended skin with scarring, sinus tracts, and open draining wounds. No areas of fluctuance appreciated. Orientation:   oriented to person, place, and time      CBC:   Lab Results   Component Value Date/Time    WBC 19.7 10/09/2022 09:23 AM    RBC 4.69 10/09/2022 09:23 AM    RBC 3.46 02/09/2012 06:19 AM    HGB 13.4 10/09/2022 09:23 AM    HCT 42.9 10/09/2022 09:23 AM    MCV 91.5 10/09/2022 09:23 AM    MCH 28.6 10/09/2022 09:23 AM    MCHC 31.2 10/09/2022 09:23 AM    RDW 14.7 10/09/2022 09:23 AM     10/09/2022 09:23 AM     02/09/2012 06:19 AM    MPV 9.4 10/09/2022 09:23 AM     BMP:    Lab Results   Component Value Date/Time     10/09/2022 10:53 AM    K 3.7 10/09/2022 10:53 AM     10/09/2022 10:53 AM    CO2 23 10/09/2022 10:53 AM    BUN 18 10/09/2022 10:53 AM    LABALBU 3.8 09/09/2022 03:26 PM    CREATININE 0.78 10/09/2022 10:53 AM    CALCIUM 8.5 10/09/2022 10:53 AM    GFRAA >60 09/09/2022 03:26 PM    LABGLOM >60 10/09/2022 10:53 AM    GLUCOSE 114 10/09/2022 10:53 AM    GLUCOSE 89 02/09/2012 06:19 AM       Pertinent Radiology:   CT HEAD WO CONTRAST    Result Date: 10/9/2022  EXAMINATION: CT OF THE HEAD WITHOUT CONTRAST  10/9/2022 12:46 pm TECHNIQUE: CT of the head was performed without the administration of intravenous contrast. Automated exposure control, iterative reconstruction, and/or weight based adjustment of the mA/kV was utilized to reduce the radiation dose to as low as reasonably achievable. COMPARISON: None.  HISTORY: ORDERING SYSTEM PROVIDED HISTORY: AMS TECHNOLOGIST PROVIDED HISTORY: AMS Decision Support Exception - unselect if not a suspected or confirmed emergency medical condition->Emergency Medical Condition (MA) FINDINGS: BRAIN/VENTRICLES: There is no acute intracranial hemorrhage, mass effect or midline shift. No abnormal extra-axial fluid collection. Insult of uncertain chronicity in the right frontal lobe. There is no evidence of hydrocephalus. ORBITS: The visualized portion of the orbits demonstrate no acute abnormality. SINUSES: The visualized paranasal sinuses and mastoid air cells demonstrate no acute abnormality. SOFT TISSUES/SKULL:  No acute abnormality of the visualized skull or soft tissues. 1. No hemorrhage. 2. Insert of uncertain chronicity in the right frontal lobe. Consider further evaluation with MRI. CT PELVIS WO CONTRAST Additional Contrast? None    Result Date: 10/9/2022  EXAMINATION: CT OF THE PELVIS WITHOUT CONTRAST 10/9/2022 2:54 pm TECHNIQUE: CT of the pelvis was performed without the administration of intravenous contrast.  Multiplanar reformatted images are provided for review. Adjustment of mA and/or kV according to patient size was utilized. Automated exposure control, iterative reconstruction, and/or weight based adjustment of the mA/kV was utilized to reduce the radiation dose to as low as reasonably achievable. COMPARISON: 10/09/2022, 08/10/2022 HISTORY: ORDERING SYSTEM PROVIDED HISTORY: eval for gas or infection. Concerns for severe hidradenitis or Opal's TECHNOLOGIST PROVIDED HISTORY: eval for gas or infection. Concerns for severe hidradenitis or Opal's Decision Support Exception - unselect if not a suspected or confirmed emergency medical condition->Emergency Medical Condition (MA) Reason for Exam: Hidradenitis suppurativa FINDINGS: Overall slightly increased skin thickening and subcutaneous fluid collections/sinus tracts of the anterior pelvis and gluteal region, greatest along the right gluteal fold. Some of these subcutaneous fluid collections and sinus tracts contain anti dependent gas. Bladder is decompressed with Kowalski catheter.   Large volume stool in the rectum. No lymphadenopathy. No acute bony abnormality. Slightly increased chronic appearing infectious/inflammatory cutaneous/subcutaneous process of the anterior pelvis and gluteal region. Recommend correlation with history of hidradenitis suppurativa. CT ABDOMEN PELVIS W IV CONTRAST Additional Contrast? None    Result Date: 10/9/2022  EXAMINATION: CT OF THE ABDOMEN AND PELVIS WITH CONTRAST 10/9/2022 12:46 pm TECHNIQUE: CT of the abdomen and pelvis was performed with the administration of intravenous contrast. Multiplanar reformatted images are provided for review. Automated exposure control, iterative reconstruction, and/or weight based adjustment of the mA/kV was utilized to reduce the radiation dose to as low as reasonably achievable. COMPARISON: 08/10/2022 HISTORY: ORDERING SYSTEM PROVIDED HISTORY: concern for Opal's gangrene TECHNOLOGIST PROVIDED HISTORY: concern for Opal's gangrene Decision Support Exception - unselect if not a suspected or confirmed emergency medical condition->Emergency Medical Condition (MA) FINDINGS: Lower Chest: Left basilar atelectasis. Organs: Liver is normal in contour and enhancement. Gallbladder is unremarkable. No biliary ductal dilatation. Pancreas, adrenals, spleen, vasculature unremarkable. Atrophic right kidney with stable Bosniak 2 benign cysts. GI/Bowel: Large volume stool in the rectum. Bowel is otherwise nondilated without wall thickening. Appendix is normal. Pelvis: Unremarkable. Peritoneum/Retroperitoneum: No free air, free fluid, organized fluid collection, lymphadenopathy. Bones/Soft Tissues: Skin thickening of the low anterior abdominal wall, anterior pelvis and visualized gluteal region. No soft tissue gas is seen in the visualized body wall. Note, the perineum is not visualized on this study. 1. Skin thickening of the low anterior abdominal wall, anterior pelvis and visualized gluteal region.   Note, the perineum is not visualized on this study. Patient could return for further imaging if clinically indicated. 2. Large volume stool in the rectum. ASSESSMENT:  Active Hospital Problems    Diagnosis Date Noted    Abscess of skin of abdomen [L02.211] 04/25/2012    Hidradenitis suppurativa [L73.2] 03/06/2012     Effie Mascorro is 39 yo male presenting with actively draining Sharolyn Vernon Stage 4 hydradenitis suppurativa with extension over the bilateral groins, perineum, buttocks, and genitalia. Plan:  Continue medical mgmt and supportive care per primary  No surgical intervention planned at this time, considering active drainage, lack of discrete fluid collection on imaging and wounds consistent with HS. Admit for IV antibiotics and fluid support  Recommend wound care consult  May require Dermatology consult. Trauma surgery team will follow.       Electronically signed by Roxy Gonzalez DO  on 10/9/2022 at 4:52 PM
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
## 2022-10-09 NOTE — ED PROVIDER NOTES
[facility] ED     Emergency Department     Faculty Attestation        I performed a history and physical examination of the patient and discussed management with the resident. I reviewed the residents note and agree with the documented findings and plan of care. Any areas of disagreement are noted on the chart. I was personally present for the key portions of any procedures. I have documented in the chart those procedures where I was not present during the key portions. I have reviewed the emergency nurses triage note. I agree with the chief complaint, past medical history, past surgical history, allergies, medications, social and family history as documented unless otherwise noted below. For Physician Assistant/ Nurse Practitioner cases/documentation I have personally evaluated this patient and have completed at least one if not all key elements of the E/M (history, physical exam, and MDM). Additional findings are as noted. Vital Signs:               PCP:  Tomi Hi MD    Pertinent Comments:     Patient is a 47-year-old male from care home with history of schizophrenia and bipolar disease as well as polysubstance abuse and hidradenitis. Patient has been \"acting somewhat differently and intermittently confused. Patient here with some mild lower abdominal pain as well but significant infection in the groin area involving scrotum and penis and concern for Opal's. Starting stat empiric antibiotics with clindamycin first followed by vancomycin and Zosyn. Beginning resuscitation as well obviously. CT head as well as CT abdomen/pelvis.    Surgical consultation        EKG Interpretation    Interpreted by emergency department physician    Rhythm: Sinus rhythm, but Tachycardic  Rate: Tachycardic at 116 bpm  Axis: normal  Conduction: normal  ST Segments: no acute change  T Waves: no acute change  Q Waves: no acute change    Clinical Impression: Sinus Tachycardia. CRITICAL CARE: There was a high probability of clinically significant/life threatening deterioration in this patient's condition which required my urgent intervention. Total critical care time was 30 minutes. This excludes any time for separately reportable procedures. (Please note that portions of this note were completed with a voice recognition program. Efforts were made to edit the dictations but occasionally words are mis-transcribed.  Whenever words are used in this note in any gender, they shall be construed as though they were used in the gender appropriate to the circumstances; and whenever words are used in this note in the singular or plural form, they shall be construed as though they were used in the form appropriate to the circumstances.)    MD Merry Rubio  Attending Emergency Medicine Physician           Janny Martinez MD  10/09/22 365 Josep Lowery MD  10/09/22 0399

## 2022-10-09 NOTE — ED PROVIDER NOTES
[facility] ED  Emergency Department Encounter  Emergency Medicine Resident     Pt Name: Willi Wilson  KLT:[id number]  [place] 1980  Date of evaluation: 10/9/22  PCP:  Rea Kuhn MD    [address] [place] [address]       Chief Complaint   Patient presents with    Altered Mental Status       HISTORY OF PRESENT ILLNESS  (Location/Symptom, Timing/Onset, Context/Setting, Quality, Duration, ModifyingFactors, Severity.)      Willi Wilson is a 38 y.o. male patient presents to the emergency room brought in from [facility]. Patient had an altered mental status this morning. Shift workers and also a foul smell. Patient normally is a GCS of 15 and ANO x4. Patient arrives, has an extremely foul stench coming from him. On removal of the patient's diaper covering patient has an entire groin area covered in multiple areas of sores and pus. Patient states that is extremely tender and painful. Patient does have history of hidradenitis suppurativa and appears to be a overlying massive cellulitic infection. PAST MEDICAL / SURGICAL / SOCIAL /FAMILY HISTORY      has a past medical history of Bipolar 1 disorder ([facility], [place] [address] GERD (gastroesophageal reflux disease), Hidradenitis suppurativa, Polysubstance abuse ([facility], [place] [address] and Schizoaffective disorder ([facility], [place] [address] No other pertinent PMH on review with patient/guardian. has a past surgical history that includes Infected skin debridement (july 2011) and Abscess Drainage (11/1/2013). No other pertinent PSH on review with patient/guardian.   Social History     Socioeconomic History    Marital status: Single     Spouse name: Not on file    Number of children: Not on file    Years of education: Not on file    Highest education level: Not on file   Occupational History    Not on file   Tobacco Use    Smoking status: Every Day     Packs/day: 1.00     Years: 22.00     Pack years: 22.00     Types: Cigarettes    Smokeless tobacco: Never   Vaping Use    Vaping Use: Never used Substance and Sexual Activity    Alcohol use: Yes     Comment: occasional    Drug use: Yes     Comment: history of marijuana & Opiate abue, claims that he is currently clean & sober.  Sexual activity: Not on file   Other Topics Concern    Not on file   Social History Narrative    Not on file     Social Determinants of Health     Financial Resource Strain: Not on file   Food Insecurity: Not on file   Transportation Needs: Not on file   Physical Activity: Not on file   Stress: Not on file   Social Connections: Not on file   Intimate Partner Violence: Not on file   Housing Stability: Not on file       I counseled the patient against using tobacco products. Family History   Problem Relation Age of Onset    Cancer Mother         breast    High Blood Pressure Maternal Grandfather     Mental Illness Paternal Aunt     Substance Abuse Paternal Uncle     Substance Abuse Brother     Alcohol Abuse Father     Cancer Father         pancrease     No other pertinent FamHx on review with patient/guardian. Allergies:  Depakene [valproic acid], Restoril [temazepam], Risperidone and related, Thorazine [chlorpromazine], and Vancomycin    Home Medications:  Prior to Admission medications    Medication Sig Start Date End Date Taking? Authorizing Provider   carBAMazepine (TEGRETOL) 200 MG tablet Take 1 tablet by mouth 2 times daily 9/16/22   Susana Post MD   haloperidol (HALDOL) 1 MG tablet Take 1 tablet by mouth 2 times daily 9/16/22   Susana Post MD   benztropine (COGENTIN) 1 MG tablet Take 0.5 tablets by mouth in the morning and 0.5 tablets before bedtime.  8/12/22 9/16/22  Katlin Mcgraw MD   adalimumab (HUMIRA) 40 MG/0.8ML injection Inject 40 mg into the skin once Patient takes 80 mg every two weeks; due this week  8/12/22  Historical Provider, MD   lithium (ESKALITH) 450 MG extended release tablet Take 450 mg by mouth 2 times daily  Patient not taking: Reported on 8/11/2022 8/12/22  Historical Provider, MD   Multiple Vitamins-Minerals (THERAPEUTIC MULTIVITAMIN-MINERALS) tablet Take 1 tablet by mouth daily. 4/18/14 8/12/22  Shannon PAUL Marroquin       REVIEW OF SYSTEMS    (2-9 systems for level 4, 10 ormore for level 5)      Review of Systems   Unable to perform ROS: Mental status change     PHYSICAL EXAM   (up to 7 for level 4, 8 or more for level 5)      INITIAL VITALS:   BP (!) 159/89   Pulse 87   Temp 98.6 °F (37 °C) (Oral)   Resp 16   Wt 300 lb (136.1 kg)   SpO2 94%   BMI 44.30 kg/m²     Physical Exam  Vitals reviewed. Constitutional:       Appearance: He is ill-appearing. HENT:      Head: Normocephalic and atraumatic. Mouth/Throat:      Mouth: Mucous membranes are moist.      Pharynx: Oropharynx is clear. Eyes:      Extraocular Movements: Extraocular movements intact. Pupils: Pupils are equal, round, and reactive to light. Cardiovascular:      Rate and Rhythm: Normal rate and regular rhythm. Heart sounds: Normal heart sounds. Pulmonary:      Effort: Pulmonary effort is normal.      Breath sounds: Normal breath sounds. Abdominal:      Comments: Soft abdomen, lower abdomen covered in multiple areas of weeping wounds, cellulitic overlying changes. There is also swelling over the changes on the scrotum, perineum, glans penis and shaft. Musculoskeletal:         General: Normal range of motion. Cervical back: Normal range of motion and neck supple. Skin:     Capillary Refill: Capillary refill takes less than 2 seconds. Comments: Weeping and excoriations with multiple areas of ulceration lower abdomen, perineum, scrotum, penis and shaft   Neurological:      Mental Status: He is alert. He is confused.    Psychiatric:         Mood and Affect: Mood normal.         Speech: Speech normal.       DIFFERENTIAL  DIAGNOSIS     DDX: Hidradenitis suppurativa, Opal's gangrene, cellulitis    PLAN (LABS / IMAGING / EKG):  Orders Placed This Encounter   Procedures    Culture, Blood 2    Blood Culture 1    Culture, Urine    CT ABDOMEN PELVIS W IV CONTRAST Additional Contrast? None    CT HEAD WO CONTRAST    CBC with Auto Differential    Lactic Acid    Sedimentation Rate    Urinalysis with Microscopic    Basic Metabolic Panel    C-Reactive Protein    Magnesium    PREVIOUS SPECIMEN    SPECIMEN REJECTION    Straight cath    Inpatient consult to General Surgery    Inpatient consult to Hospitalist    Insert/Change Kowalski Catheter       MEDICATIONS ORDERED:  Orders Placed This Encounter   Medications    acetaminophen (TYLENOL) tablet 1,000 mg    clindamycin (CLEOCIN) 600 mg in dextrose 5 % 50 mL IVPB     Order Specific Question:   Antimicrobial Indications     Answer:   Skin and Soft Tissue Infection    DISCONTD: vancomycin (VANCOCIN) 1000 mg in dextrose 5% 200 mL IVPB     Order Specific Question:   Antimicrobial Indications     Answer:   Skin and Soft Tissue Infection    piperacillin-tazobactam (ZOSYN) 4,500 mg in dextrose 5 % 100 mL IVPB (mini-bag)     Order Specific Question:   Antimicrobial Indications     Answer:   Skin and Soft Tissue Infection    diphenhydrAMINE (BENADRYL) injection 25 mg    0.9 % sodium chloride bolus    vancomycin (VANCOCIN) 1000 mg in sodium chloride 0.9% 250 mL IVPB     Order Specific Question:   Antimicrobial Indications     Answer:   Skin and Soft Tissue Infection    iopamidol (ISOVUE-370) 76 % injection 75 mL           DIAGNOSTIC RESULTS / EMERGENCY DEPARTMENT COURSE / MDM     LABS:  Results for orders placed or performed during the hospital encounter of 10/09/22   Culture, Blood 2    Specimen: Blood   Result Value Ref Range    Specimen Description . BLOOD     Special Requests RT HAND 10ML     Culture NO GROWTH <24 HRS    Blood Culture 1    Specimen: Blood   Result Value Ref Range    Specimen Description . BLOOD     Special Requests LT AC 20ML     Culture NO GROWTH <24 HRS    CBC with Auto Differential   Result Value Ref Range    WBC 19.7 (H) 3.5 - 11.3 k/uL    RBC 4.69 4.21 - 5.77 m/uL    Hemoglobin 13.4 13.0 - 17.0 g/dL    Hematocrit 42.9 40.7 - 50.3 %    MCV 91.5 82.6 - 102.9 fL    MCH 28.6 25.2 - 33.5 pg    MCHC 31.2 28.4 - 34.8 g/dL    RDW 14.7 (H) 11.8 - 14.4 %    Platelets 468 (H) 799 - 453 k/uL    MPV 9.4 8.1 - 13.5 fL    NRBC Automated 0.0 0.0 per 100 WBC    Seg Neutrophils 77 (H) 36 - 65 %    Lymphocytes 14 (L) 24 - 43 %    Monocytes 7 3 - 12 %    Eosinophils % 1 1 - 4 %    Basophils 0 0 - 2 %    Immature Granulocytes 1 (H) 0 %    Segs Absolute 15.15 (H) 1.50 - 8.10 k/uL    Absolute Lymph # 2.67 1.10 - 3.70 k/uL    Absolute Mono # 1.39 (H) 0.10 - 1.20 k/uL    Absolute Eos # 0.18 0.00 - 0.44 k/uL    Basophils Absolute 0.07 0.00 - 0.20 k/uL    Absolute Immature Granulocyte 0.23 0.00 - 0.30 k/uL    RBC Morphology ANISOCYTOSIS PRESENT    Lactic Acid   Result Value Ref Range    Lactic Acid, Whole Blood 3.6 (H) 0.7 - 2.1 mmol/L   Sedimentation Rate   Result Value Ref Range    Sed Rate 52 (H) 0 - 15 mm/Hr   Urinalysis with Microscopic   Result Value Ref Range    Color, UA Dark Yellow (A) Yellow    Turbidity UA Cloudy (A) Clear    Glucose, Ur NEGATIVE NEGATIVE    Bilirubin Urine NEGATIVE  Verified by ictotest. (A) NEGATIVE    Ketones, Urine TRACE (A) NEGATIVE    Specific Gravity, UA 1.030 1.005 - 1.030    Urine Hgb LARGE (A) NEGATIVE    pH, UA 5.0 5.0 - 8.0    Protein, UA 3+ (A) NEGATIVE    Urobilinogen, Urine ELEVATED (A) Normal    Nitrite, Urine NEGATIVE NEGATIVE    Leukocyte Esterase, Urine SMALL (A) NEGATIVE    WBC, UA 20 TO 50 0 - 5 /HPF    RBC, UA 10 TO 20 0 - 4 /HPF    Casts UA  0 - 8 /LPF     10 TO 20 HYALINE Reference range defined for non-centrifuged specimen.     Epithelial Cells UA 10 TO 20 0 - 5 /HPF   Basic Metabolic Panel   Result Value Ref Range    Glucose 114 (H) 70 - 99 mg/dL    BUN 18 6 - 20 mg/dL    Creatinine 0.78 0.70 - 1.20 mg/dL    Est, Glom Filt Rate >60 >60 mL/min/1.73m2    Calcium 8.5 (L) 8.6 - 10.4 mg/dL    Sodium 132 (L) 135 - 144 mmol/L    Potassium 3.7 3.7 - 5.3 mmol/L    Chloride 100 98 - 107 mmol/L    CO2 23 20 - 31 mmol/L    Anion Gap 9 9 - 17 mmol/L   C-Reactive Protein   Result Value Ref Range    .4 (H) 0.0 - 5.0 mg/L   Magnesium   Result Value Ref Range    Magnesium 1.7 1.6 - 2.6 mg/dL   SPECIMEN REJECTION   Result Value Ref Range    Specimen Source . BLOOD     Ordered Test BMP,CRP,MG     Reason for Rejection       SPECIMEN MODERATELY HEMOLYZED, RESULTS MAY BE ADVERSELY AFFECTED         IMPRESSION/MDM/ED COURSE:  38 y.o. 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 We will drop all laboratory work-up for sepsis markers. We will get a CT abdomen pelvis with IV contrast for evaluation of collection. We will consult general surgery for evaluation. If per the recommendations if they request urology will in addition get urology on board. We will treat with IV antibiotics triple coverage with vancomycin, Zosyn and clindamycin and treat pain with morphine or fentanyl. ED Course as of 10/09/22 1444   Sun Oct 09, 2022   1419 Was able to speak with surgery. Stated that they were not able to identify any areas of large collection that could be properly excised in the operating theater. Given this and the lack of evidence of Opal's or necrotizing fasciitis recommending medicine admission with him on his consult. Stated the patient will require more IV antibiotics and to have a repeat pelvis CT scan completed that completes through the perineum. We will proceed in this fashion [ES]   [id number] Patient admitted to the Inter[facility] service. [ES]      ED Course User Index  [ES] Jesika Dueñas MD         RADIOLOGY:  CT ABDOMEN PELVIS W IV CONTRAST Additional Contrast? None   Final Result   1. Skin thickening of the low anterior abdominal wall, anterior pelvis and   visualized gluteal region. Note, the perineum is not visualized on this   study. Patient could return for further imaging if clinically indicated.    2. Large volume stool in the rectum. CT HEAD WO CONTRAST   Final Result   1. No hemorrhage. 2. Insert of uncertain chronicity in the right frontal lobe. Consider   further evaluation with MRI. EKG  None    All EKG's are interpreted by the Emergency Department Physician who either signs or Co-signs this chart in the absence of a cardiologist.      PROCEDURES:  None    CONSULTS:  IP CONSULT TO GENERAL SURGERY  IP CONSULT TO HOSPITALIST        FINAL IMPRESSION      1. Septicemia ([address]          DISPOSITION / PLAN       DISPOSITION Decision To Admit 10/09/2022 10:13:05 AM        PATIENT REFERREDTO:  No follow-up provider specified.     DISCHARGE MEDICATIONS:  New Prescriptions    No medications on file       Lana Lujan MD  PGY 3  Resident Physician Emergency Medicine  10/09/22 2:20 PM        (Please note that portions of this note were completed with a voice recognition program.Efforts were made to edit the dictations but occasionally words are mis-transcribed.)        Lana Lujan MD  Resident  10/09/22 [id number]

## 2022-10-09 NOTE — ED NOTES
Pt back from CT  Pt respirations are even and unlabored, pt is oriented X 4, speaking in complete sentences, bed is in the lowest position, call light is within reach. Will continue to monitor.        Bryson Simeon RN  10/09/22 [id number]

## 2022-10-09 NOTE — ED TRIAGE NOTES
Patient was brought to room 23 by EMS from alf. Patient was reported to be AMS at alf. Patient is alert but only oriented to himself and place. Patient has two correctional officers at bedside. Patient on inspections has multiple ulcers on his groin and bilateral thighs that have drainage and odor on them. Unknown how long this has been going on for. Pt respirations are even and unlabored, bed is in the lowest position, call light is within reach. Will continue to monitor.

## 2022-10-09 NOTE — H&P
[facility]  Office: [address], DO, Darrell Condon, DO, Karla Beets, DO, Ed Resendez Blood, DO, Yeimy Lea MD, Pallavi Rosenbaum MD, Johnnie Claude, MD, Bre Burgess MD,  Abida Singh MD, Truett Burkitt, MD, David Rubin, DO, Sunita Gunn MD,  Sis Mac MD, Emre Duncan MD, María Reyna, DO, Summer Cartwright MD, Mela Campbell MD, Arsen Mcgee MD, Jean Cummins MD, Beena Luna MD, Mariama Good MD, Cristina Millan, DO, Daylin Morgan MD, Aye Robledo MD, Chavo Chaudhry, CNP,  Jerome Pearson, CNP, Anastasiya Pinon, CNP, Sharon Mir, CNP,  Ramon Corrales, DNP, Marylen Net, CNP, Varinder Albert, CNP, Mariana Salcido, CNP, Betty Weeks, CNP, Jeet Williamson, CNP, Kaia Collins PATrangC, Liz Manzo, CNS, Marlys Benítez, DNP, Taurus Kelsey, CNP, Sukhjinder Chino, CNP, Elizabeth Pineda, [facility]    HISTORY AND PHYSICAL EXAMINATION            Date:   10/9/2022  Patient name:  Nando Poe  Date of admission:  10/9/2022  9:05 AM  MRN:   [id number]  Account:  [de-identified]  YOB: 1980  PCP:    Bear Champion MD  Room:   23/23  Code Status:    Prior    Chief Complaint:     Chief Complaint   Patient presents with    Altered Mental Status       History Obtained From:     patient    History of Present Illness:     Nando Poe is a 38 y.o. Non- / non  male who presents with Altered Mental Status   and is admitted to the hospital for the management of Hidradenitis suppurativa. 38 M with known hx of hidradenitis suppurativa with extensive lesions bilateral axillary - presents today from residential facility due to extensive lower abdomen wounds concerning for necrotizing fasciitis    The extent of the lesions are from the lower abd down to both thighs just above the knee    There is evidence of excoriation, oozing, and malodor.   His pannus also has evidence and his bedsheet is sticking to the wounds and he is visibly in pain    Pt also has extensive psychiatric history and is on lithium + tegretol + cogentin    Humria is listed as op medication but we cannot administer it inpatient    Past Medical History:     Past Medical History:   Diagnosis Date    Bipolar 1 disorder ([facility] [address]     GERD (gastroesophageal reflux disease) 8/13/2016    Hidradenitis suppurativa     Polysubstance abuse ([facility] [address]     Schizoaffective disorder ([facility] [address]         Past Surgical History:     Past Surgical History:   Procedure Laterality Date    ABSCESS DRAINAGE  11/1/2013    left inguinal hydrodenitis/pilondal cyst    INFECTED SKIN DEBRIDEMENT  july 2011        Medications Prior to Admission:     Prior to Admission medications    Medication Sig Start Date End Date Taking? Authorizing Provider   carBAMazepine (TEGRETOL) 200 MG tablet Take 1 tablet by mouth 2 times daily 9/16/22   Andre Titus MD   haloperidol (HALDOL) 1 MG tablet Take 1 tablet by mouth 2 times daily 9/16/22   Andre Titus MD   benztropine (COGENTIN) 1 MG tablet Take 0.5 tablets by mouth in the morning and 0.5 tablets before bedtime. 8/12/22 9/16/22  Carey Reynolds MD   adalimumab (HUMIRA) 40 MG/0.8ML injection Inject 40 mg into the skin once Patient takes 80 mg every two weeks; due this week  8/12/22  Historical Provider, MD   lithium (ESKALITH) 450 MG extended release tablet Take 450 mg by mouth 2 times daily  Patient not taking: Reported on 8/11/2022 8/12/22  Historical Provider, MD   Multiple Vitamins-Minerals (THERAPEUTIC MULTIVITAMIN-MINERALS) tablet Take 1 tablet by mouth daily. 4/18/14 8/12/22  Tiffanierianna MILLER Marroquin        Allergies:     Depakene [valproic acid], Restoril [temazepam], Risperidone and related, Thorazine [chlorpromazine], and Vancomycin    Social History:     Tobacco:    reports that he has been smoking cigarettes. He has a 22.00 pack-year smoking history.  He has never used smokeless tobacco.  Alcohol:      reports current alcohol use. Drug Use:  reports current drug use. Family History:     Family History   Problem Relation Age of Onset    Cancer Mother         breast    High Blood Pressure Maternal Grandfather     Mental Illness Paternal Aunt     Substance Abuse Paternal Uncle     Substance Abuse Brother     Alcohol Abuse Father     Cancer Father         pancrease       Review of Systems:     Positive and Negative as described in HPI. CONSTITUTIONAL:  negative for fevers, chills, sweats, fatigue, weight loss  HEENT:  negative for vision, hearing changes, runny nose, throat pain  RESPIRATORY:  negative for shortness of breath, cough, congestion, wheezing  CARDIOVASCULAR:  negative for chest pain, palpitations  GASTROINTESTINAL:  negative for nausea, vomiting, diarrhea, constipation, change in bowel habits, abdominal pain   GENITOURINARY:  negative for difficulty of urination, burning with urination, frequency   INTEGUMENT:  negative for rash, skin lesions, easy bruising   HEMATOLOGIC/LYMPHATIC:  negative for swelling/edema   ALLERGIC/IMMUNOLOGIC:  negative for urticaria , itching  ENDOCRINE:  negative increase in drinking, increase in urination, hot or cold intolerance  MUSCULOSKELETAL:  negative joint pains, muscle aches, swelling of joints  NEUROLOGICAL:  negative for headaches, dizziness, lightheadedness, numbness, pain, tingling extremities  BEHAVIOR/PSYCH:  negative for depression, anxiety    Physical Exam:   BP (!) 159/89   Pulse 77   Temp 98.6 °F (37 °C) (Oral)   Resp 15   Wt 300 lb (136.1 kg)   SpO2 100%   BMI 44.30 kg/m²   Temp (24hrs), Av.6 °F (37 °C), Min:98.6 °F (37 °C), Max:98.6 °F (37 °C)    No results for input(s): POCGLU in the last 72 hours.   No intake or output data in the 24 hours ending 10/09/22 1511    General Appearance: alert oriented to self and place  Mental status: oriented to person, place, and time  Head: normocephalic, atraumatic  Eye: no icterus, redness, pupils equal and reactive, extraocular eye movements intact, conjunctiva clear  Ear: normal external ear, no discharge, hearing intact  Nose: no drainage noted  Mouth: mucous membranes moist  Neck: supple, no carotid bruits, thyroid not palpable  Lungs: Bilateral equal air entry, clear to ausculation, no wheezing, rales or rhonchi, normal effort  Cardiovascular: normal rate, regular rhythm, no murmur, gallop, rub  Abdomen: cellulitic skin lesion with oozing, purulent drainage  Neurologic: There are no new focal motor or sensory deficits, normal muscle tone and bulk, no abnormal sensation, normal speech, cranial nerves II through XII grossly intact  Skin: cellulitic skin lesions across lower abd, inner thighs  Extremities: peripheral pulses palpable, no pedal edema or calf pain with palpation  Psych: normal affect    Investigations:      Laboratory Testing:  Recent Results (from the past 24 hour(s))   Culture, Blood 2    Collection Time: 10/09/22  9:18 AM    Specimen: Blood   Result Value Ref Range    Specimen Description . BLOOD     Special Requests RT HAND 10ML     Culture NO GROWTH <24 HRS    Blood Culture 1    Collection Time: 10/09/22  9:18 AM    Specimen: Blood   Result Value Ref Range    Specimen Description . BLOOD     Special Requests LT AC 20ML     Culture NO GROWTH <24 HRS    CBC with Auto Differential    Collection Time: 10/09/22  9:23 AM   Result Value Ref Range    WBC 19.7 (H) 3.5 - 11.3 k/uL    RBC 4.69 4.21 - 5.77 m/uL    Hemoglobin 13.4 13.0 - 17.0 g/dL    Hematocrit 42.9 40.7 - 50.3 %    MCV 91.5 82.6 - 102.9 fL    MCH 28.6 25.2 - 33.5 pg    MCHC 31.2 28.4 - 34.8 g/dL    RDW 14.7 (H) 11.8 - 14.4 %    Platelets 514 (H) 308 - 453 k/uL    MPV 9.4 8.1 - 13.5 fL    NRBC Automated 0.0 0.0 per 100 WBC    Seg Neutrophils 77 (H) 36 - 65 %    Lymphocytes 14 (L) 24 - 43 %    Monocytes 7 3 - 12 %    Eosinophils % 1 1 - 4 %    Basophils 0 0 - 2 %    Immature Granulocytes 1 (H) 0 %    Segs Absolute 15.15 (H) 1.50 - 8.10 k/uL    Absolute Lymph # 2.67 1.10 - 3.70 k/uL    Absolute Mono # 1.39 (H) 0.10 - 1.20 k/uL    Absolute Eos # 0.18 0.00 - 0.44 k/uL    Basophils Absolute 0.07 0.00 - 0.20 k/uL    Absolute Immature Granulocyte 0.23 0.00 - 0.30 k/uL    RBC Morphology ANISOCYTOSIS PRESENT    Lactic Acid    Collection Time: 10/09/22  9:23 AM   Result Value Ref Range    Lactic Acid, Whole Blood 3.6 (H) 0.7 - 2.1 mmol/L   Sedimentation Rate    Collection Time: 10/09/22  9:23 AM   Result Value Ref Range    Sed Rate 52 (H) 0 - 15 mm/Hr   SPECIMEN REJECTION    Collection Time: 10/09/22  9:23 AM   Result Value Ref Range    Specimen Source . BLOOD     Ordered Test BMP,CRP,MG     Reason for Rejection       SPECIMEN MODERATELY HEMOLYZED, RESULTS MAY BE ADVERSELY AFFECTED   Urinalysis with Microscopic    Collection Time: 10/09/22  9:54 AM   Result Value Ref Range    Color, UA Dark Yellow (A) Yellow    Turbidity UA Cloudy (A) Clear    Glucose, Ur NEGATIVE NEGATIVE    Bilirubin Urine NEGATIVE  Verified by ictotest. (A) NEGATIVE    Ketones, Urine TRACE (A) NEGATIVE    Specific Gravity, UA 1.030 1.005 - 1.030    Urine Hgb LARGE (A) NEGATIVE    pH, UA 5.0 5.0 - 8.0    Protein, UA 3+ (A) NEGATIVE    Urobilinogen, Urine ELEVATED (A) Normal    Nitrite, Urine NEGATIVE NEGATIVE    Leukocyte Esterase, Urine SMALL (A) NEGATIVE    WBC, UA 20 TO 50 0 - 5 /HPF    RBC, UA 10 TO 20 0 - 4 /HPF    Casts UA  0 - 8 /LPF     10 TO 20 HYALINE Reference range defined for non-centrifuged specimen.     Epithelial Cells UA 10 TO 20 0 - 5 /HPF   Basic Metabolic Panel    Collection Time: 10/09/22 10:53 AM   Result Value Ref Range    Glucose 114 (H) 70 - 99 mg/dL    BUN 18 6 - 20 mg/dL    Creatinine 0.78 0.70 - 1.20 mg/dL    Est, Glom Filt Rate >60 >60 mL/min/1.73m2    Calcium 8.5 (L) 8.6 - 10.4 mg/dL    Sodium 132 (L) 135 - 144 mmol/L    Potassium 3.7 3.7 - 5.3 mmol/L    Chloride 100 98 - 107 mmol/L    CO2 23 20 - 31 mmol/L    Anion Gap 9 9 - 17 mmol/L   C-Reactive Protein    Collection Time: 10/09/22 10:53 AM   Result Value Ref Range    .4 (H) 0.0 - 5.0 mg/L   Magnesium    Collection Time: 10/09/22 10:53 AM   Result Value Ref Range    Magnesium 1.7 1.6 - 2.6 mg/dL       Imaging/Diagnostics:  CT HEAD WO CONTRAST    Result Date: 10/9/2022  1. No hemorrhage. 2. Insert of uncertain chronicity in the right frontal lobe. Consider further evaluation with MRI. CT ABDOMEN PELVIS W IV CONTRAST Additional Contrast? None    Result Date: 10/9/2022  1. Skin thickening of the low anterior abdominal wall, anterior pelvis and visualized gluteal region. Note, the perineum is not visualized on this study. Patient could return for further imaging if clinically indicated. 2. Large volume stool in the rectum. Assessment :      Hospital Problems             Last Modified POA    * (Principal) Hidradenitis suppurativa 10/9/2022 Yes       Plan:     Patient status inpatient in the Progressive Unit/Step down    Severe hidradenitis suppurative with concern for nec fasc  Extensive psychiatric history, schiophrenia on appropriate medical therapy will cont op dosing  ID consult Dr. Maryjane Trivedi for assistance with management of skin lesions and abx    Consultations:   IP CONSULT TO GENERAL SURGERY  IP CONSULT TO HOSPITALIST  IP CONSULT TO INFECTIOUS DISEASES    Patient is admitted as inpatient status because of co-morbidities listed above, severity of signs and symptoms as outlined, requirement for current medical therapies and most importantly because of direct risk to patient if care not provided in a hospital setting. Expected length of stay > 48 hours.     Yosi Bragg MD  10/9/2022  3:11 PM    Copy sent to Dr. Eliud Bar MD

## 2022-10-09 NOTE — ASSESSMENT & PLAN NOTE
Well-controlled, continue current medications PROVIDER:[TOKEN:[[id number]],FOLLOWUP:[1 week]],PROVIDER:[TOKEN:[[id number]]]

## 2022-10-10 PROBLEM — Z79.69 IMMUNOSUPPRESSED DUE TO CHEMOTHERAPY: Status: ACTIVE | Noted: 2022-10-10

## 2022-10-10 PROBLEM — L03.115 CELLULITIS OF RIGHT THIGH: Status: ACTIVE | Noted: 2022-10-10

## 2022-10-10 PROBLEM — F20.9 SCHIZOPHRENIA (HCC): Status: ACTIVE | Noted: 2022-10-10

## 2022-10-10 PROBLEM — R78.81 GRAM-POSITIVE BACTEREMIA: Status: ACTIVE | Noted: 2022-10-10

## 2022-10-10 PROBLEM — T45.1X5A IMMUNOSUPPRESSED DUE TO CHEMOTHERAPY (HCC): Status: ACTIVE | Noted: 2022-10-10

## 2022-10-10 PROBLEM — N49.2 SCROTAL ABSCESS: Status: ACTIVE | Noted: 2022-10-10

## 2022-10-10 PROBLEM — D84.821 IMMUNOSUPPRESSED DUE TO CHEMOTHERAPY (HCC): Status: ACTIVE | Noted: 2022-10-10

## 2022-10-10 PROBLEM — Z79.899 IMMUNOSUPPRESSED DUE TO CHEMOTHERAPY (HCC): Status: ACTIVE | Noted: 2022-10-10

## 2022-10-10 PROBLEM — D72.825 BANDEMIA: Status: ACTIVE | Noted: 2022-10-10

## 2022-10-10 LAB
ANION GAP SERPL CALCULATED.3IONS-SCNC: 12 MMOL/L (ref 9–17)
BUN BLDV-MCNC: 10 MG/DL (ref 6–20)
CALCIUM SERPL-MCNC: 8.8 MG/DL (ref 8.6–10.4)
CHLORIDE BLD-SCNC: 100 MMOL/L (ref 98–107)
CO2: 20 MMOL/L (ref 20–31)
CREAT SERPL-MCNC: 0.81 MG/DL (ref 0.7–1.2)
CULTURE: NO GROWTH
GFR SERPL CREATININE-BSD FRML MDRD: >60 ML/MIN/1.73M2
GLUCOSE BLD-MCNC: 138 MG/DL (ref 70–99)
HCT VFR BLD CALC: 37.1 % (ref 40.7–50.3)
HEMOGLOBIN: 11.8 G/DL (ref 13–17)
INR BLD: 1.1
LACTIC ACID, WHOLE BLOOD: 1.8 MMOL/L (ref 0.7–2.1)
MCH RBC QN AUTO: 28.9 PG (ref 25.2–33.5)
MCHC RBC AUTO-ENTMCNC: 31.8 G/DL (ref 28.4–34.8)
MCV RBC AUTO: 90.7 FL (ref 82.6–102.9)
NRBC AUTOMATED: 0 PER 100 WBC
PDW BLD-RTO: 14.4 % (ref 11.8–14.4)
PLATELET # BLD: 423 K/UL (ref 138–453)
PMV BLD AUTO: 9.2 FL (ref 8.1–13.5)
POTASSIUM SERPL-SCNC: 3.6 MMOL/L (ref 3.7–5.3)
PROTHROMBIN TIME: 11.4 SEC (ref 9.1–12.3)
RBC # BLD: 4.09 M/UL (ref 4.21–5.77)
SODIUM BLD-SCNC: 132 MMOL/L (ref 135–144)
SPECIMEN DESCRIPTION: NORMAL
WBC # BLD: 22 K/UL (ref 3.5–11.3)

## 2022-10-10 PROCEDURE — 97530 THERAPEUTIC ACTIVITIES: CPT

## 2022-10-10 PROCEDURE — 2580000003 HC RX 258: Performed by: NURSE PRACTITIONER

## 2022-10-10 PROCEDURE — 2500000003 HC RX 250 WO HCPCS: Performed by: CLINICAL NURSE SPECIALIST

## 2022-10-10 PROCEDURE — 99221 1ST HOSP IP/OBS SF/LOW 40: CPT | Performed by: PSYCHIATRY & NEUROLOGY

## 2022-10-10 PROCEDURE — 94761 N-INVAS EAR/PLS OXIMETRY MLT: CPT

## 2022-10-10 PROCEDURE — 2700000000 HC OXYGEN THERAPY PER DAY

## 2022-10-10 PROCEDURE — 2580000003 HC RX 258: Performed by: CLINICAL NURSE SPECIALIST

## 2022-10-10 PROCEDURE — 97162 PT EVAL MOD COMPLEX 30 MIN: CPT

## 2022-10-10 PROCEDURE — 36415 COLL VENOUS BLD VENIPUNCTURE: CPT

## 2022-10-10 PROCEDURE — 99232 SBSQ HOSP IP/OBS MODERATE 35: CPT | Performed by: INTERNAL MEDICINE

## 2022-10-10 PROCEDURE — 97535 SELF CARE MNGMENT TRAINING: CPT

## 2022-10-10 PROCEDURE — 99222 1ST HOSP IP/OBS MODERATE 55: CPT | Performed by: INTERNAL MEDICINE

## 2022-10-10 PROCEDURE — 80048 BASIC METABOLIC PNL TOTAL CA: CPT

## 2022-10-10 PROCEDURE — 6370000000 HC RX 637 (ALT 250 FOR IP): Performed by: NURSE PRACTITIONER

## 2022-10-10 PROCEDURE — 85027 COMPLETE CBC AUTOMATED: CPT

## 2022-10-10 PROCEDURE — 97166 OT EVAL MOD COMPLEX 45 MIN: CPT

## 2022-10-10 PROCEDURE — 99213 OFFICE O/P EST LOW 20 MIN: CPT

## 2022-10-10 PROCEDURE — 1200000000 HC SEMI PRIVATE

## 2022-10-10 PROCEDURE — 6360000002 HC RX W HCPCS: Performed by: CLINICAL NURSE SPECIALIST

## 2022-10-10 PROCEDURE — 6370000000 HC RX 637 (ALT 250 FOR IP): Performed by: CLINICAL NURSE SPECIALIST

## 2022-10-10 PROCEDURE — 85610 PROTHROMBIN TIME: CPT

## 2022-10-10 PROCEDURE — 83605 ASSAY OF LACTIC ACID: CPT

## 2022-10-10 PROCEDURE — 6360000002 HC RX W HCPCS: Performed by: NURSE PRACTITIONER

## 2022-10-10 RX ORDER — 0.9 % SODIUM CHLORIDE 0.9 %
1000 INTRAVENOUS SOLUTION INTRAVENOUS ONCE
Status: COMPLETED | OUTPATIENT
Start: 2022-10-10 | End: 2022-10-10

## 2022-10-10 RX ORDER — HYDROCODONE BITARTRATE AND ACETAMINOPHEN 5; 325 MG/1; MG/1
2 TABLET ORAL EVERY 4 HOURS PRN
Status: DISCONTINUED | OUTPATIENT
Start: 2022-10-10 | End: 2022-10-26 | Stop reason: HOSPADM

## 2022-10-10 RX ORDER — HYDROCODONE BITARTRATE AND ACETAMINOPHEN 5; 325 MG/1; MG/1
1 TABLET ORAL EVERY 4 HOURS PRN
Status: DISCONTINUED | OUTPATIENT
Start: 2022-10-10 | End: 2022-10-26 | Stop reason: HOSPADM

## 2022-10-10 RX ADMIN — WATER 2000 MG: 1 INJECTION INTRAMUSCULAR; INTRAVENOUS; SUBCUTANEOUS at 12:41

## 2022-10-10 RX ADMIN — HALOPERIDOL 1 MG: 1 TABLET ORAL at 10:22

## 2022-10-10 RX ADMIN — HYDROCODONE BITARTRATE AND ACETAMINOPHEN 2 TABLET: 5; 325 TABLET ORAL at 12:06

## 2022-10-10 RX ADMIN — SODIUM CHLORIDE, PRESERVATIVE FREE 10 ML: 5 INJECTION INTRAVENOUS at 01:35

## 2022-10-10 RX ADMIN — HYDROCODONE BITARTRATE AND ACETAMINOPHEN 2 TABLET: 5; 325 TABLET ORAL at 07:03

## 2022-10-10 RX ADMIN — HALOPERIDOL 1 MG: 1 TABLET ORAL at 20:33

## 2022-10-10 RX ADMIN — ENOXAPARIN SODIUM 30 MG: 100 INJECTION SUBCUTANEOUS at 10:23

## 2022-10-10 RX ADMIN — CARBAMAZEPINE 200 MG: 200 TABLET ORAL at 20:33

## 2022-10-10 RX ADMIN — SODIUM CHLORIDE 1000 ML: 9 INJECTION, SOLUTION INTRAVENOUS at 01:01

## 2022-10-10 RX ADMIN — SODIUM CHLORIDE, PRESERVATIVE FREE 10 ML: 5 INJECTION INTRAVENOUS at 20:32

## 2022-10-10 RX ADMIN — WATER 2000 MG: 1 INJECTION INTRAMUSCULAR; INTRAVENOUS; SUBCUTANEOUS at 01:34

## 2022-10-10 RX ADMIN — ENOXAPARIN SODIUM 30 MG: 100 INJECTION SUBCUTANEOUS at 20:33

## 2022-10-10 RX ADMIN — CARBAMAZEPINE 200 MG: 200 TABLET ORAL at 10:22

## 2022-10-10 RX ADMIN — HYDROCODONE BITARTRATE AND ACETAMINOPHEN 2 TABLET: 5; 325 TABLET ORAL at 00:50

## 2022-10-10 ASSESSMENT — PAIN SCALES - WONG BAKER
WONGBAKER_NUMERICALRESPONSE: 0
WONGBAKER_NUMERICALRESPONSE: 6

## 2022-10-10 ASSESSMENT — PAIN SCALES - GENERAL
PAINLEVEL_OUTOF10: 0
PAINLEVEL_OUTOF10: 7
PAINLEVEL_OUTOF10: 0
PAINLEVEL_OUTOF10: 5
PAINLEVEL_OUTOF10: 10
PAINLEVEL_OUTOF10: 0
PAINLEVEL_OUTOF10: 0
PAINLEVEL_OUTOF10: 7
PAINLEVEL_OUTOF10: 0

## 2022-10-10 ASSESSMENT — ENCOUNTER SYMPTOMS
SHORTNESS OF BREATH: 1
CONSTIPATION: 0
NAUSEA: 0
SHORTNESS OF BREATH: 0
APNEA: 0
WHEEZING: 0
COUGH: 0
STRIDOR: 0
SINUS PRESSURE: 0
DIARRHEA: 0
VOMITING: 0
ABDOMINAL DISTENTION: 0
BACK PAIN: 0
COUGH: 1

## 2022-10-10 ASSESSMENT — PAIN DESCRIPTION - ORIENTATION
ORIENTATION: RIGHT;LEFT
ORIENTATION: RIGHT
ORIENTATION: RIGHT;LOWER

## 2022-10-10 ASSESSMENT — PAIN DESCRIPTION - LOCATION
LOCATION: ABDOMEN;GENERALIZED
LOCATION: ABDOMEN;GROIN;LEG
LOCATION: ABDOMEN;BACK

## 2022-10-10 ASSESSMENT — PAIN DESCRIPTION - ONSET: ONSET: ON-GOING

## 2022-10-10 ASSESSMENT — PAIN DESCRIPTION - DESCRIPTORS
DESCRIPTORS: ACHING;DISCOMFORT
DESCRIPTORS: SHARP;BURNING;THROBBING
DESCRIPTORS: ACHING;DISCOMFORT;BURNING

## 2022-10-10 ASSESSMENT — PAIN DESCRIPTION - PAIN TYPE: TYPE: ACUTE PAIN;CHRONIC PAIN

## 2022-10-10 ASSESSMENT — PAIN DESCRIPTION - FREQUENCY: FREQUENCY: CONTINUOUS

## 2022-10-10 ASSESSMENT — PAIN - FUNCTIONAL ASSESSMENT
PAIN_FUNCTIONAL_ASSESSMENT: PREVENTS OR INTERFERES SOME ACTIVE ACTIVITIES AND ADLS
PAIN_FUNCTIONAL_ASSESSMENT: PREVENTS OR INTERFERES SOME ACTIVE ACTIVITIES AND ADLS
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
## 2022-10-10 NOTE — PROGRESS NOTES
message via perfect serve re blood cultures one of two gm positive cocci in clusters  message  read
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
## 2022-10-10 NOTE — PROGRESS NOTES
Dr. Antonio Velasquez  via perfect serve re positive blood culture one of two gm positive cocci in clusters  message read

## 2022-10-10 NOTE — PROGRESS NOTES
PROGRESS NOTE          PATIENT NAME: [address] [place] [address] Drive RECORD NO. [id number]  DATE: 10/10/2022  SURGEON: Jyotsna Wiggins  PRIMARY CARE PHYSICIAN: Jorden Jo MD    HD: # 1    ASSESSMENT    Patient Active Problem List   Diagnosis    Hidradenitis suppurativa    Acute psychosis ([facility]    Abscess of axilla    Abscess of skin of abdomen    Blood per rectum    Hidradenitis    Trochanteric bursitis    Adrenal mass, right ([address]    Hidradenitis axillaris    Schizoaffective disorder, bipolar type ([address]    Encounter for ostomy nurse consultation    Paranoid schizophrenia, chronic condition with acute exacerbation ([facility]    Iron deficiency anemia    GERD (gastroesophageal reflux disease)    Tobacco abuse    Non-healing left groin open wound, subsequent encounter    Right groin wound, subsequent encounter    Open wound of left ear    Abscess    Sepsis ([facility]    Constipation    Acute exacerbation of chronic paranoid schizophrenia [facility])       MEDICAL DECISION MAKING AND PLAN    Advanced Hidradenitis suppurativa of perineum and bilateral groins, lesions are draining spontaneously, no surgical intervention at this time. Recommend aggressive wound care and medical management of Hidradenitis suppurativa  ID consult -continue IV abx  Consider dermatology consultation       Chief Complaint: \"nausea\"    SUBJECTIVE    Angela Mckeon was seen and evaluated. . Complains of nausea  and perineum pain.  Wounds spontaneously draining purulent fluid     OBJECTIVE  VITALS: Temp: Temp: 98.9 °F (37.2 °C)Temp  Av.2 °F (37.3 °C)  Min: 97.5 °F (36.4 °C)  Max: 101.1 °F (20.6 °C) BP Systolic (69DRL), TRW:797 , Min:106 , CJW:649   Diastolic (17RJC), AFR:38, Min:66, Max:94   Pulse Pulse  Av  Min: 77  Max: 114 Resp Resp  Avg: 15  Min: 11  Max: 20 Pulse ox SpO2  Av.7 %  Min: 94 %  Max: 100 %  GENERAL: alert, mild distress  NEURO: Moving all extremities spontaneously  HEENT: normocephalic, atraumatic  : extensive Hidradenitis suppurativa of bilateral groins and perineum, extremely TTP, purulent foul smelling drainage   LUNGS: no chest wall tenderness  HEART: normal rate and regular rhythm  ABDOMEN: extension of hidradenitis in the suprapubic region. No rebound tenderness  EXTREMITY: skin grafting changes to left thigh. I/O last 3 completed shifts:  In: -   Out: 1200 [Urine:1200]    Drain/tube output: In: -   Out: 1200 [Urine:1200]    LAB:  CBC:   Recent Labs     10/09/22  0923 10/10/22  0647   WBC 19.7* 22.0*   HGB 13.4 11.8*   HCT 42.9 37.1*   MCV 91.5 90.7   * 423     BMP:   Recent Labs     10/09/22  1053 10/10/22  0647   * 132*   K 3.7 3.6*    100   CO2 23 20   BUN 18 10   CREATININE 0.78 0.81   GLUCOSE 114* 138*     COAGS:   Recent Labs     10/10/22  0647   INR 1.1       RADIOLOGY:  CT PELVIS WO CONTRAST Additional Contrast? None   Final Result   Slightly increased chronic appearing infectious/inflammatory   cutaneous/subcutaneous process of the anterior pelvis and gluteal region. Recommend correlation with history of hidradenitis suppurativa. CT ABDOMEN PELVIS W IV CONTRAST Additional Contrast? None   Final Result   1. Skin thickening of the low anterior abdominal wall, anterior pelvis and   visualized gluteal region. Note, the perineum is not visualized on this   study. Patient could return for further imaging if clinically indicated. 2. Large volume stool in the rectum. CT HEAD WO CONTRAST   Final Result   1. No hemorrhage. 2. Insert of uncertain chronicity in the right frontal lobe. Consider   further evaluation with MRI.                  Uyen Hilario MD  10/10/22, 10:23 AM

## 2022-10-10 NOTE — PLAN OF CARE
Problem: Discharge Planning  Goal: Discharge to home or other facility with appropriate resources  Outcome: Progressing  Flowsheets  Taken 10/9/2022 1900 by Bret Salcido RN  Discharge to home or other facility with appropriate resources: Identify barriers to discharge with patient and caregiver  Taken 10/9/2022 1736 by Allie Sneed RN  Discharge to home or other facility with appropriate resources:   Identify barriers to discharge with patient and caregiver   Arrange for needed discharge resources and transportation as appropriate   Identify discharge learning needs (meds, wound care, etc)     Problem: Safety - Adult  Goal: Free from fall injury  Outcome: Progressing     Problem: Confusion  Goal: Confusion, delirium, dementia, or psychosis is improved or at baseline  Description: INTERVENTIONS:  1. Assess for possible contributors to thought disturbance, including medications, impaired vision or hearing, underlying metabolic abnormalities, dehydration, psychiatric diagnoses, and notify attending LIP  2. [place] high risk fall precautions, as indicated  3. Provide frequent short contacts to provide reality reorientation, refocusing and direction  4. Decrease environmental stimuli, including noise as appropriate  5. Monitor and intervene to maintain adequate nutrition, hydration, elimination, sleep and activity  6. If unable to ensure safety without constant attention obtain sitter and review sitter guidelines with assigned personnel  7.  Initiate Psychosocial CNS and Spiritual Care consult, as indicated  Outcome: Progressing  Flowsheets (Taken 10/9/2022 1736 by Allie Sneed RN)  Effect of thought disturbance (confusion, delirium, dementia, or psychosis) are managed with adequate functional status:   Assess for contributors to thought disturbance, including medications, impaired vision or hearing, underlying metabolic abnormalities, dehydration, psychiatric diagnoses, notify [facility] high risk fall precautions, as indicated

## 2022-10-10 NOTE — PLAN OF CARE
Problem: Discharge Planning  Goal: Discharge to home or other facility with appropriate resources  Outcome: Progressing  Flowsheets (Taken 10/10/2022 0800)  Discharge to home or other facility with appropriate resources: Identify barriers to discharge with patient and caregiver     Problem: Safety - Adult  Goal: Free from fall injury  Outcome: Progressing  Flowsheets (Taken 10/10/2022 0744)  Free From Fall Injury: Instruct family/caregiver on patient safety     Problem: Confusion  Goal: Confusion, delirium, dementia, or psychosis is improved or at baseline  Description: INTERVENTIONS:  1. Assess for possible contributors to thought disturbance, including medications, impaired vision or hearing, underlying metabolic abnormalities, dehydration, psychiatric diagnoses, and notify attending LIP  2. [place] high risk fall precautions, as indicated  3. Provide frequent short contacts to provide reality reorientation, refocusing and direction  4. Decrease environmental stimuli, including noise as appropriate  5. Monitor and intervene to maintain adequate nutrition, hydration, elimination, sleep and activity  6. If unable to ensure safety without constant attention obtain sitter and review sitter guidelines with assigned personnel  7. Initiate Psychosocial CNS and Spiritual Care consult, as indicated  Outcome: Progressing  Flowsheets (Taken 10/10/2022 0800)  Effect of thought disturbance (confusion, delirium, dementia, or psychosis) are managed with adequate functional status: Assess for contributors to thought disturbance, including medications, impaired vision or hearing, underlying metabolic abnormalities, dehydration, psychiatric diagnoses, notify LIP     Problem: Skin/Tissue Integrity  Goal: Absence of new skin breakdown  Description: 1. Monitor for areas of redness and/or skin breakdown  2. Assess vascular access sites hourly  3. Every 4-6 hours minimum:  Change oxygen saturation probe site  4.   Every 4-6 hours: If on nasal continuous positive airway pressure, respiratory therapy assess nares and determine need for appliance change or resting period.   Outcome: Progressing     Problem: Pain  Goal: Verbalizes/displays adequate comfort level or baseline comfort level  Outcome: Progressing

## 2022-10-10 NOTE — PROGRESS NOTES
[facility]  Office: [address], DO, Robert Duggan, DO, An Erickson, DO, Ying Randall, DO, Boy Alan MD, Grant Dutta MD, Princess Deyvi MD, Alejandra Campbell MD,  Preston Elkins MD, Martha Rubin MD, Ladell Scheuermann, DO, Billy Rivero MD,  Codie Luna MD, Steven Holden MD, Sonu Hernandez, DO, Wanda Rocha MD, Ingrid Akhtar MD, Arslan Bae MD, Joan Richmond MD, Palak Frazier MD, Juana Hudson MD, Shahbaz Saldana DO, Santana Gallego MD, Maria Isabel De Santiago MD, Rylie Fink, [facility],  Danish Ledesma, CNP, Yumiko Fam, CNP, Loida Mendes, CNP,  Jericho Link, [facility], Fidel Daniels, CNP, Tammy Dc, CNP, Raymond Nieves, CNP, Arleen Alpers, CNP, Angela Hamilton, CNP, Jerene Oppenheim, PA-C, Nat Awan, CNS, Huong Ackerman, [facility], Fred Banerjee, CNP, Simon Cardoso, CNP, Peter Syed, [address] [place] [address]    Progress Note    10/10/2022    9:10 AM    Name:   Jourdan Reich  MRN:     [id number]     Acct:      [de-identified]   Room:   [address] [place] [address] Day:  1  Admit Date:  10/9/2022  9:05 AM    PCP:   Winsome Solorio MD  Code Status:  Full Code    Subjective:     C/C:   Chief Complaint   Patient presents with    Altered Mental Status     Interval History Status: not changed. Patient seen and examined, somewhat lethargic today. Complains of groin pain and lower extremity weakness limited by pain. No new issues, no surgical plan per surgery now    Brief History:     From H&p  Jourdan Reich is a 38 y.o. Non- / non  male who presents with Altered Mental Status   and is admitted to the hospital for the management of Hidradenitis suppurativa.      38 M with known hx of hidradenitis suppurativa with extensive lesions bilateral axillary - [facility] due to extensive lower abdomen wounds concerning for necrotizing fasciitis     The extent of the lesions are from the lower abd down to both thighs just above the knee     There is evidence of excoriation, oozing, and malodor. His pannus also has evidence and his bedsheet is sticking to the wounds and he is visibly in pain     Pt also has extensive psychiatric history and is on lithium + tegretol + cogentin     Humria is listed as op medication but we cannot administer it inpatient    Review of Systems:     Review of Systems   Constitutional:  Positive for activity change and fatigue. Negative for appetite change, fever and unexpected weight change. Respiratory:  Negative for apnea, cough, shortness of breath, wheezing and stridor. Cardiovascular:  Negative for chest pain, palpitations and leg swelling. Gastrointestinal:  Negative for abdominal distention, constipation, diarrhea, nausea and vomiting. Genitourinary:  Positive for frequency. Musculoskeletal:  Positive for joint swelling. Negative for arthralgias, back pain, myalgias and neck pain. Skin:  Positive for rash and wound. Neurological:  Positive for weakness. Negative for dizziness, speech difficulty, light-headedness and numbness. Medications: Allergies:     Allergies   Allergen Reactions    Depakene [Valproic Acid]     Restoril [Temazepam]     Risperidone And Related      Seizure      Thorazine [Chlorpromazine]     Vancomycin Other (See Comments) and Rash       Current Meds:   Scheduled Meds:    cefepime  2,000 mg IntraVENous Q12H    carBAMazepine  200 mg Oral BID    haloperidol  1 mg Oral BID    sodium chloride flush  5-40 mL IntraVENous 2 times per day    enoxaparin  30 mg SubCUTAneous BID     Continuous Infusions:    sodium chloride       PRN Meds: HYDROcodone 5 mg - acetaminophen **OR** HYDROcodone 5 mg - acetaminophen, sodium chloride flush, sodium chloride, potassium chloride **OR** potassium alternative oral replacement **OR** potassium chloride, magnesium sulfate, ondansetron **OR** ondansetron, polyethylene glycol, acetaminophen **OR** acetaminophen    Data:     Past Medical History:   has a past medical history of Bipolar 1 disorder ([facility] GERD (gastroesophageal reflux disease), Hidradenitis suppurativa, Polysubstance abuse ([facility] and Schizoaffective disorder ([facility] Social History:   reports that he has been smoking cigarettes. He has a 22.00 pack-year smoking history. He has never used smokeless tobacco. He reports current alcohol use. He reports current drug use. Family History:   Family History   Problem Relation Age of Onset    Cancer Mother         breast    High Blood Pressure Maternal Grandfather     Mental Illness Paternal Aunt     Substance Abuse Paternal Uncle     Substance Abuse Brother     Alcohol Abuse Father     Cancer Father         pancrease       Vitals:  /83   Pulse 100   Temp 98.9 °F (37.2 °C) (Axillary)   Resp 16   Ht 5' 9\" (1.753 m)   Wt 236 lb 5.3 oz (107.2 kg)   SpO2 98%   BMI 34.90 kg/m²   Temp (24hrs), Av.1 °F (37.3 °C), Min:97.5 °F (36.4 °C), Max:101.1 °F (38.4 °C)    No results for input(s): POCGLU in the last 72 hours. I/O (24Hr):     Intake/Output Summary (Last 24 hours) at 10/10/2022 0910  Last data filed at 10/9/2022 1900  Gross per 24 hour   Intake --   Output 1200 ml   Net -1200 ml       Labs:  Hematology:  Recent Labs     10/09/22  0923 10/09/22  1053 10/10/22  0647   WBC 19.7*  --  22.0*   RBC 4.69  --  4.09*   HGB 13.4  --  11.8*   HCT 42.9  --  37.1*   MCV 91.5  --  90.7   MCH 28.6  --  28.9   MCHC 31.2  --  31.8   RDW 14.7*  --  14.4   *  --  423   MPV 9.4  --  9.2   SEDRATE 52*  --   --    CRP  --  144.4*  --    INR  --   --  1.1     Chemistry:  Recent Labs     10/09/22  0923 10/09/22  1053 10/09/22  2000 10/10/22  0647   NA  --  132*  --  132*   K  --  3.7  --  3.6*   CL  --  100  --  100   CO2  --  23  --  20   GLUCOSE  --  114*  --  138*   BUN  --  18  --  10   CREATININE  --  0.78  --  0.81   MG  --  1.7  --   --    ANIONGAP  --  9  --  12   LABGLOM  --  >60  -- Vascular: No carotid bruit. Cardiovascular:      Rate and Rhythm: Normal rate and regular rhythm. Pulses: Normal pulses. Heart sounds: Normal heart sounds. No murmur heard. No friction rub. No gallop. Pulmonary:      Effort: No respiratory distress. Breath sounds: No stridor. No wheezing or rhonchi. Abdominal:      General: Abdomen is flat. There is no distension. Palpations: Abdomen is soft. There is no mass. Tenderness: There is no abdominal tenderness. There is no rebound. Hernia: No hernia is present. Comments: Multiple wounds groin, bandage in place   Musculoskeletal:      Cervical back: No rigidity or tenderness. Lymphadenopathy:      Cervical: No cervical adenopathy. Skin:     Findings: Erythema, lesion and rash present. Neurological:      General: No focal deficit present. Mental Status: He is disoriented. Assessment:        Hospital Problems             Last Modified POA    * (Principal) Hidradenitis suppurativa 10/9/2022 Yes    Sepsis ([address] 10/10/2022 Yes    Abscess of skin of abdomen 10/9/2022 Yes    Schizoaffective disorder, bipolar type ([address] 10/10/2022 Yes    GERD (gastroesophageal reflux disease) 10/10/2022 Yes       Plan:         Hidradenitis Suppurative -continue antibiotics, infectious disease consulted, general surgery following, no surgical intervention at this point  Schizoaffective disorder-complicated history, reportedly was admitted to [facility] and was taken off all medications. Patient seems somnolent this morning, patient released from prison.   We will have psychiatry consulted for [facility] evaluation  Sepsis  Obesity    Verónica Solano DO  10/10/2022  9:10 AM

## 2022-10-10 NOTE — PROGRESS NOTES
Physical Therapy  Facility/Department: [facility] RENAL//MED SURG  Physical Therapy Initial Assessment    Name: Mague Infante  : 1980  MRN: [id number]  Date of Service: 10/10/2022  Chief Complaint   Patient presents with    Altered Mental Status      Discharge Recommendations:  Patient would benefit from continued therapy after discharge   PT Equipment Recommendations  Equipment Needed: Yes  Mobility Devices: Rupal Carie: Rolling      Patient Diagnosis(es): The encounter diagnosis was Septicemia ([facility] Past Medical History:  has a past medical history of Bipolar 1 disorder ([facility] GERD (gastroesophageal reflux disease), Hidradenitis suppurativa, Polysubstance abuse ([facility] and Schizoaffective disorder ([facility] Past Surgical History:  has a past surgical history that includes Infected skin debridement (2011) and Abscess Drainage (2013). Assessment   Body Structures, Functions, Activity Limitations Requiring Skilled Therapeutic Intervention: Decreased functional mobility ; Decreased strength;Decreased endurance  Assessment: The pt ambulated 6 ft with a RW x min assist. He was unsteady and fatigued quickly with mobilization. He could benefit from a continuation of PT to assist with gait training and strengtheniing  Therapy Prognosis: Good  Decision Making: Medium Complexity  Requires PT Follow-Up: Yes  Activity Tolerance  Activity Tolerance: Patient limited by fatigue;Patient limited by endurance     Plan   Physcial Therapy Plan  General Plan:  (5-6x wk)  Current Treatment Recommendations: Strengthening, Functional mobility training, Transfer training, Endurance training, Stair training, Gait training, Safety education & training  Safety Devices  Type of Devices: Nurse notified, Left in bed, Call light within reach     Restrictions  Position Activity Restriction  Other position/activity restrictions:  Up with assist     Subjective   General  Patient assessed for rehabilitation services?: Yes  Response To Previous Treatment: Not applicable  Follows Commands: Within Functional Limits  Subjective  Subjective: No c/o pain         Social/Functional History  Social/Functional History  Lives With: Family (lives with his mother)  Type of Home: House  Home Layout: Two level  Home Access: Stairs to enter without rails  Entrance Stairs - Number of Steps: 5  Bathroom Shower/Tub: Tub/Shower unit  Bathroom Toilet: Standard  Receives Help From: Family  ADL Assistance: Independent  Homemaking Assistance: Independent  Ambulation Assistance: Independent  Transfer Assistance: Independent  Active : No  Patient's  Info: Pt's mother drives  Occupation: On disability  Vision/Hearing  Vision  Vision: Impaired  Vision Exceptions: Wears glasses at all times  Hearing  Hearing: Within functional limits    Cognition   Orientation  Overall Orientation Status: Within Functional Limits  Cognition  Overall Cognitive Status: WFL     Objective   Heart Rate: 100  Heart Rate Source: Monitor  BP: 115/83  BP Location: Right upper arm  BP Method: Automatic  MAP (Calculated): 93.67  Resp: 16  SpO2: 98 %  O2 Device: Nasal cannula        AROM RLE (degrees)  RLE AROM: WFL  AROM LLE (degrees)  LLE AROM : WFL  AROM RUE (degrees)  RUE AROM : WFL  AROM LUE (degrees)  LUE AROM : WFL  Strength RLE  Strength RLE: WFL  Strength LLE  Strength LLE: WFL  Strength RUE  Strength RUE: WFL  Strength LUE  Strength LUE: WFL        Bed mobility  Supine to Sit: Minimal assistance  Sit to Supine: Minimal assistance  Scooting: Minimal assistance  Transfers  Sit to Stand: Minimal Assistance  Stand to Sit: Minimal Assistance  Ambulation  Surface: Level tile  Device: Rolling Walker  Assistance: Minimal assistance  Gait Deviations: Slow Hoa;Decreased step height  Distance: amb 6 ft with a RW x min assist     Balance  Posture: Good  Sitting - Static: Fair  Sitting - Dynamic: Poor  Standing - Static: Poor  Standing - Dynamic: Poor     OutComes Score     AM-PAC Score  AM-PAC Inpatient Mobility Raw Score : 12 (10/10/22 1138)  AM-PAC Inpatient T-Scale Score : 35.33 (10/10/22 1138)  Mobility Inpatient CMS 0-100% Score: 68.66 (10/10/22 1138)  Mobility Inpatient CMS G-Code Modifier : CL (10/10/22 1138)     Tinneti Score     Goals  Short Term Goals  Time Frame for Short Term Goals: 10 visits  Short Term Goal 1: transfers with SBA  Short Term Goal 2: amb 125 ft with a RW x SBA  Short Term Goal 3: ascend/descend 4 steps with SBA  Short Term Goal 4: 20 min strengthening exercise program x SBA  Patient Goals   Patient Goals : Return home     Education  Patient Education  Education Given To: Patient  Education Provided: Plan of Care;Role of Therapy  Education Method: Verbal  Barriers to Learning: None  Education Outcome: Verbalized understanding      Therapy Time   Individual Concurrent Group Co-treatment   Time In 0920         Time Out 0945         Minutes 25             [address] of [address], PT

## 2022-10-10 NOTE — PROGRESS NOTES
[facility] Wound Ostomy  Nurse  Consult Note       NAME:  [address] [place] [address] Drive RECORD NUMBER:  [id number]  AGE: 38 y.o. GENDER: male  : 1980  TODAY'S DATE:  10/10/2022    Subjective   Reason for [address] Nurse Evaluation and Assessment: Hidradenitis. Patient with chronic hidradenitis to bilateral groin and posterior thighs. No lesions were noted to axilla at this time. Extensive cord-like scarring with sinus tracts to groin with bleeding ulcerations. Mom at bedside. Reported that patient sees [facility], Dr. Jg Bo, and had been getting bi-weekly Humira injections given by Mom. He had not had a flare up while on that therapy; had previously used suppressive antibiotics but not needed with Humira. Mom reports his personal hygiene has been poor with refusal to shower daily; heavy cigarette smoking reported. Patient has been without therapy while jailed, at least 17 days. (\"Missed 3 shots\")  He cannot safely shower at this time due to weakness.         Jourdan Reich is a 38 y.o. male referred by:   [x] Physician  [] Nursing  [] Other:         PAST MEDICAL HISTORY        Diagnosis Date    Bipolar 1 disorder ([facility]     GERD (gastroesophageal reflux disease) 2016    Hidradenitis suppurativa     Polysubstance abuse ([facility]     Schizoaffective disorder ([facility]        PAST SURGICAL HISTORY    Past Surgical History:   Procedure Laterality Date    ABSCESS DRAINAGE  2013    left inguinal hydrodenitis/pilondal cyst    INFECTED SKIN DEBRIDEMENT  2011       FAMILY HISTORY    Family History   Problem Relation Age of Onset    Cancer Mother         breast    High Blood Pressure Maternal Grandfather     Mental Illness Paternal Aunt     Substance Abuse Paternal Uncle     Substance Abuse Brother     Alcohol Abuse Father     Cancer Father         pancrease       SOCIAL HISTORY    Social History     Tobacco Use    Smoking status: Every Day     Packs/day: 1.00     Years: 22.00     Pack years: 22.00     Types: Cigarettes    Smokeless tobacco: Never   Vaping Use    Vaping Use: Never used   Substance Use Topics    Alcohol use: Yes     Comment: occasional    Drug use: Yes     Comment: history of marijuana & Opiate abue, claims that he is currently clean & sober. ALLERGIES    Allergies   Allergen Reactions    Depakene [Valproic Acid]     Restoril [Temazepam]     Risperidone And Related      Seizure      Thorazine [Chlorpromazine]     Vancomycin Other (See Comments) and Rash       MEDICATIONS    No current facility-administered medications on file prior to encounter. Current Outpatient Medications on File Prior to Encounter   Medication Sig Dispense Refill    carBAMazepine (TEGRETOL) 200 MG tablet Take 1 tablet by mouth 2 times daily 90 tablet 3    haloperidol (HALDOL) 1 MG tablet Take 1 tablet by mouth 2 times daily 120 tablet 3    [DISCONTINUED] benztropine (COGENTIN) 1 MG tablet Take 0.5 tablets by mouth in the morning and 0.5 tablets before bedtime. 90 tablet 3    [DISCONTINUED] adalimumab (HUMIRA) 40 MG/0.8ML injection Inject 40 mg into the skin once Patient takes 80 mg every two weeks; due this week      [DISCONTINUED] lithium (ESKALITH) 450 MG extended release tablet Take 450 mg by mouth 2 times daily (Patient not taking: Reported on 8/11/2022)      [DISCONTINUED] Multiple Vitamins-Minerals (THERAPEUTIC MULTIVITAMIN-MINERALS) tablet Take 1 tablet by mouth daily.  30 tablet 0       Objective    /89   Pulse 85   Temp 98.4 °F (36.9 °C) (Axillary)   Resp 16   Ht 5' 9\" (1.753 m)   Wt 236 lb 5.3 oz (107.2 kg)   SpO2 100%   BMI 34.90 kg/m²     LABS:  WBC:    Lab Results   Component Value Date/Time    WBC 22.0 10/10/2022 06:47 AM     H/H:    Lab Results   Component Value Date/Time    HGB 11.8 10/10/2022 06:47 AM    HCT 37.1 10/10/2022 06:47 AM     PTT:    Lab Results   Component Value Date/Time    APTT 29.6 08/10/2022 09:07 PM   [APTT}  PT/INR:    Lab Results   Component Value Date/Time    PROTIME 11.4 10/10/2022 06:47 AM    INR 1.1 10/10/2022 06:47 AM     HgBA1c:    Lab Results   Component Value Date/Time    LABA1C 5.6 03/03/2014 05:50 PM       Assessment   Roosevelt Risk Score: Roosevelt Scale Score: 16    Patient Active Problem List   Diagnosis Code    Hidradenitis suppurativa L73.2    Acute psychosis (HCC) F23    Abscess of axilla L02.419    Abscess of skin of abdomen L02.211    Blood per rectum K62.5    Hidradenitis L73.2    Trochanteric bursitis M70.60    Adrenal mass, 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    Hidradenitis axillaris L73.2    Schizoaffective disorder, bipolar type ([address] F25.0    Encounter for ostomy nurse consultation Z71.89    Paranoid schizophrenia, chronic condition with acute exacerbation ([address] F20.0    Iron deficiency anemia D50.9    GERD (gastroesophageal reflux disease) K21.9    Tobacco abuse Z72.0    Non-healing left groin open wound, subsequent encounter S31.104D    Right groin wound, subsequent encounter S31.109D    Open wound of left ear S01.302A    Abscess L02.91    Sepsis ([address] A41.9    Constipation K59.00    Acute exacerbation of chronic paranoid schizophrenia ([address] F20.0    Schizophrenia ([address] F20.9       Measurements:     10/10/22 1231   Wound 10/10/22 Groin Left;Right chronic hidradinitis scarring and sinus tracts   Date First Assessed: 10/10/22   Present on Hospital Admission: Yes  Primary Wound Type: Soft Tissue Necrosis  Location: Groin  Wound Location Orientation: Left;Right  Wound Description (Comments): chronic hidradinitis scarring and sinus tracts   Wound Image    Dressing Status New dressing applied   Wound Cleansed Soap and water   Dressing/Treatment Alginate with Ag   Dressing Change Due 10/10/22  (daily and prn)   Wound Assessment Bleeding;Pink/red;Epithelialization   Drainage Amount Moderate   Drainage Description Thin;Purulent   Odor Moderate   Blaire-wound Assessment   (scarring)             Response to treatment:  With complaints of pain.      Plan   Plan of Care: BILATERAL AXILLA, GROIN AND POSTERIOR THIGHS:  Provide hygiene daily, showering if possible, using Hibiclens soap. Apply moisturizing lotion to dry skin of arms, legs and trunk. Apply silver hydrofiber (OPTICELL AG) to draining lesions of groin and posterior thighs and secure using stockinet. Change daily with hygiene and prn. Resume injectable biologic agents or suppressive antibiotic therapy at provider's discretion. Specialty Bed Required :    [] Low Air Loss   [x] Pressure Redistribution  [] Fluid Immersion  [] Bariatric  [] Total Pressure Relief  [] Other:     Current Diet: ADULT DIET;  Regular      Discharge Plan:  Placement for patient upon discharge: skilled nursing    Patient appropriate for Outpatient [address]: Yes      Patient/Caregiver Teaching:  Level of patient/caregiver understanding able to:   [] Indicates understanding       [] Needs reinforcement  [] Unsuccessful      [] Verbal Understanding  [] Demonstrated understanding       [] No evidence of learning  [] Refused teaching         [] Holley Rush RN BSN, [employer]

## 2022-10-10 NOTE — PROGRESS NOTES
Occupational Therapy  Facility/Department: [facility] RENAL//MED SURG  Occupational Therapy Initial Assessment    Name: Nancie Hutchins  : 1980  MRN: [id number]  Date of Service: 10/10/2022    Discharge Recommendations:   Further therapy recommended at discharge. OT Equipment Recommendations  Equipment Needed: Yes  Mobility Devices: ADL Assistive Devices  ADL Assistive Devices: Shower Chair with back       Patient Diagnosis(es): The encounter diagnosis was Septicemia ([facility] [address] Past Medical History:  has a past medical history of Bipolar 1 disorder ([facility] [address] GERD (gastroesophageal reflux disease), Hidradenitis suppurativa, Polysubstance abuse ([facility] [address] and Schizoaffective disorder ([facility] [address] Past Surgical History:  has a past surgical history that includes Infected skin debridement (2011) and Abscess Drainage (2013). Treatment Diagnosis: hidradenitis suppurativa      Assessment   Performance deficits / Impairments: Decreased functional mobility ; Decreased ADL status; Decreased endurance;Decreased high-level IADLs;Decreased strength;Decreased balance;Decreased cognition  Assessment: pt would benefit from further therapy at discharge before returning to prior living arrangement d/t requiring increased physical assistance for ADLS and functional transfers/functional mobility.   Treatment Diagnosis: hidradenitis suppurativa  Prognosis: Good  Decision Making: Medium Complexity  REQUIRES OT FOLLOW-UP: Yes  Activity Tolerance  Activity Tolerance: Patient Tolerated treatment well;Patient limited by fatigue;Patient limited by pain        Plan   Occupational Therapy Plan  Times Per Week: 3-4x/wk     Restrictions  Restrictions/Precautions  Restrictions/Precautions: Fall Risk  Required Braces or Orthoses?: No  Position Activity Restriction  Other position/activity restrictions: up with assist    Subjective   General  Patient assessed for rehabilitation services?: Yes  Family / Caregiver Present: No  Diagnosis: hidradenitits suppurativa  General Comment  Comments: RN ok'd for therapy this morning. pt agreeable to participate in session and cooperative throughout. pt reported \"a lot\" of pain at [employer], pt able to continue with session     Social/Functional History  Social/Functional History  Lives With: Family (mom)  Type of Home: House  Home Layout: Two level, Bed/Bath upstairs  Home Access: Stairs to enter without rails  Entrance Stairs - Number of Steps: 5  Bathroom Shower/Tub: Tub/Shower unit  Bathroom Toilet: Standard  Home Equipment:  (pt reported no use of DME atb aseline)  Receives Help From: Family  ADL Assistance: Independent  Homemaking Assistance: Independent  Homemaking Responsibilities: Yes  Meal Prep Responsibility: Primary  Laundry Responsibility: Primary  Cleaning Responsibility: Primary  Ambulation Assistance: Independent  Transfer Assistance: Independent  Active : No  Patient's  Info: mom drives  Occupation: On disability       Objective                Safety Devices  Type of Devices: Left in bed;Call light within reach;Gait belt;Patient at risk for falls;Nurse notified; Bed alarm in place  Restraints  Restraints Initially in Place: No    Bed Mobility Training  Bed Mobility Training: Yes  Overall Level of Assistance: Minimum assistance;Assist X2  Supine to Sit: Minimum assistance;Assist X2  Sit to Supine: Moderate assistance (for B LE progression)  Balance  Sitting: With support (~10 minutes on eOB with SBA with B UE support on bed. pt unable to maintain balance without at least 1 UE support on bed)  Standing: With support (~2 minutes.  pt completed static standing at bedside for sheet management and for writer to wipe blood off B LE. pt reqiured min A to maintain balance with RW)  Transfer Training  Transfer Training: Yes  Overall Level of Assistance: Minimum assistance;Assist X2 (hand held assist)  Sit to Stand: Minimum assistance;Assist X2  Stand to Sit: Minimum assistance;Assist X2  Gait  Overall Level of Assistance: Contact-guard assistance (pt able to take side steps toward [facility] with CGA and RW and increased time)       AROM: Within functional limits  Strength: Generally decreased, functional (B shoulder and  4/5)  Coordination: Within functional limits  Tone: Normal  Sensation: Intact    ADL  Feeding: Modified independent   Grooming: Supervision  UE Bathing: Minimal assistance  LE Bathing: Moderate assistance  UE Dressing: Minimal assistance  LE Dressing: Moderate assistance  Toileting: Moderate assistance  Additional Comments: OT facilitated pt in donning B socks and changing gown while sitting on EOB. pt required max A for donning B socks d/t decreased dynamic sitting balance. Pt also required min A for donning gown d/t inability to maintain balance without 1 arm support on bed. Vision  Vision: Impaired  Vision Exceptions: Wears glasses at all times  Hearing  Hearing: Within functional limits    Cognition  Overall Cognitive Status: Exceptions  Following Commands: Follows multistep commands with repitition; Follows multistep commands with increased time  Attention Span: Attends with cues to redirect  Initiation: Requires cues for some  Sequencing: Requires cues for some  Orientation  Overall Orientation Status: Within Functional Limits                    Education Given To: Patient  Education Provided: Plan of Care;Role of Therapy;Transfer Training  Education Method: Verbal  Barriers to Learning: None  Education Outcome: Verbalized understanding    LUE AROM (degrees)  LUE AROM : WFL  Left Hand AROM (degrees)  Left Hand AROM: WFL  RUE AROM (degrees)  RUE AROM : WFL  Right Hand AROM (degrees)  Right Hand AROM: [facility]                                         AM-PAC Score        AM-PAC Inpatient Daily Activity Raw Score: 16 (10/10/22 1209)  AM-PAC Inpatient ADL T-Scale Score : 35.96 (10/10/22 1209)  ADL Inpatient CMS 0-100% Score: 53.32 (10/10/22 1209)  ADL Inpatient CMS G-Code Modifier : CK (10/10/22 0720)           Goals  Short Term Goals  Time Frame for Short Term Goals: pt will, by discharge  Short Term Goal 1: complete LB ADLs and toileting tasks with min A, set up and Ae, as needed  Short Term Goal 2: complete UB ADLs with SBA and set up  Short Term Goal 3: dem CGA during functional transfers/functional mobility with LRd, as needed  Short Term Goal 4: dem ~15 minutes dynamic sitting balance with SBA on EOB in order to complete functional tasks  Short Term Goal 5: increase activity tolerance to 20+ minutes in order to participate in daily tasks       Therapy Time   Individual Concurrent Group Co-treatment   [facility]         Time Out 0947         Minutes 22      Co-eval with PT    Timed Code Treatment Minutes: Cindy 0615, OTR/L

## 2022-10-10 NOTE — CONSULTS
[facility] -   Infectious diseases evaluation  admission date 10/9/2022    reason for consultation:   Hidradenitis suppurativa     Impression :   Current:  Gram positive bacteremia  Hx of hidradenitis suppurativa - followed with [facility] derm clinic, was receiving biweekly humira injections   Patient has not received Humira injections for last 17 days while in retirement   immunosuppressed  Recent R buttock abscess vs. hidradenitis on 8/2022   Requested superficial abscess drain by IR but they decided it was loculated, would need drainage by GS  GS deferred to plastic, as lesions were very superficial   Dr. Fifi chamberlain plastic planned for wound care outpatient  DC on augmentin  Altered mental status- new  CT Head wo hemorrhage  Mother unsure if pt has been getting psychotropic meds in retirement   Multiple foci of painful hidradenitis on R thigh, groin area, scrotum   Purulent bloody drainage  CT correlates with hidradenitis hx  Wound care on board  Cellulitis on b/l thighs  Sepsis  SIRS: 3 on presentation + source  1 BCx with gram positive cocci in clusters likely contamination  2nd BCx pending  Bandemia   Elevated CRP - 144    Other:  Incarcerated - last 17 days   Current smoker   Schizophrenia  Bipolar disorder    Discussion / summary of stay / plan of care     Recommendations   Continue Vanc and meropenem   Infection from multiple sites of Hidradenitis suppurativa  GS -might need excision    Infection Control Recommendations   [place] Precautions  Contact Isolation     Antimicrobial Stewardship Recommendations   Simplification of therapy  Targeted therapy    History of Present Illness:   Initial history:  Kayode Ashley is a 38y.o.-year-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   The wounds are severe, painful, and draining bloody red, purulent drainage, likely hidradenitis suppurativa. Patient has been following with Dr. Kelsey Paez at [facility] dermatology clinic and had been getting bi weekly Humira injections. The patient has not been getting biweekly Humira injections while in [facility] for the last 17 days- his mother reported that he missed 3 shots. This patient has an extensive psych history - mother unsure is patient was getting psychotropic medications while in [facility]. He currently follows with [facility]              Interval changes  10/10/2022   Patient Vitals for the past 8 hrs:   BP Temp Temp src Pulse Resp SpO2   10/10/22 1236 -- -- -- -- 15 --   10/10/22 1223 131/89 98.4 °F (36.9 °C) Axillary 85 16 100 %   10/10/22 1050 -- -- -- -- -- 98 %   10/10/22 0847 115/83 98.9 °F (37.2 °C) Axillary 100 16 98 %   10/10/22 0733 -- -- -- -- 16 --   10/10/22 0703 -- -- -- -- 16 --   10/10/22 0649 -- 99.1 °F (37.3 °C) Oral -- -- --       Summary of relevant labs:  Labs:  WBC - 19.7-22    Lactic acid, whole blood 3.6 -0.9  Cr 0.78  Micro:  BC x1 - GPC in clusters, likely contaminatin  Bcx 2- pending  Urine cx- neg  UA - small leukocyte esterase  Imaging:  10/9 Ct Head 1. No hemorrhage. 2. Insert of uncertain chronicity in the right frontal lobe. Consider further evaluation with MRI. 10/9 - CT Pelvis Slightly increased chronic appearing infectious/inflammatory cutaneous/subcutaneous process of the anterior pelvis and gluteal region. Recommend correlation with history of hidradenitis suppurativa. 10/9 CT Ab and Pelvis 1. Skin thickening of the low anterior abdominal wall, anterior pelvis and visualized gluteal region. Note, the perineum is not visualized on this study. Patient could return for further imaging if clinically indicated. 2. Large volume stool in the rectum. I have personally reviewed the past medical history, past surgical history, medications, social history, and family history, and I haveupdated the database accordingly.       Allergies:   Depakene [valproic acid], Restoril [temazepam], Risperidone and related, Thorazine [chlorpromazine], and Vancomycin     Review of Systems:     Review of Systems   Constitutional:  Positive for chills. Negative for diaphoresis and fatigue. HENT:  Negative for congestion and sinus pressure. Eyes:  Negative for visual disturbance. Respiratory:  Positive for cough (dry cough) and shortness of breath. Cardiovascular:  Negative for chest pain. Gastrointestinal:  Negative for abdominal distention. Endocrine: Negative for cold intolerance and heat intolerance. Genitourinary:  Negative for difficulty urinating. Musculoskeletal:  Negative for arthralgias. Skin:  Positive for wound (groin with purulent bloody drainage). Allergic/Immunologic: Negative for immunocompromised state. Neurological:  Negative for headaches. Hematological:  Negative for adenopathy. Psychiatric/Behavioral:  Negative for agitation. Physical Examination :       Physical Exam  Constitutional:       General: He is not in acute distress. Appearance: Normal appearance. HENT:      Head: Normocephalic and atraumatic. Right Ear: External ear normal.      Nose: Nose normal. No congestion. Mouth/Throat:      Mouth: Mucous membranes are moist.      Pharynx: Oropharynx is clear. No oropharyngeal exudate. Eyes:      General: No scleral icterus. Extraocular Movements: Extraocular movements intact. Pupils: Pupils are equal, round, and reactive to light. Cardiovascular:      Rate and Rhythm: Normal rate and regular rhythm. Pulses: Normal pulses. Heart sounds: No murmur heard. No gallop. Pulmonary:      Effort: Pulmonary effort is normal. No respiratory distress. Breath sounds: No wheezing. Abdominal:      General: Bowel sounds are normal. There is no distension. Tenderness: There is no abdominal tenderness.    Genitourinary:     Comments: Kowalski with julius urine  Musculoskeletal: General: No swelling, tenderness or deformity. Cervical back: No rigidity. Lymphadenopathy:      Cervical: No cervical adenopathy. Skin:     General: Skin is warm. Capillary Refill: Capillary refill takes less than 2 seconds. Coloration: Skin is not jaundiced. Findings: Rash (multiple foci of hydratinitis) present. No bruising. Neurological:      General: No focal deficit present. Mental Status: He is alert and oriented to person, place, and time. Cranial Nerves: No cranial nerve deficit. Motor: No weakness.    Psychiatric:         Mood and Affect: Mood normal.         Behavior: Behavior normal.       Past Medical History:     Past Medical History:   Diagnosis Date    Bipolar 1 disorder ([facility] [address]     GERD (gastroesophageal reflux disease) 8/13/2016    Hidradenitis suppurativa     [facility]     Schizoaffective disorder ([facility])        Past Surgical  History:     Past Surgical History:   Procedure Laterality Date    ABSCESS DRAINAGE  11/1/2013    left inguinal hydrodenitis/pilondal cyst    INFECTED SKIN DEBRIDEMENT  july 2011       Medications:      cefepime  2,000 mg IntraVENous Q12H    carBAMazepine  200 mg Oral BID    haloperidol  1 mg Oral BID    sodium chloride flush  5-40 mL IntraVENous 2 times per day    enoxaparin  30 mg SubCUTAneous BID       Social History:     Social History     Socioeconomic History    Marital status: Single     Spouse name: Not on file    Number of children: Not on file    Years of education: Not on file    Highest education level: Not on file   Occupational History    Not on file   Tobacco Use    Smoking status: Every Day     Packs/day: 1.00     Years: 22.00     Pack years: 22.00     Types: Cigarettes    Smokeless tobacco: Never   Vaping Use    Vaping Use: Never used   Substance and Sexual Activity    Alcohol use: Yes     Comment: occasional    Drug use: Yes     Comment: history of marijuana & Opiate abue, claims that he is currently clean & sober. Sexual activity: Not on file   Other Topics Concern    Not on file   Social History Narrative    Not on file     Social Determinants of Health     Financial Resource Strain: Not on file   Food Insecurity: Not on file   Transportation Needs: Not on file   Physical Activity: Not on file   Stress: Not on file   Social Connections: Not on file   Intimate Partner Violence: Not on file   Housing Stability: Not on file       Family History:     Family History   Problem Relation Age of Onset    Cancer Mother         breast    High Blood Pressure Maternal Grandfather     Mental Illness Paternal Aunt     Substance Abuse Paternal Uncle     Substance Abuse Brother     Alcohol Abuse Father     Cancer Father         pancrease      Medical Decision Making:   I have independently reviewed/ordered the following labs:    CBC with Differential:   Recent Labs     10/09/22  0923 10/10/22  0647   WBC 19.7* 22.0*   HGB 13.4 11.8*   HCT 42.9 37.1*   * 423   LYMPHOPCT 14*  --    MONOPCT 7  --      BMP:  Recent Labs     10/09/22  1053 10/10/22  0647   * 132*   K 3.7 3.6*    100   CO2 23 20   BUN 18 10   CREATININE 0.78 0.81   MG 1.7  --      Hepatic Function Panel: No results for input(s): PROT, LABALBU, BILIDIR, IBILI, BILITOT, ALKPHOS, ALT, AST in the last 72 hours. No results for input(s): RPR in the last 72 hours. No results for input(s): HIV in the last 72 hours. No results for input(s): BC in the last 72 hours. Lab Results   Component Value Date/Time    CREATININE 0.81 10/10/2022 06:47 AM    GLUCOSE 138 10/10/2022 06:47 AM    GLUCOSE 89 02/09/2012 06:19 AM       Detailed results: Thank you for allowing us to participate in the care of this patient. Please call with questions.     This note is created with the assistance of a speech recognition program.  While intending to generate adocument that actually reflects the content of the visit, the document can still have some errors including those of syntax and sound a like substitutions which may escape proof reading. It such instances, actual meaningcan be extrapolated by contextual diversion. Office: [phone]  Perfect serve / office [phone]    Abdulaziz Ascencio, OMJANIE        I have discussed the care of the patient, including pertinent history and exam findings,  with the resident. I have seen and examined the patient and the key elements of all parts of the encounter have been performed by me. I agree with the assessment, plan and orders as documented by the resident.     Kathy Rivera, Infectious Diseases

## 2022-10-11 LAB
ALBUMIN SERPL-MCNC: 2 G/DL (ref 3.5–5.2)
ANION GAP SERPL CALCULATED.3IONS-SCNC: 10 MMOL/L (ref 9–17)
BUN BLDV-MCNC: 8 MG/DL (ref 6–20)
CALCIUM SERPL-MCNC: 8.7 MG/DL (ref 8.6–10.4)
CHLORIDE BLD-SCNC: 100 MMOL/L (ref 98–107)
CO2: 22 MMOL/L (ref 20–31)
CREAT SERPL-MCNC: 0.78 MG/DL (ref 0.7–1.2)
CULTURE: ABNORMAL
EKG ATRIAL RATE: 116 BPM
EKG P AXIS: 81 DEGREES
EKG P-R INTERVAL: 150 MS
EKG Q-T INTERVAL: 316 MS
EKG QRS DURATION: 84 MS
EKG QTC CALCULATION (BAZETT): 439 MS
EKG R AXIS: 20 DEGREES
EKG T AXIS: 5 DEGREES
EKG VENTRICULAR RATE: 116 BPM
GFR SERPL CREATININE-BSD FRML MDRD: >60 ML/MIN/1.73M2
GLUCOSE BLD-MCNC: 98 MG/DL (ref 70–99)
HCT VFR BLD CALC: 33.1 % (ref 40.7–50.3)
HEMOGLOBIN: 10.8 G/DL (ref 13–17)
Lab: ABNORMAL
MAGNESIUM: 1.7 MG/DL (ref 1.6–2.6)
MCH RBC QN AUTO: 29.1 PG (ref 25.2–33.5)
MCHC RBC AUTO-ENTMCNC: 32.6 G/DL (ref 28.4–34.8)
MCV RBC AUTO: 89.2 FL (ref 82.6–102.9)
NRBC AUTOMATED: 0 PER 100 WBC
PDW BLD-RTO: 14.3 % (ref 11.8–14.4)
PHOSPHORUS: 2.8 MG/DL (ref 2.5–4.5)
PLATELET # BLD: 391 K/UL (ref 138–453)
PMV BLD AUTO: 8.9 FL (ref 8.1–13.5)
POTASSIUM SERPL-SCNC: 3.6 MMOL/L (ref 3.7–5.3)
RBC # BLD: 3.71 M/UL (ref 4.21–5.77)
SODIUM BLD-SCNC: 132 MMOL/L (ref 135–144)
SPECIMEN DESCRIPTION: ABNORMAL
WBC # BLD: 22.5 K/UL (ref 3.5–11.3)

## 2022-10-11 PROCEDURE — 6360000002 HC RX W HCPCS: Performed by: CLINICAL NURSE SPECIALIST

## 2022-10-11 PROCEDURE — 6360000002 HC RX W HCPCS: Performed by: NURSE PRACTITIONER

## 2022-10-11 PROCEDURE — 2580000003 HC RX 258: Performed by: CLINICAL NURSE SPECIALIST

## 2022-10-11 PROCEDURE — 99232 SBSQ HOSP IP/OBS MODERATE 35: CPT | Performed by: STUDENT IN AN ORGANIZED HEALTH CARE EDUCATION/TRAINING PROGRAM

## 2022-10-11 PROCEDURE — 2500000003 HC RX 250 WO HCPCS: Performed by: CLINICAL NURSE SPECIALIST

## 2022-10-11 PROCEDURE — 2580000003 HC RX 258: Performed by: STUDENT IN AN ORGANIZED HEALTH CARE EDUCATION/TRAINING PROGRAM

## 2022-10-11 PROCEDURE — 36415 COLL VENOUS BLD VENIPUNCTURE: CPT

## 2022-10-11 PROCEDURE — 6370000000 HC RX 637 (ALT 250 FOR IP): Performed by: CLINICAL NURSE SPECIALIST

## 2022-10-11 PROCEDURE — 1200000000 HC SEMI PRIVATE

## 2022-10-11 PROCEDURE — 85027 COMPLETE CBC AUTOMATED: CPT

## 2022-10-11 PROCEDURE — 80069 RENAL FUNCTION PANEL: CPT

## 2022-10-11 PROCEDURE — 6370000000 HC RX 637 (ALT 250 FOR IP): Performed by: NURSE PRACTITIONER

## 2022-10-11 PROCEDURE — 2580000003 HC RX 258: Performed by: NURSE PRACTITIONER

## 2022-10-11 PROCEDURE — 99232 SBSQ HOSP IP/OBS MODERATE 35: CPT | Performed by: INTERNAL MEDICINE

## 2022-10-11 PROCEDURE — 83735 ASSAY OF MAGNESIUM: CPT

## 2022-10-11 PROCEDURE — 94761 N-INVAS EAR/PLS OXIMETRY MLT: CPT

## 2022-10-11 PROCEDURE — 93010 ELECTROCARDIOGRAM REPORT: CPT | Performed by: INTERNAL MEDICINE

## 2022-10-11 RX ORDER — SODIUM CHLORIDE 9 MG/ML
INJECTION, SOLUTION INTRAVENOUS CONTINUOUS
Status: DISCONTINUED | OUTPATIENT
Start: 2022-10-11 | End: 2022-10-14

## 2022-10-11 RX ADMIN — SODIUM CHLORIDE: 9 INJECTION, SOLUTION INTRAVENOUS at 10:52

## 2022-10-11 RX ADMIN — CARBAMAZEPINE 200 MG: 200 TABLET ORAL at 09:49

## 2022-10-11 RX ADMIN — WATER 2000 MG: 1 INJECTION INTRAMUSCULAR; INTRAVENOUS; SUBCUTANEOUS at 12:24

## 2022-10-11 RX ADMIN — ENOXAPARIN SODIUM 30 MG: 100 INJECTION SUBCUTANEOUS at 20:15

## 2022-10-11 RX ADMIN — HYDROCODONE BITARTRATE AND ACETAMINOPHEN 2 TABLET: 5; 325 TABLET ORAL at 16:35

## 2022-10-11 RX ADMIN — CARBAMAZEPINE 200 MG: 200 TABLET ORAL at 20:14

## 2022-10-11 RX ADMIN — WATER 2000 MG: 1 INJECTION INTRAMUSCULAR; INTRAVENOUS; SUBCUTANEOUS at 01:02

## 2022-10-11 RX ADMIN — HALOPERIDOL 1 MG: 1 TABLET ORAL at 20:15

## 2022-10-11 RX ADMIN — ENOXAPARIN SODIUM 30 MG: 100 INJECTION SUBCUTANEOUS at 09:49

## 2022-10-11 RX ADMIN — HYDROCODONE BITARTRATE AND ACETAMINOPHEN 2 TABLET: 5; 325 TABLET ORAL at 09:49

## 2022-10-11 RX ADMIN — SODIUM CHLORIDE, PRESERVATIVE FREE 10 ML: 5 INJECTION INTRAVENOUS at 09:50

## 2022-10-11 RX ADMIN — HALOPERIDOL 1 MG: 1 TABLET ORAL at 09:49

## 2022-10-11 RX ADMIN — ONDANSETRON 4 MG: 4 TABLET, ORALLY DISINTEGRATING ORAL at 09:49

## 2022-10-11 ASSESSMENT — PAIN DESCRIPTION - ORIENTATION: ORIENTATION: RIGHT;LEFT

## 2022-10-11 ASSESSMENT — PAIN SCALES - GENERAL: PAINLEVEL_OUTOF10: 7

## 2022-10-11 ASSESSMENT — ENCOUNTER SYMPTOMS
CHEST TIGHTNESS: 0
ABDOMINAL DISTENTION: 0
COLOR CHANGE: 0

## 2022-10-11 ASSESSMENT — PAIN DESCRIPTION - LOCATION: LOCATION: LEG;GROIN

## 2022-10-11 NOTE — PROGRESS NOTES
Physician Progress Note      Martín Lagos  CSN #:                  [id number]  :                       1980  ADMIT DATE:       10/9/2022 9:05 AM  DISCH DATE:  RESPONDING  PROVIDER #:        Charity Lea MD          QUERY TEXT:    Pt admitted with hidradenitis suppurativa/ Sepsis and Hx Schizophrenia with   AMS. If possible, please clarify if you are evaluating and / or treating any   of the following: The medical record reflects the following:  Risk Factors: Sepsis,Bipolar/Schizophrenia  Clinical Indicators:  presents from residential  with AMS. had been 'acting   somewhat differently' and intermittently confused- known hx of hidradenitis   suppurativa with extensive lesions bilateral axillary. Celestia Celso Celestia Celso Septic= wbc 19 Lac   acid 3.6 Sed rate 52 Na 132 . Hx Extensive Bipolar-Paranoid   Schizophrenia with Psych consult this admission. Treatment: IV Abx, ID/Surgery/Psych consults  Options provided:  -- Metabolic encephalopathy  -- Septic encephalopathy  -- Toxic encephalopathy  -- Toxic metabolic encephalopathy  -- Delirium due to  -- Delirium  -- Other - I will add my own diagnosis  -- Disagree - Not applicable / Not valid  -- Disagree - Clinically unable to determine / Unknown  -- Refer to Clinical Documentation Reviewer    PROVIDER RESPONSE TEXT:    This patient has metabolic encephalopathy.     Query created by: Sarah Sanchez on 10/11/2022 12:56 PM      Electronically signed by:  Charity Lea MD 10/11/2022 1:11 PM

## 2022-10-11 NOTE — PLAN OF CARE
Problem: Discharge Planning  Goal: Discharge to home or other facility with appropriate resources  10/11/2022 0603 by Rubia Clifford RN  Outcome: Progressing  10/10/2022 1610 by Power Nath RN  Outcome: Progressing  Flowsheets (Taken 10/10/2022 0800)  Discharge to home or other facility with appropriate resources: Identify barriers to discharge with patient and caregiver     Problem: Safety - Adult  Goal: Free from fall injury  10/11/2022 0603 by Rubia Clifford RN  Outcome: Progressing  10/10/2022 1610 by Power Nath RN  Outcome: Progressing  Flowsheets (Taken 10/10/2022 0744)  Free From Fall Injury: Instruct family/caregiver on patient safety     Problem: Confusion  Goal: Confusion, delirium, dementia, or psychosis is improved or at baseline  Description: INTERVENTIONS:  1. Assess for possible contributors to thought disturbance, including medications, impaired vision or hearing, underlying metabolic abnormalities, dehydration, psychiatric diagnoses, and notify attending LIP  2. [place] high risk fall precautions, as indicated  3. Provide frequent short contacts to provide reality reorientation, refocusing and direction  4. Decrease environmental stimuli, including noise as appropriate  5. Monitor and intervene to maintain adequate nutrition, hydration, elimination, sleep and activity  6. If unable to ensure safety without constant attention obtain sitter and review sitter guidelines with assigned personnel  7.  Initiate Psychosocial CNS and Spiritual Care consult, as indicated  10/11/2022 0603 by Rubia Clifford RN  Outcome: Progressing  10/10/2022 1610 by Power Nath RN  Outcome: Progressing  Flowsheets (Taken 10/10/2022 0800)  Effect of thought disturbance (confusion, delirium, dementia, or psychosis) are managed with adequate functional status: Assess for contributors to thought disturbance, including medications, impaired vision or hearing, underlying metabolic abnormalities, dehydration, psychiatric diagnoses, notify LIP     Problem: Skin/Tissue Integrity  Goal: Absence of new skin breakdown  Description: 1. Monitor for areas of redness and/or skin breakdown  2. Assess vascular access sites hourly  3. Every 4-6 hours minimum:  Change oxygen saturation probe site  4. Every 4-6 hours:  If on nasal continuous positive airway pressure, respiratory therapy assess nares and determine need for appliance change or resting period.   10/11/2022 0603 by Geam Blake RN  Outcome: Progressing  10/10/2022 1610 by Felisha Mccormick RN  Outcome: Progressing     Problem: Pain  Goal: Verbalizes/displays adequate comfort level or baseline comfort level  10/11/2022 0603 by Gema Blake RN  Outcome: Progressing  10/10/2022 1610 by Felisha Mccormick RN  Outcome: Progressing

## 2022-10-11 NOTE — PROGRESS NOTES
[facility]  Office: [address], DO, Reina Sanchez, DO, Ana Mtory Mark, DO, Gillian Randall, DO, Abhinav Barrera MD, Mateo Simons MD, William Singh MD, Joe Harden MD,  Lissett Kan MD, Sofi Boothe MD, Jose Martin DO, Valeria Beavers MD,  Mervin Bravo MD, Vladimir Morocho MD, Melvin Ibanez DO, Mickey Wood MD, Daylin Oneal MD, Natalya Son MD, Kaylan Bravo MD, Anais Mcgraw MD, Leonard Griffin MD, Diana Wu DO, Gilda Mathias MD, Debbie Gtz MD, Pacheco Driscoll CNP,  Eileen Kunz, CNP, Joya Campa, CNP, Ambrose Disla, CNP,  Mariann Rodgers, [facility], Travis Gilmore, CNP, Mariah Martínez, CNP, Nuha Carr, CNP, Rachael Bey, CNP, Eileen Mehta, CNP, JAMIN NovoaC, Eusebia Wiley, CNS, Elie Chávez, [facility], Bernardino Reyes, CNP, Tima Arana, [facility], Rubi Prom, [facility]    Progress Note    10/11/2022    4:58 PM    Name:   Kayode Ashley  MRN:     [id number]     Acct:      [de-identified]   Room:   [address] [place] [address] Day:  2  Admit Date:  10/9/2022  9:05 AM    PCP:   Abby Vega MD  Code Status:  Full Code    Subjective:     C/C:   Chief Complaint   Patient presents with    Altered Mental Status     Interval History Status: not changed. Patient does not have any acute complaints  No chest pain or shortness of breath  Has some pain around the skin wounds  Appetite is good, no nausea or vomiting  Patient states that he wants to go to nursing home    Brief History:     64-year-old female with known medical history of hidradenitis suppurativa with extensive lesions bilateral axilla presented to the hospital from [facility] due to extensive lower abdominal wounds. Initial concern for necrotizing fasciitis. Patient has extensive psych history and on antipsychotic medications. Patient was getting Humira but was stopped as he went to the [facility].     Review of Systems:     Constitutional:  negative for chills, fevers, sweats  Respiratory:  negative for cough, dyspnea on exertion, shortness of breath, wheezing  Cardiovascular:  negative for chest pain, chest pressure/discomfort, lower extremity edema, palpitations  Gastrointestinal:  negative for abdominal pain, constipation, diarrhea, nausea, vomiting  Neurological:  negative for dizziness, headache  Multiple wounds in the groin  Old scar marks on axilla  Medications: Allergies: Allergies   Allergen Reactions    Depakene [Valproic Acid]     Restoril [Temazepam]     Risperidone And Related      Seizure      Thorazine [Chlorpromazine]     Vancomycin Other (See Comments) and Rash       Current Meds:   Scheduled Meds:    cefepime  2,000 mg IntraVENous Q12H    carBAMazepine  200 mg Oral BID    haloperidol  1 mg Oral BID    sodium chloride flush  5-40 mL IntraVENous 2 times per day    enoxaparin  30 mg SubCUTAneous BID     Continuous Infusions:    sodium chloride 75 mL/hr at 10/11/22 1052    sodium chloride       PRN Meds: HYDROcodone 5 mg - acetaminophen **OR** HYDROcodone 5 mg - acetaminophen, mineral oil-hydrophilic petrolatum, sodium chloride flush, sodium chloride, potassium chloride **OR** potassium alternative oral replacement **OR** potassium chloride, magnesium sulfate, ondansetron **OR** ondansetron, polyethylene glycol, acetaminophen **OR** acetaminophen    Data:     Past Medical History:   has a past medical history of Bipolar 1 disorder ([facility] GERD (gastroesophageal reflux disease), Hidradenitis suppurativa, Polysubstance abuse ([facility] and [facility]ive disorder ([facility] Social History:   reports that he has been smoking cigarettes. He has a 22.00 pack-year smoking history. He has never used smokeless tobacco. He reports current alcohol use. He reports current drug use.      Family History:   Family History   Problem Relation Age of Onset    Cancer Mother         breast    High Blood Pressure Maternal Grandfather     Mental Illness Paternal Aunt     Substance Abuse Paternal Uncle     Substance Abuse Brother     Alcohol Abuse Father     Cancer Father         pancrease       Vitals:  /77   Pulse 96   Temp 99.3 °F (37.4 °C) (Oral)   Resp 16   Ht 5' 9\" (1.753 m)   Wt 236 lb (107 kg)   SpO2 96%   BMI 34.85 kg/m²   Temp (24hrs), Av.2 °F (37.3 °C), Min:98.5 °F (36.9 °C), Max:99.9 °F (37.7 °C)    No results for input(s): POCGLU in the last 72 hours. I/O (24Hr):     Intake/Output Summary (Last 24 hours) at 10/11/2022 1658  Last data filed at 10/10/2022 1824  Gross per 24 hour   Intake --   Output 500 ml   Net -500 ml       Labs:  Hematology:  Recent Labs     10/09/22  0923 10/09/22  1053 10/10/22  0647 10/11/22  0533   WBC 19.7*  --  22.0* 22.5*   RBC 4.69  --  4.09* 3.71*   HGB 13.4  --  11.8* 10.8*   HCT 42.9  --  37.1* 33.1*   MCV 91.5  --  90.7 89.2   MCH 28.6  --  28.9 29.1   MCHC 31.2  --  31.8 32.6   RDW 14.7*  --  14.4 14.3   *  --  423 391   MPV 9.4  --  9.2 8.9   SEDRATE 52*  --   --   --    CRP  --  144.4*  --   --    INR  --   --  1.1  --      Chemistry:  Recent Labs     10/09/22  0923 10/09/22  1053 10/09/22  2000 10/10/22  0647 10/11/22  0533   NA  --  132*  --  132* 132*   K  --  3.7  --  3.6* 3.6*   CL  --  100  --  100 100   CO2  --  23  --  20 22   GLUCOSE  --  114*  --  138* 98   BUN  --  18  --  10 8   CREATININE  --  0.78  --  0.81 0.78   MG  --  1.7  --   --  1.7   ANIONGAP  --  9  --  12 10   LABGLOM  --  >60  --  >60 >60   CALCIUM  --  8.5*  --  8.8 8.7   PHOS  --   --   --   --  2.8   LACTACIDWB 3.6*  --  0.9 1.8  --      Recent Labs     10/11/22  0533   LABALBU 2.0*     ABG:No results found for: POCPH, PHART, PH, POCPCO2, BWJ8DHT, PCO2, POCPO2, PO2ART, PO2, POCHCO3, FXR7KNV, HCO3, NBEA, PBEA, BEART, BE, THGBART, THB, VOP7ZAW, CBRZ1WAC, B9EUHPXW, O2SAT, FIO2  Lab Results   Component Value Date/Time    SPECIAL RT HAND 10ML 10/09/2022 09:18 AM    SPECIAL LT AC 20ML 10/09/2022 09:18 AM     Lab Results   Component Value Date/Time    CULTURE NO GROWTH 10/09/2022 09:54 AM       Radiology:  CT HEAD WO CONTRAST    Result Date: 10/9/2022  1. No hemorrhage. 2. Insert of uncertain chronicity in the right frontal lobe. Consider further evaluation with MRI. CT PELVIS WO CONTRAST Additional Contrast? None    Result Date: 10/9/2022  Slightly increased chronic appearing infectious/inflammatory cutaneous/subcutaneous process of the anterior pelvis and gluteal region. Recommend correlation with history of hidradenitis suppurativa. CT ABDOMEN PELVIS W IV CONTRAST Additional Contrast? None    Result Date: 10/9/2022  1. Skin thickening of the low anterior abdominal wall, anterior pelvis and visualized gluteal region. Note, the perineum is not visualized on this study. Patient could return for further imaging if clinically indicated. 2. Large volume stool in the rectum.        Physical Examination:        General appearance:  alert, cooperative and obese  Mental Status:  oriented to person, place and time and normal affect  Lungs:  clear to auscultation bilaterally, normal effort  Heart:  regular rate and rhythm, no murmur  Abdomen:  soft, nontender, nondistended, normal bowel sounds, no masses, hepatomegaly, splenomegaly  Extremities:  no edema, redness, tenderness in the calves  Skin: Multiple wounds present endocrine    Assessment:        Hospital Problems             Last Modified POA    * (Principal) Hidradenitis suppurativa 10/9/2022 Yes    Septicemia ([address] 10/10/2022 Yes    Schizophrenia ([address] 10/10/2022 Yes    Gram-positive bacteremia 10/10/2022 Yes    Cellulitis of right thigh 10/10/2022 Yes    Bandemia 10/10/2022 Yes    Scrotal abscess 10/10/2022 Yes    Immunosuppressed due to chemotherapy ([address] 10/10/2022 Yes    Abscess of skin of abdomen 10/9/2022 Yes    Schizoaffective disorder, bipolar type ([address] 10/10/2022 Yes    GERD (gastroesophageal reflux disease) 10/10/2022 Yes Plan:        Sepsis with hidradenitis suppurativa-continue antibiotic per infectious disease, on cefepime, plastic surgery evaluated, no surgical intervention planned. We will follow-up with dermatology outpatient. Schizoaffective disorder-seen by psychiatry, medication adjustments made, continue Haldol, Tegretol    Leukocytosis-continue to monitor, start IV fluids    Immunosuppressed-patient has not received Humira in last 17 days.     Monitor vitals  Monitor electrolytes and replace as needed    Minda Stern MD  10/11/2022  4:58 PM

## 2022-10-11 NOTE — CARE COORDINATION
Transitional Planning  Spoke with mother at bedside. 1st choice is [facility]. No further choices. Encouraged to pick 2 additional choices has list.  Referral sent to [facility]. 230 pm Call from Gabriele roblero at [facility], having questions. Updates given, they are to review chart and consult with team regarding admission. Will continue to follow.

## 2022-10-11 NOTE — PROGRESS NOTES
[facility] -   Infectious diseases evaluation  admission date 10/9/2022    reason for consultation:   Hidradenitis suppurativa     Impression :   Current:  Single Gram positive bacteremia - micrococcus contamination  Hx of hidradenitis suppurativa - followed with [facility] derm clinic, was receiving biweekly humira injections   Patient has not received Humira injections for last 17 days while in [facility]   immunosuppressed  Recent R buttock abscess vs. hidradenitis on 8/2022   Requested superficial abscess drain by IR but they decided it was loculated, would need drainage by GS  GS deferred to plastic, as lesions were very superficial   Dr. Nahomi chamberlain plastic planned for wound care outpatient  DC on augmentin  Altered mental status- new  CT Head wo hemorrhage  Mother unsure if pt has been getting psychotropic meds in [facility]   Multiple foci of painful hidradenitis on R thigh, groin area, scrotum   Purulent bloody drainage  CT correlates with hidradenitis hx  Wound care on board  Cellulitis on b/l thighs  Sepsis  SIRS: 3 on presentation + source  1 BCx with gram positive cocci in clusters likely contamination  2nd BCx pending  Bandemia   Elevated CRP - 144    Other:  Incarcerated - last 17 days   Current smoker   Schizophrenia  Bipolar disorder    Discussion / summary of stay / plan of care     Recommendations   Continue Vanc and meropenem   Infection from multiple sites of Hidradenitis suppurativa  GS - decided no surgical incision at this time   Cx from the groin purulence  Watch WBc response  Infection Control Recommendations   [place] Precautions  Contact Isolation     Antimicrobial Stewardship Recommendations   Simplification of therapy  Targeted therapy    History of Present Illness:   Initial history:  Mague Infante is a 38y.o.-year-old [facility] inmate with PMHx of schizophrenia and hidradenitis suppurativa who presents with purulent wounds around the groin, scrotum, and R thigh.  The wounds are severe, painful, and draining bloody red, purulent drainage, likely hidradenitis suppurativa. Patient has been following with Dr. Abhilash Harris at [facility] dermatology clinic and had been getting bi weekly Humira injections. The patient has not been getting biweekly Humira injections while in long term for the last 17 days- his mother reported that he missed 3 shots. This patient has an extensive psych history - mother unsure is patient was getting psychotropic medications while in long term. He currently follows with [facility]              Interval changes  10/11/2022   Patient Vitals for the past 8 hrs:   BP Temp Temp src Pulse SpO2   10/11/22 0815 115/77 99.3 °F (37.4 °C) Oral 96 96 %     10/11  Tmax last 24 hours 101.1F  Has not had BM yet  WBC increased slightly  Getting rest  Final blood cx still pending  Still in pain around groin - pus expressed from many orifices associated w pain, induration and little redness  Wound care with silver and aquaphor - recommended by wound care        Summary of relevant labs:  Labs:  WBC - 19.7-22 - 22.5    Lactic acid, whole blood 3.6 -0.9  Cr 0.78  Micro:  BC x1 - [facility] in clusters, likely contamination - micrococcus  Bcx 2- pending  Urine cx- neg  UA - small leukocyte esterase  Imaging:  10/9 Ct Head 1. No hemorrhage. 2. Insert of uncertain chronicity in the right frontal lobe. Consider further evaluation with MRI. 10/9 - CT Pelvis Slightly increased chronic appearing infectious/inflammatory cutaneous/subcutaneous process of the anterior pelvis and gluteal region. Recommend correlation with history of hidradenitis suppurativa. 10/9 CT Ab and Pelvis 1. Skin thickening of the low anterior abdominal wall, anterior pelvis and visualized gluteal region. Note, the perineum is not visualized on this study. Patient could return for further imaging if clinically indicated. 2. Large volume stool in the rectum.         I have personally reviewed the past medical history, past surgical history, medications, social history, and family history, and I haveupdated the database accordingly. Allergies:   Depakene [valproic acid], Restoril [temazepam], Risperidone and related, Thorazine [chlorpromazine], and Vancomycin     Review of Systems:     Review of Systems   Constitutional:  Negative for diaphoresis and fatigue. HENT:  Negative for congestion. Eyes:  Negative for visual disturbance. Respiratory:  Negative for chest tightness. Cardiovascular:  Negative for chest pain. Gastrointestinal:  Negative for abdominal distention. Genitourinary:  Negative for difficulty urinating and dysuria. Musculoskeletal:  Negative for arthralgias. Skin:  Negative for color change. Allergic/Immunologic: Negative for food allergies. Neurological:  Negative for dizziness. Hematological:  Negative for adenopathy. Psychiatric/Behavioral:  Negative for agitation. Physical Examination :       Physical Exam  Constitutional:       Appearance: Normal appearance. He is normal weight. HENT:      Head: Normocephalic and atraumatic. Right Ear: External ear normal.      Left Ear: External ear normal.      Nose: Nose normal. No congestion. Mouth/Throat:      Mouth: Mucous membranes are moist.      Pharynx: Oropharynx is clear. Eyes:      Extraocular Movements: Extraocular movements intact. Pupils: Pupils are equal, round, and reactive to light. Cardiovascular:      Rate and Rhythm: Normal rate and regular rhythm. Pulses: Normal pulses. Heart sounds: Normal heart sounds. Pulmonary:      Effort: No respiratory distress. Breath sounds: No wheezing. Abdominal:      General: Bowel sounds are normal. There is no distension. Tenderness: There is no abdominal tenderness. Genitourinary:     Comments: On [place]ki - dark yellow urine  Musculoskeletal:      Cervical back: No rigidity. Right lower leg: No edema.       Left lower leg: No edema. Lymphadenopathy:      Cervical: No cervical adenopathy. Skin:     Capillary Refill: Capillary refill takes less than 2 seconds. Findings: Rash present. Neurological:      General: No focal deficit present. Mental Status: He is oriented to person, place, and time. Psychiatric:         Mood and Affect: Mood normal.         Behavior: Behavior normal.       Past Medical History:     Past Medical History:   Diagnosis Date    Bipolar 1 disorder ([facility]     GERD (gastroesophageal reflux disease) 8/13/2016    Hidradenitis suppurativa     Polysubstance abuse ([facility]     Schizoaffective disorder ([facility])        Past Surgical  History:     Past Surgical History:   Procedure Laterality Date    ABSCESS DRAINAGE  11/1/2013    left inguinal hydrodenitis/pilondal cyst    INFECTED SKIN DEBRIDEMENT  july 2011       Medications:      cefepime  2,000 mg IntraVENous Q12H    carBAMazepine  200 mg Oral BID    haloperidol  1 mg Oral BID    sodium chloride flush  5-40 mL IntraVENous 2 times per day    enoxaparin  30 mg SubCUTAneous BID       Social History:     Social History     Socioeconomic History    Marital status: Single     Spouse name: Not on file    Number of children: Not on file    Years of education: Not on file    Highest education level: Not on file   Occupational History    Not on file   Tobacco Use    Smoking status: Every Day     Packs/day: 1.00     Years: 22.00     Pack years: 22.00     Types: Cigarettes    Smokeless tobacco: Never   Vaping Use    Vaping Use: Never used   Substance and Sexual Activity    Alcohol use: Yes     Comment: occasional    Drug use: Yes     Comment: history of marijuana & Opiate abue, claims that he is currently clean & sober.     Sexual activity: Not on file   Other Topics Concern    Not on file   Social History Narrative    Not on file     Social Determinants of Health     Financial Resource Strain: Not on file   Food Insecurity: Not on file   Transportation Needs: Not on file Physical Activity: Not on file   Stress: Not on file   Social Connections: Not on file   Intimate Partner Violence: Not on file   Housing Stability: Not on file       Family History:     Family History   Problem Relation Age of Onset    Cancer Mother         breast    High Blood Pressure Maternal Grandfather     Mental Illness Paternal Aunt     Substance Abuse Paternal Uncle     Substance Abuse Brother     Alcohol Abuse Father     Cancer Father         pancrease      Medical Decision Making:   I have independently reviewed/ordered the following labs:    CBC with Differential:   Recent Labs     10/09/22  0923 10/10/22  0647 10/11/22  0533   WBC 19.7* 22.0* 22.5*   HGB 13.4 11.8* 10.8*   HCT 42.9 37.1* 33.1*   * 423 391   LYMPHOPCT 14*  --   --    MONOPCT 7  --   --        BMP:  Recent Labs     10/09/22  1053 10/10/22  0647 10/11/22  0533   * 132* 132*   K 3.7 3.6* 3.6*    100 100   CO2 23 20 22   BUN 18 10 8   CREATININE 0.78 0.81 0.78   MG 1.7  --  1.7       Hepatic Function Panel:   Recent Labs     10/11/22  0533   LABALBU 2.0*     No results for input(s): RPR in the last 72 hours. No results for input(s): HIV in the last 72 hours. No results for input(s): BC in the last 72 hours. Lab Results   Component Value Date/Time    CREATININE 0.78 10/11/2022 05:33 AM    GLUCOSE 98 10/11/2022 05:33 AM    GLUCOSE 89 02/09/2012 06:19 AM       Detailed results: Thank you for allowing us to participate in the care of this patient. Please call with questions. This note is created with the assistance of a speech recognition program.  While intending to generate adocument that actually reflects the content of the visit, the document can still have some errors including those of syntax and sound a like substitutions which may escape proof reading. It such instances, actual meaningcan be extrapolated by contextual diversion.     Office: [phone]  Perfect serve / office [phone]    [facility] have discussed the care of the patient, including pertinent history and exam findings,  with the resident. I have seen and examined the patient and the key elements of all parts of the encounter have been performed by me. I agree with the assessment, plan and orders as documented by the resident.     Kathy Rivera, Infectious Diseases

## 2022-10-12 PROBLEM — E44.1 MILD PROTEIN-CALORIE MALNUTRITION (HCC): Status: ACTIVE | Noted: 2022-10-12

## 2022-10-12 PROBLEM — E55.9 HYPOVITAMINOSIS D: Status: ACTIVE | Noted: 2022-10-12

## 2022-10-12 LAB
ALBUMIN SERPL-MCNC: 2 G/DL (ref 3.5–5.2)
ANION GAP SERPL CALCULATED.3IONS-SCNC: 12 MMOL/L (ref 9–17)
BUN BLDV-MCNC: 7 MG/DL (ref 6–20)
CALCIUM SERPL-MCNC: 8.6 MG/DL (ref 8.6–10.4)
CHLORIDE BLD-SCNC: 101 MMOL/L (ref 98–107)
CO2: 21 MMOL/L (ref 20–31)
CREAT SERPL-MCNC: 0.71 MG/DL (ref 0.7–1.2)
CULTURE: NORMAL
GFR SERPL CREATININE-BSD FRML MDRD: >60 ML/MIN/1.73M2
GLUCOSE BLD-MCNC: 107 MG/DL (ref 70–99)
HCT VFR BLD CALC: 31 % (ref 40.7–50.3)
HEMOGLOBIN: 10.2 G/DL (ref 13–17)
MAGNESIUM: 1.8 MG/DL (ref 1.6–2.6)
MCH RBC QN AUTO: 29.4 PG (ref 25.2–33.5)
MCHC RBC AUTO-ENTMCNC: 32.9 G/DL (ref 28.4–34.8)
MCV RBC AUTO: 89.3 FL (ref 82.6–102.9)
NRBC AUTOMATED: 0 PER 100 WBC
PDW BLD-RTO: 14.5 % (ref 11.8–14.4)
PHOSPHORUS: 2.9 MG/DL (ref 2.5–4.5)
PLATELET # BLD: 404 K/UL (ref 138–453)
PMV BLD AUTO: 9.2 FL (ref 8.1–13.5)
POTASSIUM SERPL-SCNC: 3.7 MMOL/L (ref 3.7–5.3)
RBC # BLD: 3.47 M/UL (ref 4.21–5.77)
SODIUM BLD-SCNC: 134 MMOL/L (ref 135–144)
SPECIMEN DESCRIPTION: NORMAL
VITAMIN D 25-HYDROXY: 11.4 NG/ML
WBC # BLD: 17.9 K/UL (ref 3.5–11.3)

## 2022-10-12 PROCEDURE — 82306 VITAMIN D 25 HYDROXY: CPT

## 2022-10-12 PROCEDURE — 87070 CULTURE OTHR SPECIMN AEROBIC: CPT

## 2022-10-12 PROCEDURE — 6370000000 HC RX 637 (ALT 250 FOR IP): Performed by: STUDENT IN AN ORGANIZED HEALTH CARE EDUCATION/TRAINING PROGRAM

## 2022-10-12 PROCEDURE — 97530 THERAPEUTIC ACTIVITIES: CPT

## 2022-10-12 PROCEDURE — 6360000002 HC RX W HCPCS: Performed by: NURSE PRACTITIONER

## 2022-10-12 PROCEDURE — 51702 INSERT TEMP BLADDER CATH: CPT

## 2022-10-12 PROCEDURE — 99232 SBSQ HOSP IP/OBS MODERATE 35: CPT | Performed by: STUDENT IN AN ORGANIZED HEALTH CARE EDUCATION/TRAINING PROGRAM

## 2022-10-12 PROCEDURE — 36415 COLL VENOUS BLD VENIPUNCTURE: CPT

## 2022-10-12 PROCEDURE — 85027 COMPLETE CBC AUTOMATED: CPT

## 2022-10-12 PROCEDURE — 1200000000 HC SEMI PRIVATE

## 2022-10-12 PROCEDURE — 6360000002 HC RX W HCPCS: Performed by: CLINICAL NURSE SPECIALIST

## 2022-10-12 PROCEDURE — 99232 SBSQ HOSP IP/OBS MODERATE 35: CPT | Performed by: INTERNAL MEDICINE

## 2022-10-12 PROCEDURE — 6360000002 HC RX W HCPCS: Performed by: INTERNAL MEDICINE

## 2022-10-12 PROCEDURE — 87205 SMEAR GRAM STAIN: CPT

## 2022-10-12 PROCEDURE — 83735 ASSAY OF MAGNESIUM: CPT

## 2022-10-12 PROCEDURE — 2580000003 HC RX 258: Performed by: STUDENT IN AN ORGANIZED HEALTH CARE EDUCATION/TRAINING PROGRAM

## 2022-10-12 PROCEDURE — 6370000000 HC RX 637 (ALT 250 FOR IP): Performed by: INTERNAL MEDICINE

## 2022-10-12 PROCEDURE — 87077 CULTURE AEROBIC IDENTIFY: CPT

## 2022-10-12 PROCEDURE — 80069 RENAL FUNCTION PANEL: CPT

## 2022-10-12 PROCEDURE — 6370000000 HC RX 637 (ALT 250 FOR IP): Performed by: NURSE PRACTITIONER

## 2022-10-12 PROCEDURE — 87075 CULTR BACTERIA EXCEPT BLOOD: CPT

## 2022-10-12 PROCEDURE — 2500000003 HC RX 250 WO HCPCS: Performed by: CLINICAL NURSE SPECIALIST

## 2022-10-12 PROCEDURE — 6370000000 HC RX 637 (ALT 250 FOR IP): Performed by: CLINICAL NURSE SPECIALIST

## 2022-10-12 PROCEDURE — 87186 SC STD MICRODIL/AGAR DIL: CPT

## 2022-10-12 PROCEDURE — 2580000003 HC RX 258: Performed by: INTERNAL MEDICINE

## 2022-10-12 RX ORDER — ERGOCALCIFEROL 1.25 MG/1
50000 CAPSULE ORAL WEEKLY
Status: DISCONTINUED | OUTPATIENT
Start: 2022-10-12 | End: 2022-10-26 | Stop reason: HOSPADM

## 2022-10-12 RX ORDER — ZINC SULFATE 50(220)MG
50 CAPSULE ORAL DAILY
Status: DISCONTINUED | OUTPATIENT
Start: 2022-10-12 | End: 2022-10-26 | Stop reason: HOSPADM

## 2022-10-12 RX ORDER — ASCORBIC ACID 500 MG
500 TABLET ORAL 2 TIMES DAILY
Status: DISCONTINUED | OUTPATIENT
Start: 2022-10-12 | End: 2022-10-26 | Stop reason: HOSPADM

## 2022-10-12 RX ADMIN — HALOPERIDOL 1 MG: 1 TABLET ORAL at 20:55

## 2022-10-12 RX ADMIN — ZINC SULFATE 220 MG (50 MG) CAPSULE 50 MG: CAPSULE at 14:34

## 2022-10-12 RX ADMIN — HALOPERIDOL 1 MG: 1 TABLET ORAL at 08:56

## 2022-10-12 RX ADMIN — CARBAMAZEPINE 200 MG: 200 TABLET ORAL at 20:55

## 2022-10-12 RX ADMIN — ONDANSETRON 4 MG: 2 INJECTION INTRAMUSCULAR; INTRAVENOUS at 18:00

## 2022-10-12 RX ADMIN — ERGOCALCIFEROL 50000 UNITS: 1.25 CAPSULE ORAL at 17:54

## 2022-10-12 RX ADMIN — OXYCODONE HYDROCHLORIDE AND ACETAMINOPHEN 500 MG: 500 TABLET ORAL at 20:55

## 2022-10-12 RX ADMIN — ENOXAPARIN SODIUM 30 MG: 100 INJECTION SUBCUTANEOUS at 20:55

## 2022-10-12 RX ADMIN — PIPERACILLIN AND TAZOBACTAM 3375 MG: 3; .375 INJECTION, POWDER, FOR SOLUTION INTRAVENOUS at 20:44

## 2022-10-12 RX ADMIN — HYDROCODONE BITARTRATE AND ACETAMINOPHEN 2 TABLET: 5; 325 TABLET ORAL at 17:59

## 2022-10-12 RX ADMIN — WATER 2000 MG: 1 INJECTION INTRAMUSCULAR; INTRAVENOUS; SUBCUTANEOUS at 00:28

## 2022-10-12 RX ADMIN — CARBAMAZEPINE 200 MG: 200 TABLET ORAL at 08:56

## 2022-10-12 RX ADMIN — SODIUM CHLORIDE: 9 INJECTION, SOLUTION INTRAVENOUS at 12:31

## 2022-10-12 RX ADMIN — OXYCODONE HYDROCHLORIDE AND ACETAMINOPHEN 500 MG: 500 TABLET ORAL at 17:54

## 2022-10-12 RX ADMIN — ENOXAPARIN SODIUM 30 MG: 100 INJECTION SUBCUTANEOUS at 08:57

## 2022-10-12 RX ADMIN — PIPERACILLIN AND TAZOBACTAM 3375 MG: 3; .375 INJECTION, POWDER, FOR SOLUTION INTRAVENOUS at 13:30

## 2022-10-12 ASSESSMENT — PAIN DESCRIPTION - FREQUENCY
FREQUENCY: CONTINUOUS

## 2022-10-12 ASSESSMENT — PAIN SCALES - GENERAL
PAINLEVEL_OUTOF10: 8
PAINLEVEL_OUTOF10: 9
PAINLEVEL_OUTOF10: 10
PAINLEVEL_OUTOF10: 10
PAINLEVEL_OUTOF10: 0
PAINLEVEL_OUTOF10: 8
PAINLEVEL_OUTOF10: 0
PAINLEVEL_OUTOF10: 7

## 2022-10-12 ASSESSMENT — PAIN DESCRIPTION - PAIN TYPE
TYPE: ACUTE PAIN
TYPE: CHRONIC PAIN;ACUTE PAIN

## 2022-10-12 ASSESSMENT — PAIN DESCRIPTION - ORIENTATION
ORIENTATION: INNER;UPPER
ORIENTATION: RIGHT;LEFT
ORIENTATION: LOWER;INNER
ORIENTATION: LOWER;INNER

## 2022-10-12 ASSESSMENT — PAIN DESCRIPTION - DESCRIPTORS
DESCRIPTORS: TENDER;SHARP;THROBBING
DESCRIPTORS: HEAVINESS;ITCHING

## 2022-10-12 ASSESSMENT — PAIN DESCRIPTION - ONSET
ONSET: ON-GOING

## 2022-10-12 ASSESSMENT — PAIN - FUNCTIONAL ASSESSMENT
PAIN_FUNCTIONAL_ASSESSMENT: PREVENTS OR INTERFERES SOME ACTIVE ACTIVITIES AND ADLS

## 2022-10-12 ASSESSMENT — PAIN DESCRIPTION - LOCATION
LOCATION: GROIN;LEG
LOCATION: BUTTOCKS;GROIN;ABDOMEN
LOCATION: ABDOMEN;GROIN
LOCATION: LEG

## 2022-10-12 ASSESSMENT — ENCOUNTER SYMPTOMS
ABDOMINAL DISTENTION: 0
SHORTNESS OF BREATH: 0

## 2022-10-12 NOTE — PLAN OF CARE
Problem: Discharge Planning  Goal: Discharge to home or other facility with appropriate resources  Outcome: Progressing     Problem: Safety - Adult  Goal: Free from fall injury  Outcome: Progressing     Problem: Confusion  Goal: Confusion, delirium, dementia, or psychosis is improved or at baseline  Description: INTERVENTIONS:  1. Assess for possible contributors to thought disturbance, including medications, impaired vision or hearing, underlying metabolic abnormalities, dehydration, psychiatric diagnoses, and notify attending LIP  2. [place] high risk fall precautions, as indicated  3. Provide frequent short contacts to provide reality reorientation, refocusing and direction  4. Decrease environmental stimuli, including noise as appropriate  5. Monitor and intervene to maintain adequate nutrition, hydration, elimination, sleep and activity  6. If unable to ensure safety without constant attention obtain sitter and review sitter guidelines with assigned personnel  7. Initiate Psychosocial CNS and Spiritual Care consult, as indicated  Outcome: Progressing     Problem: Skin/Tissue Integrity  Goal: Absence of new skin breakdown  Description: 1. Monitor for areas of redness and/or skin breakdown  2. Assess vascular access sites hourly  3. Every 4-6 hours minimum:  Change oxygen saturation probe site  4. Every 4-6 hours:  If on nasal continuous positive airway pressure, respiratory therapy assess nares and determine need for appliance change or resting period.   Outcome: Progressing     Problem: Pain  Goal: Verbalizes/displays adequate comfort level or baseline comfort level  Outcome: Progressing

## 2022-10-12 NOTE — PROGRESS NOTES
[facility]  Office: [address], DO, Nelsy Mancia, DO, Tor Phlegm, DO, Mikaela Rodriguezkat Blood, DO, Salma Davis MD, Jett Garcia MD, Meg Daniels MD, Louise Medellin MD,  Didi Tavarez MD, Robe Salgado MD, Justyna Monae, DO, Ning Domingo MD,  Flower Ramon MD, Noah Aggarwal MD, Selina Meza, DO, Ana Jeffery MD, Hailee Perdomo MD, James Zuñiga MD, Michelle Goel MD, Divya Diaz MD, Marisela Fish MD, Aaron Hartmann DO, Jan Grigsby MD, Homar Gaming MD, Nakia Dwyer CNP,  Holley Rubin, CNP, Irene Lomas, CNP, George Yang, CNP,  Rafi Loo, DNP, Blake Carrillo, CNP, Tessa Hooks, CNP, Radha Mcqueen, CNP, Luma Ennis, CNP, Antony Libman, CNP, Ana Maria Benedict PA-C, Marylin Soliz, CNS, Bartolo Quezada, [facility], Mirella Chao, CNP, Nicole Vergara, CNP, Bertin López, [address] [place] [address]    Progress Note    10/12/2022    2:22 PM    Name:   Altagracia Mckeon  MRN:     [id number]     Acct:      [de-identified]   Room:   [address] [place] [address] Day:  3  Admit Date:  10/9/2022  9:05 AM    PCP:   Jossie Thapa MD  Code Status:  Full Code    Subjective:     C/C:   Chief Complaint   Patient presents with    Altered Mental Status     Interval History Status: not changed. Patient does not have any acute complaints  No acute events overnight  As per RN patient is waking up more and is more alert during the day. His appetite is improving. 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   Initial concern for necrotizing fasciitis. Patient has extensive psych history and on antipsychotic medications. Patient was getting Humira but was stopped as he went to the prison. Review of Systems:     Constitutional:  negative for chills, fevers, sweats  Respiratory:  negative for cough, dyspnea on exertion, shortness of breath, wheezing  Cardiovascular:  negative for chest pain, chest pressure/discomfort, lower extremity edema, palpitations  Gastrointestinal:  negative for abdominal pain, constipation, diarrhea, nausea, vomiting  Neurological:  negative for dizziness, headache  Multiple wounds in the groin  Old scar marks on axilla  Medications: Allergies: Allergies   Allergen Reactions    Depakene [Valproic Acid]     Restoril [Temazepam]     Risperidone And Related      Seizure      Thorazine [Chlorpromazine]     Vancomycin Other (See Comments) and Rash       Current Meds:   Scheduled Meds:    piperacillin-tazobactam  3,375 mg IntraVENous Q8H    zinc sulfate  50 mg Oral Daily    vitamin D  50,000 Units Oral Weekly    vitamin C  500 mg Oral BID    carBAMazepine  200 mg Oral BID    haloperidol  1 mg Oral BID    sodium chloride flush  5-40 mL IntraVENous 2 times per day    enoxaparin  30 mg SubCUTAneous BID     Continuous Infusions:    sodium chloride 75 mL/hr at 10/12/22 1231    sodium chloride       PRN Meds: HYDROcodone 5 mg - acetaminophen **OR** HYDROcodone 5 mg - acetaminophen, mineral oil-hydrophilic petrolatum, sodium chloride flush, sodium chloride, potassium chloride **OR** potassium alternative oral replacement **OR** potassium chloride, magnesium sulfate, ondansetron **OR** ondansetron, polyethylene glycol, acetaminophen **OR** acetaminophen    Data:     Past Medical History:   has a past medical history of Bipolar 1 disorder ([facility] [address] GERD (gastroesophageal reflux disease), Hidradenitis suppurativa, Polysubstance abuse ([facility] [address] and [facility]     Social History:   reports that he has been smoking cigarettes. He has a 22.00 pack-year smoking history. He has never used smokeless tobacco. He reports current alcohol use. He reports current drug use. Family History:   Family History   Problem Relation Age of Onset    Cancer Mother         breast    High Blood Pressure Maternal Grandfather     Mental Illness Paternal Aunt     Substance Abuse Paternal Uncle     Substance Abuse Brother     Alcohol Abuse Father     Cancer Father         pancrease       Vitals:  /74   Pulse 97   Temp 98.2 °F (36.8 °C) (Oral)   Resp 16   Ht 5' 9\" (1.753 m)   Wt 236 lb (107 kg)   SpO2 96%   BMI 34.85 kg/m²   Temp (24hrs), Av.1 °F (36.7 °C), Min:97.9 °F (36.6 °C), Max:98.2 °F (36.8 °C)    No results for input(s): POCGLU in the last 72 hours. I/O (24Hr):     Intake/Output Summary (Last 24 hours) at 10/12/2022 1422  Last data filed at 10/12/2022 1200  Gross per 24 hour   Intake --   Output 3350 ml   Net -3350 ml       Labs:  Hematology:  Recent Labs     10/10/22  0647 10/11/22  0533 10/12/22  0436   WBC 22.0* 22.5* 17.9*   RBC 4.09* 3.71* 3.47*   HGB 11.8* 10.8* 10.2*   HCT 37.1* 33.1* 31.0*   MCV 90.7 89.2 89.3   MCH 28.9 29.1 29.4   MCHC 31.8 32.6 32.9   RDW 14.4 14.3 14.5*    391 404   MPV 9.2 8.9 9.2   INR 1.1  --   --      Chemistry:  Recent Labs     10/09/22  2000 10/10/22  0647 10/11/22  0533 10/12/22  0436   NA  --  132* 132* 134*   K  --  3.6* 3.6* 3.7   CL  --  100 100 101   CO2  --  20 22 21   GLUCOSE  --  138* 98 107*   BUN  --  10 8 7   CREATININE  --  0.81 0.78 0.71   MG  --   --  1.7 1.8   ANIONGAP  --  12 10 12   LABGLOM  --  >60 >60 >60   CALCIUM  --  8.8 8.7 8.6   PHOS  --   --  2.8 2.9   LACTACIDWB 0.9 1.8  --   --      Recent Labs     10/11/22  0533 10/12/22  0436   LABALBU 2.0* 2.0*     ABG:No results found for: POCPH, PHART, PH, POCPCO2, ELL6HWI, PCO2, POCPO2, PO2ART, PO2, POCHCO3, MKN3FTN, HCO3, NBEA, PBEA, BEART, BE, THGBART, THB, GSO9OAZ, QHXW6YNG, X3GMBURC, O2SAT, FIO2  Lab Results Component Value Date/Time    SPECIAL RT HAND 10ML 10/09/2022 09:18 AM    SPECIAL LT AC 20ML 10/09/2022 09:18 AM     Lab Results   Component Value Date/Time    CULTURE PENDING 10/12/2022 12:32 PM       Radiology:  CT HEAD WO CONTRAST    Result Date: 10/9/2022  1. No hemorrhage. 2. Insert of uncertain chronicity in the right frontal lobe. Consider further evaluation with MRI. CT PELVIS WO CONTRAST Additional Contrast? None    Result Date: 10/9/2022  Slightly increased chronic appearing infectious/inflammatory cutaneous/subcutaneous process of the anterior pelvis and gluteal region. Recommend correlation with history of hidradenitis suppurativa. CT ABDOMEN PELVIS W IV CONTRAST Additional Contrast? None    Result Date: 10/9/2022  1. Skin thickening of the low anterior abdominal wall, anterior pelvis and visualized gluteal region. Note, the perineum is not visualized on this study. Patient could return for further imaging if clinically indicated. 2. Large volume stool in the rectum.        Physical Examination:        General appearance:  alert, cooperative and obese  Mental Status:  oriented to person, place and time and normal affect  Lungs:  clear to auscultation bilaterally, normal effort  Heart:  regular rate and rhythm, no murmur  Abdomen:  soft, nontender, nondistended, normal bowel sounds, no masses, hepatomegaly, splenomegaly  Extremities:  no edema, redness, tenderness in the calves  Skin: Multiple wounds present endocrine    Assessment:        Hospital Problems             Last Modified POA    * (Principal) Hidradenitis suppurativa 10/9/2022 Yes    Septicemia ([address] 10/10/2022 Yes    Schizophrenia ([address] 10/10/2022 Yes    Gram-positive bacteremia 10/10/2022 Yes    Cellulitis of right thigh 10/10/2022 Yes    Bandemia 10/10/2022 Yes    Scrotal abscess 10/10/2022 Yes    Immunosuppressed due to chemotherapy ([address] 10/10/2022 Yes    Mild protein-calorie malnutrition ([address] 10/12/2022 Yes    Abscess of skin of abdomen 10/9/2022 Yes    Schizoaffective disorder, bipolar type ([address] 10/10/2022 Yes    GERD (gastroesophageal reflux disease) 10/10/2022 Yes     Plan:        Sepsis with hidradenitis suppurativa-continue antibiotic per infectious disease, antibiotics switched to Zosyn, cultures showing gram-positive and gram-negative rods. Await final cultures. Plastic surgery evaluated, no surgical intervention planned. We will follow-up with dermatology outpatient.     Schizoaffective disorder-seen by psychiatry, medication adjustments made, continue Haldol, Tegretol    Leukocytosis-leukocytosis trending down with IV fluids, patient afebrile, continue cefepime    Immunosuppressed-received Humira outpatient, currently on hold    Replace vitamin D  Add supplements for malnutrition  Monitor vitals  Monitor electrolytes and replace as needed    Discharge plan to SNF    Berkley Carvalho MD  10/12/2022  2:22 PM

## 2022-10-12 NOTE — PLAN OF CARE
Problem: Safety - Adult  Goal: Free from fall injury  10/12/2022 1248 by Nella Magana RN  Outcome: Progressing  10/12/2022 0505 by Dali Tillman RN  Outcome: Progressing     Problem: Confusion  Goal: Confusion, delirium, dementia, or psychosis is improved or at baseline  Description: INTERVENTIONS:  1. Assess for possible contributors to thought disturbance, including medications, impaired vision or hearing, underlying metabolic abnormalities, dehydration, psychiatric diagnoses, and notify attending LIP  2. [place] high risk fall precautions, as indicated  3. Provide frequent short contacts to provide reality reorientation, refocusing and direction  4. Decrease environmental stimuli, including noise as appropriate  5. Monitor and intervene to maintain adequate nutrition, hydration, elimination, sleep and activity  6. If unable to ensure safety without constant attention obtain sitter and review sitter guidelines with assigned personnel  7. Initiate Psychosocial CNS and Spiritual Care consult, as indicated  10/12/2022 1248 by Nella Magana RN  Outcome: Progressing  10/12/2022 0505 by Dali Tillman RN  Outcome: Progressing     Problem: Skin/Tissue Integrity  Goal: Absence of new skin breakdown  Description: 1. Monitor for areas of redness and/or skin breakdown  2. Assess vascular access sites hourly  3. Every 4-6 hours minimum:  Change oxygen saturation probe site  4. Every 4-6 hours:  If on nasal continuous positive airway pressure, respiratory therapy assess nares and determine need for appliance change or resting period.   10/12/2022 1248 by Nella Magana RN  Outcome: Progressing  10/12/2022 0505 by Dali Tillman RN  Outcome: Progressing     Problem: Pain  Goal: Verbalizes/displays adequate comfort level or baseline comfort level  10/12/2022 1248 by Nella Magana RN  Outcome: Progressing  10/12/2022 0505 by Dali Tillman RN  Outcome: Progressing

## 2022-10-12 NOTE — CARE COORDINATION
Transitional planning note: call placed to jos with [facility] to follow up on status of referral. Zaira Whitfield states patient has been with them previously and she believes they will be able to accept but her director would like her to perform an onsite before accepting. Zaira Whitfield states she will be her onsite at 8am tomorrow.

## 2022-10-12 NOTE — PROGRESS NOTES
Physical Therapy  Facility/Department: [facility] RENAL//MED SURG  Physical Therapy Daily Treatment Note    Name: Gael Duval  : 1980  MRN: [id number]  Date of Service: 10/12/2022    Discharge Recommendations:  Patient would benefit from continued therapy after discharge   PT Equipment Recommendations  Equipment Needed: Yes  Mobility Devices: Paulett Fam: Rolling      Patient Diagnosis(es): The encounter diagnosis was Septicemia ([facility] [address] Past Medical History:  has a past medical history of Bipolar 1 disorder ([facility] [address] GERD (gastroesophageal reflux disease), Hidradenitis suppurativa, Polysubstance abuse ([facility] [address] and Schizoaffective disorder ([facility] [address] Past Surgical History:  has a past surgical history that includes Infected skin debridement (2011) and Abscess Drainage (2013). Assessment   Body Structures, Functions, Activity Limitations Requiring Skilled Therapeutic Intervention: Decreased functional mobility ; Decreased strength;Decreased endurance;Decreased cognition;Decreased safe awareness;Decreased balance  Assessment: Pt with mobility deficits requiring min-A to perform bed mobility and sit<>stand transfer. Pt declines to attempt ambulation this date secondary to increased fatigue. Pt will require 24 hour assistance with mobility upon discharge. Pt would benefit from additional therapy upon discharge to maximize functional independence.   Therapy Prognosis: Good  Decision Making: Medium Complexity  Requires PT Follow-Up: Yes  Activity Tolerance  Activity Tolerance: Patient limited by fatigue;Patient limited by endurance     Plan   Physcial Therapy Plan  General Plan:  (5-6x wk)  Current Treatment Recommendations: Strengthening, Functional mobility training, Transfer training, Endurance training, Stair training, Gait training, Safety education & training, Balance training, Therapeutic activities, Equipment evaluation, education, & procurement, Home exercise program, Patient/Caregiver education & training  Safety Devices  Type of Devices: Left in bed, Call light within reach, Gait belt, Patient at risk for falls, Nurse notified, Bed alarm in place  Restraints  Restraints Initially in Place: No     Restrictions  Restrictions/Precautions  Restrictions/Precautions: Fall Risk  Required Braces or Orthoses?: No  Position Activity Restriction  Other position/activity restrictions: up with assist     Subjective   General  Patient assessed for rehabilitation services?: Yes  Response To Previous Treatment: Not applicable  Family / Caregiver Present: No  Follows Commands: Within Functional Limits  Subjective  Subjective: Pt supine in bed and with encouragement is agreeable to therapy, RN agreeable to therapy. Pt cooperative throughout today's session. Cognition   Cognition  Overall Cognitive Status: Exceptions  Following Commands: Follows multistep commands with repitition; Follows multistep commands with increased time  Attention Span: Attends with cues to redirect  Problem Solving: Assistance required to identify errors made;Assistance required to generate solutions  Insights: Decreased awareness of deficits  Initiation: Requires cues for some  Sequencing: Requires cues for some     Objective                                Bed mobility  Supine to Sit: Minimal assistance  Sit to Supine: Minimal assistance  Scooting: Minimal assistance  Bed Mobility Comments: HOB elevated ~25 degrees, increased time/effort to perform. Transfers  Sit to Stand: Minimal Assistance  Stand to Sit: Minimal Assistance  Ambulation  More Ambulation?: No (Pt declined to attempt ambulation this date seconday to fatigue.)  Stairs/Curb  Stairs?: No     Balance  Posture: Fair  Sitting - Static: Fair  Sitting - Dynamic: Poor;+  Standing - Static: Poor;+  Comments: standing balance assessed while using a RW.  Pt's standing balance challenged x7 minutes this date.                                                         AM-PAC Score  AM-PAC Inpatient Mobility Raw Score : 13 (10/12/22 1222)  AM-PAC Inpatient T-Scale Score : 36.74 (10/12/22 1222)  Mobility Inpatient CMS 0-100% Score: 64.91 (10/12/22 1222)  Mobility Inpatient CMS G-Code Modifier : CL (10/12/22 1222)            Goals  Short Term Goals  Time Frame for Short Term Goals: 10 visits  Short Term Goal 1: transfers with SBA  Short Term Goal 2: amb 125 ft with a RW x SBA  Short Term Goal 3: ascend/descend 4 steps with SBA  Short Term Goal 4: 20 min strengthening exercise program x SBA  Additional Goals?: No  Patient Goals   Patient Goals : Return home       Education  Patient Education  Education Given To: Patient  Education Provided: Plan of Care;Role of Therapy;Transfer Training  Education Method: Verbal  Barriers to Learning: Cognition  Education Outcome: Verbalized understanding;Continued education needed      Therapy Time   Individual Concurrent Group Co-treatment   Time In 0930         Time Out 0953         Minutes 23         Timed Code Treatment Minutes: 23 Minutes       [employer], PT

## 2022-10-12 NOTE — PROGRESS NOTES
[facility] -   Infectious diseases evaluation  admission date 10/9/2022    reason for consultation:   Hidradenitis suppurativa     Impression :   Current:  Single Gram positive bacteremia - micrococcus contamination  Hx of hidradenitis suppurativa - followed with [facility] derm clinic, was receiving biweekly humira injections   Patient has not received Humira injections for last 17 days while in intermediate   immunosuppressed  Recent R buttock abscess vs. hidradenitis on 8/2022   Requested superficial abscess drain by IR but they decided it was loculated, would need drainage by GS  GS deferred to plastic, as lesions were very superficial   Dr. Dennis Herrera w plastic planned for wound care outpatient  DC on augmentin  Altered mental status- new  CT Head wo hemorrhage  Mother unsure if pt has been getting psychotropic meds in intermediate   Multiple foci of painful hidradenitis on R thigh, groin area, scrotum   Purulent bloody drainage  CT correlates with hidradenitis hx  Wound care on board  Cellulitis on b/l thighs  Sepsis  SIRS: 3 on presentation + source  1 BCx with gram positive cocci in clusters likely contamination  2nd BCx pending  Bandemia   Elevated CRP - 144  Vitamin D deficiency -11.4     Other:  Incarcerated - last 17 days   Current smoker   Schizophrenia  Bipolar disorder    Discussion / summary of stay / plan of care     Recommendations   Continue zosyn Till pus cx out- GNR, GPR, GPC in clusters  Infection from multiple sites of Hidradenitis suppurativa  GS - decided no surgical incision at this time  WBC downtrending    Hidradenitis - will get better controlled if Zinc and vItamin D are added  Start Zinc 50mg QD   Vit D 11.4 low - start 20261UF weekly x 8     Infection Control Recommendations   [employer] Precautions  Contact Isolation     Antimicrobial Stewardship Recommendations   Simplification of therapy  Targeted therapy    History of Present Illness:   Initial history:  Darwin Winn Deja De La O is a 38y.o.-year-old assisted inmate with PMHx of schizophrenia and hidradenitis suppurativa who presents with purulent wounds around the groin, scrotum, and R thigh. The wounds are severe, painful, and draining bloody red, purulent drainage, likely hidradenitis suppurativa. Patient has been following with Dr. Matt Shen at [facility] dermatology clinic and had been getting bi weekly Humira injections. The patient has not been getting biweekly Humira injections while in assisted for the last 17 days- his mother reported that he missed 3 shots. This patient has an extensive psych history - mother unsure is patient was getting psychotropic medications while in assisted. He currently follows with [facility]          Interval changes  10/12/2022   Patient Vitals for the past 8 hrs:   BP Temp Temp src Pulse Resp SpO2   10/12/22 1200 115/74 98.2 °F (36.8 °C) Oral 97 16 96 %   10/12/22 0757 123/81 98.2 °F (36.8 °C) Oral 99 18 95 %     10/11  Tmax last 24 hours 101.1F  Has not had BM yet  WBC increased slightly  Getting rest  Final blood cx still pending  Still in pain around groin - pus expressed from many orifices associated w pain, induration and little redness  Wound care with silver and aquaphor - recommended by wound care    10/12  Afebrile  Vit D deficiency - 11.4  - will replace  Drainage from he groin is +++, cx taken - some indurated found as well -  Continues to have pain around groin-    Summary of relevant labs:  Labs:  WBC - 19.7-22 - 22.5- 17.9    Lactic acid, whole blood 3.6 -0.9  Cr 0.78  Vit D 11.4  Micro:  Groin cx: prelim GPR, GNR, GPC in clusters  BC x1 - GPC in clusters, likely contamination - micrococcus  Bcx 2- pending  Urine cx- neg  UA - small leukocyte esterase  Imaging:  10/9 Ct Head 1. No hemorrhage. 2. Insert of uncertain chronicity in the right frontal lobe. Consider further evaluation with MRI.    10/9 - CT Pelvis Slightly increased chronic appearing infectious/inflammatory cutaneous/subcutaneous process of the anterior pelvis and gluteal region. Recommend correlation with history of hidradenitis suppurativa. 10/9 CT Ab and Pelvis 1. Skin thickening of the low anterior abdominal wall, anterior pelvis and visualized gluteal region. Note, the perineum is not visualized on this study. Patient could return for further imaging if clinically indicated. 2. Large volume stool in the rectum. I have personally reviewed the past medical history, past surgical history, medications, social history, and family history, and I haveupdated the database accordingly. Allergies:   Depakene [valproic acid], Restoril [temazepam], Risperidone and related, Thorazine [chlorpromazine], and Vancomycin     Review of Systems:     Review of Systems   Constitutional:  Negative for diaphoresis. HENT:  Negative for congestion, dental problem and nosebleeds. Eyes:  Negative for visual disturbance. Respiratory:  Negative for shortness of breath. Cardiovascular:  Negative for chest pain. Gastrointestinal:  Negative for abdominal distention. Endocrine: Negative for cold intolerance and heat intolerance. Genitourinary:  Negative for hematuria. Musculoskeletal:  Negative for arthralgias. Skin:  Positive for wound. Negative for pallor. Allergic/Immunologic: Negative for immunocompromised state. Neurological:  Negative for light-headedness. Hematological:  Does not bruise/bleed easily. Psychiatric/Behavioral:  Negative for behavioral problems. Physical Examination :       Physical Exam  Constitutional:       General: He is not in acute distress. Appearance: He is not toxic-appearing. HENT:      Head: Normocephalic and atraumatic. Right Ear: External ear normal.      Left Ear: External ear normal.      Nose: Nose normal. No congestion. Mouth/Throat:      Mouth: Mucous membranes are moist.      Pharynx: Oropharynx is clear. No oropharyngeal exudate.    Eyes: General: No scleral icterus. Extraocular Movements: Extraocular movements intact. Pupils: Pupils are equal, round, and reactive to light. Cardiovascular:      Rate and Rhythm: Normal rate and regular rhythm. Pulses: Normal pulses. Heart sounds: Normal heart sounds. No murmur heard. No gallop. Pulmonary:      Effort: Pulmonary effort is normal.      Breath sounds: No wheezing or rales. Abdominal:      General: Abdomen is flat. Palpations: Abdomen is soft. There is no mass. Tenderness: There is no abdominal tenderness. There is no guarding. Genitourinary:     Comments: No Kowalski  Musculoskeletal:         General: No swelling or deformity. Normal range of motion. Cervical back: Normal range of motion. No rigidity. Lymphadenopathy:      Cervical: No cervical adenopathy. Skin:     Capillary Refill: Capillary refill takes less than 2 seconds. Findings: Rash (groin, scrotal area, thigh) present. Neurological:      General: No focal deficit present. Mental Status: He is oriented to person, place, and time. Motor: No weakness.       Gait: Gait normal.   Psychiatric:         Mood and Affect: Mood normal.         Behavior: Behavior normal.       Past Medical History:     Past Medical History:   Diagnosis Date    Bipolar 1 disorder ([facility])     GERD (gastroesophageal reflux disease) 8/13/2016    Hidradenitis suppurativa     Polysubstance abuse ([facility] [address]     Schizoaffective disorder ([facility])        Past Surgical  History:     Past Surgical History:   Procedure Laterality Date    ABSCESS DRAINAGE  11/1/2013    left inguinal hydrodenitis/pilondal cyst    INFECTED SKIN DEBRIDEMENT  july 2011       Medications:      piperacillin-tazobactam  3,375 mg IntraVENous Q8H    zinc sulfate  50 mg Oral Daily    vitamin D  50,000 Units Oral Weekly    vitamin C  500 mg Oral BID    carBAMazepine  200 mg Oral BID    haloperidol  1 mg Oral BID    sodium chloride flush  5-40 mL IntraVENous 2 times per day    enoxaparin  30 mg SubCUTAneous BID       Social History:     Social History     Socioeconomic History    Marital status: Single     Spouse name: Not on file    Number of children: Not on file    Years of education: Not on file    Highest education level: Not on file   Occupational History    Not on file   Tobacco Use    Smoking status: Every Day     Packs/day: 1.00     Years: 22.00     Pack years: 22.00     Types: Cigarettes    Smokeless tobacco: Never   Vaping Use    Vaping Use: Never used   Substance and Sexual Activity    Alcohol use: Yes     Comment: occasional    Drug use: Yes     Comment: history of marijuana & Opiate abue, claims that he is currently clean & sober.     Sexual activity: Not on file   Other Topics Concern    Not on file   Social History Narrative    Not on file     Social Determinants of Health     Financial Resource Strain: Not on file   Food Insecurity: Not on file   Transportation Needs: Not on file   Physical Activity: Not on file   Stress: Not on file   Social Connections: Not on file   Intimate Partner Violence: Not on file   Housing Stability: Not on file       Family History:     Family History   Problem Relation Age of Onset    Cancer Mother         breast    High Blood Pressure Maternal Grandfather     Mental Illness Paternal Aunt     Substance Abuse Paternal Uncle     Substance Abuse Brother     Alcohol Abuse Father     Cancer Father         pancrease      Medical Decision Making:   I have independently reviewed/ordered the following labs:    CBC with Differential:   Recent Labs     10/11/22  0533 10/12/22  0436   WBC 22.5* 17.9*   HGB 10.8* 10.2*   HCT 33.1* 31.0*    404       BMP:  Recent Labs     10/11/22  0533 10/12/22  0436   * 134*   K 3.6* 3.7    101   CO2 22 21   BUN 8 7   CREATININE 0.78 0.71   MG 1.7 1.8       Hepatic Function Panel:   Recent Labs     10/11/22  0533 10/12/22  0436   LABALBU 2.0* 2.0*       No results for input(s): RPR in the last 72 hours. No results for input(s): HIV in the last 72 hours. No results for input(s): BC in the last 72 hours. Lab Results   Component Value Date/Time    CREATININE 0.71 10/12/2022 04:36 AM    GLUCOSE 107 10/12/2022 04:36 AM    GLUCOSE 89 02/09/2012 06:19 AM       Detailed results: Thank you for allowing us to participate in the care of this patient. Please call with questions. This note is created with the assistance of a speech recognition program.  While intending to generate adocument that actually reflects the content of the visit, the document can still have some errors including those of syntax and sound a like substitutions which may escape proof reading. It such instances, actual meaningcan be extrapolated by contextual diversion. Office: [phone]  Perfect serve / office [phone]    Zuleima Jamison, OMS3      I have discussed the care of the patient, including pertinent history and exam findings,  with the resident. I have seen and examined the patient and the key elements of all parts of the encounter have been performed by me. I agree with the assessment, plan and orders as documented by the resident.     Kathy Rivera, Infectious Diseases

## 2022-10-13 ENCOUNTER — APPOINTMENT (OUTPATIENT)
Dept: CT IMAGING | Age: 42
DRG: 871 | End: 2022-10-13
Payer: COMMERCIAL

## 2022-10-13 LAB
ALBUMIN SERPL-MCNC: 2 G/DL (ref 3.5–5.2)
ANION GAP SERPL CALCULATED.3IONS-SCNC: 9 MMOL/L (ref 9–17)
BUN BLDV-MCNC: 6 MG/DL (ref 6–20)
CALCIUM SERPL-MCNC: 8.6 MG/DL (ref 8.6–10.4)
CHLORIDE BLD-SCNC: 103 MMOL/L (ref 98–107)
CO2: 24 MMOL/L (ref 20–31)
CREAT SERPL-MCNC: 0.6 MG/DL (ref 0.7–1.2)
GFR SERPL CREATININE-BSD FRML MDRD: >60 ML/MIN/1.73M2
GLUCOSE BLD-MCNC: 86 MG/DL (ref 70–99)
HCT VFR BLD CALC: 30.4 % (ref 40.7–50.3)
HEMOGLOBIN: 10 G/DL (ref 13–17)
MAGNESIUM: 1.7 MG/DL (ref 1.6–2.6)
MCH RBC QN AUTO: 29 PG (ref 25.2–33.5)
MCHC RBC AUTO-ENTMCNC: 32.9 G/DL (ref 28.4–34.8)
MCV RBC AUTO: 88.1 FL (ref 82.6–102.9)
NRBC AUTOMATED: 0 PER 100 WBC
PDW BLD-RTO: 14.5 % (ref 11.8–14.4)
PHOSPHORUS: 2.7 MG/DL (ref 2.5–4.5)
PLATELET # BLD: 389 K/UL (ref 138–453)
PMV BLD AUTO: 8.7 FL (ref 8.1–13.5)
POTASSIUM SERPL-SCNC: 3.8 MMOL/L (ref 3.7–5.3)
RBC # BLD: 3.45 M/UL (ref 4.21–5.77)
SODIUM BLD-SCNC: 136 MMOL/L (ref 135–144)
WBC # BLD: 17 K/UL (ref 3.5–11.3)

## 2022-10-13 PROCEDURE — 6360000004 HC RX CONTRAST MEDICATION: Performed by: INTERNAL MEDICINE

## 2022-10-13 PROCEDURE — 85027 COMPLETE CBC AUTOMATED: CPT

## 2022-10-13 PROCEDURE — 1200000000 HC SEMI PRIVATE

## 2022-10-13 PROCEDURE — 97535 SELF CARE MNGMENT TRAINING: CPT

## 2022-10-13 PROCEDURE — 6370000000 HC RX 637 (ALT 250 FOR IP): Performed by: INTERNAL MEDICINE

## 2022-10-13 PROCEDURE — 83735 ASSAY OF MAGNESIUM: CPT

## 2022-10-13 PROCEDURE — 51702 INSERT TEMP BLADDER CATH: CPT

## 2022-10-13 PROCEDURE — 6370000000 HC RX 637 (ALT 250 FOR IP): Performed by: CLINICAL NURSE SPECIALIST

## 2022-10-13 PROCEDURE — 6360000002 HC RX W HCPCS: Performed by: NURSE PRACTITIONER

## 2022-10-13 PROCEDURE — 6370000000 HC RX 637 (ALT 250 FOR IP): Performed by: STUDENT IN AN ORGANIZED HEALTH CARE EDUCATION/TRAINING PROGRAM

## 2022-10-13 PROCEDURE — 2580000003 HC RX 258: Performed by: INTERNAL MEDICINE

## 2022-10-13 PROCEDURE — 6360000002 HC RX W HCPCS: Performed by: INTERNAL MEDICINE

## 2022-10-13 PROCEDURE — 2580000003 HC RX 258: Performed by: STUDENT IN AN ORGANIZED HEALTH CARE EDUCATION/TRAINING PROGRAM

## 2022-10-13 PROCEDURE — 80069 RENAL FUNCTION PANEL: CPT

## 2022-10-13 PROCEDURE — 99232 SBSQ HOSP IP/OBS MODERATE 35: CPT | Performed by: INTERNAL MEDICINE

## 2022-10-13 PROCEDURE — 6370000000 HC RX 637 (ALT 250 FOR IP): Performed by: NURSE PRACTITIONER

## 2022-10-13 PROCEDURE — 36415 COLL VENOUS BLD VENIPUNCTURE: CPT

## 2022-10-13 PROCEDURE — 97116 GAIT TRAINING THERAPY: CPT

## 2022-10-13 PROCEDURE — 97530 THERAPEUTIC ACTIVITIES: CPT

## 2022-10-13 PROCEDURE — 99232 SBSQ HOSP IP/OBS MODERATE 35: CPT | Performed by: STUDENT IN AN ORGANIZED HEALTH CARE EDUCATION/TRAINING PROGRAM

## 2022-10-13 PROCEDURE — 72193 CT PELVIS W/DYE: CPT

## 2022-10-13 RX ORDER — ASCORBIC ACID 500 MG
500 TABLET ORAL 2 TIMES DAILY
Qty: 30 TABLET | Refills: 3 | Status: SHIPPED | OUTPATIENT
Start: 2022-10-13

## 2022-10-13 RX ORDER — ERGOCALCIFEROL 1.25 MG/1
50000 CAPSULE ORAL WEEKLY
Qty: 5 CAPSULE | Refills: 1 | Status: SHIPPED | OUTPATIENT
Start: 2022-10-19

## 2022-10-13 RX ORDER — ZINC SULFATE 50(220)MG
50 CAPSULE ORAL DAILY
Qty: 30 CAPSULE | Refills: 3 | COMMUNITY
Start: 2022-10-13

## 2022-10-13 RX ORDER — AMOXICILLIN AND CLAVULANATE POTASSIUM 875; 125 MG/1; MG/1
1 TABLET, FILM COATED ORAL EVERY 12 HOURS SCHEDULED
Status: COMPLETED | OUTPATIENT
Start: 2022-10-13 | End: 2022-10-20

## 2022-10-13 RX ORDER — HYDROCODONE BITARTRATE AND ACETAMINOPHEN 5; 325 MG/1; MG/1
1 TABLET ORAL EVERY 4 HOURS PRN
Qty: 7 TABLET | Refills: 0 | Status: SHIPPED | OUTPATIENT
Start: 2022-10-13 | End: 2022-10-16

## 2022-10-13 RX ORDER — LEVOFLOXACIN 500 MG/1
500 TABLET, FILM COATED ORAL DAILY
Status: COMPLETED | OUTPATIENT
Start: 2022-10-13 | End: 2022-10-22

## 2022-10-13 RX ADMIN — CARBAMAZEPINE 200 MG: 200 TABLET ORAL at 08:48

## 2022-10-13 RX ADMIN — CARBAMAZEPINE 200 MG: 200 TABLET ORAL at 20:49

## 2022-10-13 RX ADMIN — ZINC SULFATE 220 MG (50 MG) CAPSULE 50 MG: CAPSULE at 12:38

## 2022-10-13 RX ADMIN — ENOXAPARIN SODIUM 30 MG: 100 INJECTION SUBCUTANEOUS at 08:48

## 2022-10-13 RX ADMIN — SODIUM CHLORIDE: 9 INJECTION, SOLUTION INTRAVENOUS at 10:07

## 2022-10-13 RX ADMIN — WHITE PETROLATUM: 1.75 OINTMENT TOPICAL at 17:23

## 2022-10-13 RX ADMIN — AMOXICILLIN AND CLAVULANATE POTASSIUM 1 TABLET: 875; 125 TABLET, FILM COATED ORAL at 20:49

## 2022-10-13 RX ADMIN — HYDROCODONE BITARTRATE AND ACETAMINOPHEN 2 TABLET: 5; 325 TABLET ORAL at 18:16

## 2022-10-13 RX ADMIN — HALOPERIDOL 1 MG: 1 TABLET ORAL at 20:49

## 2022-10-13 RX ADMIN — OXYCODONE HYDROCHLORIDE AND ACETAMINOPHEN 500 MG: 500 TABLET ORAL at 08:48

## 2022-10-13 RX ADMIN — HYDROCODONE BITARTRATE AND ACETAMINOPHEN 2 TABLET: 5; 325 TABLET ORAL at 10:06

## 2022-10-13 RX ADMIN — OXYCODONE HYDROCHLORIDE AND ACETAMINOPHEN 500 MG: 500 TABLET ORAL at 20:49

## 2022-10-13 RX ADMIN — HYDROCODONE BITARTRATE AND ACETAMINOPHEN 2 TABLET: 5; 325 TABLET ORAL at 14:17

## 2022-10-13 RX ADMIN — HALOPERIDOL 1 MG: 1 TABLET ORAL at 08:48

## 2022-10-13 RX ADMIN — PIPERACILLIN AND TAZOBACTAM 3375 MG: 3; .375 INJECTION, POWDER, FOR SOLUTION INTRAVENOUS at 12:36

## 2022-10-13 RX ADMIN — LEVOFLOXACIN 500 MG: 500 TABLET, FILM COATED ORAL at 17:22

## 2022-10-13 RX ADMIN — PIPERACILLIN AND TAZOBACTAM 3375 MG: 3; .375 INJECTION, POWDER, FOR SOLUTION INTRAVENOUS at 04:25

## 2022-10-13 RX ADMIN — ENOXAPARIN SODIUM 30 MG: 100 INJECTION SUBCUTANEOUS at 20:49

## 2022-10-13 RX ADMIN — IOPAMIDOL 75 ML: 755 INJECTION, SOLUTION INTRAVENOUS at 18:08

## 2022-10-13 ASSESSMENT — PAIN DESCRIPTION - LOCATION
LOCATION: GENERALIZED
LOCATION: GENERALIZED

## 2022-10-13 ASSESSMENT — PAIN SCALES - GENERAL
PAINLEVEL_OUTOF10: 8
PAINLEVEL_OUTOF10: 8
PAINLEVEL_OUTOF10: 10

## 2022-10-13 ASSESSMENT — ENCOUNTER SYMPTOMS
SHORTNESS OF BREATH: 0
ABDOMINAL DISTENTION: 0

## 2022-10-13 NOTE — CARE COORDINATION
Transitional planning note: spoke with effie from [facility]. She states she did perform an onsite eval today and she needs to confirm with her director regarding acceptance. Advised that patient is ready for discharge.  Effie will call back    1700: spoke with effie from [facility] and they have accepted patient and started precert

## 2022-10-13 NOTE — PLAN OF CARE
Problem: Discharge Planning  Goal: Discharge to home or other facility with appropriate resources  10/13/2022 0108 by Bernarda Cantu RN  Outcome: Progressing  10/12/2022 1248 by Maikel Weems RN  Outcome: Progressing     Problem: Safety - Adult  Goal: Free from fall injury  10/13/2022 0108 by Bernarda Cantu RN  Outcome: Progressing  10/12/2022 1248 by Maikel Weems RN  Outcome: Progressing     Problem: Confusion  Goal: Confusion, delirium, dementia, or psychosis is improved or at baseline  Description: INTERVENTIONS:  1. Assess for possible contributors to thought disturbance, including medications, impaired vision or hearing, underlying metabolic abnormalities, dehydration, psychiatric diagnoses, and notify attending LIP  2. [place] high risk fall precautions, as indicated  3. Provide frequent short contacts to provide reality reorientation, refocusing and direction  4. Decrease environmental stimuli, including noise as appropriate  5. Monitor and intervene to maintain adequate nutrition, hydration, elimination, sleep and activity  6. If unable to ensure safety without constant attention obtain sitter and review sitter guidelines with assigned personnel  7. Initiate Psychosocial CNS and Spiritual Care consult, as indicated  10/13/2022 0108 by Bernarda Cantu RN  Outcome: Progressing  Flowsheets (Taken 10/12/2022 1411 by Leola Frankel RN)  Effect of thought disturbance (confusion, delirium, dementia, or psychosis) are managed with adequate functional status: Assess for contributors to thought disturbance, including medications, impaired vision or hearing, underlying metabolic abnormalities, dehydration, psychiatric diagnoses, notify LIP  10/12/2022 1248 by Maikel Weems RN  Outcome: Progressing     Problem: Skin/Tissue Integrity  Goal: Absence of new skin breakdown  Description: 1. Monitor for areas of redness and/or skin breakdown  2. Assess vascular access sites hourly  3.   Every 4-6 hours minimum: Change oxygen saturation probe site  4. Every 4-6 hours:  If on nasal continuous positive airway pressure, respiratory therapy assess nares and determine need for appliance change or resting period.   10/13/2022 0108 by Taryn Gibson RN  Outcome: Progressing  10/12/2022 1248 by Manish Dickey RN  Outcome: Progressing     Problem: Pain  Goal: Verbalizes/displays adequate comfort level or baseline comfort level  10/13/2022 0108 by Taryn Gibson RN  Outcome: Progressing  10/12/2022 1248 by Manish Dickey RN  Outcome: Progressing

## 2022-10-13 NOTE — DISCHARGE INSTR - COC
Continuity of Care Form    Patient Name: Heather Tipton   :  1980  MRN:  [id number]    Admit date:  10/9/2022  Discharge date:  10/26/2022    Code Status Order: Full Code   Advance Directives:     Admitting Physician:  No admitting provider for patient encounter. PCP: Nancy Linton MD    Discharging Nurse: [facility] Unit/Room#: 9221/7132-92  Discharging Unit Phone Number:     Emergency Contact:   Extended Emergency Contact Information  Primary Emergency Contact: Petar Dubon  Address: [address] [place] [id number]  Home Phone: [phone]  Work Phone: [phone]  Relation: Parent    Past Surgical History:  Past Surgical History:   Procedure Laterality Date    ABSCESS DRAINAGE  2013    left inguinal hydrodenitis/pilondal cyst    INFECTED SKIN DEBRIDEMENT  2011       Immunization History: There is no immunization history on file for this patient.     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    Hidradenitis axillaris L73.2    Schizoaffective disorder, bipolar type ([address] F25.0    Encounter for ostomy nurse consultation Z71.89    Paranoid schizophrenia, chronic condition with acute exacerbation ([address] F20.0    Iron deficiency anemia D50.9    GERD (gastroesophageal reflux disease) K21.9    Tobacco abuse Z72.0    Non-healing left groin open wound, subsequent encounter S31.104D    Right groin wound, subsequent encounter S31.109D    Open wound of left ear S01.302A    Abscess L02.91    Septicemia ([address] A41.9    Constipation K59.00    Acute exacerbation of chronic paranoid schizophrenia ([address] F20.0    Schizophrenia ([address] F20.9    Gram-positive bacteremia R78.81    Cellulitis of right thigh L03.115    Bandemia D72.825    Scrotal abscess N49.2    Immunosuppressed due to chemotherapy ([facility] [address] D84.821, T45.1X5A, Z79.899    Mild protein-calorie malnutrition ([facility] E44.1    Hypovitaminosis D E55.9       Isolation/Infection:   Isolation            Contact          Patient Infection Status       Infection Onset Added Last Indicated Last Indicated By Review Planned Expiration Resolved Resolved By    MDRO (multi-drug resistant organism)  03/21/16 03/21/16 PHILIP Burnett - 3/18/2016 urine            Nurse Assessment:  Last Vital Signs: /83   Pulse 75   Temp 98.7 °F (37.1 °C) (Oral)   Resp 14   Ht 5' 9\" (1.753 m)   Wt 248 lb 3.8 oz (112.6 kg)   SpO2 90%   BMI 36.66 kg/m²     Last documented pain score (0-10 scale): Pain Level: 8  Last Weight:   Wt Readings from Last 1 Encounters:   10/13/22 248 lb 3.8 oz (112.6 kg)     Mental Status:   orientated /disorientated  talks to self at times     IV Access:  - None    Nursing Mobility/ADLs:  Walking   Assisted  Transfer  Assisted  Bathing  Dependent  Dressing  Dependent  Toileting  Dependent  Feeding  Assisted  Med Admin  Assisted  Med Delivery   none and whole    Wound Care Documentation and Therapy:  Wound 10/10/22 Groin Left;Right chronic hidradinitis scarring and sinus tracts (Active)   Wound Image   10/10/22 1231   Dressing Status Clean;Dry; Intact 10/13/22 0818   Wound Cleansed Wound cleanser 10/12/22 0700   Dressing/Treatment Alginate with Ag 10/12/22 1100   Dressing Change Due 10/10/22 10/10/22 1231   Wound Assessment Bleeding 10/12/22 1100   Drainage Amount Moderate 10/12/22 1100   Drainage Description Sanguinous;Purulent 10/12/22 1100   Odor Mild 10/12/22 1100   Blaire-wound Assessment Dry/flaky 10/12/22 1100   Number of days: 3        Elimination:  Continence:    Bowel: No  Bladder: Yes  Urinary Catheter: Indication for Use of Catheter: healing of groin wounds   hunter inserted 10/9/2022  Colostomy/Ileostomy/Ileal Conduit: No       Date of Last BM: 10/25/2022    Intake/Output Summary (Last 24 hours) at 10/13/2022 1326  Last data filed at 10/13/2022 1223  Gross per 24 hour   Intake 1200 ml   Output 2150 ml   Net -950 ml     I/O last 3 completed shifts: In: 5 [P.O.:720]  Out: 4950 [Urine:4950]    Safety Concerns: At Risk for Falls    Impairments/Disabilities:      Speech mumbles hard to understand at times    Nutrition Therapy:  Current Nutrition Therapy:   - Oral Diet:  General    Routes of Feeding: Oral  Liquids: Thin Liquids  Daily Fluid Restriction: no  Last Modified Barium Swallow with Video (Video Swallowing Test): not done    Treatments at the Time of Hospital Discharge:   Respiratory Treatments: none  Oxygen Therapy:  is not on home oxygen therapy. Ventilator:    - No ventilator support    Rehab Therapies: Physical Therapy and Occupational Therapy  Weight Bearing Status/Restrictions: No weight bearing restrictions  Other Medical Equipment (for information only, NOT a DME order):  walker  Other Treatments: skilled nursing care and assessments daily HCG baths hunter care soap and water    Patient's personal belongings (please select all that are sent with patient):  Pants, shirt    RN SIGNATURE:  Electronically signed by Matias Nevarez RN on 10/25/22 at 3:42 PM EDT    CASE MANAGEMENT/SOCIAL WORK SECTION    Inpatient Status Date: ***    Readmission Risk Assessment Score:  Readmission Risk              Risk of Unplanned Readmission:  19           Discharging to Facility/ Agency   Name: [facility]  Address:  Phone: [phone]  Fax:    / signature: Electronically signed by Ayden Peterson RN on 10/15/22 at 10:35 AM EDT    PHYSICIAN SECTION    Prognosis: Fair    Condition at Discharge: Stable    Rehab Potential (if transferring to Rehab):  Fair    Recommended Labs or Other Treatments After Discharge: follow up psych  Follow up ID, continue antibiotics and dressing changes    Physician Certification: I certify the above information and transfer of Altagracia Mckeon  is necessary for the continuing treatment of the diagnosis listed and that he requires [place] for less 30 days.      Update Admission H&P: Changes in H&P as follows - discharge summary to follow    PHYSICIAN SIGNATURE:  Johanna Gonzalez MD  [facility] hospitalists  [facility]  10/25/2022,3:12 PM

## 2022-10-13 NOTE — PROGRESS NOTES
Physical Therapy  Facility/Department: [facility] RENAL//MED SURG  Physical Therapy Daily Treatment Note    Name: Vasquez Veras  : 1980  MRN: [id number]  Date of Service: 10/13/2022    Discharge Recommendations:  Patient would benefit from continued therapy after discharge   PT Equipment Recommendations  Equipment Needed: Yes  Mobility Devices: Tapan Dally: Rolling      Patient Diagnosis(es): The primary encounter diagnosis was Septicemia ([facility] [address] Diagnoses of Cellulitis of right thigh and Hidradenitis were also pertinent to this visit. Past Medical History:  has a past medical history of Bipolar 1 disorder ([facility] [address] GERD (gastroesophageal reflux disease), Hidradenitis suppurativa, Polysubstance abuse ([facility] [address] and Schizoaffective disorder ([facility] [address] Past Surgical History:  has a past surgical history that includes Infected skin debridement (2011) and Abscess Drainage (2013). Assessment   Body Structures, Functions, Activity Limitations Requiring Skilled Therapeutic Intervention: Decreased functional mobility ; Decreased strength;Decreased endurance;Decreased cognition;Decreased safe awareness;Decreased balance; Increased pain  Assessment: Pt with improving mobility this date, requiring min-A to perform bed mobility, CGA to perform sit<>stand transfer and to maintain static standing x7 minutes this date. Pt able to ambulate 2 feet L at the EOB with a RW and CGA. Pt will require 24 hour assistance with mobility upon discharge. Pt would benefit from additional therapy upon discharge to maximize functional independence.   Therapy Prognosis: Good  Decision Making: Medium Complexity  Requires PT Follow-Up: Yes  Activity Tolerance  Activity Tolerance: Patient limited by fatigue;Patient limited by pain     Plan   Physcial Therapy Plan  General Plan:  (5-6x wk)  Current Treatment Recommendations: Strengthening, Functional mobility training, Transfer training, Endurance training, Stair training, Gait training, Safety education & training, Balance training, Therapeutic activities, Equipment evaluation, education, & procurement, Home exercise program, Patient/Caregiver education & training  Safety Devices  Type of Devices: Left in bed, Call light within reach, Gait belt, Patient at risk for falls, Nurse notified, Bed alarm in place  Restraints  Restraints Initially in Place: No     Restrictions  Restrictions/Precautions  Restrictions/Precautions: Fall Risk  Required Braces or Orthoses?: No  Position Activity Restriction  Other position/activity restrictions: up with assist     Subjective   General  Patient assessed for rehabilitation services?: Yes  Response To Previous Treatment: Patient with no complaints from previous session. Family / Caregiver Present: No  Follows Commands: Within Functional Limits  Subjective  Subjective: Pt supine in bed and with encouragement is agreeable to therapy, RN agreeable to therapy. Pt cooperative throughout today's session. Pt reports 10/10 leg pain at rest.           Cognition   Cognition  Overall Cognitive Status: Exceptions  Arousal/Alertness: Delayed responses to stimuli  Following Commands: Follows multistep commands with repitition; Follows multistep commands with increased time  Attention Span: Attends with cues to redirect  Problem Solving: Assistance required to identify errors made;Assistance required to generate solutions  Insights: Decreased awareness of deficits  Initiation: Requires cues for some  Sequencing: Requires cues for some     Objective                           Bed mobility  Supine to Sit: Minimal assistance  Sit to Supine: Minimal assistance  Scooting: Contact guard assistance  Bed Mobility Comments: HOB elevated ~50 degrees, verbal cues for sequencing. Increased time/effort to perform. Transfers  Sit to Stand: Contact guard assistance  Stand to Sit: Contact guard assistance  Comment: Increased time/effort to perform.   Ambulation  Surface: Level tile  Device: Rolling Walker  Assistance: Contact guard assistance  Quality of Gait: unsteady, decreased stride length, no LOB. Gait Deviations: Slow Hoa;Decreased step height  Distance: 2 feet L at the EOB. More Ambulation?: No  Stairs/Curb  Stairs?: No     Balance  Posture: Fair  Sitting - Static: Fair  Sitting - Dynamic: Fair;-  Standing - Static: Poor;+  Standing - Dynamic: Poor;+  Comments: standing balance assessed while using a RW.  Pt's standing balance challenged x7 minutes this date. Exercise Treatment: x10 BLE in supine: hip abduction, LAQs, hip flexion, ankle pumps.                                                        AM-PAC Score  AM-PAC Inpatient Mobility Raw Score : 14 (10/13/22 1544)  AM-PAC Inpatient T-Scale Score : 38.1 (10/13/22 1544)  Mobility Inpatient CMS 0-100% Score: 61.29 (10/13/22 1544)  Mobility Inpatient CMS G-Code Modifier : CL (10/13/22 1544)            Goals  Short Term Goals  Time Frame for Short Term Goals: 10 visits  Short Term Goal 1: transfers with SBA  Short Term Goal 2: amb 125 ft with a RW x SBA  Short Term Goal 3: ascend/descend 4 steps with SBA  Short Term Goal 4: 20 min strengthening exercise program x SBA  Additional Goals?: No  Patient Goals   Patient Goals : Return home       Education  Patient Education  Education Given To: Patient  Education Provided: Plan of Care;Transfer Training;Equipment;Home Exercise Program  Education Method: Verbal  Barriers to Learning: Cognition  Education Outcome: Verbalized understanding;Continued education needed      Therapy Time   Individual Concurrent Group Co-treatment   Time In 1414         Time Out 1447         Minutes 33         Timed Code Treatment Minutes: 23 Minutes       [place]

## 2022-10-13 NOTE — PROGRESS NOTES
Occupational Therapy  Facility/Department: [facility] RENAL//MED SURG  Occupational Therapy Daily Progress Note    Name: Nando Poe  : 1980  MRN: [id number]  Date of Service: 10/13/2022    Discharge Recommendations:Pt. Would benefit from further skilled OT services prior to returning to [facility]. OT Equipment Recommendations  Equipment Needed: Yes  Mobility Devices: Gordan Brighton: Rolling  ADL Assistive Devices: Shower Chair with back       Patient Diagnosis(es): The primary encounter diagnosis was Septicemia ([facility] [address] Diagnoses of Cellulitis of right thigh and Hidradenitis were also pertinent to this visit. Past Medical History:  has a past medical history of Bipolar 1 disorder ([facility] [address] GERD (gastroesophageal reflux disease), Hidradenitis suppurativa, Polysubstance abuse ([facility] [address] and Schizoaffective disorder ([facility] [address] Past Surgical History:  has a past surgical history that includes Infected skin debridement (2011) and Abscess Drainage (2013). Treatment Diagnosis: hidradenitis suppurativa      Assessment   Performance deficits / Impairments: Decreased functional mobility ; Decreased ADL status; Decreased endurance;Decreased high-level IADLs;Decreased strength;Decreased balance;Decreased cognition  Assessment: pt would benefit from further therapy at discharge before returning to prior living arrangement d/t requiring increased physical assistance for ADLS and functional transfers/functional mobility.   Treatment Diagnosis: hidradenitis suppurativa  Prognosis: Good  Decision Making: Medium Complexity  REQUIRES OT FOLLOW-UP: Yes  Activity Tolerance  Activity Tolerance: Patient Tolerated treatment well;Patient limited by fatigue;Patient limited by pain        Plan   Occupational Therapy Plan  Times Per Week: 3-4x/wk     Restrictions  Restrictions/Precautions  Restrictions/Precautions: Fall Risk  Required Braces or Orthoses?: No  Position Activity Restriction  Other position/activity restrictions: up with assist    Subjective   General  Patient assessed for rehabilitation services?: Yes  Family / Caregiver Present: No  Diagnosis: hidradenitits suppurativa  General Comment  Comments: RN ok'd for therapy ths pm. pt agreeable to participate in session and cooperative throughout after encouragement. pt reported 10/10 of pain at [employer], pt able to continue with session       Objective        Safety Devices  Type of Devices: Left in bed;Call light within reach;Gait belt;Patient at risk for falls;Nurse notified; Bed alarm in place  Restraints  Restraints Initially in Place: No  Bed Mobility Training  Bed Mobility Training: Yes  Overall Level of Assistance: Contact-guard assistance; Additional time (Bed rail, HOB elevated to the max, per pt. request.)  Supine to Sit: Contact-guard assistance; Additional time  Sit to Supine: Contact-guard assistance; Additional time  Scooting: Contact-guard assistance  Balance  Sitting: With support (SBA-CGA sitting EOB, static/dynamic, ~20 minutes)  Standing: With support (CGA + RW, 50% of time B elbow support on RW, 50% of time B hand support on RW. Pt. forward flexed at B hips and neck, cues to keep upright posture.)  Transfer Training  Transfer Training: Yes  Overall Level of Assistance: Contact-guard assistance; Additional time (Very slow moving, RW,)  Interventions: Safety awareness training;Verbal cues (Cues for B hand and foot placement.)  Sit to Stand: Contact-guard assistance; Additional time  Stand to Sit: Contact-guard assistance; Additional time  Gait  Overall Level of Assistance:  (Pt declined, despite max encouragement.)        ADL  UE Bathing: Moderate assistance  UE Bathing Skilled Clinical Factors: Sitting EOB pt. able to bathe R side of UE while leaning into raised HOB for support at SBA level + set up. At standing level pt. needing mod A to bathe back and L side of UE that was leaning into bed for support, d/t B UE support of RW needed to keep upright position.   LE Bathing: Moderate assistance;Verbal cueing;Setup  LE Bathing Skilled Clinical Factors: Mod A to bathe B LEs in bed  d/t increased pain when reaching and bending. UE Dressing: Minimal assistance  UE Dressing Skilled Clinical Factors: Sitting EOB, min A to doff/don gown d/t weakness and pain  Additional Comments: Pt. demo F/P tolerance to ADL activity, pt. wounds seeping, RN notified. Cognition  Overall Cognitive Status: Exceptions  Following Commands: Follows multistep commands with repitition; Follows multistep commands with increased time  Attention Span: Attends with cues to redirect  Problem Solving: Assistance required to identify errors made;Assistance required to generate solutions  Insights: Decreased awareness of deficits  Initiation: Requires cues for some  Sequencing: Requires cues for some  Cognition Comment: Slow to respond.   Orientation  Overall Orientation Status: Within Functional Limits                  Education Given To: Patient  Education Provided: Plan of Care;Role of Therapy;Transfer Training  Education Method: Verbal  Barriers to Learning: None  Education Outcome: Verbalized understanding                                                  AM-PAC Score        AM-[facility] Inpatient Daily Activity Raw Score: 16 (10/13/22 1500)  AM-PAC Inpatient ADL T-Scale Score : 35.96 (10/13/22 1500)  ADL Inpatient CMS 0-100% Score: 53.32 (10/13/22 1500)  ADL Inpatient CMS G-Code Modifier : CK (10/13/22 1500)           Goals  Short Term Goals  Time Frame for Short Term Goals: pt will, by discharge  Short Term Goal 1: complete LB ADLs and toileting tasks with min A, set up and Ae, as needed  Short Term Goal 2: complete UB ADLs with SBA and set up  Short Term Goal 3: dem CGA during functional transfers/functional mobility with LRd, as needed  Short Term Goal 4: dem ~15 minutes dynamic sitting balance with SBA on EOB in order to complete functional tasks  Short Term Goal 5: increase activity tolerance to 20+ minutes in order to participate in daily tasks       Therapy Time   Individual Concurrent Group Co-treatment   Time In 1404         Time Out 1442         Minutes 38         Timed Code Treatment Minutes: [address] [place], NAIN/L

## 2022-10-13 NOTE — PROGRESS NOTES
[facility] -   Infectious diseases evaluation  admission date 10/9/2022    reason for consultation:   Hidradenitis suppurativa     Impression :   Current:  Single Gram positive bacteremia - micrococcus contamination  Hx of hidradenitis suppurativa - followed with [facility] derm clinic, was receiving biweekly humira injections   Patient has not received Humira injections for last 17 days while in [facility]   immunosuppressed  Recent R buttock abscess vs. hidradenitis on 8/2022   Requested superficial abscess drain by IR but they decided it was loculated, would need drainage by GS  GS deferred to plastic, as lesions were very superficial   Dr. Bre chamberlain plastic planned for wound care outpatient  DC on augmentin  Altered mental status- new  CT Head wo hemorrhage  Mother unsure if pt has been getting psychotropic meds in [facility]   Multiple foci of painful hidradenitis on R thigh, groin area, scrotum   Purulent bloody drainage  CT correlates with hidradenitis hx  Wound care on board  Cellulitis on b/l thighs  Sepsis  SIRS: 3 on presentation + source  1 BCx with gram positive cocci in clusters likely contamination  2nd BCx pending  Bandemia   Elevated CRP - 144  Vitamin D deficiency -11.4     Other:  Incarcerated - last 17 days   Current smoker   Schizophrenia  Bipolar disorder    Discussion / summary of stay / plan of care     Recommendations     Switch zosyn nemesio  augmentin and levaquin x 2 weeks  Pus mixed bacteria  WBC remains elevated -   get repeat CT of the pelvis looking for an abscess formation   If no abscess, wound DC on above po AB x 2 weeks  So far GS - decided no surgical incision at this time  WBC fluctuating    Hidradenitis -  10/12 Start Zinc 50mg QD   Vit D 11.4 low - start 61994IH weekly x 8     Infection Control Recommendations   Tok Precautions  Contact Isolation     Antimicrobial Stewardship Recommendations   Simplification of therapy  Targeted therapy    History of Present Illness:   Initial history:  Gael Duval is a 38y.o.-year-old [facility] inmate with PMHx of schizophrenia and hidradenitis suppurativa who presents with purulent wounds around the groin, scrotum, and R thigh. The wounds are severe, painful, and draining bloody red, purulent drainage, likely hidradenitis suppurativa. Patient has been following with Dr. Keon Robles at [facility] dermatology clinic and had been getting bi weekly Humira injections. The patient has not been getting biweekly Humira injections while in [facility] for the last 17 days- his mother reported that he missed 3 shots. This patient has an extensive psych history - mother unsure is patient was getting psychotropic medications while in [facility]. He currently follows with [facility]          Interval changes  10/13/2022   Patient Vitals for the past 8 hrs:   BP Temp Temp src Pulse Resp SpO2   10/13/22 0839 114/83 98.7 °F (37.1 °C) Oral 75 14 90 %     10/11  Tmax last 24 hours 101.1F  Has not had BM yet  WBC increased slightly  Getting rest  Final blood cx still pending  Still in pain around groin - pus expressed from many orifices associated w pain, induration and little redness  Wound care with silver and aquaphor - recommended by wound care    10/12  Afebrile  Vit D deficiency - 11.4  - will replace  Drainage from he groin is +++, cx taken - some indurated found as well -  Continues to have pain around groin-    10/13  WBC 17 still and right inner thigh aea is very indurated tender and pus remains from all orifices right thigh and groins  Will get a CT pelvis and better eval for abscess      Summary of relevant labs:  Labs:  WBC - 19.7-22 - 22.5- 17.9    Lactic acid, whole blood 3.6 -0.9  Cr 0.78  Vit D 11.4  Micro:  Groin cx: prelim GPR, GNR, GPC in clusters  BC x1 - GPC in clusters, likely contamination - micrococcus  Bcx 2- pending  Urine cx- neg  UA - small leukocyte esterase  Imaging:  10/9 Ct Head 1.  No hemorrhage. 2. Insert of uncertain chronicity in the right frontal lobe. Consider further evaluation with MRI. 10/9 - CT Pelvis Slightly increased chronic appearing infectious/inflammatory cutaneous/subcutaneous process of the anterior pelvis and gluteal region. Recommend correlation with history of hidradenitis suppurativa. 10/9 CT Ab and Pelvis 1. Skin thickening of the low anterior abdominal wall, anterior pelvis and visualized gluteal region. Note, the perineum is not visualized on this study. Patient could return for further imaging if clinically indicated. 2. Large volume stool in the rectum. I have personally reviewed the past medical history, past surgical history, medications, social history, and family history, and I haveupdated the database accordingly. Allergies:   Depakene [valproic acid], Restoril [temazepam], Risperidone and related, Thorazine [chlorpromazine], and Vancomycin     Review of Systems:     Review of Systems   Constitutional:  Negative for diaphoresis. HENT:  Negative for congestion, dental problem and nosebleeds. Eyes:  Negative for visual disturbance. Respiratory:  Negative for shortness of breath. Cardiovascular:  Negative for chest pain. Gastrointestinal:  Negative for abdominal distention. Endocrine: Negative for cold intolerance, heat intolerance and polyphagia. Genitourinary:  Negative for dysuria and hematuria. Musculoskeletal:  Negative for arthralgias. Skin:  Positive for wound. Negative for pallor. Allergic/Immunologic: Negative for immunocompromised state. Neurological:  Negative for seizures, facial asymmetry and light-headedness. Hematological:  Does not bruise/bleed easily. Psychiatric/Behavioral:  Negative for behavioral problems. Physical Examination :       Physical Exam  Constitutional:       General: He is not in acute distress. Appearance: Normal appearance. He is not toxic-appearing.    HENT:      Head: Normocephalic and atraumatic. Right Ear: External ear normal.      Left Ear: External ear normal.      Nose: Nose normal. No congestion. Mouth/Throat:      Mouth: Mucous membranes are moist.      Pharynx: Oropharynx is clear. No oropharyngeal exudate. Eyes:      General: No scleral icterus. Extraocular Movements: Extraocular movements intact. Pupils: Pupils are equal, round, and reactive to light. Cardiovascular:      Rate and Rhythm: Normal rate and regular rhythm. Pulses: Normal pulses. Heart sounds: Normal heart sounds. No murmur heard. No gallop. Pulmonary:      Effort: Pulmonary effort is normal.      Breath sounds: No wheezing or rales. Abdominal:      General: Abdomen is flat. Palpations: Abdomen is soft. There is no mass. Tenderness: There is no abdominal tenderness. There is no guarding. Genitourinary:     Comments: No Kowalski  Musculoskeletal:         General: No swelling, tenderness, deformity or signs of injury. Normal range of motion. Cervical back: Normal range of motion. No rigidity. Lymphadenopathy:      Cervical: No cervical adenopathy. Skin:     Capillary Refill: Capillary refill takes less than 2 seconds. Findings: Rash (groin, scrotal area, thigh) present. Neurological:      General: No focal deficit present. Mental Status: He is alert and oriented to person, place, and time. Motor: No weakness.       Gait: Gait normal.   Psychiatric:         Mood and Affect: Mood normal.         Behavior: Behavior normal.       Past Medical History:     Past Medical History:   Diagnosis Date    Bipolar 1 disorder (HCC)     GERD (gastroesophageal reflux disease) 8/13/2016    Hidradenitis suppurativa     Polysubstance abuse ([facility] [address]     Schizoaffective disorder [facility])        Past Surgical  History:     Past Surgical History:   Procedure Laterality Date    ABSCESS DRAINAGE  11/1/2013    left inguinal hydrodenitis/pilondal cyst    INFECTED SKIN DEBRIDEMENT  july 2011       Medications:      piperacillin-tazobactam  3,375 mg IntraVENous Q8H    zinc sulfate  50 mg Oral Daily    vitamin D  50,000 Units Oral Weekly    vitamin C  500 mg Oral BID    carBAMazepine  200 mg Oral BID    haloperidol  1 mg Oral BID    sodium chloride flush  5-40 mL IntraVENous 2 times per day    enoxaparin  30 mg SubCUTAneous BID       Social History:     Social History     Socioeconomic History    Marital status: Single     Spouse name: Not on file    Number of children: Not on file    Years of education: Not on file    Highest education level: Not on file   Occupational History    Not on file   Tobacco Use    Smoking status: Every Day     Packs/day: 1.00     Years: 22.00     Pack years: 22.00     Types: Cigarettes    Smokeless tobacco: Never   Vaping Use    Vaping Use: Never used   Substance and Sexual Activity    Alcohol use: Yes     Comment: occasional    Drug use: Yes     Comment: history of marijuana & Opiate abue, claims that he is currently clean & sober.     Sexual activity: Not on file   Other Topics Concern    Not on file   Social History Narrative    Not on file     Social Determinants of Health     Financial Resource Strain: Not on file   Food Insecurity: Not on file   Transportation Needs: Not on file   Physical Activity: Not on file   Stress: Not on file   Social Connections: Not on file   Intimate Partner Violence: Not on file   Housing Stability: Not on file       Family History:     Family History   Problem Relation Age of Onset    Cancer Mother         breast    High Blood Pressure Maternal Grandfather     Mental Illness Paternal Aunt     Substance Abuse Paternal Uncle     Substance Abuse Brother     Alcohol Abuse Father     Cancer Father         pancrease      Medical Decision Making:   I have independently reviewed/ordered the following labs:    CBC with Differential:   Recent Labs     10/12/22  0436 10/13/22  0624   WBC 17.9* 17.0*   HGB 10.2* 10.0*   HCT 31.0* 30.4*    389       BMP:  Recent Labs     10/12/22  0436 10/13/22  0624   * 136   K 3.7 3.8    103   CO2 21 24   BUN 7 6   CREATININE 0.71 0.60*   MG 1.8 1.7       Hepatic Function Panel:   Recent Labs     10/12/22  0436 10/13/22  0624   LABALBU 2.0* 2.0*       No results for input(s): RPR in the last 72 hours. No results for input(s): HIV in the last 72 hours. No results for input(s): BC in the last 72 hours. Lab Results   Component Value Date/Time    CREATININE 0.60 10/13/2022 06:24 AM    GLUCOSE 86 10/13/2022 06:24 AM    GLUCOSE 89 02/09/2012 06:19 AM       Detailed results: Thank you for allowing us to participate in the care of this patient. Please call with questions. This note is created with the assistance of a speech recognition program.  While intending to generate adocument that actually reflects the content of the visit, the document can still have some errors including those of syntax and sound a like substitutions which may escape proof reading. It such instances, actual meaningcan be extrapolated by contextual diversion.     Office: [phone]  Perfect serve / office [phone]        Jose Martinez, Infectious Diseases

## 2022-10-13 NOTE — PROGRESS NOTES
[facility]  Office: [address], DO, Becca Merrill, DO, Jazzy Pacheco, DO, Norah Mcgee Blood, DO, Buck Yan MD, Vanda Barboza MD, Yvone Phalen, MD, Gary Vee MD,  Homer Hashimoto, MD, William Crowder MD, Kim Muir DO, Indy Lobo MD,  Tabitha Johnson MD, Yazmin Hahn MD, Kareem Orozco DO, Logan Mckoy MD, Agustina Ferreira MD, Waleska Resendez MD, Basim Rob MD, Vera Ruby MD, Michelle Gallardo MD, Anca Garrido DO, Vance Souza MD, Shira Estrella MD, Chula Dai, CNP,  Eliane Ross, CNP, Walter Jesus, CNP, Mer Gong, CNP,  Audie Steele, [facility], Gillian Bob, CNP, Mauro Duarte, CNP, Parker Chapman, CNP, Madiha Alarcon, CNP, Roland Gómez, CNP, Scarlet Oconnor PATrangC, Virginia Jacobs, CNS, Gabe Christianson, DNP, Bill Jordan, CNP, Denilson Ronquillo, [facility], Mikael Barry, [facility]    Progress Note    10/13/2022    1:24 PM    Name:   Esther Boss  MRN:     [id number]     Acct:      [de-identified]   Room:   [address] [place] [address] Day:  4  Admit Date:  10/9/2022  9:05 AM    PCP:   Vicky Lara MD  Code Status:  Full Code    Subjective:     C/C:   Chief Complaint   Patient presents with    Altered Mental Status     Interval History Status: not changed. Patient does not have any acute complaints  No acute events overnight  Changes  Vital stable  White count slowly trending down    Brief History:     58-year-old female with known medical history of hidradenitis suppurativa with extensive lesions bilateral axilla presented to the hospital from shelter due to extensive lower abdominal wounds. Initial concern for necrotizing fasciitis. Patient has extensive psych history and on antipsychotic medications. Patient was getting Humira but was stopped as he went to the shelter.     Review of Systems:     Constitutional:  negative for chills, fevers, sweats  Respiratory:  negative for cough, dyspnea on exertion, shortness of breath, wheezing  Cardiovascular:  negative for chest pain, chest pressure/discomfort, lower extremity edema, palpitations  Gastrointestinal:  negative for abdominal pain, constipation, diarrhea, nausea, vomiting  Neurological:  negative for dizziness, headache  Multiple wounds in the groin  Old scar marks on axilla  Medications: Allergies: Allergies   Allergen Reactions    Depakene [Valproic Acid]     Restoril [Temazepam]     Risperidone And Related      Seizure      Thorazine [Chlorpromazine]     Vancomycin Other (See Comments) and Rash       Current Meds:   Scheduled Meds:    piperacillin-tazobactam  3,375 mg IntraVENous Q8H    zinc sulfate  50 mg Oral Daily    vitamin D  50,000 Units Oral Weekly    vitamin C  500 mg Oral BID    carBAMazepine  200 mg Oral BID    haloperidol  1 mg Oral BID    sodium chloride flush  5-40 mL IntraVENous 2 times per day    enoxaparin  30 mg SubCUTAneous BID     Continuous Infusions:    sodium chloride 75 mL/hr at 10/13/22 1007    sodium chloride       PRN Meds: HYDROcodone 5 mg - acetaminophen **OR** HYDROcodone 5 mg - acetaminophen, mineral oil-hydrophilic petrolatum, sodium chloride flush, sodium chloride, potassium chloride **OR** potassium alternative oral replacement **OR** potassium chloride, magnesium sulfate, ondansetron **OR** ondansetron, polyethylene glycol, acetaminophen **OR** acetaminophen    Data:     Past Medical History:   has a past medical history of Bipolar 1 disorder ([facility] [address] GERD (gastroesophageal reflux disease), Hidradenitis suppurativa, [facility] ([facility] and Schizoaffective disorder ([facility] Social History:   reports that he has been smoking cigarettes. He has a 22.00 pack-year smoking history. He has never used smokeless tobacco. He reports current alcohol use. He reports current drug use.      Family History:   Family History   Problem Relation Age of Onset    Cancer Mother         breast    High Blood Pressure Maternal Grandfather     Mental Illness Paternal Aunt     Substance Abuse Paternal Uncle     Substance Abuse Brother     Alcohol Abuse Father     Cancer Father         pancrease       Vitals:  /83   Pulse 75   Temp 98.7 °F (37.1 °C) (Oral)   Resp 14   Ht 5' 9\" (1.753 m)   Wt 248 lb 3.8 oz (112.6 kg)   SpO2 90%   BMI 36.66 kg/m²   Temp (24hrs), Av.4 °F (36.9 °C), Min:98.1 °F (36.7 °C), Max:98.7 °F (37.1 °C)    No results for input(s): POCGLU in the last 72 hours. I/O (24Hr): Intake/Output Summary (Last 24 hours) at 10/13/2022 1324  Last data filed at 10/13/2022 1223  Gross per 24 hour   Intake 1200 ml   Output 2150 ml   Net -950 ml         Labs:  Hematology:  Recent Labs     10/11/22  0533 10/12/22  0436 10/13/22  0624   WBC 22.5* 17.9* 17.0*   RBC 3.71* 3.47* 3.45*   HGB 10.8* 10.2* 10.0*   HCT 33.1* 31.0* 30.4*   MCV 89.2 89.3 88.1   MCH 29.1 29.4 29.0   MCHC 32.6 32.9 32.9   RDW 14.3 14.5* 14.5*    404 389   MPV 8.9 9.2 8.7       Chemistry:  Recent Labs     10/11/22  0533 10/12/22  0436 10/13/22  0624   * 134* 136   K 3.6* 3.7 3.8    101 103   CO2 22 21 24   GLUCOSE 98 107* 86   BUN 8 7 6   CREATININE 0.78 0.71 0.60*   MG 1.7 1.8 1.7   ANIONGAP 10 12 9   LABGLOM >60 >60 >60   CALCIUM 8.7 8.6 8.6   PHOS 2.8 2.9 2.7       Recent Labs     10/11/22  0533 10/12/22  0436 10/13/22  0624   LABALBU 2.0* 2.0* 2.0*       ABG:No results found for: POCPH, PHART, PH, POCPCO2, QMN4PAT, PCO2, POCPO2, PO2ART, PO2, POCHCO3, QXG8VVO, HCO3, NBEA, PBEA, BEART, BE, THGBART, THB, AHR8HCO, IUVC5VGX, V5VZEMVU, O2SAT, FIO2  Lab Results   Component Value Date/Time    SPECIAL RT HAND 10ML 10/09/2022 09:18 AM    SPECIAL LT AC 20ML 10/09/2022 09:18 AM     Lab Results   Component Value Date/Time    CULTURE CULTURE IN PROGRESS 10/12/2022 12:32 PM       Radiology:  CT HEAD WO CONTRAST    Result Date: 10/9/2022  1. No hemorrhage. 2. Insert of uncertain chronicity in the right frontal lobe. Consider further evaluation with MRI. CT PELVIS WO CONTRAST Additional Contrast? None    Result Date: 10/9/2022  Slightly increased chronic appearing infectious/inflammatory cutaneous/subcutaneous process of the anterior pelvis and gluteal region. Recommend correlation with history of hidradenitis suppurativa. CT ABDOMEN PELVIS W IV CONTRAST Additional Contrast? None    Result Date: 10/9/2022  1. Skin thickening of the low anterior abdominal wall, anterior pelvis and visualized gluteal region. Note, the perineum is not visualized on this study. Patient could return for further imaging if clinically indicated. 2. Large volume stool in the rectum.        Physical Examination:        General appearance:  alert, cooperative and obese  Mental Status:  oriented to person, place and time and normal affect  Lungs:  clear to auscultation bilaterally, normal effort  Heart:  regular rate and rhythm, no murmur  Abdomen:  soft, nontender, nondistended, normal bowel sounds, no masses, hepatomegaly, splenomegaly  Extremities:  no edema, redness, tenderness in the calves  Skin: Multiple wounds present in groin    Assessment:        Hospital Problems             Last Modified POA    * (Principal) Hidradenitis suppurativa 10/9/2022 Yes    Septicemia ([address] 10/10/2022 Yes    Schizophrenia ([address] 10/10/2022 Yes    Gram-positive bacteremia 10/10/2022 Yes    Cellulitis of right thigh 10/10/2022 Yes    Bandemia 10/10/2022 Yes    Scrotal abscess 10/10/2022 Yes    Immunosuppressed due to chemotherapy ([address] 10/10/2022 Yes    Mild protein-calorie malnutrition ([address] 10/12/2022 Yes    Hypovitaminosis D 10/12/2022 Yes    Abscess of skin of abdomen 10/9/2022 Yes    Schizoaffective disorder, bipolar type ([address] 10/10/2022 Yes    GERD (gastroesophageal reflux disease) 10/10/2022 Yes     Plan:        Sepsis with hidradenitis suppurativa-continue antibiotic per infectious disease, antibiotics switched to Zosyn, cultures showing gram-positive and gram-negative rods. Await final cultures. Plastic surgery evaluated, no surgical intervention planned. We will follow-up with dermatology outpatient.     Schizoaffective disorder-seen by psychiatry, medication adjustments made, continue Haldol, Tegretol    Leukocytosis-leukocytosis trending down with IV fluids, patient afebrile, continue zosyn    Immunosuppressed-received Humira outpatient, currently on hold    Replace vitamin D, add vitamin C and zinc  Continue supplements for malnutrition  Monitor vitals  Monitor electrolytes and replace as needed    Discharge plan to [facility]    Edgardo Ivey MD  10/13/2022  1:24 PM

## 2022-10-14 PROBLEM — L02.31 GLUTEAL ABSCESS: Status: ACTIVE | Noted: 2022-10-14

## 2022-10-14 LAB
ALBUMIN SERPL-MCNC: 2.4 G/DL (ref 3.5–5.2)
ANION GAP SERPL CALCULATED.3IONS-SCNC: 8 MMOL/L (ref 9–17)
BUN BLDV-MCNC: 5 MG/DL (ref 6–20)
CALCIUM SERPL-MCNC: 8.9 MG/DL (ref 8.6–10.4)
CHLORIDE BLD-SCNC: 102 MMOL/L (ref 98–107)
CO2: 26 MMOL/L (ref 20–31)
CREAT SERPL-MCNC: 0.59 MG/DL (ref 0.7–1.2)
CULTURE: NORMAL
GFR SERPL CREATININE-BSD FRML MDRD: >60 ML/MIN/1.73M2
GLUCOSE BLD-MCNC: 83 MG/DL (ref 70–99)
HCT VFR BLD CALC: 31.1 % (ref 40.7–50.3)
HEMOGLOBIN: 10.1 G/DL (ref 13–17)
Lab: NORMAL
MAGNESIUM: 1.7 MG/DL (ref 1.6–2.6)
MCH RBC QN AUTO: 29.1 PG (ref 25.2–33.5)
MCHC RBC AUTO-ENTMCNC: 32.5 G/DL (ref 28.4–34.8)
MCV RBC AUTO: 89.6 FL (ref 82.6–102.9)
NRBC AUTOMATED: 0 PER 100 WBC
PDW BLD-RTO: 14.6 % (ref 11.8–14.4)
PHOSPHORUS: 2.9 MG/DL (ref 2.5–4.5)
PLATELET # BLD: 415 K/UL (ref 138–453)
PMV BLD AUTO: 8.5 FL (ref 8.1–13.5)
POTASSIUM SERPL-SCNC: 3.9 MMOL/L (ref 3.7–5.3)
RBC # BLD: 3.47 M/UL (ref 4.21–5.77)
SODIUM BLD-SCNC: 136 MMOL/L (ref 135–144)
SPECIMEN DESCRIPTION: NORMAL
WBC # BLD: 15.4 K/UL (ref 3.5–11.3)

## 2022-10-14 PROCEDURE — 6370000000 HC RX 637 (ALT 250 FOR IP): Performed by: INTERNAL MEDICINE

## 2022-10-14 PROCEDURE — 6370000000 HC RX 637 (ALT 250 FOR IP): Performed by: STUDENT IN AN ORGANIZED HEALTH CARE EDUCATION/TRAINING PROGRAM

## 2022-10-14 PROCEDURE — 83735 ASSAY OF MAGNESIUM: CPT

## 2022-10-14 PROCEDURE — 6370000000 HC RX 637 (ALT 250 FOR IP): Performed by: CLINICAL NURSE SPECIALIST

## 2022-10-14 PROCEDURE — 36415 COLL VENOUS BLD VENIPUNCTURE: CPT

## 2022-10-14 PROCEDURE — 85027 COMPLETE CBC AUTOMATED: CPT

## 2022-10-14 PROCEDURE — 2580000003 HC RX 258: Performed by: NURSE PRACTITIONER

## 2022-10-14 PROCEDURE — 1200000000 HC SEMI PRIVATE

## 2022-10-14 PROCEDURE — 6370000000 HC RX 637 (ALT 250 FOR IP): Performed by: NURSE PRACTITIONER

## 2022-10-14 PROCEDURE — 6360000002 HC RX W HCPCS: Performed by: NURSE PRACTITIONER

## 2022-10-14 PROCEDURE — 80069 RENAL FUNCTION PANEL: CPT

## 2022-10-14 PROCEDURE — 99232 SBSQ HOSP IP/OBS MODERATE 35: CPT | Performed by: STUDENT IN AN ORGANIZED HEALTH CARE EDUCATION/TRAINING PROGRAM

## 2022-10-14 PROCEDURE — 99232 SBSQ HOSP IP/OBS MODERATE 35: CPT | Performed by: NURSE PRACTITIONER

## 2022-10-14 RX ADMIN — ZINC SULFATE 220 MG (50 MG) CAPSULE 50 MG: CAPSULE at 09:05

## 2022-10-14 RX ADMIN — AMOXICILLIN AND CLAVULANATE POTASSIUM 1 TABLET: 875; 125 TABLET, FILM COATED ORAL at 19:52

## 2022-10-14 RX ADMIN — OXYCODONE HYDROCHLORIDE AND ACETAMINOPHEN 500 MG: 500 TABLET ORAL at 19:52

## 2022-10-14 RX ADMIN — HALOPERIDOL 1 MG: 1 TABLET ORAL at 09:06

## 2022-10-14 RX ADMIN — ACETAMINOPHEN 650 MG: 325 TABLET ORAL at 01:11

## 2022-10-14 RX ADMIN — AMOXICILLIN AND CLAVULANATE POTASSIUM 1 TABLET: 875; 125 TABLET, FILM COATED ORAL at 09:06

## 2022-10-14 RX ADMIN — CARBAMAZEPINE 200 MG: 200 TABLET ORAL at 19:51

## 2022-10-14 RX ADMIN — SODIUM CHLORIDE, PRESERVATIVE FREE 10 ML: 5 INJECTION INTRAVENOUS at 09:11

## 2022-10-14 RX ADMIN — LEVOFLOXACIN 500 MG: 500 TABLET, FILM COATED ORAL at 09:06

## 2022-10-14 RX ADMIN — HALOPERIDOL 1 MG: 1 TABLET ORAL at 19:52

## 2022-10-14 RX ADMIN — ENOXAPARIN SODIUM 30 MG: 100 INJECTION SUBCUTANEOUS at 19:51

## 2022-10-14 RX ADMIN — HYDROCODONE BITARTRATE AND ACETAMINOPHEN 2 TABLET: 5; 325 TABLET ORAL at 05:54

## 2022-10-14 RX ADMIN — CARBAMAZEPINE 200 MG: 200 TABLET ORAL at 09:06

## 2022-10-14 RX ADMIN — OXYCODONE HYDROCHLORIDE AND ACETAMINOPHEN 500 MG: 500 TABLET ORAL at 09:06

## 2022-10-14 RX ADMIN — HYDROCODONE BITARTRATE AND ACETAMINOPHEN 2 TABLET: 5; 325 TABLET ORAL at 13:52

## 2022-10-14 RX ADMIN — ONDANSETRON 4 MG: 4 TABLET, ORALLY DISINTEGRATING ORAL at 05:57

## 2022-10-14 ASSESSMENT — PAIN SCALES - GENERAL
PAINLEVEL_OUTOF10: 8
PAINLEVEL_OUTOF10: 0
PAINLEVEL_OUTOF10: 10

## 2022-10-14 ASSESSMENT — PAIN DESCRIPTION - LOCATION: LOCATION: LEG

## 2022-10-14 ASSESSMENT — ENCOUNTER SYMPTOMS
SHORTNESS OF BREATH: 0
ABDOMINAL DISTENTION: 0

## 2022-10-14 NOTE — PLAN OF CARE
Problem: Discharge Planning  Goal: Discharge to home or other facility with appropriate resources  10/14/2022 1701 by Gal Burnette RN  Outcome: Progressing  10/14/2022 0306 by Terry Chow RN  Outcome: Progressing     Problem: Safety - Adult  Goal: Free from fall injury  10/14/2022 1701 by Gal Burnette RN  Outcome: Progressing  10/14/2022 0306 by Terry Chow RN  Outcome: Progressing     Problem: Confusion  Goal: Confusion, delirium, dementia, or psychosis is improved or at baseline  Description: INTERVENTIONS:  1. Assess for possible contributors to thought disturbance, including medications, impaired vision or hearing, underlying metabolic abnormalities, dehydration, psychiatric diagnoses, and notify attending LIP  2. Atlas high risk fall precautions, as indicated  3. Provide frequent short contacts to provide reality reorientation, refocusing and direction  4. Decrease environmental stimuli, including noise as appropriate  5. Monitor and intervene to maintain adequate nutrition, hydration, elimination, sleep and activity  6. If unable to ensure safety without constant attention obtain sitter and review sitter guidelines with assigned personnel  7. Initiate Psychosocial CNS and Spiritual Care consult, as indicated  10/14/2022 1701 by Gal Burnette RN  Outcome: Progressing  10/14/2022 0306 by Terry Chow RN  Outcome: Progressing  Flowsheets (Taken 10/13/2022 1521 by Gilbert Matute RN)  Effect of thought disturbance (confusion, delirium, dementia, or psychosis) are managed with adequate functional status: Monitor and intervene to maintain adequate nutrition, hydration, elimination, sleep and activity     Problem: Skin/Tissue Integrity  Goal: Absence of new skin breakdown  Description: 1. Monitor for areas of redness and/or skin breakdown  2. Assess vascular access sites hourly  3. Every 4-6 hours minimum:  Change oxygen saturation probe site  4.   Every 4-6 hours:  If on nasal continuous positive airway pressure, respiratory therapy assess nares and determine need for appliance change or resting period.   10/14/2022 1701 by Jeferson Mcallister RN  Outcome: Progressing  10/14/2022 0306 by Maryse Brewster RN  Outcome: Progressing     Problem: Pain  Goal: Verbalizes/displays adequate comfort level or baseline comfort level  10/14/2022 1701 by Jeferson Mcallister RN  Outcome: Progressing  10/14/2022 0306 by Maryse Brewster RN  Outcome: Progressing

## 2022-10-14 NOTE — PROGRESS NOTES
[facility]  Office: [address], DO, Padma Alston, DO, Zakiya Stanley, DO, Berry Mireles Blood, DO, Louis Chauhan MD, Ade Oneill MD, Laci Curtis MD, Heather Mcnamara MD,  Papi Calhoun MD, Diann Gross MD, Farhan Meredith, DO, Lori Hernandez MD,  Rasheed Weller MD, Peace Solares MD, Kristina Choi, DO, Xiomara Novoa MD, Bhumika Toledo MD, Valentino Palumbo MD, Vince Faria MD, Teddy Kay MD, Cathy Hagan MD, Ty Fabian DO, Jared Araujo MD, Irais Jc MD, Tramaine Mercado, [facility],  Rolan Belle, CNP, Elissa Esquivel, CNP, Perley Gilford, CNP,  Leilani Villaseñor, [facility], Raisa Solorzano, CNP, Millicent Alvares, CNP, Ana John, CNP, Karma Santillan, CNP, Lawyer Diaz, CNP, Jessi Nye PA-C, Clay Mcgregor, CNS, Henry Myers, [facility], Cletis Hashimoto, CNP, Geo Castillo, CNP, Jeni Weber, [address] [place] [address]    Progress Note    10/14/2022    3:23 PM    Name:   Selma Romero  MRN:     [id number]     Acct:      [de-identified]   Room:   [address] [place] [address] Day:  5  Admit Date:  10/9/2022  9:05 AM    PCP:   David Harry MD  Code Status:  Full Code    Subjective:     C/C:   Chief Complaint   Patient presents with    Altered Mental Status     Interval History Status: not changed. Patient examined  No acute events overnight  No new complaints  Pain is under control  CT abdomen pelvis reviewed. Brief History:     53-year-old female with known medical history of hidradenitis suppurativa with extensive lesions bilateral axilla presented to the hospital from [facility] due to extensive lower abdominal wounds. Initial concern for necrotizing fasciitis. Patient has extensive psych history and on antipsychotic medications. Patient was getting Humira but was stopped as he went to the [facility]. Patient was evaluated by surgery and infectious disease.   CT abdomen had some fluid collection but surgery did not recommend any intervention. Initially was on IV antibiotics, then switched to Levaquin and Augmentin per infectious disease. Culture showed normal skin blake. Review of Systems:     Constitutional:  negative for chills, fevers, sweats  Respiratory:  negative for cough, dyspnea on exertion, shortness of breath, wheezing  Cardiovascular:  negative for chest pain, chest pressure/discomfort, lower extremity edema, palpitations  Gastrointestinal:  negative for abdominal pain, constipation, diarrhea, nausea, vomiting  Neurological:  negative for dizziness, headache  Multiple wounds in the groin  Old scar marks on axilla  Medications: Allergies: Allergies   Allergen Reactions    Depakene [Valproic Acid]     Restoril [Temazepam]     Risperidone And Related      Seizure      Thorazine [Chlorpromazine]     Vancomycin Other (See Comments) and Rash       Current Meds:   Scheduled Meds:    levoFLOXacin  500 mg Oral Daily    amoxicillin-clavulanate  1 tablet Oral 2 times per day    zinc sulfate  50 mg Oral Daily    vitamin D  50,000 Units Oral Weekly    vitamin C  500 mg Oral BID    carBAMazepine  200 mg Oral BID    haloperidol  1 mg Oral BID    sodium chloride flush  5-40 mL IntraVENous 2 times per day    enoxaparin  30 mg SubCUTAneous BID     Continuous Infusions:    sodium chloride       PRN Meds: HYDROcodone 5 mg - acetaminophen **OR** HYDROcodone 5 mg - acetaminophen, mineral oil-hydrophilic petrolatum, sodium chloride flush, sodium chloride, potassium chloride **OR** potassium alternative oral replacement **OR** potassium chloride, magnesium sulfate, ondansetron **OR** ondansetron, polyethylene glycol, acetaminophen **OR** acetaminophen    Data:     Past Medical History:   has a past medical history of Bipolar 1 disorder ([facility] GERD (gastroesophageal reflux disease), Hidradenitis suppurativa, Polysubstance abuse ([facility] and Sch[facility]ive disorder ([facility]     Social History:   reports that he has been smoking cigarettes. He has a 22.00 pack-year smoking history. He has never used smokeless tobacco. He reports current alcohol use. He reports current drug use. Family History:   Family History   Problem Relation Age of Onset    Cancer Mother         breast    High Blood Pressure Maternal Grandfather     Mental Illness Paternal Aunt     Substance Abuse Paternal Uncle     Substance Abuse Brother     Alcohol Abuse Father     Cancer Father         pancrease       Vitals:  /80   Pulse 92   Temp 98.4 °F (36.9 °C) (Oral)   Resp 16   Ht 5' 9\" (1.753 m)   Wt 246 lb 4.1 oz (111.7 kg)   SpO2 95%   BMI 36.37 kg/m²   Temp (24hrs), Av.7 °F (37.1 °C), Min:98.2 °F (36.8 °C), Max:99.6 °F (37.6 °C)    No results for input(s): POCGLU in the last 72 hours. I/O (24Hr):     Intake/Output Summary (Last 24 hours) at 10/14/2022 1523  Last data filed at 10/14/2022 1419  Gross per 24 hour   Intake 480 ml   Output 2100 ml   Net -1620 ml         Labs:  Hematology:  Recent Labs     10/12/22  0436 10/13/22  0624 10/14/22  0622   WBC 17.9* 17.0* 15.4*   RBC 3.47* 3.45* 3.47*   HGB 10.2* 10.0* 10.1*   HCT 31.0* 30.4* 31.1*   MCV 89.3 88.1 89.6   MCH 29.4 29.0 29.1   MCHC 32.9 32.9 32.5   RDW 14.5* 14.5* 14.6*    389 415   MPV 9.2 8.7 8.5       Chemistry:  Recent Labs     10/12/22  0436 10/13/22  0624 10/14/22  0622   * 136 136   K 3.7 3.8 3.9    103 102   CO2 21 24 26   GLUCOSE 107* 86 83   BUN 7 6 5*   CREATININE 0.71 0.60* 0.59*   MG 1.8 1.7 1.7   ANIONGAP 12 9 8*   LABGLOM >60 >60 >60   CALCIUM 8.6 8.6 8.9   PHOS 2.9 2.7 2.9       Recent Labs     10/12/22  0436 10/13/22  0624 10/14/22  0622   LABALBU 2.0* 2.0* 2.4*       ABG:No results found for: POCPH, PHART, PH, POCPCO2, NVU5HRU, PCO2, POCPO2, PO2ART, PO2, POCHCO3, TQW8SCG, HCO3, NBEA, PBEA, BEART, BE, THGBART, THB, RVZ8QTI, HFTN2AQN, Z8TSIYBS, O2SAT, FIO2  Lab Results   Component Value Date/Time    SPECIAL RT HAND 10ML 10/09/2022 09:18 AM    SPECIAL LT AC 20ML 10/09/2022 09:18 AM     Lab Results   Component Value Date/Time    CULTURE PROTEUS SPECIES SCANT AMOUNT (A) 10/12/2022 12:32 PM    CULTURE GRAM NEGATIVE RODS SCANT AMOUNT (A) 10/12/2022 12:32 PM    CULTURE NORMAL SKIN PRAVEEN 10/12/2022 12:32 PM       Radiology:  CT HEAD WO CONTRAST    Result Date: 10/9/2022  1. No hemorrhage. 2. Insert of uncertain chronicity in the right frontal lobe. Consider further evaluation with MRI. CT PELVIS WO CONTRAST Additional Contrast? None    Result Date: 10/9/2022  Slightly increased chronic appearing infectious/inflammatory cutaneous/subcutaneous process of the anterior pelvis and gluteal region. Recommend correlation with history of hidradenitis suppurativa. CT ABDOMEN PELVIS W IV CONTRAST Additional Contrast? None    Result Date: 10/9/2022  1. Skin thickening of the low anterior abdominal wall, anterior pelvis and visualized gluteal region. Note, the perineum is not visualized on this study. Patient could return for further imaging if clinically indicated. 2. Large volume stool in the rectum.        Physical Examination:        General appearance:  alert, cooperative and obese  Mental Status:  oriented to person, place and time and normal affect  Lungs:  clear to auscultation bilaterally, normal effort  Heart:  regular rate and rhythm, no murmur  Abdomen:  soft, nontender, nondistended, normal bowel sounds, no masses, hepatomegaly, splenomegaly  Extremities:  no edema, redness, tenderness in the calves  Skin: Multiple wounds present in groin    Assessment:        Hospital Problems             Last Modified POA    * (Principal) Hidradenitis suppurativa 10/9/2022 Yes    Septicemia ([address] 10/10/2022 Yes    Schizophrenia ([address] 10/10/2022 Yes    Gram-positive bacteremia 10/10/2022 Yes    Cellulitis of right thigh 10/10/2022 Yes    Bandemia 10/10/2022 Yes    Scrotal abscess 10/10/2022 Yes    Immunosuppressed due to chemotherapy ([address] 10/10/2022 Yes    Mild protein-calorie malnutrition ([facility] 10/12/2022 Yes    Hypovitaminosis D 10/12/2022 Yes    Abscess of skin of abdomen 10/9/2022 Yes    Schizoaffective disorder, bipolar type ([facility] 10/10/2022 Yes    GERD (gastroesophageal reflux disease) 10/10/2022 Yes   Plan:        Sepsis with hidradenitis suppurativa-continue antibiotic per infectious disease, final culture shows normal skin blake, Zosyn switched to Augmentin and Levaquin for 2 weeks. New CT scan reviewed with surgery, no intervention planned. Patient needs continuous wound care and antibiotics, needs follow-up outpatient with dermatology and infectious disease.     Schizoaffective disorder-seen by psychiatry, medication adjustments made, continue Haldol, Tegretol    Leukocytosis-continues to trend down, continue antibiotic    Immunosuppressed-received Humira outpatient, currently on hold    Replace vitamin D, add vitamin C and zinc  Continue supplements for malnutrition  Monitor vitals  Monitor electrolytes and replace as needed    Discharge plan to [facility]    Pili Singh MD  10/14/2022  3:23 PM

## 2022-10-14 NOTE — PROGRESS NOTES
[facility] -   Infectious diseases evaluation  admission date 10/9/2022    reason for consultation:   Hidradenitis suppurativa     Impression :   Current:  Single Gram positive bacteremia - micrococcus contamination  Hx of hidradenitis suppurativa - followed with [facility] derm clinic, was receiving biweekly humira injections   Patient has not received Humira injections for last 17 days while in alf   immunosuppressed  Recent R buttock abscess vs. hidradenitis on 8/2022   Requested superficial abscess drain by IR but they decided it was loculated, would need drainage by GS  GS deferred to plastic, as lesions were very superficial   Dr. Kaylah Alexandra w plastic planned for wound care outpatient  Repeat CT abd/pelvis identified gluteal fluid collections suspicious for abscesses.   DC on augmentin  Altered mental status- new  CT Head wo hemorrhage  Mother unsure if pt has been getting psychotropic meds in alf   Multiple foci of painful hidradenitis on R thigh, groin area, scrotum   Purulent bloody drainage  CT correlates with hidradenitis hx  Wound care on board  Cellulitis on b/l thighs  Sepsis  SIRS: 3 on presentation + source  1 BCx with gram positive cocci in clusters likely contamination  2nd BCx pending  Bandemia   Elevated CRP - 144  Vitamin D deficiency -11.4     Other:  Incarcerated - last 17 days   Current smoker   Schizophrenia  Bipolar disorder    Discussion / summary of stay / plan of care     Recommendations     augmentin and levaquin x 2 weeks  Pus mixed bacteria  WBC remains elevated -   get repeat CT of the pelvis looking for an abscess formation   If no abscess, wound DC on above po AB x 2 weeks  Reconsult IR for aspiration gluteal fluid collections  So far GS - decided no surgical incision at this time  WBC fluctuating    Hidradenitis -  10/12 Start Zinc 50mg QD   Vit D 11.4 low - start 24903CY weekly x 8     Infection Control Recommendations   Universal Precautions  Contact Isolation     Antimicrobial Stewardship Recommendations   Simplification of therapy  Targeted therapy    History of Present Illness:   Initial history:  Segun Daniel is a 38y.o.-year-old long term inmate with PMHx of schizophrenia and hidradenitis suppurativa who presents with purulent wounds around the groin, scrotum, and R thigh. The wounds are severe, painful, and draining bloody red, purulent drainage, likely hidradenitis suppurativa. Patient has been following with Dr. Celina Quarles at [facility] dermatology clinic and had been getting bi weekly Humira injections. The patient has not been getting biweekly Humira injections while in long term for the last 17 days- his mother reported that he missed 3 shots. This patient has an extensive psych history - mother unsure is patient was getting psychotropic medications while in long term. He currently follows with [facility]          Interval changes  10/14/2022   Patient Vitals for the past 8 hrs:   BP Temp Temp src Pulse Resp   10/14/22 1500 114/80 98.4 °F (36.9 °C) Oral 92 16   10/14/22 1422 -- -- -- -- 16   10/11  Tmax last 24 hours 101.1F  Has not had BM yet  WBC increased slightly  Getting rest  Final blood cx still pending  Still in pain around groin - pus expressed from many orifices associated w pain, induration and little redness  Wound care with silver and aquaphor - recommended by wound care    10/12  Afebrile  Vit D deficiency - 11.4  - will replace  Drainage from he groin is +++, cx taken - some indurated found as well -  Continues to have pain around groin-    10/13  WBC 17 still and right inner thigh aea is very indurated tender and pus remains from all orifices right thigh and groins  Will get a CT pelvis and better eval for abscess    10/14  CT abd/pelvis suspicious for abscesses. IR reconsulted for drainage. States he \"feels a fever coming on\"  Denies any chills, diarrhea. Having some nausea. WBC trending down.     Summary of relevant labs:  Labs:  WBC - 19.7-22 - 22.5- 17.9-15.4    Lactic acid, whole blood 3.6 -0.9  Cr 0.78-0.59  Vit D 11.4  Micro:  Groin cx: prelim GPR, GNR, GPC in clusters. Proteus species. BC x1 - GPC in clusters, likely contamination - micrococcus  Bcx 2- pending  Urine cx- neg  UA - small leukocyte esterase  Imaging:  10/9 Ct Head 1. No hemorrhage. 2. Insert of uncertain chronicity in the right frontal lobe. Consider further evaluation with MRI. 10/9 - CT Pelvis Slightly increased chronic appearing infectious/inflammatory cutaneous/subcutaneous process of the anterior pelvis and gluteal region. Recommend correlation with history of hidradenitis suppurativa. 10/9 CT Ab and Pelvis 1. Skin thickening of the low anterior abdominal wall, anterior pelvis and visualized gluteal region. Note, the perineum is not visualized on this study. Patient could return for further imaging if clinically indicated. 2. Large volume stool in the rectum. 10/14 CT AbD/PELVIS  FINDINGS:   Skin thickening/stranding of the lower abdominal wall, with 2.0 x 1.9 x 1.6   cm fluid collection with thick adjacent wall, slightly increased in size   (2:56), suspicious for abscess. Skin thickening/stranding in bilateral   gluteal soft tissues, with 2.6 x 1.8 x 3.2 cm left gluteal fluid collection   with surrounding thick wall (2: 117) and 1.4 x 1.4 x  2.0 cm right gluteal   fluid collection with surrounding thick wall (2:122), similar to prior and   suspicious for abscess. Scrotal area is unremarkable within study limits. Kowalski. Normal appendix. Partially imaged large bowel anastomosis. Impression   Fluid collections with thick wall and adjacent soft tissue stranding in the   lower anterior abdominal wall and bilateral gluteal soft tissues described   above, suspicious for abscesses. Lower anterior abdominal wall fluid   collection is slightly increased in size, others are stable.        I have personally reviewed the past medical history, past surgical history, medications, social history, and family history, and I haveupdated the database accordingly. Allergies:   Depakene [valproic acid], Restoril [temazepam], Risperidone and related, Thorazine [chlorpromazine], and Vancomycin     Review of Systems:     Review of Systems   Constitutional:  Negative for diaphoresis. HENT:  Negative for congestion, dental problem and nosebleeds. Eyes:  Negative for visual disturbance. Respiratory:  Negative for shortness of breath. Cardiovascular:  Negative for chest pain. Gastrointestinal:  Negative for abdominal distention. Endocrine: Negative for cold intolerance, heat intolerance and polyphagia. Genitourinary:  Negative for dysuria and hematuria. Musculoskeletal:  Negative for arthralgias. Skin:  Positive for wound. Negative for pallor. Allergic/Immunologic: Negative for immunocompromised state. Neurological:  Negative for seizures, facial asymmetry and light-headedness. Hematological:  Does not bruise/bleed easily. Psychiatric/Behavioral:  Negative for behavioral problems. Physical Examination :       Physical Exam  Vitals and nursing note reviewed. Constitutional:       Appearance: Normal appearance. HENT:      Head: Normocephalic and atraumatic. Right Ear: External ear normal.      Left Ear: External ear normal.      Nose: Nose normal. No congestion. Mouth/Throat:      Mouth: Mucous membranes are moist.      Pharynx: Oropharynx is clear. No oropharyngeal exudate. Eyes:      General: No scleral icterus. Extraocular Movements: Extraocular movements intact. Conjunctiva/sclera: Conjunctivae normal.      Pupils: Pupils are equal, round, and reactive to light. Cardiovascular:      Rate and Rhythm: Normal rate and regular rhythm. Pulses: Normal pulses. Heart sounds: Normal heart sounds. No murmur heard. No gallop.    Pulmonary:      Effort: Pulmonary effort is normal. Breath sounds: Normal breath sounds. No wheezing or rhonchi. Abdominal:      General: Abdomen is flat. Bowel sounds are normal.      Palpations: Abdomen is soft. There is no mass. Tenderness: There is no abdominal tenderness. There is no guarding. Genitourinary:     Comments: No Kowalski  Musculoskeletal:         General: No swelling, tenderness, deformity or signs of injury. Normal range of motion. Cervical back: Normal range of motion. No rigidity. Skin:     Capillary Refill: Capillary refill takes less than 2 seconds. Findings: Rash (groin, scrotal area, thigh) present. Neurological:      General: No focal deficit present. Mental Status: He is alert and oriented to person, place, and time. Motor: No weakness.       Gait: Gait normal.   Psychiatric:         Mood and Affect: Mood normal.         Behavior: Behavior normal.       Past Medical History:     Past Medical History:   Diagnosis Date    Bipolar 1 disorder (HCC)     GERD (gastroesophageal reflux disease) 8/13/2016    Hidradenitis suppurativa     Polysubstance abuse ([facility] [address]     Schizoaffective disorder (HCC)        Past Surgical  History:     Past Surgical History:   Procedure Laterality Date    ABSCESS DRAINAGE  11/1/2013    left inguinal hydrodenitis/pilondal cyst    INFECTED SKIN DEBRIDEMENT  july 2011       Medications:      levoFLOXacin  500 mg Oral Daily    amoxicillin-clavulanate  1 tablet Oral 2 times per day    zinc sulfate  50 mg Oral Daily    vitamin D  50,000 Units Oral Weekly    vitamin C  500 mg Oral BID    carBAMazepine  200 mg Oral BID    haloperidol  1 mg Oral BID    sodium chloride flush  5-40 mL IntraVENous 2 times per day    enoxaparin  30 mg SubCUTAneous BID       Social History:     Social History     Socioeconomic History    Marital status: Single     Spouse name: Not on file    Number of children: Not on file    Years of education: Not on file    Highest education level: Not on file   Occupational History Not on file   Tobacco Use    Smoking status: Every Day     Packs/day: 1.00     Years: 22.00     Pack years: 22.00     Types: Cigarettes    Smokeless tobacco: Never   Vaping Use    Vaping Use: Never used   Substance and Sexual Activity    Alcohol use: Yes     Comment: occasional    Drug use: Yes     Comment: history of marijuana & Opiate abue, claims that he is currently clean & sober. Sexual activity: Not on file   Other Topics Concern    Not on file   Social History Narrative    Not on file     Social Determinants of Health     Financial Resource Strain: Not on file   Food Insecurity: Not on file   Transportation Needs: Not on file   Physical Activity: Not on file   Stress: Not on file   Social Connections: Not on file   Intimate Partner Violence: Not on file   Housing Stability: Not on file       Family History:     Family History   Problem Relation Age of Onset    Cancer Mother         breast    High Blood Pressure Maternal Grandfather     Mental Illness Paternal Aunt     Substance Abuse Paternal Uncle     Substance Abuse Brother     Alcohol Abuse Father     Cancer Father         pancrease      Medical Decision Making:   I have independently reviewed/ordered the following labs:    CBC with Differential:   Recent Labs     10/13/22  0624 10/14/22  0622   WBC 17.0* 15.4*   HGB 10.0* 10.1*   HCT 30.4* 31.1*    415     BMP:  Recent Labs     10/13/22  0624 10/14/22  0622    136   K 3.8 3.9    102   CO2 24 26   BUN 6 5*   CREATININE 0.60* 0.59*   MG 1.7 1.7     Hepatic Function Panel:   Recent Labs     10/13/22  0624 10/14/22  0622   LABALBU 2.0* 2.4*     No results for input(s): RPR in the last 72 hours. No results for input(s): HIV in the last 72 hours. No results for input(s): BC in the last 72 hours. Lab Results   Component Value Date/Time    CREATININE 0.59 10/14/2022 06:22 AM    GLUCOSE 83 10/14/2022 06:22 AM    GLUCOSE 89 02/09/2012 06:19 AM       Detailed results:         Thank you for allowing us to participate in the care of this patient. Please call with questions. This note is created with the assistance of a speech recognition program.  While intending to generate adocument that actually reflects the content of the visit, the document can still have some errors including those of syntax and sound a like substitutions which may escape proof reading. It such instances, actual meaningcan be extrapolated by contextual diversion.     Office: [phone]  Perfect serve / office [phone]  Layla MIXON-CNP

## 2022-10-14 NOTE — PROGRESS NOTES
Sherry Valentin was re-evaluated today by General Surgery. Overall, there is less purulence compared to my previous examination. Patient is in significantly less pain. Recommend continuation of local wound care. No surgical intervention. Patient requires aggressive medical management of hidradenitis with dermatology and plastics.      Estuardo Thacker MD  PGY-3 General Surgery  5:49 PM 10/14/22 '

## 2022-10-14 NOTE — PROGRESS NOTES
[facility]upational [employer]  Occupational Therapy Not Seen Note    DATE: 10/14/2022    NAME: Willi Wilson  MRN: [id number]   : 1980      Patient not seen this date for Occupational Therapy due to:    Pt. Declined OT services d/t fatigue, despite encouragement.     Electronically signed by ISSAC Del Rio on 10/14/2022 at 2:49 PM

## 2022-10-14 NOTE — CARE COORDINATION
Vm message left with [facility]. Advised of readiness for discharge and requested escalation of authorization for SNF level of care. Await call back.

## 2022-10-14 NOTE — PLAN OF CARE
Problem: Discharge Planning  Goal: Discharge to home or other facility with appropriate resources  Outcome: Progressing     Problem: Safety - Adult  Goal: Free from fall injury  Outcome: Progressing     Problem: Confusion  Goal: Confusion, delirium, dementia, or psychosis is improved or at baseline  Description: INTERVENTIONS:  1. Assess for possible contributors to thought disturbance, including medications, impaired vision or hearing, underlying metabolic abnormalities, dehydration, psychiatric diagnoses, and notify attending LIP  2. [place] high risk fall precautions, as indicated  3. Provide frequent short contacts to provide reality reorientation, refocusing and direction  4. Decrease environmental stimuli, including noise as appropriate  5. Monitor and intervene to maintain adequate nutrition, hydration, elimination, sleep and activity  6. If unable to ensure safety without constant attention obtain sitter and review sitter guidelines with assigned personnel  7. Initiate Psychosocial CNS and Spiritual Care consult, as indicated  Outcome: Progressing  Flowsheets (Taken 10/13/2022 1521 by Brandin Alexandra RN)  Effect of thought disturbance (confusion, delirium, dementia, or psychosis) are managed with adequate functional status: Monitor and intervene to maintain adequate nutrition, hydration, elimination, sleep and activity     Problem: Skin/Tissue Integrity  Goal: Absence of new skin breakdown  Description: 1. Monitor for areas of redness and/or skin breakdown  2. Assess vascular access sites hourly  3. Every 4-6 hours minimum:  Change oxygen saturation probe site  4. Every 4-6 hours:  If on nasal continuous positive airway pressure, respiratory therapy assess nares and determine need for appliance change or resting period.   Outcome: Progressing     Problem: Pain  Goal: Verbalizes/displays adequate comfort level or baseline comfort level  Outcome: Progressing

## 2022-10-14 NOTE — PROGRESS NOTES
Physical Therapy        Physical Therapy Cancel Note      DATE: 10/14/2022    NAME: Johanny Serrato  MRN: [id number]   : 1980      Patient not seen this date for Physical Therapy due to:    Patient Declined: Pt checked on 2 times today, pt continues to decline therapy. Will defer PT today and continue to check back as able.       Electronically signed by Doris Payan PTA on 10/14/2022 at 2:31 PM

## 2022-10-15 LAB
ALBUMIN SERPL-MCNC: 2.4 G/DL (ref 3.5–5.2)
ANION GAP SERPL CALCULATED.3IONS-SCNC: 11 MMOL/L (ref 9–17)
BUN BLDV-MCNC: 5 MG/DL (ref 6–20)
CALCIUM SERPL-MCNC: 9 MG/DL (ref 8.6–10.4)
CHLORIDE BLD-SCNC: 99 MMOL/L (ref 98–107)
CO2: 26 MMOL/L (ref 20–31)
CREAT SERPL-MCNC: 0.55 MG/DL (ref 0.7–1.2)
CULTURE: ABNORMAL
DIRECT EXAM: ABNORMAL
GFR SERPL CREATININE-BSD FRML MDRD: >60 ML/MIN/1.73M2
GLUCOSE BLD-MCNC: 89 MG/DL (ref 70–99)
HCT VFR BLD CALC: 31.4 % (ref 40.7–50.3)
HEMOGLOBIN: 10.3 G/DL (ref 13–17)
MAGNESIUM: 1.7 MG/DL (ref 1.6–2.6)
MCH RBC QN AUTO: 28.9 PG (ref 25.2–33.5)
MCHC RBC AUTO-ENTMCNC: 32.8 G/DL (ref 28.4–34.8)
MCV RBC AUTO: 88.2 FL (ref 82.6–102.9)
NRBC AUTOMATED: 0 PER 100 WBC
PDW BLD-RTO: 14.5 % (ref 11.8–14.4)
PHOSPHORUS: 3.2 MG/DL (ref 2.5–4.5)
PLATELET # BLD: 434 K/UL (ref 138–453)
PMV BLD AUTO: 8.6 FL (ref 8.1–13.5)
POTASSIUM SERPL-SCNC: 3.8 MMOL/L (ref 3.7–5.3)
RBC # BLD: 3.56 M/UL (ref 4.21–5.77)
SODIUM BLD-SCNC: 136 MMOL/L (ref 135–144)
SPECIMEN DESCRIPTION: ABNORMAL
WBC # BLD: 14.6 K/UL (ref 3.5–11.3)

## 2022-10-15 PROCEDURE — 6370000000 HC RX 637 (ALT 250 FOR IP): Performed by: STUDENT IN AN ORGANIZED HEALTH CARE EDUCATION/TRAINING PROGRAM

## 2022-10-15 PROCEDURE — 6370000000 HC RX 637 (ALT 250 FOR IP): Performed by: INTERNAL MEDICINE

## 2022-10-15 PROCEDURE — 85027 COMPLETE CBC AUTOMATED: CPT

## 2022-10-15 PROCEDURE — 80069 RENAL FUNCTION PANEL: CPT

## 2022-10-15 PROCEDURE — 99231 SBSQ HOSP IP/OBS SF/LOW 25: CPT | Performed by: STUDENT IN AN ORGANIZED HEALTH CARE EDUCATION/TRAINING PROGRAM

## 2022-10-15 PROCEDURE — 36415 COLL VENOUS BLD VENIPUNCTURE: CPT

## 2022-10-15 PROCEDURE — 51702 INSERT TEMP BLADDER CATH: CPT

## 2022-10-15 PROCEDURE — 6370000000 HC RX 637 (ALT 250 FOR IP): Performed by: NURSE PRACTITIONER

## 2022-10-15 PROCEDURE — 6360000002 HC RX W HCPCS: Performed by: NURSE PRACTITIONER

## 2022-10-15 PROCEDURE — 99232 SBSQ HOSP IP/OBS MODERATE 35: CPT | Performed by: INTERNAL MEDICINE

## 2022-10-15 PROCEDURE — 97110 THERAPEUTIC EXERCISES: CPT

## 2022-10-15 PROCEDURE — 1200000000 HC SEMI PRIVATE

## 2022-10-15 PROCEDURE — 83735 ASSAY OF MAGNESIUM: CPT

## 2022-10-15 RX ADMIN — CARBAMAZEPINE 200 MG: 200 TABLET ORAL at 09:12

## 2022-10-15 RX ADMIN — HALOPERIDOL 1 MG: 1 TABLET ORAL at 21:45

## 2022-10-15 RX ADMIN — ZINC SULFATE 220 MG (50 MG) CAPSULE 50 MG: CAPSULE at 09:11

## 2022-10-15 RX ADMIN — OXYCODONE HYDROCHLORIDE AND ACETAMINOPHEN 500 MG: 500 TABLET ORAL at 21:44

## 2022-10-15 RX ADMIN — ENOXAPARIN SODIUM 30 MG: 100 INJECTION SUBCUTANEOUS at 09:11

## 2022-10-15 RX ADMIN — AMOXICILLIN AND CLAVULANATE POTASSIUM 1 TABLET: 875; 125 TABLET, FILM COATED ORAL at 09:12

## 2022-10-15 RX ADMIN — AMOXICILLIN AND CLAVULANATE POTASSIUM 1 TABLET: 875; 125 TABLET, FILM COATED ORAL at 21:43

## 2022-10-15 RX ADMIN — HALOPERIDOL 1 MG: 1 TABLET ORAL at 09:12

## 2022-10-15 RX ADMIN — CARBAMAZEPINE 200 MG: 200 TABLET ORAL at 21:43

## 2022-10-15 RX ADMIN — OXYCODONE HYDROCHLORIDE AND ACETAMINOPHEN 500 MG: 500 TABLET ORAL at 09:12

## 2022-10-15 RX ADMIN — ENOXAPARIN SODIUM 30 MG: 100 INJECTION SUBCUTANEOUS at 21:44

## 2022-10-15 RX ADMIN — LEVOFLOXACIN 500 MG: 500 TABLET, FILM COATED ORAL at 09:12

## 2022-10-15 ASSESSMENT — ENCOUNTER SYMPTOMS
SHORTNESS OF BREATH: 0
ABDOMINAL DISTENTION: 0

## 2022-10-15 ASSESSMENT — PAIN SCALES - GENERAL: PAINLEVEL_OUTOF10: 0

## 2022-10-15 NOTE — PROGRESS NOTES
[facility]  Office: [address], DO, Reina Sanchez, DO, Ana M Mark, DO, Gillian Bradley Blood, DO, Abhinav Barrera MD, Mateo Simons MD, William Singh MD, Joe Harden MD,  Lissett Kan MD, Sofi Boothe MD, Jose Martin, DO, Valeria Beavers MD,  Mervin Bravo MD, Vladimir Morocho MD, Melvin Ibanez DO, Mickey Wood MD, Daylin Onael MD, Natalya Son MD, Kaylan Bravo MD, Anais Mcgraw MD, Leonard Griffin MD, Diana Wu DO, Gilda Mathias MD, Debbie Gtz MD, Pacheco Driscoll CNP,  Eileen Kunz, CNP, Joya Campa, CNP, Ambrose Disla, CNP,  Mariann Rodgers, DNP, Travis Gilmore, CNP, Mariah Martínez, CNP, Nuha Carr, CNP, Rachael Bey, CNP, Eileen Mehta, CNP, Faye Mckay PA-C, Eusebia Wiley, CNS, Elie Chávez, [facility], Bernardino Reyes, CNP, Tima Arana, [facility], Rubi Prom, [address]    Progress Note    10/15/2022    11:59 AM    Name:   Kayode Ashley  MRN:     [id number]     Acct:      [de-identified]   Room:   [address] [place] [address] Day:  6  Admit Date:  10/9/2022  9:05 AM    PCP:   Abby Vega MD  Code Status:  Full Code    Subjective:     C/C:   Chief Complaint   Patient presents with    Altered Mental Status     Interval History Status: not changed. Patient examined  No acute events overnight  No new complaints  Vitals stable  Brief History:     73-year-old female with known medical history of hidradenitis suppurativa with extensive lesions bilateral axilla presented to the hospital from assisted due to extensive lower abdominal wounds. Initial concern for necrotizing fasciitis. Patient has extensive psych history and on antipsychotic medications. Patient was getting Humira but was stopped as he went to the assisted. Patient was evaluated by surgery and infectious disease. CT abdomen had some fluid collection but surgery did not recommend any intervention.   Initially was on IV antibiotics, then switched to Levaquin and Augmentin per infectious disease. Culture showed normal skin blake. Review of Systems:     Constitutional:  negative for chills, fevers, sweats  Respiratory:  negative for cough, dyspnea on exertion, shortness of breath, wheezing  Cardiovascular:  negative for chest pain, chest pressure/discomfort, lower extremity edema, palpitations  Gastrointestinal:  negative for abdominal pain, constipation, diarrhea, nausea, vomiting  Neurological:  negative for dizziness, headache  Multiple wounds in the groin  Old scar marks on axilla  Medications: Allergies: Allergies   Allergen Reactions    Depakene [Valproic Acid]     Restoril [Temazepam]     Risperidone And Related      Seizure      Thorazine [Chlorpromazine]     Vancomycin Other (See Comments) and Rash       Current Meds:   Scheduled Meds:    levoFLOXacin  500 mg Oral Daily    amoxicillin-clavulanate  1 tablet Oral 2 times per day    zinc sulfate  50 mg Oral Daily    vitamin D  50,000 Units Oral Weekly    vitamin C  500 mg Oral BID    carBAMazepine  200 mg Oral BID    haloperidol  1 mg Oral BID    sodium chloride flush  5-40 mL IntraVENous 2 times per day    enoxaparin  30 mg SubCUTAneous BID     Continuous Infusions:    sodium chloride       PRN Meds: HYDROcodone 5 mg - acetaminophen **OR** HYDROcodone 5 mg - acetaminophen, mineral oil-hydrophilic petrolatum, sodium chloride flush, sodium chloride, potassium chloride **OR** potassium alternative oral replacement **OR** potassium chloride, magnesium sulfate, ondansetron **OR** ondansetron, polyethylene glycol, acetaminophen **OR** acetaminophen    Data:     Past Medical History:   has a past medical history of Bipolar 1 disorder ([facility] GERD (gastroesophageal reflux disease), Hidradenitis suppurativa, [facility] and Schizoaffective disorder ([facility] Social History:   reports that he has been smoking cigarettes.  He has a 22.00 pack-year smoking history. He has never used smokeless tobacco. He reports current alcohol use. He reports current drug use. Family History:   Family History   Problem Relation Age of Onset    Cancer Mother         breast    High Blood Pressure Maternal Grandfather     Mental Illness Paternal Aunt     Substance Abuse Paternal Uncle     Substance Abuse Brother     Alcohol Abuse Father     Cancer Father         pancrease       Vitals:  /65   Pulse 89   Temp 97.9 °F (36.6 °C) (Oral)   Resp 16   Ht 5' 9\" (1.753 m)   Wt 246 lb 4.1 oz (111.7 kg)   SpO2 97%   BMI 36.37 kg/m²   Temp (24hrs), Av.1 °F (36.7 °C), Min:97.7 °F (36.5 °C), Max:98.4 °F (36.9 °C)    No results for input(s): POCGLU in the last 72 hours. I/O (24Hr):     Intake/Output Summary (Last 24 hours) at 10/15/2022 1159  Last data filed at 10/15/2022 0555  Gross per 24 hour   Intake --   Output 1780 ml   Net -1780 ml         Labs:  Hematology:  Recent Labs     10/13/22  0624 10/14/22  0622 10/15/22  0657   WBC 17.0* 15.4* 14.6*   RBC 3.45* 3.47* 3.56*   HGB 10.0* 10.1* 10.3*   HCT 30.4* 31.1* 31.4*   MCV 88.1 89.6 88.2   MCH 29.0 29.1 28.9   MCHC 32.9 32.5 32.8   RDW 14.5* 14.6* 14.5*    415 434   MPV 8.7 8.5 8.6       Chemistry:  Recent Labs     10/13/22  0624 10/14/22  0622 10/15/22  0657    136 136   K 3.8 3.9 3.8    102 99   CO2 24 26 26   GLUCOSE 86 83 89   BUN 6 5* 5*   CREATININE 0.60* 0.59* 0.55*   MG 1.7 1.7 1.7   ANIONGAP 9 8* 11   LABGLOM >60 >60 >60   CALCIUM 8.6 8.9 9.0   PHOS 2.7 2.9 3.2       Recent Labs     10/13/22  0624 10/14/22  0622 10/15/22  0657   LABALBU 2.0* 2.4* 2.4*       ABG:No results found for: POCPH, PHART, PH, POCPCO2, JAW3AXW, PCO2, POCPO2, PO2ART, PO2, POCHCO3, DEN2HRT, HCO3, NBEA, PBEA, BEART, BE, THGBART, THB, WKN5SKE, MXUZ8XEF, X2PUYKHC, O2SAT, FIO2  Lab Results   Component Value Date/Time    SPECIAL RT HAND 10ML 10/09/2022 09:18 AM    SPECIAL LT AC 20ML 10/09/2022 09:18 AM     Lab Results Component Value Date/Time    CULTURE PROTEUS MIRABILIS SCANT GROWTH (A) 10/12/2022 12:32 PM    CULTURE ESCHERICHIA COLI SCANT GROWTH (A) 10/12/2022 12:32 PM    CULTURE NORMAL SKIN PRAVEEN LIGHT GROWTH 10/12/2022 12:32 PM       Radiology:  CT HEAD WO CONTRAST    Result Date: 10/9/2022  1. No hemorrhage. 2. Insert of uncertain chronicity in the right frontal lobe. Consider further evaluation with MRI. CT PELVIS WO CONTRAST Additional Contrast? None    Result Date: 10/9/2022  Slightly increased chronic appearing infectious/inflammatory cutaneous/subcutaneous process of the anterior pelvis and gluteal region. Recommend correlation with history of hidradenitis suppurativa. CT ABDOMEN PELVIS W IV CONTRAST Additional Contrast? None    Result Date: 10/9/2022  1. Skin thickening of the low anterior abdominal wall, anterior pelvis and visualized gluteal region. Note, the perineum is not visualized on this study. Patient could return for further imaging if clinically indicated. 2. Large volume stool in the rectum.        Physical Examination:        General appearance:  alert, cooperative and obese  Mental Status:  oriented to person, place and time and normal affect  Lungs:  clear to auscultation bilaterally, normal effort  Heart:  regular rate and rhythm, no murmur  Abdomen:  soft, nontender, nondistended, normal bowel sounds, no masses, hepatomegaly, splenomegaly  Extremities:  no edema, redness, tenderness in the calves  Skin: Multiple wounds present in groin    Assessment:        Hospital Problems             Last Modified POA    * (Principal) Hidradenitis suppurativa 10/9/2022 Yes    Septicemia ([address] 10/10/2022 Yes    Schizophrenia ([address] 10/10/2022 Yes    Gram-positive bacteremia 10/10/2022 Yes    Cellulitis of right thigh 10/10/2022 Yes    Bandemia 10/10/2022 Yes    Scrotal abscess 10/10/2022 Yes    Immunosuppressed due to chemotherapy ([address] 10/10/2022 Yes    Mild protein-calorie malnutrition ([address] 10/12/2022 Yes    Hypovitaminosis D 10/12/2022 Yes    Gluteal abscess 10/14/2022 Yes    Abscess of skin of abdomen 10/9/2022 Yes    Schizoaffective disorder, bipolar type ([facility] 10/10/2022 Yes    GERD (gastroesophageal reflux disease) 10/10/2022 Yes   Plan:        Sepsis with hidradenitis suppurativa-continue antibiotic per infectious disease, final culture shows normal skin blake,continue Augmentin and Levaquin for 2 weeks. New CT scan reviewed with surgery, no intervention planned. Patient needs continuous wound care and antibiotics, needs follow-up outpatient with dermatology and infectious disease.     Schizoaffective disorder-seen by psychiatry, medication adjustments made, continue Haldol, Tegretol    Leukocytosis-continues to trend down, continue antibiotic    Immunosuppressed-received Humira outpatient, currently on hold    Replace vitamin D, add vitamin C and zinc  Continue supplements for malnutrition  Monitor vitals  Monitor electrolytes and replace as needed    Discharge plan to SNF, pending precert    Ozzie Madera MD  10/15/2022  11:59 AM

## 2022-10-15 NOTE — CARE COORDINATION
OT notified for notes, ML on answering machine    1030 Call to [facility] to verify if they have precert for pt, admissions is not in until Monday 1419 Met with pt to discuss need for him to participate with physical therapy in order for him to be accepted in a SNF, the pt relates he is too sore to participate, attempt to rationalize need for therapy.  The patient relates he will go home to his mothers then because he will not be participating in therapy

## 2022-10-15 NOTE — PROGRESS NOTES
Physical Therapy  Facility/Department: [facility] RENAL//MED SURG  Physical Therapy Initial Assessment    Name: Rodríguez Phan  : 1980  MRN: [id number]  Date of Service: 10/15/2022    Discharge Recommendations:  Patient would benefit from continued therapy after discharge   PT Equipment Recommendations  Walker: Rolling      Patient Diagnosis(es): The primary encounter diagnosis was Septicemia [facility]). Diagnoses of Cellulitis of right thigh and Hidradenitis were also pertinent to this visit. Past Medical History:  has a past medical history of Bipolar 1 disorder ([facility] GERD (gastroesophageal reflux disease), Hidradenitis suppurativa, Polysubstance abuse ([facility] [facility] and [facility] ([facility] Past Surgical History:  has a past surgical history that includes Infected skin debridement (2011) and Abscess Drainage (2013). Assessment   Body Structures, Functions, Activity Limitations Requiring Skilled Therapeutic Intervention: Decreased functional mobility ; Decreased strength;Decreased endurance;Decreased cognition;Decreased safe awareness;Decreased balance; Increased pain  Assessment: Intermittent participation with PT. Pt refused change of positions for exercises. Refused transfers and AMB. Perforemed exercises in left sidelying. Denies pain. Decision Making: Medium Complexity  Requires PT Follow-Up: Yes  Activity Tolerance  Activity Tolerance: Patient tolerated treatment well  Activity Tolerance Comments: No c/voiced. Denies pain.  Acknowledges that he is comfortable     Plan   Physcial Therapy Plan  General Plan:  (5-6x wk)  Current Treatment Recommendations: Strengthening, Functional mobility training, Transfer training, Endurance training, Stair training, Gait training, Safety education & training, Balance training, Therapeutic activities, Equipment evaluation, education, & procurement, Home exercise program, Patient/Caregiver education & training  Safety Devices  Type of Devices: Left in bed, Call light within reach, Gait belt, Patient at risk for falls, Nurse notified, Bed alarm in place  Restraints  Restraints Initially in Place: No     Restrictions  Restrictions/Precautions  Restrictions/Precautions: Fall Risk  Required Braces or Orthoses?: No  Position Activity Restriction  Other position/activity restrictions: up with assist     Subjective   General  Response To Previous Treatment: Patient with no complaints from previous session. Subjective  Subjective: Pt in leftside lying. Refused supine and right side lying. Agreeable to do exercises in left side lying. Objective   Bed mobility  Bed Mobility Comments: Pt refused bed mobility  Transfers  Comment: Pt refused transfers  Ambulation  Comments: Pt refused AMB        Exercise Treatment: Pt refused changing position from left side lying.   A/AROM Exercises: Left side lying: LAQ x10 reps, AP x5 reps, Left LE \"Clam\" x10 reps, hip flexion x10 reps; UE: R/L Elbow flexion, left Shoulder ABD, left shoulder IR/ER x10 reps      AM-PAC Score     AM-PAC Inpatient Mobility without Stair Climbing Raw Score : 10 (10/15/22 1021)  AM-PAC Inpatient without Stair Climbing T-Scale Score : 34.07 (10/15/22 1021)  Mobility Inpatient CMS 0-100% Score: 71.66 (10/15/22 1021)  Mobility Inpatient without Stair CMS G-Code Modifier : CL (10/15/22 1021)         Goals  Short Term Goals  Time Frame for Short Term Goals: 10 visits  Short Term Goal 1: transfers with SBA  Short Term Goal 2: amb 125 ft with a RW x SBA  Short Term Goal 3: ascend/descend 4 steps with SBA  Short Term Goal 4: 20 min strengthening exercise program x SBA  Additional Goals?: No  Patient Goals   Patient Goals : Return home       Education  Patient Education  Education Provided: Plan of Care;Role of Therapy  Education Method: Verbal  Barriers to Learning: Cognition  Education Outcome: Continued education needed;Verbalized understanding      Therapy Time   Individual Concurrent Group Co-treatment   Time In 1000         Time Out 1015         Minutes [facility] [place], [place]

## 2022-10-15 NOTE — PLAN OF CARE
Problem: Discharge Planning  Goal: Discharge to home or other facility with appropriate resources  Outcome: Progressing  Flowsheets (Taken 10/15/2022 0800)  Discharge to home or other facility with appropriate resources: Identify barriers to discharge with patient and caregiver     Problem: Safety - Adult  Goal: Free from fall injury  Outcome: Progressing  Flowsheets (Taken 10/15/2022 0841)  Free From Fall Injury: Instruct family/caregiver on patient safety     Problem: Confusion  Goal: Confusion, delirium, dementia, or psychosis is improved or at baseline  Description: INTERVENTIONS:  1. Assess for possible contributors to thought disturbance, including medications, impaired vision or hearing, underlying metabolic abnormalities, dehydration, psychiatric diagnoses, and notify attending LIP  2. [place] high risk fall precautions, as indicated  3. Provide frequent short contacts to provide reality reorientation, refocusing and direction  4. Decrease environmental stimuli, including noise as appropriate  5. Monitor and intervene to maintain adequate nutrition, hydration, elimination, sleep and activity  6. If unable to ensure safety without constant attention obtain sitter and review sitter guidelines with assigned personnel  7.  Initiate Psychosocial CNS and Spiritual Care consult, as indicated  Outcome: Progressing  Flowsheets  Taken 10/15/2022 0800 by Freeman Pichardo RN  Effect of thought disturbance (confusion, delirium, dementia, or psychosis) are managed with adequate functional status: Monitor and intervene to maintain adequate nutrition, hydration, elimination, sleep and activity  Taken 10/15/2022 0730 by Jadyn Lobo  Effect of thought disturbance (confusion, delirium, dementia, or psychosis) are managed with adequate functional status: Monitor and intervene to maintain adequate nutrition, hydration, elimination, sleep and activity     Problem: Skin/Tissue Integrity  Goal: Absence of new skin breakdown  Description: 1. Monitor for areas of redness and/or skin breakdown  2. Assess vascular access sites hourly  3. Every 4-6 hours minimum:  Change oxygen saturation probe site  4. Every 4-6 hours:  If on nasal continuous positive airway pressure, respiratory therapy assess nares and determine need for appliance change or resting period.   Outcome: Progressing     Problem: Pain  Goal: Verbalizes/displays adequate comfort level or baseline comfort level  Outcome: Progressing

## 2022-10-16 PROCEDURE — 99232 SBSQ HOSP IP/OBS MODERATE 35: CPT | Performed by: INTERNAL MEDICINE

## 2022-10-16 PROCEDURE — 6370000000 HC RX 637 (ALT 250 FOR IP): Performed by: INTERNAL MEDICINE

## 2022-10-16 PROCEDURE — 6370000000 HC RX 637 (ALT 250 FOR IP): Performed by: NURSE PRACTITIONER

## 2022-10-16 PROCEDURE — 1200000000 HC SEMI PRIVATE

## 2022-10-16 PROCEDURE — 6370000000 HC RX 637 (ALT 250 FOR IP): Performed by: CLINICAL NURSE SPECIALIST

## 2022-10-16 PROCEDURE — 99231 SBSQ HOSP IP/OBS SF/LOW 25: CPT | Performed by: STUDENT IN AN ORGANIZED HEALTH CARE EDUCATION/TRAINING PROGRAM

## 2022-10-16 PROCEDURE — 51702 INSERT TEMP BLADDER CATH: CPT

## 2022-10-16 PROCEDURE — 6360000002 HC RX W HCPCS: Performed by: NURSE PRACTITIONER

## 2022-10-16 PROCEDURE — 6370000000 HC RX 637 (ALT 250 FOR IP): Performed by: STUDENT IN AN ORGANIZED HEALTH CARE EDUCATION/TRAINING PROGRAM

## 2022-10-16 PROCEDURE — 97535 SELF CARE MNGMENT TRAINING: CPT

## 2022-10-16 RX ORDER — NICOTINE 21 MG/24HR
1 PATCH, TRANSDERMAL 24 HOURS TRANSDERMAL DAILY
Status: DISCONTINUED | OUTPATIENT
Start: 2022-10-16 | End: 2022-10-26 | Stop reason: HOSPADM

## 2022-10-16 RX ADMIN — ZINC SULFATE 220 MG (50 MG) CAPSULE 50 MG: CAPSULE at 09:11

## 2022-10-16 RX ADMIN — OXYCODONE HYDROCHLORIDE AND ACETAMINOPHEN 500 MG: 500 TABLET ORAL at 20:02

## 2022-10-16 RX ADMIN — ENOXAPARIN SODIUM 30 MG: 100 INJECTION SUBCUTANEOUS at 09:11

## 2022-10-16 RX ADMIN — OXYCODONE HYDROCHLORIDE AND ACETAMINOPHEN 500 MG: 500 TABLET ORAL at 09:11

## 2022-10-16 RX ADMIN — CARBAMAZEPINE 200 MG: 200 TABLET ORAL at 20:02

## 2022-10-16 RX ADMIN — AMOXICILLIN AND CLAVULANATE POTASSIUM 1 TABLET: 875; 125 TABLET, FILM COATED ORAL at 20:02

## 2022-10-16 RX ADMIN — ENOXAPARIN SODIUM 30 MG: 100 INJECTION SUBCUTANEOUS at 20:03

## 2022-10-16 RX ADMIN — HYDROCODONE BITARTRATE AND ACETAMINOPHEN 1 TABLET: 5; 325 TABLET ORAL at 07:50

## 2022-10-16 RX ADMIN — HALOPERIDOL 1 MG: 1 TABLET ORAL at 10:44

## 2022-10-16 RX ADMIN — AMOXICILLIN AND CLAVULANATE POTASSIUM 1 TABLET: 875; 125 TABLET, FILM COATED ORAL at 09:11

## 2022-10-16 RX ADMIN — ACETAMINOPHEN 650 MG: 325 TABLET ORAL at 23:45

## 2022-10-16 RX ADMIN — CARBAMAZEPINE 200 MG: 200 TABLET ORAL at 09:11

## 2022-10-16 RX ADMIN — LEVOFLOXACIN 500 MG: 500 TABLET, FILM COATED ORAL at 09:11

## 2022-10-16 RX ADMIN — HALOPERIDOL 1 MG: 1 TABLET ORAL at 20:02

## 2022-10-16 ASSESSMENT — PAIN SCALES - GENERAL
PAINLEVEL_OUTOF10: 8
PAINLEVEL_OUTOF10: 0
PAINLEVEL_OUTOF10: 0

## 2022-10-16 ASSESSMENT — PAIN DESCRIPTION - LOCATION: LOCATION: HEAD

## 2022-10-16 ASSESSMENT — ENCOUNTER SYMPTOMS
SHORTNESS OF BREATH: 0
ABDOMINAL DISTENTION: 0

## 2022-10-16 NOTE — PLAN OF CARE
Problem: Discharge Planning  Goal: Discharge to home or other facility with appropriate resources  Outcome: Progressing     Problem: Safety - Adult  Goal: Free from fall injury  Outcome: Progressing     Problem: Confusion  Goal: Confusion, delirium, dementia, or psychosis is improved or at baseline  Description: INTERVENTIONS:  1. Assess for possible contributors to thought disturbance, including medications, impaired vision or hearing, underlying metabolic abnormalities, dehydration, psychiatric diagnoses, and notify attending LIP  2. [place] high risk fall precautions, as indicated  3. Provide frequent short contacts to provide reality reorientation, refocusing and direction  4. Decrease environmental stimuli, including noise as appropriate  5. Monitor and intervene to maintain adequate nutrition, hydration, elimination, sleep and activity  6. If unable to ensure safety without constant attention obtain sitter and review sitter guidelines with assigned personnel  7. Initiate Psychosocial CNS and Spiritual Care consult, as indicated  Outcome: Progressing     Problem: Skin/Tissue Integrity  Goal: Absence of new skin breakdown  Description: 1. Monitor for areas of redness and/or skin breakdown  2. Assess vascular access sites hourly  3. Every 4-6 hours minimum:  Change oxygen saturation probe site  4. Every 4-6 hours:  If on nasal continuous positive airway pressure, respiratory therapy assess nares and determine need for appliance change or resting period.   Outcome: Progressing     Problem: Pain  Goal: Verbalizes/displays adequate comfort level or baseline comfort level  Outcome: Progressing

## 2022-10-16 NOTE — PROGRESS NOTES
[facility] -   Infectious diseases evaluation  admission date 10/9/2022    reason for consultation:   Hidradenitis suppurativa     Impression :   Current:  Single Gram positive bacteremia - micrococcus contamination  Hx of hidradenitis suppurativa - [facility], was receiving biweekly humira injections   Patient has not received Humira injections for last 17 days while in group home   immunosuppressed  Recent R buttock abscess vs. hidradenitis on 8/2022   Requested superficial abscess drain by IR but they decided it was loculated, would need drainage by GS  GS deferred to plastic, as lesions were very superficial   Dr. Maria Elena Du w plastic planned for wound care outpatient  Repeat CT abd/pelvis identified gluteal fluid collections suspicious for abscesses.   DC on augmentin  Altered mental status- new  CT Head wo hemorrhage  Mother unsure if pt has been getting psychotropic meds in group home   Multiple foci of painful hidradenitis on R thigh, groin area, scrotum   Purulent bloody drainage  CT correlates with hidradenitis hx  Wound care on board  Cellulitis on b/l thighs  Sepsis  SIRS: 3 on presentation + source  1 BCx with gram positive cocci in clusters likely contamination  2nd BCx pending  Bandemia   Elevated CRP - 144  Vitamin D deficiency -11.4     Other:  Incarcerated - last 17 days   Current smoker   Schizophrenia  Bipolar disorder    Discussion / summary of stay / plan of care     Recommendations     Augmentin and levaquin x 2 weeks  Pus with mixed bacteria  WBC remains elevated -   Reconsult IR for aspiration gluteal fluid collections  GS - does not suggest surgical intervention at this time  WBC fluctuating    Hidradenitis -  10/12 Start Zinc 50mg QD   Vit D 11.4 low - start [id number] weekly x 8     Infection Control Recommendations   [place] Precautions  Contact Isolation     Antimicrobial Stewardship Recommendations   Simplification of therapy  Targeted 10-15-22  Labs:  WBC - 19.7-22 - 22.5- 17.9-15.4    Lactic acid, whole blood 3.6 -0.9  Cr 0.78-0.59  Vit D 11.4  Micro:  Groin cx: prelim GPR, GNR, GPC in clusters. Proteus species. BC x1 - GPC in clusters, likely contamination - micrococcus  Bcx 2- pending  Urine cx- neg  UA - small leukocyte esterase  Imaging:  10/9 Ct Head 1. No hemorrhage. 2. Insert of uncertain chronicity in the right frontal lobe. Consider further evaluation with MRI. 10/9 - CT Pelvis Slightly increased chronic appearing infectious/inflammatory cutaneous/subcutaneous process of the anterior pelvis and gluteal region. Recommend correlation with history of hidradenitis suppurativa. 10/9 CT Ab and Pelvis 1. Skin thickening of the low anterior abdominal wall, anterior pelvis and visualized gluteal region. Note, the perineum is not visualized on this study. Patient could return for further imaging if clinically indicated. 2. Large volume stool in the rectum. 10/14 CT AbD/PELVIS  FINDINGS:   Skin thickening/stranding of the lower abdominal wall, with 2.0 x 1.9 x 1.6   cm fluid collection with thick adjacent wall, slightly increased in size   (2:56), suspicious for abscess. Skin thickening/stranding in bilateral   gluteal soft tissues, with 2.6 x 1.8 x 3.2 cm left gluteal fluid collection   with surrounding thick wall (2: 117) and 1.4 x 1.4 x  2.0 cm right gluteal   fluid collection with surrounding thick wall (2:122), similar to prior and   suspicious for abscess. Scrotal area is unremarkable within study limits. Kowalski. Normal appendix. Partially imaged large bowel anastomosis. Impression   Fluid collections with thick wall and adjacent soft tissue stranding in the   lower anterior abdominal wall and bilateral gluteal soft tissues described   above, suspicious for abscesses. Lower anterior abdominal wall fluid   collection is slightly increased in size, others are stable.        I have personally reviewed the past medical history, past surgical history, medications, social history, and family history, and I haveupdated the database accordingly. Allergies:   Depakene [valproic acid], Restoril [temazepam], Risperidone and related, Thorazine [chlorpromazine], and Vancomycin     Review of Systems:     Review of Systems   Constitutional:  Negative for diaphoresis. HENT:  Negative for congestion, dental problem and nosebleeds. Eyes:  Negative for visual disturbance. Respiratory:  Negative for shortness of breath. Cardiovascular:  Negative for chest pain. Gastrointestinal:  Negative for abdominal distention. Endocrine: Negative for cold intolerance, heat intolerance and polyphagia. Genitourinary:  Negative for dysuria and hematuria. Musculoskeletal:  Negative for arthralgias. Skin:  Positive for wound. Negative for pallor. Allergic/Immunologic: Negative for immunocompromised state. Neurological:  Negative for seizures, facial asymmetry and light-headedness. Hematological:  Does not bruise/bleed easily. Psychiatric/Behavioral:  Negative for behavioral problems. Physical Examination :       Physical Exam  Vitals and nursing note reviewed. Constitutional:       Appearance: Normal appearance. HENT:      Head: Normocephalic and atraumatic. Right Ear: External ear normal.      Left Ear: External ear normal.      Nose: Nose normal. No congestion. Mouth/Throat:      Mouth: Mucous membranes are moist.      Pharynx: Oropharynx is clear. No oropharyngeal exudate. Eyes:      General: No scleral icterus. Extraocular Movements: Extraocular movements intact. Conjunctiva/sclera: Conjunctivae normal.      Pupils: Pupils are equal, round, and reactive to light. Cardiovascular:      Rate and Rhythm: Normal rate and regular rhythm. Pulses: Normal pulses. Heart sounds: Normal heart sounds. No murmur heard. No gallop.    Pulmonary:      Effort: Pulmonary effort is normal. Breath sounds: Normal breath sounds. No wheezing or rhonchi. Abdominal:      General: Abdomen is flat. Bowel sounds are normal.      Palpations: Abdomen is soft. There is no mass. Tenderness: There is no abdominal tenderness. There is no guarding. Genitourinary:     Comments: No Kowalski  Musculoskeletal:         General: No swelling, tenderness, deformity or signs of injury. Normal range of motion. Cervical back: Normal range of motion. No rigidity. Skin:     Capillary Refill: Capillary refill takes less than 2 seconds. Findings: Rash (groin, scrotal area, thigh) present. Neurological:      General: No focal deficit present. Mental Status: He is alert and oriented to person, place, and time. Motor: No weakness.       Gait: Gait normal.   Psychiatric:         Mood and Affect: Mood normal.         Behavior: Behavior normal.       Past Medical History:     Past Medical History:   Diagnosis Date    Bipolar 1 disorder (HCC)     GERD (gastroesophageal reflux disease) 8/13/2016    Hidradenitis suppurativa     Polysubstance abuse ([facility] [address]     Schizoaffective disorder (HCC)        Past Surgical  History:     Past Surgical History:   Procedure Laterality Date    ABSCESS DRAINAGE  11/1/2013    left inguinal hydrodenitis/pilondal cyst    INFECTED SKIN DEBRIDEMENT  july 2011       Medications:      levoFLOXacin  500 mg Oral Daily    amoxicillin-clavulanate  1 tablet Oral 2 times per day    zinc sulfate  50 mg Oral Daily    vitamin D  50,000 Units Oral Weekly    vitamin C  500 mg Oral BID    carBAMazepine  200 mg Oral BID    haloperidol  1 mg Oral BID    sodium chloride flush  5-40 mL IntraVENous 2 times per day    enoxaparin  30 mg SubCUTAneous BID       Social History:     Social History     Socioeconomic History    Marital status: Single     Spouse name: Not on file    Number of children: Not on file    Years of education: Not on file    Highest education level: Not on file   Occupational History Not on file   Tobacco Use    Smoking status: Every Day     Packs/day: 1.00     Years: 22.00     Pack years: 22.00     Types: Cigarettes    Smokeless tobacco: Never   Vaping Use    Vaping Use: Never used   Substance and Sexual Activity    Alcohol use: Yes     Comment: occasional    Drug use: Yes     Comment: history of marijuana & Opiate abue, claims that he is currently clean & sober. Sexual activity: Not on file   Other Topics Concern    Not on file   Social History Narrative    Not on file     Social Determinants of Health     Financial Resource Strain: Not on file   Food Insecurity: Not on file   Transportation Needs: Not on file   Physical Activity: Not on file   Stress: Not on file   Social Connections: Not on file   Intimate Partner Violence: Not on file   Housing Stability: Not on file       Family History:     Family History   Problem Relation Age of Onset    Cancer Mother         breast    High Blood Pressure Maternal Grandfather     Mental Illness Paternal Aunt     Substance Abuse Paternal Uncle     Substance Abuse Brother     Alcohol Abuse Father     Cancer Father         pancrease      Medical Decision Making:   I have independently reviewed/ordered the following labs:    CBC with Differential:   Recent Labs     10/14/22  0622 10/15/22  0657   WBC 15.4* 14.6*   HGB 10.1* 10.3*   HCT 31.1* 31.4*    434       BMP:  Recent Labs     10/14/22  0622 10/15/22  0657    136   K 3.9 3.8    99   CO2 26 26   BUN 5* 5*   CREATININE 0.59* 0.55*   MG 1.7 1.7       Hepatic Function Panel:   Recent Labs     10/14/22  0622 10/15/22  0657   LABALBU 2.4* 2.4*       No results for input(s): RPR in the last 72 hours. No results for input(s): HIV in the last 72 hours. No results for input(s): BC in the last 72 hours. Lab Results   Component Value Date/Time    CREATININE 0.55 10/15/2022 06:57 AM    GLUCOSE 89 10/15/2022 06:57 AM    GLUCOSE 89 02/09/2012 06:19 AM       Detailed results:         Thank you for allowing us to participate in the care of this patient. Please call with questions. This note is created with the assistance of a speech recognition program.  While intending to generate adocument that actually reflects the content of the visit, the document can still have some errors including those of syntax and sound a like substitutions which may escape proof reading. It such instances, actual meaningcan be extrapolated by contextual diversion.     Office: [phone]  Perfect serve / office [phone]    Jareth Landers MD

## 2022-10-16 NOTE — PROGRESS NOTES
[facility] -   Infectious diseases evaluation  admission date 10/9/2022    reason for consultation:   Hidradenitis suppurativa     Impression :   Current:  Single Gram positive bacteremia - micrococcus contamination  Hx of hidradenitis suppurativa - followed with [facility] derm clinic, was receiving biweekly humira injections   Patient has not received Humira injections for last 17 days while in longterm   immunosuppressed  Recent R buttock abscess vs. hidradenitis on 8/2022   Requested superficial abscess drain by IR but they decided it was loculated, would need drainage by GS  GS deferred to plastic, as lesions were very superficial   Dr. Anastasiya chamberlain plastic planned for wound care outpatient  DC on augmentin  Altered mental status- new  CT Head wo hemorrhage  Mother unsure if pt has been getting psychotropic meds in longterm   Multiple foci of painful hidradenitis on R thigh, groin area, scrotum   Purulent bloody drainage  CT correlates with hidradenitis hx  Wound care on board  Cellulitis on b/l thighs  Sepsis  SIRS: 3 on presentation + source  1 BCx with gram positive cocci in clusters likely contamination  2nd BCx pending  Bandemia   Elevated CRP - 144  Vitamin D deficiency -11.4     Other:  Incarcerated - last 17 days   Current smoker   Schizophrenia  Bipolar disorder    Discussion / summary of stay / plan of care     Recommendations     Switch zosyn nemesio  augmentin and levaquin x 2 weeks  Pus mixed bacteria  WBC remains elevated -   get repeat CT of the pelvis looking for an abscess formation   If no abscess, wound DC on above po AB x 2 weeks  So far GS - decided no surgical incision at this time  WBC fluctuating    Hidradenitis -  10/12 Start Zinc 50mg QD   Vit D 11.4 low - start [id number] weekly x 8     Infection Control Recommendations   [place] Precautions  Contact Isolation     Antimicrobial Stewardship Recommendations   Simplification of therapy  Targeted therapy    History of Present Illness:   Initial history:  Theresa Calzada is a 38y.o.-year-old long-term inmate with PMHx of schizophrenia and hidradenitis suppurativa who presents with purulent wounds around the groin, scrotum, and R thigh. The wounds are severe, painful, and draining bloody red, purulent drainage, likely hidradenitis suppurativa. Patient has been following with Dr. Mendel Lao at [facility] dermatology clinic and had been getting bi weekly Humira injections. The patient has not been getting biweekly Humira injections while in long-term for the last 17 days- his mother reported that he missed 3 shots. This patient has an extensive psych history - mother unsure is patient was getting psychotropic medications while in long-term.  He currently follows with [facility]          Interval changes  10/16/2022   Patient Vitals for the past 8 hrs:   BP Temp Temp src Pulse Resp SpO2   10/16/22 1130 103/62 97.9 °F (36.6 °C) Oral 91 16 93 %   10/16/22 0900 100/65 -- -- -- -- --   10/16/22 0820 -- -- -- -- 16 --     10/11  Tmax last 24 hours 101.1F  Has not had BM yet  WBC increased slightly  Getting rest  Final blood cx still pending  Still in pain around groin - pus expressed from many orifices associated w pain, induration and little redness  Wound care with silver and aquaphor - recommended by wound care    10/12  Afebrile  Vit D deficiency - 11.4  - will replace  Drainage from he groin is +++, cx taken - some indurated found as well -  Continues to have pain around groin-    10/13  WBC 17 still and right inner thigh aea is very indurated tender and pus remains from all orifices right thigh and groins  Will get a CT pelvis and better eval for abscess      Summary of relevant labs:  Labs:  WBC - 19.7-22 - 22.5- 17.9    Lactic acid, whole blood 3.6 -0.9  Cr 0.78  Vit D 11.4  Micro:  Groin cx: prelim GPR, GNR, GPC in clusters  BC x1 - GPC in clusters, likely contamination - micrococcus  Bcx 2- pending  Urine cx- neg  UA - small leukocyte esterase  Imaging:  10/9 Ct Head 1. No hemorrhage. 2. Insert of uncertain chronicity in the right frontal lobe. Consider further evaluation with MRI. 10/9 - CT Pelvis Slightly increased chronic appearing infectious/inflammatory cutaneous/subcutaneous process of the anterior pelvis and gluteal region. Recommend correlation with history of hidradenitis suppurativa. 10/9 CT Ab and Pelvis 1. Skin thickening of the low anterior abdominal wall, anterior pelvis and visualized gluteal region. Note, the perineum is not visualized on this study. Patient could return for further imaging if clinically indicated. 2. Large volume stool in the rectum. I have personally reviewed the past medical history, past surgical history, medications, social history, and family history, and I haveupdated the database accordingly. Allergies:   Depakene [valproic acid], Restoril [temazepam], Risperidone and related, Thorazine [chlorpromazine], and Vancomycin     Review of Systems:     Review of Systems   Constitutional:  Negative for diaphoresis. HENT:  Negative for congestion, dental problem and nosebleeds. Eyes:  Negative for visual disturbance. Respiratory:  Negative for shortness of breath. Cardiovascular:  Negative for chest pain. Gastrointestinal:  Negative for abdominal distention. Endocrine: Negative for cold intolerance, heat intolerance and polyphagia. Genitourinary:  Negative for dysuria and hematuria. Musculoskeletal:  Negative for arthralgias. Skin:  Positive for wound. Negative for pallor. Allergic/Immunologic: Negative for immunocompromised state. Neurological:  Negative for seizures, facial asymmetry and light-headedness. Hematological:  Does not bruise/bleed easily. Psychiatric/Behavioral:  Negative for behavioral problems. Physical Examination :       Physical Exam  Constitutional:       General: He is not in acute distress.      Appearance: Normal appearance. He is not toxic-appearing. HENT:      Head: Normocephalic and atraumatic. Right Ear: External ear normal.      Left Ear: External ear normal.      Nose: Nose normal. No congestion. Mouth/Throat:      Mouth: Mucous membranes are moist.      Pharynx: Oropharynx is clear. No oropharyngeal exudate. Eyes:      General: No scleral icterus. Extraocular Movements: Extraocular movements intact. Pupils: Pupils are equal, round, and reactive to light. Cardiovascular:      Rate and Rhythm: Normal rate and regular rhythm. Pulses: Normal pulses. Heart sounds: Normal heart sounds. No murmur heard. No gallop. Pulmonary:      Effort: Pulmonary effort is normal.      Breath sounds: No wheezing or rales. Abdominal:      General: Abdomen is flat. Palpations: Abdomen is soft. There is no mass. Tenderness: There is no abdominal tenderness. There is no guarding. Genitourinary:     Comments: No Kowalski  Musculoskeletal:         General: No swelling, tenderness, deformity or signs of injury. Normal range of motion. Cervical back: Normal range of motion. No rigidity. Lymphadenopathy:      Cervical: No cervical adenopathy. Skin:     Capillary Refill: Capillary refill takes less than 2 seconds. Findings: Rash (groin, scrotal area, thigh) present. Neurological:      General: No focal deficit present. Mental Status: He is alert and oriented to person, place, and time. Motor: No weakness.       Gait: Gait normal.   Psychiatric:         Mood and Affect: Mood normal.         Behavior: Behavior normal.       Past Medical History:     Past Medical History:   Diagnosis Date    [facility]     GERD (gastroesophageal reflux disease) 8/13/2016    Hidradenitis suppurativa     [facility]ub[facility] ([facility]     Schizoaffective disorder [facility])        Past Surgical  History:     Past Surgical History:   Procedure Laterality Date    ABSCESS DRAINAGE  11/1/2013 left inguinal hydrodenitis/pilondal cyst    INFECTED SKIN DEBRIDEMENT  july 2011       Medications:      nicotine  1 patch TransDERmal Daily    levoFLOXacin  500 mg Oral Daily    amoxicillin-clavulanate  1 tablet Oral 2 times per day    zinc sulfate  50 mg Oral Daily    vitamin D  50,000 Units Oral Weekly    vitamin C  500 mg Oral BID    carBAMazepine  200 mg Oral BID    haloperidol  1 mg Oral BID    sodium chloride flush  5-40 mL IntraVENous 2 times per day    enoxaparin  30 mg SubCUTAneous BID       Social History:     Social History     Socioeconomic History    Marital status: Single     Spouse name: Not on file    Number of children: Not on file    Years of education: Not on file    Highest education level: Not on file   Occupational History    Not on file   Tobacco Use    Smoking status: Every Day     Packs/day: 1.00     Years: 22.00     Pack years: 22.00     Types: Cigarettes    Smokeless tobacco: Never   Vaping Use    Vaping Use: Never used   Substance and Sexual Activity    Alcohol use: Yes     Comment: occasional    Drug use: Yes     Comment: history of marijuana & Opiate abue, claims that he is currently clean & sober.     Sexual activity: Not on file   Other Topics Concern    Not on file   Social History Narrative    Not on file     Social Determinants of Health     Financial Resource Strain: Not on file   Food Insecurity: Not on file   Transportation Needs: Not on file   Physical Activity: Not on file   Stress: Not on file   Social Connections: Not on file   Intimate Partner Violence: Not on file   Housing Stability: Not on file       Family History:     Family History   Problem Relation Age of Onset    Cancer Mother         breast    High Blood Pressure Maternal Grandfather     Mental Illness Paternal Aunt     Substance Abuse Paternal Uncle     Substance Abuse Brother     Alcohol Abuse Father     Cancer Father         pancrease      Medical Decision Making:   I have independently reviewed/ordered the following labs:    CBC with Differential:   Recent Labs     10/14/22  0622 10/15/22  0657   WBC 15.4* 14.6*   HGB 10.1* 10.3*   HCT 31.1* 31.4*    434       BMP:  Recent Labs     10/14/22  0622 10/15/22  0657    136   K 3.9 3.8    99   CO2 26 26   BUN 5* 5*   CREATININE 0.59* 0.55*   MG 1.7 1.7       Hepatic Function Panel:   Recent Labs     10/14/22  0622 10/15/22  0657   LABALBU 2.4* 2.4*       No results for input(s): RPR in the last 72 hours. No results for input(s): HIV in the last 72 hours. No results for input(s): BC in the last 72 hours. Lab Results   Component Value Date/Time    CREATININE 0.55 10/15/2022 06:57 AM    GLUCOSE 89 10/15/2022 06:57 AM    GLUCOSE 89 02/09/2012 06:19 AM       Detailed results:    Silver Richards MD      Thank you for allowing us to participate in the care of this patient. Please call with questions. This note is created with the assistance of a speech recognition program.  While intending to generate adocument that actually reflects the content of the visit, the document can still have some errors including those of syntax and sound a like substitutions which may escape proof reading. It such instances, actual meaningcan be extrapolated by contextual diversion.

## 2022-10-16 NOTE — PROGRESS NOTES
Occupational Therapy  Facility/Department: [facility] RENAL//MED SURG  Occupational Therapy Daily Treatment Note    Name: Tawanda Parra  : 1980  MRN: [id number]  Date of Service: 10/16/2022    Discharge Recommendations:Further therapy recommended at discharge in order to increase pt balance, strength and independence. Assessment   Performance deficits / Impairments: Decreased functional mobility ; Decreased ADL status; Decreased endurance;Decreased high-level IADLs;Decreased strength;Decreased balance;Decreased cognition;Decreased safe awareness  Prognosis: Good  REQUIRES OT FOLLOW-UP: Yes  Activity Tolerance  Activity Tolerance: Patient Tolerated treatment well;Patient limited by pain        Plan   Occupational Therapy Plan  Times Per Week: 3-4x/wk     Restrictions  Restrictions/Precautions  Restrictions/Precautions: Fall Risk  Required Braces or Orthoses?: No  Position Activity Restriction  Other position/activity restrictions: up with assist    Subjective   General  Chart Reviewed: Yes  Family / Caregiver Present: No  General Comment  Comments: Pt and RN agreeable to therapy this day. Pt supine in bed at start of session and retired to supine in bed L side lying at session end with call light in reach and bed alarm on. Pt c/o 5/10 pain at buttocks/groin. Pt cooperative req min encouragement for activities         Objective        Safety Devices  Type of Devices: Left in bed;Call light within reach; Bed alarm in place; Patient at risk for falls;Nurse notified  Restraints  Restraints Initially in Place: No  Balance  Sitting: Without support (Pt tolerated approx 6 min static/dynamic sitting at EOB SBA)  Standing: With support (Min A static standing without AD tolerating approx 30 sec)  Gait  Overall Level of Assistance:  (pt declined attempt)        ADL  Grooming: Modified independent   Grooming Skilled Clinical Factors:  Face washing facilitated seated at EOB  UE Dressing: Minimal assistance;Setup;Verbal cueing; Increased time to complete  UE Dressing Skilled Clinical Factors: To doff/don gown seated at EOB req assist with threading d/t 1 hand support for sitting and pt pain  LE Dressing: Maximum assistance;Setup;Verbal cueing; Increased time to complete  LE Dressing Skilled Clinical Factors: Max A to doff/don socks seated at EOB req increased assist d/t pain  Toileting: Maximum assistance;Setup; Increased time to complete  Toileting Skilled Clinical Factors: Pt bedding notably soiled req Max A for personal hygiene pt assisting minimally with bed mobility  Additional Comments: Throughout session pt limited per pain and fatigue. Bed mobility  Rolling to Left: Stand by assistance  Rolling to Right: Minimal assistance  Supine to Sit: Stand by assistance  Sit to Supine: Stand by assistance  Scooting: Stand by assistance  Bed Mobility Comments: utilizing bed rails  Transfers  Sit to stand: Contact guard assistance  Stand to sit: Contact guard assistance  Transfer Comments: without AD pt impuslively transfered req cues for safety     Cognition  Overall Cognitive Status: Exceptions  Arousal/Alertness: Delayed responses to stimuli  Following Commands: Follows multistep commands with repitition; Follows multistep commands with increased time  Attention Span: Attends with cues to redirect  Safety Judgement: Decreased awareness of need for assistance;Decreased awareness of need for safety  Problem Solving: Assistance required to identify errors made;Assistance required to correct errors made;Decreased awareness of errors  Insights: Decreased awareness of deficits  Initiation: Requires cues for some  Sequencing: Requires cues for some  Cognition Comment: fior min impulsivity, slow to respond                  Education Given To: Patient  Education Provided: Role of Therapy;Transfer Training;Precautions  Education Provided Comments: proper hand and foot placement, balance maintaince, importance of therapy and participation-F carry over  Education Method: Demonstration;Verbal  Education Outcome: Continued education needed                                         AM-PAC Score        AM-PAC Inpatient Daily Activity Raw Score: 17 (10/16/22 1405)  AM-PAC Inpatient ADL T-Scale Score : 37.26 (10/16/22 1405)  ADL Inpatient CMS 0-100% Score: 50.11 (10/16/22 1405)  ADL Inpatient CMS G-Code Modifier : CK (10/16/22 1405)        Goals  Short Term Goals  Time Frame for Short Term Goals: pt will, by discharge  Short Term Goal 1: complete LB ADLs and toileting tasks with min A, set up and Ae, as needed  Short Term Goal 2: complete UB ADLs with SBA and set up  Short Term Goal 3: dem CGA during functional transfers/functional mobility with LRd, as needed  Short Term Goal 4: dem ~15 minutes dynamic sitting balance with SBA on EOB in order to complete functional tasks  Short Term Goal 5: increase activity tolerance to 20+ minutes in order to participate in daily tasks       Therapy Time   Individual Concurrent Group Co-treatment   Time In 0844         Time Out 0907         Minutes 23         Timed Code Treatment Minutes: ISSAC Jacobsen

## 2022-10-16 NOTE — PLAN OF CARE
Problem: Discharge Planning  Goal: Discharge to home or other facility with appropriate resources  10/16/2022 1829 by Dyllan Ji RN  Outcome: Progressing  Flowsheets (Taken 10/16/2022 0800)  Discharge to home or other facility with appropriate resources: Identify barriers to discharge with patient and caregiver  10/16/2022 0604 by Wendy Jernigan RN  Outcome: Progressing     Problem: Safety - Adult  Goal: Free from fall injury  10/16/2022 1829 by Dyllan Ji RN  Outcome: Progressing  Flowsheets (Taken 10/16/2022 0856)  Free From Fall Injury: Instruct family/caregiver on patient safety  10/16/2022 0604 by Wendy Jernigan RN  Outcome: Progressing     Problem: Confusion  Goal: Confusion, delirium, dementia, or psychosis is improved or at baseline  Description: INTERVENTIONS:  1. Assess for possible contributors to thought disturbance, including medications, impaired vision or hearing, underlying metabolic abnormalities, dehydration, psychiatric diagnoses, and notify attending LIP  2. [place] high risk fall precautions, as indicated  3. Provide frequent short contacts to provide reality reorientation, refocusing and direction  4. Decrease environmental stimuli, including noise as appropriate  5. Monitor and intervene to maintain adequate nutrition, hydration, elimination, sleep and activity  6. If unable to ensure safety without constant attention obtain sitter and review sitter guidelines with assigned personnel  7.  Initiate Psychosocial CNS and Spiritual Care consult, as indicated  10/16/2022 1829 by Dyllan Ji RN  Outcome: Progressing  Flowsheets (Taken 10/16/2022 0800)  Effect of thought disturbance (confusion, delirium, dementia, or psychosis) are managed with adequate functional status: Assess for contributors to thought disturbance, including medications, impaired vision or hearing, underlying metabolic abnormalities, dehydration, psychiatric diagnoses, notify LIP  10/16/2022 0604 by Audra Dickerson PHILIP Tovar  Outcome: Progressing     Problem: Skin/Tissue Integrity  Goal: Absence of new skin breakdown  Description: 1. Monitor for areas of redness and/or skin breakdown  2. Assess vascular access sites hourly  3. Every 4-6 hours minimum:  Change oxygen saturation probe site  4. Every 4-6 hours:  If on nasal continuous positive airway pressure, respiratory therapy assess nares and determine need for appliance change or resting period.   10/16/2022 1829 by Farhan Burns RN  Outcome: Progressing  10/16/2022 0604 by Carroll Recio RN  Outcome: Progressing     Problem: Pain  Goal: Verbalizes/displays adequate comfort level or baseline comfort level  10/16/2022 1829 by Farhan Burns RN  Outcome: Progressing  10/16/2022 0604 by Carroll Recio RN  Outcome: Progressing

## 2022-10-16 NOTE — PROGRESS NOTES
[facility]  Office: [address], DO, Rickie Colon, DO, Ananya Gonzalez, DO, Natalia Bryan Blood, DO, Chandan Blount MD, Crys Raman MD, Fredo De La Garza MD, Marcell Helm MD,  Ligia Walsh MD, Anjel Verma MD, Pauline Layer, DO, Rodolfo Lomas MD,  Isabel Watson MD, Rae Hanson MD, Linda Chew, DO, Bhumika Casey MD, Gill Gross MD, Dior Hanson MD, Jon Gillette MD, Carlos Lamb MD, Josué Fan MD, Dmitri Castle, DO, Angela De Los Santos MD, Starla Peraza MD, Avtar Worrell CNP,  Carson Desai, CNP, Craig Sever, CNP, Pancho Weeks, CNP,  Karen Downs, DNP, Catracho Koo, CNP, Rasheed Saucedo, CNP, Denys Ghotra, CNP, Shirley Calhoun, CNP, Leonid Mayorga, CNP, Mylene Garay PA-C, Ajay Alexander, CNS, Oumar Bentley, DNP, La English, CNP, Julia Acosta, CNP, Sarah Beth Almonte, [address] [place] [address]    Progress Note    10/16/2022    11:55 AM    Name:   Tracie Tamayo  MRN:     [id number]     Acct:      [de-identified]   Room:   [address] [place] [address] Day:  7  Admit Date:  10/9/2022  9:05 AM    PCP:   Ronn Cervantes MD  Code Status:  Full Code    Subjective:     C/C:   Chief Complaint   Patient presents with    Altered Mental Status     Interval History Status: not changed. Patient examined  No new complaints  States that he worked with therapy  Appetite is good    Brief History:     39-year-old female with known medical history of hidradenitis suppurativa with extensive lesions bilateral axilla presented to the hospital from intermediate due to extensive lower abdominal wounds. Initial concern for necrotizing fasciitis. Patient has extensive psych history and on antipsychotic medications. Patient was getting Humira but was stopped as he went to the intermediate. Patient was evaluated by surgery and infectious disease.   CT abdomen had some fluid collection but surgery did not recommend any intervention. Initially was on IV antibiotics, then switched to Levaquin and Augmentin per infectious disease. Culture showed normal skin blake. Review of Systems:     Constitutional:  negative for chills, fevers, sweats  Respiratory:  negative for cough, dyspnea on exertion, shortness of breath, wheezing  Cardiovascular:  negative for chest pain, chest pressure/discomfort, lower extremity edema, palpitations  Gastrointestinal:  negative for abdominal pain, constipation, diarrhea, nausea, vomiting  Neurological:  negative for dizziness, headache  Multiple wounds in the groin  Old scar marks on axilla  Medications: Allergies: Allergies   Allergen Reactions    Depakene [Valproic Acid]     Restoril [Temazepam]     Risperidone And Related      Seizure      Thorazine [Chlorpromazine]     Vancomycin Other (See Comments) and Rash       Current Meds:   Scheduled Meds:    levoFLOXacin  500 mg Oral Daily    amoxicillin-clavulanate  1 tablet Oral 2 times per day    zinc sulfate  50 mg Oral Daily    vitamin D  50,000 Units Oral Weekly    vitamin C  500 mg Oral BID    carBAMazepine  200 mg Oral BID    haloperidol  1 mg Oral BID    sodium chloride flush  5-40 mL IntraVENous 2 times per day    enoxaparin  30 mg SubCUTAneous BID     Continuous Infusions:    sodium chloride       PRN Meds: HYDROcodone 5 mg - acetaminophen **OR** HYDROcodone 5 mg - acetaminophen, mineral oil-hydrophilic petrolatum, sodium chloride flush, sodium chloride, potassium chloride **OR** potassium alternative oral replacement **OR** potassium chloride, magnesium sulfate, ondansetron **OR** ondansetron, polyethylene glycol, acetaminophen **OR** acetaminophen    Data:     Past Medical History:   has a past medical history of Bipolar 1 disorder ([facility] GERD (gastroesophageal reflux disease), Hidradenitis suppurativa, Polysubstance abuse ([facility] [facility] and Schizoaffective disorder ([facility] Social History:   reports that he has been smoking cigarettes.  He has a 22.00 pack-year smoking history. He has never used smokeless tobacco. He reports current alcohol use. He reports current drug use. Family History:   Family History   Problem Relation Age of Onset    Cancer Mother         breast    High Blood Pressure Maternal Grandfather     Mental Illness Paternal Aunt     Substance Abuse Paternal Uncle     Substance Abuse Brother     Alcohol Abuse Father     Cancer Father         pancrease       Vitals:  /62   Pulse 91   Temp 97.9 °F (36.6 °C) (Oral)   Resp 16   Ht 5' 9\" (1.753 m)   Wt 246 lb 4.1 oz (111.7 kg)   SpO2 93%   BMI 36.37 kg/m²   Temp (24hrs), Av.9 °F (36.6 °C), Min:97.7 °F (36.5 °C), Max:98.1 °F (36.7 °C)    No results for input(s): POCGLU in the last 72 hours. I/O (24Hr):     Intake/Output Summary (Last 24 hours) at 10/16/2022 1155  Last data filed at 10/16/2022 1112  Gross per 24 hour   Intake --   Output 1000 ml   Net -1000 ml         Labs:  Hematology:  Recent Labs     10/14/22  0622 10/15/22  0657   WBC 15.4* 14.6*   RBC 3.47* 3.56*   HGB 10.1* 10.3*   HCT 31.1* 31.4*   MCV 89.6 88.2   MCH 29.1 28.9   MCHC 32.5 32.8   RDW 14.6* 14.5*    434   MPV 8.5 8.6       Chemistry:  Recent Labs     10/14/22  0622 10/15/22  0657    136   K 3.9 3.8    99   CO2 26 26   GLUCOSE 83 89   BUN 5* 5*   CREATININE 0.59* 0.55*   MG 1.7 1.7   ANIONGAP 8* 11   LABGLOM >60 >60   CALCIUM 8.9 9.0   PHOS 2.9 3.2       Recent Labs     10/14/22  0622 10/15/22  0657   LABALBU 2.4* 2.4*       ABG:No results found for: POCPH, PHART, PH, POCPCO2, RLN5CWQ, PCO2, POCPO2, PO2ART, PO2, POCHCO3, RAY2IQF, HCO3, NBEA, PBEA, BEART, BE, THGBART, THB, VCD8RSZ, OINX9ZRB, P8EXHKUK, O2SAT, FIO2  Lab Results   Component Value Date/Time    SPECIAL RT HAND 10ML 10/09/2022 09:18 AM    SPECIAL LT AC 20ML 10/09/2022 09:18 AM     Lab Results   Component Value Date/Time    CULTURE PROTEUS MIRABILIS SCANT GROWTH (A) 10/12/2022 12:32 PM    CULTURE ESCHERICHIA COLI SCANT GROWTH (A) 10/12/2022 12:32 PM    CULTURE NORMAL SKIN PRAVEEN LIGHT GROWTH 10/12/2022 12:32 PM       Radiology:  CT HEAD WO CONTRAST    Result Date: 10/9/2022  1. No hemorrhage. 2. Insert of uncertain chronicity in the right frontal lobe. Consider further evaluation with MRI. CT PELVIS WO CONTRAST Additional Contrast? None    Result Date: 10/9/2022  Slightly increased chronic appearing infectious/inflammatory cutaneous/subcutaneous process of the anterior pelvis and gluteal region. Recommend correlation with history of hidradenitis suppurativa. CT ABDOMEN PELVIS W IV CONTRAST Additional Contrast? None    Result Date: 10/9/2022  1. Skin thickening of the low anterior abdominal wall, anterior pelvis and visualized gluteal region. Note, the perineum is not visualized on this study. Patient could return for further imaging if clinically indicated. 2. Large volume stool in the rectum.        Physical Examination:        General appearance:  alert, cooperative and obese  Mental Status:  oriented to person, place and time and normal affect  Lungs:  clear to auscultation bilaterally, normal effort  Heart:  regular rate and rhythm, no murmur  Abdomen:  soft, nontender, nondistended, normal bowel sounds, no masses, hepatomegaly, splenomegaly  Extremities:  no edema, redness, tenderness in the calves  Skin: Multiple wounds present in groin    Assessment:        Hospital Problems             Last Modified POA    * (Principal) Hidradenitis suppurativa 10/9/2022 Yes    Septicemia ([address] 10/10/2022 Yes    Schizophrenia ([address] 10/10/2022 Yes    Gram-positive bacteremia 10/10/2022 Yes    Cellulitis of right thigh 10/10/2022 Yes    Bandemia 10/10/2022 Yes    Scrotal abscess 10/10/2022 Yes    Immunosuppressed due to chemotherapy ([address] 10/10/2022 Yes    Mild protein-calorie malnutrition ([address] 10/12/2022 Yes    Hypovitaminosis D 10/12/2022 Yes    Gluteal abscess 10/14/2022 Yes    Abscess of skin of abdomen 10/9/2022 Yes    Schizoaffective disorder, bipolar type ([facility] 10/10/2022 Yes    GERD (gastroesophageal reflux disease) 10/10/2022 Yes   Plan:        Sepsis with hidradenitis suppurativa-continue antibiotic per infectious disease, final culture shows normal skin blake,continue Augmentin and Levaquin for 2 weeks. New CT scan reviewed with surgery, no intervention planned. Patient needs continuous wound care and antibiotics, needs follow-up outpatient with dermatology and infectious disease.     Schizoaffective disorder-seen by psychiatry, medication adjustments made, continue Haldol, Tegretol    Leukocytosis-continues to trend down, continue antibiotic    Immunosuppressed-received Humira outpatient, currently on hold    Replace vitamin D, add vitamin C and zinc  Continue supplements for malnutrition  Monitor vitals  Monitor electrolytes and replace as needed    Discharge plan to SNF, pending precert  Encourage PT, Lisa Hassan MD  10/16/2022  11:55 AM

## 2022-10-17 PROCEDURE — 6360000002 HC RX W HCPCS: Performed by: NURSE PRACTITIONER

## 2022-10-17 PROCEDURE — 97530 THERAPEUTIC ACTIVITIES: CPT

## 2022-10-17 PROCEDURE — 6370000000 HC RX 637 (ALT 250 FOR IP): Performed by: CLINICAL NURSE SPECIALIST

## 2022-10-17 PROCEDURE — 99231 SBSQ HOSP IP/OBS SF/LOW 25: CPT | Performed by: STUDENT IN AN ORGANIZED HEALTH CARE EDUCATION/TRAINING PROGRAM

## 2022-10-17 PROCEDURE — 1200000000 HC SEMI PRIVATE

## 2022-10-17 PROCEDURE — 6370000000 HC RX 637 (ALT 250 FOR IP): Performed by: STUDENT IN AN ORGANIZED HEALTH CARE EDUCATION/TRAINING PROGRAM

## 2022-10-17 PROCEDURE — 6370000000 HC RX 637 (ALT 250 FOR IP): Performed by: NURSE PRACTITIONER

## 2022-10-17 PROCEDURE — 6370000000 HC RX 637 (ALT 250 FOR IP): Performed by: INTERNAL MEDICINE

## 2022-10-17 PROCEDURE — 97116 GAIT TRAINING THERAPY: CPT

## 2022-10-17 PROCEDURE — 99232 SBSQ HOSP IP/OBS MODERATE 35: CPT | Performed by: INTERNAL MEDICINE

## 2022-10-17 RX ADMIN — AMOXICILLIN AND CLAVULANATE POTASSIUM 1 TABLET: 875; 125 TABLET, FILM COATED ORAL at 21:30

## 2022-10-17 RX ADMIN — HYDROCODONE BITARTRATE AND ACETAMINOPHEN 2 TABLET: 5; 325 TABLET ORAL at 21:30

## 2022-10-17 RX ADMIN — HALOPERIDOL 1 MG: 1 TABLET ORAL at 10:09

## 2022-10-17 RX ADMIN — HALOPERIDOL 1 MG: 1 TABLET ORAL at 22:17

## 2022-10-17 RX ADMIN — LEVOFLOXACIN 500 MG: 500 TABLET, FILM COATED ORAL at 10:10

## 2022-10-17 RX ADMIN — AMOXICILLIN AND CLAVULANATE POTASSIUM 1 TABLET: 875; 125 TABLET, FILM COATED ORAL at 10:10

## 2022-10-17 RX ADMIN — HYDROCODONE BITARTRATE AND ACETAMINOPHEN 2 TABLET: 5; 325 TABLET ORAL at 15:44

## 2022-10-17 RX ADMIN — ZINC SULFATE 220 MG (50 MG) CAPSULE 50 MG: CAPSULE at 10:09

## 2022-10-17 RX ADMIN — OXYCODONE HYDROCHLORIDE AND ACETAMINOPHEN 500 MG: 500 TABLET ORAL at 21:30

## 2022-10-17 RX ADMIN — CARBAMAZEPINE 200 MG: 200 TABLET ORAL at 10:09

## 2022-10-17 RX ADMIN — OXYCODONE HYDROCHLORIDE AND ACETAMINOPHEN 500 MG: 500 TABLET ORAL at 10:09

## 2022-10-17 RX ADMIN — CARBAMAZEPINE 200 MG: 200 TABLET ORAL at 21:30

## 2022-10-17 ASSESSMENT — ENCOUNTER SYMPTOMS
ABDOMINAL DISTENTION: 0
ABDOMINAL PAIN: 0
SINUS PRESSURE: 0
SINUS PAIN: 0
EYE PAIN: 0
EYE ITCHING: 0
SHORTNESS OF BREATH: 0

## 2022-10-17 ASSESSMENT — PAIN SCALES - GENERAL
PAINLEVEL_OUTOF10: 10
PAINLEVEL_OUTOF10: 10

## 2022-10-17 ASSESSMENT — PAIN DESCRIPTION - LOCATION: LOCATION: HEAD;TEETH

## 2022-10-17 ASSESSMENT — PAIN DESCRIPTION - ORIENTATION: ORIENTATION: RIGHT;LEFT

## 2022-10-17 NOTE — PROGRESS NOTES
Comprehensive Nutrition Assessment    Type and Reason for Visit:  RD Nutrition Re-Screen/LOS    Nutrition Recommendations/Plan:   Change ONS to high protein/high calorie. Malnutrition Assessment:  Malnutrition Status:  No malnutrition (10/17/22 1445)    Context:  Chronic Illness         Nutrition Assessment:    Patient seen for LOS. Patient admitted for hidradenitis suppurativa, sepsis. PMH schizoaffective disorder. Patient nods when asked if he is eating well. When asked if he likes the ensure, patient nods. Multiple empty Ensures noted at bedside. Changed from low lisa/high protein to high lisa/high protein as it has more protein. Patient with multiple wounds r/t hidradenitis suppurativa. Nutrition Related Findings:    labs/meds reviewed Wound Type: Open Wounds (bilat inner thighs.)       Current Nutrition Intake & Therapies:    Average Meal Intake: 51-75%  Average Supplements Intake: %  ADULT DIET; Regular  ADULT ORAL NUTRITION SUPPLEMENT; Breakfast, Lunch, Dinner; Standard High Calorie/High Protein Oral Supplement    Anthropometric Measures:  Height: 5' 9\" (175.3 cm)  Ideal Body Weight (IBW): 160 lbs (73 kg)       Current Body Weight: 246 lb (111.6 kg), 153.8 % IBW.  Weight Source: Bed Scale  Current BMI (kg/m2): 36.3                          BMI Categories: Obese Class 2 (BMI 35.0 -39.9)    Estimated Daily Nutrient Needs:  Energy Requirements Based On: Formula  Weight Used for Energy Requirements: Current  Energy (kcal/day): 6756-4376 kcal  Weight Used for Protein Requirements: Adjusted  Protein (g/day): 110-115 g  Method Used for Fluid Requirements: 1 ml/kcal  Fluid (ml/day): 2100 mL or per MD    Nutrition Diagnosis:   Inadequate protein intake related to increase demand for energy/nutrients as evidenced by wounds    Nutrition Interventions:   Food and/or Nutrient Delivery: Continue Current Diet, Modify Oral Nutrition Supplement  Nutrition Education/Counseling: No recommendation at this time  Coordination of Nutrition Care: Continue to monitor while inpatient       Goals:     Goals: Meet at least 75% of estimated needs       Nutrition Monitoring and Evaluation:   Behavioral-Environmental Outcomes: None Identified  Food/Nutrient Intake Outcomes: Food and Nutrient Intake, Supplement Intake  Physical Signs/Symptoms Outcomes: Biochemical Data, GI Status, Skin, Weight    Discharge Planning:     Too soon to determine     Janeice Merlin, [address]  Contact: [phone]

## 2022-10-17 NOTE — CARE COORDINATION
Transitional planning note: call placed to [facility] with [facility]. She states [place] is pending but she is checking with administration to see if they can admit under patient's medicaid benefit while they await authorization. 1215: received call from effie with [facility] inquiring about patient's humira injections as he was previously receiving them every 2 weeks but it has been on hold since admission. Effie states the humira would be cost prohibitive for them as it is $6000/ dose. She would like to know if these injections will remain on hold or resume after discharge. Perfect serve message to dr Maurizio Infante to inquire.

## 2022-10-17 NOTE — PROGRESS NOTES
Physical Therapy  Facility/Department: [facility] RENAL//MED SURG  Physical Therapy Daily Treatment Note    Name: Neha Duenas  : 1980  MRN: [id number]  Date of Service: 10/17/2022    Discharge Recommendations:  Patient would benefit from continued therapy after discharge   PT Equipment Recommendations  Equipment Needed: Yes  Mobility Devices: Omega Slim: Rolling      Patient Diagnosis(es): The primary encounter diagnosis was Septicemia ([facility] [address] Diagnoses of Cellulitis of right thigh and Hidradenitis were also pertinent to this visit. Past Medical History:  has a past medical history of Bipolar 1 disorder ([facility] [address] GERD (gastroesophageal reflux disease), Hidradenitis suppurativa, Polysubstance abuse ([facility] [address] and Schizoaffective disorder ([facility] [address] Past Surgical History:  has a past surgical history that includes Infected skin debridement (2011) and Abscess Drainage (2013). Assessment   Body Structures, Functions, Activity Limitations Requiring Skilled Therapeutic Intervention: Decreased functional mobility ; Decreased strength;Decreased endurance;Decreased cognition;Decreased safe awareness;Decreased balance; Increased pain  Assessment: Pt required SBA for bed mobility and was able to stand and ambulate a few steps forwards/backwards with use of RW and CGA. Pt limited by decreased strength and endurance, recommending continued PT to address deficits and maximize safety and independence with mobility.   Therapy Prognosis: Good  Requires PT Follow-Up: Yes  Activity Tolerance  Activity Tolerance: Patient limited by endurance     Plan   Physcial Therapy Plan  General Plan:  (5-6x/week)  Current Treatment Recommendations: Strengthening, Functional mobility training, Transfer training, Endurance training, Stair training, Gait training, Safety education & training, Balance training, Therapeutic activities, Equipment evaluation, education, & procurement, Home exercise program, Patient/Caregiver education & training  Safety Devices  Type of Devices: Left in bed, Call light within reach, Patient at risk for falls, Nurse notified  Restraints  Restraints Initially in Place: No     Restrictions  Restrictions/Precautions  Restrictions/Precautions: Fall Risk  Required Braces or Orthoses?: No  Position Activity Restriction  Other position/activity restrictions: up with assist     Subjective   General  Chart Reviewed: Yes  Patient assessed for rehabilitation services?: Yes  Response To Previous Treatment: Patient with no complaints from previous session. Family / Caregiver Present: No  Follows Commands: Within Functional Limits  General Comment  Comments: Pt returned to supine in bed at end of session with RN present in room to get pt washed up. Subjective  Subjective: Pt and RN agreeable to PT this afternoon. Pt sidelying in bed upon arrival with c/o pain in groin area. Pt cooperative throughout session. Cognition   Orientation  Overall Orientation Status: Within Functional Limits  Cognition  Overall Cognitive Status: Exceptions  Arousal/Alertness: Delayed responses to stimuli  Following Commands: Follows multistep commands with repitition; Follows multistep commands with increased time  Attention Span: Attends with cues to redirect  Safety Judgement: Decreased awareness of need for assistance;Decreased awareness of need for safety  Problem Solving: Assistance required to identify errors made;Assistance required to correct errors made;Decreased awareness of errors  Insights: Decreased awareness of deficits  Initiation: Requires cues for some  Sequencing: Requires cues for some  Cognition Comment: demo min impulsivity, slow to respond     Objective   Bed mobility  Rolling to Left: Stand by assistance  Supine to Sit: Stand by assistance  Sit to Supine: Stand by assistance  Scooting: Stand by assistance  Bed Mobility Comments: utilizing bed rails, increased time/effort d/t pain in buttocks/groin region.   Transfers  Sit to Stand: Contact guard assistance  Stand to Sit: Contact guard assistance  Comment: RW used for transfers, pt performed multiple times throughout session. poor hand placement during transfers despite cueing. Ambulation  Surface: Level tile  Device: Rolling Walker  Assistance: Contact guard assistance  Quality of Gait: unsteady, decreased stride length, no LOB. Gait Deviations: Slow Hoa;Decreased step height  Distance: 3 steps forwards/backwards. Comments: ambulation distance limited d/t pt bleeding from groin area and needing to return to bed to get washed up. More Ambulation?: No  Stairs/Curb  Stairs?: No     Balance  Posture: Fair  Sitting - Static: Fair  Sitting - Dynamic: Fair;-  Standing - Static: Fair  Standing - Dynamic: Poor;+  Comments: standing balance assessed while using a RW. Pt able to tolerate standing ~10 minutes while RN was changing out linens on bed. Exercise Treatment: Deferred exercises d/t pt needing to get cleaned up with nursing staff.       AM-PAC Score  AM-PAC Inpatient Mobility Raw Score : 15 (10/17/22 1550)  AM-PAC Inpatient T-Scale Score : 39.45 (10/17/22 1550)  Mobility Inpatient CMS 0-100% Score: 57.7 (10/17/22 1550)  Mobility Inpatient CMS G-Code Modifier : CK (10/17/22 1550)      Goals  Short Term Goals  Time Frame for Short Term Goals: 10 visits  Short Term Goal 1: transfers with SBA  Short Term Goal 2: amb 125 ft with a RW x SBA  Short Term Goal 3: ascend/descend 4 steps with SBA  Short Term Goal 4: 20 min strengthening exercise program x SBA  Additional Goals?: No  Patient Goals   Patient Goals : Return home       Education  Patient Education  Education Given To: Patient  Education Provided: Plan of Care;Role of Therapy  Education Method: Verbal  Barriers to Learning: Cognition  Education Outcome: Continued education needed;Verbalized understanding      Therapy Time   Individual Concurrent Group Co-treatment   Time In 1504         Time Out 1532         Minutes 28 Timed Code Treatment Minutes: 5154 Chastity Johns PTA

## 2022-10-17 NOTE — PLAN OF CARE
Problem: Discharge Planning  Goal: Discharge to home or other facility with appropriate resources  10/17/2022 1829 by Sabrina Miranda RN  Outcome: Progressing  10/17/2022 0514 by Aydee Meneses RN  Outcome: Progressing     Problem: Safety - Adult  Goal: Free from fall injury  10/17/2022 1829 by Sabrina Miranda RN  Outcome: Progressing  10/17/2022 0514 by Aydee Meneses RN  Outcome: Progressing     Problem: Confusion  Goal: Confusion, delirium, dementia, or psychosis is improved or at baseline  Description: INTERVENTIONS:  1. Assess for possible contributors to thought disturbance, including medications, impaired vision or hearing, underlying metabolic abnormalities, dehydration, psychiatric diagnoses, and notify attending LIP  2. [place] high risk fall precautions, as indicated  3. Provide frequent short contacts to provide reality reorientation, refocusing and direction  4. Decrease environmental stimuli, including noise as appropriate  5. Monitor and intervene to maintain adequate nutrition, hydration, elimination, sleep and activity  6. If unable to ensure safety without constant attention obtain sitter and review sitter guidelines with assigned personnel  7. Initiate Psychosocial CNS and Spiritual Care consult, as indicated  10/17/2022 1829 by Sabrina Miranda RN  Outcome: Progressing  10/17/2022 0514 by Aydee Meneses RN  Outcome: Progressing     Problem: Skin/Tissue Integrity  Goal: Absence of new skin breakdown  Description: 1. Monitor for areas of redness and/or skin breakdown  2. Assess vascular access sites hourly  3. Every 4-6 hours minimum:  Change oxygen saturation probe site  4. Every 4-6 hours:  If on nasal continuous positive airway pressure, respiratory therapy assess nares and determine need for appliance change or resting period.   10/17/2022 1829 by Sabrina Miranda RN  Outcome: Progressing  10/17/2022 0514 by Aydee Meneses RN  Outcome: Progressing     Problem: Pain  Goal: Verbalizes/displays adequate comfort level or baseline comfort level  10/17/2022 1829 by Jayson Cifuentes RN  Outcome: Progressing  10/17/2022 0514 by Chio Wong RN  Outcome: Progressing

## 2022-10-17 NOTE — PROGRESS NOTES
[facility] -   Infectious diseases evaluation    Progress Note    admission date 10/9/2022    reason for consultation:   Hidradenitis suppurativa     Impression :   Current:  Single Gram positive bacteremia - micrococcus contamination  Hx of hidradenitis suppurativa - followed with [facility] derm clinic, was receiving biweekly humira injections   Patient has not received Humira injections for last 17 days while in retirement   immunosuppressed  Recent R buttock abscess vs. hidradenitis on 8/2022   Requested superficial abscess drain by IR but they decided it was loculated, would need drainage by GS  GS deferred to plastic, as lesions were very superficial   Dr. Irene chamberlain plastic planned for wound care outpatient  DC on augmentin  Altered mental status- new  CT Head wo hemorrhage  Mother unsure if pt has been getting psychotropic meds in retirement   Multiple foci of painful hidradenitis on R thigh, groin area, scrotum   Purulent bloody drainage  CT correlates with hidradenitis hx  Wound care on board  Cellulitis on b/l thighs  Sepsis  SIRS: 3 on presentation + source  1 BCx with gram positive cocci in clusters likely contamination  2nd BCx pending  Bandemia   Elevated CRP - 144  Vitamin D deficiency -11.4     Other:  Incarcerated - last 17 days   Current smoker   Schizophrenia  Bipolar disorder    Discussion / summary of stay / plan of care     Recommendations     Augmentin 875-125 mg q 12 hr. Stop date 10-20-22  Levaquin 500 mg po q day.  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 The wounds are severe, painful, and draining bloody red, purulent drainage, likely hidradenitis suppurativa. Patient has been following with Dr. Celina Quarles at [facility] dermatology clinic and had been getting bi weekly Humira injections. The patient has not been getting biweekly Humira injections while in custodial for the last 17 days- his mother reported that he missed 3 shots. This patient has an extensive psych history - mother unsure is patient was getting psychotropic medications while in custodial. He currently follows with [facility]                10/11  Tmax last 24 hours 101.1F  Has not had BM yet  WBC increased slightly  Getting rest  Final blood cx still pending  Still in pain around groin - pus expressed from many orifices associated w pain, induration and little redness  Wound care with silver and aquaphor - recommended by wound care    10/12  Afebrile  Vit D deficiency - 11.4  - will replace  Drainage from he groin is +++, cx taken - some indurated found as well -  Continues to have pain around groin-    10/13  WBC 17 still and right inner thigh aea is very indurated tender and pus remains from all orifices right thigh and groins  Will get a CT pelvis and better eval for abscess      Interval changes  10/16/2022   Patient Vitals for the past 8 hrs:   BP Temp Pulse SpO2   10/16/22 2056 108/73 98.6 °F (37 °C) (!) 108 96 %     CURRENT EVALUATION :10/16/2022    Afebrile  VS stable    Patient feels better  No complaints  No new issues per RN    Has residual lesions on the groin  Old scars on the axillas.   Tolerating antibiotics without any difficulties      Summary of relevant labs: 10/16/2022    Labs:  WBC - 19.7-22 - 22.5- 17.9    Lactic acid, whole blood 3.6 -0.9  Cr 0.78  Vit D 11.4  Micro:  Groin cx: prelim GPR, GNR, GPC in clusters  BC x1 - GPC in clusters, likely contamination - micrococcus  Bcx 2- pending  Urine cx- neg  UA - small leukocyte esterase  Imaging:  10/9 Ct Head 1. No hemorrhage. 2. Insert of uncertain chronicity in the right frontal lobe. Consider further evaluation with MRI. 10/9 - CT Pelvis Slightly increased chronic appearing infectious/inflammatory cutaneous/subcutaneous process of the anterior pelvis and gluteal region. Recommend correlation with history of hidradenitis suppurativa. 10/9 CT Ab and Pelvis 1. Skin thickening of the low anterior abdominal wall, anterior pelvis and visualized gluteal region. Note, the perineum is not visualized on this study. Patient could return for further imaging if clinically indicated. 2. Large volume stool in the rectum. I have personally reviewed the past medical history, past surgical history, medications, social history, and family history, and I haveupdated the database accordingly. Allergies:   Depakene [valproic acid], Restoril [temazepam], Risperidone and related, Thorazine [chlorpromazine], and Vancomycin     Review of Systems:     Review of Systems   Constitutional:  Negative for diaphoresis. HENT:  Negative for congestion, dental problem and nosebleeds. Eyes:  Negative for visual disturbance. Respiratory:  Negative for shortness of breath. Cardiovascular:  Negative for chest pain. Gastrointestinal:  Negative for abdominal distention. Endocrine: Negative for cold intolerance, heat intolerance and polyphagia. Genitourinary:  Negative for dysuria and hematuria. Musculoskeletal:  Negative for arthralgias. Skin:  Positive for wound. Negative for pallor. Allergic/Immunologic: Negative for immunocompromised state. Neurological:  Negative for seizures, facial asymmetry and light-headedness. Hematological:  Does not bruise/bleed easily. Psychiatric/Behavioral:  Negative for behavioral problems. Physical Examination :       Physical Exam  Constitutional:       General: He is not in acute distress. Appearance: Normal appearance.  He is not toxic-appearing. HENT:      Head: Normocephalic and atraumatic. Right Ear: External ear normal.      Left Ear: External ear normal.      Nose: Nose normal. No congestion. Mouth/Throat:      Mouth: Mucous membranes are moist.      Pharynx: Oropharynx is clear. No oropharyngeal exudate. Eyes:      General: No scleral icterus. Extraocular Movements: Extraocular movements intact. Pupils: Pupils are equal, round, and reactive to light. Cardiovascular:      Rate and Rhythm: Normal rate and regular rhythm. Pulses: Normal pulses. Heart sounds: Normal heart sounds. No murmur heard. No gallop. Pulmonary:      Effort: Pulmonary effort is normal.      Breath sounds: No wheezing or rales. Abdominal:      General: Abdomen is flat. Palpations: Abdomen is soft. There is no mass. Tenderness: There is no abdominal tenderness. There is no guarding. Genitourinary:     Comments: No [place]  Musculoskeletal:         General: No swelling, tenderness, deformity or signs of injury. Normal range of motion. Cervical back: Normal range of motion. No rigidity. Lymphadenopathy:      Cervical: No cervical adenopathy. Skin:     Capillary Refill: Capillary refill takes less than 2 seconds. Findings: Rash (groin, scrotal area, thigh) present. Neurological:      General: No focal deficit present. Mental Status: He is alert and oriented to person, place, and time. Motor: No weakness.       Gait: Gait normal.   Psychiatric:         Mood and Affect: Mood normal.         Behavior: Behavior normal.       Past Medical History:     Past Medical History:   Diagnosis Date    [facility]     GERD (gastroesophageal reflux disease) 8/13/2016    Hidradenitis suppurativa     Polysubstance abuse ([facility]     Schizoaffective disorder [facility])        Past Surgical  History:     Past Surgical History:   Procedure Laterality Date    ABSCESS DRAINAGE  11/1/2013    left inguinal hydrodenitis/pilondal cyst    INFECTED SKIN DEBRIDEMENT  july 2011       Medications:      nicotine  1 patch TransDERmal Daily    levoFLOXacin  500 mg Oral Daily    amoxicillin-clavulanate  1 tablet Oral 2 times per day    zinc sulfate  50 mg Oral Daily    vitamin D  50,000 Units Oral Weekly    vitamin C  500 mg Oral BID    carBAMazepine  200 mg Oral BID    haloperidol  1 mg Oral BID    sodium chloride flush  5-40 mL IntraVENous 2 times per day    enoxaparin  30 mg SubCUTAneous BID       Social History:     Social History     Socioeconomic History    Marital status: Single     Spouse name: Not on file    Number of children: Not on file    Years of education: Not on file    Highest education level: Not on file   Occupational History    Not on file   Tobacco Use    Smoking status: Every Day     Packs/day: 1.00     Years: 22.00     Pack years: 22.00     Types: Cigarettes    Smokeless tobacco: Never   Vaping Use    Vaping Use: Never used   Substance and Sexual Activity    Alcohol use: Yes     Comment: occasional    Drug use: Yes     Comment: history of marijuana & Opiate abue, claims that he is currently clean & sober.     Sexual activity: Not on file   Other Topics Concern    Not on file   Social History Narrative    Not on file     Social Determinants of Health     Financial Resource Strain: Not on file   Food Insecurity: Not on file   Transportation Needs: Not on file   Physical Activity: Not on file   Stress: Not on file   Social Connections: Not on file   Intimate Partner Violence: Not on file   Housing Stability: Not on file       Family History:     Family History   Problem Relation Age of Onset    Cancer Mother         breast    High Blood Pressure Maternal Grandfather     Mental Illness Paternal Aunt     Substance Abuse Paternal Uncle     Substance Abuse Brother     Alcohol Abuse Father     Cancer Father         pancrease      Medical Decision Making:   I have independently reviewed/ordered the following labs:    CBC with Differential:   Recent Labs     10/14/22  0622 10/15/22  0657   WBC 15.4* 14.6*   HGB 10.1* 10.3*   HCT 31.1* 31.4*    434       BMP:  Recent Labs     10/14/22  0622 10/15/22  0657    136   K 3.9 3.8    99   CO2 26 26   BUN 5* 5*   CREATININE 0.59* 0.55*   MG 1.7 1.7       Hepatic Function Panel:   Recent Labs     10/14/22  0622 10/15/22  0657   LABALBU 2.4* 2.4*       No results for input(s): RPR in the last 72 hours. No results for input(s): HIV in the last 72 hours. No results for input(s): BC in the last 72 hours. Lab Results   Component Value Date/Time    CREATININE 0.55 10/15/2022 06:57 AM    GLUCOSE 89 10/15/2022 06:57 AM    GLUCOSE 89 02/09/2012 06:19 AM       Detailed results: Thank you for allowing us to participate in the care of this patient. Please call with questions. This note is created with the assistance of a speech recognition program.  While intending to generate adocument that actually reflects the content of the visit, the document can still have some errors including those of syntax and sound a like substitutions which may escape proof reading. It such instances, actual meaningcan be extrapolated by contextual diversion. Oksana Loja MD    ATTESTATION:    I have discussed the case, including pertinent history and exam findings with the residents and students. I have seen and examined the patient and the key elements of the encounter have been performed by me. I was present when the student obtained his information or examined the patient. I have reviewed the laboratory data, other diagnostic studies and discussed them with the residents. I have updated the medical record where necessary. I agree with the assessment, plan and orders as documented by the resident/ student.     Oksana Loja MD.

## 2022-10-17 NOTE — PROGRESS NOTES
[facility]  Office: [address], DO, Beto Najera, DO, Kip Garcia, DO, Rolando Portillo Blood, DO, Zuleima Billy MD, Dash Garcia MD, Josias Toribio MD, Maryana Mejia MD,  Elvira Perez MD, Olvin Lewis MD, Jacob Mcmillan, DO, Daniela Peters MD,  Robby Evans MD, Delroy Abbott MD, Claudine Malin, DO, Shady Tristan MD, Chandler Lombard, MD, Amparo Wolfe MD, Shanika Nath MD, Na Escoto MD, Kostas Van MD, Chris Brooks, DO, Lizet Whiting MD, [place] MD, Laura De Guzman, CNP,  Yeyo Stovall, CNP, Cornelius Smalls, CNP, Renetta Angela, CNP,  Angie Cruz, DNP, Gasper Talbert, CNP, Marisa So, CNP, Molina Smith, CNP, Darren Harrison, CNP, Maurilio Wood, CNP, Indra Varghese PA-C, Maryan Smith, CNS, Juanis Sullivan, DNP, Stefania Bey, CNP, Xavier Sapp, CNP, Cami Salcedo, [address] [place] [address]    Progress Note    10/17/2022    3:00 PM    Name:   Mariah Urias  MRN:     [id number]     Acct:      [de-identified]   Room:   [address] [place] [address] Day:  8  Admit Date:  10/9/2022  9:05 AM    PCP:   Nuha Jacob MD  Code Status:  Full Code    Subjective:     C/C:   Chief Complaint   Patient presents with    Altered Mental Status     Interval History Status: not changed. Patient examined  Patient had a shower, states that he needs time to go down  Refused dressing changes this morning saying that he needs some time to cool down first and then will have dressing changes. Vitals are stable  Pre-CERT pending    Brief History:     25-year-old female with known medical history of hidradenitis suppurativa with extensive lesions bilateral axilla presented to the hospital from longterm due to extensive lower abdominal wounds. Initial concern for necrotizing fasciitis. Patient has extensive psych history and on antipsychotic medications.   Patient was getting Humira but was stopped as he went to the care home. Patient was evaluated by surgery and infectious disease. CT abdomen had some fluid collection but surgery did not recommend any intervention. Initially was on IV antibiotics, then switched to Levaquin and Augmentin per infectious disease. Culture showed normal skin blake. Review of Systems:     Constitutional:  negative for chills, fevers, sweats  Respiratory:  negative for cough, dyspnea on exertion, shortness of breath, wheezing  Cardiovascular:  negative for chest pain, chest pressure/discomfort, lower extremity edema, palpitations  Gastrointestinal:  negative for abdominal pain, constipation, diarrhea, nausea, vomiting  Neurological:  negative for dizziness, headache  Multiple wounds in the groin  Old scar marks on axilla  Medications: Allergies:     Allergies   Allergen Reactions    Depakene [Valproic Acid]     Restoril [Temazepam]     Risperidone And Related      Seizure      Thorazine [Chlorpromazine]     Vancomycin Other (See Comments) and Rash       Current Meds:   Scheduled Meds:    nicotine  1 patch TransDERmal Daily    levoFLOXacin  500 mg Oral Daily    amoxicillin-clavulanate  1 tablet Oral 2 times per day    zinc sulfate  50 mg Oral Daily    vitamin D  50,000 Units Oral Weekly    vitamin C  500 mg Oral BID    carBAMazepine  200 mg Oral BID    haloperidol  1 mg Oral BID    sodium chloride flush  5-40 mL IntraVENous 2 times per day    enoxaparin  30 mg SubCUTAneous BID     Continuous Infusions:    sodium chloride       PRN Meds: HYDROcodone 5 mg - acetaminophen **OR** HYDROcodone 5 mg - acetaminophen, mineral oil-hydrophilic petrolatum, sodium chloride flush, sodium chloride, potassium chloride **OR** potassium alternative oral replacement **OR** potassium chloride, magnesium sulfate, ondansetron **OR** ondansetron, polyethylene glycol, acetaminophen **OR** acetaminophen    Data:     Past Medical History:   has a past medical history of Bipolar 1 disorder ([facility] [address] GERD (gastroesophageal reflux disease), Hidradenitis suppurativa, Polysubstance abuse ([facility] [address] and Schizoaffective disorder ([facility] [address] Social History:   reports that he has been smoking cigarettes. He has a 22.00 pack-year smoking history. He has never used smokeless tobacco. He reports current alcohol use. He reports current drug use. Family History:   Family History   Problem Relation Age of Onset    Cancer Mother         breast    High Blood Pressure Maternal Grandfather     Mental Illness Paternal Aunt     Substance Abuse Paternal Uncle     Substance Abuse Brother     Alcohol Abuse Father     Cancer Father         pancrease       Vitals:  BP 95/67   Pulse 95   Temp 98.6 °F (37 °C) (Oral)   Resp 17   Ht 5' 9\" (1.753 m)   Wt 246 lb 4.1 oz (111.7 kg)   SpO2 95%   BMI 36.37 kg/m²   Temp (24hrs), Av.6 °F (37 °C), Min:98.6 °F (37 °C), Max:98.6 °F (37 °C)    No results for input(s): POCGLU in the last 72 hours. I/O (24Hr):     Intake/Output Summary (Last 24 hours) at 10/17/2022 1500  Last data filed at 10/16/2022 1838  Gross per 24 hour   Intake --   Output 360 ml   Net -360 ml         Labs:  Hematology:  Recent Labs     10/15/22  0657   WBC 14.6*   RBC 3.56*   HGB 10.3*   HCT 31.4*   MCV 88.2   MCH 28.9   MCHC 32.8   RDW 14.5*      MPV 8.6       Chemistry:  Recent Labs     10/15/22  0657      K 3.8   CL 99   CO2 26   GLUCOSE 89   BUN 5*   CREATININE 0.55*   MG 1.7   ANIONGAP 11   LABGLOM >60   CALCIUM 9.0   PHOS 3.2       Recent Labs     10/15/22  0657   LABALBU 2.4*       ABG:No results found for: POCPH, PHART, PH, POCPCO2, FRX8TOE, PCO2, POCPO2, PO2ART, PO2, POCHCO3, RUY9BDQ, HCO3, NBEA, PBEA, BEART, BE, THGBART, THB, IPP8FWL, VUYF0SSY, A9ILJZJW, O2SAT, FIO2  Lab Results   Component Value Date/Time    SPECIAL RT HAND 10ML 10/09/2022 09:18 AM    SPECIAL LT AC 20ML 10/09/2022 09:18 AM     Lab Results   Component Value Date/Time    CULTURE PROTEUS MIRABILIS SCANT GROWTH (A) 10/12/2022 12:32 PM    CULTURE ESCHERICHIA COLI SCANT GROWTH (A) 10/12/2022 12:32 PM    CULTURE NORMAL SKIN PRAVEEN LIGHT GROWTH 10/12/2022 12:32 PM       Radiology:  CT HEAD WO CONTRAST    Result Date: 10/9/2022  1. No hemorrhage. 2. Insert of uncertain chronicity in the right frontal lobe. Consider further evaluation with MRI. CT PELVIS WO CONTRAST Additional Contrast? None    Result Date: 10/9/2022  Slightly increased chronic appearing infectious/inflammatory cutaneous/subcutaneous process of the anterior pelvis and gluteal region. Recommend correlation with history of hidradenitis suppurativa. CT ABDOMEN PELVIS W IV CONTRAST Additional Contrast? None    Result Date: 10/9/2022  1. Skin thickening of the low anterior abdominal wall, anterior pelvis and visualized gluteal region. Note, the perineum is not visualized on this study. Patient could return for further imaging if clinically indicated. 2. Large volume stool in the rectum.        Physical Examination:        General appearance:  alert, cooperative and obese  Mental Status:  oriented to person, place and time and normal affect  Lungs:  clear to auscultation bilaterally, normal effort  Heart:  regular rate and rhythm, no murmur  Abdomen:  soft, nontender, nondistended, normal bowel sounds, no masses, hepatomegaly, splenomegaly  Extremities:  no edema, redness, tenderness in the calves  Skin: Multiple wounds present in groin    Assessment:        Hospital Problems             Last Modified POA    * (Principal) Hidradenitis suppurativa 10/9/2022 Yes    Septicemia ([address] 10/10/2022 Yes    Schizophrenia ([address] 10/10/2022 Yes    Gram-positive bacteremia 10/10/2022 Yes    Cellulitis of right thigh 10/10/2022 Yes    Bandemia 10/10/2022 Yes    Scrotal abscess 10/10/2022 Yes    Immunosuppressed due to chemotherapy ([address] 10/10/2022 Yes    Mild protein-calorie malnutrition ([address] 10/12/2022 Yes    Hypovitaminosis D 10/12/2022 Yes    Gluteal abscess 10/14/2022 Yes    Abscess of skin of abdomen 10/9/2022 Yes    Schizoaffective disorder, bipolar type ([facility] [address] 10/10/2022 Yes    GERD (gastroesophageal reflux disease) 10/10/2022 Yes   Plan:        Sepsis with hidradenitis suppurativa-continue antibiotic per infectious disease, final culture shows normal skin blake,continue Augmentin and Levaquin for 2 weeks. New CT scan reviewed with surgery, no intervention planned. Patient needs continuous wound care and antibiotics, needs follow-up outpatient with dermatology and infectious disease.     Schizoaffective disorder-seen by psychiatry, medication adjustments made, continue Haldol, Tegretol    Leukocytosis-continues to trend down, continue antibiotic    Immunosuppressed-received Humira outpatient, currently on hold    Replace vitamin D, add vitamin C and zinc  Continue supplements for malnutrition  Monitor vitals  Monitor electrolytes and replace as needed    Discharge plan to SNF, pending precert  Encourage PTOwen MD  10/17/2022  3:00 PM

## 2022-10-17 NOTE — PLAN OF CARE
Problem: Discharge Planning  Goal: Discharge to home or other facility with appropriate resources  10/17/2022 0514 by Dipika Reis RN  Outcome: Progressing  10/16/2022 1829 by Cyrus Patiño RN  Outcome: Progressing  Flowsheets (Taken 10/16/2022 0800)  Discharge to home or other facility with appropriate resources: Identify barriers to discharge with patient and caregiver     Problem: Safety - Adult  Goal: Free from fall injury  10/17/2022 0514 by Dipika Reis RN  Outcome: Progressing  10/16/2022 1829 by Cyrus Patiño RN  Outcome: Progressing  Flowsheets (Taken 10/16/2022 0856)  Free From Fall Injury: Instruct family/caregiver on patient safety     Problem: Confusion  Goal: Confusion, delirium, dementia, or psychosis is improved or at baseline  Description: INTERVENTIONS:  1. Assess for possible contributors to thought disturbance, including medications, impaired vision or hearing, underlying metabolic abnormalities, dehydration, psychiatric diagnoses, and notify attending LIP  2. [place] high risk fall precautions, as indicated  3. Provide frequent short contacts to provide reality reorientation, refocusing and direction  4. Decrease environmental stimuli, including noise as appropriate  5. Monitor and intervene to maintain adequate nutrition, hydration, elimination, sleep and activity  6. If unable to ensure safety without constant attention obtain sitter and review sitter guidelines with assigned personnel  7.  Initiate Psychosocial CNS and Spiritual Care consult, as indicated  10/17/2022 0514 by Dipika Reis RN  Outcome: Progressing  10/16/2022 1829 by Cyrus Patiño RN  Outcome: Progressing  Flowsheets (Taken 10/16/2022 0800)  Effect of thought disturbance (confusion, delirium, dementia, or psychosis) are managed with adequate functional status: Assess for contributors to thought disturbance, including medications, impaired vision or hearing, underlying metabolic abnormalities, dehydration, psychiatric diagnoses, notify LIP     Problem: Skin/Tissue Integrity  Goal: Absence of new skin breakdown  Description: 1. Monitor for areas of redness and/or skin breakdown  2. Assess vascular access sites hourly  3. Every 4-6 hours minimum:  Change oxygen saturation probe site  4. Every 4-6 hours:  If on nasal continuous positive airway pressure, respiratory therapy assess nares and determine need for appliance change or resting period.   10/17/2022 0514 by Angely Jacques RN  Outcome: Progressing  10/16/2022 1829 by Allie Sneed RN  Outcome: Progressing     Problem: Pain  Goal: Verbalizes/displays adequate comfort level or baseline comfort level  10/17/2022 0514 by Angely Jacques RN  Outcome: Progressing  10/16/2022 1829 by Allie Sneed RN  Outcome: Progressing

## 2022-10-17 NOTE — PROGRESS NOTES
[facility] -   Infectious diseases evaluation  admission date 10/9/2022    reason for consultation:   Hidradenitis suppurativa     Impression :   Current:  Single Gram positive bacteremia - micrococcus contamination  Hx of hidradenitis suppurativa - followed with [facility] derm clinic, was receiving biweekly humira injections   Patient has not received Humira injections for last 17 days while in senior living   immunosuppressed  Recent R buttock abscess vs. hidradenitis on 8/2022   Requested superficial abscess drain by IR but they decided it was loculated, would need drainage by GS  GS deferred to plastic, as lesions were very superficial   Dr. Dennis Herrera w plastic planned for wound care outpatient  Repeat CT abd/pelvis identified gluteal fluid collections suspicious for abscesses.   DC on augmentin  Altered mental status- new  CT Head wo hemorrhage  Mother unsure if pt has been getting psychotropic meds in senior living   Multiple foci of painful hidradenitis on R thigh, groin area, scrotum   Purulent bloody drainage  CT correlates with hidradenitis hx  Wound care on board  Cellulitis on b/l thighs  Sepsis  SIRS: 3 on presentation + source  1 BCx with gram positive cocci in clusters likely contamination  2nd BCx pending  Bandemia   Elevated CRP - 144  Vitamin D deficiency -11.4     Other:  Incarcerated - last 17 days   Current smoker   Schizophrenia  Bipolar disorder    Discussion / summary of stay / plan of care     Recommendations     augmentin and levaquin x 2 weeks  Pus mixed bacteria  Repeat CT of the pelvis shows the same collection over the pelvic area, general surgery have no plan of drainage  Clinically the tenderness and the purulence are improved, WBC slowly trending down  Okay for discharge with above plan    Hidradenitis -  10/12 Start Zinc 50mg QD -continue at discharge  Vit D 11.4 low - start [id number] weekly x 10 -continue at discharge    Infection Control Recommendations Anita Precautions  Contact Isolation     Antimicrobial Stewardship Recommendations   Simplification of therapy  Targeted therapy    History of Present Illness:   Initial history:  Rodríguez Phan is a 38y.o.-year-old prison inmate with PMHx of schizophrenia and hidradenitis suppurativa who presents with purulent wounds around the groin, scrotum, and R thigh. The wounds are severe, painful, and draining bloody red, purulent drainage, likely hidradenitis suppurativa. Patient has been following with Dr. Maria T Rose at [facility] dermatology clinic and had been getting bi weekly Humira injections. The patient has not been getting biweekly Humira injections while in prison for the last 17 days- his mother reported that he missed 3 shots. This patient has an extensive psych history - mother unsure is patient was getting psychotropic medications while in prison. He currently follows with [facility]          Interval changes  10/17/2022   Patient Vitals for the past 8 hrs:   Resp Height   10/17/22 1614 18 --   10/17/22 1437 -- 5' 9\" (1.753 m)     10/11  Tmax last 24 hours 101.1F  Has not had BM yet  WBC increased slightly  Getting rest  Final blood cx still pending  Still in pain around groin - pus expressed from many orifices associated w pain, induration and little redness  Wound care with silver and aquaphor - recommended by wound care    10/12  Afebrile  Vit D deficiency - 11.4  - will replace  Drainage from he groin is +++, cx taken - some indurated found as well -  Continues to have pain around groin-    10/13  WBC 17 still and right inner thigh aea is very indurated tender and pus remains from all orifices right thigh and groins  Will get a CT pelvis and better eval for abscess    10/14  CT abd/pelvis suspicious for abscesses. IR reconsulted for drainage. States he \"feels a fever coming on\"  Denies any chills, diarrhea. Having some nausea. WBC trending down.       10/17  No fever  WBC 14.6  Purulent discharge from medial thigh bilaterally and bleeding from what appear to be little holes-color seem to be improving and it looks more bloody, tenderness and induration seem to be better all over the pelvic area  WBC improved  Stated that he feels okay but has some pain    Summary of relevant labs:  Labs:  WBC - 19.7-22 - 22.5- 17.9-15.4 - 14.6    Lactic acid, whole blood 3.6 -0.9  Cr 0.78-0.59 - 0.55  Vit D 11.4  Micro:  Groin cx: prelim GPR, GNR, GPC in clusters. Proteus species. BC x1 - GPC in clusters, likely contamination - micrococcus  Bcx 2- pending  Urine cx- neg  UA - small leukocyte esterase  Imaging:  10/9 Ct Head 1. No hemorrhage. 2. Insert of uncertain chronicity in the right frontal lobe. Consider further evaluation with MRI. 10/9 - CT Pelvis Slightly increased chronic appearing infectious/inflammatory cutaneous/subcutaneous process of the anterior pelvis and gluteal region. Recommend correlation with history of hidradenitis suppurativa. 10/9 CT Ab and Pelvis 1. Skin thickening of the low anterior abdominal wall, anterior pelvis and visualized gluteal region. Note, the perineum is not visualized on this study. Patient could return for further imaging if clinically indicated. 2. Large volume stool in the rectum. 10/14 CT AbD/PELVIS  FINDINGS:   Skin thickening/stranding of the lower abdominal wall, with 2.0 x 1.9 x 1.6   cm fluid collection with thick adjacent wall, slightly increased in size   (2:56), suspicious for abscess. Skin thickening/stranding in bilateral   gluteal soft tissues, with 2.6 x 1.8 x 3.2 cm left gluteal fluid collection   with surrounding thick wall (2: 117) and 1.4 x 1.4 x  2.0 cm right gluteal   fluid collection with surrounding thick wall (2:122), similar to prior and   suspicious for abscess. Scrotal area is unremarkable within study limits. Kowalski. Normal appendix. Partially imaged large bowel anastomosis.            Impression   Fluid collections with thick wall and adjacent soft tissue stranding in the   lower anterior abdominal wall and bilateral gluteal soft tissues described   above, suspicious for abscesses. Lower anterior abdominal wall fluid   collection is slightly increased in size, others are stable. I have personally reviewed the past medical history, past surgical history, medications, social history, and family history, and I haveupdated the database accordingly. Allergies:   Depakene [valproic acid], Restoril [temazepam], Risperidone and related, Thorazine [chlorpromazine], and Vancomycin     Review of Systems:     Review of Systems   Constitutional:  Negative for diaphoresis, fatigue and fever. HENT:  Negative for congestion, dental problem, nosebleeds, sinus pressure and sinus pain. Eyes:  Negative for pain, itching and visual disturbance. Respiratory:  Negative for shortness of breath. Cardiovascular:  Negative for chest pain. Gastrointestinal:  Negative for abdominal distention and abdominal pain. Endocrine: Negative for cold intolerance, heat intolerance and polyphagia. Genitourinary:  Negative for dysuria and hematuria. Musculoskeletal:  Negative for arthralgias. Skin:  Positive for wound. Negative for pallor. Allergic/Immunologic: Negative for immunocompromised state. Neurological:  Negative for seizures, facial asymmetry and light-headedness. Hematological:  Does not bruise/bleed easily. Psychiatric/Behavioral:  Negative for behavioral problems. Physical Examination :       Physical Exam  Vitals and nursing note reviewed. Constitutional:       General: He is not in acute distress. Appearance: Normal appearance. He is not ill-appearing or diaphoretic. HENT:      Head: Normocephalic and atraumatic. Right Ear: External ear normal.      Left Ear: External ear normal.      Nose: Nose normal. No congestion. Mouth/Throat:      Mouth: Mucous membranes are moist.      Pharynx: Oropharynx is clear. No oropharyngeal exudate. Eyes:      General: No scleral icterus. Right eye: No discharge. Left eye: No discharge. Extraocular Movements: Extraocular movements intact. Conjunctiva/sclera: Conjunctivae normal.      Pupils: Pupils are equal, round, and reactive to light. Cardiovascular:      Rate and Rhythm: Normal rate and regular rhythm. Pulses: Normal pulses. Heart sounds: Normal heart sounds. No murmur heard. No friction rub. No gallop. Pulmonary:      Effort: Pulmonary effort is normal.      Breath sounds: Normal breath sounds. No wheezing or rhonchi. Abdominal:      General: Abdomen is flat. Bowel sounds are normal.      Palpations: Abdomen is soft. There is no mass. Tenderness: There is no abdominal tenderness. There is no guarding. Genitourinary:     Comments: No Kowalski  Musculoskeletal:         General: No swelling, tenderness, deformity or signs of injury. Normal range of motion. Cervical back: Normal range of motion. No rigidity. Skin:     Capillary Refill: Capillary refill takes less than 2 seconds. Findings: Rash (groin, scrotal area, thigh, purulent disccharge and bleeding on 10/17) present. Neurological:      General: No focal deficit present. Mental Status: He is alert and oriented to person, place, and time. Motor: No weakness.       Gait: Gait normal.   Psychiatric:         Mood and Affect: Mood normal.         Behavior: Behavior normal.       Past Medical History:     Past Medical History:   Diagnosis Date    Bipolar 1 disorder ([facility]     GERD (gastroesophageal reflux disease) 8/13/2016    Hidradenitis suppurativa     Polysubstance abuse ([facility]     Schizoaffective disorder [facility])        Past Surgical  History:     Past Surgical History:   Procedure Laterality Date    ABSCESS DRAINAGE  11/1/2013    left inguinal hydrodenitis/pilondal cyst    INFECTED SKIN DEBRIDEMENT  july 2011       Medications:      nicotine  1 patch TransDERmal Daily    levoFLOXacin  500 mg Oral Daily    amoxicillin-clavulanate  1 tablet Oral 2 times per day    zinc sulfate  50 mg Oral Daily    vitamin D  50,000 Units Oral Weekly    vitamin C  500 mg Oral BID    carBAMazepine  200 mg Oral BID    haloperidol  1 mg Oral BID    sodium chloride flush  5-40 mL IntraVENous 2 times per day    enoxaparin  30 mg SubCUTAneous BID       Social History:     Social History     Socioeconomic History    Marital status: Single     Spouse name: Not on file    Number of children: Not on file    Years of education: Not on file    Highest education level: Not on file   Occupational History    Not on file   Tobacco Use    Smoking status: Every Day     Packs/day: 1.00     Years: 22.00     Pack years: 22.00     Types: Cigarettes    Smokeless tobacco: Never   Vaping Use    Vaping Use: Never used   Substance and Sexual Activity    Alcohol use: Yes     Comment: occasional    Drug use: Yes     Comment: history of marijuana & Opiate abue, claims that he is currently clean & sober.     Sexual activity: Not on file   Other Topics Concern    Not on file   Social History Narrative    Not on file     Social Determinants of Health     Financial Resource Strain: Not on file   Food Insecurity: Not on file   Transportation Needs: Not on file   Physical Activity: Not on file   Stress: Not on file   Social Connections: Not on file   Intimate Partner Violence: Not on file   Housing Stability: Not on file       Family History:     Family History   Problem Relation Age of Onset    Cancer Mother         breast    High Blood Pressure Maternal Grandfather     Mental Illness Paternal Aunt     Substance Abuse Paternal Uncle     Substance Abuse Brother     Alcohol Abuse Father     Cancer Father         pancrease      Medical Decision Making:   I have independently reviewed/ordered the following labs:    CBC with Differential:   Recent Labs     10/15/22  0657   WBC 14.6*   HGB 10.3*   HCT 31.4*    BMP:  Recent Labs     10/15/22  0657      K 3.8   CL 99   CO2 26   BUN 5*   CREATININE 0.55*   MG 1.7       Hepatic Function Panel:   Recent Labs     10/15/22  0657   LABALBU 2.4*       No results for input(s): RPR in the last 72 hours. No results for input(s): HIV in the last 72 hours. No results for input(s): BC in the last 72 hours. Lab Results   Component Value Date/Time    CREATININE 0.55 10/15/2022 06:57 AM    GLUCOSE 89 10/15/2022 06:57 AM    GLUCOSE 89 02/09/2012 06:19 AM       Detailed results: Thank you for allowing us to participate in the care of this patient. Please call with questions. This note is created with the assistance of a speech recognition program.  While intending to generate adocument that actually reflects the content of the visit, the document can still have some errors including those of syntax and sound a like substitutions which may escape proof reading. It such instances, actual meaningcan be extrapolated by contextual diversion. Office: [phone]  Perfect serve / office [phone]    Myriam Grant, OMS3          I have discussed the care of the patient, including pertinent history and exam findings,  with the resident. I have seen and examined the patient and the key elements of all parts of the encounter have been performed by me. I agree with the assessment, plan and orders as documented by the resident.     Kathy Rivera, Infectious Diseases

## 2022-10-18 PROCEDURE — 99232 SBSQ HOSP IP/OBS MODERATE 35: CPT | Performed by: INTERNAL MEDICINE

## 2022-10-18 PROCEDURE — 6370000000 HC RX 637 (ALT 250 FOR IP): Performed by: CLINICAL NURSE SPECIALIST

## 2022-10-18 PROCEDURE — 6370000000 HC RX 637 (ALT 250 FOR IP): Performed by: STUDENT IN AN ORGANIZED HEALTH CARE EDUCATION/TRAINING PROGRAM

## 2022-10-18 PROCEDURE — 6370000000 HC RX 637 (ALT 250 FOR IP): Performed by: NURSE PRACTITIONER

## 2022-10-18 PROCEDURE — 1200000000 HC SEMI PRIVATE

## 2022-10-18 PROCEDURE — 6370000000 HC RX 637 (ALT 250 FOR IP): Performed by: INTERNAL MEDICINE

## 2022-10-18 PROCEDURE — 99231 SBSQ HOSP IP/OBS SF/LOW 25: CPT | Performed by: STUDENT IN AN ORGANIZED HEALTH CARE EDUCATION/TRAINING PROGRAM

## 2022-10-18 RX ORDER — LEVOFLOXACIN 500 MG/1
500 TABLET, FILM COATED ORAL DAILY
Qty: 10 TABLET | Refills: 0 | Status: SHIPPED | OUTPATIENT
Start: 2022-10-19 | End: 2022-10-25 | Stop reason: HOSPADM

## 2022-10-18 RX ORDER — AMOXICILLIN AND CLAVULANATE POTASSIUM 875; 125 MG/1; MG/1
1 TABLET, FILM COATED ORAL EVERY 12 HOURS SCHEDULED
Qty: 20 TABLET | Refills: 0 | Status: SHIPPED | OUTPATIENT
Start: 2022-10-18 | End: 2022-10-25 | Stop reason: HOSPADM

## 2022-10-18 RX ADMIN — AMOXICILLIN AND CLAVULANATE POTASSIUM 1 TABLET: 875; 125 TABLET, FILM COATED ORAL at 21:03

## 2022-10-18 RX ADMIN — HYDROCODONE BITARTRATE AND ACETAMINOPHEN 2 TABLET: 5; 325 TABLET ORAL at 15:16

## 2022-10-18 RX ADMIN — LEVOFLOXACIN 500 MG: 500 TABLET, FILM COATED ORAL at 10:05

## 2022-10-18 RX ADMIN — HALOPERIDOL 1 MG: 1 TABLET ORAL at 21:03

## 2022-10-18 RX ADMIN — ACETAMINOPHEN 650 MG: 325 TABLET ORAL at 00:26

## 2022-10-18 RX ADMIN — AMOXICILLIN AND CLAVULANATE POTASSIUM 1 TABLET: 875; 125 TABLET, FILM COATED ORAL at 10:05

## 2022-10-18 RX ADMIN — HYDROCODONE BITARTRATE AND ACETAMINOPHEN 2 TABLET: 5; 325 TABLET ORAL at 19:08

## 2022-10-18 RX ADMIN — OXYCODONE HYDROCHLORIDE AND ACETAMINOPHEN 500 MG: 500 TABLET ORAL at 10:05

## 2022-10-18 RX ADMIN — ZINC SULFATE 220 MG (50 MG) CAPSULE 50 MG: CAPSULE at 10:05

## 2022-10-18 RX ADMIN — OXYCODONE HYDROCHLORIDE AND ACETAMINOPHEN 500 MG: 500 TABLET ORAL at 21:03

## 2022-10-18 RX ADMIN — CARBAMAZEPINE 200 MG: 200 TABLET ORAL at 21:03

## 2022-10-18 RX ADMIN — HYDROCODONE BITARTRATE AND ACETAMINOPHEN 2 TABLET: 5; 325 TABLET ORAL at 10:08

## 2022-10-18 RX ADMIN — CARBAMAZEPINE 200 MG: 200 TABLET ORAL at 10:05

## 2022-10-18 RX ADMIN — HYDROCODONE BITARTRATE AND ACETAMINOPHEN 2 TABLET: 5; 325 TABLET ORAL at 03:59

## 2022-10-18 RX ADMIN — HALOPERIDOL 1 MG: 1 TABLET ORAL at 10:05

## 2022-10-18 ASSESSMENT — PAIN DESCRIPTION - LOCATION
LOCATION: GENERALIZED
LOCATION: HEAD;TEETH

## 2022-10-18 ASSESSMENT — ENCOUNTER SYMPTOMS
SINUS PRESSURE: 0
EYE ITCHING: 0
EYE PAIN: 0
ABDOMINAL PAIN: 0
SHORTNESS OF BREATH: 0
SINUS PAIN: 0
ABDOMINAL DISTENTION: 0

## 2022-10-18 ASSESSMENT — PAIN SCALES - GENERAL
PAINLEVEL_OUTOF10: 8
PAINLEVEL_OUTOF10: 8
PAINLEVEL_OUTOF10: 0
PAINLEVEL_OUTOF10: 8
PAINLEVEL_OUTOF10: 3
PAINLEVEL_OUTOF10: 10

## 2022-10-18 ASSESSMENT — PAIN DESCRIPTION - DESCRIPTORS: DESCRIPTORS: ACHING

## 2022-10-18 NOTE — PROGRESS NOTES
[facility]  Office: [address], DO, Has[facility], DO, Robin Leventhal, DO, Rosemarie Samsonont Blood, DO, Lindsey Pruitt MD, Catherine Ramos MD, Kirk Meza MD, Lorenzo Mcmahon MD,  Adarsh Branch MD, Dario Alejandre MD, Destiny Nevarez, DO, Gloria Riddle MD,  Mao Wise MD, Massiel Henderson MD, Gildardo Alfaro DO, Gennaro Raymond MD, Mike Carrasco MD, Adwoa Du MD, Ursula Turner MD, Swapna Quinones MD, Piter Jackson MD, Crista Ventura, DO, Tiffany Polanco MD, Santana Hand MD, Faye Hernandez, CNP,  Dean Guzmán, CNP, Rebecca Wilkins, CNP, Caro Armstrong, CNP,  Lon Medina, [facility], Jayy Pena, CNP, Eddi Washburn, CNP, Sally Gee, CNP, Selin Durand, CNP, Oleksandr Elizabeth, CNP, JAMIN DialC, Bjorn Forte, CNS, Kellen Bailey, [facility], Mark Shen, CNP, Rosalva Garcia, CNP, Dominick Li, [address] [place] [address]    Progress Note    10/18/2022    8:58 AM    Name:   Neha Duenas  MRN:     [id number]     Acct:      [de-identified]   Room:   [address] [place] [address] Day:  9  Admit Date:  10/9/2022  9:05 AM    PCP:   Nathalie Lares MD  Code Status:  Full Code    Subjective:     C/C:   Chief Complaint   Patient presents with    Altered Mental Status     Interval History Status: improved. Vitals reviewed, afebrile and hemodynamically stable. Saturating well on room air. Previous labs reviewed. Overnight patient had no significant events. On examination patient resting comfortably in bed. No complaints at this time. Discussed case with case management given patient's Humira injections and concerns with SNF for cost.  Patient has not had Humira injections due to being in half-way. Attempted to call patient's mother x2 no response. If patient can be discharged off Humira injections potentially can go to facility otherwise difficult placement due to cost of medication.     Brief History:     Mr. Tomy Tanner Asa Syed is a 1year-old male with a significant past medical history of hidradenitis suppurativa with extensive lesions to bilateral axilla who presented to the emergency department from detention due to extensive lower abdominal wounds. There was initial concern for necrotizing fasciitis. CT pelvis demonstrated fluid collection and general surgery was consulted however did not recommend any intervention. Was started on empiric IV antibiotics and infectious disease was consulted. He was switched to Levaquin and Augmentin per infectious disease. Plan for placement to SNF and Augmentin and Levaquin x2 weeks. Review of Systems:     Constitutional:  negative for chills, fevers, sweats  Respiratory:  negative for cough, dyspnea on exertion, shortness of breath, wheezing  Cardiovascular:  negative for chest pain, chest pressure/discomfort, lower extremity edema, palpitations  Gastrointestinal: Positive for groin wounds, negative for abdominal pain, constipation, diarrhea, nausea, vomiting  Neurological:  negative for dizziness, headache    Medications: Allergies:     Allergies   Allergen Reactions    Depakene [Valproic Acid]     Restoril [Temazepam]     Risperidone And Related      Seizure      Thorazine [Chlorpromazine]     Vancomycin Other (See Comments) and Rash       Current Meds:   Scheduled Meds:    nicotine  1 patch TransDERmal Daily    levoFLOXacin  500 mg Oral Daily    amoxicillin-clavulanate  1 tablet Oral 2 times per day    zinc sulfate  50 mg Oral Daily    vitamin D  50,000 Units Oral Weekly    vitamin C  500 mg Oral BID    carBAMazepine  200 mg Oral BID    haloperidol  1 mg Oral BID    sodium chloride flush  5-40 mL IntraVENous 2 times per day    enoxaparin  30 mg SubCUTAneous BID     Continuous Infusions:    sodium chloride       PRN Meds: HYDROcodone 5 mg - acetaminophen **OR** HYDROcodone 5 mg - acetaminophen, mineral oil-hydrophilic petrolatum, sodium chloride flush, sodium chloride, potassium chloride **OR** potassium alternative oral replacement **OR** potassium chloride, magnesium sulfate, ondansetron **OR** ondansetron, polyethylene glycol, acetaminophen **OR** acetaminophen    Data:     Past Medical History:   has a past medical history of Bipolar 1 disorder ([facility] [address] GERD (gastroesophageal reflux disease), Hidradenitis suppurativa, [facility] and Schizoaffective disorder ([facility] Social History:   reports that he has been smoking cigarettes. He has a 22.00 pack-year smoking history. He has never used smokeless tobacco. He reports current alcohol use. He reports current drug use. Family History:   Family History   Problem Relation Age of Onset    Cancer Mother         breast    High Blood Pressure Maternal Grandfather     Mental Illness Paternal Aunt     Substance Abuse Paternal Uncle     Substance Abuse Brother     Alcohol Abuse Father     Cancer Father         pancrease       Vitals:  /72   Pulse 95   Temp 98.4 °F (36.9 °C) (Oral)   Resp 20   Ht 5' 9\" (1.753 m)   Wt 246 lb 4.1 oz (111.7 kg)   SpO2 92%   BMI 36.37 kg/m²   Temp (24hrs), Av.3 °F (36.8 °C), Min:98 °F (36.7 °C), Max:98.6 °F (37 °C)    No results for input(s): POCGLU in the last 72 hours. I/O (24Hr): Intake/Output Summary (Last 24 hours) at 10/18/2022 0858  Last data filed at 10/18/2022 0400  Gross per 24 hour   Intake 480 ml   Output 2025 ml   Net -1545 ml       Labs:  Hematology:No results for input(s): WBC, RBC, HGB, HCT, MCV, MCH, MCHC, RDW, PLT, MPV, SEDRATE, CRP, INR, DDIMER, XN5WMEJV, LABABSO in the last 72 hours. Invalid input(s): PT  Chemistry:No results for input(s): NA, K, CL, CO2, GLUCOSE, BUN, CREATININE, MG, ANIONGAP, LABGLOM, GFRAA, CALCIUM, CAION, PHOS, PSA, PROBNP, TROPHS, CKTOTAL, CKMB, CKMBINDEX, MYOGLOBIN, DIGOXIN, LACTACIDWB in the last 72 hours. No results for input(s): PROT, LABALBU, LABA1C, G6HWOTZ, H2JVXJC, FT4, TSH, AST, ALT, LDH, GGT, ALKPHOS, LABGGT, BILITOT, BILIDIR, AMMONIA, AMYLASE, LIPASE, LACTATE, CHOL, HDL, LDLCHOLESTEROL, CHOLHDLRATIO, TRIG, VLDL, KCA38HD, PHENYTOIN, PHENYF, URICACID, POCGLU in the last 72 hours. ABG:No results found for: POCPH, PHART, PH, POCPCO2, RMB4KYI, PCO2, POCPO2, PO2ART, PO2, POCHCO3, EHJ7AML, HCO3, NBEA, PBEA, BEART, BE, THGBART, THB, OFQ7IRH, HFQM3KGJ, O3SAXFWU, O2SAT, FIO2  Lab Results   Component Value Date/Time    SPECIAL RT HAND 10ML 10/09/2022 09:18 AM    SPECIAL LT AC 20ML 10/09/2022 09:18 AM     Lab Results   Component Value Date/Time    CULTURE PROTEUS MIRABILIS SCANT GROWTH (A) 10/12/2022 12:32 PM    CULTURE ESCHERICHIA COLI SCANT GROWTH (A) 10/12/2022 12:32 PM    CULTURE NORMAL SKIN PRAVEEN LIGHT GROWTH 10/12/2022 12:32 PM       Radiology:  CT PELVIS W CONTRAST Additional Contrast? Radiologist Recommendation    Result Date: 10/13/2022  Fluid collections with thick wall and adjacent soft tissue stranding in the lower anterior abdominal wall and bilateral gluteal soft tissues described above, suspicious for abscesses. Lower anterior abdominal wall fluid collection is slightly increased in size, others are stable. RECOMMENDATIONS:     Physical Examination:        General appearance:  alert, cooperative and no distress  Mental Status:  oriented to person, place and time and normal affect  Lungs:  clear to auscultation bilaterally, normal effort  Heart:  regular rate and rhythm, no murmur  Abdomen:  soft, nontender, nondistended, normal bowel sounds, no masses, hepatomegaly, splenomegaly  Extremities:  no edema, redness, tenderness in the calves  Skin: Multiple wounds present in groin. Gauze applied.     Assessment:        Hospital Problems             Last Modified POA    * (Principal) Hidradenitis suppurativa 10/9/2022 Yes    Septicemia ([facility] [address] 10/10/2022 Yes    Schizophrenia ([facility] 10/10/2022 Yes    Gram-positive bacteremia 10/10/2022 Yes    Cellulitis of right thigh 10/10/2022 Yes    Bandemia 10/10/2022 Yes    Scrotal abscess 10/10/2022 Yes Immunosuppressed due to chemotherapy ([facility] [address] 10/10/2022 Yes    Mild protein-calorie malnutrition ([facility] [address] 10/12/2022 Yes    Hypovitaminosis D 10/12/2022 Yes    Gluteal abscess 10/14/2022 Yes    Abscess of skin of abdomen 10/9/2022 Yes    Schizoaffective disorder, bipolar type ([facility] [address] 10/10/2022 Yes    GERD (gastroesophageal reflux disease) 10/10/2022 Yes       Plan:        Sepsis secondary to hidradenitis suppurativa. Infectious disease following. Currently on Augmentin and Levaquin x2 weeks. And for discharge to [facility]. Schizoaffective disorder. Evaluated by psychiatry. Remains on Haldol and Tegretol. Immunosuppressed. Patient was taking Humira outpatient per dermatology. Has not received dose in at least 17 days given incarceration in [facility]. Gastroesophageal reflux disease. Stable. DVT prophylaxis: Lovenox 30 mg twice daily. GI prophylaxis: None indicated at this time. Discharge planning: Attempting to reach patient's mother as she is primary decision-maker. Patient is excepted to [facility] however difficult with insurance given Humira. Off Humira due to current infection however concern whether patient will be going back on Humira. Attempted to call mother as she is primary caregiver given patient will not be receiving Humira at facility. No answer on phone and cell phone appears to be out of service.     Nicole Perez DO  10/18/2022  8:58 AM

## 2022-10-18 NOTE — CARE COORDINATION
Transitional planning note: spoke with jos from [facility]. She states that after speaking with patient's mother, she is ultimately looking for long term placement after skilled rehab for her son and [facility] can accommodate that. However, they would be not be able to accept patient if he will be put back on humira injection down the road as the medication cost is just too expensive for facility to absorb. Felicia Monday states she will contact patient's mother and will call me back. 1500: spoke with jos from [facility]. She states that she did call patient's dermatologist's office that orders the humira injections and left message to follow up on plan for humira after hospital discharge.

## 2022-10-18 NOTE — PLAN OF CARE
Problem: Discharge Planning  Goal: Discharge to home or other facility with appropriate resources  10/17/2022 2303 by Sree Mcmillan RN  Outcome: Progressing     Problem: Safety - Adult  Goal: Free from fall injury  10/17/2022 2303 by Sree Mcmillan RN  Outcome: Progressing     Problem: Confusion  Goal: Confusion, delirium, dementia, or psychosis is improved or at baseline  Description: INTERVENTIONS:  1. Assess for possible contributors to thought disturbance, including medications, impaired vision or hearing, underlying metabolic abnormalities, dehydration, psychiatric diagnoses, and notify attending LIP  2. [place] high risk fall precautions, as indicated  3. Provide frequent short contacts to provide reality reorientation, refocusing and direction  4. Decrease environmental stimuli, including noise as appropriate  5. Monitor and intervene to maintain adequate nutrition, hydration, elimination, sleep and activity  6. If unable to ensure safety without constant attention obtain sitter and review sitter guidelines with assigned personnel  7. Initiate Psychosocial CNS and Spiritual Care consult, as indicated  10/17/2022 2303 by Sree Mcmillan RN  Outcome: Progressing     Problem: Skin/Tissue Integrity  Goal: Absence of new skin breakdown  Description: 1. Monitor for areas of redness and/or skin breakdown  2. Assess vascular access sites hourly  3. Every 4-6 hours minimum:  Change oxygen saturation probe site  4. Every 4-6 hours:  If on nasal continuous positive airway pressure, respiratory therapy assess nares and determine need for appliance change or resting period.   10/17/2022 2303 by Sree Mcmillan RN  Outcome: Progressing     Problem: Pain  Goal: Verbalizes/displays adequate comfort level or baseline comfort level  10/17/2022 2303 by Sree Mcmillan RN  Outcome: Progressing

## 2022-10-18 NOTE — PLAN OF CARE
Problem: Discharge Planning  Goal: Discharge to home or other facility with appropriate resources  Outcome: Progressing     Problem: Safety - Adult  Goal: Free from fall injury  Outcome: Progressing     Problem: Confusion  Goal: Confusion, delirium, dementia, or psychosis is improved or at baseline  Description: INTERVENTIONS:  1. Assess for possible contributors to thought disturbance, including medications, impaired vision or hearing, underlying metabolic abnormalities, dehydration, psychiatric diagnoses, and notify attending LIP  2. [place] high risk fall precautions, as indicated  3. Provide frequent short contacts to provide reality reorientation, refocusing and direction  4. Decrease environmental stimuli, including noise as appropriate  5. Monitor and intervene to maintain adequate nutrition, hydration, elimination, sleep and activity  6. If unable to ensure safety without constant attention obtain sitter and review sitter guidelines with assigned personnel  7. Initiate Psychosocial CNS and Spiritual Care consult, as indicated  Outcome: Progressing     Problem: Skin/Tissue Integrity  Goal: Absence of new skin breakdown  Description: 1. Monitor for areas of redness and/or skin breakdown  2. Assess vascular access sites hourly  3. Every 4-6 hours minimum:  Change oxygen saturation probe site  4. Every 4-6 hours:  If on nasal continuous positive airway pressure, respiratory therapy assess nares and determine need for appliance change or resting period.   Outcome: Progressing     Problem: Pain  Goal: Verbalizes/displays adequate comfort level or baseline comfort level  Outcome: Progressing

## 2022-10-18 NOTE — PROGRESS NOTES
[facility] -   Infectious diseases evaluation  admission date 10/9/2022    reason for consultation:   Hidradenitis suppurativa     Impression :   Current:  Single Gram positive bacteremia - micrococcus contamination  Hx of hidradenitis suppurativa - followed with [facility] derm clinic, was receiving biweekly humira injections   Patient has not received Humira injections for last 17 days while in correction   immunosuppressed  Recent R buttock abscess vs. hidradenitis on 8/2022   Requested superficial abscess drain by IR but they decided it was loculated, would need drainage by GS  GS deferred to plastic, as lesions were very superficial   Dr. Nahomi chamberlain plastic planned for wound care outpatient  Repeat CT abd/pelvis identified gluteal fluid collections suspicious for abscesses.   DC on augmentin  Altered mental status- new  CT Head wo hemorrhage  Mother unsure if pt has been getting psychotropic meds in correction   Multiple foci of painful hidradenitis on R thigh, groin area, scrotum   Purulent bloody drainage  CT correlates with hidradenitis hx  Wound care on board  Cellulitis on b/l thighs  Sepsis  SIRS: 3 on presentation + source  1 BCx with gram positive cocci in clusters likely contamination  2nd BCx pending  Bandemia   Elevated CRP - 144  Vitamin D deficiency -11.4     Other:  Incarcerated - last 17 days   Current smoker   Schizophrenia  Bipolar disorder    Discussion / summary of stay / plan of care     Recommendations     augmentin and levaquin x 2 weeks  Pus mixed bacteria  Repeat CT of the pelvis shows the same collection over the pelvic area, general surgery have no plan of drainage  Clinically the tenderness and the purulence are improved, WBC slowly trending down  Okay for discharge with above plan  ID SIGNING OFF    Hidradenitis -  10/12 Start Zinc 50mg QD -continue at discharge  Vit D 11.4 low - start [id number] weekly x 10 -continue at discharge    Infection Control Recommendations   [place] Precautions  Contact Isolation     Antimicrobial Stewardship Recommendations   Simplification of therapy  Targeted therapy    History of Present Illness:   Initial history:  Dustin Medina is a 38y.o.-year-old [facility] inmate with PMHx of schizophrenia and hidradenitis suppurativa who presents with purulent wounds around the groin, scrotum, and R thigh. The wounds are severe, painful, and draining bloody red, purulent drainage, likely hidradenitis suppurativa. Patient has been following with Dr. Andreas Fischer at [facility] dermatology clinic and had been getting bi weekly Humira injections. The patient has not been getting biweekly Humira injections while in [facility] for the last 17 days- his mother reported that he missed 3 shots. This patient has an extensive psych history - mother unsure is patient was getting psychotropic medications while in [facility]. He currently follows with [facility]          Interval changes  10/18/2022   Patient Vitals for the past 8 hrs:   BP Temp Temp src Pulse Resp SpO2   10/18/22 1008 -- -- -- -- 16 --   10/18/22 0700 122/72 98.4 °F (36.9 °C) Oral 95 20 92 %   10/18/22 0400 (!) 131/91 -- -- 89 -- --     10/11  Tmax last 24 hours 101.1F  Has not had BM yet  WBC increased slightly  Getting rest  Final blood cx still pending  Still in pain around groin - pus expressed from many orifices associated w pain, induration and little redness  Wound care with silver and aquaphor - recommended by wound care    10/12  Afebrile  Vit D deficiency - 11.4  - will replace  Drainage from he groin is +++, cx taken - some indurated found as well -  Continues to have pain around groin-    10/13  WBC 17 still and right inner thigh aea is very indurated tender and pus remains from all orifices right thigh and groins  Will get a CT pelvis and better eval for abscess    10/14  CT abd/pelvis suspicious for abscesses. IR reconsulted for drainage.   States he \"feels a fever coming on\"  Denies any chills, diarrhea. Having some nausea. WBC trending down. 10/17  No fever  WBC 14.6  Purulent discharge from medial thigh bilaterally and bleeding from what appear to be little holes-color seem to be improving and it looks more bloody, tenderness and induration seem to be better all over the pelvic area  WBC improved  Stated that he feels okay but has some pain    10/18: Afebrile overnight  WBC  14.6  Serosanguinous discharge was coming out from the holes based on the RN statement last night. It was not checked today,because patient was not co operative. Patient said he is doing better. Summary of relevant labs:  Labs:  WBC - 19.7-22 - 22.5- 17.9-15.4 - 14.6    Lactic acid, whole blood 3.6 -0.9  Cr 0.78-0.59 - 0.55  Vit D 11.4  Micro:  Groin cx: prelim GPR, GNR, GPC in clusters. Proteus species. BC x1 - GPC in clusters, likely contamination - micrococcus  Bcx 2- pending  Urine cx- neg  UA - small leukocyte esterase  Imaging:  10/9 Ct Head 1. No hemorrhage. 2. Insert of uncertain chronicity in the right frontal lobe. Consider further evaluation with MRI. 10/9 - CT Pelvis Slightly increased chronic appearing infectious/inflammatory cutaneous/subcutaneous process of the anterior pelvis and gluteal region. Recommend correlation with history of hidradenitis suppurativa. 10/9 CT Ab and Pelvis 1. Skin thickening of the low anterior abdominal wall, anterior pelvis and visualized gluteal region. Note, the perineum is not visualized on this study. Patient could return for further imaging if clinically indicated. 2. Large volume stool in the rectum. 10/14 CT AbD/PELVIS  FINDINGS:   Skin thickening/stranding of the lower abdominal wall, with 2.0 x 1.9 x 1.6   cm fluid collection with thick adjacent wall, slightly increased in size   (2:56), suspicious for abscess.   Skin thickening/stranding in bilateral   gluteal soft tissues, with 2.6 x 1.8 x 3.2 cm left gluteal fluid collection   with surrounding thick wall (2: 117) and 1.4 x 1.4 x  2.0 cm right gluteal   fluid collection with surrounding thick wall (2:122), similar to prior and   suspicious for abscess. Scrotal area is unremarkable within study limits. Kowalski. Normal appendix. Partially imaged large bowel anastomosis. Impression   Fluid collections with thick wall and adjacent soft tissue stranding in the   lower anterior abdominal wall and bilateral gluteal soft tissues described   above, suspicious for abscesses. Lower anterior abdominal wall fluid   collection is slightly increased in size, others are stable. I have personally reviewed the past medical history, past surgical history, medications, social history, and family history, and I haveupdated the database accordingly. Allergies:   Depakene [valproic acid], Restoril [temazepam], Risperidone and related, Thorazine [chlorpromazine], and Vancomycin     Review of Systems:     Review of Systems   Constitutional:  Negative for diaphoresis, fatigue and fever. HENT:  Negative for congestion, dental problem, nosebleeds, sinus pressure and sinus pain. Eyes:  Negative for pain, itching and visual disturbance. Respiratory:  Negative for shortness of breath. Cardiovascular:  Negative for chest pain. Gastrointestinal:  Negative for abdominal distention and abdominal pain. Endocrine: Negative for cold intolerance, heat intolerance and polyphagia. Genitourinary:  Negative for dysuria and hematuria. Musculoskeletal:  Negative for arthralgias. Skin:  Positive for wound. Negative for pallor. Allergic/Immunologic: Negative for immunocompromised state. Neurological:  Negative for seizures, facial asymmetry and light-headedness. Hematological:  Does not bruise/bleed easily. Psychiatric/Behavioral:  Negative for behavioral problems. Physical Examination :       Physical Exam  Vitals and nursing note reviewed.    Constitutional:       General: He is not in acute distress. Appearance: Normal appearance. He is not ill-appearing or diaphoretic. HENT:      Head: Normocephalic and atraumatic. Right Ear: External ear normal.      Left Ear: External ear normal.      Nose: Nose normal. No congestion. Mouth/Throat:      Mouth: Mucous membranes are moist.      Pharynx: Oropharynx is clear. No oropharyngeal exudate. Eyes:      General: No scleral icterus. Right eye: No discharge. Left eye: No discharge. Extraocular Movements: Extraocular movements intact. Conjunctiva/sclera: Conjunctivae normal.      Pupils: Pupils are equal, round, and reactive to light. Cardiovascular:      Rate and Rhythm: Normal rate and regular rhythm. Pulses: Normal pulses. Heart sounds: Normal heart sounds. No murmur heard. No friction rub. No gallop. Pulmonary:      Effort: Pulmonary effort is normal.      Breath sounds: Normal breath sounds. No wheezing or rhonchi. Abdominal:      General: Abdomen is flat. Bowel sounds are normal.      Palpations: Abdomen is soft. There is no mass. Tenderness: There is no abdominal tenderness. There is no guarding. Genitourinary:     Comments: No Kowalski  Musculoskeletal:         General: No swelling, tenderness, deformity or signs of injury. Normal range of motion. Cervical back: Normal range of motion. No rigidity. Skin:     Capillary Refill: Capillary refill takes less than 2 seconds. Findings: Rash (groin, scrotal area, thigh, purulent disccharge and bleeding on 10/17) present. Neurological:      General: No focal deficit present. Mental Status: He is alert and oriented to person, place, and time. Motor: No weakness.       Gait: Gait normal.   Psychiatric:         Mood and Affect: Mood normal.         Behavior: Behavior normal.       Past Medical History:     Past Medical History:   Diagnosis Date    Bipolar 1 disorder (HCC)     GERD (gastroesophageal reflux disease) 8/13/2016    Hidradenitis suppurativa     Polysubstance abuse ([facility] [address]     Schizoaffective disorder [facility])        Past Surgical  History:     Past Surgical History:   Procedure Laterality Date    ABSCESS DRAINAGE  11/1/2013    left inguinal hydrodenitis/pilondal cyst    INFECTED SKIN DEBRIDEMENT  july 2011       Medications:      nicotine  1 patch TransDERmal Daily    levoFLOXacin  500 mg Oral Daily    amoxicillin-clavulanate  1 tablet Oral 2 times per day    zinc sulfate  50 mg Oral Daily    vitamin D  50,000 Units Oral Weekly    vitamin C  500 mg Oral BID    carBAMazepine  200 mg Oral BID    haloperidol  1 mg Oral BID    sodium chloride flush  5-40 mL IntraVENous 2 times per day    enoxaparin  30 mg SubCUTAneous BID       Social History:     Social History     Socioeconomic History    Marital status: Single     Spouse name: Not on file    Number of children: Not on file    Years of education: Not on file    Highest education level: Not on file   Occupational History    Not on file   Tobacco Use    Smoking status: Every Day     Packs/day: 1.00     Years: 22.00     Pack years: 22.00     Types: Cigarettes    Smokeless tobacco: Never   Vaping Use    Vaping Use: Never used   Substance and Sexual Activity    Alcohol use: Yes     Comment: occasional    Drug use: Yes     Comment: history of marijuana & Opiate abue, claims that he is currently clean & sober.     Sexual activity: Not on file   Other Topics Concern    Not on file   Social History Narrative    Not on file     Social Determinants of Health     Financial Resource Strain: Not on file   Food Insecurity: Not on file   Transportation Needs: Not on file   Physical Activity: Not on file   Stress: Not on file   Social Connections: Not on file   Intimate Partner Violence: Not on file   Housing Stability: Not on file       Family History:     Family History   Problem Relation Age of Onset    Cancer Mother         breast    High Blood Pressure Maternal Grandfather     Mental Illness Paternal Aunt     Substance Abuse Paternal Uncle     Substance Abuse Brother     Alcohol Abuse Father     Cancer Father         pancrease      Medical Decision Making:   I have independently reviewed/ordered the following labs:    CBC with Differential:   No results for input(s): WBC, HGB, HCT, PLT, SEGSPCT, BANDSPCT, LYMPHOPCT, MONOPCT, EOSPCT in the last 72 hours. BMP:  No results for input(s): NA, K, CL, CO2, BUN, CREATININE, CA, MG in the last 72 hours. Hepatic Function Panel:   No results for input(s): PROT, LABALBU, BILIDIR, IBILI, BILITOT, ALKPHOS, ALT, AST in the last 72 hours. No results for input(s): RPR in the last 72 hours. No results for input(s): HIV in the last 72 hours. No results for input(s): BC in the last 72 hours. Lab Results   Component Value Date/Time    CREATININE 0.55 10/15/2022 06:57 AM    GLUCOSE 89 10/15/2022 06:57 AM    GLUCOSE 89 02/09/2012 06:19 AM       Tariq Estrada MD  Internal Medicine Resident,PGY [address]       I have discussed the care of the patient, including pertinent history and exam findings,  with the resident. I have seen and examined the patient and the key elements of all parts of the encounter have been performed by me. I agree with the assessment, plan and orders as documented by the resident.     Kathy Rivera, Infectious Diseases

## 2022-10-18 NOTE — PROGRESS NOTES
[facility] -   Infectious diseases evaluation  admission date 10/9/2022    reason for consultation:   Hidradenitis suppurativa     Impression :   Current:  Single Gram positive bacteremia - micrococcus contamination  Hx of hidradenitis suppurativa - followed with [facility] derm clinic, was receiving biweekly humira injections   Patient has not received Humira injections for last 17 days while in [facility]   immunosuppressed  Recent R buttock abscess vs. hidradenitis on 8/2022   Requested superficial abscess drain by IR but they decided it was loculated, would need drainage by GS  GS deferred to plastic, as lesions were very superficial   Dr. Neda chamberlain plastic planned for wound care outpatient  Repeat CT abd/pelvis identified gluteal fluid collections suspicious for abscesses.   DC on augmentin  Altered mental status- new  CT Head wo hemorrhage  Mother unsure if pt has been getting psychotropic meds in [facility]   Multiple foci of painful hidradenitis on R thigh, groin area, scrotum   Purulent bloody drainage  CT correlates with hidradenitis hx  Wound care on board  Cellulitis on b/l thighs  Sepsis  SIRS: 3 on presentation + source  1 BCx with gram positive cocci in clusters likely contamination  2nd BCx pending  Bandemia   Elevated CRP - 144  Vitamin D deficiency -11.4     Other:  Incarcerated - last 17 days   Current smoker   Schizophrenia  Bipolar disorder    Discussion / summary of stay / plan of care     Recommendations     augmentin and levaquin x 2 weeks  Pus mixed bacteria  Repeat CT of the pelvis shows the same collection over the pelvic area, general surgery have no plan of drainage  Clinically the tenderness and the purulence are improved, WBC slowly trending down  Okay for discharge with above plan    Hidradenitis -  10/12 Start Zinc 50mg QD -continue at discharge  Vit D 11.4 low - start 14709AB weekly x 10 -continue at discharge    Infection Control Recommendations [place] Precautions  Contact Isolation     Antimicrobial Stewardship Recommendations   Simplification of therapy  Targeted therapy    History of Present Illness:   Initial history:  Tawanda Parra is a 38y.o.-year-old half-way inmate with PMHx of schizophrenia and hidradenitis suppurativa who presents with purulent wounds around the groin, scrotum, and R thigh. The wounds are severe, painful, and draining bloody red, purulent drainage, likely hidradenitis suppurativa. Patient has been following with Dr. Regla Garnica at [facility] dermatology clinic and had been getting bi weekly Humira injections. The patient has not been getting biweekly Humira injections while in half-way for the last 17 days- his mother reported that he missed 3 shots. This patient has an extensive psych history - mother unsure is patient was getting psychotropic medications while in half-way. He currently follows with [facility]          Interval changes  10/18/2022   Patient Vitals for the past 8 hrs:   BP Temp Temp src Pulse Resp SpO2   10/18/22 1008 -- -- -- -- 16 --   10/18/22 0700 122/72 98.4 °F (36.9 °C) Oral 95 20 92 %   10/18/22 0400 (!) 131/91 -- -- 89 -- --     10/11  Tmax last 24 hours 101.1F  Has not had BM yet  WBC increased slightly  Getting rest  Final blood cx still pending  Still in pain around groin - pus expressed from many orifices associated w pain, induration and little redness  Wound care with silver and aquaphor - recommended by wound care    10/12  Afebrile  Vit D deficiency - 11.4  - will replace  Drainage from he groin is +++, cx taken - some indurated found as well -  Continues to have pain around groin-    10/13  WBC 17 still and right inner thigh aea is very indurated tender and pus remains from all orifices right thigh and groins  Will get a CT pelvis and better eval for abscess    10/14  CT abd/pelvis suspicious for abscesses. IR reconsulted for drainage.   States he \"feels a fever coming on\"  Denies any chills, diarrhea. Having some nausea. WBC trending down. 10/17  No fever  WBC 14.6  Purulent discharge from medial thigh bilaterally and bleeding from what appear to be little holes-color seem to be improving and it looks more bloody, tenderness and induration seem to be better all over the pelvic area  WBC improved  Stated that he feels okay but has some pain    10/18: Afebrile  WBC   Summary of relevant labs:  Labs:  WBC - 19.7-22 - 22.5- 17.9-15.4 - 14.6    Lactic acid, whole blood 3.6 -0.9  Cr 0.78-0.59 - 0.55  Vit D 11.4  Micro:  Groin cx: prelim GPR, GNR, GPC in clusters. Proteus species. BC x1 - GPC in clusters, likely contamination - micrococcus  Bcx 2- pending  Urine cx- neg  UA - small leukocyte esterase  Imaging:  10/9 Ct Head 1. No hemorrhage. 2. Insert of uncertain chronicity in the right frontal lobe. Consider further evaluation with MRI. 10/9 - CT Pelvis Slightly increased chronic appearing infectious/inflammatory cutaneous/subcutaneous process of the anterior pelvis and gluteal region. Recommend correlation with history of hidradenitis suppurativa. 10/9 CT Ab and Pelvis 1. Skin thickening of the low anterior abdominal wall, anterior pelvis and visualized gluteal region. Note, the perineum is not visualized on this study. Patient could return for further imaging if clinically indicated. 2. Large volume stool in the rectum. 10/14 CT AbD/PELVIS  FINDINGS:   Skin thickening/stranding of the lower abdominal wall, with 2.0 x 1.9 x 1.6   cm fluid collection with thick adjacent wall, slightly increased in size   (2:56), suspicious for abscess. Skin thickening/stranding in bilateral   gluteal soft tissues, with 2.6 x 1.8 x 3.2 cm left gluteal fluid collection   with surrounding thick wall (2: 117) and 1.4 x 1.4 x  2.0 cm right gluteal   fluid collection with surrounding thick wall (2:122), similar to prior and   suspicious for abscess. Scrotal area is unremarkable within study limits. Kowalski.  Normal appendix. Partially imaged large bowel anastomosis. Impression   Fluid collections with thick wall and adjacent soft tissue stranding in the   lower anterior abdominal wall and bilateral gluteal soft tissues described   above, suspicious for abscesses. Lower anterior abdominal wall fluid   collection is slightly increased in size, others are stable. I have personally reviewed the past medical history, past surgical history, medications, social history, and family history, and I haveupdated the database accordingly. Allergies:   Depakene [valproic acid], Restoril [temazepam], Risperidone and related, Thorazine [chlorpromazine], and Vancomycin     Review of Systems:     Review of Systems   Constitutional:  Negative for diaphoresis, fatigue and fever. HENT:  Negative for congestion, dental problem, nosebleeds, sinus pressure and sinus pain. Eyes:  Negative for pain, itching and visual disturbance. Respiratory:  Negative for shortness of breath. Cardiovascular:  Negative for chest pain. Gastrointestinal:  Negative for abdominal distention and abdominal pain. Endocrine: Negative for cold intolerance, heat intolerance and polyphagia. Genitourinary:  Negative for dysuria and hematuria. Musculoskeletal:  Negative for arthralgias. Skin:  Positive for wound. Negative for pallor. Allergic/Immunologic: Negative for immunocompromised state. Neurological:  Negative for seizures, facial asymmetry and light-headedness. Hematological:  Does not bruise/bleed easily. Psychiatric/Behavioral:  Negative for behavioral problems. Physical Examination :       Physical Exam  Vitals and nursing note reviewed. Constitutional:       General: He is not in acute distress. Appearance: Normal appearance. He is not ill-appearing or diaphoretic. HENT:      Head: Normocephalic and atraumatic.       Right Ear: External ear normal.      Left Ear: External ear normal. Nose: Nose normal. No congestion. Mouth/Throat:      Mouth: Mucous membranes are moist.      Pharynx: Oropharynx is clear. No oropharyngeal exudate. Eyes:      General: No scleral icterus. Right eye: No discharge. Left eye: No discharge. Extraocular Movements: Extraocular movements intact. Conjunctiva/sclera: Conjunctivae normal.      Pupils: Pupils are equal, round, and reactive to light. Cardiovascular:      Rate and Rhythm: Normal rate and regular rhythm. Pulses: Normal pulses. Heart sounds: Normal heart sounds. No murmur heard. No friction rub. No gallop. Pulmonary:      Effort: Pulmonary effort is normal.      Breath sounds: Normal breath sounds. No wheezing or rhonchi. Abdominal:      General: Abdomen is flat. Bowel sounds are normal.      Palpations: Abdomen is soft. There is no mass. Tenderness: There is no abdominal tenderness. There is no guarding. Genitourinary:     Comments: No Kowalski  Musculoskeletal:         General: No swelling, tenderness, deformity or signs of injury. Normal range of motion. Cervical back: Normal range of motion. No rigidity. Skin:     Capillary Refill: Capillary refill takes less than 2 seconds. Findings: Rash (groin, scrotal area, thigh, purulent disccharge and bleeding on 10/17) present. Neurological:      General: No focal deficit present. Mental Status: He is alert and oriented to person, place, and time. Motor: No weakness.       Gait: Gait normal.   Psychiatric:         Mood and Affect: Mood normal.         Behavior: Behavior normal.       Past Medical History:     Past Medical History:   Diagnosis Date    Bipolar 1 disorder (HCC)     GERD (gastroesophageal reflux disease) 8/13/2016    Hidradenitis suppurativa     Polysubstance abuse ([facility] [address]     Schizoaffective disorder ([facility] [address]        Past Surgical  History:     Past Surgical History:   Procedure Laterality Date    ABSCESS DRAINAGE  11/1/2013 left inguinal hydrodenitis/pilondal cyst    INFECTED SKIN DEBRIDEMENT  july 2011       Medications:      nicotine  1 patch TransDERmal Daily    levoFLOXacin  500 mg Oral Daily    amoxicillin-clavulanate  1 tablet Oral 2 times per day    zinc sulfate  50 mg Oral Daily    vitamin D  50,000 Units Oral Weekly    vitamin C  500 mg Oral BID    carBAMazepine  200 mg Oral BID    haloperidol  1 mg Oral BID    sodium chloride flush  5-40 mL IntraVENous 2 times per day    enoxaparin  30 mg SubCUTAneous BID       Social History:     Social History     Socioeconomic History    Marital status: Single     Spouse name: Not on file    Number of children: Not on file    Years of education: Not on file    Highest education level: Not on file   Occupational History    Not on file   Tobacco Use    Smoking status: Every Day     Packs/day: 1.00     Years: 22.00     Pack years: 22.00     Types: Cigarettes    Smokeless tobacco: Never   Vaping Use    Vaping Use: Never used   Substance and Sexual Activity    Alcohol use: Yes     Comment: occasional    Drug use: Yes     Comment: history of marijuana & Opiate abue, claims that he is currently clean & sober.     Sexual activity: Not on file   Other Topics Concern    Not on file   Social History Narrative    Not on file     Social Determinants of Health     Financial Resource Strain: Not on file   Food Insecurity: Not on file   Transportation Needs: Not on file   Physical Activity: Not on file   Stress: Not on file   Social Connections: Not on file   Intimate Partner Violence: Not on file   Housing Stability: Not on file       Family History:     Family History   Problem Relation Age of Onset    Cancer Mother         breast    High Blood Pressure Maternal Grandfather     Mental Illness Paternal Aunt     Substance Abuse Paternal Uncle     Substance Abuse Brother     Alcohol Abuse Father     Cancer Father         pancrease      Medical Decision Making:   I have independently reviewed/ordered the following labs:    CBC with Differential:   No results for input(s): WBC, HGB, HCT, PLT, SEGSPCT, BANDSPCT, LYMPHOPCT, MONOPCT, EOSPCT in the last 72 hours. BMP:  No results for input(s): NA, K, CL, CO2, BUN, CREATININE, CA, MG in the last 72 hours. Hepatic Function Panel:   No results for input(s): PROT, LABALBU, BILIDIR, IBILI, BILITOT, ALKPHOS, ALT, AST in the last 72 hours. No results for input(s): RPR in the last 72 hours. No results for input(s): HIV in the last 72 hours. No results for input(s): BC in the last 72 hours. Lab Results   Component Value Date/Time    CREATININE 0.55 10/15/2022 06:57 AM    GLUCOSE 89 10/15/2022 06:57 AM    GLUCOSE 89 02/09/2012 06:19 AM       Detailed results: Thank you for allowing us to participate in the care of this patient. Please call with questions. This note is created with the assistance of a speech recognition program.  While intending to generate adocument that actually reflects the content of the visit, the document can still have some errors including those of syntax and sound a like substitutions which may escape proof reading. It such instances, actual meaningcan be extrapolated by contextual diversion. Office: [phone]  Perfect serve / office [phone]    Marci Quiroga, OMS3          I have discussed the care of the patient, including pertinent history and exam findings,  with the resident. I have seen and examined the patient and the key elements of all parts of the encounter have been performed by me. I agree with the assessment, plan and orders as documented by the resident.     Kathy Rivera, Infectious Diseases

## 2022-10-18 NOTE — PROGRESS NOTES
Physical Therapy  Facility/Department: [facility] RENAL//MED SURG  Physical Therapy Initial Assessment    Name: Jourdan Reich  : 1980  MRN: [id number]  Date of Service: 10/18/2022    Discharge Recommendations:  Patient would benefit from continued therapy after discharge       Patient Diagnosis(es): The primary encounter diagnosis was Septicemia [facility]). Diagnoses of Cellulitis of right thigh and Hidradenitis were also pertinent to this visit. Past Medical History:  has a past medical history of Bipolar 1 disorder ([facility] [address] GERD (gastroesophageal reflux disease), Hidradenitis suppurativa, Polysubstance abuse ([facility] [address] and Schizoaffective disorder ([facility] [address] Past Surgical History:  has a past surgical history that includes Infected skin debridement (2011) and Abscess Drainage (2013). Assessment   Body Structures, Functions, Activity Limitations Requiring Skilled Therapeutic Intervention: Decreased functional mobility ; Decreased strength;Decreased endurance;Decreased cognition;Decreased safe awareness;Decreased balance; Increased pain  Assessment: pt isaac ther ex to increase AROM & increase strength to isaac mobility. pt could benefit from cont therapies to improve his dynamic functional mobility. Requires PT Follow-Up: Yes  Activity Tolerance  Activity Tolerance: Patient limited by endurance; Patient limited by fatigue  Activity Tolerance Comments: defer further PT & defers mobility     Plan   Physcial Therapy Plan  General Plan:  (5-6x/week)  Current Treatment Recommendations: Strengthening, Functional mobility training, Transfer training, Endurance training, Stair training, Gait training, Safety education & training, Balance training, Therapeutic activities, Equipment evaluation, education, & procurement, Home exercise program, Patient/Caregiver education & training  Safety Devices  Type of Devices: Left in bed, Call light within reach, Patient at risk for falls, Nurse notified  Restraints  Restraints Initially in Place: No     Restrictions  Restrictions/Precautions  Restrictions/Precautions: Fall Risk (isolation)  Required Braces or Orthoses?: No  Position Activity Restriction  Other position/activity restrictions: up with assist     Subjective   General  Chart Reviewed: Yes  Family / Caregiver Present: No  Follows Commands: Within Functional Limits  General Comment  Comments: pt defer mobility d/t reports of increased bleeding the last marcello ehe got up. Upin arrival pt in bed R side lying in fetal position  Subjective  Subjective: pt attempted to decline PT however agreeable to ther ex in supine    Social/Functional History  Social/Functional History  Lives With: Other (comment) (Was living with mother.)  Type of Home: House  Home Layout: Two level, Bed/Bath upstairs  Home Access: Stairs to enter without rails  Entrance Stairs - Number of Steps: 4  Bathroom Shower/Tub: Tub/Shower unit  Bathroom Toilet: Standard  Bathroom Accessibility: Accessible  Home Equipment:  (pt reported no use of DME atb aseline)  Receives Help From: Family  ADL Assistance: Needs assistance  Bath: Dependent/Total  Dressing: Maximum assistance  Grooming: Moderate assistance  Feeding: Minimal assistance  Toileting: Needs assistance  Homemaking Assistance: Needs assistance  Meal Prep: Total  Laundry: Total  Vacuuming: Total  Cleaning: Total  Shopping: Total  Homemaking Responsibilities: Yes  Meal Prep Responsibility: Secondary  Laundry Responsibility: Secondary  Cleaning Responsibility: Secondary  Ambulation Assistance: Needs assistance  Transfer Assistance: Needs assistance  Active : No  Patient's  Info: mother provides transportation  Occupation: On disability      Objective   Exercise Treatment: bilat UE AROM 10 reps of: shoulder flexion, shoulder horizontal abd/add, shoulder abd/add, elbow flex/ext while in supine.  pt isaac bilat LE AROM 10 reps of: heel slide, PF/DF while in supine      OutComes Score  AM-PAC Score  AM-PAC Inpatient Mobility Raw Score : 15 (10/18/22 1034)  AM-PAC Inpatient T-Scale Score : 39.45 (10/18/22 1034)  Mobility Inpatient CMS 0-100% Score: 57.7 (10/18/22 1034)  Mobility Inpatient CMS G-Code Modifier : CK (10/18/22 1034)    Goals  Short Term Goals  Time Frame for Short Term Goals: 10 visits  Short Term Goal 1: transfers with SBA  Short Term Goal 2: amb 125 ft with a RW x SBA  Short Term Goal 3: ascend/descend 4 steps with SBA  Short Term Goal 4: 20 min strengthening exercise program x SBA  Additional Goals?: No  Patient Goals   Patient Goals : Return home       Education  Patient Education  Education Given To: Patient  Education Provided: Plan of Care  Education Method: Verbal  Barriers to Learning: Cognition  Education Outcome: Continued education needed;Verbalized understanding      Therapy Time   Individual Concurrent Group Co-treatment   Time In  1004         Time Out  1015         Minutes  11                 [address] [facility]

## 2022-10-19 PROCEDURE — 6370000000 HC RX 637 (ALT 250 FOR IP): Performed by: NURSE PRACTITIONER

## 2022-10-19 PROCEDURE — 99231 SBSQ HOSP IP/OBS SF/LOW 25: CPT | Performed by: STUDENT IN AN ORGANIZED HEALTH CARE EDUCATION/TRAINING PROGRAM

## 2022-10-19 PROCEDURE — 1200000000 HC SEMI PRIVATE

## 2022-10-19 PROCEDURE — 6370000000 HC RX 637 (ALT 250 FOR IP): Performed by: STUDENT IN AN ORGANIZED HEALTH CARE EDUCATION/TRAINING PROGRAM

## 2022-10-19 PROCEDURE — 6370000000 HC RX 637 (ALT 250 FOR IP): Performed by: CLINICAL NURSE SPECIALIST

## 2022-10-19 PROCEDURE — 6370000000 HC RX 637 (ALT 250 FOR IP): Performed by: INTERNAL MEDICINE

## 2022-10-19 PROCEDURE — 6360000002 HC RX W HCPCS: Performed by: NURSE PRACTITIONER

## 2022-10-19 RX ADMIN — OXYCODONE HYDROCHLORIDE AND ACETAMINOPHEN 500 MG: 500 TABLET ORAL at 09:29

## 2022-10-19 RX ADMIN — HALOPERIDOL 1 MG: 1 TABLET ORAL at 09:29

## 2022-10-19 RX ADMIN — AMOXICILLIN AND CLAVULANATE POTASSIUM 1 TABLET: 875; 125 TABLET, FILM COATED ORAL at 09:29

## 2022-10-19 RX ADMIN — HALOPERIDOL 1 MG: 1 TABLET ORAL at 20:22

## 2022-10-19 RX ADMIN — LEVOFLOXACIN 500 MG: 500 TABLET, FILM COATED ORAL at 09:29

## 2022-10-19 RX ADMIN — ENOXAPARIN SODIUM 30 MG: 100 INJECTION SUBCUTANEOUS at 20:21

## 2022-10-19 RX ADMIN — ZINC SULFATE 220 MG (50 MG) CAPSULE 50 MG: CAPSULE at 09:28

## 2022-10-19 RX ADMIN — ENOXAPARIN SODIUM 30 MG: 100 INJECTION SUBCUTANEOUS at 09:29

## 2022-10-19 RX ADMIN — ERGOCALCIFEROL 50000 UNITS: 1.25 CAPSULE ORAL at 09:29

## 2022-10-19 RX ADMIN — CARBAMAZEPINE 200 MG: 200 TABLET ORAL at 20:20

## 2022-10-19 RX ADMIN — HYDROCODONE BITARTRATE AND ACETAMINOPHEN 2 TABLET: 5; 325 TABLET ORAL at 02:32

## 2022-10-19 RX ADMIN — OXYCODONE HYDROCHLORIDE AND ACETAMINOPHEN 500 MG: 500 TABLET ORAL at 20:20

## 2022-10-19 RX ADMIN — CARBAMAZEPINE 200 MG: 200 TABLET ORAL at 09:29

## 2022-10-19 RX ADMIN — AMOXICILLIN AND CLAVULANATE POTASSIUM 1 TABLET: 875; 125 TABLET, FILM COATED ORAL at 20:20

## 2022-10-19 ASSESSMENT — PAIN SCALES - GENERAL
PAINLEVEL_OUTOF10: 10
PAINLEVEL_OUTOF10: 5
PAINLEVEL_OUTOF10: 2
PAINLEVEL_OUTOF10: 5
PAINLEVEL_OUTOF10: 5

## 2022-10-19 ASSESSMENT — PAIN - FUNCTIONAL ASSESSMENT: PAIN_FUNCTIONAL_ASSESSMENT: PREVENTS OR INTERFERES SOME ACTIVE ACTIVITIES AND ADLS

## 2022-10-19 ASSESSMENT — PAIN DESCRIPTION - ORIENTATION: ORIENTATION: RIGHT;LEFT

## 2022-10-19 ASSESSMENT — PAIN DESCRIPTION - DESCRIPTORS: DESCRIPTORS: ACHING

## 2022-10-19 ASSESSMENT — PAIN DESCRIPTION - LOCATION: LOCATION: HEAD;ABDOMEN

## 2022-10-19 NOTE — PLAN OF CARE
Problem: Safety - Adult  Goal: Free from fall injury  10/19/2022 1127 by Matt Arce RN  Outcome: Progressing  10/19/2022 0305 by Molina Dalal RN  Outcome: Progressing     Problem: Skin/Tissue Integrity  Goal: Absence of new skin breakdown  Description: 1. Monitor for areas of redness and/or skin breakdown  2. Assess vascular access sites hourly  3. Every 4-6 hours minimum:  Change oxygen saturation probe site  4. Every 4-6 hours:  If on nasal continuous positive airway pressure, respiratory therapy assess nares and determine need for appliance change or resting period.   10/19/2022 1127 by Matt Arce RN  Outcome: Progressing  10/19/2022 0305 by Molina Dalal RN  Outcome: Progressing

## 2022-10-19 NOTE — CARE COORDINATION
Transitional planning note: call placed to effie with jovana verma to follow up on referral and issue with humira injections. Also inquired if facility can accept patient if he obtains his scripts for humira as an outpatient and then have his mother administer injection so facility does not incur cost. Per Effie, the facility would be able to do that. However, there is concern that the patient's mother/ guardian will not be assisting patient with these things once he is placed in the facility so they are trying to see if [facility] dermatology clinic will be continuing these injections and if so, can the patient have them done in the outpatient dermatology clinic. She states they have not heard back from the clinic.     [phone]: call placed to [facility] dermatology clinic @ [phone] and spoke with Juanito Ayala in the office to see if they would have the ability to administer this medication in the office. She states they do give it in the office but the patient has to bring the medication in as they don't supply it from the office. Juanito Ayala from the office stated that there is a note in the patient's chart from dr valentine yesterday statin that the patient has \"horrible HS and should continue with humira treatment\". Updated effie with jovana verma of above. She will speak with corporate and call me back. [phone]: received call back from effie with jovana verma stating that her corporate office stated that they cannot accept patient currently as the insurance will not carve out the cost of the humira injections for this patient once he goes to long term status. She states they could take him if it can be worked out that patient's humira injections could be delivered and administered in the [facility] dermatology office.

## 2022-10-19 NOTE — PLAN OF CARE
Problem: Discharge Planning  Goal: Discharge to home or other facility with appropriate resources  10/19/2022 0305 by Janny Weller RN  Outcome: Progressing  10/18/2022 1757 by Gem Chapa RN  Outcome: Progressing     Problem: Safety - Adult  Goal: Free from fall injury  10/19/2022 0305 by Janny Weller RN  Outcome: Progressing  10/18/2022 1757 by Gme Chapa RN  Outcome: Progressing     Problem: Confusion  Goal: Confusion, delirium, dementia, or psychosis is improved or at baseline  Description: INTERVENTIONS:  1. Assess for possible contributors to thought disturbance, including medications, impaired vision or hearing, underlying metabolic abnormalities, dehydration, psychiatric diagnoses, and notify attending LIP  2. [place] high risk fall precautions, as indicated  3. Provide frequent short contacts to provide reality reorientation, refocusing and direction  4. Decrease environmental stimuli, including noise as appropriate  5. Monitor and intervene to maintain adequate nutrition, hydration, elimination, sleep and activity  6. If unable to ensure safety without constant attention obtain sitter and review sitter guidelines with assigned personnel  7. Initiate Psychosocial CNS and Spiritual Care consult, as indicated  10/19/2022 0305 by Janny Weller RN  Outcome: Progressing  10/18/2022 1757 by Gem Chapa RN  Outcome: Progressing     Problem: Skin/Tissue Integrity  Goal: Absence of new skin breakdown  Description: 1. Monitor for areas of redness and/or skin breakdown  2. Assess vascular access sites hourly  3. Every 4-6 hours minimum:  Change oxygen saturation probe site  4. Every 4-6 hours:  If on nasal continuous positive airway pressure, respiratory therapy assess nares and determine need for appliance change or resting period.   10/19/2022 0305 by Janny Weller RN  Outcome: Progressing  10/18/2022 1757 by Gem Chapa RN  Outcome: Progressing     Problem: Pain  Goal: Verbalizes/displays adequate comfort level or baseline comfort level  10/19/2022 0305 by Soy Awan RN  Outcome: Progressing  10/18/2022 1757 by Mariluz Hewitt RN  Outcome: Progressing

## 2022-10-19 NOTE — PROGRESS NOTES
[facility]  Office: [address], DO, Gurpreet Thompson, DO, Bianca Demarco, DO, Lyndsay Tabaresars Blood, DO, Corrine Javde MD, Bhavesh Mitchell MD, Stanford Esteves MD, Campbell Randall MD,  Doron Villeda MD, Elise Hassan MD, Jamaal Patton, DO, Erasto Carmona MD,  Nika Rooney MD, Kylie Brand MD, Steve Vieira, DO, Daniela Varela MD, Teri George MD, Haris Trevino MD, Jose Francisco Yanez MD, Madison Esposito MD, Yg Lauren MD, Daniel Trejo, DO, Paz Jones MD, Diane Verma MD, Asucnion Driscoll CNP,  Marta Miranda, CNP, Sunil Aguilar, CNP, Alfonzo Villasenor, CNP,  Gala Milton, [facility], Chary Henderson, CNP, Ramandeep Sanders, CNP, Lacey Bean, CNP, Ermelinda Mehta, CNP, Abner Seymour, CNP, JAMIN RossC, Avi Epstein, CNS, Kae Boone, [facility], Lisandra Adair, CNP, Mirna Velasquez, CNP, Betty Jorgensen, [address] [place] [address]    Progress Note    10/19/2022    1:34 PM    Name:   Mague Infante  MRN:     [id number]     Acct:      [de-identified]   Room:   [address] [place] [address] Day:  10  Admit Date:  10/9/2022  9:05 AM    PCP:   Grace Garcia MD  Code Status:  Full Code    Subjective:     C/C:   Chief Complaint   Patient presents with    Altered Mental Status     Interval History Status: improved. Vitals reviewed, afebrile and hemodynamically stable. Saturating well on room air. Previous labs reviewed. Overnight patient had no significant events. On examination patient resting comfortably in bed. No complaints at this time. Discussed case with case management given patient's Humira injections and concerns with SNF for cost. Case management reached out to [facility] dermatology and Humira injections can be done in the office. However, patient has to bring his own supply.   Case management working with [facility] to determine if this is acceptable and whether or not they can accept the patient with this plan in place.    Brief History:     Mr. Elia Neely is a 1year-old male with a significant past medical history of hidradenitis suppurativa with extensive lesions to bilateral axilla who presented to the emergency department from prison due to extensive lower abdominal wounds. There was initial concern for necrotizing fasciitis. CT pelvis demonstrated fluid collection and general surgery was consulted however did not recommend any intervention. Was started on empiric IV antibiotics and infectious disease was consulted. He was switched to Levaquin and Augmentin per infectious disease. Plan for placement to SNF and Augmentin and Levaquin x2 weeks. Review of Systems:     Constitutional:  negative for chills, fevers, sweats  Respiratory:  negative for cough, dyspnea on exertion, shortness of breath, wheezing  Cardiovascular:  negative for chest pain, chest pressure/discomfort, lower extremity edema, palpitations  Gastrointestinal: Positive for groin wounds, negative for abdominal pain, constipation, diarrhea, nausea, vomiting  Neurological:  negative for dizziness, headache    Medications: Allergies:     Allergies   Allergen Reactions    Depakene [Valproic Acid]     Restoril [Temazepam]     Risperidone And Related      Seizure      Thorazine [Chlorpromazine]     Vancomycin Other (See Comments) and Rash       Current Meds:   Scheduled Meds:    nicotine  1 patch TransDERmal Daily    levoFLOXacin  500 mg Oral Daily    amoxicillin-clavulanate  1 tablet Oral 2 times per day    zinc sulfate  50 mg Oral Daily    vitamin D  50,000 Units Oral Weekly    vitamin C  500 mg Oral BID    carBAMazepine  200 mg Oral BID    haloperidol  1 mg Oral BID    sodium chloride flush  5-40 mL IntraVENous 2 times per day    enoxaparin  30 mg SubCUTAneous BID     Continuous Infusions:    sodium chloride       PRN Meds: HYDROcodone 5 mg - acetaminophen **OR** HYDROcodone 5 mg - acetaminophen, mineral oil-hydrophilic petrolatum, sodium chloride flush, sodium chloride, potassium chloride **OR** potassium alternative oral replacement **OR** potassium chloride, magnesium sulfate, ondansetron **OR** ondansetron, polyethylene glycol, acetaminophen **OR** acetaminophen    Data:     Past Medical History:   has a past medical history of Bipolar 1 disorder ([facility] GERD (gastroesophageal reflux disease), Hidradenitis suppurativa, Polysubstance abuse ([facility] and Schizoaffective disorder ([facility] Social History:   reports that he has been smoking cigarettes. He has a 22.00 pack-year smoking history. He has never used smokeless tobacco. He reports current alcohol use. He reports current drug use. Family History:   Family History   Problem Relation Age of Onset    Cancer Mother         breast    High Blood Pressure Maternal Grandfather     Mental Illness Paternal Aunt     Substance Abuse Paternal Uncle     Substance Abuse Brother     Alcohol Abuse Father     Cancer Father         pancrease       Vitals:  /69   Pulse (!) 102   Temp 98.4 °F (36.9 °C) (Oral)   Resp 16   Ht 5' 9\" (1.753 m)   Wt 246 lb 4.1 oz (111.7 kg)   SpO2 96%   BMI 36.37 kg/m²   Temp (24hrs), Av.6 °F (37 °C), Min:98.2 °F (36.8 °C), Max:99.1 °F (37.3 °C)    No results for input(s): POCGLU in the last 72 hours. I/O (24Hr): Intake/Output Summary (Last 24 hours) at 10/19/2022 1334  Last data filed at 10/19/2022 1117  Gross per 24 hour   Intake 237 ml   Output 1500 ml   Net -1263 ml         Labs:  Hematology:No results for input(s): WBC, RBC, HGB, HCT, MCV, MCH, MCHC, RDW, PLT, MPV, SEDRATE, CRP, INR, DDIMER, FU9EHVJS, LABABSO in the last 72 hours. Invalid input(s): PT  Chemistry:No results for input(s): NA, K, CL, CO2, GLUCOSE, BUN, CREATININE, MG, ANIONGAP, LABGLOM, GFRAA, CALCIUM, CAION, PHOS, PSA, PROBNP, TROPHS, CKTOTAL, CKMB, CKMBINDEX, MYOGLOBIN, DIGOXIN, LACTACIDWB in the last 72 hours. No results for input(s): PROT, LABALBU, LABA1C, P2UMSXV, S2UJPXZ, FT4, TSH, AST, ALT, LDH, GGT, ALKPHOS, LABGGT, BILITOT, BILIDIR, AMMONIA, AMYLASE, LIPASE, LACTATE, CHOL, HDL, LDLCHOLESTEROL, CHOLHDLRATIO, TRIG, VLDL, LMF79NN, PHENYTOIN, PHENYF, URICACID, POCGLU in the last 72 hours. ABG:No results found for: POCPH, PHART, PH, POCPCO2, RAJ2JBW, PCO2, POCPO2, PO2ART, PO2, POCHCO3, AUA7LWI, HCO3, NBEA, PBEA, BEART, BE, THGBART, THB, PEP7ZJC, QUOI1ZXG, F0PTFQBD, O2SAT, FIO2  Lab Results   Component Value Date/Time    SPECIAL RT HAND 10ML 10/09/2022 09:18 AM    SPECIAL LT AC 20ML 10/09/2022 09:18 AM     Lab Results   Component Value Date/Time    CULTURE PROTEUS MIRABILIS SCANT GROWTH (A) 10/12/2022 12:32 PM    CULTURE ESCHERICHIA COLI SCANT GROWTH (A) 10/12/2022 12:32 PM    CULTURE NORMAL SKIN PRAVEEN LIGHT GROWTH 10/12/2022 12:32 PM       Radiology:  CT PELVIS W CONTRAST Additional Contrast? Radiologist Recommendation    Result Date: 10/13/2022  Fluid collections with thick wall and adjacent soft tissue stranding in the lower anterior abdominal wall and bilateral gluteal soft tissues described above, suspicious for abscesses. Lower anterior abdominal wall fluid collection is slightly increased in size, others are stable. RECOMMENDATIONS:     Physical Examination:        General appearance:  alert, cooperative and no distress  Mental Status:  oriented to person, place and time and normal affect  Lungs:  clear to auscultation bilaterally, normal effort  Heart:  regular rate and rhythm, no murmur  Abdomen:  soft, nontender, nondistended, normal bowel sounds, no masses, hepatomegaly, splenomegaly  Extremities:  no edema, redness, tenderness in the calves  Skin: Multiple wounds present in groin.     Assessment:        Hospital Problems             Last Modified POA    * (Principal) Hidradenitis suppurativa 10/9/2022 Yes    Septicemia ([facility] 10/10/2022 Yes    Schizophrenia ([facility] 10/10/2022 Yes    Gram-positive bacteremia 10/10/2022 Yes    Cellulitis of right thigh 10/10/2022 Yes    Bandemia 10/10/2022 Yes Scrotal abscess 10/10/2022 Yes    Immunosuppressed due to chemotherapy ([facility] 10/10/2022 Yes    Mild protein-calorie malnutrition ([address] 10/12/2022 Yes    Hypovitaminosis D 10/12/2022 Yes    Gluteal abscess 10/14/2022 Yes    Abscess of skin of abdomen 10/9/2022 Yes    Schizoaffective disorder, bipolar type ([facility] 10/10/2022 Yes    GERD (gastroesophageal reflux disease) 10/10/2022 Yes     Plan:        Sepsis secondary to hidradenitis suppurativa. Infectious disease following. Currently on Augmentin and Levaquin x2 weeks. And for discharge to [facility]. Schizoaffective disorder. Evaluated by psychiatry. Remains on Haldol and Tegretol. Immunosuppressed. Patient was taking Humira outpatient per dermatology. Has not received dose in at least 17 days given incarceration in correction. [facility] dermatology contacted and hopefully patient can receive Humira injections while at [facility] in the outpatient setting. Gastroesophageal reflux disease. Stable. DVT prophylaxis: Lovenox 30 mg twice daily. GI prophylaxis: None indicated at this time. Discharge planning: Plan to discharge patient home to [facility] hopefully today. [facility] was contacted by  and injections can be given in the outpatient setting. Hua Pandey acceptance of patient with Humira as an outpatient injection.     Zac Reeves DO  10/19/2022  1:34 PM

## 2022-10-20 PROCEDURE — 6370000000 HC RX 637 (ALT 250 FOR IP): Performed by: STUDENT IN AN ORGANIZED HEALTH CARE EDUCATION/TRAINING PROGRAM

## 2022-10-20 PROCEDURE — 6360000002 HC RX W HCPCS: Performed by: NURSE PRACTITIONER

## 2022-10-20 PROCEDURE — 2580000003 HC RX 258: Performed by: NURSE PRACTITIONER

## 2022-10-20 PROCEDURE — 99231 SBSQ HOSP IP/OBS SF/LOW 25: CPT | Performed by: STUDENT IN AN ORGANIZED HEALTH CARE EDUCATION/TRAINING PROGRAM

## 2022-10-20 PROCEDURE — 1200000000 HC SEMI PRIVATE

## 2022-10-20 PROCEDURE — 6370000000 HC RX 637 (ALT 250 FOR IP): Performed by: NURSE PRACTITIONER

## 2022-10-20 PROCEDURE — 6370000000 HC RX 637 (ALT 250 FOR IP): Performed by: INTERNAL MEDICINE

## 2022-10-20 RX ADMIN — SODIUM CHLORIDE, PRESERVATIVE FREE 10 ML: 5 INJECTION INTRAVENOUS at 08:25

## 2022-10-20 RX ADMIN — OXYCODONE HYDROCHLORIDE AND ACETAMINOPHEN 500 MG: 500 TABLET ORAL at 08:24

## 2022-10-20 RX ADMIN — CARBAMAZEPINE 200 MG: 200 TABLET ORAL at 21:03

## 2022-10-20 RX ADMIN — ENOXAPARIN SODIUM 30 MG: 100 INJECTION SUBCUTANEOUS at 08:26

## 2022-10-20 RX ADMIN — OXYCODONE HYDROCHLORIDE AND ACETAMINOPHEN 500 MG: 500 TABLET ORAL at 21:03

## 2022-10-20 RX ADMIN — HALOPERIDOL 1 MG: 1 TABLET ORAL at 21:03

## 2022-10-20 RX ADMIN — CARBAMAZEPINE 200 MG: 200 TABLET ORAL at 08:24

## 2022-10-20 RX ADMIN — ZINC SULFATE 220 MG (50 MG) CAPSULE 50 MG: CAPSULE at 08:24

## 2022-10-20 RX ADMIN — ACETAMINOPHEN 650 MG: 325 TABLET ORAL at 00:43

## 2022-10-20 RX ADMIN — AMOXICILLIN AND CLAVULANATE POTASSIUM 1 TABLET: 875; 125 TABLET, FILM COATED ORAL at 08:24

## 2022-10-20 RX ADMIN — LEVOFLOXACIN 500 MG: 500 TABLET, FILM COATED ORAL at 08:24

## 2022-10-20 RX ADMIN — HALOPERIDOL 1 MG: 1 TABLET ORAL at 08:25

## 2022-10-20 ASSESSMENT — PAIN SCALES - GENERAL
PAINLEVEL_OUTOF10: 2
PAINLEVEL_OUTOF10: 7

## 2022-10-20 ASSESSMENT — PAIN DESCRIPTION - LOCATION
LOCATION: MOUTH
LOCATION: MOUTH

## 2022-10-20 NOTE — PROGRESS NOTES
[facility]  Office: [address], DO, Lili Sep, DO, Carson Heart, DO, Laci Cheema Blood, DO, Jacquelyn Spears MD, Tatiana Penn MD, Robin Charlton MD, Violeta Angel MD,  Paulino Cortes MD, Luly Payan MD, Melinda Garza, DO, Sami Johnson MD,  Jenelle Ferris MD, Anuj Berger MD, Bing Boxer, DO, Alireza Zavala MD, Ailyn Montes MD, Prashanth Ward MD, Cornell Gillette MD, Gael Dixon MD, Evangelina Servinr, MD, Susie Thomas DO, Nilton Stanley MD, Jose Soto MD, Yoly Abarca, CNP,  Aruna Ortiz, CNP, Yolanda Nguyen, CNP, Nichole Martínez, CNP,  Mitch Li, [facility], Kika German, CNP, Luanne Mota, CNP, Margarita Rueda, CNP, Shabbir Chambers, CNP, Sukumar Cordova, CNP, Raquel Menjivarr, PA-C, Jung Cruz, CNS, Dominga Ugalde, [facility], Atiya Silva, CNP, [place], [facility], [facility], [facility]    Progress Note    10/20/2022    1:31 PM    Name:   Aurelia Hill  MRN:     [id number]     Acct:      [de-identified]   Room:   [address] [place] [address] Day:  11  Admit Date:  10/9/2022  9:05 AM    PCP:   Josh Bradshaw MD  Code Status:  Full Code    Subjective:     C/C:   Chief Complaint   Patient presents with    Altered Mental Status     Interval History Status: improved. Vitals reviewed, afebrile and hemodynamically stable. Saturating well on room air. Previous labs reviewed. Overnight patient had no significant events. On examination patient resting comfortably in bed. No complaints at this time. Discussed case with case management given patient's Humira injections and concerns with SNF for cost.    Brief History:     Mr. Bryant Russo is a 1year-old male with a significant past medical history of hidradenitis suppurativa with extensive lesions to bilateral axilla who presented to the emergency department from longterm due to extensive lower abdominal wounds.  There was initial concern for necrotizing fasciitis. CT pelvis demonstrated fluid collection and general surgery was consulted however did not recommend any intervention. Was started on empiric IV antibiotics and infectious disease was consulted. He was switched to Levaquin and Augmentin per infectious disease. Plan for placement to SNF and Augmentin and Levaquin x2 weeks. Review of Systems:     Constitutional:  negative for chills, fevers, sweats  Respiratory:  negative for cough, dyspnea on exertion, shortness of breath, wheezing  Cardiovascular:  negative for chest pain, chest pressure/discomfort, lower extremity edema, palpitations  Gastrointestinal: Positive for groin wounds, negative for abdominal pain, constipation, diarrhea, nausea, vomiting  Neurological:  negative for dizziness, headache    Medications: Allergies:     Allergies   Allergen Reactions    Depakene [Valproic Acid]     Restoril [Temazepam]     Risperidone And Related      Seizure      Thorazine [Chlorpromazine]     Vancomycin Other (See Comments) and Rash       Current Meds:   Scheduled Meds:    nicotine  1 patch TransDERmal Daily    levoFLOXacin  500 mg Oral Daily    zinc sulfate  50 mg Oral Daily    vitamin D  50,000 Units Oral Weekly    vitamin C  500 mg Oral BID    carBAMazepine  200 mg Oral BID    haloperidol  1 mg Oral BID    sodium chloride flush  5-40 mL IntraVENous 2 times per day    enoxaparin  30 mg SubCUTAneous BID     Continuous Infusions:    sodium chloride       PRN Meds: HYDROcodone 5 mg - acetaminophen **OR** HYDROcodone 5 mg - acetaminophen, mineral oil-hydrophilic petrolatum, sodium chloride flush, sodium chloride, potassium chloride **OR** potassium alternative oral replacement **OR** potassium chloride, magnesium sulfate, ondansetron **OR** ondansetron, polyethylene glycol, acetaminophen **OR** acetaminophen    Data:     Past Medical History:   has a past medical history of Bipolar 1 disorder ([facility] GERD (gastroesophageal reflux disease), Hidradenitis suppurativa, Polysubstance abuse ([facility] and Schizoaffective disorder ([facility] Social History:   reports that he has been smoking cigarettes. He has a 22.00 pack-year smoking history. He has never used smokeless tobacco. He reports current alcohol use. He reports current drug use. Family History:   Family History   Problem Relation Age of Onset    Cancer Mother         breast    High Blood Pressure Maternal Grandfather     Mental Illness Paternal Aunt     Substance Abuse Paternal Uncle     Substance Abuse Brother     Alcohol Abuse Father     Cancer Father         pancrease       Vitals:  /74   Pulse 94   Temp 97.2 °F (36.2 °C) (Oral)   Resp 18   Ht 5' 9\" (1.753 m)   Wt 246 lb 4.1 oz (111.7 kg)   SpO2 97%   BMI 36.37 kg/m²   Temp (24hrs), Av.2 °F (36.8 °C), Min:97.2 °F (36.2 °C), Max:98.8 °F (37.1 °C)    No results for input(s): POCGLU in the last 72 hours. I/O (24Hr): Intake/Output Summary (Last 24 hours) at 10/20/2022 1331  Last data filed at 10/20/2022 1234  Gross per 24 hour   Intake --   Output 800 ml   Net -800 ml         Labs:  Hematology:No results for input(s): WBC, RBC, HGB, HCT, MCV, MCH, MCHC, RDW, PLT, MPV, SEDRATE, CRP, INR, DDIMER, MK3EABPF, LABABSO in the last 72 hours. Invalid input(s): PT  Chemistry:No results for input(s): NA, K, CL, CO2, GLUCOSE, BUN, CREATININE, MG, ANIONGAP, LABGLOM, GFRAA, CALCIUM, CAION, PHOS, PSA, PROBNP, TROPHS, CKTOTAL, CKMB, CKMBINDEX, MYOGLOBIN, DIGOXIN, LACTACIDWB in the last 72 hours. No results for input(s): PROT, LABALBU, LABA1C, T1XLUEY, E6NVQMV, FT4, TSH, AST, ALT, LDH, GGT, ALKPHOS, LABGGT, BILITOT, BILIDIR, AMMONIA, AMYLASE, LIPASE, LACTATE, CHOL, HDL, LDLCHOLESTEROL, CHOLHDLRATIO, TRIG, VLDL, SRU98RQ, PHENYTOIN, PHENYF, URICACID, POCGLU in the last 72 hours.   ABG:No results found for: POCPH, PHART, PH, POCPCO2, CGS8TXD, PCO2, POCPO2, PO2ART, PO2, POCHCO3, PQU6YWQ, HCO3, NBEA, PBEA, BEART, BE, THGBART, THB, CQG4VBW, disease) 10/10/2022 Yes     Plan:        Sepsis secondary to hidradenitis suppurativa. Infectious disease following. Currently on Augmentin and Levaquin x2 weeks. Issues regarding Humira injections and cost while at SNF. Schizoaffective disorder. Evaluated by psychiatry. Remains on Haldol and Tegretol. Immunosuppressed. Patient was taking Humira outpatient per dermatology. Gastroesophageal reflux disease. Stable. DVT prophylaxis: Lovenox 30 mg twice daily. GI prophylaxis: None indicated at this time. Discharge planning: Awaiting SNF approval.  Difficulties due to cost of Humira. [facility] dermatology was contacted and patient must remain on Humira injections as this is the only medication he has improved with.     Yevgeniy Salvage, DO  10/20/2022  1:31 PM

## 2022-10-20 NOTE — PROGRESS NOTES
Physical Therapy        Physical Therapy Cancel Note      DATE: 10/20/2022    NAME: Vasquez Veras  MRN: [id number]   : 1980      Patient not seen this date for Physical Therapy due to:    Patient Declined: pt requesting PT ck back later. Pt educated on importance of therapy and OOB mobility. Pt continued to decline participation.  Ck in PM or 10/21      Electronically signed by Geo Torrez PT on 10/20/2022 at 11:35 AM

## 2022-10-20 NOTE — PROGRESS NOTES
Occupational [address]  Occupational Therapy Not Seen Note    DATE: 10/20/2022    NAME: Mague Infante  MRN: [id number]   : 1980      Patient not seen this date for Occupational Therapy due to:      Pt declines any OOB activity even to edge of bed. Will continue as able.       Electronically signed by ISSAC Louie on 10/20/2022 at 9:15 AM

## 2022-10-20 NOTE — PLAN OF CARE
Problem: Discharge Planning  Goal: Discharge to home or other facility with appropriate resources  Outcome: Progressing     Problem: Safety - Adult  Goal: Free from fall injury  Outcome: Progressing     Problem: Confusion  Goal: Confusion, delirium, dementia, or psychosis is improved or at baseline  Description: INTERVENTIONS:  1. Assess for possible contributors to thought disturbance, including medications, impaired vision or hearing, underlying metabolic abnormalities, dehydration, psychiatric diagnoses, and notify attending LIP  2. [place] high risk fall precautions, as indicated  3. Provide frequent short contacts to provide reality reorientation, refocusing and direction  4. Decrease environmental stimuli, including noise as appropriate  5. Monitor and intervene to maintain adequate nutrition, hydration, elimination, sleep and activity  6. If unable to ensure safety without constant attention obtain sitter and review sitter guidelines with assigned personnel  7. Initiate Psychosocial CNS and Spiritual Care consult, as indicated  Outcome: Progressing     Problem: Skin/Tissue Integrity  Goal: Absence of new skin breakdown  Description: 1. Monitor for areas of redness and/or skin breakdown  2. Assess vascular access sites hourly  3. Every 4-6 hours minimum:  Change oxygen saturation probe site  4. Every 4-6 hours:  If on nasal continuous positive airway pressure, respiratory therapy assess nares and determine need for appliance change or resting period.   Outcome: Progressing     Problem: Pain  Goal: Verbalizes/displays adequate comfort level or baseline comfort level  Outcome: Progressing

## 2022-10-20 NOTE — CARE COORDINATION
Transitional planning note:   [facility] will be able to accept patient if arrangements can be made for humira to be shipped to and administered by patient's physician office [facility] dermatology). Belén Tucker is willing to transport every two weeks via w/c transport to physician office for humira injections    call placed to [facility] dermatology (dr Jayy Acosta) office @ [phone] and spoke with Juanito Ayala. She states the patient's humira script was being filled by [facility] home delivery and their phone number is [phone]. She further states the office is checking to see if they can have the patient's humira injection delivered to their office and administered there. 1500: call placed to [address] [place] [address] with [facility] @ [phone] and left vm message to see if she could possibly assist with this patient by having humira medication shipped to her and then deliver to the facility if the [facility] dermatology clinic cannot accommodate having medication shipped to their office.

## 2022-10-21 PROCEDURE — 6370000000 HC RX 637 (ALT 250 FOR IP): Performed by: NURSE PRACTITIONER

## 2022-10-21 PROCEDURE — 99231 SBSQ HOSP IP/OBS SF/LOW 25: CPT | Performed by: STUDENT IN AN ORGANIZED HEALTH CARE EDUCATION/TRAINING PROGRAM

## 2022-10-21 PROCEDURE — 6360000002 HC RX W HCPCS: Performed by: NURSE PRACTITIONER

## 2022-10-21 PROCEDURE — 2580000003 HC RX 258: Performed by: NURSE PRACTITIONER

## 2022-10-21 PROCEDURE — 6370000000 HC RX 637 (ALT 250 FOR IP): Performed by: STUDENT IN AN ORGANIZED HEALTH CARE EDUCATION/TRAINING PROGRAM

## 2022-10-21 PROCEDURE — 6370000000 HC RX 637 (ALT 250 FOR IP): Performed by: INTERNAL MEDICINE

## 2022-10-21 PROCEDURE — 1200000000 HC SEMI PRIVATE

## 2022-10-21 RX ADMIN — ENOXAPARIN SODIUM 30 MG: 100 INJECTION SUBCUTANEOUS at 09:13

## 2022-10-21 RX ADMIN — OXYCODONE HYDROCHLORIDE AND ACETAMINOPHEN 500 MG: 500 TABLET ORAL at 09:12

## 2022-10-21 RX ADMIN — OXYCODONE HYDROCHLORIDE AND ACETAMINOPHEN 500 MG: 500 TABLET ORAL at 20:54

## 2022-10-21 RX ADMIN — ENOXAPARIN SODIUM 30 MG: 100 INJECTION SUBCUTANEOUS at 20:55

## 2022-10-21 RX ADMIN — CARBAMAZEPINE 200 MG: 200 TABLET ORAL at 20:54

## 2022-10-21 RX ADMIN — SODIUM CHLORIDE, PRESERVATIVE FREE 10 ML: 5 INJECTION INTRAVENOUS at 09:14

## 2022-10-21 RX ADMIN — CARBAMAZEPINE 200 MG: 200 TABLET ORAL at 09:13

## 2022-10-21 RX ADMIN — HALOPERIDOL 1 MG: 1 TABLET ORAL at 09:13

## 2022-10-21 RX ADMIN — LEVOFLOXACIN 500 MG: 500 TABLET, FILM COATED ORAL at 09:13

## 2022-10-21 RX ADMIN — HALOPERIDOL 1 MG: 1 TABLET ORAL at 20:54

## 2022-10-21 RX ADMIN — ZINC SULFATE 220 MG (50 MG) CAPSULE 50 MG: CAPSULE at 09:13

## 2022-10-21 ASSESSMENT — PAIN SCALES - WONG BAKER
WONGBAKER_NUMERICALRESPONSE: 0

## 2022-10-21 ASSESSMENT — PAIN SCALES - GENERAL
PAINLEVEL_OUTOF10: 0
PAINLEVEL_OUTOF10: 0

## 2022-10-21 NOTE — PLAN OF CARE
Problem: Discharge Planning  Goal: Discharge to home or other facility with appropriate resources  Outcome: Progressing     Problem: Safety - Adult  Goal: Free from fall injury  Outcome: Progressing     Problem: Confusion  Goal: Confusion, delirium, dementia, or psychosis is improved or at baseline  Description: INTERVENTIONS:  1. Assess for possible contributors to thought disturbance, including medications, impaired vision or hearing, underlying metabolic abnormalities, dehydration, psychiatric diagnoses, and notify attending LIP  2. [place] high risk fall precautions, as indicated  3. Provide frequent short contacts to provide reality reorientation, refocusing and direction  4. Decrease environmental stimuli, including noise as appropriate  5. Monitor and intervene to maintain adequate nutrition, hydration, elimination, sleep and activity  6. If unable to ensure safety without constant attention obtain sitter and review sitter guidelines with assigned personnel  7. Initiate Psychosocial CNS and Spiritual Care consult, as indicated  Outcome: Progressing     Problem: Skin/Tissue Integrity  Goal: Absence of new skin breakdown  Description: 1. Monitor for areas of redness and/or skin breakdown  2. Assess vascular access sites hourly  3. Every 4-6 hours minimum:  Change oxygen saturation probe site  4. Every 4-6 hours:  If on nasal continuous positive airway pressure, respiratory therapy assess nares and determine need for appliance change or resting period.   Outcome: Progressing     Problem: Pain  Goal: Verbalizes/displays adequate comfort level or baseline comfort level  Outcome: Progressing

## 2022-10-21 NOTE — PROGRESS NOTES
Physical Therapy        Physical Therapy Cancel Note      DATE: 10/21/2022    NAME: Kyler Valdez  MRN: [id number]   : 1980      Patient not seen this date for Physical Therapy due to:    Patient Declined:    Pt refused stating that he didn't feel like doing anything      Electronically signed by Virgene Opitz, PT on 10/21/2022 at 1:18 PM

## 2022-10-21 NOTE — PROGRESS NOTES
Comprehensive Nutrition Assessment    Type and Reason for Visit:  Reassess    Nutrition Recommendations/Plan:   Continue with current diet, monitor/encourage adequate intake of meals/nutrition supplements     Malnutrition Assessment:  Malnutrition Status:  No malnutrition (10/17/22 1445)    Context:  Chronic Illness       Nutrition Assessment:    Pt states appetite has been \"off\". Breakfast tray (2 containers of frosted flakes and milk) untouched. Was drinking the Ensure nutrition supplement at time of visit. Encouraged continued intake of Ensure and meals-pt stated understanding. Nutrition Related Findings:    labs/meds reviewed Wound Type: Open Wounds (bilat inner thighs.)       Current Nutrition Intake & Therapies:    Average Meal Intake: 26-50%  Average Supplements Intake: 51-75%, %  ADULT DIET; Regular  ADULT ORAL NUTRITION SUPPLEMENT; Breakfast, Lunch, Dinner; Standard High Calorie/High Protein Oral Supplement    Anthropometric Measures:  Height: 5' 9\" (175.3 cm)  Ideal Body Weight (IBW): 160 lbs (73 kg)       Current Body Weight: 246 lb (111.6 kg), 153.8 % IBW.  Weight Source: Bed Scale  Current BMI (kg/m2): 36.3                          BMI Categories: Obese Class 2 (BMI 35.0 -39.9)    Estimated Daily Nutrient Needs:  Energy Requirements Based On: Formula  Weight Used for Energy Requirements: Current  Energy (kcal/day): 0658-4579 kcal  Weight Used for Protein Requirements: Adjusted  Protein (g/day): 110-115 g  Method Used for Fluid Requirements: 1 ml/kcal  Fluid (ml/day): 2100 mL or per MD    Nutrition Diagnosis:   Inadequate protein intake related to increase demand for energy/nutrients as evidenced by wounds    Nutrition Interventions:   Food and/or Nutrient Delivery: Continue Current Diet, Continue Oral Nutrition Supplement  Nutrition Education/Counseling: No recommendation at this time  Coordination of Nutrition Care: Continue to monitor while inpatient       Goals:  Previous Goal Met: Progressing toward Goal(s)  Goals: Meet at least 75% of estimated needs, prior to discharge       Nutrition Monitoring and Evaluation:   Behavioral-Environmental Outcomes: None Identified  Food/Nutrient Intake Outcomes: Food and Nutrient Intake, Supplement Intake  Physical Signs/Symptoms Outcomes: Biochemical Data, Nutrition Focused Physical Findings, Skin, Weight, Meal Time Behavior    Discharge Planning:    Continue Oral Nutrition Supplement, Continue current diet     Anai Disla RD, LD  Contact: [phone]

## 2022-10-21 NOTE — PROGRESS NOTES
[facility]  Office: [address], DO, Katie Clements, DO, Jono Ortiz, DO, Damionridez Sorto Blood, DO, Dread Villa MD, Hilary Mosher MD, Carlota Schreiber MD, Keny Fontana MD,  Jigar Munoz MD, Simone Sanchez MD, Daniel Hsu, DO, Malaika Avilez MD,  Geoff Pate MD, Javier Tirado MD, Master Em DO, Radha Hill MD, Katlin Mcgraw MD, Michelle Hudson MD, Omar Valentine MD, Marleen Harada, MD, Dane Terrazas MD, Johnny Holstein, DO, Onesimo Best MD, Tai Stoddard MD, Mariama Berger CNP,  Shayy Morales, CNP, Evelio Pickett, CNP, Fern Sales, CNP,  Druscilla Severe, [facility], Ekta Vela, [facility], Akira Arvizu, [facility], Melchor Nichols, CNP, Lanita Cogan, CNP, Tiffany Gonzales, CNP, Kelvin Almonte PA-C, Maria A Bang, CNS, Mauro Mckeon, [facility], Brook Gee, CNP, Breann Fulton CNP, Teetee Lopez, [address] [place] [address]    Progress Note    10/21/2022    8:42 AM    Name:   Heather Tipton  MRN:     [id number]     Acct:      [de-identified]   Room:   [address] [place] [address] Day:  12  Admit Date:  10/9/2022  9:05 AM    PCP:   Nancy Linton MD  Code Status:  Full Code    Subjective:     C/C:   Chief Complaint   Patient presents with    Altered Mental Status     Interval History Status: improved. Vitals reviewed, afebrile and hemodynamically stable. Saturating well on room air. Previous labs reviewed. Overnight patient had no significant events. On examination patient resting comfortably in bed. No complaints at this time. Discussed case with case management given patient's Humira injections and concerns with SNF for cost.    Brief History:     Mr. Anna aMrie Oconnor is a 1year-old male with a significant past medical history of hidradenitis suppurativa with extensive lesions to bilateral axilla who presented to the emergency department from long-term due to extensive lower abdominal wounds.  There was initial concern for necrotizing fasciitis. CT pelvis demonstrated fluid collection and general surgery was consulted however did not recommend any intervention. Was started on empiric IV antibiotics and infectious disease was consulted. He was switched to Levaquin and Augmentin per infectious disease. Plan for placement to SNF and Augmentin and Levaquin x2 weeks. Review of Systems:     Constitutional:  negative for chills, fevers, sweats  Respiratory:  negative for cough, dyspnea on exertion, shortness of breath, wheezing  Cardiovascular:  negative for chest pain, chest pressure/discomfort, lower extremity edema, palpitations  Gastrointestinal: Positive for groin wounds, negative for abdominal pain, constipation, diarrhea, nausea, vomiting  Neurological:  negative for dizziness, headache    Medications: Allergies:     Allergies   Allergen Reactions    Depakene [Valproic Acid]     Restoril [Temazepam]     Risperidone And Related      Seizure      Thorazine [Chlorpromazine]     Vancomycin Other (See Comments) and Rash       Current Meds:   Scheduled Meds:    nicotine  1 patch TransDERmal Daily    levoFLOXacin  500 mg Oral Daily    zinc sulfate  50 mg Oral Daily    vitamin D  50,000 Units Oral Weekly    vitamin C  500 mg Oral BID    carBAMazepine  200 mg Oral BID    haloperidol  1 mg Oral BID    sodium chloride flush  5-40 mL IntraVENous 2 times per day    enoxaparin  30 mg SubCUTAneous BID     Continuous Infusions:    sodium chloride       PRN Meds: HYDROcodone 5 mg - acetaminophen **OR** HYDROcodone 5 mg - acetaminophen, mineral oil-hydrophilic petrolatum, sodium chloride flush, sodium chloride, potassium chloride **OR** potassium alternative oral replacement **OR** potassium chloride, magnesium sulfate, ondansetron **OR** ondansetron, polyethylene glycol, acetaminophen **OR** acetaminophen    Data:     Past Medical History:   has a past medical history of Bipolar 1 disorder ([facility] [address] GERD (gastroesophageal reflux disease), Hidradenitis suppurativa, Polysubstance abuse ([facility] [address] and Schizoaffective disorder ([facility] [address] Social History:   reports that he has been smoking cigarettes. He has a 22.00 pack-year smoking history. He has never used smokeless tobacco. He reports current alcohol use. He reports current drug use. Family History:   Family History   Problem Relation Age of Onset    Cancer Mother         breast    High Blood Pressure Maternal Grandfather     Mental Illness Paternal Aunt     Substance Abuse Paternal Uncle     Substance Abuse Brother     Alcohol Abuse Father     Cancer Father         pancrease       Vitals:  /61   Pulse 82   Temp 97.7 °F (36.5 °C) (Axillary)   Resp 16   Ht 5' 9\" (1.753 m)   Wt 246 lb 4.1 oz (111.7 kg)   SpO2 96%   BMI 36.37 kg/m²   Temp (24hrs), Av.6 °F (36.4 °C), Min:97.2 °F (36.2 °C), Max:97.9 °F (36.6 °C)    No results for input(s): POCGLU in the last 72 hours. I/O (24Hr): Intake/Output Summary (Last 24 hours) at 10/21/2022 0842  Last data filed at 10/21/2022 0641  Gross per 24 hour   Intake 840 ml   Output 1000 ml   Net -160 ml         Labs:  Hematology:No results for input(s): WBC, RBC, HGB, HCT, MCV, MCH, MCHC, RDW, PLT, MPV, SEDRATE, CRP, INR, DDIMER, YL4JMCVM, LABABSO in the last 72 hours. Invalid input(s): PT  Chemistry:No results for input(s): NA, K, CL, CO2, GLUCOSE, BUN, CREATININE, MG, ANIONGAP, LABGLOM, GFRAA, CALCIUM, CAION, PHOS, PSA, PROBNP, TROPHS, CKTOTAL, CKMB, CKMBINDEX, MYOGLOBIN, DIGOXIN, LACTACIDWB in the last 72 hours. No results for input(s): PROT, LABALBU, LABA1C, J7LYYVD, X2FSBJI, FT4, TSH, AST, ALT, LDH, GGT, ALKPHOS, LABGGT, BILITOT, BILIDIR, AMMONIA, AMYLASE, LIPASE, LACTATE, CHOL, HDL, LDLCHOLESTEROL, CHOLHDLRATIO, TRIG, VLDL, ZRZ74YS, PHENYTOIN, PHENYF, URICACID, POCGLU in the last 72 hours.   ABG:No results found for: POCPH, PHART, PH, POCPCO2, CZN6ZFI, PCO2, POCPO2, PO2ART, PO2, POCHCO3, CBT2LCV, HCO3, NBEA, PBEA, BEART, BE, THGBART, THB, GNS9YQZ, LDKJ6DUH, A5FDRSII, O2SAT, FIO2  Lab Results   Component Value Date/Time    SPECIAL RT HAND 10ML 10/09/2022 09:18 AM    SPECIAL LT AC 20ML 10/09/2022 09:18 AM     Lab Results   Component Value Date/Time    CULTURE PROTEUS MIRABILIS SCANT GROWTH (A) 10/12/2022 12:32 PM    CULTURE ESCHERICHIA COLI SCANT GROWTH (A) 10/12/2022 12:32 PM    CULTURE NORMAL SKIN PRAVEEN LIGHT GROWTH 10/12/2022 12:32 PM       Radiology:  CT PELVIS W CONTRAST Additional Contrast? Radiologist Recommendation    Result Date: 10/13/2022  Fluid collections with thick wall and adjacent soft tissue stranding in the lower anterior abdominal wall and bilateral gluteal soft tissues described above, suspicious for abscesses. Lower anterior abdominal wall fluid collection is slightly increased in size, others are stable. RECOMMENDATIONS:     Physical Examination:        General appearance:  alert, cooperative and no distress  Mental Status:  oriented to person, place and time and normal affect  Lungs:  clear to auscultation bilaterally, normal effort  Heart:  regular rate and rhythm, no murmur  Abdomen:  soft, nontender, nondistended, normal bowel sounds, no masses, hepatomegaly, splenomegaly  Extremities:  no edema, redness, tenderness in the calves  Skin: Multiple wounds present in groin.     Assessment:        Hospital Problems             Last Modified POA    * (Principal) Hidradenitis suppurativa 10/9/2022 Yes    Septicemia ([address] 10/10/2022 Yes    Schizophrenia ([address] 10/10/2022 Yes    Gram-positive bacteremia 10/10/2022 Yes    Cellulitis of right thigh 10/10/2022 Yes    Bandemia 10/10/2022 Yes    Scrotal abscess 10/10/2022 Yes    Immunosuppressed due to chemotherapy ([address] 10/10/2022 Yes    Mild protein-calorie malnutrition ([address] 10/12/2022 Yes    Hypovitaminosis D 10/12/2022 Yes    Gluteal abscess 10/14/2022 Yes    Abscess of skin of abdomen 10/9/2022 Yes    Schizoaffective disorder, bipolar type ([address] 10/10/2022 Yes    GERD (gastroesophageal reflux disease) 10/10/2022 Yes   Plan:        Sepsis secondary to hidradenitis suppurativa, resolved. Infectious disease following. Currently on Augmentin and Levaquin x2 weeks. Issues regarding Humira injections and cost while at SNF. Schizoaffective disorder. Evaluated by psychiatry. Remains on Haldol and Tegretol. Immunosuppressed. Patient was taking Humira outpatient per dermatology. Gastroesophageal reflux disease. Stable. DVT prophylaxis: Lovenox 30 mg twice daily. GI prophylaxis: None indicated at this time. Plan: Awaiting SNF approval. Difficulties due to cost of Humira. [facility] dermatology was contacted and patient must remain on Humira injections as this is the only medication he has improved with.  Case management is attempting to find a way that Humira can be delivered to the facility or given outpatient at dermatology center to avoid cost.    Indra Amaral DO  10/21/2022  8:42 AM

## 2022-10-21 NOTE — PLAN OF CARE
Problem: Discharge Planning  Goal: Discharge to home or other facility with appropriate resources  10/21/2022 1745 by Gerald Mata RN  Outcome: Progressing     Problem: Safety - Adult  Goal: Free from fall injury  10/21/2022 1745 by Gerald Mata RN  Outcome: Progressing     Problem: Confusion  Goal: Confusion, delirium, dementia, or psychosis is improved or at baseline  Description: INTERVENTIONS:  1. Assess for possible contributors to thought disturbance, including medications, impaired vision or hearing, underlying metabolic abnormalities, dehydration, psychiatric diagnoses, and notify attending LIP  2. [place] high risk fall precautions, as indicated  3. Provide frequent short contacts to provide reality reorientation, refocusing and direction  4. Decrease environmental stimuli, including noise as appropriate  5. Monitor and intervene to maintain adequate nutrition, hydration, elimination, sleep and activity  6. If unable to ensure safety without constant attention obtain sitter and review sitter guidelines with assigned personnel  7. Initiate Psychosocial CNS and Spiritual Care consult, as indicated  10/21/2022 1745 by Gerald Mata RN  Outcome: Progressing     Problem: Skin/Tissue Integrity  Goal: Absence of new skin breakdown  Description: 1. Monitor for areas of redness and/or skin breakdown  2. Assess vascular access sites hourly  3. Every 4-6 hours minimum:  Change oxygen saturation probe site  4. Every 4-6 hours:  If on nasal continuous positive airway pressure, respiratory therapy assess nares and determine need for appliance change or resting period.   10/21/2022 1745 by Gerald Mata RN  Outcome: Progressing     Problem: Pain  Goal: Verbalizes/displays adequate comfort level or baseline comfort level  10/21/2022 1745 by Gerald Mata RN  Outcome: Progressing

## 2022-10-21 NOTE — PROGRESS NOTES
Occupational [address] [employer]  Occupational Therapy Not Seen Note    DATE: 10/21/2022    NAME: Jose Wilkinson  MRN: [id number]   : 1980      Patient not seen this date for Occupational Therapy due to:      Pt declined OT session stating he didn't feel like it and wasn't going to get out of bed. Will continue as able.       Electronically signed by ISSAC Stanton on 10/21/2022 at 2:18 PM

## 2022-10-22 PROCEDURE — 6370000000 HC RX 637 (ALT 250 FOR IP): Performed by: STUDENT IN AN ORGANIZED HEALTH CARE EDUCATION/TRAINING PROGRAM

## 2022-10-22 PROCEDURE — 1200000000 HC SEMI PRIVATE

## 2022-10-22 PROCEDURE — 6370000000 HC RX 637 (ALT 250 FOR IP): Performed by: CLINICAL NURSE SPECIALIST

## 2022-10-22 PROCEDURE — 6370000000 HC RX 637 (ALT 250 FOR IP): Performed by: INTERNAL MEDICINE

## 2022-10-22 PROCEDURE — 99232 SBSQ HOSP IP/OBS MODERATE 35: CPT | Performed by: STUDENT IN AN ORGANIZED HEALTH CARE EDUCATION/TRAINING PROGRAM

## 2022-10-22 PROCEDURE — 6370000000 HC RX 637 (ALT 250 FOR IP): Performed by: NURSE PRACTITIONER

## 2022-10-22 RX ADMIN — LEVOFLOXACIN 500 MG: 500 TABLET, FILM COATED ORAL at 13:35

## 2022-10-22 RX ADMIN — OXYCODONE HYDROCHLORIDE AND ACETAMINOPHEN 500 MG: 500 TABLET ORAL at 10:54

## 2022-10-22 RX ADMIN — CARBAMAZEPINE 200 MG: 200 TABLET ORAL at 19:59

## 2022-10-22 RX ADMIN — OXYCODONE HYDROCHLORIDE AND ACETAMINOPHEN 500 MG: 500 TABLET ORAL at 19:59

## 2022-10-22 RX ADMIN — ZINC SULFATE 220 MG (50 MG) CAPSULE 50 MG: CAPSULE at 10:54

## 2022-10-22 RX ADMIN — HYDROCODONE BITARTRATE AND ACETAMINOPHEN 2 TABLET: 5; 325 TABLET ORAL at 15:00

## 2022-10-22 RX ADMIN — HALOPERIDOL 1 MG: 1 TABLET ORAL at 10:54

## 2022-10-22 RX ADMIN — HALOPERIDOL 1 MG: 1 TABLET ORAL at 19:59

## 2022-10-22 RX ADMIN — CARBAMAZEPINE 200 MG: 200 TABLET ORAL at 10:54

## 2022-10-22 ASSESSMENT — PAIN SCALES - GENERAL: PAINLEVEL_OUTOF10: 10

## 2022-10-22 NOTE — PROGRESS NOTES
Physical Therapy        Physical Therapy Cancel Note      DATE: 10/22/2022    NAME: Gael Duval  MRN: [id number]   : 1980      Patient not seen this date for Physical Therapy due to:     Other: pt declines to participate with PT @ this time      Electronically signed by Basil Muro PTA on 10/22/2022 at 11:36 AM

## 2022-10-22 NOTE — PROGRESS NOTES
[facility]  Office: [address], DO, Nahid Stain, DO, Kian Hwang, DO, Laurent Bennieor Blood, DO, Laura See MD, Valeria Estevez MD, Yamil Johnson MD, Nikki Couch MD,  Skyla Kebede MD, Derrick Hernandez MD, Marah Marquez, DO, Shira Allison MD,  Raquel Ansari MD, Carlene Nelson MD, An Valles, DO, Layla Carter MD, Carey Reynolds MD, Gladys Diaz MD, Josefina Adkins MD, Mello Vera MD, Jeanine Escalante MD, Janell Marquez, DO, Alpa Orellana MD, Wesley Jaime MD, [employer], CNP,  Fran Goodell, CNP, Anthony Barron, CNP, Shavonne Hoyos, CNP,  Shahriar Toney, DNP, Sinai Crump, CNP, Lane Pichardo, CNP, Phu Burden, CNP, Chandler Cross, CNP, Monty Paris, CNP, Betty Mao PA-C, Vamsi Britt, CNS, Moy Warner, DNP, Reyna Hannon, CNP, Cooper Anguiano, CNP, Yovany Groves, [address] [place] [address]    Progress Note    10/22/2022    1:40 PM    Name:   Kyler Valdez  MRN:     [id number]     Acct:      [de-identified]   Room:   [address] [place] [address] Day:  13  Admit Date:  10/9/2022  9:05 AM    PCP:   Tristan Johnson MD  Code Status:  Full Code    Subjective:     C/C:   Chief Complaint   Patient presents with    Altered Mental Status     Interval History Status: not changed. Patient examined  No new complaints. Patient is inquiring about discharge plan. I made patient aware that he has not been working with therapy and we need approval for his Humira. Patient states that he cannot walk and he needs wheelchair. I explained to him that he needs to start working with therapy. Vitals have been stable    Brief History:     49-year-old female with known medical history of hidradenitis suppurativa with extensive lesions bilateral axilla presented to the hospital from [facility] due to extensive lower abdominal wounds. Initial concern for necrotizing fasciitis.   Patient has extensive psych history BEART, BE, THGBART, THB, FIB0VQG, UTVP0KSG, V5MCRHZN, O2SAT, FIO2  Lab Results   Component Value Date/Time    SPECIAL RT HAND 10ML 10/09/2022 09:18 AM    SPECIAL LT AC 20ML 10/09/2022 09:18 AM     Lab Results   Component Value Date/Time    CULTURE PROTEUS MIRABILIS SCANT GROWTH (A) 10/12/2022 12:32 PM    CULTURE ESCHERICHIA COLI SCANT GROWTH (A) 10/12/2022 12:32 PM    CULTURE NORMAL SKIN PRAVEEN LIGHT GROWTH 10/12/2022 12:32 PM       Radiology:  CT HEAD WO CONTRAST    Result Date: 10/9/2022  1. No hemorrhage. 2. Insert of uncertain chronicity in the right frontal lobe. Consider further evaluation with MRI. CT PELVIS WO CONTRAST Additional Contrast? None    Result Date: 10/9/2022  Slightly increased chronic appearing infectious/inflammatory cutaneous/subcutaneous process of the anterior pelvis and gluteal region. Recommend correlation with history of hidradenitis suppurativa. CT ABDOMEN PELVIS W IV CONTRAST Additional Contrast? None    Result Date: 10/9/2022  1. Skin thickening of the low anterior abdominal wall, anterior pelvis and visualized gluteal region. Note, the perineum is not visualized on this study. Patient could return for further imaging if clinically indicated. 2. Large volume stool in the rectum.        Physical Examination:        General appearance:  alert, cooperative and obese  Mental Status:  oriented to person, place and time and normal affect  Lungs:  clear to auscultation bilaterally, normal effort  Heart:  regular rate and rhythm, no murmur  Abdomen:  soft, nontender, nondistended, normal bowel sounds, no masses, hepatomegaly, splenomegaly  Extremities:  no edema, redness, tenderness in the calves  Skin: Multiple wounds present in groin    Assessment:        Hospital Problems             Last Modified POA    * (Principal) Hidradenitis suppurativa 10/9/2022 Yes    Septicemia ([facility] [address] 10/10/2022 Yes    Schizophrenia ([facility] [address] 10/10/2022 Yes    Gram-positive bacteremia 10/10/2022 Yes    Cellulitis of right thigh 10/10/2022 Yes    Bandemia 10/10/2022 Yes    Scrotal abscess 10/10/2022 Yes    Immunosuppressed due to chemotherapy ([facility] [address] 10/10/2022 Yes    Mild protein-calorie malnutrition ([address] 10/12/2022 Yes    Hypovitaminosis D 10/12/2022 Yes    Gluteal abscess 10/14/2022 Yes    Abscess of skin of abdomen 10/9/2022 Yes    Schizoaffective disorder, bipolar type ([address] 10/10/2022 Yes    GERD (gastroesophageal reflux disease) 10/10/2022 Yes   Plan:        Sepsis with hidradenitis suppurativa-patient completed Augmentin and Levaquin for 2 weeks. New CT scan reviewed with surgery, no intervention planned.   Continue wound care, patient needs to continue Humira per dermatology, difficult discharge plan given high cost for Humira    Schizoaffective disorder-seen by psychiatry earlier this admission, medication adjustments made, continue Haldol, Tegretol    Leukocytosis-downtrending, check labs tomorrow    Immunosuppressed-received Humira outpatient, currently on hold    Replace vitamin D, continue vitamin C and zinc  Continue supplements for malnutrition  Monitor vitals  Monitor electrolytes and replace as needed    Discharge plan to SNF,pending placement  Encouraged PT, Thai Dos Santos MD  10/22/2022  1:40 PM

## 2022-10-22 NOTE — PROGRESS NOTES
Pt's mother notified of pt's d/c status. She verbalizes understanding and she will be up to pick him up soon. none

## 2022-10-23 LAB
ABSOLUTE EOS #: 0.38 K/UL (ref 0–0.44)
ABSOLUTE IMMATURE GRANULOCYTE: 0.08 K/UL (ref 0–0.3)
ABSOLUTE LYMPH #: 2.47 K/UL (ref 1.1–3.7)
ABSOLUTE MONO #: 1.31 K/UL (ref 0.1–1.2)
ANION GAP SERPL CALCULATED.3IONS-SCNC: 9 MMOL/L (ref 9–17)
BASOPHILS # BLD: 0 % (ref 0–2)
BASOPHILS ABSOLUTE: 0.05 K/UL (ref 0–0.2)
BUN BLDV-MCNC: 11 MG/DL (ref 6–20)
CALCIUM SERPL-MCNC: 9.4 MG/DL (ref 8.6–10.4)
CHLORIDE BLD-SCNC: 98 MMOL/L (ref 98–107)
CO2: 27 MMOL/L (ref 20–31)
CREAT SERPL-MCNC: 0.6 MG/DL (ref 0.7–1.2)
EOSINOPHILS RELATIVE PERCENT: 3 % (ref 1–4)
GFR SERPL CREATININE-BSD FRML MDRD: >60 ML/MIN/1.73M2
GLUCOSE BLD-MCNC: 91 MG/DL (ref 70–99)
HCT VFR BLD CALC: 34 % (ref 40.7–50.3)
HEMOGLOBIN: 10.9 G/DL (ref 13–17)
IMMATURE GRANULOCYTES: 1 %
LYMPHOCYTES # BLD: 19 % (ref 24–43)
MCH RBC QN AUTO: 28.6 PG (ref 25.2–33.5)
MCHC RBC AUTO-ENTMCNC: 32.1 G/DL (ref 28.4–34.8)
MCV RBC AUTO: 89.2 FL (ref 82.6–102.9)
MONOCYTES # BLD: 10 % (ref 3–12)
NRBC AUTOMATED: 0 PER 100 WBC
PDW BLD-RTO: 14.6 % (ref 11.8–14.4)
PLATELET # BLD: 567 K/UL (ref 138–453)
PMV BLD AUTO: 8.1 FL (ref 8.1–13.5)
POTASSIUM SERPL-SCNC: 4.3 MMOL/L (ref 3.7–5.3)
RBC # BLD: 3.81 M/UL (ref 4.21–5.77)
RBC # BLD: ABNORMAL 10*6/UL
SEG NEUTROPHILS: 67 % (ref 36–65)
SEGMENTED NEUTROPHILS ABSOLUTE COUNT: 8.66 K/UL (ref 1.5–8.1)
SODIUM BLD-SCNC: 134 MMOL/L (ref 135–144)
WBC # BLD: 13 K/UL (ref 3.5–11.3)

## 2022-10-23 PROCEDURE — 6370000000 HC RX 637 (ALT 250 FOR IP): Performed by: INTERNAL MEDICINE

## 2022-10-23 PROCEDURE — 36415 COLL VENOUS BLD VENIPUNCTURE: CPT

## 2022-10-23 PROCEDURE — 6370000000 HC RX 637 (ALT 250 FOR IP): Performed by: NURSE PRACTITIONER

## 2022-10-23 PROCEDURE — 6370000000 HC RX 637 (ALT 250 FOR IP): Performed by: CLINICAL NURSE SPECIALIST

## 2022-10-23 PROCEDURE — 85025 COMPLETE CBC W/AUTO DIFF WBC: CPT

## 2022-10-23 PROCEDURE — 80048 BASIC METABOLIC PNL TOTAL CA: CPT

## 2022-10-23 PROCEDURE — 6370000000 HC RX 637 (ALT 250 FOR IP): Performed by: STUDENT IN AN ORGANIZED HEALTH CARE EDUCATION/TRAINING PROGRAM

## 2022-10-23 PROCEDURE — 1200000000 HC SEMI PRIVATE

## 2022-10-23 PROCEDURE — 99231 SBSQ HOSP IP/OBS SF/LOW 25: CPT | Performed by: STUDENT IN AN ORGANIZED HEALTH CARE EDUCATION/TRAINING PROGRAM

## 2022-10-23 RX ADMIN — HALOPERIDOL 1 MG: 1 TABLET ORAL at 08:59

## 2022-10-23 RX ADMIN — CARBAMAZEPINE 200 MG: 200 TABLET ORAL at 08:56

## 2022-10-23 RX ADMIN — ACETAMINOPHEN 650 MG: 325 TABLET ORAL at 08:56

## 2022-10-23 RX ADMIN — HALOPERIDOL 1 MG: 1 TABLET ORAL at 20:07

## 2022-10-23 RX ADMIN — ZINC SULFATE 220 MG (50 MG) CAPSULE 50 MG: CAPSULE at 08:56

## 2022-10-23 RX ADMIN — HYDROCODONE BITARTRATE AND ACETAMINOPHEN 2 TABLET: 5; 325 TABLET ORAL at 14:41

## 2022-10-23 RX ADMIN — OXYCODONE HYDROCHLORIDE AND ACETAMINOPHEN 500 MG: 500 TABLET ORAL at 20:07

## 2022-10-23 RX ADMIN — CARBAMAZEPINE 200 MG: 200 TABLET ORAL at 20:07

## 2022-10-23 RX ADMIN — OXYCODONE HYDROCHLORIDE AND ACETAMINOPHEN 500 MG: 500 TABLET ORAL at 08:56

## 2022-10-23 ASSESSMENT — PAIN SCALES - GENERAL: PAINLEVEL_OUTOF10: 7

## 2022-10-23 NOTE — PLAN OF CARE
Problem: Pain  Goal: Verbalizes/displays adequate comfort level or baseline comfort level  10/23/2022 0321 by Isidra Connolly RN  Outcome: Progressing

## 2022-10-23 NOTE — PLAN OF CARE
Problem: Discharge Planning  Goal: Discharge to home or other facility with appropriate resources  Outcome: Progressing     Problem: Safety - Adult  Goal: Free from fall injury  Outcome: Progressing     Problem: Confusion  Goal: Confusion, delirium, dementia, or psychosis is improved or at baseline  Description: INTERVENTIONS:  1. Assess for possible contributors to thought disturbance, including medications, impaired vision or hearing, underlying metabolic abnormalities, dehydration, psychiatric diagnoses, and notify attending LIP  2. [place] high risk fall precautions, as indicated  3. Provide frequent short contacts to provide reality reorientation, refocusing and direction  4. Decrease environmental stimuli, including noise as appropriate  5. Monitor and intervene to maintain adequate nutrition, hydration, elimination, sleep and activity  6. If unable to ensure safety without constant attention obtain sitter and review sitter guidelines with assigned personnel  7. Initiate Psychosocial CNS and Spiritual Care consult, as indicated  Outcome: Progressing     Problem: Skin/Tissue Integrity  Goal: Absence of new skin breakdown  Description: 1. Monitor for areas of redness and/or skin breakdown  2. Assess vascular access sites hourly  3. Every 4-6 hours minimum:  Change oxygen saturation probe site  4. Every 4-6 hours:  If on nasal continuous positive airway pressure, respiratory therapy assess nares and determine need for appliance change or resting period.   Outcome: Progressing     Problem: Pain  Goal: Verbalizes/displays adequate comfort level or baseline comfort level  10/23/2022 1546 by Ciaran Patrick RN  Outcome: Progressing  10/23/2022 0321 by Frank Camejo RN  Outcome: Progressing

## 2022-10-23 NOTE — PROGRESS NOTES
[facility]  Office: [address], DO, Ashlyn Belle, DO, Morenita Pepper, DO, Wes Quinn Blood, DO, Casandra Hairston MD, Georges Ahmadi MD, Jordan Ayala MD, Anastasia Flowers MD,  Pedro Phillips MD, Jackie Lopez MD, Walter Marc DO, Wen Leung MD,  Ana M Rivera MD, Shahnaz Cuello MD, Az Johnson DO, Min Devine MD, Carol Avila MD, Power Solis MD, Carla Garcia MD, Butch Trent MD, Nadeen Barney MD, Laura Reyes DO, Anoop Barnes MD, Jason Dillard MD, Jonatan Gabriel, CNP,  Tammy Ashford, CNP, Ezequiel Chapin, CNP, Roosevelt Lau, CNP,  Neda Machado, DNP, Ela Hidalgo, CNP, Darin Torrez, CNP, Roman Mcclendon, CNP, Merna Singleton, CNP, Kwasi Stinson, CNP, Tessa Garcia, PA-C, Keren Pyle, CNS, Alpa Quiroga, [facility], Slick Swain, CNP, Aziza Villafana, CNP, Nikki Ramos, [address] [place] [address]    Progress Note    10/23/2022    2:05 PM    Name:   Cailin Siddiqui  MRN:     [id number]     Acct:      [de-identified]   Room:   [address] [place] [address] Day:  15  Admit Date:  10/9/2022  9:05 AM    PCP:   Gordon Myles MD  Code Status:  Full Code    Subjective:     C/C:   Chief Complaint   Patient presents with    Altered Mental Status     Interval History Status: not changed. Patient examined  No new changes  Await placement    Brief History:     78-year-old female with known medical history of hidradenitis suppurativa with extensive lesions bilateral axilla presented to the hospital from assisted due to extensive lower abdominal wounds. Initial concern for necrotizing fasciitis. Patient has extensive psych history and on antipsychotic medications. Patient was getting Humira but was stopped as he went to the assisted. Patient was evaluated by surgery and infectious disease. CT abdomen had some fluid collection but surgery did not recommend any intervention.   Initially was on IV antibiotics, then switched to Levaquin and Augmentin per infectious disease. Culture showed normal skin blake. Review of Systems:     Constitutional:  negative for chills, fevers, sweats  Respiratory:  negative for cough, dyspnea on exertion, shortness of breath, wheezing  Cardiovascular:  negative for chest pain, chest pressure/discomfort, lower extremity edema, palpitations  Gastrointestinal:  negative for abdominal pain, constipation, diarrhea, nausea, vomiting  Neurological:  negative for dizziness, headache  Multiple wounds in the groin  Old scar marks on axilla  Medications: Allergies: Allergies   Allergen Reactions    Depakene [Valproic Acid]     Restoril [Temazepam]     Risperidone And Related      Seizure      Thorazine [Chlorpromazine]     Vancomycin Other (See Comments) and Rash       Current Meds:   Scheduled Meds:    nicotine  1 patch TransDERmal Daily    zinc sulfate  50 mg Oral Daily    vitamin D  50,000 Units Oral Weekly    vitamin C  500 mg Oral BID    carBAMazepine  200 mg Oral BID    haloperidol  1 mg Oral BID    sodium chloride flush  5-40 mL IntraVENous 2 times per day    enoxaparin  30 mg SubCUTAneous BID     Continuous Infusions:    sodium chloride       PRN Meds: HYDROcodone 5 mg - acetaminophen **OR** HYDROcodone 5 mg - acetaminophen, mineral oil-hydrophilic petrolatum, sodium chloride flush, sodium chloride, potassium chloride **OR** potassium alternative oral replacement **OR** potassium chloride, magnesium sulfate, ondansetron **OR** ondansetron, polyethylene glycol, acetaminophen **OR** acetaminophen    Data:     Past Medical History:   has a past medical history of Bipolar 1 disorder ([facility] GERD (gastroesophageal reflux disease), Hidradenitis suppurativa, [facility] and Schizoaffective disorder ([facility] Social History:   reports that he has been smoking cigarettes. He has a 22.00 pack-year smoking history.  He has never used smokeless tobacco. He reports current alcohol use. He reports current drug use. Family History:   Family History   Problem Relation Age of Onset    Cancer Mother         breast    High Blood Pressure Maternal Grandfather     Mental Illness Paternal Aunt     Substance Abuse Paternal Uncle     Substance Abuse Brother     Alcohol Abuse Father     Cancer Father         pancrease       Vitals:  /64   Pulse 96   Temp 97.7 °F (36.5 °C) (Oral)   Resp 16   Ht 5' 9\" (1.753 m)   Wt 246 lb 4.1 oz (111.7 kg)   SpO2 97%   BMI 36.37 kg/m²   Temp (24hrs), Av.9 °F (36.6 °C), Min:97.7 °F (36.5 °C), Max:98.1 °F (36.7 °C)    No results for input(s): POCGLU in the last 72 hours. I/O (24Hr): Intake/Output Summary (Last 24 hours) at 10/23/2022 1405  Last data filed at 10/23/2022 0046  Gross per 24 hour   Intake --   Output 1325 ml   Net -1325 ml         Labs:  Hematology:  Recent Labs     10/23/22  0553   WBC 13.0*   RBC 3.81*   HGB 10.9*   HCT 34.0*   MCV 89.2   MCH 28.6   MCHC 32.1   RDW 14.6*   *   MPV 8.1       Chemistry:  Recent Labs     10/23/22  0553   *   K 4.3   CL 98   CO2 27   GLUCOSE 91   BUN 11   CREATININE 0.60*   ANIONGAP 9   LABGLOM >60   CALCIUM 9.4       No results for input(s): PROT, LABALBU, LABA1C, V2BURWM, S1XZNPC, FT4, TSH, AST, ALT, LDH, GGT, ALKPHOS, LABGGT, BILITOT, BILIDIR, AMMONIA, AMYLASE, LIPASE, LACTATE, CHOL, HDL, LDLCHOLESTEROL, CHOLHDLRATIO, TRIG, VLDL, NAU44GP, PHENYTOIN, PHENYF, URICACID, POCGLU in the last 72 hours.     ABG:No results found for: POCPH, PHART, PH, POCPCO2, ZFL8RBI, PCO2, POCPO2, PO2ART, PO2, POCHCO3, SUV6ZZL, HCO3, NBEA, PBEA, BEART, BE, THGBART, THB, WLD3DTA, QBPN4FIL, T9EENHSP, O2SAT, FIO2  Lab Results   Component Value Date/Time    SPECIAL RT HAND 10ML 10/09/2022 09:18 AM    SPECIAL LT AC 20ML 10/09/2022 09:18 AM     Lab Results   Component Value Date/Time    CULTURE PROTEUS MIRABILIS SCANT GROWTH (A) 10/12/2022 12:32 PM    CULTURE ESCHERICHIA COLI SCANT GROWTH (A) 10/12/2022 12:32 PM CULTURE NORMAL SKIN PRAVEEN LIGHT GROWTH 10/12/2022 12:32 PM       Radiology:  CT HEAD WO CONTRAST    Result Date: 10/9/2022  1. No hemorrhage. 2. Insert of uncertain chronicity in the right frontal lobe. Consider further evaluation with MRI. CT PELVIS WO CONTRAST Additional Contrast? None    Result Date: 10/9/2022  Slightly increased chronic appearing infectious/inflammatory cutaneous/subcutaneous process of the anterior pelvis and gluteal region. Recommend correlation with history of hidradenitis suppurativa. CT ABDOMEN PELVIS W IV CONTRAST Additional Contrast? None    Result Date: 10/9/2022  1. Skin thickening of the low anterior abdominal wall, anterior pelvis and visualized gluteal region. Note, the perineum is not visualized on this study. Patient could return for further imaging if clinically indicated. 2. Large volume stool in the rectum.        Physical Examination:        General appearance:  alert, cooperative and obese  Mental Status:  oriented to person, place and time and normal affect  Lungs:  clear to auscultation bilaterally, normal effort  Heart:  regular rate and rhythm, no murmur  Abdomen:  soft, nontender, nondistended, normal bowel sounds, no masses, hepatomegaly, splenomegaly  Extremities:  no edema, redness, tenderness in the calves  Skin: Multiple wounds present in groin    Assessment:        Hospital Problems             Last Modified POA    * (Principal) Hidradenitis suppurativa 10/9/2022 Yes    Septicemia ([address] 10/10/2022 Yes    Schizophrenia ([address] 10/10/2022 Yes    Gram-positive bacteremia 10/10/2022 Yes    Cellulitis of right thigh 10/10/2022 Yes    Bandemia 10/10/2022 Yes    Scrotal abscess 10/10/2022 Yes    Immunosuppressed due to chemotherapy ([address] 10/10/2022 Yes    Mild protein-calorie malnutrition ([address] 10/12/2022 Yes    Hypovitaminosis D 10/12/2022 Yes    Gluteal abscess 10/14/2022 Yes    Abscess of skin of abdomen 10/9/2022 Yes    [facility]ffective disorder, [facility] 10/10/2022 Yes    GERD (gastroesophageal reflux disease) 10/10/2022 Yes   Plan:        Sepsis with hidradenitis suppurativa-patient completed Augmentin and Levaquin for 2 weeks. New CT scan reviewed with surgery, no intervention planned.   Continue wound care, patient needs to continue Humira per dermatology, difficult discharge plan given high cost for Humira    Schizoaffective disorder-seen by psychiatry earlier this admission, medication adjustments made, continue Haldol, Tegretol    Leukocytosis-downtrending, check labs tomorrow    Immunosuppressed-received Humira outpatient, currently on hold    Replace vitamin D, continue vitamin C and zinc  Continue supplements for malnutrition  Monitor vitals  Monitor electrolytes and replace as needed    Discharge plan to SNF,pending placement  Encouraged PT, Ashlie Mac MD  10/23/2022  2:05 PM

## 2022-10-24 LAB
ABSOLUTE EOS #: 0.38 K/UL (ref 0–0.44)
ABSOLUTE IMMATURE GRANULOCYTE: 0.08 K/UL (ref 0–0.3)
ABSOLUTE LYMPH #: 2.34 K/UL (ref 1.1–3.7)
ABSOLUTE MONO #: 1.36 K/UL (ref 0.1–1.2)
BASOPHILS # BLD: 0 % (ref 0–2)
BASOPHILS ABSOLUTE: 0.06 K/UL (ref 0–0.2)
EOSINOPHILS RELATIVE PERCENT: 3 % (ref 1–4)
HCT VFR BLD CALC: 34.7 % (ref 40.7–50.3)
HEMOGLOBIN: 10.6 G/DL (ref 13–17)
IMMATURE GRANULOCYTES: 1 %
LYMPHOCYTES # BLD: 17 % (ref 24–43)
MCH RBC QN AUTO: 28.7 PG (ref 25.2–33.5)
MCHC RBC AUTO-ENTMCNC: 30.5 G/DL (ref 28.4–34.8)
MCV RBC AUTO: 94 FL (ref 82.6–102.9)
MONOCYTES # BLD: 10 % (ref 3–12)
NRBC AUTOMATED: 0 PER 100 WBC
PDW BLD-RTO: 14.8 % (ref 11.8–14.4)
PLATELET # BLD: 565 K/UL (ref 138–453)
PMV BLD AUTO: 8.3 FL (ref 8.1–13.5)
RBC # BLD: 3.69 M/UL (ref 4.21–5.77)
RBC # BLD: ABNORMAL 10*6/UL
SEG NEUTROPHILS: 69 % (ref 36–65)
SEGMENTED NEUTROPHILS ABSOLUTE COUNT: 9.25 K/UL (ref 1.5–8.1)
WBC # BLD: 13.5 K/UL (ref 3.5–11.3)

## 2022-10-24 PROCEDURE — 85025 COMPLETE CBC W/AUTO DIFF WBC: CPT

## 2022-10-24 PROCEDURE — 6370000000 HC RX 637 (ALT 250 FOR IP): Performed by: STUDENT IN AN ORGANIZED HEALTH CARE EDUCATION/TRAINING PROGRAM

## 2022-10-24 PROCEDURE — 1200000000 HC SEMI PRIVATE

## 2022-10-24 PROCEDURE — 97110 THERAPEUTIC EXERCISES: CPT

## 2022-10-24 PROCEDURE — 6370000000 HC RX 637 (ALT 250 FOR IP): Performed by: INTERNAL MEDICINE

## 2022-10-24 PROCEDURE — 36415 COLL VENOUS BLD VENIPUNCTURE: CPT

## 2022-10-24 PROCEDURE — 6360000002 HC RX W HCPCS: Performed by: NURSE PRACTITIONER

## 2022-10-24 PROCEDURE — 99231 SBSQ HOSP IP/OBS SF/LOW 25: CPT | Performed by: STUDENT IN AN ORGANIZED HEALTH CARE EDUCATION/TRAINING PROGRAM

## 2022-10-24 PROCEDURE — 6370000000 HC RX 637 (ALT 250 FOR IP): Performed by: NURSE PRACTITIONER

## 2022-10-24 RX ADMIN — HALOPERIDOL 1 MG: 1 TABLET ORAL at 20:08

## 2022-10-24 RX ADMIN — CARBAMAZEPINE 200 MG: 200 TABLET ORAL at 20:08

## 2022-10-24 RX ADMIN — HALOPERIDOL 1 MG: 1 TABLET ORAL at 08:06

## 2022-10-24 RX ADMIN — OXYCODONE HYDROCHLORIDE AND ACETAMINOPHEN 500 MG: 500 TABLET ORAL at 08:05

## 2022-10-24 RX ADMIN — OXYCODONE HYDROCHLORIDE AND ACETAMINOPHEN 500 MG: 500 TABLET ORAL at 20:08

## 2022-10-24 RX ADMIN — CARBAMAZEPINE 200 MG: 200 TABLET ORAL at 08:05

## 2022-10-24 RX ADMIN — ENOXAPARIN SODIUM 30 MG: 100 INJECTION SUBCUTANEOUS at 20:09

## 2022-10-24 RX ADMIN — ZINC SULFATE 220 MG (50 MG) CAPSULE 50 MG: CAPSULE at 08:05

## 2022-10-24 ASSESSMENT — PAIN SCALES - WONG BAKER
WONGBAKER_NUMERICALRESPONSE: 0

## 2022-10-24 ASSESSMENT — PAIN SCALES - GENERAL
PAINLEVEL_OUTOF10: 3
PAINLEVEL_OUTOF10: 4

## 2022-10-24 NOTE — PROGRESS NOTES
Occupational [address]  Occupational Therapy Not Seen Note    DATE: 10/24/2022    NAME: Esther Boss  MRN: [id number]   : 1980      Patient not seen this date for Occupational Therapy due to:    Patient Declined: Pt declining to work with OT at this time, pt provided max encouragement and educated on the importance of OOB activity for pt health, pt continuing to decline. \"I can just leave this hospital at any time\" as pt was referring to potentially going [facility]. Next Scheduled Treatment: Attempt on 10/25 as appropriate.     Electronically signed by Erik Byrne OT on 10/24/2022 at 11:17 AM RN cannot approve Refill Request    RN can NOT refill this medication No established PCP. Last office visit: 9/23/2020 Rusty Rojas MD Last Physical: Visit date not found Last MTM visit: Visit date not found Last visit same specialty: 9/23/2020 Rusty Rojas MD.  Next visit within 3 mo: Visit date not found  Next physical within 3 mo: Visit date not found      Arlen Akins, [facility] Triage/Med Refill 2/13/2021    Requested Prescriptions   Pending Prescriptions Disp Refills     desonide (DESOWEN) 0.05 % cream 60 g 1     Sig: Apply to skin as needed       There is no refill protocol information for this order

## 2022-10-24 NOTE — PROGRESS NOTES
Physical Therapy  Facility/Department: [facility] RENAL//MED SURG  Physical Therapy     Name: Kyler Valdez  : 1980  MRN: [id number]  Date of Service: 10/24/2022    Discharge Recommendations:  Patient would benefit from continued therapy after discharge      Patient Diagnosis(es): The primary encounter diagnosis was Septicemia [facility]). Diagnoses of Cellulitis of right thigh and Hidradenitis were also pertinent to this visit. Past Medical History:  has a past medical history of Bipolar 1 disorder ([facility] GERD (gastroesophageal reflux disease), Hidradenitis suppurativa, Polysubstance abuse ([facility] and Schizoaffective disorder ([facility] Past Surgical History:  has a past surgical history that includes Infected skin debridement (2011) and Abscess Drainage (2013). Assessment   Body Structures, Functions, Activity Limitations Requiring Skilled Therapeutic Intervention: Decreased functional mobility ; Decreased strength;Decreased endurance;Decreased cognition;Decreased safe awareness;Decreased balance; Increased pain  Assessment: pt isaac ther ex to increase AROM & increase strength to isaac mobility. pt could benefit from cont therapies to improve his dynamic funcitonal mobility. Requires PT Follow-Up: Yes  Activity Tolerance  Activity Tolerance: Patient limited by endurance; Patient limited by fatigue  Activity Tolerance Comments: pt defers mobilility. pt states \" I'm cool \" when asked if he will get OOB.      Plan   Physcial Therapy Plan  General Plan:  (5-6x/week)  Current Treatment Recommendations: Strengthening, Functional mobility training, Transfer training, Endurance training, Stair training, Gait training, Safety education & training, Balance training, Therapeutic activities, Equipment evaluation, education, & procurement, Home exercise program, Patient/Caregiver education & training  Safety Devices  Type of Devices: Left in bed, Call light within reach, Patient at risk for falls, Nurse notified (telesitter in room)  Restraints  Restraints Initially in Place: No     Restrictions  Restrictions/Precautions  Restrictions/Precautions: Fall Risk  Required Braces or Orthoses?: No  Position Activity Restriction  Other position/activity restrictions: up with assist     Subjective   General  Chart Reviewed: Yes  Response To Previous Treatment: Patient with no complaints from previous session. Family / Caregiver Present: No  Follows Commands: Impaired  General Comment  Comments: pt mumbles, unable to understand what he is saying  Subjective  Subjective: pt attempted ot decline PT however agreeable to ther ex in supine. Social/Functional History  Social/Functional History  Lives With: Other (comment) (Was living with mother.)  Type of Home: House  Home Layout: Two level, Bed/Bath upstairs  Home Access: Stairs to enter without rails  Entrance Stairs - Number of Steps: 4  Bathroom Shower/Tub: Tub/Shower unit  Bathroom Toilet: Standard  Bathroom Accessibility: Accessible  Home Equipment:  (pt reported no use of DME atb aseline)  Receives Help From: Family  ADL Assistance: Needs assistance  Bath: Dependent/Total  Dressing: Maximum assistance  Grooming: Moderate assistance  Feeding: Minimal assistance  Toileting: Needs assistance  Homemaking Assistance: Needs assistance  Meal Prep: Total  Laundry: Total  Vacuuming: Total  Cleaning: Total  Shopping: Total  Homemaking Responsibilities: Yes  Meal Prep Responsibility: Secondary  Laundry Responsibility: Secondary  Cleaning Responsibility: Secondary  Ambulation Assistance: Needs assistance  Transfer Assistance: Needs assistance  Active : No  Patient's  Info: mother provides transportation  Occupation: On disability    Cognition   Orientation  Overall Orientation Status: oriented to person & place    Objective      Exercise Treatment: bilat UE AROM 5-10 reps of: shoulder flexion, shoulder horizontal abd/add, shoulder add/abd, elbow flex/ext.  pt isaac bilat LE AROM 10 reps of: heel slide, hip abd/add, SLR, PF/DF while in spine. Pt isaac ther ex to increase strength & endurance to facilitate mobility  AM-PAC Score  AM-PAC Inpatient Mobility Raw Score : 15 (10/24/22 1305)  AM-PAC Inpatient T-Scale Score : 39.45 (10/24/22 1305)  Mobility Inpatient CMS 0-100% Score: 57.7 (10/24/22 1305)  Mobility Inpatient CMS G-Code Modifier : CK (10/24/22 1305)     Goals  Short Term Goals  Time Frame for Short Term Goals: 10 visits  Short Term Goal 1: transfers with SBA  Short Term Goal 2: amb 125 ft with a RW x SBA  Short Term Goal 3: ascend/descend 4 steps with SBA  Short Term Goal 4: 20 min strengthening exercise program x SBA  Additional Goals?: No  Patient Goals   Patient Goals : Return home     Education  Patient Education  Education Given To: Patient  Education Provided: Plan of Care  Education Provided Comments: pt educated on the importance of increasing OOB activity.   Education Outcome: Continued education needed;Verbalized understanding      Therapy Time   Individual Concurrent Group Co-treatment   Time In  540         Time Out  908         Minutes  Amarjit 79, PTA

## 2022-10-24 NOTE — CARE COORDINATION
Transitional planning note: plan is [facility] term care if Humira injections can be arranged at [facility] dermatology clinic. [facility] previously confirmed that they would administer medication in office and jovana verma willing to tranport to office for medication. Awaiting response from [facility] derm office to see if they can also have humira delivered to them. 1055: Call placed to [facility] @ [phone] and left message for Dora requesting follow up regarding if office is able to have patient's humira shipped to them. 1500: no response yet from [facility] dermatology clinic. [facility] to see if the patient's medications could be shipped to their facility from [facility] and patient could go to [address] for injections. Also inquired if patient's mother is willing to have humira shipped to her home again and then deliver to [facility] if that would be an option. Jos contacting corporate to inquire about above.

## 2022-10-24 NOTE — PLAN OF CARE
Problem: Confusion  Goal: Confusion, delirium, dementia, or psychosis is improved or at baseline  Description: INTERVENTIONS:  1. Assess for possible contributors to thought disturbance, including medications, impaired vision or hearing, underlying metabolic abnormalities, dehydration, psychiatric diagnoses, and notify attending LIP  2. [place] high risk fall precautions, as indicated  3. Provide frequent short contacts to provide reality reorientation, refocusing and direction  4. Decrease environmental stimuli, including noise as appropriate  5. Monitor and intervene to maintain adequate nutrition, hydration, elimination, sleep and activity  6. If unable to ensure safety without constant attention obtain sitter and review sitter guidelines with assigned personnel  7.  Initiate Psychosocial CNS and Spiritual Care consult, as indicated  10/24/2022 3903 by Kyle Flores RN  Outcome: Progressing

## 2022-10-24 NOTE — PROGRESS NOTES
[facility]  Office: [address], DO, Adalgisa Daniel, DO, Trip Li, DO, Calderon [facility] Blood, DO, Shoshana Soto MD, Georgina Carrasco MD, Apolinar Martinez MD, Norberto Weller MD,  Mathew Duggan MD, Cinthia Beltre MD, Shelly Duarte DO, Sharon Bacon MD,  Roxanna Dewitt MD, Alethea Carrasco MD, Miguel Hilario, DO, Maxim Tang MD, Yosi Bragg MD, Kim Hernandez MD, Jay Soliman MD, Marivel Tiwari MD, Linden Calzada MD, Swapnil Nunez, DO, Jane Anguiano MD, Vanesa Ramos MD, Patricio Robles, CNP,  Ana Barbour, CNP, Dahlia Cho, CNP, Rafa Schilling, CNP,  Burnett Osgood, DNP, Jennifer Capellan, CNP, [place] Safe, CNP, Holley Warren, CNP, Carson Giraldo, CNP, Dottie De Santiago, [facility], Antoinette Elizondo PA-C, Gabriela Morel, CNS, Darwin Scott, DNP, Codie York, CNP, Shahram French, CNP, [facility], [address] [place] [address]    Progress Note    10/24/2022    11:15 AM    Name:   Tawanda Parra  MRN:     [id number]     Acct:      [de-identified]   Room:   [address] [place] [address] Day:  15  Admit Date:  10/9/2022  9:05 AM    PCP:   Eliud Bar MD  Code Status:  Full Code    Subjective:     C/C:   Chief Complaint   Patient presents with    Altered Mental Status     Interval History Status: not changed. NO NEW COMPLAINTS  Patient wants to go  Allowing dressing changes  Made him aware that we are still waiting on placement    Brief History:     57-year-old female with known medical history of hidradenitis suppurativa with extensive lesions bilateral axilla presented to the hospital from [facility] due to extensive lower abdominal wounds. Initial concern for necrotizing fasciitis. Patient has extensive psych history and on antipsychotic medications. Patient was getting Humira but was stopped as he went to the [facility]. Patient was evaluated by surgery and infectious disease.   CT abdomen had some fluid collection but surgery did not recommend any intervention. Initially was on IV antibiotics, then switched to Levaquin and Augmentin per infectious disease. Culture showed normal skin blake. Review of Systems:     Constitutional:  negative for chills, fevers, sweats  Respiratory:  negative for cough, dyspnea on exertion, shortness of breath, wheezing  Cardiovascular:  negative for chest pain, chest pressure/discomfort, lower extremity edema, palpitations  Gastrointestinal:  negative for abdominal pain, constipation, diarrhea, nausea, vomiting  Neurological:  negative for dizziness, headache  Multiple wounds in the groin  Old scar marks on axilla  Medications: Allergies: Allergies   Allergen Reactions    Depakene [Valproic Acid]     Restoril [Temazepam]     Risperidone And Related      Seizure      Thorazine [Chlorpromazine]     Vancomycin Other (See Comments) and Rash       Current Meds:   Scheduled Meds:    nicotine  1 patch TransDERmal Daily    zinc sulfate  50 mg Oral Daily    vitamin D  50,000 Units Oral Weekly    vitamin C  500 mg Oral BID    carBAMazepine  200 mg Oral BID    haloperidol  1 mg Oral BID    sodium chloride flush  5-40 mL IntraVENous 2 times per day    enoxaparin  30 mg SubCUTAneous BID     Continuous Infusions:    sodium chloride       PRN Meds: HYDROcodone 5 mg - acetaminophen **OR** HYDROcodone 5 mg - acetaminophen, mineral oil-hydrophilic petrolatum, sodium chloride flush, sodium chloride, potassium chloride **OR** potassium alternative oral replacement **OR** potassium chloride, magnesium sulfate, ondansetron **OR** ondansetron, polyethylene glycol, acetaminophen **OR** acetaminophen    Data:     Past Medical History:   has a past medical history of Bipolar [facility] disorder ([facility] GERD (gastroesophageal reflux disease), Hidradenitis suppurativa, Polysubstance abuse ([facility] and Schizoaffective disorder ([facility] Social History:   reports that he has been smoking cigarettes.  He has a 22.00 pack-year smoking history. He has never used smokeless tobacco. He reports current alcohol use. He reports current drug use. Family History:   Family History   Problem Relation Age of Onset    Cancer Mother         breast    High Blood Pressure Maternal Grandfather     Mental Illness Paternal Aunt     Substance Abuse Paternal Uncle     Substance Abuse Brother     Alcohol Abuse Father     Cancer Father         pancrease       Vitals:  /71   Pulse 89   Temp 97.9 °F (36.6 °C)   Resp 16   Ht 5' 9\" (1.753 m)   Wt 246 lb 4.1 oz (111.7 kg)   SpO2 94%   BMI 36.37 kg/m²   Temp (24hrs), Av °F (36.7 °C), Min:97.9 °F (36.6 °C), Max:98.1 °F (36.7 °C)    No results for input(s): POCGLU in the last 72 hours. I/O (24Hr): Intake/Output Summary (Last 24 hours) at 10/24/2022 1115  Last data filed at 10/24/2022 0433  Gross per 24 hour   Intake --   Output 200 ml   Net -200 ml         Labs:  Hematology:  Recent Labs     10/23/22  0553 10/24/22  0500   WBC 13.0* 13.5*   RBC 3.81* 3.69*   HGB 10.9* 10.6*   HCT 34.0* 34.7*   MCV 89.2 94.0   MCH 28.6 28.7   MCHC 32.1 30.5   RDW 14.6* 14.8*   * 565*   MPV 8.1 8.3       Chemistry:  Recent Labs     10/23/22  0553   *   K 4.3   CL 98   CO2 27   GLUCOSE 91   BUN 11   CREATININE 0.60*   ANIONGAP 9   LABGLOM >60   CALCIUM 9.4       No results for input(s): PROT, LABALBU, LABA1C, A6TCZGQ, H7EHTGK, FT4, TSH, AST, ALT, LDH, GGT, ALKPHOS, LABGGT, BILITOT, BILIDIR, AMMONIA, AMYLASE, LIPASE, LACTATE, CHOL, HDL, LDLCHOLESTEROL, CHOLHDLRATIO, TRIG, VLDL, TNU27IS, PHENYTOIN, PHENYF, URICACID, POCGLU in the last 72 hours.     ABG:No results found for: POCPH, PHART, PH, POCPCO2, JXA4SNX, PCO2, POCPO2, PO2ART, PO2, POCHCO3, SRF9BMB, HCO3, NBEA, PBEA, BEART, BE, THGBART, THB, HZY0JXL, KQQQ3KXJ, A0WACBRP, O2SAT, FIO2  Lab Results   Component Value Date/Time    SPECIAL RT HAND 10ML 10/09/2022 09:18 AM    SPECIAL LT AC 20ML 10/09/2022 09:18 AM     Lab Results   Component Value Date/Time CULTURE PROTEUS MIRABILIS SCANT GROWTH (A) 10/12/2022 12:32 PM    CULTURE ESCHERICHIA COLI SCANT GROWTH (A) 10/12/2022 12:32 PM    CULTURE NORMAL SKIN PRAVEEN LIGHT GROWTH 10/12/2022 12:32 PM       Radiology:  CT HEAD WO CONTRAST    Result Date: 10/9/2022  1. No hemorrhage. 2. Insert of uncertain chronicity in the right frontal lobe. Consider further evaluation with MRI. CT PELVIS WO CONTRAST Additional Contrast? None    Result Date: 10/9/2022  Slightly increased chronic appearing infectious/inflammatory cutaneous/subcutaneous process of the anterior pelvis and gluteal region. Recommend correlation with history of hidradenitis suppurativa. CT ABDOMEN PELVIS W IV CONTRAST Additional Contrast? None    Result Date: 10/9/2022  1. Skin thickening of the low anterior abdominal wall, anterior pelvis and visualized gluteal region. Note, the perineum is not visualized on this study. Patient could return for further imaging if clinically indicated. 2. Large volume stool in the rectum.        Physical Examination:        General appearance:  alert, cooperative and obese  Mental Status:  oriented to person, place and time and normal affect  Lungs:  clear to auscultation bilaterally, normal effort  Heart:  regular rate and rhythm, no murmur  Abdomen:  soft, nontender, nondistended, normal bowel sounds, no masses, hepatomegaly, splenomegaly  Extremities:  no edema, redness, tenderness in the calves  Skin: Multiple wounds present in groin    Assessment:        Hospital Problems             Last Modified POA    * (Principal) Hidradenitis suppurativa 10/9/2022 Yes    Septicemia ([address] 10/10/2022 Yes    Schizophrenia ([address] 10/10/2022 Yes    Gram-positive bacteremia 10/10/2022 Yes    Cellulitis of right thigh 10/10/2022 Yes    Bandemia 10/10/2022 Yes    Scrotal abscess 10/10/2022 Yes    Immunosuppressed due to chemotherapy ([address] 10/10/2022 Yes    Mild protein-calorie malnutrition ([address] 10/12/2022 Yes    Hypovitaminosis D 10/12/2022 Yes    Gluteal abscess 10/14/2022 Yes    Abscess of skin of abdomen 10/9/2022 Yes    Schizoaffective disorder, bipolar type ([facility] 10/10/2022 Yes    GERD (gastroesophageal reflux disease) 10/10/2022 Yes   Plan:        Sepsis with hidradenitis suppurativa-patient completed Augmentin and Levaquin for 2 weeks. New CT scan reviewed with surgery, no intervention planned.   Continue wound care, patient needs to continue Humira per dermatology, difficult discharge plan given high cost for Humira,  working to get humira delivered to the facility    Schizoaffective disorder-seen by psychiatry earlier this admission, medication adjustments made, continue Haldol, Tegretol    Leukocytosis-downtrending, check labs tomorrow    Immunosuppressed-received Humira outpatient, currently on hold    Replace vitamin D, continue vitamin C and zinc  Continue supplements for malnutrition  Monitor vitals  Monitor electrolytes and replace as needed    Discharge plan to SNF,pending placement  Encouraged PT, Nadeen Mishra MD  10/24/2022  11:15 AM

## 2022-10-24 NOTE — PLAN OF CARE
Problem: Discharge Planning  Goal: Discharge to home or other facility with appropriate resources  Outcome: Progressing  Flowsheets (Taken 10/24/2022 0807)  Discharge to home or other facility with appropriate resources: Identify barriers to discharge with patient and caregiver     Problem: Safety - Adult  Goal: Free from fall injury  Outcome: Progressing  Flowsheets (Taken 10/24/2022 0732)  Free From Fall Injury: Instruct family/caregiver on patient safety     Problem: Confusion  Goal: Confusion, delirium, dementia, or psychosis is improved or at baseline  Description: INTERVENTIONS:  1. Assess for possible contributors to thought disturbance, including medications, impaired vision or hearing, underlying metabolic abnormalities, dehydration, psychiatric diagnoses, and notify attending LIP  2. [place] high risk fall precautions, as indicated  3. Provide frequent short contacts to provide reality reorientation, refocusing and direction  4. Decrease environmental stimuli, including noise as appropriate  5. Monitor and intervene to maintain adequate nutrition, hydration, elimination, sleep and activity  6. If unable to ensure safety without constant attention obtain sitter and review sitter guidelines with assigned personnel  7. Initiate Psychosocial CNS and Spiritual Care consult, as indicated  10/24/2022 1614 by Allie Sneed RN  Outcome: Progressing  10/24/2022 0337 by Jazmine Daniel RN  Outcome: Progressing     Problem: Skin/Tissue Integrity  Goal: Absence of new skin breakdown  Description: 1. Monitor for areas of redness and/or skin breakdown  2. Assess vascular access sites hourly  3. Every 4-6 hours minimum:  Change oxygen saturation probe site  4. Every 4-6 hours:  If on nasal continuous positive airway pressure, respiratory therapy assess nares and determine need for appliance change or resting period.   Outcome: Progressing     Problem: Pain  Goal: Verbalizes/displays adequate comfort level or baseline comfort level  Outcome: Progressing

## 2022-10-25 PROCEDURE — 6370000000 HC RX 637 (ALT 250 FOR IP): Performed by: CLINICAL NURSE SPECIALIST

## 2022-10-25 PROCEDURE — 6370000000 HC RX 637 (ALT 250 FOR IP): Performed by: NURSE PRACTITIONER

## 2022-10-25 PROCEDURE — 6370000000 HC RX 637 (ALT 250 FOR IP): Performed by: STUDENT IN AN ORGANIZED HEALTH CARE EDUCATION/TRAINING PROGRAM

## 2022-10-25 PROCEDURE — 6370000000 HC RX 637 (ALT 250 FOR IP): Performed by: INTERNAL MEDICINE

## 2022-10-25 PROCEDURE — 97535 SELF CARE MNGMENT TRAINING: CPT

## 2022-10-25 PROCEDURE — 6360000002 HC RX W HCPCS: Performed by: NURSE PRACTITIONER

## 2022-10-25 PROCEDURE — 51702 INSERT TEMP BLADDER CATH: CPT

## 2022-10-25 PROCEDURE — 1200000000 HC SEMI PRIVATE

## 2022-10-25 PROCEDURE — 99231 SBSQ HOSP IP/OBS SF/LOW 25: CPT | Performed by: STUDENT IN AN ORGANIZED HEALTH CARE EDUCATION/TRAINING PROGRAM

## 2022-10-25 RX ADMIN — OXYCODONE HYDROCHLORIDE AND ACETAMINOPHEN 500 MG: 500 TABLET ORAL at 20:38

## 2022-10-25 RX ADMIN — ENOXAPARIN SODIUM 30 MG: 100 INJECTION SUBCUTANEOUS at 20:39

## 2022-10-25 RX ADMIN — HYDROCODONE BITARTRATE AND ACETAMINOPHEN 2 TABLET: 5; 325 TABLET ORAL at 11:49

## 2022-10-25 RX ADMIN — HALOPERIDOL 1 MG: 1 TABLET ORAL at 20:38

## 2022-10-25 RX ADMIN — OXYCODONE HYDROCHLORIDE AND ACETAMINOPHEN 500 MG: 500 TABLET ORAL at 08:52

## 2022-10-25 RX ADMIN — HALOPERIDOL 1 MG: 1 TABLET ORAL at 08:51

## 2022-10-25 RX ADMIN — CARBAMAZEPINE 200 MG: 200 TABLET ORAL at 08:51

## 2022-10-25 RX ADMIN — ZINC SULFATE 220 MG (50 MG) CAPSULE 50 MG: CAPSULE at 08:51

## 2022-10-25 RX ADMIN — CARBAMAZEPINE 200 MG: 200 TABLET ORAL at 20:38

## 2022-10-25 ASSESSMENT — PAIN SCALES - GENERAL
PAINLEVEL_OUTOF10: 5
PAINLEVEL_OUTOF10: 3
PAINLEVEL_OUTOF10: 10

## 2022-10-25 ASSESSMENT — PAIN SCALES - WONG BAKER
WONGBAKER_NUMERICALRESPONSE: 0

## 2022-10-25 ASSESSMENT — PAIN DESCRIPTION - LOCATION: LOCATION: COCCYX;GROIN

## 2022-10-25 NOTE — PROGRESS NOTES
[facility]  Office: [address], DO, Nelsy Gavino, DO, Tor Phlegm, DO, Mikaela Garcia Blood, DO, Salma Davis MD, Jett Garcia MD, Meg Daniels MD, Louise Medellin MD,  Didi Tavarez MD, Robe Salgado MD, Justyna Monae, DO, Ning Domingo MD,  Flower Ramon MD, Noah Aggarwal MD, Selina Meza, DO, Ana Jeffery MD, Hailee Perdomo MD, James Zuñiga MD, Michelle Goel MD, Divya Diaz MD, Marisela Fish MD, Aaron Hartmann DO, Jan Grigsby MD, Homar Gaming MD, Nakia Dwyer CNP,  Holley Rubin, CNP, Irene Lomas, CNP, George Yang, CNP,  Rafi Loo, DNP, Blake Carrillo, CNP, Tessa Hooks, CNP, Radha Mcqueen, CNP, Luma Ennis, CNP, Antony Libman, CNP, JAMIN MesaC, Marylin Soliz, CNS, Bartolo Quezada, DNP, Mirella Chao, CNP, Nicole Vergara, CNP, Bertin López, [facility]    Progress Note    10/25/2022    2:10 PM    Name:   Altagracia Mckeon  MRN:     [id number]     Acct:      [de-identified]   Room:   [address] [place] [address] Day:  12  Admit Date:  10/9/2022  9:05 AM    PCP:   Jossie Thapa MD  Code Status:  Full Code    Subjective:     C/C:   Chief Complaint   Patient presents with    Altered Mental Status     Interval History Status: not changed. Patient was discomfort in his abdominal wound. Patient has stable vitals  No acute events overnight. Brief History:     49-year-old female with known medical history of hidradenitis suppurativa with extensive lesions bilateral axilla presented to the hospital from [facility] due to extensive lower abdominal wounds. Initial concern for necrotizing fasciitis. Patient has extensive psych history and on antipsychotic medications. Patient was getting Humira but was stopped as he went to the [facility]. Patient was evaluated by surgery and infectious disease.   CT abdomen had some fluid collection but surgery did not recommend any intervention. Initially was on IV antibiotics, then switched to Levaquin and Augmentin per infectious disease. Culture showed normal skin blaek. Review of Systems:     Constitutional:  negative for chills, fevers, sweats  Respiratory:  negative for cough, dyspnea on exertion, shortness of breath, wheezing  Cardiovascular:  negative for chest pain, chest pressure/discomfort, lower extremity edema, palpitations  Gastrointestinal:  negative for abdominal pain, constipation, diarrhea, nausea, vomiting  Neurological:  negative for dizziness, headache  Multiple wounds in the groin  Old scar marks on axilla  Medications: Allergies: Allergies   Allergen Reactions    Depakene [Valproic Acid]     Restoril [Temazepam]     Risperidone And Related      Seizure      Thorazine [Chlorpromazine]     Vancomycin Other (See Comments) and Rash       Current Meds:   Scheduled Meds:    nicotine  1 patch TransDERmal Daily    zinc sulfate  50 mg Oral Daily    vitamin D  50,000 Units Oral Weekly    vitamin C  500 mg Oral BID    carBAMazepine  200 mg Oral BID    haloperidol  1 mg Oral BID    sodium chloride flush  5-40 mL IntraVENous 2 times per day    enoxaparin  30 mg SubCUTAneous BID     Continuous Infusions:    sodium chloride       PRN Meds: HYDROcodone 5 mg - acetaminophen **OR** HYDROcodone 5 mg - acetaminophen, mineral oil-hydrophilic petrolatum, sodium chloride flush, sodium chloride, potassium chloride **OR** potassium alternative oral replacement **OR** potassium chloride, magnesium sulfate, ondansetron **OR** ondansetron, polyethylene glycol, acetaminophen **OR** acetaminophen    Data:     Past Medical History:   has a past medical history of Bipolar 1 disorder ([facility] GERD (gastroesophageal reflux disease), Hidradenitis suppurativa, [facility] ([facility] and Schizoaffective disorder ([facility] Social History:   reports that he has been smoking cigarettes. He has a 22.00 pack-year smoking history.  He has never used smokeless tobacco. He reports current alcohol use. He reports current drug use. Family History:   Family History   Problem Relation Age of Onset    Cancer Mother         breast    High Blood Pressure Maternal Grandfather     Mental Illness Paternal Aunt     Substance Abuse Paternal Uncle     Substance Abuse Brother     Alcohol Abuse Father     Cancer Father         pancrease       Vitals:  /73   Pulse 88   Temp 98.8 °F (37.1 °C) (Oral)   Resp 15   Ht 5' 9\" (1.753 m)   Wt 245 lb 6 oz (111.3 kg)   SpO2 97%   BMI 36.24 kg/m²   Temp (24hrs), Av.2 °F (36.8 °C), Min:97.9 °F (36.6 °C), Max:98.8 °F (37.1 °C)    No results for input(s): POCGLU in the last 72 hours. I/O (24Hr): Intake/Output Summary (Last 24 hours) at 10/25/2022 1410  Last data filed at 10/25/2022 0551  Gross per 24 hour   Intake 480 ml   Output 1300 ml   Net -820 ml         Labs:  Hematology:  Recent Labs     10/23/22  0553 10/24/22  0500   WBC 13.0* 13.5*   RBC 3.81* 3.69*   HGB 10.9* 10.6*   HCT 34.0* 34.7*   MCV 89.2 94.0   MCH 28.6 28.7   MCHC 32.1 30.5   RDW 14.6* 14.8*   * 565*   MPV 8.1 8.3       Chemistry:  Recent Labs     10/23/22  0553   *   K 4.3   CL 98   CO2 27   GLUCOSE 91   BUN 11   CREATININE 0.60*   ANIONGAP 9   LABGLOM >60   CALCIUM 9.4       No results for input(s): PROT, LABALBU, LABA1C, U9XVTOH, P5EBVJI, FT4, TSH, AST, ALT, LDH, GGT, ALKPHOS, LABGGT, BILITOT, BILIDIR, AMMONIA, AMYLASE, LIPASE, LACTATE, CHOL, HDL, LDLCHOLESTEROL, CHOLHDLRATIO, TRIG, VLDL, IES09UV, PHENYTOIN, PHENYF, URICACID, POCGLU in the last 72 hours.     ABG:No results found for: POCPH, PHART, PH, POCPCO2, NRR0KBE, PCO2, POCPO2, PO2ART, PO2, POCHCO3, DCW6URZ, HCO3, NBEA, PBEA, BEART, BE, THGBART, THB, ZRA5MBN, ZXMO0MON, F1RASOVY, O2SAT, FIO2  Lab Results   Component Value Date/Time    SPECIAL RT HAND 10ML 10/09/2022 09:18 AM    SPECIAL LT AC 20ML 10/09/2022 09:18 AM     Lab Results   Component Value Date/Time    CULTURE PROTEUS MIRABILIS SCANT GROWTH (A) 10/12/2022 12:32 PM    CULTURE ESCHERICHIA COLI SCANT GROWTH (A) 10/12/2022 12:32 PM    CULTURE NORMAL SKIN PRAVEEN LIGHT GROWTH 10/12/2022 12:32 PM       Radiology:  CT HEAD WO CONTRAST    Result Date: 10/9/2022  1. No hemorrhage. 2. Insert of uncertain chronicity in the right frontal lobe. Consider further evaluation with MRI. CT PELVIS WO CONTRAST Additional Contrast? None    Result Date: 10/9/2022  Slightly increased chronic appearing infectious/inflammatory cutaneous/subcutaneous process of the anterior pelvis and gluteal region. Recommend correlation with history of hidradenitis suppurativa. CT ABDOMEN PELVIS W IV CONTRAST Additional Contrast? None    Result Date: 10/9/2022  1. Skin thickening of the low anterior abdominal wall, anterior pelvis and visualized gluteal region. Note, the perineum is not visualized on this study. Patient could return for further imaging if clinically indicated. 2. Large volume stool in the rectum.        Physical Examination:        General appearance:  alert, cooperative and obese  Mental Status:  oriented to person, place and time and normal affect  Lungs:  clear to auscultation bilaterally, normal effort  Heart:  regular rate and rhythm, no murmur  Abdomen:  soft, nontender, nondistended, normal bowel sounds, no masses, hepatomegaly, splenomegaly  Extremities:  no edema, redness, tenderness in the calves  Skin: Multiple wounds present in groin    Assessment:        Hospital Problems             Last Modified POA    * (Principal) Hidradenitis suppurativa 10/9/2022 Yes    Septicemia ([address] 10/10/2022 Yes    Schizophrenia ([address] 10/10/2022 Yes    Gram-positive bacteremia 10/10/2022 Yes    Cellulitis of right thigh 10/10/2022 Yes    Bandemia 10/10/2022 Yes    Scrotal abscess 10/10/2022 Yes    Immunosuppressed due to chemotherapy ([address] 10/10/2022 Yes    Mild protein-calorie malnutrition ([address] 10/12/2022 Yes    Hypovitaminosis D 10/12/2022 Yes Gluteal abscess 10/14/2022 Yes    Abscess of skin of abdomen 10/9/2022 Yes    Schizoaffective disorder, bipolar type ([facility] 10/10/2022 Yes    GERD (gastroesophageal reflux disease) 10/10/2022 Yes   Plan:        Sepsis with hidradenitis suppurativa-patient completed Augmentin and Levaquin for 2 weeks. New CT scan reviewed with surgery, no intervention planned.   Continue wound care, patient needs to continue Humira per dermatology, difficult discharge plan given high cost for Humira,  working to get humira delivered to the facility    Schizoaffective disorder-seen by psychiatry earlier this admission, medication adjustments made, continue Haldol, Tegretol    Leukocytosis-downtrending    Immunosuppressed-received Humira outpatient, currently on hold, plan to restart outpatient    Replace vitamin D, continue vitamin C and zinc  Continue supplements for malnutrition  Monitor vitals  Monitor electrolytes and replace as needed    Discharge plan to SNF,pending placement  Encouraged PT, OT    Case discussed with     Ozzie Madera MD  10/25/2022  2:10 PM

## 2022-10-25 NOTE — PROGRESS NOTES
Occupational Therapy  Facility/Department: [facility] RENAL//MED SURG  Occupational Therapy Daily Treatment Note  Name: Neha Duenas  : 1980  MRN: [id number]  Date of Service: 10/25/2022    Discharge Recommendations:Further therapy recommended at discharge. Education provided throughout session on importance of participation and purpose of activity, pt mumbling to self throughout demo P understanding. Assessment   Performance deficits / Impairments: Decreased functional mobility ; Decreased ADL status; Decreased endurance;Decreased high-level IADLs;Decreased strength;Decreased balance;Decreased cognition;Decreased safe awareness  Prognosis: Fair  REQUIRES OT FOLLOW-UP: Yes  Activity Tolerance  Activity Tolerance: Treatment limited secondary to decreased cognition        Plan   Occupational Therapy Plan  Times Per Week: 3-4x/wk     Restrictions  Restrictions/Precautions  Restrictions/Precautions: Fall Risk  Required Braces or Orthoses?: No  Position Activity Restriction  Other position/activity restrictions: up with assist    Subjective   General  Chart Reviewed: Yes  Family / Caregiver Present: No  General Comment  Comments: Pt and RN agreeable to therapy this day. Pt supine in bed at start of session and retired to supine in bed at session end with bed alarm on and call light in reach. Pt not answering GILLETTE question about pain, no facial grimacing or groaning noted         Objective        Safety Devices  Type of Devices: Left in bed;Call light within reach; Bed alarm in place  Restraints  Restraints Initially in Place: No  Balance  Sitting:  (pt adamently declined)        ADL  Grooming: Independent  Grooming Skilled Clinical Factors: Oral care facilitated supine in bed  Additional Comments: Pt agreeable to oral care seated at EOB at start of session. GILLETTE educated pt on importance of EOB/OOB activity.  Upon set up pt declining any EOB or OOB Activity despite max encouragement pt stating, \"I don't feel good.\" Pt began oral care supine in bed mumbling to self throughout. Bed mobility  Rolling to Left: Independent        Cognition  Overall Cognitive Status: Exceptions  Arousal/Alertness: Delayed responses to stimuli  Following Commands: Follows multistep commands with repitition; Follows multistep commands with increased time  Attention Span: Attends with cues to redirect  Safety Judgement: Decreased awareness of need for assistance;Decreased awareness of need for safety  Problem Solving: Decreased awareness of errors;Assistance required to identify errors made;Assistance required to correct errors made  Insights: Decreased awareness of deficits  Initiation: Requires cues for some  Sequencing: Requires cues for some  Orientation  Overall Orientation Status: Within Functional Limits                  Education Given To: Patient  Education Provided: Role of Therapy;Precautions  Education Provided Comments: importance of OT, EOB and OOB activity- P understanding  Education Method: Verbal  Education Outcome: Continued education needed        AM-PAC Score        AM-PAC Inpatient Daily Activity Raw Score: 16 (10/25/22 1357)  AM-PAC Inpatient ADL T-Scale Score : 35.96 (10/25/22 [facility]7)  ADL Inpatient CMS 0-100% Score: 53.32 (10/25/22 135)  ADL Inpatient CMS G-Code Modifier : CK (10/25/22 1357)        Goals  Short Term Goals  Time Frame for Short Term Goals: pt will, by discharge  Short Term Goal 1: complete LB ADLs and toileting tasks with min A, set up and Ae, as needed  Short Term Goal 2: complete UB ADLs with SBA and set up  Short Term Goal 3: dem CGA during functional transfers/functional mobility with LRd, as needed  Short Term Goal 4: dem ~15 minutes dynamic sitting balance with SBA on EOB in order to complete functional tasks  Short Term Goal 5: increase activity tolerance to 20+ minutes in order to participate in daily tasks       Therapy Time   Individual Concurrent Group Co-treatment   Time In [phone]) [id number] Time Out 1341         Minutes 8         Timed Code Treatment Minutes: [address], GILLETTE/L

## 2022-10-25 NOTE — CARE COORDINATION
Transitional planning note: received call from Dora with [facility] who states they have an authorization pending with their specialty pharmacy for patient's humira injections to be delivered to their office. She states she expects to hear something back today. She did confirm the office is willing to administer the medication. Call placed to Abdiaziz Tang with [facility] and left vm message to update regarding Abel Montenegro has pending auth for humira medication for medication to be delivered to their office. Requested call back to confirm they can accept patient if medication auth comes through today    1430: received call from Sung Dandy with [facility] @ [phone] who states that the [facility] can fill patient's humira and ship it to the [facility] for outpatient administration. 1440: call placed to jos with [facility] to notify of above. Jos states her  has approved zeenat but she needs to get corporate approval. She requests I set up transport for tomorrow am    12: faxed request to [address] [place] [address] for [employer] . 1515: HENS completed and messaged dr Maurizio Infante to update discharge med rec.

## 2022-10-25 NOTE — PROGRESS NOTES
Physical Therapy        Physical Therapy Cancel Note      DATE: 10/25/2022    NAME: Dustin Medina  MRN: [id number]   : 1980      Patient not seen this date for Physical Therapy due to:    Patient refused.       Electronically signed by Ned Billy PT on 10/25/2022 at 11:03 AM

## 2022-10-25 NOTE — PLAN OF CARE
Problem: Discharge Planning  Goal: Discharge to home or other facility with appropriate resources  10/25/2022 0426 by Suzan Pickett RN  Outcome: Progressing  10/24/2022 1614 by Shiloh Camacho RN  Outcome: Progressing  Flowsheets (Taken 10/24/2022 0807)  Discharge to home or other facility with appropriate resources: Identify barriers to discharge with patient and caregiver     Problem: Safety - Adult  Goal: Free from fall injury  10/25/2022 0426 by Suzan Pickett RN  Outcome: Progressing  10/24/2022 1614 by Shiloh Camacho RN  Outcome: Progressing  Flowsheets (Taken 10/24/2022 0732)  Free From Fall Injury: Instruct family/caregiver on patient safety     Problem: Confusion  Goal: Confusion, delirium, dementia, or psychosis is improved or at baseline  Description: INTERVENTIONS:  1. Assess for possible contributors to thought disturbance, including medications, impaired vision or hearing, underlying metabolic abnormalities, dehydration, psychiatric diagnoses, and notify attending LIP  2. [place] high risk fall precautions, as indicated  3. Provide frequent short contacts to provide reality reorientation, refocusing and direction  4. Decrease environmental stimuli, including noise as appropriate  5. Monitor and intervene to maintain adequate nutrition, hydration, elimination, sleep and activity  6. If unable to ensure safety without constant attention obtain sitter and review sitter guidelines with assigned personnel  7. Initiate Psychosocial CNS and Spiritual Care consult, as indicated  10/25/2022 0426 by Suzan Pickett RN  Outcome: Progressing  10/24/2022 1614 by Shiloh Camacho RN  Outcome: Progressing     Problem: Skin/Tissue Integrity  Goal: Absence of new skin breakdown  Description: 1. Monitor for areas of redness and/or skin breakdown  2. Assess vascular access sites hourly  3. Every 4-6 hours minimum:  Change oxygen saturation probe site  4.   Every 4-6 hours:  If on nasal continuous positive airway pressure, respiratory therapy assess nares and determine need for appliance change or resting period.   10/25/2022 0426 by Haritha Arce RN  Outcome: Progressing  10/24/2022 1614 by Vicky Jefferson RN  Outcome: Progressing     Problem: Pain  Goal: Verbalizes/displays adequate comfort level or baseline comfort level  10/25/2022 0426 by Haritha Arce RN  Outcome: Progressing  10/24/2022 1614 by Vicky Jefferson RN  Outcome: Progressing

## 2022-10-25 NOTE — PLAN OF CARE
Problem: Discharge Planning  Goal: Discharge to home or other facility with appropriate resources  10/25/2022 1535 by Muriel Harris RN  Outcome: Progressing  10/25/2022 0426 by Manav Mcginnis RN  Outcome: Progressing     Problem: Safety - Adult  Goal: Free from fall injury  10/25/2022 1535 by Muriel Harris RN  Outcome: Progressing  Flowsheets (Taken 10/25/2022 0728)  Free From Fall Injury: Instruct family/caregiver on patient safety  10/25/2022 0426 by Manav Mcginnis RN  Outcome: Progressing     Problem: Confusion  Goal: Confusion, delirium, dementia, or psychosis is improved or at baseline  Description: INTERVENTIONS:  1. Assess for possible contributors to thought disturbance, including medications, impaired vision or hearing, underlying metabolic abnormalities, dehydration, psychiatric diagnoses, and notify attending LIP  2. [place] high risk fall precautions, as indicated  3. Provide frequent short contacts to provide reality reorientation, refocusing and direction  4. Decrease environmental stimuli, including noise as appropriate  5. Monitor and intervene to maintain adequate nutrition, hydration, elimination, sleep and activity  6. If unable to ensure safety without constant attention obtain sitter and review sitter guidelines with assigned personnel  7. Initiate Psychosocial CNS and Spiritual Care consult, as indicated  10/25/2022 1535 by Muriel Harris RN  Outcome: Progressing  Flowsheets (Taken 10/25/2022 0800)  Effect of thought disturbance (confusion, delirium, dementia, or psychosis) are managed with adequate functional status: Assess for contributors to thought disturbance, including medications, impaired vision or hearing, underlying metabolic abnormalities, dehydration, psychiatric diagnoses, notify LIP  10/25/2022 0426 by Manav Mcginnis RN  Outcome: Progressing     Problem: Skin/Tissue Integrity  Goal: Absence of new skin breakdown  Description: 1.   Monitor for areas of redness and/or skin breakdown  2. Assess vascular access sites hourly  3. Every 4-6 hours minimum:  Change oxygen saturation probe site  4. Every 4-6 hours:  If on nasal continuous positive airway pressure, respiratory therapy assess nares and determine need for appliance change or resting period.   10/25/2022 1535 by Lenka Cortes RN  Outcome: Progressing  10/25/2022 0426 by Marta Rodríguez RN  Outcome: Progressing     Problem: Pain  Goal: Verbalizes/displays adequate comfort level or baseline comfort level  10/25/2022 1535 by Lenka Cortes RN  Outcome: Progressing  10/25/2022 0426 by Marta Rodríguez RN  Outcome: Progressing

## 2022-10-26 VITALS
TEMPERATURE: 98.2 F | HEIGHT: 69 IN | OXYGEN SATURATION: 91 % | RESPIRATION RATE: 20 BRPM | WEIGHT: 245.37 LBS | BODY MASS INDEX: 36.34 KG/M2 | DIASTOLIC BLOOD PRESSURE: 75 MMHG | HEART RATE: 93 BPM | SYSTOLIC BLOOD PRESSURE: 102 MMHG

## 2022-10-26 PROCEDURE — 6370000000 HC RX 637 (ALT 250 FOR IP): Performed by: INTERNAL MEDICINE

## 2022-10-26 PROCEDURE — 94761 N-INVAS EAR/PLS OXIMETRY MLT: CPT

## 2022-10-26 PROCEDURE — 99239 HOSP IP/OBS DSCHRG MGMT >30: CPT | Performed by: STUDENT IN AN ORGANIZED HEALTH CARE EDUCATION/TRAINING PROGRAM

## 2022-10-26 PROCEDURE — 6370000000 HC RX 637 (ALT 250 FOR IP): Performed by: NURSE PRACTITIONER

## 2022-10-26 PROCEDURE — 6370000000 HC RX 637 (ALT 250 FOR IP): Performed by: CLINICAL NURSE SPECIALIST

## 2022-10-26 PROCEDURE — 6370000000 HC RX 637 (ALT 250 FOR IP): Performed by: STUDENT IN AN ORGANIZED HEALTH CARE EDUCATION/TRAINING PROGRAM

## 2022-10-26 RX ADMIN — HALOPERIDOL 1 MG: 1 TABLET ORAL at 10:22

## 2022-10-26 RX ADMIN — CARBAMAZEPINE 200 MG: 200 TABLET ORAL at 10:22

## 2022-10-26 RX ADMIN — OXYCODONE HYDROCHLORIDE AND ACETAMINOPHEN 500 MG: 500 TABLET ORAL at 10:22

## 2022-10-26 RX ADMIN — HYDROCODONE BITARTRATE AND ACETAMINOPHEN 2 TABLET: 5; 325 TABLET ORAL at 05:38

## 2022-10-26 RX ADMIN — ZINC SULFATE 220 MG (50 MG) CAPSULE 50 MG: CAPSULE at 10:22

## 2022-10-26 RX ADMIN — ERGOCALCIFEROL 50000 UNITS: 1.25 CAPSULE ORAL at 10:22

## 2022-10-26 ASSESSMENT — PAIN SCALES - GENERAL
PAINLEVEL_OUTOF10: 0
PAINLEVEL_OUTOF10: 8

## 2022-10-26 ASSESSMENT — PAIN DESCRIPTION - DESCRIPTORS: DESCRIPTORS: DISCOMFORT

## 2022-10-26 NOTE — PROGRESS NOTES
Physical Therapy        Physical Therapy Cancel Note      DATE: 10/26/2022    NAME: Theresa Calzada  MRN: [id number]   : 1980      Patient not seen this date for Physical Therapy due to:    Patient Declined: Pt refused PT interventions x  multiple attempts pt report require/request motorized WC to get out of bed. Nurse Notified. PT will continue to follow and address as indicated and as pt allows.        Electronically signed by Benjamin Pepper PT on 10/26/2022 at 10:35 AM

## 2022-10-26 NOTE — DISCHARGE SUMMARY
[facility]  Office: [address], DO, Wojciech Olguin, DO, Gagan Lowry, DO, Kristina Angie Randall, DO, Simeon Kohli MD, Malcolm Parker MD, Felipa Raymond MD, Luvenia Duverney, MD,  Madan Medina MD, Basilio Lobo MD, Anika Flannery, DO, Duncan Ball MD,  Joan Jesus MD, Celina Nobles MD, Sandhya Mayfield DO, Lakesha Hi MD, Mervin Wise MD, Anne Osman DO, Nash Chanel MD, Bonny Mascorro MD, Regla Santana MD, Orville Nair MD, Trisha Larose, DO, Yarelis Noble MD, Jose Baird MD, Satnam Brandt, CNP,  Jeanine Pace, CNP, Joshua Wilson, CNP, Haydee Pinto, CNP,  Farhan Soto, [facility], Huey Tolentino, CNP, Светлана Dyson, CNP, Lluvia Mathews, CNP, Daylin Nino, CNP, Mary Hernandez, CNP, Thien Browning PA-C, Alin Mclain, [facility], [place] , [facility], Raffi Saeed, CNP, Marietta Curiel, CNP, Lulu Russo, [facility]    Discharge Summary     Patient ID: Segun Daniel  :  1980   MRN: [id number]     ACCOUNT:  [de-identified]   Patient's PCP: Gabbie Hernandez MD  Admit Date: 10/9/2022   Discharge Date: 10/26/2022     Length of Stay: 17  Code Status:  Full Code  Admitting Physician: No admitting provider for patient encounter. 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  *      Admission Condition:  poor     Discharged Condition: fair    Hospital Stay:     Hospital Course:  Linda Capellan is a 38 y.o. male who was admitted for the management of   Hidradenitis suppurativa , presented to ER with Altered Mental Status    38year-old male with known medical history of hidradenitis suppurativa with extensive lesions, previous axillary lesion, presents with extensive lower abdominal wounds. Patient was on Humira outpatient for hidradenitis suppurativa but he went to [facility] and was taken off his Humira. Patient had extensive lower abdominal wounds. Patient was evaluated by infectious disease and general surgery. Patient was initially on broad-spectrum antibiotics but then switched to Levaquin and Augmentin which she received for 2 weeks. CT abdomen had some fluid collection but evaluated by surgery but did not recommend any intervention. Patient will need to restart his Humira outpatient. Arrangements were made, plan to discharge to SNF. HANNAH signed. Discharge was delayed due to difficult placement.     Significant therapeutic interventions: As above    Significant Diagnostic Studies:   Labs / Micro:  CBC:   Lab Results   Component Value Date/Time    WBC 13.5 10/24/2022 05:00 AM    RBC 3.69 10/24/2022 05:00 AM    RBC 3.46 02/09/2012 06:19 AM    HGB 10.6 10/24/2022 05:00 AM    HCT 34.7 10/24/2022 05:00 AM    MCV 94.0 10/24/2022 05:00 AM    MCH 28.7 10/24/2022 05:00 AM    MCHC 30.5 10/24/2022 05:00 AM    RDW 14.8 10/24/2022 05:00 AM     10/24/2022 05:00 AM     02/09/2012 06:19 AM     BMP:    Lab Results   Component Value Date/Time    GLUCOSE 91 10/23/2022 05:53 AM    GLUCOSE 89 02/09/2012 06:19 AM     10/23/2022 05:53 AM    K 4.3 10/23/2022 05:53 AM    CL 98 10/23/2022 05:53 AM    CO2 27 10/23/2022 05:53 AM    ANIONGAP 9 10/23/2022 05:53 AM    BUN 11 10/23/2022 05:53 AM    CREATININE 0.60 10/23/2022 05:53 AM    BUNCRER 14 11/03/2021 09:08 AM    CALCIUM 9.4 10/23/2022 05:53 AM    LABGLOM >60 10/23/2022 05:53 AM    GFRAA >60 09/09/2022 03:26 PM    GFR      09/09/2022 03:26 PM     HFP:    Lab Results   Component Value Date/Time    PROT 9.1 09/09/2022 03:26 PM     CMP:    Lab Results   Component Value Date/Time    GLUCOSE 91 10/23/2022 05:53 AM    GLUCOSE 89 02/09/2012 06:19 AM     10/23/2022 05:53 AM    K 4.3 10/23/2022 05:53 AM    CL 98 10/23/2022 05:53 AM    CO2 27 10/23/2022 05:53 AM    BUN 11 10/23/2022 05:53 AM    CREATININE 0.60 10/23/2022 05:53 AM    ANIONGAP 9 10/23/2022 05:53 AM    ALKPHOS 116 09/09/2022 03:26 PM    ALT 19 09/09/2022 03:26 PM    AST 14 09/09/2022 03:26 PM    BILITOT 0.2 09/09/2022 03:26 PM    LABALBU 2.4 10/15/2022 06:57 AM    ALBUMIN 0.6 08/12/2022 08:29 AM    LABGLOM >60 10/23/2022 05:53 AM    GFRAA >60 09/09/2022 03:26 PM    GFR      09/09/2022 03:26 PM    PROT 9.1 09/09/2022 03:26 PM    CALCIUM 9.4 10/23/2022 05:53 AM        Radiology:  No results found. Consultations:    Consults:     Final Specialist Recommendations/Findings:   IP CONSULT TO GENERAL SURGERY  IP CONSULT TO HOSPITALIST  IP CONSULT TO INFECTIOUS DISEASES  IP CONSULT TO SOCIAL WORK  IP CONSULT TO PSYCHIATRY      The patient was seen and examined on day of discharge and this discharge summary is in conjunction with any daily progress note from day of discharge. Discharge plan:     Disposition:  To a non-[facility] facility    Physician Follow Up:     Gurmeet Toledo MD  [address]  [phone]    Schedule an appointment as soon as possible for a visit in 3 day(s)      Aida Amaral MD  [address] [place] [address] 614, [address] [facility]  [phone]    Schedule an appointment as soon as possible for a visit in 1 week(s)         Requiring Further Evaluation/Follow Up POST HOSPITALIZATION/Incidental Findings: follow up dermatology    Diet: regular diet    Activity: As tolerated    Instructions to Patient: follow up with dermatology  Allow dressing changes    Discharge Medications:      Medication List START taking these medications      ascorbic acid 500 MG tablet  Commonly known as: VITAMIN C  Take 1 tablet by mouth 2 times daily     mineral oil-hydrophilic petrolatum ointment  Apply topically as needed. Vitamin D (Ergocalciferol) 11326 units Caps  Take 50,000 Units by mouth once a week     zinc sulfate 220 (50 Zn) MG capsule  Commonly known as: ZINCATE  Take 1 capsule by mouth daily            CONTINUE taking these medications      carBAMazepine 200 MG tablet  Commonly known as: TEGRETOL  Take 1 tablet by mouth 2 times daily     haloperidol 1 MG tablet  Commonly known as: HALDOL  Take 1 tablet by mouth 2 times daily            STOP taking these medications      adalimumab 40 MG/0.8ML injection  Commonly known as: HUMIRA     benztropine 1 MG tablet  Commonly known as: COGENTIN     lithium 450 MG extended release tablet  Commonly known as: ESKALITH     therapeutic multivitamin-minerals tablet            ASK your doctor about these medications      HYDROcodone-acetaminophen 5-325 MG per tablet  Commonly known as: NORCO  Take 1 tablet by mouth every 4 hours as needed for Pain for up to 3 days. Ask about: Should I take this medication? Where to Get Your Medications        These medications were sent to [address] [place] [id number]      Phone: [phone]   ascorbic acid 500 MG tablet  mineral oil-hydrophilic petrolatum ointment  Vitamin D (Ergocalciferol) 18495 units Caps       You can get these medications from any pharmacy    Bring a paper prescription for each of these medications  HYDROcodone-acetaminophen 5-325 MG per tablet  You don't need a prescription for these medications  zinc sulfate 220 (50 Zn) MG capsule         No discharge procedures on file.     Time Spent on discharge is  33 mins in patient examination, evaluation, counseling as well as medication reconciliation, prescriptions for required medications, discharge plan and follow up. Electronically signed by   Pili Singh MD  10/26/2022  2:09 PM      Thank you Dr. Robb Kent MD for the opportunity to be involved in this patient's care.

## 2022-10-26 NOTE — PROGRESS NOTES
Comprehensive Nutrition Assessment    Type and Reason for Visit:  Reassess    Nutrition Recommendations/Plan:   Continue current diet and ONS  Monitor weight, labs and intake. Malnutrition Assessment:  Malnutrition Status:  No malnutrition (10/17/22 1445)    Context:  Chronic Illness     Findings of the 6 clinical characteristics of malnutrition:  Energy Intake:  No significant decrease in energy intake  Weight Loss:  5% over 1 month     Body Fat Loss:  No significant body fat loss     Muscle Mass Loss:  No significant muscle mass loss    Fluid Accumulation:  No significant fluid accumulation     Strength:  Not Performed    Nutrition Assessment:    Patient reports intake/appetite is ok. 50% of breakfast eaten consumed. Per EMR, intake is variable, 0-100% of meals. Patient with good acceptance of Ensure. Nutrition Related Findings:    labs/meds reviewed Wound Type: Open Wounds (bilat inner thighs.)       Current Nutrition Intake & Therapies:    Average Meal Intake: 1-25%, 26-50%, 51-75%, % (Variable)  Average Supplements Intake: %, 51-75%  ADULT DIET; Regular  ADULT ORAL NUTRITION SUPPLEMENT; Breakfast, Lunch, Dinner; Standard High Calorie/High Protein Oral Supplement    Anthropometric Measures:  Height: 5' 9\" (175.3 cm)  Ideal Body Weight (IBW): 160 lbs (73 kg)       Current Body Weight: 246 lb (111.6 kg), 153.8 % IBW.  Weight Source: Bed Scale  Current BMI (kg/m2): 36.3                          BMI Categories: Obese Class 2 (BMI 35.0 -39.9)    Estimated Daily Nutrient Needs:  Energy Requirements Based On: Formula  Weight Used for Energy Requirements: Current  Energy (kcal/day): 2127-5202 kcal  Weight Used for Protein Requirements: Adjusted  Protein (g/day): 110-115 g  Method Used for Fluid Requirements: 1 ml/kcal  Fluid (ml/day): 2100 mL or per MD    Nutrition Diagnosis:   Inadequate protein intake related to increase demand for energy/nutrients as evidenced by wounds    Nutrition Interventions:   Food and/or Nutrient Delivery: Continue Current Diet, Continue Oral Nutrition Supplement  Nutrition Education/Counseling: No recommendation at this time  Coordination of Nutrition Care: Continue to monitor while inpatient       Goals:  Previous Goal Met: Progressing toward Goal(s)  Goals: Meet at least 75% of estimated needs, prior to discharge       Nutrition Monitoring and Evaluation:   Behavioral-Environmental Outcomes: None Identified  Food/Nutrient Intake Outcomes: Food and Nutrient Intake, Supplement Intake  Physical Signs/Symptoms Outcomes: Biochemical Data, Nutrition Focused Physical Findings, Skin, Weight, Meal Time Behavior    Discharge Planning:    Continue Oral Nutrition Supplement, Continue current diet     Isidra Najera RD  Contact: [phone]

## 2022-10-26 NOTE — PROGRESS NOTES
[facility]  Office: [address], DO, Nahid Stain, DO, Kian Hwang, DO, Laurent Bennieor Blood, DO, Laura See MD, Valeria Estevez MD, Yamil Johnson MD, Nikki Couch MD,  Skyla Kebede MD, Derrick Hernandez MD, Marah Marquez, DO, Shira Allison MD,  Raquel Ansari MD, Carlene Nelson MD, An Valles, DO, Layla Carter MD, Carey Reynolds MD, Gladys Diaz MD, Josefina Adkins MD, Mello Vera MD, Jeanine Escalante MD, Janell Marquez, DO, Alpa Orellana MD, Wesley Jaime MD, Marga Velasco, CNP,  Fran Agarwal, CNP, Anthony Barron, CNP, Shavonne Hoyos, CNP,  Shahriar Toney, DNP, Sinai Crump, CNP, Lane Pichardo, CNP, Phu Burden, CNP, Chandler Cross, CNP, Monty Paris, CNP, Betty Mao PA-C, Vamsi Britt, CNS, Moy Warner, DNP, Reyna Hannon, CNP, Cooper Anguiano, CNP, Yovany Groves, [address] [place] [address]    Progress Note    10/26/2022    2:08 PM    Name:   Kyler Valdez  MRN:     [id number]     Acct:      [de-identified]   Room:   [address] [place] [address] Day:  16  Admit Date:  10/9/2022  9:05 AM    PCP:   Tristan Johnson MD  Code Status:  Full Code    Subjective:     C/C:   Chief Complaint   Patient presents with    Altered Mental Status     Interval History Status: not changed. No acute events overnight  Patient refused dressing change this morning  Plan for discharge today  Vitals have been stable    Brief History:     68-year-old female with known medical history of hidradenitis suppurativa with extensive lesions bilateral axilla presented to the hospital from assisted due to extensive lower abdominal wounds. Initial concern for necrotizing fasciitis. Patient has extensive psych history and on antipsychotic medications. Patient was getting Humira but was stopped as he went to the assisted. Patient was evaluated by surgery and infectious disease.   CT abdomen had some fluid collection but surgery did not recommend any intervention. Initially was on IV antibiotics, then switched to Levaquin and Augmentin per infectious disease. Culture showed normal skin blake. Review of Systems:     Constitutional:  negative for chills, fevers, sweats  Respiratory:  negative for cough, dyspnea on exertion, shortness of breath, wheezing  Cardiovascular:  negative for chest pain, chest pressure/discomfort, lower extremity edema, palpitations  Gastrointestinal:  negative for abdominal pain, constipation, diarrhea, nausea, vomiting  Neurological:  negative for dizziness, headache  Multiple wounds in the groin  Old scar marks on axilla  Medications: Allergies: Allergies   Allergen Reactions    Depakene [Valproic Acid]     Restoril [Temazepam]     Risperidone And Related      Seizure      Thorazine [Chlorpromazine]     Vancomycin Other (See Comments) and Rash       Current Meds:   Scheduled Meds:    nicotine  1 patch TransDERmal Daily    zinc sulfate  50 mg Oral Daily    vitamin D  50,000 Units Oral Weekly    vitamin C  500 mg Oral BID    carBAMazepine  200 mg Oral BID    haloperidol  1 mg Oral BID    sodium chloride flush  5-40 mL IntraVENous 2 times per day    enoxaparin  30 mg SubCUTAneous BID     Continuous Infusions:    sodium chloride       PRN Meds: HYDROcodone 5 mg - acetaminophen **OR** HYDROcodone 5 mg - acetaminophen, mineral oil-hydrophilic petrolatum, sodium chloride flush, sodium chloride, potassium chloride **OR** potassium alternative oral replacement **OR** potassium chloride, magnesium sulfate, ondansetron **OR** ondansetron, polyethylene glycol, acetaminophen **OR** acetaminophen    Data:     Past Medical History:   has a past medical history of Bipolar 1 disorder ([facility] GERD (gastroesophageal reflux disease), Hidradenitis suppurativa, Polysubstance abuse ([facility] and Schizoaffective disorder ([facility] Social History:   reports that he has been smoking cigarettes.  He has a 22.00 pack-year smoking history. He has never used smokeless tobacco. He reports current alcohol use. He reports current drug use. Family History:   Family History   Problem Relation Age of Onset    Cancer Mother         breast    High Blood Pressure Maternal Grandfather     Mental Illness Paternal Aunt     Substance Abuse Paternal Uncle     Substance Abuse Brother     Alcohol Abuse Father     Cancer Father         pancrease       Vitals:  /75   Pulse 93   Temp 98.2 °F (36.8 °C) (Oral)   Resp 20   Ht 5' 9\" (1.753 m)   Wt 245 lb 6 oz (111.3 kg)   SpO2 91%   BMI 36.24 kg/m²   Temp (24hrs), Av.9 °F (36.6 °C), Min:97.5 °F (36.4 °C), Max:98.2 °F (36.8 °C)    No results for input(s): POCGLU in the last 72 hours. I/O (24Hr): Intake/Output Summary (Last 24 hours) at 10/26/2022 1408  Last data filed at 10/26/2022 0000  Gross per 24 hour   Intake --   Output 1350 ml   Net -1350 ml         Labs:  Hematology:  Recent Labs     10/24/22  0500   WBC 13.5*   RBC 3.69*   HGB 10.6*   HCT 34.7*   MCV 94.0   MCH 28.7   MCHC 30.5   RDW 14.8*   *   MPV 8.3       Chemistry:  No results for input(s): NA, K, CL, CO2, GLUCOSE, BUN, CREATININE, MG, ANIONGAP, LABGLOM, GFRAA, CALCIUM, CAION, PHOS, PSA, PROBNP, TROPHS, CKTOTAL, CKMB, CKMBINDEX, MYOGLOBIN, DIGOXIN, LACTACIDWB in the last 72 hours. No results for input(s): PROT, LABALBU, LABA1C, A8LUPVX, R2PXUIT, FT4, TSH, AST, ALT, LDH, GGT, ALKPHOS, LABGGT, BILITOT, BILIDIR, AMMONIA, AMYLASE, LIPASE, LACTATE, CHOL, HDL, LDLCHOLESTEROL, CHOLHDLRATIO, TRIG, VLDL, TZE82BV, PHENYTOIN, PHENYF, URICACID, POCGLU in the last 72 hours.     ABG:No results found for: POCPH, PHART, PH, POCPCO2, OTU4MQO, PCO2, POCPO2, PO2ART, PO2, POCHCO3, LBE8PWH, HCO3, NBEA, PBEA, BEART, BE, THGBART, THB, IYG6ILG, NTHU8XZC, S4ISSRBP, O2SAT, FIO2  Lab Results   Component Value Date/Time    SPECIAL RT HAND 10ML 10/09/2022 09:18 AM    SPECIAL LT AC 20ML 10/09/2022 09:18 AM     Lab Results   Component Value Date/Time    CULTURE PROTEUS MIRABILIS SCANT GROWTH (A) 10/12/2022 12:32 PM    CULTURE ESCHERICHIA COLI SCANT GROWTH (A) 10/12/2022 12:32 PM    CULTURE NORMAL SKIN PRAVEEN LIGHT GROWTH 10/12/2022 12:32 PM       Radiology:  CT HEAD WO CONTRAST    Result Date: 10/9/2022  1. No hemorrhage. 2. Insert of uncertain chronicity in the right frontal lobe. Consider further evaluation with MRI. CT PELVIS WO CONTRAST Additional Contrast? None    Result Date: 10/9/2022  Slightly increased chronic appearing infectious/inflammatory cutaneous/subcutaneous process of the anterior pelvis and gluteal region. Recommend correlation with history of hidradenitis suppurativa. CT ABDOMEN PELVIS W IV CONTRAST Additional Contrast? None    Result Date: 10/9/2022  1. Skin thickening of the low anterior abdominal wall, anterior pelvis and visualized gluteal region. Note, the perineum is not visualized on this study. Patient could return for further imaging if clinically indicated. 2. Large volume stool in the rectum.        Physical Examination:        General appearance:  alert, cooperative and obese  Mental Status:  oriented to person, place and time and normal affect  Lungs:  clear to auscultation bilaterally, normal effort  Heart:  regular rate and rhythm, no murmur  Abdomen:  soft, nontender, nondistended, normal bowel sounds, no masses, hepatomegaly, splenomegaly  Extremities:  no edema, redness, tenderness in the calves  Skin: Multiple wounds present in groin    Assessment:        Hospital Problems             Last Modified POA    * (Principal) Hidradenitis suppurativa 10/9/2022 Yes    Septicemia ([address] 10/10/2022 Yes    Schizophrenia ([address] 10/10/2022 Yes    Gram-positive bacteremia 10/10/2022 Yes    Cellulitis of right thigh 10/10/2022 Yes    Bandemia 10/10/2022 Yes    Scrotal abscess 10/10/2022 Yes    Immunosuppressed due to chemotherapy ([address] 10/10/2022 Yes    Mild protein-calorie malnutrition ([address] 10/12/2022 Yes Hypovitaminosis D 10/12/2022 Yes    Gluteal abscess 10/14/2022 Yes    Abscess of skin of abdomen 10/9/2022 Yes    Schizoaffective disorder, bipolar type ([facility] 10/10/2022 Yes    GERD (gastroesophageal reflux disease) 10/10/2022 Yes   Plan:        Sepsis with hidradenitis suppurativa-patient completed Augmentin and Levaquin for 2 weeks. New CT scan reviewed with surgery, no intervention planned.   Continue wound care, patient needs to continue Humira per dermatology, difficult discharge plan given high cost for Humira,  working to get humira delivered to the facility    Schizoaffective disorder-seen by psychiatry earlier this admission, medication adjustments made, continue Haldol, Tegretol    Leukocytosis-downtrending    Immunosuppressed-received Humira outpatient, currently on hold, plan to restart outpatient    Replace vitamin D, continue vitamin C and zinc  Continue supplements for malnutrition  Monitor vitals  Monitor electrolytes and replace as needed    Discharge plan to SNF, hopefully today  Encouraged PT, OT    Case discussed with     Lakia Negro MD  10/26/2022  2:08 PM

## 2022-10-26 NOTE — CARE COORDINATION
Transitional planning note: plan is [facility]. Per jos, the facility director is ok to accept zeenat to facility as humira injections have been arranged for outpatient at [address] but they are still awaiting corporate approval. Advised that I need an answer today. [facility] on will call. 1105: spoke with jos from [facility] who states they can accept zeenat but are moving beds around. She asked if transport can be arranged for later this afternoon    1110: [facility] with facundo for 4pm or after  time. Gayathri Tariq states she will call around and call me back with a  time. Call placed to patient's mom and left vm message to notify that patient will transfer to [facility] today. 1115: spoke with leticia from [address] [place] [address] who states 345PM . Spoke with patient to notify of 345PM  and he is agreeable to plan. 1120: notified jos with [facility] of 345PM .     1130: spoke with patient's mom and informed her of 345PM  to [facility]

## 2022-10-26 NOTE — PLAN OF CARE
Problem: Discharge Planning  Goal: Discharge to home or other facility with appropriate resources  10/25/2022 2226 by Tito Lopez RN  Outcome: Progressing     Problem: Safety - Adult  Goal: Free from fall injury  10/25/2022 2226 by Tito Lopez RN  Outcome: Progressing     Problem: Confusion  Goal: Confusion, delirium, dementia, or psychosis is improved or at baseline  Description: INTERVENTIONS:  1. Assess for possible contributors to thought disturbance, including medications, impaired vision or hearing, underlying metabolic abnormalities, dehydration, psychiatric diagnoses, and notify attending LIP  2. [place] high risk fall precautions, as indicated  3. Provide frequent short contacts to provide reality reorientation, refocusing and direction  4. Decrease environmental stimuli, including noise as appropriate  5. Monitor and intervene to maintain adequate nutrition, hydration, elimination, sleep and activity  6. If unable to ensure safety without constant attention obtain sitter and review sitter guidelines with assigned personnel  7. Initiate Psychosocial CNS and Spiritual Care consult, as indicated  10/25/2022 2226 by Tito Lopez RN  Outcome: Progressing     Problem: Skin/Tissue Integrity  Goal: Absence of new skin breakdown  Description: 1. Monitor for areas of redness and/or skin breakdown  2. Assess vascular access sites hourly  3. Every 4-6 hours minimum:  Change oxygen saturation probe site  4. Every 4-6 hours:  If on nasal continuous positive airway pressure, respiratory therapy assess nares and determine need for appliance change or resting period.   10/25/2022 2226 by Tito Lopez RN  Outcome: Progressing     Problem: Pain  Goal: Verbalizes/displays adequate comfort level or baseline comfort level  10/25/2022 2226 by Tito Lopez RN  Outcome: Progressing

## 2022-11-11 ENCOUNTER — HOSPITAL ENCOUNTER (INPATIENT)
Age: 42
LOS: 5 days | Discharge: NURSING, REHAB OR LONG-TERM CARE | DRG: 872 | End: 2022-11-17
Attending: EMERGENCY MEDICINE | Admitting: FAMILY MEDICINE
Payer: COMMERCIAL

## 2022-11-11 DIAGNOSIS — L73.2 HYDRADENITIS: Primary | ICD-10-CM

## 2022-11-11 DIAGNOSIS — A41.9 SEPSIS, DUE TO UNSPECIFIED ORGANISM, UNSPECIFIED WHETHER ACUTE ORGAN DYSFUNCTION PRESENT (HCC): ICD-10-CM

## 2022-11-11 DIAGNOSIS — R33.9 URINARY RETENTION: ICD-10-CM

## 2022-11-11 PROCEDURE — 99285 EMERGENCY DEPT VISIT HI MDM: CPT

## 2022-11-11 RX ORDER — CLINDAMYCIN PHOSPHATE 600 MG/50ML
600 INJECTION INTRAVENOUS ONCE
Status: COMPLETED | OUTPATIENT
Start: 2022-11-11 | End: 2022-11-12

## 2022-11-11 RX ORDER — MORPHINE SULFATE 4 MG/ML
4 INJECTION, SOLUTION INTRAMUSCULAR; INTRAVENOUS ONCE
Status: COMPLETED | OUTPATIENT
Start: 2022-11-11 | End: 2022-11-12

## 2022-11-12 ENCOUNTER — APPOINTMENT (OUTPATIENT)
Dept: CT IMAGING | Age: 42
DRG: 872 | End: 2022-11-12
Payer: COMMERCIAL

## 2022-11-12 PROBLEM — Z91.199 NONCOMPLIANCE BY DECLINING SERVICE: Status: ACTIVE | Noted: 2022-11-12

## 2022-11-12 LAB
ABSOLUTE EOS #: 0.41 K/UL (ref 0–0.44)
ABSOLUTE IMMATURE GRANULOCYTE: 0.05 K/UL (ref 0–0.3)
ABSOLUTE LYMPH #: 2.51 K/UL (ref 1.1–3.7)
ABSOLUTE MONO #: 1.4 K/UL (ref 0.1–1.2)
ALBUMIN SERPL-MCNC: 2.8 G/DL (ref 3.5–5.2)
ALP BLD-CCNC: 133 U/L (ref 40–129)
ALT SERPL-CCNC: 70 U/L (ref 5–41)
ANION GAP SERPL CALCULATED.3IONS-SCNC: 10 MMOL/L (ref 9–17)
AST SERPL-CCNC: 28 U/L
BASOPHILS # BLD: 1 % (ref 0–2)
BASOPHILS ABSOLUTE: 0.07 K/UL (ref 0–0.2)
BILIRUB SERPL-MCNC: 0.2 MG/DL (ref 0.3–1.2)
BUN BLDV-MCNC: 7 MG/DL (ref 6–20)
BUN/CREAT BLD: 11 (ref 9–20)
CALCIUM SERPL-MCNC: 9.7 MG/DL (ref 8.6–10.4)
CHLORIDE BLD-SCNC: 98 MMOL/L (ref 98–107)
CO2: 25 MMOL/L (ref 20–31)
CREAT SERPL-MCNC: 0.63 MG/DL (ref 0.7–1.2)
EOSINOPHILS RELATIVE PERCENT: 3 % (ref 1–4)
GFR SERPL CREATININE-BSD FRML MDRD: >60 ML/MIN/1.73M2
GLUCOSE BLD-MCNC: 96 MG/DL (ref 70–99)
HCT VFR BLD CALC: 34.4 % (ref 40.7–50.3)
HEMOGLOBIN: 10.5 G/DL (ref 13–17)
IMMATURE GRANULOCYTES: 0 %
LACTIC ACID, SEPSIS: 1 MMOL/L (ref 0.5–1.9)
LYMPHOCYTES # BLD: 20 % (ref 24–43)
MCH RBC QN AUTO: 27.9 PG (ref 25.2–33.5)
MCHC RBC AUTO-ENTMCNC: 30.5 G/DL (ref 28.4–34.8)
MCV RBC AUTO: 91.2 FL (ref 82.6–102.9)
MONOCYTES # BLD: 11 % (ref 3–12)
NRBC AUTOMATED: 0 PER 100 WBC
PDW BLD-RTO: 14.1 % (ref 11.8–14.4)
PLATELET # BLD: 489 K/UL (ref 138–453)
PMV BLD AUTO: 8.3 FL (ref 8.1–13.5)
POTASSIUM SERPL-SCNC: 4 MMOL/L (ref 3.7–5.3)
RBC # BLD: 3.77 M/UL (ref 4.21–5.77)
SEG NEUTROPHILS: 65 % (ref 36–65)
SEGMENTED NEUTROPHILS ABSOLUTE COUNT: 8.12 K/UL (ref 1.5–8.1)
SODIUM BLD-SCNC: 133 MMOL/L (ref 135–144)
TOTAL PROTEIN: 8.4 G/DL (ref 6.4–8.3)
WBC # BLD: 12.6 K/UL (ref 3.5–11.3)

## 2022-11-12 PROCEDURE — 87077 CULTURE AEROBIC IDENTIFY: CPT

## 2022-11-12 PROCEDURE — 6360000002 HC RX W HCPCS: Performed by: EMERGENCY MEDICINE

## 2022-11-12 PROCEDURE — 6360000002 HC RX W HCPCS: Performed by: NURSE PRACTITIONER

## 2022-11-12 PROCEDURE — 80053 COMPREHEN METABOLIC PANEL: CPT

## 2022-11-12 PROCEDURE — 96361 HYDRATE IV INFUSION ADD-ON: CPT

## 2022-11-12 PROCEDURE — 96367 TX/PROPH/DG ADDL SEQ IV INF: CPT

## 2022-11-12 PROCEDURE — 99222 1ST HOSP IP/OBS MODERATE 55: CPT | Performed by: INTERNAL MEDICINE

## 2022-11-12 PROCEDURE — 36415 COLL VENOUS BLD VENIPUNCTURE: CPT

## 2022-11-12 PROCEDURE — 99223 1ST HOSP IP/OBS HIGH 75: CPT | Performed by: INTERNAL MEDICINE

## 2022-11-12 PROCEDURE — 2500000003 HC RX 250 WO HCPCS: Performed by: EMERGENCY MEDICINE

## 2022-11-12 PROCEDURE — 2580000003 HC RX 258: Performed by: EMERGENCY MEDICINE

## 2022-11-12 PROCEDURE — 96375 TX/PRO/DX INJ NEW DRUG ADDON: CPT

## 2022-11-12 PROCEDURE — 87070 CULTURE OTHR SPECIMN AEROBIC: CPT

## 2022-11-12 PROCEDURE — 2580000003 HC RX 258: Performed by: NURSE PRACTITIONER

## 2022-11-12 PROCEDURE — 2500000003 HC RX 250 WO HCPCS: Performed by: NURSE PRACTITIONER

## 2022-11-12 PROCEDURE — 83605 ASSAY OF LACTIC ACID: CPT

## 2022-11-12 PROCEDURE — 6370000000 HC RX 637 (ALT 250 FOR IP): Performed by: NURSE PRACTITIONER

## 2022-11-12 PROCEDURE — 87040 BLOOD CULTURE FOR BACTERIA: CPT

## 2022-11-12 PROCEDURE — 74177 CT ABD & PELVIS W/CONTRAST: CPT

## 2022-11-12 PROCEDURE — 6360000004 HC RX CONTRAST MEDICATION: Performed by: EMERGENCY MEDICINE

## 2022-11-12 PROCEDURE — 87205 SMEAR GRAM STAIN: CPT

## 2022-11-12 PROCEDURE — 85025 COMPLETE CBC W/AUTO DIFF WBC: CPT

## 2022-11-12 PROCEDURE — 87186 SC STD MICRODIL/AGAR DIL: CPT

## 2022-11-12 PROCEDURE — 1200000000 HC SEMI PRIVATE

## 2022-11-12 PROCEDURE — 96365 THER/PROPH/DIAG IV INF INIT: CPT

## 2022-11-12 RX ORDER — CLINDAMYCIN PHOSPHATE 600 MG/50ML
600 INJECTION INTRAVENOUS EVERY 8 HOURS
Status: DISCONTINUED | OUTPATIENT
Start: 2022-11-12 | End: 2022-11-16

## 2022-11-12 RX ORDER — POTASSIUM CHLORIDE 20 MEQ/1
40 TABLET, EXTENDED RELEASE ORAL PRN
Status: DISCONTINUED | OUTPATIENT
Start: 2022-11-12 | End: 2022-11-17 | Stop reason: HOSPADM

## 2022-11-12 RX ORDER — MAGNESIUM SULFATE 1 G/100ML
1000 INJECTION INTRAVENOUS PRN
Status: DISCONTINUED | OUTPATIENT
Start: 2022-11-12 | End: 2022-11-17 | Stop reason: HOSPADM

## 2022-11-12 RX ORDER — SODIUM CHLORIDE 9 MG/ML
INJECTION, SOLUTION INTRAVENOUS PRN
Status: DISCONTINUED | OUTPATIENT
Start: 2022-11-12 | End: 2022-11-17 | Stop reason: HOSPADM

## 2022-11-12 RX ORDER — POTASSIUM CHLORIDE 7.45 MG/ML
10 INJECTION INTRAVENOUS PRN
Status: DISCONTINUED | OUTPATIENT
Start: 2022-11-12 | End: 2022-11-17 | Stop reason: HOSPADM

## 2022-11-12 RX ORDER — ONDANSETRON 2 MG/ML
4 INJECTION INTRAMUSCULAR; INTRAVENOUS EVERY 6 HOURS PRN
Status: DISCONTINUED | OUTPATIENT
Start: 2022-11-12 | End: 2022-11-17 | Stop reason: HOSPADM

## 2022-11-12 RX ORDER — KETOROLAC TROMETHAMINE 15 MG/ML
15 INJECTION, SOLUTION INTRAMUSCULAR; INTRAVENOUS ONCE
Status: ACTIVE | OUTPATIENT
Start: 2022-11-12 | End: 2022-11-17

## 2022-11-12 RX ORDER — ACETAMINOPHEN 325 MG/1
650 TABLET ORAL EVERY 6 HOURS PRN
Status: DISCONTINUED | OUTPATIENT
Start: 2022-11-12 | End: 2022-11-17 | Stop reason: HOSPADM

## 2022-11-12 RX ORDER — SODIUM CHLORIDE 0.9 % (FLUSH) 0.9 %
10 SYRINGE (ML) INJECTION PRN
Status: DISCONTINUED | OUTPATIENT
Start: 2022-11-12 | End: 2022-11-17 | Stop reason: HOSPADM

## 2022-11-12 RX ORDER — ACETAMINOPHEN 650 MG/1
650 SUPPOSITORY RECTAL EVERY 6 HOURS PRN
Status: DISCONTINUED | OUTPATIENT
Start: 2022-11-12 | End: 2022-11-17 | Stop reason: HOSPADM

## 2022-11-12 RX ORDER — 0.9 % SODIUM CHLORIDE 0.9 %
1000 INTRAVENOUS SOLUTION INTRAVENOUS ONCE
Status: COMPLETED | OUTPATIENT
Start: 2022-11-12 | End: 2022-11-12

## 2022-11-12 RX ORDER — CARBAMAZEPINE 200 MG/1
200 TABLET ORAL 2 TIMES DAILY
Status: DISCONTINUED | OUTPATIENT
Start: 2022-11-12 | End: 2022-11-17 | Stop reason: HOSPADM

## 2022-11-12 RX ORDER — ENOXAPARIN SODIUM 100 MG/ML
30 INJECTION SUBCUTANEOUS 2 TIMES DAILY
Status: DISCONTINUED | OUTPATIENT
Start: 2022-11-12 | End: 2022-11-17 | Stop reason: HOSPADM

## 2022-11-12 RX ORDER — SODIUM CHLORIDE 0.9 % (FLUSH) 0.9 %
5-40 SYRINGE (ML) INJECTION EVERY 12 HOURS SCHEDULED
Status: DISCONTINUED | OUTPATIENT
Start: 2022-11-12 | End: 2022-11-17 | Stop reason: HOSPADM

## 2022-11-12 RX ORDER — HALOPERIDOL 1 MG/1
1 TABLET ORAL 2 TIMES DAILY
Status: DISCONTINUED | OUTPATIENT
Start: 2022-11-12 | End: 2022-11-17 | Stop reason: HOSPADM

## 2022-11-12 RX ORDER — POLYETHYLENE GLYCOL 3350 17 G/17G
17 POWDER, FOR SOLUTION ORAL DAILY PRN
Status: DISCONTINUED | OUTPATIENT
Start: 2022-11-12 | End: 2022-11-17 | Stop reason: HOSPADM

## 2022-11-12 RX ORDER — SODIUM CHLORIDE 9 MG/ML
INJECTION, SOLUTION INTRAVENOUS CONTINUOUS
Status: DISCONTINUED | OUTPATIENT
Start: 2022-11-12 | End: 2022-11-16

## 2022-11-12 RX ORDER — ONDANSETRON 4 MG/1
4 TABLET, ORALLY DISINTEGRATING ORAL EVERY 8 HOURS PRN
Status: DISCONTINUED | OUTPATIENT
Start: 2022-11-12 | End: 2022-11-17 | Stop reason: HOSPADM

## 2022-11-12 RX ORDER — MORPHINE SULFATE 4 MG/ML
INJECTION, SOLUTION INTRAMUSCULAR; INTRAVENOUS
Status: DISPENSED
Start: 2022-11-12 | End: 2022-11-12

## 2022-11-12 RX ORDER — 0.9 % SODIUM CHLORIDE 0.9 %
80 INTRAVENOUS SOLUTION INTRAVENOUS ONCE
Status: DISCONTINUED | OUTPATIENT
Start: 2022-11-12 | End: 2022-11-17 | Stop reason: HOSPADM

## 2022-11-12 RX ADMIN — IOPAMIDOL 75 ML: 755 INJECTION, SOLUTION INTRAVENOUS at 00:47

## 2022-11-12 RX ADMIN — CARBAMAZEPINE 200 MG: 200 TABLET ORAL at 21:37

## 2022-11-12 RX ADMIN — SODIUM CHLORIDE 1000 ML: 9 INJECTION, SOLUTION INTRAVENOUS at 01:34

## 2022-11-12 RX ADMIN — ENOXAPARIN SODIUM 30 MG: 100 INJECTION SUBCUTANEOUS at 21:37

## 2022-11-12 RX ADMIN — ACETAMINOPHEN 650 MG: 325 TABLET, FILM COATED ORAL at 17:22

## 2022-11-12 RX ADMIN — CEFEPIME 2000 MG: 2 INJECTION, POWDER, FOR SOLUTION INTRAVENOUS at 14:25

## 2022-11-12 RX ADMIN — CEFEPIME 2000 MG: 2 INJECTION, POWDER, FOR SOLUTION INTRAVENOUS at 01:35

## 2022-11-12 RX ADMIN — ENOXAPARIN SODIUM 30 MG: 100 INJECTION SUBCUTANEOUS at 09:53

## 2022-11-12 RX ADMIN — CLINDAMYCIN IN 5 PERCENT DEXTROSE 600 MG: 12 INJECTION, SOLUTION INTRAVENOUS at 01:05

## 2022-11-12 RX ADMIN — Medication 80 ML: at 00:50

## 2022-11-12 RX ADMIN — CLINDAMYCIN IN 5 PERCENT DEXTROSE 600 MG: 12 INJECTION, SOLUTION INTRAVENOUS at 21:20

## 2022-11-12 RX ADMIN — HALOPERIDOL 1 MG: 1 TABLET ORAL at 21:37

## 2022-11-12 RX ADMIN — SODIUM CHLORIDE: 9 INJECTION, SOLUTION INTRAVENOUS at 09:54

## 2022-11-12 RX ADMIN — SODIUM CHLORIDE, PRESERVATIVE FREE 10 ML: 5 INJECTION INTRAVENOUS at 00:49

## 2022-11-12 RX ADMIN — MORPHINE SULFATE 4 MG: 4 INJECTION, SOLUTION INTRAMUSCULAR; INTRAVENOUS at 00:09

## 2022-11-12 RX ADMIN — CLINDAMYCIN IN 5 PERCENT DEXTROSE 600 MG: 12 INJECTION, SOLUTION INTRAVENOUS at 09:47

## 2022-11-12 RX ADMIN — HALOPERIDOL 1 MG: 1 TABLET ORAL at 09:47

## 2022-11-12 RX ADMIN — CARBAMAZEPINE 200 MG: 200 TABLET ORAL at 09:47

## 2022-11-12 ASSESSMENT — PAIN DESCRIPTION - ORIENTATION: ORIENTATION: RIGHT;LEFT

## 2022-11-12 ASSESSMENT — PAIN SCALES - WONG BAKER
WONGBAKER_NUMERICALRESPONSE: 6

## 2022-11-12 ASSESSMENT — ENCOUNTER SYMPTOMS: EYES NEGATIVE: 1

## 2022-11-12 ASSESSMENT — PAIN DESCRIPTION - LOCATION
LOCATION: GROIN
LOCATION: GENERALIZED

## 2022-11-12 ASSESSMENT — PAIN DESCRIPTION - DESCRIPTORS: DESCRIPTORS: SHARP

## 2022-11-12 ASSESSMENT — PAIN SCALES - GENERAL
PAINLEVEL_OUTOF10: 10
PAINLEVEL_OUTOF10: 7
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
## 2022-11-12 NOTE — PROGRESS NOTES
DATE: 2022    NAME: Jono Bradshaw  MRN: [id number]   : 1980    Patient not seen this date for Physical Therapy due to:      [] Cancel by RN or physician due to:    [] Hemodialysis    [] Critical Lab Value Level     [] Blood transfusion in progress    [] Acute or unstable cardiovascular status   _MAP < 55 or more than >115  _HR < 40 or > 130    [] Acute or unstable pulmonary status   -FiO2 > 60%   _RR < 5 or >40    _O2 sats < 85%    [] Strict Bedrest    [] Off Unit for surgery or procedure    [] Off Unit for testing       [] Pending imaging to R/O fracture    [x] Refusal by Patient      [] Other      [] PT being discontinued at this time. Patient independent. No further needs. [] PT being discontinued at this time as the patient has been transferred to hospice care. No further needs.       Janusz Colorado, PT

## 2022-11-12 NOTE — PROGRESS NOTES
[facility]  PROGRESS NOTE    Room # 2007/2007-02   Name: Geal Duval            Episcopalian: unknown    Reason for visit: [facility] Date & Time: 11/11/2022 11:23 PM    Assessment:  Gael Duval is a 38 y.o. male in the hospital because \"hidradentitis suppurtavia\". Upon entering the room pt was sleeping. Intervention: This writer offered a brief silent prayer for pt    Outcome:  Pt remained sleeping    Plan:  Chaplains will remain available to offer spiritual and emotional support as needed.     Electronically signed by Mary Jane Concepcion on 11/12/2022 at 12:09 PM.  [place]       11/12/22 1208   Encounter Summary   Service Provided For: Patient not available   Referral/Consult From: Nurse   Last Encounter  11/12/22   Complexity of Encounter Low   Begin Time 1120   End Time  1122   Total Time Calculated 2 min   Encounter    Type Initial Screen/Assessment   Spiritual/Emotional needs   Type Spiritual Support   Assessment/Intervention/Outcome   Assessment Unable to assess   Intervention Prayer (assurance of)/[place]

## 2022-11-12 NOTE — CARE COORDINATION
Pt recently D/C from . 's 10-26 to [facility]. Spoke to [place] at facility. Has multiple psych diagnoses. Mother is guardian. Pt has been non compliant and refuses therapy and care at facility. Has multiple wounds on legs, groin, under abd folds and coccyx. Became feverish and tachycardic at facility and readmitted yesterday for hidradenitis suppurativa. [facility] has bed on hold and will accept pt back at discharge. Currently on IV Maxipime and Cleocin. ID consulted. SW informed of return to [facility]. HANNAH initiated. Continue to follow.

## 2022-11-12 NOTE — CONSULTS
General Surgery:  Consult Note        PATIENT NAME: Jorden Thomas OF BIRTH: 1980    ADMISSION DATE: 11/11/2022 11:23 PM     Admitting Provider: Feli Watson    Consulted Physician: Dr. Hung Pap DATE: 11/12/2022    Chief Complaint:  I hurt  Consult Regarding:  Chronic HS gluteal and perineal     HISTORY OF PRESENT ILLNESS:  The patient is a 38 y.o. male  who was admitted on 11/12/2022 with complaints of pain and wound drainage from his groin and bilateral buttocks and perineum with worsening as patient reportedly not allowing staff at his SNF to care for them. Patient has hx severe HS failing multiple surgical treatments and medical management. Patient had previously been on humira and working relatively well but discontinued when incarcerated. Patient recently seen in October by [facility] surgery team recommending medical management and plastic surgery evaluation. On exam patient wounds appear ropy, chronic, with some drainage but no foul smell or expanding erythema. Patient currently VSS, patient withdrawn during discussion but cooperative with exam.      Past Medical History:        Diagnosis Date    Bipolar 1 disorder ([address]     GERD (gastroesophageal reflux disease) 8/13/2016    Hidradenitis suppurativa     Polysubstance abuse [facility]     Schizoaffective disorder ([facility] [address]        Past Surgical History:        Procedure Laterality Date    ABSCESS DRAINAGE  11/1/2013    left inguinal hydrodenitis/pilondal cyst    INFECTED SKIN DEBRIDEMENT  july 2011       Medications Prior to Admission:   Medications Prior to Admission: mineral oil-hydrophilic petrolatum (AQUAPHOR) ointment, Apply topically as needed.   zinc sulfate (ZINCATE) 220 (50 Zn) MG capsule, Take 1 capsule by mouth daily  ascorbic acid (VITAMIN C) 500 MG tablet, Take 1 tablet by mouth 2 times daily  Ergocalciferol (VITAMIN D) 72396 units CAPS, Take 50,000 Units by mouth once a week  carBAMazepine (TEGRETOL) 200 MG tablet, Take 1 tablet by mouth 2 times daily  haloperidol (HALDOL) 1 MG tablet, Take 1 tablet by mouth 2 times daily    Allergies:  Depakene [valproic acid], Restoril [temazepam], Risperidone and related, Thorazine [chlorpromazine], and Vancomycin    Social History:   Social History     Socioeconomic History    Marital status: Single     Spouse name: Not on file    Number of children: Not on file    Years of education: Not on file    Highest education level: Not on file   Occupational History    Not on file   Tobacco Use    Smoking status: Every Day     Packs/day: 1.00     Years: 22.00     Pack years: 22.00     Types: Cigarettes    Smokeless tobacco: Never   Vaping Use    Vaping Use: Never used   Substance and Sexual Activity    Alcohol use: Yes     Comment: occasional    Drug use: Yes     Comment: history of marijuana & Opiate abue, claims that he is currently clean & sober. Sexual activity: Not on file   Other Topics Concern    Not on file   Social History Narrative    Not on file     Social Determinants of Health     Financial Resource Strain: Not on file   Food Insecurity: Not on file   Transportation Needs: Not on file   Physical Activity: Not on file   Stress: Not on file   Social Connections: Not on file   Intimate Partner Violence: Not on file   Housing Stability: Not on file       Family History:       Problem Relation Age of Onset    Cancer Mother         breast    High Blood Pressure Maternal Grandfather     Mental Illness Paternal Aunt     Substance Abuse Paternal Uncle     Substance Abuse Brother     Alcohol Abuse Father     Cancer Father         pancrease       REVIEW OF SYSTEMS:    CONSTITUTIONAL: Norecent weight loss, fatigue, fevers, chills. HEENT: Denies rhinorrhea, dysphagia, odynphagia. CARDIOVASCULAR: Denies history of MI, recent chest pain. RESPIRATORY: Denies recent history of shortness of breath or history of PE.   GASTROINTESTINAL: hx GERD  GENITOURINARY: Denies increased frequency or dysuria. HEMATOLOGIC/LYMPHATIC: Denies history of anemia or DVTs. ENDOCRINE: Denies history of thyroid problems or diabetes. NEURO: Denies history of CVA, TIA. SKIN: hx severe perineal HS with extensive buttock disease requiring multiple excisions and debridements in the past  Review of systems negative unless listed above. PHYSICAL EXAM:    VITALS:  /75   Pulse 98   Temp 98.8 °F (37.1 °C) (Oral)   Resp 20   Wt 245 lb (111.1 kg)   SpO2 92%   BMI 36.18 kg/m²   INTAKE/OUTPUT:     Intake/Output Summary (Last 24 hours) at 11/12/2022 0650  Last data filed at 11/12/2022 0207  Gross per 24 hour   Intake 100 ml   Output --   Net 100 ml       CONSTITUTIONAL:  awake, alert, not distressed and normal weight  HEENT: Normocephalic/atraumatic, without obvious abnormality. NECK:  Supple, symmetrical, trachea midline   CARDIOVASCULAR: Regular rate and rhythm  LUNGS: equal chest rise and fall, no increased WOB, no audible stridor or wheeze  ABDOMEN: soft, NTND, indwelling hunter in place  MUSCULOSKELETAL: Muscle strength intact in all extremities bilaterally. NEUROLOGIC: Gross motor intact without focal weakness.   SKIN: Large area of scar to the bilateral groin, perineal area, and bilateral buttocks, scattered pits with small amount drainage, no acute erythema or spreading cellulitis  Orientation:   oriented to person, place, and time        CBC with Differential:    Lab Results   Component Value Date/Time    WBC 12.6 11/12/2022 12:22 AM    RBC 3.77 11/12/2022 12:22 AM    RBC 3.46 02/09/2012 06:19 AM    HGB 10.5 11/12/2022 12:22 AM    HCT 34.4 11/12/2022 12:22 AM     11/12/2022 12:22 AM     02/09/2012 06:19 AM    MCV 91.2 11/12/2022 12:22 AM    MCH 27.9 11/12/2022 12:22 AM    MCHC 30.5 11/12/2022 12:22 AM    RDW 14.1 11/12/2022 12:22 AM    LYMPHOPCT 20 11/12/2022 12:22 AM    MONOPCT 11 11/12/2022 12:22 AM    BASOPCT 1 11/12/2022 12:22 AM    MONOSABS 1.40 11/12/2022 12:22 AM    LYMPHSABS 2.51 11/12/2022 12:22 AM    EOSABS 0.41 11/12/2022 12:22 AM    BASOSABS 0.07 11/12/2022 12:22 AM    DIFFTYPE NOT REPORTED 11/03/2021 09:08 AM     BMP:    Lab Results   Component Value Date/Time     11/12/2022 12:22 AM    K 4.0 11/12/2022 12:22 AM    CL 98 11/12/2022 12:22 AM    CO2 25 11/12/2022 12:22 AM    BUN 7 11/12/2022 12:22 AM    LABALBU 2.8 11/12/2022 12:22 AM    CREATININE 0.63 11/12/2022 12:22 AM    CALCIUM 9.7 11/12/2022 12:22 AM    GFRAA >60 09/09/2022 03:26 PM    LABGLOM >60 11/12/2022 12:22 AM    GLUCOSE 96 11/12/2022 12:22 AM    GLUCOSE 89 02/09/2012 06:19 AM       Pertinent Radiology:   CT ABDOMEN PELVIS W IV CONTRAST Additional Contrast? None    Result Date: 11/12/2022  Redemonstration of atrophy/hypoplastic nonfunctional right kidney which is a few cyst with calcified wall. The largest cyst with calcified wall measures 4.3 cm, similar to previous CT scan. Normal left kidney a prominent size without any abnormality. Evidence of moderate to large amount of mildly hyperdense material in the gallbladder suggestive of significant sludge in gallbladder without obvious radiopaque stones. Normal appendix visualized. Nonspecific small to moderate amount of gas scattered in small bowel with multiple fluid levels. Acute enteritis not excluded. Thickened redundant subcutaneous fatty tissue suspended from the lower abdominal wall and pelvic wall without any significant surrounding inflammatory change. Inferior to the perineum evidence of irregular thickened tissues with surrounding inflammatory changes involving the posteromedial aspect of the gluteal folds mainly on the right side and extended to the posterior of medial aspect of the proximal right upper thigh. These soft tissue abnormalities are  limited to the subcutaneous tissues without extending to muscle.   No evidence of any inflammatory changes involving the muscles of abdominal walls, pelvic walls,, bilateral gluteal muscles, and the visualized muscles of the bilateral proximal thighs. ASSESSMENT:  Active Hospital Problems    Diagnosis Date Noted    Hidradenitis suppurativa [L73.2] 03/06/2012       Plan:  The patient does not appear to have any acute superficial abscesses on exam, patient is stable and these wound appear chronic in nature, worsened by poor adherence to wound care. At this time there are no acute surgical interventions required. Recommend patient be seen by plastic surgery and dermatology to discuss continued optimal management, this may be difficult due to patient's poor compliance however he will not be well served by surgical removal or aggressive debridement of his current wounds until maximal medical therapy initiated and continued. Electronically signed by Jayce Diaz DO  on 11/12/2022 at 6:50 AM      Attending Physician Statement  I have discussed the case, including pertinent history and exam findings with the resident. I agree with the assessment, plan and orders as documented by the resident. The patient's wounds were evaluated at bedside today. These are all chronic wounds and there unfortunately is no clear surgical answer. He has been noncompliant not only with his wound care, but other recommendations including the use of Humira. He has no undrained fluid collections at this point. The patient is followed by both dermatology and plastic surgery. I have nothing else to offer from a surgery standpoint. There is no need for urgent or emergent debridement. Further surgical intervention without a clear plan is fraught with failure. The patient does not need follow-up with me.

## 2022-11-12 NOTE — ED NOTES
Pt to ED via EMS from Nursing facility; pt was sent there after admission for sepsis. Per EMS's report pt was refusing care @ the facility. Pt is A&Ox4, Pt was refusing the wound care & dressing changes, pt has multiple wounds & abscesses, pt has hx of Hidradenitis suppurativa. Per EMS pt had maggots in his wounds; none was noted during wound care & linen change. Pt's wounds are malodorous & draining. Wounds presents to the inner thigh, groin, under the the folds of his abd, pt has multiple wounds to the coccyx & his gluteal folds.       Jayy Wright RN  11/12/22 2014

## 2022-11-12 NOTE — DISCHARGE INSTR - COC
Continuity of Care Form    Patient Name: Cailin Siddiqui   :  1980  MRN:  [id number]    [facility] date:  2022  Discharge date:  2022    Code Status Order: Full Code   Advance Directives:     Admitting Physician:  Jaswinder Brand MD  PCP: No primary care provider on file. Discharging Nurse: [facility] Unit/Room#:   Discharging Unit Phone Number: [phone]    Emergency Contact:   Extended Emergency Contact Information  Primary Emergency Contact: LingPetar  Address: [address] [place] [id number]  Home Phone: [phone]  Work Phone: [phone]  Relation: Parent    Past Surgical History:  Past Surgical History:   Procedure Laterality Date    ABSCESS DRAINAGE  2013    left inguinal hydrodenitis/pilondal cyst    INFECTED SKIN DEBRIDEMENT  2011       Immunization History: There is no immunization history on file for this patient.     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    Hidradenitis axillaris L73.2    Schizoaffective disorder, bipolar type ([address] F25.0    Encounter for ostomy nurse consultation Z71.89    Paranoid schizophrenia, chronic condition with acute exacerbation ([address] F20.0    Iron deficiency anemia D50.9    GERD (gastroesophageal reflux disease) K21.9    Tobacco abuse Z72.0    Non-healing left groin open wound, subsequent encounter S31.104D    Right groin wound, subsequent encounter S31.109D    Open wound of left ear S01.302A    Abscess L02.91    Septicemia ([address] A41.9    Constipation K59.00    Acute exacerbation of chronic paranoid schizophrenia ([address] F20.0    Schizophrenia ([address] F20.9    Gram-positive bacteremia R78.81    Cellulitis of right thigh L03.115 Bandemia D72.825    Scrotal abscess N49.2    Immunosuppressed due to chemotherapy ([facility] [address] D84.821, T45.1X5A, Z79.899    Mild protein-calorie malnutrition ([facility] [address] E44.1    Hypovitaminosis D E55.9    Gluteal abscess L02.31       Isolation/Infection:   Isolation            Contact          Patient Infection Status       Infection Onset Added Last Indicated Last Indicated By Review Planned Expiration Resolved Resolved By    MDRO (multi-drug resistant organism)  03/21/16 03/21/16 PHILIP Begum - 3/18/2016 urine            Nurse Assessment:  Last Vital Signs: /72   Pulse 94   Temp 98.6 °F (37 °C) (Oral)   Resp 16   Ht 5' 9\" (1.753 m)   Wt 245 lb (111.1 kg)   SpO2 94%   BMI 36.18 kg/m²     Last documented pain score (0-10 scale): Pain Level: 7  Last Weight:   Wt Readings from Last 1 Encounters:   11/11/22 245 lb (111.1 kg)     Mental Status:  oriented    IV Access:  - None    Nursing Mobility/ADLs:  Walking   Dependent  Transfer  Assisted  Bathing  Assisted  Dressing  Assisted  Toileting  Dependent  Feeding  Assisted  Med Admin  Dependent  Med Delivery   whole    Wound Care Documentation and Therapy:  Wound 10/10/22 Groin Left;Right chronic hidradinitis scarring and sinus tracts (Active)   Wound Image   10/10/22 1231   Dressing Status Intact 10/25/22 2000   Wound Cleansed Not Cleansed 10/25/22 2000   Dressing/Treatment Non adherent 11/12/22 0950   Dressing Change Due 10/10/22 10/10/22 1231   Wound Assessment Bleeding;Pink/red 11/12/22 0950   Drainage Amount Moderate 11/12/22 0950   Drainage Description Serosanguinous 11/12/22 0950   Odor Moderate 11/12/22 0950   Blaire-wound Assessment Dry/flaky 11/12/22 0950   Number of days: 33        Elimination:  Continence:    Bowel: No  Bladder: No  Urinary Catheter: None   Colostomy/Ileostomy/Ileal Conduit: No       Date of Last BM: ***    Intake/Output Summary (Last 24 hours) at 11/12/2022 1130  Last data filed at 11/12/2022 0920  Gross per 24 hour Intake 100 ml   Output 400 ml   Net -300 ml     I/O last 3 completed shifts: In: 100 [IV Piggyback:100]  Out: -     Safety Concerns: At Risk for Falls    Impairments/Disabilities:      None    Nutrition Therapy:  Current Nutrition Therapy:   - Oral Diet:  General    Routes of Feeding: Oral  Liquids: No Restrictions  Daily Fluid Restriction: no  Last Modified Barium Swallow with Video (Video Swallowing Test): not done    Treatments at the Time of Hospital Discharge:   Respiratory Treatments: ***  Oxygen Therapy:  is not on home oxygen therapy. Ventilator:    - No ventilator support    Rehab Therapies: Physical Therapy and Occupational Therapy  Weight Bearing Status/Restrictions: No weight bearing restrictions  Other Medical Equipment (for information only, NOT a DME order):  wheelchair  Other Treatments: ***    Patient's personal belongings (please select all that are sent with patient): Footwear, undergarments, socks, shirt, pants    RN SIGNATURE:  Electronically signed by Jesus Love RN on 11/17/22 at 1:43 PM EST    CASE MANAGEMENT/SOCIAL WORK SECTION    Inpatient Status Date: ***    Readmission Risk Assessment Score:  Readmission Risk              Risk of Unplanned Readmission:  16           Discharging to Facility/ Agency   Name: [facility]  Address: [address] [place] [address]  Phone: [phone]  Fax:      / signature: Electronically signed by Es Carrion RN on 11/12/22 at 11:30 AM EST    PHYSICIAN SECTION    Prognosis: Fair    Condition at Discharge: Stable    Rehab Potential (if transferring to Rehab): Guarded    Recommended Labs or Other Treatments After Discharge: PT OT , wound care     Physician Certification: I certify the above information and transfer of Theresa Calzada  is necessary for the continuing treatment of the diagnosis listed and that he requires [facility] for greater 30 days.      Update Admission H&P: No change in H&P    PHYSICIAN SIGNATURE:  Electronically signed by Liane Ochoa MD on 11/17/22 at 12:55 PM EST

## 2022-11-12 NOTE — PROGRESS NOTES
Pt admitted to 2007-2 vitals assessed, telemetry placed. Wounds washed and dried. Mepilex applied to posterior thigh and coccyx. Dry dressings cover abdomen and groin. Kowalski cleaned. Waffle mattress in place, bed alarm initiated.

## 2022-11-12 NOTE — CONSULTS
[facility]  Urology Consultation    Patient:  Paige Calvin  MRN: [id number]  YOB: 1980    CHIEF COMPLAINT:  chronic retention of urine    HISTORY OF PRESENT ILLNESS:   The patient is a 38 y.o. male who presents with severe hidradenitis with perineal abscesses and infection as outlined  As well as infected abdominal wounds  The patient has had an indwelling Kowalski catheter for the past few months he has refused the catheter change as well as catheter care  Patient's old records, notes and chart reviewed and summarized above.     Past Medical History:    Past Medical History:   Diagnosis Date    Bipolar 1 disorder ([facility] [address]     GERD (gastroesophageal reflux disease) 8/13/2016    Hidradenitis suppurativa     Polysubstance abuse ([facility] [address]     Sch[facility] ([facility]        Past Surgical History:    Past Surgical History:   Procedure Laterality Date    ABSCESS DRAINAGE  11/1/2013    left inguinal hydrodenitis/pilondal cyst    INFECTED SKIN DEBRIDEMENT  july 2011     Previous  surgery: none     Medications:    Scheduled Meds:   sodium chloride  80 mL IntraVENous Once    ketorolac  15 mg IntraVENous Once    carBAMazepine  200 mg Oral BID    haloperidol  1 mg Oral BID    sodium chloride flush  5-40 mL IntraVENous 2 times per day    enoxaparin  30 mg SubCUTAneous BID    clindamycin (CLEOCIN) IV  600 mg IntraVENous Q8H    cefepime  2,000 mg IntraVENous Q12H     Continuous Infusions:   sodium chloride      sodium chloride 75 mL/hr at 11/12/22 0954     PRN Meds:.sodium chloride flush, sodium chloride flush, sodium chloride, potassium chloride **OR** potassium alternative oral replacement **OR** potassium chloride, magnesium sulfate, ondansetron **OR** ondansetron, polyethylene glycol, acetaminophen **OR** acetaminophen    Allergies:  Depakene [valproic acid], Restoril [temazepam], Risperidone and related, Thorazine [chlorpromazine], and Vancomycin    Social History:    Social History     Socioeconomic History    Marital status: Single     Spouse name: Not on file    Number of children: Not on file    Years of education: Not on file    Highest education level: Not on file   Occupational History    Not on file   Tobacco Use    Smoking status: Every Day     Packs/day: 1.00     Years: 22.00     Pack years: 22.00     Types: Cigarettes    Smokeless tobacco: Never   Vaping Use    Vaping Use: Never used   Substance and Sexual Activity    Alcohol use: Yes     Comment: occasional    Drug use: Yes     Comment: history of marijuana & Opiate abue, claims that he is currently clean & sober.     Sexual activity: Not on file   Other Topics Concern    Not on file   Social History Narrative    Not on file     Social Determinants of Health     Financial Resource Strain: Not on file   Food Insecurity: Not on file   Transportation Needs: Not on file   Physical Activity: Not on file   Stress: Not on file   Social Connections: Not on file   Intimate Partner Violence: Not on file   Housing Stability: Not on file       Family History:    Family History   Problem Relation Age of Onset    Cancer Mother         breast    High Blood Pressure Maternal Grandfather     Mental Illness Paternal Aunt     Substance Abuse Paternal Uncle     Substance Abuse Brother     Alcohol Abuse Father     Cancer Father         pancrease     Previous Urologic Family history: none    REVIEW OF SYSTEMS:  Constitutional: negative  Eyes: negative  Respiratory: negative  Cardiovascular: negative  Gastrointestinal: negative  Genitourinary: see HPI  Musculoskeletal: negative  Skin: negative   Neurological: negative  Hematological/Lymphatic: negative  Psychological: see history of present illness, the records   document bipolar disorder and schizophrenia    Physical Exam:    This a 38 y.o. male   Patient Vitals for the past 24 hrs:   BP Temp Temp src Pulse Resp SpO2 Height Weight   11/12/22 0736 112/72 98.6 °F (37 °C) Oral 94 16 94 % -- --   11/12/22 0700 -- -- -- -- -- -- 5' 9\" (1.753 m) --   11/12/22 0612 -- -- -- 98 -- 92 % -- --   11/12/22 0609 113/75 98.8 °F (37.1 °C) Oral (!) 105 20 -- -- --   11/12/22 0538 120/76 -- -- 93 15 93 % -- --   11/12/22 0137 -- 99.2 °F (37.3 °C) Oral -- -- -- -- --   11/11/22 2332 129/82 (!) 100.9 °F (38.3 °C) -- (!) 105 16 92 % -- 245 lb (111.1 kg)     Constitutional: Patient in no acute distress; Neuro: alert and oriented to person place and time. Psych: Mood and affect normal.  Skin: Normal  Lungs: Respiratory effort normal  Cardiovascular:  Normal peripheral pulses  Abdomen: Soft, non-tender, non-distended with no CVA, flank pain, hepatosplenomegaly or hernia. Kidneys normal.  Bladder non-tender and not distended. Lymphatics: no palpable lymphadenopathy    Testicles normal bilaterally  Epididymis normal bilaterally  Perineal wounds as reported  Catheter in place bladder decompressed. LABS:  Recent Labs     11/12/22  0022   WBC 12.6*   HGB 10.5*   HCT 34.4*   MCV 91.2   *     Recent Labs     11/12/22  0022   *   K 4.0   CL 98   CO2 25   BUN 7   CREATININE 0.63*     No results found for: PSA    Additional Lab/culture results:    Urinalysis: No results for input(s): COLORU, PHUR, LABCAST, WBCUA, RBCUA, MUCUS, TRICHOMONAS, YEAST, BACTERIA, CLARITYU, SPECGRAV, LEUKOCYTESUR, UROBILINOGEN, BILIRUBINUR, BLOODU in the last 72 hours.     Invalid input(s): NITRATE, GLUCOSEUKETONESUAMORPHOUS     -----------------------------------------------------------------  Imaging Results:    Assessment and Plan   Impression:    Patient Active Problem List   Diagnosis    Hidradenitis suppurativa    Acute psychosis ([facility]    Abscess of axilla    Abscess of skin of abdomen    Blood per rectum    Hydradenitis    Trochanteric bursitis    Adrenal mass, right (HCC)    Hidradenitis axillaris    Schizoaffective disorder, bipolar type ([facility]    Encounter for ostomy nurse consultation    Paranoid schizophrenia, chronic condition with acute exacerbation ([facility] [facility] Iron deficiency anemia    GERD (gastroesophageal reflux disease)    Tobacco abuse    Non-healing left groin open wound, subsequent encounter    Right groin wound, subsequent encounter    Open wound of left ear    Abscess    Sepsis ([address]    Constipation    Acute exacerbation of chronic paranoid schizophrenia (HCC)    Schizophrenia (HCC)    Gram-positive bacteremia    Cellulitis of right thigh    Bandemia    Scrotal abscess    Immunosuppressed due to chemotherapy ([address]    Mild protein-calorie malnutrition ([address]    Hypovitaminosis D    Gluteal abscess    Noncompliance by declining service of wounds care at nursing facility       Plan: patient refused catheter change he agreed to bladder irrigation until clear through the same indwelling Kowalski.    Don't believe this would be sufficient at this time since the urine culture was still show significant contamination because of bacterial colonization with a chronic Kowalski    Aishwarya Bryant MD  3:13 PM 11/12/2022

## 2022-11-12 NOTE — ED NOTES
ED to inpatient nurses report     Chief Complaint   Patient presents with    Wound Infection      Present to ED from Pt to ED via EMS from Nursing facility; pt was sent there after admission for sepsis. Per EMS's report pt was refusing care @ the facility. Pt is A&Ox4, Pt was refusing the wound care & dressing changes, pt has multiple wounds & abscesses, pt has hx of Hidradenitis suppurativa. Per EMS pt had maggots in his wounds; none was noted during wound care & linen change. Pt's wounds are malodorous & draining. Wounds presents to the inner thigh, groin, under the the folds of his abd, pt has multiple wounds to the coccyx & his gluteal folds.    LOC: A&Ox3   Vital signs   Vitals:    11/11/22 2332 11/12/22 0137   BP: 129/82    Pulse: (!) 105    Resp: 16    Temp: (!) 100.9 °F (38.3 °C) 99.2 °F (37.3 °C)   TempSrc:  Oral   SpO2: 92%    Weight: 245 lb (111.1 kg)       Oxygen Baseline RA    Current needs required n/a   SEPSIS: yes  [yes] Lactate X 2 ordered (Yes or No)  [yes] Antibiotics given (Yes or No)  [yes] IV Fluids ordered (Yes or No)             [no] IV completed (Yes or No)  [no] Hourly Vital Signs (Validated)  [none] Outstanding Orders:     LDAs:   Peripheral IV 11/12/22 Right Antecubital (Active)     Mobility: Requires assistance * 2  Fall Risk:    Pending ED orders: n/a  Present condition: stable  Code Status: full  Consults: None  []  Hospitalist  Completed  [] yes [] no Who:   []  Medicine  Completed  [] yes [] No Who:   []  Cardiology  Completed  [] yes [] No Who:   []  GI   Completed  [] yes [] No Who:   []  Neurology  Completed  [] yes [] No Who:   []  Nephrology Completed  [] yes [] No Who:    []  Vascular  Completed  [] yes [] No Who:   []  Ortho  Completed  [] yes [] No Who:     []  Surgery  Completed  [] yes [] No Who:    []  Urology  Completed  [] yes [] No Who:    []  CT Surgery Completed  [] yes [] No Who:   []  Podiatry  Completed  [] yes [] No Who:    []  Other    Completed  [] yes [] No Who:  Interventions: linen change & documentation of wounds.  IV abx started  Important Events:         Electronically signed by Carol Greene RN on 11/12/2022 at Mandeep Shipley RN  11/12/22 014

## 2022-11-12 NOTE — ED PROVIDER NOTES
EMERGENCY DEPARTMENT ENCOUNTER    Pt Name: Best Hudson  MRN: [id number]  [place] 1980  Date of evaluation: 11/12/22  CHIEF COMPLAINT       Chief Complaint   Patient presents with    Wound Infection     HISTORY OF PRESENT ILLNESS   69-year-old male presents emergency room by EMS for wounds to the perineal area and lower abdomen. Patient has history of schizophrenia. He has hidradenitis suppurativa and has had recurrent issues with wounds to the thighs and perineal area in the past.  He is currently residing at a nursing facility the nursing facility had reported patient was refusing to allow them to care for his wounds. Here in the ED patient is chronically ill-appearing appears to be feeling comfortable requesting pain medications for the sores. From EMR records I am able to deduce that patient has had frequent admissions for sepsis related to the hidradenitis and skin wounds. REVIEW OF SYSTEMS     Review of Systems   Unable to perform ROS: Psychiatric disorder   Eyes: Negative.       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    Hidradenitis axillaris L73.2    Schizoaffective disorder, bipolar type ([address] F25.0    Encounter for ostomy nurse consultation Z71.89    Paranoid schizophrenia, chronic condition with acute exacerbation ([address] F20.0    Iron deficiency anemia D50.9    GERD (gastroesophageal reflux disease) K21.9    Tobacco abuse Z72.0    Non-healing left groin open wound, subsequent encounter S31.104D    Right groin wound, subsequent encounter S31.109D    Open wound of left ear S01.302A    Abscess L02.91    Sepsis ([facility]) A41.9    Constipation K59.00    Acute exacerbation of chronic paranoid schizophrenia ([facility]) F20.0    Schizophrenia ([facility]) F20.9    Gram-positive bacteremia R78.81    Cellulitis of right thigh L03.115    Bandemia D72.825    Scrotal abscess N49.2    Immunosuppressed due to chemotherapy ([facility] D84.821, T45.1X5A, Z79.899    Mild protein-calorie malnutrition ([facility]) E44.1    Hypovitaminosis D E55.9    Gluteal abscess L02.31    Noncompliance by declining service of wounds care at nursing facility Z91.199     SURGICAL HISTORY       Past Surgical History:   Procedure Laterality Date    ABSCESS DRAINAGE  11/1/2013    left inguinal hydrodenitis/pilondal cyst    INFECTED SKIN DEBRIDEMENT  july 2011     CURRENT MEDICATIONS       Current Discharge Medication List        CONTINUE these medications which have NOT CHANGED    Details   mineral oil-hydrophilic petrolatum (AQUAPHOR) ointment Apply topically as needed. Qty: 198 g, Refills: 2      zinc sulfate (ZINCATE) 220 (50 Zn) MG capsule Take 1 capsule by mouth daily  Qty: 30 capsule, Refills: 3      ascorbic acid (VITAMIN C) 500 MG tablet Take 1 tablet by mouth 2 times daily  Qty: 30 tablet, Refills: 3      Ergocalciferol (VITAMIN D) 62834 units CAPS Take 50,000 Units by mouth once a week  Qty: 5 capsule, Refills: 1      carBAMazepine (TEGRETOL) 200 MG tablet Take 1 tablet by mouth 2 times daily  Qty: 90 tablet, Refills: 3      haloperidol (HALDOL) 1 MG tablet Take 1 tablet by mouth 2 times daily  Qty: 120 tablet, Refills: 3           ALLERGIES     is allergic to depakene [valproic acid], restoril [temazepam], risperidone and related, thorazine [chlorpromazine], and vancomycin. FAMILY HISTORY     He indicated that the status of his mother is unknown. He indicated that the status of his father is unknown.  He indicated that the status of his brother is unknown. He indicated that the status of his maternal grandfather is unknown. He indicated that the status of his paternal aunt is unknown. He indicated that the status of his paternal uncle is unknown. SOCIAL HISTORY       Social History     Tobacco Use    Smoking status: Every Day     Packs/day: 1.00     Years: 22.00     Pack years: 22.00     Types: Cigarettes    Smokeless tobacco: Never   Vaping Use    Vaping Use: Never used   Substance Use Topics    Alcohol use: Yes     Comment: occasional    Drug use: Yes     Comment: history of marijuana & Opiate abue, claims that he is currently clean & sober. PHYSICAL EXAM     INITIAL VITALS: /76   Pulse 97   Temp 99 °F (37.2 °C) (Oral)   Resp 16   Ht 5' 9\" (1.753 m)   Wt 245 lb (111.1 kg)   SpO2 94%   BMI 36.18 kg/m²    Physical Exam  Constitutional:       General: He is not in acute distress. Appearance: He is well-developed. HENT:      Head: Normocephalic. Eyes:      Pupils: Pupils are equal, round, and reactive to light. Cardiovascular:      Rate and Rhythm: Regular rhythm. Tachycardia present. Heart sounds: Normal heart sounds. Pulmonary:      Effort: Pulmonary effort is normal. No respiratory distress. Breath sounds: Normal breath sounds. Abdominal:      General: Bowel sounds are normal.      Palpations: Abdomen is soft. Tenderness: There is no abdominal tenderness. Musculoskeletal:         General: Normal range of motion. Skin:     General: Skin is warm and dry. Comments: Patient has numerous small areas of folliculitis to the perineal area upper thighs and lower abdomen  There is foul drainage coming from multiple of these sites  Several wounds in the abdomen   Neurological:      Mental Status: He is alert and oriented to person, place, and time. MEDICAL DECISION MAKIN-year-old male presented to emergency room with wounds to his upper thighs scrotal region perineum and lower abdomen.   Wounds have foul odor with purulent drainage. Some of these wounds are suspicious for infectious process. Hygiene has overall been quite poor given his exam.  Patient started on broad-spectrum antibiotics. Plan is for admission. Case discussed with [facility] who will accept. ED Course as of 11/13/22 0247   Sat Nov 12, 2022   0126 1)  Sepsis Identified at Time:      1:15       2)  Sepsis Alert Protocol Guidelines:  Blood Cultures drawn before antibiotics  Broad Spectrum Antibiotics given stat   Lactic Acid Q2 hours times 2 occurrences (if first lactic acid > 2.0)    3)  Fluid Bolus Guidelines:    If lactate > 4.0   OR   MAP less than 65  OR  systolic BP < 90 (two separate readings),            then 30ml/kg crystalloid fluid bolus infused, and may give over 4 hour duration. Is the patient Obese (BMI > 30): No    @BMI@  [unfilled]    If Obese the Ideal Body  ([facility]): Ideal body weight (IBW) (men) = 50 kg + 2.3 kg x (height, inches - 60)   Ideal body weight (IBW) (women) = 45.5 kg + 2.3 kg x (height, inches - 60)    4)  Repeat Sepsis Exam completed during fluid bolus at time:             5)  Vasopressors: For persistent hypotension after completion of 30ml/kg fluid bolus (need to measure BP Q 15 min in the hour after completion of fluid bolus). Discuss risks and benefits of vasopressors and alternative therapies with patient and/or DPOA. [EG]      ED Course User Index  [EG] Virgie Ferreira MD       CRITICAL CARE:       PROCEDURES:    Procedures    DIAGNOSTIC RESULTS   EKG:All EKG's are interpreted by the Emergency Department Physician who either signs or Co-signs this chart in the absence of a cardiologist.        RADIOLOGY:All plain film, CT, MRI, and formal ultrasound images (except ED bedside ultrasound) are read by the radiologist, see reports below, unless otherwisenoted in MDM or here.   CT ABDOMEN PELVIS W IV CONTRAST Additional Contrast? None   Final Result Redemonstration of atrophy/hypoplastic nonfunctional right kidney which has a   few cysts with calcified wall. The largest cyst with calcified wall measures   4.3 cm, similar to previous CT scan. Normal left kidney of prominent size without any abnormality. Evidence of moderate to large amount of mildly hyperdense material in the   gallbladder suggestive of significant sludge in gallbladder without obvious   radiopaque stones. Normal appendix visualized. Nonspecific small to moderate amount of gas scattered in small bowel with   multiple fluid levels. Acute enteritis not excluded. Thickened redundant subcutaneous fatty tissue suspended from the lower   abdominal wall and pelvic wall without any significant surrounding   inflammatory change. Inferior to the perineum evidence of irregular thickened tissues with   surrounding inflammatory changes involving the posteromedial aspect of the   gluteal folds mainly on the right side and extended to the posteromedial   aspect of the proximal right upper thigh. These soft tissue abnormalities   are  limited to the subcutaneous tissues without extending to muscle. No   evidence of any inflammatory changes involving the muscles of abdominal   walls, pelvic walls, bilateral gluteal muscles, and the visualized muscles of   the bilateral proximal thighs. LABS: All lab results were reviewed by myself, and all abnormals are listed below. Labs Reviewed   CULTURE, WOUND - Abnormal; Notable for the following components:       Result Value    Direct Exam   (*)     Value: This specimen contains greater than 9 Squamous Epithelial cells per LPF which is an indicator of a low quality specimen.     Direct Exam RARE NEUTROPHILS (*)     Direct Exam MIXED BACTERIAL MORPHOTYPES SEEN ON GRAM STAIN. (*)     All other components within normal limits   CBC WITH AUTO DIFFERENTIAL - Abnormal; Notable for the following components:    WBC 12.6 (*)     RBC 3.77 (*)     Hemoglobin 10.5 (*)     Hematocrit 34.4 (*)     Platelets 514 (*)     Lymphocytes 20 (*)     Segs Absolute 8.12 (*)     Absolute Mono # 1.40 (*)     All other components within normal limits   COMPREHENSIVE METABOLIC PANEL - Abnormal; Notable for the following components:    Creatinine 0.63 (*)     Sodium 133 (*)     Alkaline Phosphatase 133 (*)     ALT 70 (*)     Total Bilirubin 0.2 (*)     Total Protein 8.4 (*)     Albumin 2.8 (*)     All other components within normal limits   CULTURE, BLOOD 1   CULTURE, BLOOD 1   LACTATE, SEPSIS   URINALYSIS WITH REFLEX TO CULTURE   BASIC METABOLIC PANEL W/ REFLEX TO MG FOR LOW K   CBC       EMERGENCY DEPARTMENTCOURSE:         Vitals:    Vitals:    11/12/22 0700 11/12/22 0736 11/12/22 2053 11/13/22 0035   BP:  112/72 113/74 106/76   Pulse:  94 98 97   Resp:  16 16 16   Temp:  98.6 °F (37 °C) 99.5 °F (37.5 °C) 99 °F (37.2 °C)   TempSrc:  Oral Oral Oral   SpO2:  94% 96% 94%   Weight:       Height: 5' 9\" (1.753 m)          The patient was given the following medications while in the emergency department:  Orders Placed This Encounter   Medications    cefepime (MAXIPIME) 2000 mg IVPB minibag     Order Specific Question:   Antimicrobial Indications     Answer:   Skin and Soft Tissue Infection    clindamycin (CLEOCIN) 600 mg in dextrose 5 % 50 mL IVPB     Order Specific Question:   Antimicrobial Indications     Answer:   Skin and Soft Tissue Infection    morphine sulfate (PF) injection 4 mg    0.9 % sodium chloride bolus    sodium chloride flush 0.9 % injection 10 mL    iopamidol (ISOVUE-370) 76 % injection 75 mL    0.9 % sodium chloride bolus    ketorolac (TORADOL) injection 15 mg    carBAMazepine (TEGRETOL) tablet 200 mg    haloperidol (HALDOL) tablet 1 mg    sodium chloride flush 0.9 % injection 5-40 mL    sodium chloride flush 0.9 % injection 10 mL    0.9 % sodium chloride infusion    OR Linked Order Group     potassium chloride (KLOR-CON M) extended release tablet 40 mEq     potassium bicarb-citric acid (EFFER-K) effervescent tablet 40 mEq     potassium chloride 10 mEq/100 mL IVPB (Peripheral Line)    magnesium sulfate 1000 mg in dextrose 5% 100 mL IVPB    enoxaparin Sodium (LOVENOX) injection 30 mg     Order Specific Question:   Indication of Use     Answer:   Prophylaxis-DVT/PE    OR Linked Order Group     ondansetron (ZOFRAN-ODT) disintegrating tablet 4 mg     ondansetron (ZOFRAN) injection 4 mg    polyethylene glycol (GLYCOLAX) packet 17 g    OR Linked Order Group     acetaminophen (TYLENOL) tablet 650 mg     acetaminophen (TYLENOL) suppository 650 mg    0.9 % sodium chloride infusion    clindamycin (CLEOCIN) 600 mg in dextrose 5 % 50 mL IVPB     Order Specific Question:   Antimicrobial Indications     Answer:   Skin and Soft Tissue Infection     Order Specific Question:   Skin duration of therapy     Answer:   7 days    cefepime (MAXIPIME) 2000 mg IVPB minibag     Order Specific Question:   Antimicrobial Indications     Answer:   Skin and Soft Tissue Infection     Order Specific Question:   Skin duration of therapy     Answer:   7 days     CONSULTS:  IP CONSULT TO INTERNAL MEDICINE  IP CONSULT TO GENERAL SURGERY  IP CONSULT TO SPIRITUAL SERVICES  IP CONSULT TO INFECTIOUS DISEASES  IP CONSULT TO UROLOGY    FINAL IMPRESSION      1. Hydradenitis    2. Sepsis, due to unspecified organism, unspecified whether acute organ dysfunction present [facility])          DISPOSITION/PLAN   DISPOSITION Admitted 11/12/2022 04:28:28 AM      PATIENT REFERRED TO:  No follow-up provider specified. DISCHARGE MEDICATIONS:  Current Discharge Medication List        Anais Aggarwal MD  Attending Emergency Physician      Care during this encounter was due to an unprecedented national emergency due to COVID-19.             Justin Arizmendi MD  11/13/22 2628

## 2022-11-12 NOTE — H&P
[facility]  Office: [address], DO, Wojciech Olguin, DO, Gagan Lowry, DO, Kristina Randall, DO, Simeon Kohli MD, Malcolm Parker MD, Felipa Raymond MD, Luvenia Duverney, MD,  Madan Medina MD, Basilio Lobo MD, Anika Flannery, DO, Duncan Ball MD,  Joan Jesus MD, Celina Nobles MD, Sandhya Mayfield, DO, Lakesha Hi MD, Mervin Wise MD, Anne Osman DO, Nash Chanel MD, Bonny Mascorro MD, Regla Santana MD, Orville Nair MD, Trisha Larose, DO, Yarelis Noble MD, Jose Baird MD, Satnam Brandt, CNP,  Jeanine Pace, CNP, Joshua Wilson, CNP, Haydee Pinto, CNP,  Farhan Soto, [facility], Huey Tolentino, CNP, Светлана Dyson, CNP, Lluvia Mathews, CNP, Daylin Nino, CNP, Mary Hernandez, CNP, Thien Browning PA-C, Alin Mclain, [facility], [place] , [facility], Raffi Saeed, CNP, Marietta Curiel, CNP, Lulu Russo, [facility]    HISTORY AND PHYSICAL EXAMINATION            Date:   11/12/2022  Patient name:  Segun Daniel  Date of admission:  11/11/2022 11:23 PM  MRN:   [id number]  Account:  [de-identified]  YOB: 1980  PCP:    No primary care provider on file. Room:   2007/2007-02  Code Status:    Full Code    Chief Complaint:     Chief Complaint   Patient presents with    Wound Infection       History Obtained From:     patient, electronic medical records    History of Present Illness:   Admitted through ER with following information:    Segun Daniel is a 38 y.o.  male presents emergency room by EMS for wounds to the perineal area and lower abdomen. Patient has history of schizophrenia. He has hidradenitis suppurativa and has had recurrent issues with wounds to the thighs and perineal area in the past.  He is currently residing at a nursing facility the nursing facility had reported patient was refusing to allow them to care for his wounds.   Here in the ED patient is chronically ill-appearing appears to be feeling comfortable requesting pain medications for the sores. From EMR records I am able to deduce that patient has had frequent admissions for sepsis related to the hidradenitis and skin wounds. Further nurses note in ER stated as follows:  Per EMS pt had maggots in his wounds; none was noted during wound care & linen change. Pt's wounds are malodorous & draining. Wounds presents to the inner thigh, groin, under the the folds of his abd, pt has multiple wounds to the coccyx & his gluteal folds. Patient had previously been on Humira which was working relatively well but was discontinued when he was incarcerated  Was recently admitted in [place] in October, was evaluated by surgery team and recommended medical management and plastic surgery evaluation. Patient was sent to [facility]  Surgical team has evaluated the patient here and they do not recommend any surgical intervention and there is no urgent need of emergent debridement, as per their note patient is being followed by both dermatology and plastic surgery possibly as outpatient  Past Medical History:     Past Medical History:   Diagnosis Date    Bipolar 1 disorder ([address]     GERD (gastroesophageal reflux disease) 8/13/2016    Hidradenitis suppurativa     Polysubstance abuse ([address]     Schizoaffective disorder ([facility]         Past Surgical History:     Past Surgical History:   Procedure Laterality Date    ABSCESS DRAINAGE  11/1/2013    left inguinal hydrodenitis/pilondal cyst    INFECTED SKIN DEBRIDEMENT  july 2011        Medications Prior to Admission:     Prior to Admission medications    Medication Sig Start Date End Date Taking? Authorizing Provider   mineral oil-hydrophilic petrolatum (AQUAPHOR) ointment Apply topically as needed.  10/13/22   Cary Sheikh MD   zinc sulfate (ZINCATE) 220 (50 Zn) MG capsule Take 1 capsule by mouth daily 10/13/22   Cary Sheikh MD   ascorbic acid (VITAMIN C) 500 MG tablet Take 1 tablet by mouth 2 times daily 10/13/22   Omayra Aviles MD   Ergocalciferol (VITAMIN D) 92370 units CAPS Take 50,000 Units by mouth once a week 10/19/22   Omayra Aviles MD   carBAMazepine (TEGRETOL) 200 MG tablet Take 1 tablet by mouth 2 times daily 9/16/22   Arpit Andrew MD   haloperidol (HALDOL) 1 MG tablet Take 1 tablet by mouth 2 times daily 9/16/22   Arpit Andrew MD   benztropine (COGENTIN) 1 MG tablet Take 0.5 tablets by mouth in the morning and 0.5 tablets before bedtime. 8/12/22 9/16/22  Ivette Burk MD   adalimumab (HUMIRA) 40 MG/0.8ML injection Inject 40 mg into the skin once Patient takes 80 mg every two weeks; due this week  8/12/22  Historical Provider, MD   lithium (ESKALITH) 450 MG extended release tablet Take 450 mg by mouth 2 times daily  Patient not taking: Reported on 8/11/2022 8/12/22  Historical Provider, MD   Multiple Vitamins-Minerals (THERAPEUTIC MULTIVITAMIN-MINERALS) tablet Take 1 tablet by mouth daily. 4/18/14 8/12/22  Shannon MILLER Marroquin        Allergies:     Depakene [valproic acid], Restoril [temazepam], Risperidone and related, Thorazine [chlorpromazine], and Vancomycin    Social History:     Tobacco:    reports that he has been smoking cigarettes. He has a 22.00 pack-year smoking history. He has never used smokeless tobacco.  Alcohol:      reports current alcohol use. Drug Use:  reports current drug use. Family History:     Family History   Problem Relation Age of Onset    Cancer Mother         breast    High Blood Pressure Maternal Grandfather     Mental Illness Paternal Aunt     Substance Abuse Paternal Uncle     Substance Abuse Brother     Alcohol Abuse Father     Cancer Father         pancrease       Review of Systems:     Positive and Negative as described in HPI.     CONSTITUTIONAL:  negative for fevers, chills, sweats, +fatigue, weight loss  HEENT:  negative for vision, hearing changes, runny nose, throat pain  RESPIRATORY:  negative for shortness of breath, cough, congestion, wheezing  CARDIOVASCULAR:  negative for chest pain, palpitations  GASTROINTESTINAL:  negative for nausea, vomiting, diarrhea, constipation, change in bowel habits, abdominal pain   GENITOURINARY:  negative for difficulty of urination, burning with urination, frequency   INTEGUMENT: Multiple wounds in both groins and sacrococcygeal region  HEMATOLOGIC/LYMPHATIC:  negative for swelling/edema   ALLERGIC/IMMUNOLOGIC:  negative for urticaria , itching  ENDOCRINE:  negative increase in drinking, increase in urination, hot or cold intolerance  MUSCULOSKELETAL:  negative joint pains, muscle aches, swelling of joints  NEUROLOGICAL:  negative for headaches, dizziness, lightheadedness, numbness, pain, tingling extremities  BEHAVIOR/PSYCH:  negative for depression, anxiety    Physical Exam:   /72   Pulse 94   Temp 98.6 °F (37 °C) (Oral)   Resp 16   Ht 5' 9\" (1.753 m)   Wt 245 lb (111.1 kg)   SpO2 94%   BMI 36.18 kg/m²   Temp (24hrs), Av.4 °F (37.4 °C), Min:98.6 °F (37 °C), Max:100.9 °F (38.3 °C)    No results for input(s): POCGLU in the last 72 hours. Intake/Output Summary (Last 24 hours) at 2022 1456  Last data filed at 2022 0920  Gross per 24 hour   Intake 100 ml   Output 400 ml   Net -300 ml       General Appearance:  alert, sick looking but in no acute distress  Mental status: oriented to person, place, and time, not very cooperative for exam  Head:  normocephalic, atraumatic  Eye: no icterus, redness, pupils equal and reactive, extraocular eye movements intact, conjunctiva clear  Ear: normal external ear, no discharge, hearing intact  Nose:  no drainage noted  Mouth: mucous membranes moist  Neck: supple, no carotid bruits, thyroid not palpable  Lungs: Bilateral equal air entry, clear to auscultation, no wheezing, rales or rhonchi, normal effort  Cardiovascular: normal rate, regular rhythm, no murmur, gallop, rub.   Abdomen: Soft, nontender, nondistended, normal bowel sounds, no hepatomegaly or splenomegaly  Neurologic: There are no new focal motor or sensory deficits, normal muscle tone and bulk, no abnormal sensation, normal speech, cranial nerves II through XII grossly intact  Skin:+ Multiple infected right abdominal wall wounds/groins and buttocks-see pictures in media  Extremities:  peripheral pulses palpable, no pedal edema or calf pain with palpation  Psych: normal affect     Investigations:      Laboratory Testing:  Recent Results (from the past 24 hour(s))   Culture, Wound    Collection Time: 11/12/22 12:02 AM    Specimen: Coccyx   Result Value Ref Range    Specimen Description . COCCYX     Direct Exam (A)      This specimen contains greater than 9 Squamous Epithelial cells per LPF which is an indicator of a low quality specimen. Direct Exam RARE NEUTROPHILS (A)     Direct Exam MIXED BACTERIAL MORPHOTYPES SEEN ON GRAM STAIN.  (A)     Culture PENDING    CBC with Auto Differential    Collection Time: 11/12/22 12:22 AM   Result Value Ref Range    WBC 12.6 (H) 3.5 - 11.3 k/uL    RBC 3.77 (L) 4.21 - 5.77 m/uL    Hemoglobin 10.5 (L) 13.0 - 17.0 g/dL    Hematocrit 34.4 (L) 40.7 - 50.3 %    MCV 91.2 82.6 - 102.9 fL    MCH 27.9 25.2 - 33.5 pg    MCHC 30.5 28.4 - 34.8 g/dL    RDW 14.1 11.8 - 14.4 %    Platelets 182 (H) 875 - 453 k/uL    MPV 8.3 8.1 - 13.5 fL    NRBC Automated 0.0 0.0 per 100 WBC    Seg Neutrophils 65 36 - 65 %    Lymphocytes 20 (L) 24 - 43 %    Monocytes 11 3 - 12 %    Eosinophils % 3 1 - 4 %    Basophils 1 0 - 2 %    Immature Granulocytes 0 0 %    Segs Absolute 8.12 (H) 1.50 - 8.10 k/uL    Absolute Lymph # 2.51 1.10 - 3.70 k/uL    Absolute Mono # 1.40 (H) 0.10 - 1.20 k/uL    Absolute Eos # 0.41 0.00 - 0.44 k/uL    Basophils Absolute 0.07 0.00 - 0.20 k/uL    Absolute Immature Granulocyte 0.05 0.00 - 0.30 k/uL   Comprehensive Metabolic Panel    Collection Time: 11/12/22 12:22 AM   Result Value Ref Range    Glucose 96 70 - 99 mg/dL    BUN 7 6 - 20 mg/dL    Creatinine 0.63 (L) 0.70 - 1.20 mg/dL    Est, Glom Filt Rate >60 >60 mL/min/1.73m2    Bun/Cre Ratio 11 9 - 20    Calcium 9.7 8.6 - 10.4 mg/dL    Sodium 133 (L) 135 - 144 mmol/L    Potassium 4.0 3.7 - 5.3 mmol/L    Chloride 98 98 - 107 mmol/L    CO2 25 20 - 31 mmol/L    Anion Gap 10 9 - 17 mmol/L    Alkaline Phosphatase 133 (H) 40 - 129 U/L    ALT 70 (H) 5 - 41 U/L    AST 28 <40 U/L    Total Bilirubin 0.2 (L) 0.3 - 1.2 mg/dL    Total Protein 8.4 (H) 6.4 - 8.3 g/dL    Albumin 2.8 (L) 3.5 - 5.2 g/dL   Lactate, Sepsis    Collection Time: 11/12/22 12:22 AM   Result Value Ref Range    Lactic Acid, Sepsis 1.0 0.5 - 1.9 mmol/L   Culture, Blood 1    Collection Time: 11/12/22 12:22 AM    Specimen: Blood   Result Value Ref Range    Specimen Description . BLOOD     Special Requests 10ML RT AC     Culture NO GROWTH <24 HRS    Culture, Blood 1    Collection Time: 11/12/22 12:30 AM    Specimen: Blood   Result Value Ref Range    Specimen Description . BLOOD     Special Requests RT HAND 20ML     Culture NO GROWTH <24 HRS        Imaging/Diagnostics:  CT ABDOMEN PELVIS W IV CONTRAST Additional Contrast? None    Result Date: 11/12/2022  Redemonstration of atrophy/hypoplastic nonfunctional right kidney which has a few cysts with calcified wall. The largest cyst with calcified wall measures 4.3 cm, similar to previous CT scan. Normal left kidney of prominent size without any abnormality. Evidence of moderate to large amount of mildly hyperdense material in the gallbladder suggestive of significant sludge in gallbladder without obvious radiopaque stones. Normal appendix visualized. Nonspecific small to moderate amount of gas scattered in small bowel with multiple fluid levels. Acute enteritis not excluded. Thickened redundant subcutaneous fatty tissue suspended from the lower abdominal wall and pelvic wall without any significant surrounding inflammatory change.  Inferior to the perineum evidence of irregular thickened tissues with surrounding inflammatory changes involving the posteromedial aspect of the gluteal folds mainly on the right side and extended to the posteromedial aspect of the proximal right upper thigh. These soft tissue abnormalities are  limited to the subcutaneous tissues without extending to muscle. No evidence of any inflammatory changes involving the muscles of abdominal walls, pelvic walls, bilateral gluteal muscles, and the visualized muscles of the bilateral proximal thighs. Assessment :      Hospital Problems             Last Modified POA    * (Principal) Hidradenitis suppurativa 11/12/2022 Yes    Sepsis ([facility] [address] 11/12/2022 Yes    Schizophrenia ([facility] [address] 11/12/2022 Yes    Mild protein-calorie malnutrition ([facility] [address] 11/12/2022 Yes    Hypovitaminosis D 11/12/2022 Yes    Noncompliance by declining service of wounds care at nursing facility 11/12/2022 Yes    Tobacco abuse 11/12/2022 Yes       Plan:     Patient status inpatient in the [facility]    Continue IV antibiotics cefepime and clindamycin, he is allergic to vancomycin  Consult infectious disease  Surgical evaluation in progress  Resume home medications  DVT prophylaxis  Ostomy consult  Consult urology for difficult catheterization  Monitor daily labs  Patient will be sent back to [facility] when more stable    Consultations:   IP CONSULT TO INTERNAL MEDICINE  IP CONSULT TO GENERAL SURGERY  IP CONSULT TO SPIRITUAL SERVICES  IP CONSULT TO INFECTIOUS DISEASES  IP CONSULT TO UROLOGY     Patient is admitted as inpatient status because of co-morbidities listed above, severity of signs and symptoms as outlined, requirement for current medical therapies and most importantly because of direct risk to patient if care not provided in a hospital setting. Expected length of stay > 48 hours. Juju Amaya MD  11/12/2022  2:56 PM    Copy sent to Dr. Osborne primary care provider on file.

## 2022-11-12 NOTE — CONSULTS
GERD (gastroesophageal reflux disease) 8/13/2016    Hidradenitis suppurativa     Polysubstance abuse ([facility] [address]     Schizoaffective disorder [facility])        Past Surgical  History:     Past Surgical History:   Procedure Laterality Date    ABSCESS DRAINAGE  11/1/2013    left inguinal hydrodenitis/pilondal cyst    INFECTED SKIN DEBRIDEMENT  july 2011       Medications:      sodium chloride  80 mL IntraVENous Once    ketorolac  15 mg IntraVENous Once    carBAMazepine  200 mg Oral BID    haloperidol  1 mg Oral BID    sodium chloride flush  5-40 mL IntraVENous 2 times per day    enoxaparin  30 mg SubCUTAneous BID    clindamycin (CLEOCIN) IV  600 mg IntraVENous Q8H    cefepime  2,000 mg IntraVENous Q12H       Social History:     Social History     Socioeconomic History    Marital status: Single     Spouse name: Not on file    Number of children: Not on file    Years of education: Not on file    Highest education level: Not on file   Occupational History    Not on file   Tobacco Use    Smoking status: Every Day     Packs/day: 1.00     Years: 22.00     Pack years: 22.00     Types: Cigarettes    Smokeless tobacco: Never   Vaping Use    Vaping Use: Never used   Substance and Sexual Activity    Alcohol use: Yes     Comment: occasional    Drug use: Yes     Comment: history of marijuana & Opiate abue, claims that he is currently clean & sober.     Sexual activity: Not on file   Other Topics Concern    Not on file   Social History Narrative    Not on file     Social Determinants of Health     Financial Resource Strain: Not on file   Food Insecurity: Not on file   Transportation Needs: Not on file   Physical Activity: Not on file   Stress: Not on file   Social Connections: Not on file   Intimate Partner Violence: Not on file   Housing Stability: Not on file       Family History:     Family History   Problem Relation Age of Onset    Cancer Mother         breast    High Blood Pressure Maternal Grandfather     Mental Illness Paternal Aunt Substance Abuse Paternal Uncle     Substance Abuse Brother     Alcohol Abuse Father     Cancer Father         pancrease      Medical Decision Making:   I have independently reviewed/ordered the following labs:    CBC with Differential:   Recent Labs     11/12/22 0022   WBC 12.6*   HGB 10.5*   HCT 34.4*   *   LYMPHOPCT 20*   MONOPCT 11     BMP:  Recent Labs     11/12/22 0022   *   K 4.0   CL 98   CO2 25   BUN 7   CREATININE 0.63*     Hepatic Function Panel:   Recent Labs     11/12/22 0022   PROT 8.4*   LABALBU 2.8*   BILITOT 0.2*   ALKPHOS 133*   ALT 70*   AST 28     No results for input(s): RPR in the last 72 hours. No results for input(s): HIV in the last 72 hours. No results for input(s): BC in the last 72 hours. Lab Results   Component Value Date/Time    CREATININE 0.63 11/12/2022 12:22 AM    GLUCOSE 96 11/12/2022 12:22 AM    GLUCOSE 89 02/09/2012 06:19 AM       Detailed results: Thank you for allowing us to participate in the care of this patient. Please call with questions. This note is created with the assistance of a speech recognition program.  While intending to generate adocument that actually reflects the content of the visit, the document can still have some errors including those of syntax and sound a like substitutions which may escape proof reading. It such instances, actual meaningcan be extrapolated by contextual diversion.     Jose Pepper MD  Office: [phone]  Perfect serve / office [phone]

## 2022-11-13 LAB
ANION GAP SERPL CALCULATED.3IONS-SCNC: 9 MMOL/L (ref 9–17)
BILIRUBIN URINE: NEGATIVE
BUN BLDV-MCNC: 4 MG/DL (ref 6–20)
BUN/CREAT BLD: 6 (ref 9–20)
CALCIUM SERPL-MCNC: 9.3 MG/DL (ref 8.6–10.4)
CHLORIDE BLD-SCNC: 100 MMOL/L (ref 98–107)
CO2: 27 MMOL/L (ref 20–31)
COLOR: ABNORMAL
CREAT SERPL-MCNC: 0.63 MG/DL (ref 0.7–1.2)
EPITHELIAL CELLS UA: NORMAL /HPF (ref 0–5)
GFR SERPL CREATININE-BSD FRML MDRD: >60 ML/MIN/1.73M2
GLUCOSE BLD-MCNC: 112 MG/DL (ref 70–99)
GLUCOSE URINE: NEGATIVE
HCT VFR BLD CALC: 31.3 % (ref 40.7–50.3)
HEMOGLOBIN: 9.5 G/DL (ref 13–17)
KETONES, URINE: NEGATIVE
LEUKOCYTE ESTERASE, URINE: NEGATIVE
MCH RBC QN AUTO: 27.5 PG (ref 25.2–33.5)
MCHC RBC AUTO-ENTMCNC: 30.4 G/DL (ref 28.4–34.8)
MCV RBC AUTO: 90.7 FL (ref 82.6–102.9)
NITRITE, URINE: NEGATIVE
NRBC AUTOMATED: 0 PER 100 WBC
PDW BLD-RTO: 14.1 % (ref 11.8–14.4)
PH UA: 7 (ref 5–8)
PLATELET # BLD: 439 K/UL (ref 138–453)
PMV BLD AUTO: 8.2 FL (ref 8.1–13.5)
POTASSIUM SERPL-SCNC: 3.7 MMOL/L (ref 3.7–5.3)
PROTEIN UA: ABNORMAL
RBC # BLD: 3.45 M/UL (ref 4.21–5.77)
RBC UA: NORMAL /HPF (ref 0–2)
SODIUM BLD-SCNC: 136 MMOL/L (ref 135–144)
SPECIFIC GRAVITY UA: 1.02 (ref 1–1.03)
TURBIDITY: ABNORMAL
URINE HGB: ABNORMAL
UROBILINOGEN, URINE: NORMAL
WBC # BLD: 13 K/UL (ref 3.5–11.3)
WBC UA: NORMAL /HPF (ref 0–5)

## 2022-11-13 PROCEDURE — 85027 COMPLETE CBC AUTOMATED: CPT

## 2022-11-13 PROCEDURE — 6370000000 HC RX 637 (ALT 250 FOR IP): Performed by: NURSE PRACTITIONER

## 2022-11-13 PROCEDURE — 80048 BASIC METABOLIC PNL TOTAL CA: CPT

## 2022-11-13 PROCEDURE — 2500000003 HC RX 250 WO HCPCS: Performed by: NURSE PRACTITIONER

## 2022-11-13 PROCEDURE — 6360000002 HC RX W HCPCS: Performed by: NURSE PRACTITIONER

## 2022-11-13 PROCEDURE — 1200000000 HC SEMI PRIVATE

## 2022-11-13 PROCEDURE — 99232 SBSQ HOSP IP/OBS MODERATE 35: CPT | Performed by: INTERNAL MEDICINE

## 2022-11-13 PROCEDURE — 81001 URINALYSIS AUTO W/SCOPE: CPT

## 2022-11-13 PROCEDURE — 36415 COLL VENOUS BLD VENIPUNCTURE: CPT

## 2022-11-13 PROCEDURE — 2580000003 HC RX 258: Performed by: NURSE PRACTITIONER

## 2022-11-13 RX ADMIN — SODIUM CHLORIDE: 9 INJECTION, SOLUTION INTRAVENOUS at 13:16

## 2022-11-13 RX ADMIN — CLINDAMYCIN IN 5 PERCENT DEXTROSE 600 MG: 12 INJECTION, SOLUTION INTRAVENOUS at 20:37

## 2022-11-13 RX ADMIN — CLINDAMYCIN IN 5 PERCENT DEXTROSE 600 MG: 12 INJECTION, SOLUTION INTRAVENOUS at 13:17

## 2022-11-13 RX ADMIN — CEFEPIME 2000 MG: 2 INJECTION, POWDER, FOR SOLUTION INTRAVENOUS at 14:40

## 2022-11-13 RX ADMIN — CARBAMAZEPINE 200 MG: 200 TABLET ORAL at 21:32

## 2022-11-13 RX ADMIN — ENOXAPARIN SODIUM 30 MG: 100 INJECTION SUBCUTANEOUS at 20:29

## 2022-11-13 RX ADMIN — HALOPERIDOL 1 MG: 1 TABLET ORAL at 11:18

## 2022-11-13 RX ADMIN — CLINDAMYCIN IN 5 PERCENT DEXTROSE 600 MG: 12 INJECTION, SOLUTION INTRAVENOUS at 06:47

## 2022-11-13 RX ADMIN — CARBAMAZEPINE 200 MG: 200 TABLET ORAL at 11:17

## 2022-11-13 RX ADMIN — CEFEPIME 2000 MG: 2 INJECTION, POWDER, FOR SOLUTION INTRAVENOUS at 02:32

## 2022-11-13 RX ADMIN — ENOXAPARIN SODIUM 30 MG: 100 INJECTION SUBCUTANEOUS at 11:18

## 2022-11-13 RX ADMIN — HALOPERIDOL 1 MG: 1 TABLET ORAL at 20:30

## 2022-11-13 NOTE — PLAN OF CARE
Problem: Discharge Planning  Goal: Discharge to home or other facility with appropriate resources  Outcome: Progressing  Flowsheets  Taken 11/12/2022 0950 by Berta Ford RN  Discharge to home or other facility with appropriate resources:   Identify barriers to discharge with patient and caregiver   Arrange for needed discharge resources and transportation as appropriate   Identify discharge learning needs (meds, wound care, etc)   Refer to discharge planning if patient needs post-hospital services based on physician order or complex needs related to functional status, cognitive ability or social support system  Taken 11/12/2022 0704 by Michelle Murray RN  Discharge to home or other facility with appropriate resources: Identify barriers to discharge with patient and caregiver     Problem: Skin/Tissue Integrity  Goal: Absence of new skin breakdown  Description: 1. Monitor for areas of redness and/or skin breakdown  2. Assess vascular access sites hourly  3. Every 4-6 hours minimum:  Change oxygen saturation probe site  4. Every 4-6 hours:  If on nasal continuous positive airway pressure, respiratory therapy assess nares and determine need for appliance change or resting period.   Outcome: Progressing     Problem: Safety - Adult  Goal: Free from fall injury  Outcome: Progressing  Flowsheets (Taken 11/12/2022 0950)  Free From Fall Injury: Instruct family/caregiver on patient safety     Problem: Pain  Goal: Verbalizes/displays adequate comfort level or baseline comfort level  Outcome: Progressing

## 2022-11-13 NOTE — PROGRESS NOTES
[facility] -   Infectious diseases evaluation  admission date 11/11/2022    reason for consultation:   Infected wounds    Impression :   Current:  Infected right abdominal wall wounds/groins and buttocks cellulitis. Hydradenitis suppurative was on Humira   Schizophrenia  Bipolar disorder  Gallbladder sludge  History of vancomycin allergy with mild rash. Recommendations   Follow blood and wound cultures  CT abdomen pelvis reviewed no obvious abscess  Surgery following with no plan for surgical intervention at this point. Continue IV clindamycin and cefepime  The patient refused a Kowalski catheter change per nurses or urology. UA with reflex to culture pending  Discussed with Dr. Justin Soriano and Dr. Betsy Seo    Infection Control Recommendations   Chelan Precautions  Contact Isolation       Antimicrobial Stewardship Recommendations   Simplification of therapy  Targeted therapy      History of Present Illness:   Initial history:  Alesha Garcia is a 38y.o.-year-old male presenting to the hospital with worsening pain to the groin, perineum and bilateral buttocks with increased drainage. He also had right abdominal wall wounds with purulent drainage. Symptoms are moderate to severe, no alleviating or aggravating factors. The patient is poor historian, Kowalski catheter in place. On admission he had a fever with temperature max of 100.9, WBC 12.6, lactic acid 1, renal function normal.  CT abdomen and pelvic showed soft tissue irregularity but no fluid collection. The patient had history of severe hydradenitis suppurativa, used to be on Humira that was discontinued around 2 months ago due to incarceration. He used to follow-up with [facility] dermatology clinic.   He was admitted to [facility] October 2022 was evaluated by surgery, no surgical intervention was recommended, was discharged on Levaquin and Augmentin for 2 weeks  Previous wound culture on 10/12/2022 grew Proteus mirabilis that was pansensitive and E. coli that was sensitive to cefazolin and Cipro, resistant to Bactrim. Interval changes  11/13/2022   He still complaining of a groin, perineum and buttocks pain with drainage, afebrile. Kowalski catheter in place for 4-month, refused catheter change  Patient Vitals for the past 8 hrs:   BP Temp Temp src Pulse Resp SpO2 Weight   11/13/22 0740 99/70 99 °F (37.2 °C) Oral 94 16 -- --   11/13/22 0556 -- -- -- -- -- -- 215 lb (97.5 kg)   11/13/22 0427 111/71 99 °F (37.2 °C) Oral (!) 101 16 94 % --             I have personally reviewed the past medical history, past surgical history, medications, social history, and family history, and I haveupdated the database accordingly. Allergies:   Depakene [valproic acid], Restoril [temazepam], Risperidone and related, Thorazine [chlorpromazine], and Vancomycin     Review of Systems:     Review of Systems  As per history of present illness, other than above 12 system review was negative. Physical Examination :       Physical Exam  Constitutional:       General: He is not in acute distress. HENT:      Head: Normocephalic and atraumatic. Right Ear: External ear normal.      Left Ear: External ear normal.   Eyes:      General: No scleral icterus. Conjunctiva/sclera: Conjunctivae normal.   Cardiovascular:      Rate and Rhythm: Normal rate and regular rhythm. Heart sounds: No murmur heard. Pulmonary:      Effort: Pulmonary effort is normal. No respiratory distress. Abdominal:      General: There is no distension. Palpations: Abdomen is soft. Musculoskeletal:      Cervical back: Neck supple. No rigidity. Skin:     Coloration: Skin is not jaundiced. Comments: Right lower abdominal wounds with purulent drainage. Groin and perineum was not evaluated today. Neurological:      General: No focal deficit present. Mental Status: He is alert.        Past Medical History:     Past Medical History: Diagnosis Date    [facility]     GERD (gastroesophageal reflux disease) 8/13/2016    Hidradenitis suppurativa     Polysubstance abuse ([facility]     Schizoaffective disorder (HCC)        Past Surgical  History:     Past Surgical History:   Procedure Laterality Date    ABSCESS DRAINAGE  11/1/2013    left inguinal hydrodenitis/pilondal cyst    INFECTED SKIN DEBRIDEMENT  july 2011       Medications:      sodium chloride  80 mL IntraVENous Once    ketorolac  15 mg IntraVENous Once    carBAMazepine  200 mg Oral BID    haloperidol  1 mg Oral BID    sodium chloride flush  5-40 mL IntraVENous 2 times per day    enoxaparin  30 mg SubCUTAneous BID    clindamycin (CLEOCIN) IV  600 mg IntraVENous Q8H    cefepime  2,000 mg IntraVENous Q12H       Social History:     Social History     Socioeconomic History    Marital status: Single     Spouse name: Not on file    Number of children: Not on file    Years of education: Not on file    Highest education level: Not on file   Occupational History    Not on file   Tobacco Use    Smoking status: Every Day     Packs/day: 1.00     Years: 22.00     Pack years: 22.00     Types: Cigarettes    Smokeless tobacco: Never   Vaping Use    Vaping Use: Never used   Substance and Sexual Activity    Alcohol use: Yes     Comment: occasional    Drug use: Yes     Comment: history of marijuana & Opiate abue, claims that he is currently clean & sober.     Sexual activity: Not on file   Other Topics Concern    Not on file   Social History Narrative    Not on file     Social Determinants of Health     Financial Resource Strain: Not on file   Food Insecurity: Not on file   Transportation Needs: Not on file   Physical Activity: Not on file   Stress: Not on file   Social Connections: Not on file   Intimate Partner Violence: Not on file   Housing Stability: Not on file       Family History:     Family History   Problem Relation Age of Onset    Cancer Mother         breast    High Blood Pressure Maternal Grandfather     Mental Illness Paternal Aunt     Substance Abuse Paternal Uncle     Substance Abuse Brother     Alcohol Abuse Father     Cancer Father         pancrease      Medical Decision Making:   I have independently reviewed/ordered the following labs:    CBC with Differential:   Recent Labs     11/12/22 0022 11/13/22  0648   WBC 12.6* 13.0*   HGB 10.5* 9.5*   HCT 34.4* 31.3*   * 439   LYMPHOPCT 20*  --    MONOPCT 11  --        BMP:  Recent Labs     11/12/22 0022 11/13/22  0648   * 136   K 4.0 3.7   CL 98 100   CO2 25 27   BUN 7 4*   CREATININE 0.63* 0.63*       Hepatic Function Panel:   Recent Labs     11/12/22 0022   PROT 8.4*   LABALBU 2.8*   BILITOT 0.2*   ALKPHOS 133*   ALT 70*   AST 28       No results for input(s): RPR in the last 72 hours. No results for input(s): HIV in the last 72 hours. No results for input(s): BC in the last 72 hours. Lab Results   Component Value Date/Time    CREATININE 0.63 11/13/2022 06:48 AM    GLUCOSE 112 11/13/2022 06:48 AM    GLUCOSE 89 02/09/2012 06:19 AM       Detailed results: Thank you for allowing us to participate in the care of this patient. Please call with questions. This note is created with the assistance of a speech recognition program.  While intending to generate adocument that actually reflects the content of the visit, the document can still have some errors including those of syntax and sound a like substitutions which may escape proof reading. It such instances, actual meaningcan be extrapolated by contextual diversion.     Yareli Baker MD  Office: [phone]  Perfect serve / office [phone]

## 2022-11-13 NOTE — PLAN OF CARE
Problem: Discharge Planning  Goal: Discharge to home or other facility with appropriate resources  11/13/2022 1800 by Deana Gutierrez RN  Outcome: Progressing  Flowsheets (Taken 11/13/2022 0800)  Discharge to home or other facility with appropriate resources: Identify barriers to discharge with patient and caregiver  11/13/2022 0509 by Alex Garza RN  Outcome: Progressing     Problem: Skin/Tissue Integrity  Goal: Absence of new skin breakdown  Description: 1. Monitor for areas of redness and/or skin breakdown  2. Assess vascular access sites hourly  3. Every 4-6 hours minimum:  Change oxygen saturation probe site  4. Every 4-6 hours:  If on nasal continuous positive airway pressure, respiratory therapy assess nares and determine need for appliance change or resting period.   11/13/2022 1800 by Deana Gutierrez RN  Outcome: Progressing  11/13/2022 0509 by Alex Garza RN  Outcome: Progressing     Problem: Safety - Adult  Goal: Free from fall injury  11/13/2022 1800 by Deana Gutierrez RN  Outcome: Progressing  11/13/2022 0509 by Alex Garza RN  Outcome: Progressing     Problem: Pain  Goal: Verbalizes/displays adequate comfort level or baseline comfort level  11/13/2022 1800 by Deana Gutierrez RN  Outcome: Progressing  11/13/2022 0509 by Alex Garza RN  Outcome: Progressing     Problem: Neurosensory - Adult  Goal: Achieves stable or improved neurological status  Outcome: Progressing  Flowsheets (Taken 11/13/2022 0800)  Achieves stable or improved neurological status: Assess for and report changes in neurological status     Problem: Respiratory - Adult  Goal: Achieves optimal ventilation and oxygenation  Outcome: Progressing  Flowsheets (Taken 11/13/2022 0800)  Achieves optimal ventilation and oxygenation: Assess for changes in respiratory status     Problem: Skin/Tissue Integrity - Adult  Goal: Skin integrity remains intact  Recent Flowsheet Documentation  Taken 11/13/2022 0800 by Deana Gutierrez RN  Skin Integrity Remains Intact: Monitor for areas of redness and/or skin breakdown     Problem: Musculoskeletal - Adult  Goal: Return mobility to safest level of function  Outcome: Progressing  Flowsheets (Taken 11/13/2022 0800)  Return Mobility to Safest Level of Function: Assess patient stability and activity tolerance for standing, transferring and ambulating with or without assistive devices     Problem: Gastrointestinal - Adult  Goal: Minimal or absence of nausea and vomiting  Outcome: Progressing     Problem: Genitourinary - Adult  Goal: Absence of urinary retention  Outcome: Progressing  Flowsheets (Taken 11/13/2022 0800)  Absence of urinary retention: Assess patients ability to void and empty bladder     Problem: Infection - Adult  Goal: Absence of infection at discharge  Outcome: Progressing  Flowsheets (Taken 11/13/2022 0800)  Absence of infection at discharge: Assess and monitor for signs and symptoms of infection

## 2022-11-13 NOTE — PLAN OF CARE
Problem: Discharge Planning  Goal: Discharge to home or other facility with appropriate resources  11/13/2022 0509 by Gem Chapa RN  Outcome: Progressing  11/12/2022 1916 by Kyra Urias RN  Outcome: Progressing  Flowsheets  Taken 11/12/2022 0950 by Kyra Urias RN  Discharge to home or other facility with appropriate resources:   Identify barriers to discharge with patient and caregiver   Arrange for needed discharge resources and transportation as appropriate   Identify discharge learning needs (meds, wound care, etc)   Refer to discharge planning if patient needs post-hospital services based on physician order or complex needs related to functional status, cognitive ability or social support system  Taken 11/12/2022 0704 by Gem Chapa RN  Discharge to home or other facility with appropriate resources: Identify barriers to discharge with patient and caregiver     Problem: Skin/Tissue Integrity  Goal: Absence of new skin breakdown  Description: 1. Monitor for areas of redness and/or skin breakdown  2. Assess vascular access sites hourly  3. Every 4-6 hours minimum:  Change oxygen saturation probe site  4. Every 4-6 hours:  If on nasal continuous positive airway pressure, respiratory therapy assess nares and determine need for appliance change or resting period.   11/13/2022 0509 by Gem Chapa RN  Outcome: Progressing  11/12/2022 1916 by Kyra Urias RN  Outcome: Progressing     Problem: Safety - Adult  Goal: Free from fall injury  11/13/2022 0509 by Gem Chapa RN  Outcome: Progressing  11/12/2022 1916 by Kyra Urias RN  Outcome: Fabiola Drummond (Taken 11/12/2022 0950)  Free From Fall Injury: Instruct family/caregiver on patient safety     Problem: Pain  Goal: Verbalizes/displays adequate comfort level or baseline comfort level  11/13/2022 0509 by Gem Chapa RN  Outcome: Progressing  11/12/2022 1916 by Kyra Urias RN  Outcome: Progressing

## 2022-11-13 NOTE — PROGRESS NOTES
[facility]  Office: [address], DO, Lili Sep, DO, Carson Heart, DO, Laci Cheema Blood, DO, Jacquelyn Spears MD, Tatiana Penn MD, Robin Charlton MD, Violeta Angel MD,  Paulino Cortes MD, Luly Payan MD, Melinda Garza, DO, Sami Johnson MD,  Jenelle Ferris MD, Anuj Berger MD, Bing Boxer, DO, Alireza Zavala MD, Ailyn Montes MD, Darin Weeks, DO, Prashanth Ward MD, Cornell Gillette MD, Gael Dixon MD, Evangelina Servinr, MD, Susie Thomas DO, Nilton Stanley MD, Gwyn Rodriguez MD, Yoly Abarca, Rosendo Blood, CNP, Yolanda Nguyen, CNP, Nichole Martínez, CNP,  Mitch Li, [facility], Kika German, CNP, Luanne Mota, CNP, Margarita Rueda, CNP, Shabbir Chambers, CNP, Sukumar Cordova, CNP, Raquel MenjivarrMAMADOU, Jung Cruz, CNS, Dominga Ugalde, [facility], Atiya Silva, CNP, Rosario Areas, [facility], [facility], [facility]    Progress Note    11/13/2022    2:58 PM    Name:   Aurelia Hill  MRN:     [id number]     [place]:      [de-identified]   Room:   2007/2007-02  IP Day:  1  Admit Date:  11/11/2022 11:23 PM    PCP:   No primary care provider on file. Code Status:  Full Code    Subjective:     C/C:   Chief Complaint   Patient presents with    Wound Infection     Interval History Status: . T-max 99.0  Currently on cefepime and clindamycin  Waiting for culture reports  Has refused Kowalski's catheter exchange which he is having for the last 4 months, urology ordered flushings  Brief History:   Aurelia Hill is a 38 y.o.  male presents emergency room by EMS for wounds to the perineal area and lower abdomen. Patient has history of schizophrenia. He has hidradenitis suppurativa and has had recurrent issues with wounds to the thighs and perineal area in the past.  He is currently residing at a nursing facility the nursing facility had reported patient was refusing to allow them to care for his wounds. Here in the ED patient is chronically ill-appearing appears to be feeling comfortable requesting pain medications for the sores. From EMR records I am able to deduce that patient has had frequent admissions for sepsis related to the hidradenitis and skin wounds. Further nurses note in ER stated as follows:  Per EMS pt had maggots in his wounds; none was noted during wound care & linen change. Pt's wounds are malodorous & draining. Wounds presents to the inner thigh, groin, under the the folds of his abd, pt has multiple wounds to the coccyx & his gluteal folds. Patient had previously been on Humira which was working relatively well but was discontinued when he was incarcerated  Was recently admitted in [place] in October, was evaluated by surgery team and recommended medical management and plastic surgery evaluation. Patient was sent to [facility]  Surgical team has evaluated the patient here and they do not recommend any surgical intervention and there is no urgent need of emergent debridement, as per their note patient is being followed by both dermatology and plastic surgery possibly as outpatient      Review of Systems:     Constitutional:  negative for chills, fevers, sweats  Respiratory:  negative for cough, dyspnea on exertion, shortness of breath, wheezing  Cardiovascular:  negative for chest pain, chest pressure/discomfort, lower extremity edema, palpitations  Gastrointestinal:  negative for abdominal pain, constipation, diarrhea, nausea, vomiting  Neurological:  negative for dizziness, headache    Medications: Allergies:     Allergies   Allergen Reactions    Depakene [Valproic Acid]     Restoril [Temazepam]     Risperidone And Related      Seizure      Thorazine [Chlorpromazine]     Vancomycin Other (See Comments) and Rash       Current Meds:   Scheduled Meds:    sodium chloride  80 mL IntraVENous Once    ketorolac  15 mg IntraVENous Once    carBAMazepine  200 mg Oral BID    haloperidol  1 mg Oral BID    sodium chloride flush  5-40 mL IntraVENous 2 times per day    enoxaparin  30 mg SubCUTAneous BID    clindamycin (CLEOCIN) IV  600 mg IntraVENous Q8H    cefepime  2,000 mg IntraVENous Q12H     Continuous Infusions:    sodium chloride      sodium chloride Stopped (22 1317)     PRN Meds: sodium chloride flush, sodium chloride flush, sodium chloride, potassium chloride **OR** potassium alternative oral replacement **OR** potassium chloride, magnesium sulfate, ondansetron **OR** ondansetron, polyethylene glycol, acetaminophen **OR** acetaminophen    Data:     Past Medical History:   has a past medical history of Bipolar 1 disorder ([facility] GERD (gastroesophageal reflux disease), Hidradenitis suppurativa, Polysubstance abuse ([facility] and Schizoaffective disorder ([facility] Social History:   reports that he has been smoking cigarettes. He has a 22.00 pack-year smoking history. He has never used smokeless tobacco. He reports current alcohol use. He reports current drug use. Family History:   Family History   Problem Relation Age of Onset    Cancer Mother         breast    High Blood Pressure Maternal Grandfather     Mental Illness Paternal Aunt     Substance Abuse Paternal Uncle     Substance Abuse Brother     Alcohol Abuse Father     Cancer Father         pancrease       Vitals:  /77   Pulse 100   Temp 99 °F (37.2 °C) (Oral)   Resp 16   Ht 5' 9\" (1.753 m)   Wt 215 lb (97.5 kg)   SpO2 94%   BMI 31.75 kg/m²   Temp (24hrs), Av.1 °F (37.3 °C), Min:99 °F (37.2 °C), Max:99.5 °F (37.5 °C)    No results for input(s): POCGLU in the last 72 hours. I/O (24Hr):     Intake/Output Summary (Last 24 hours) at 2022 1458  Last data filed at 2022 1426  Gross per 24 hour   Intake 2310.93 ml   Output 300 ml   Net 2010.93 ml       Labs:  Hematology:  Recent Labs     22  0022 22  0648   WBC 12.6* 13.0*   RBC 3.77* 3.45*   HGB 10.5* 9.5*   HCT 34.4* 31.3*   MCV 91.2 90.7   MCH 27.9 27.5   MCHC 30.5 30.4   RDW 14.1 14.1   * 439   MPV 8.3 8.2     Chemistry:  Recent Labs     11/12/22  0022 11/13/22  0648   * 136   K 4.0 3.7   CL 98 100   CO2 25 27   GLUCOSE 96 112*   BUN 7 4*   CREATININE 0.63* 0.63*   ANIONGAP 10 9   LABGLOM >60 >60   CALCIUM 9.7 9.3     Recent Labs     11/12/22  0022   PROT 8.4*   LABALBU 2.8*   AST 28   ALT 70*   ALKPHOS 133*   BILITOT 0.2*     ABG:No results found for: POCPH, PHART, PH, POCPCO2, YLE8RRG, PCO2, POCPO2, PO2ART, PO2, POCHCO3, QUS0ZWL, HCO3, NBEA, PBEA, BEART, BE, THGBART, THB, PMI8NLA, BUJG1KQB, W9BSNZGK, O2SAT, FIO2  Lab Results   Component Value Date/Time    SPECIAL RT HAND 20ML 11/12/2022 12:30 AM     Lab Results   Component Value Date/Time    CULTURE NO GROWTH 1 DAY 11/12/2022 12:30 AM       Radiology:  CT ABDOMEN PELVIS W IV CONTRAST Additional Contrast? None    Result Date: 11/12/2022  Redemonstration of atrophy/hypoplastic nonfunctional right kidney which has a few cysts with calcified wall. The largest cyst with calcified wall measures 4.3 cm, similar to previous CT scan. Normal left kidney of prominent size without any abnormality. Evidence of moderate to large amount of mildly hyperdense material in the gallbladder suggestive of significant sludge in gallbladder without obvious radiopaque stones. Normal appendix visualized. Nonspecific small to moderate amount of gas scattered in small bowel with multiple fluid levels. Acute enteritis not excluded. Thickened redundant subcutaneous fatty tissue suspended from the lower abdominal wall and pelvic wall without any significant surrounding inflammatory change. Inferior to the perineum evidence of irregular thickened tissues with surrounding inflammatory changes involving the posteromedial aspect of the gluteal folds mainly on the right side and extended to the posteromedial aspect of the proximal right upper thigh.   These soft tissue abnormalities are  limited to the subcutaneous tissues without extending to muscle. No evidence of any inflammatory changes involving the muscles of abdominal walls, pelvic walls, bilateral gluteal muscles, and the visualized muscles of the bilateral proximal thighs.        Physical Examination:        General appearance:  alert, cooperative and no distress  Mental Status:  oriented to person, place and time and normal affect  Lungs:  clear to auscultation bilaterally, normal effort  Heart:  regular rate and rhythm, no murmur  Abdomen:  soft, nontender, nondistended, normal bowel sounds, no masses, hepatomegaly, splenomegaly, + Kowalski's catheter in place  Extremities:  no edema, redness, tenderness in the calves  Skin:  + Multiple infected bilateral abdominal wall wounds/groins and buttocks-see pictures in media    Assessment:        Hospital Problems             Last Modified POA    * (Principal) Hidradenitis suppurativa 11/12/2022 Yes    Sepsis ([address] 11/12/2022 Yes    Schizophrenia ([facility] 11/12/2022 Yes    Mild protein-calorie malnutrition ([facility] 11/12/2022 Yes    Hypovitaminosis D 11/12/2022 Yes    Noncompliance by declining service of wounds care at nursing facility 11/12/2022 Yes    Hydradenitis 11/12/2022 Yes    Tobacco abuse 11/12/2022 Yes   Chronic indwelling Kowalski's cath    Plan:           Continue IV antibiotics cefepime and clindamycin, he is allergic to vancomycin  Consulted infectious disease, waiting for final culture results  Surgical evaluation reviewed, no debridement planned  Resume home medications  DVT prophylaxis  Ostomy consult  Consulted urology for difficult catheterization-patient refuses catheter change  Monitor daily labs  Patient will be sent back to [facility] when more stable    Michael Johnson MD  11/13/2022  2:58 PM

## 2022-11-13 NOTE — PROGRESS NOTES
Jared Gladys   Urology Progress Note            Subjective: Follow-up urinary retention, chronic indwelling Kowalski    Patient Vitals for the past 24 hrs:   BP Temp Temp src Pulse Resp SpO2 Weight   11/13/22 0740 99/70 99 °F (37.2 °C) Oral 94 16 -- --   11/13/22 0556 -- -- -- -- -- -- 215 lb (97.5 kg)   11/13/22 0427 111/71 99 °F (37.2 °C) Oral (!) 101 16 94 % --   11/13/22 0035 106/76 99 °F (37.2 °C) Oral 97 16 94 % --   11/12/22 2053 113/74 99.5 °F (37.5 °C) Oral 98 16 96 % --       Intake/Output Summary (Last 24 hours) at 11/13/2022 0752  Last data filed at 11/13/2022 0602  Gross per 24 hour   Intake 158.11 ml   Output 700 ml   Net -541.89 ml       Recent Labs     11/12/22  0022 11/13/22  0648   WBC 12.6* 13.0*   HGB 10.5* 9.5*   HCT 34.4* 31.3*   MCV 91.2 90.7   * 439     Recent Labs     11/12/22  0022 11/13/22  0648   * 136   K 4.0 3.7   CL 98 100   CO2 25 27   BUN 7 4*   CREATININE 0.63* 0.63*       No results for input(s): COLORU, PHUR, LABCAST, WBCUA, RBCUA, MUCUS, TRICHOMONAS, YEAST, BACTERIA, CLARITYU, SPECGRAV, LEUKOCYTESUR, UROBILINOGEN, BILIRUBINUR, BLOODU in the last 72 hours. Invalid input(s): NITRATE, GLUCOSEUKETONESUAMORPHOUS    Additional Lab/culture results:    Physical Exam: Patient seen in conjunction with nursing staff, we advised the patient about the need for catheter change, he has had the catheter for more than 4-month, he refused to have the catheter changed.     The patient only agrees to catheter irrigation    Interval Imaging Findings:    Impression:    Patient Active Problem List   Diagnosis    Hidradenitis suppurativa    Acute psychosis ([address]    Abscess of axilla    Abscess of skin of abdomen    Blood per rectum    Hydradenitis    Trochanteric bursitis    Adrenal mass, right (HCC)    Hidradenitis axillaris    Schizoaffective disorder, bipolar type ([facility]    Encounter for ostomy nurse consultation    Paranoid schizophrenia, chronic condition with acute exacerbation (HCC)    Iron deficiency anemia    GERD (gastroesophageal reflux disease)    Tobacco abuse    Non-healing left groin open wound, subsequent encounter    Right groin wound, subsequent encounter    Open wound of left ear    Abscess    Sepsis ([address]    Constipation    Acute exacerbation of chronic paranoid schizophrenia (HCC)    Schizophrenia (HCC)    Gram-positive bacteremia    Cellulitis of right thigh    Bandemia    Scrotal abscess    Immunosuppressed due to chemotherapy ([address]    Mild protein-calorie malnutrition ([address]    Hypovitaminosis D    Gluteal abscess    Noncompliance by declining service of wounds care at nursing facility       Plan:  We recommended to flush the catheter periodically and to irrigate with 200 mL sterile saline before collecting any urine sample most likely bacterial colonization will continue to be a problem    Ernie Lewis MD  7:52 AM 11/13/2022

## 2022-11-14 LAB
ANION GAP SERPL CALCULATED.3IONS-SCNC: 8 MMOL/L (ref 9–17)
BUN BLDV-MCNC: 5 MG/DL (ref 6–20)
BUN/CREAT BLD: 8 (ref 9–20)
CALCIUM SERPL-MCNC: 9.2 MG/DL (ref 8.6–10.4)
CHLORIDE BLD-SCNC: 100 MMOL/L (ref 98–107)
CO2: 27 MMOL/L (ref 20–31)
CREAT SERPL-MCNC: 0.66 MG/DL (ref 0.7–1.2)
GFR SERPL CREATININE-BSD FRML MDRD: >60 ML/MIN/1.73M2
GLUCOSE BLD-MCNC: 99 MG/DL (ref 70–99)
HCT VFR BLD CALC: 30.4 % (ref 40.7–50.3)
HEMOGLOBIN: 9.2 G/DL (ref 13–17)
MCH RBC QN AUTO: 28 PG (ref 25.2–33.5)
MCHC RBC AUTO-ENTMCNC: 30.3 G/DL (ref 28.4–34.8)
MCV RBC AUTO: 92.7 FL (ref 82.6–102.9)
NRBC AUTOMATED: 0 PER 100 WBC
PDW BLD-RTO: 14.2 % (ref 11.8–14.4)
PLATELET # BLD: 438 K/UL (ref 138–453)
PMV BLD AUTO: 8 FL (ref 8.1–13.5)
POTASSIUM SERPL-SCNC: 3.9 MMOL/L (ref 3.7–5.3)
RBC # BLD: 3.28 M/UL (ref 4.21–5.77)
SODIUM BLD-SCNC: 135 MMOL/L (ref 135–144)
WBC # BLD: 12.8 K/UL (ref 3.5–11.3)

## 2022-11-14 PROCEDURE — 99232 SBSQ HOSP IP/OBS MODERATE 35: CPT | Performed by: INTERNAL MEDICINE

## 2022-11-14 PROCEDURE — 6370000000 HC RX 637 (ALT 250 FOR IP): Performed by: NURSE PRACTITIONER

## 2022-11-14 PROCEDURE — 36415 COLL VENOUS BLD VENIPUNCTURE: CPT

## 2022-11-14 PROCEDURE — 2580000003 HC RX 258: Performed by: NURSE PRACTITIONER

## 2022-11-14 PROCEDURE — 80048 BASIC METABOLIC PNL TOTAL CA: CPT

## 2022-11-14 PROCEDURE — 99221 1ST HOSP IP/OBS SF/LOW 40: CPT

## 2022-11-14 PROCEDURE — 1200000000 HC SEMI PRIVATE

## 2022-11-14 PROCEDURE — 6360000002 HC RX W HCPCS: Performed by: NURSE PRACTITIONER

## 2022-11-14 PROCEDURE — 2500000003 HC RX 250 WO HCPCS: Performed by: NURSE PRACTITIONER

## 2022-11-14 PROCEDURE — 51702 INSERT TEMP BLADDER CATH: CPT

## 2022-11-14 PROCEDURE — 85027 COMPLETE CBC AUTOMATED: CPT

## 2022-11-14 RX ADMIN — CARBAMAZEPINE 200 MG: 200 TABLET ORAL at 08:51

## 2022-11-14 RX ADMIN — ENOXAPARIN SODIUM 30 MG: 100 INJECTION SUBCUTANEOUS at 08:51

## 2022-11-14 RX ADMIN — CLINDAMYCIN IN 5 PERCENT DEXTROSE 600 MG: 12 INJECTION, SOLUTION INTRAVENOUS at 13:22

## 2022-11-14 RX ADMIN — ENOXAPARIN SODIUM 30 MG: 100 INJECTION SUBCUTANEOUS at 21:30

## 2022-11-14 RX ADMIN — SODIUM CHLORIDE: 9 INJECTION, SOLUTION INTRAVENOUS at 11:28

## 2022-11-14 RX ADMIN — HALOPERIDOL 1 MG: 1 TABLET ORAL at 21:30

## 2022-11-14 RX ADMIN — HALOPERIDOL 1 MG: 1 TABLET ORAL at 08:51

## 2022-11-14 RX ADMIN — CLINDAMYCIN IN 5 PERCENT DEXTROSE 600 MG: 12 INJECTION, SOLUTION INTRAVENOUS at 21:30

## 2022-11-14 RX ADMIN — CARBAMAZEPINE 200 MG: 200 TABLET ORAL at 21:30

## 2022-11-14 RX ADMIN — CEFEPIME 2000 MG: 2 INJECTION, POWDER, FOR SOLUTION INTRAVENOUS at 14:31

## 2022-11-14 ASSESSMENT — ENCOUNTER SYMPTOMS
GASTROINTESTINAL NEGATIVE: 1
RESPIRATORY NEGATIVE: 1

## 2022-11-14 ASSESSMENT — PAIN SCALES - GENERAL
PAINLEVEL_OUTOF10: 0
PAINLEVEL_OUTOF10: 0

## 2022-11-14 NOTE — PROGRESS NOTES
Patient's IV infiltrated IV attempted x 1 then patient refused IV restart even after being educated on the importance of IV antibiotics.  Nurse practitioner notified

## 2022-11-14 NOTE — PROGRESS NOTES
[facility]  Progress Note    Nata Goldberg  MRN: [id number]  Date: 11/14/2022  LOS: 2     Reason for F/U :   Hidradenitis suppurativa    Impression :   Hidradenitis suppurativa was previously on Humira  Perineal/gluteal hidradenitis with areas of purulent drainage  Schizophrenia/bipolar disorder  History of vancomycin allergy-rash  Infected right abdominal wall wounds    Recommendations: The patient continues on systemic antimicrobial therapy with cefepime and clindamycin  There is some induration/cellulitis in the perineal soft tissues and there is also some purulent drainage  Continue current treatment and we will follow his progress and adjust therapy accordingly    Infection Control Recommendations:   Universal precautions. Discharge Planning:   Estimated Length of IV antimicrobials: To be determined  Patient will need Midline Catheter Insertion/ PICC line Insertion: No  Patient will need: Home IV , [place],  SNF,  LTAC: Undetermined  Patient willneed outpatient wound care: No    Medical Decision making / Summary of Stay:     Nata Goldberg is a 38y.o.-year-old male presenting to the hospital with worsening pain to the groin, perineum and bilateral buttocks with increased drainage. He also had right abdominal wall wounds with purulent drainage. Symptoms are moderate to severe, no alleviating or aggravating factors. The patient is poor historian, Kowalski catheter in place. On admission he had a fever with temperature max of 100.9, WBC 12.6, lactic acid 1, renal function normal.  CT abdomen and pelvic showed soft tissue irregularity but no fluid collection. The patient had history of severe hydradenitis suppurativa, used to be on Humira that was discontinued around 2 months ago due to incarceration. He used to follow-up with [facility] dermatology clinic.   He was admitted to [facility] October 2022 was evaluated by surgery, no surgical intervention was recommended, was discharged on Levaquin and Augmentin for 2 weeks  Previous wound culture on 10/12/2022 grew Proteus mirabilis that was pansensitive and E. coli that was sensitive to cefazolin and Cipro, resistant to Bactrim. Current evaluation:2022    /70   Pulse 84   Temp 97.9 °F (36.6 °C) (Axillary)   Resp 18   Ht 5' 9\" (1.753 m)   Wt 214 lb (97.1 kg)   SpO2 97%   BMI 31.60 kg/m²     Temperature Range: Temp: 97.9 °F (36.6 °C) Temp  Av.5 °F (36.9 °C)  Min: 97.9 °F (36.6 °C)  Max: 99 °F (37.2 °C)  The patient is seen and evaluated at bedside and is awake and alert in some mild pain in the perineal area. He did not allow for dressing changes secondary to pain. Review of Systems   Constitutional: Negative. HENT: Negative. Respiratory: Negative. Cardiovascular: Negative. Gastrointestinal: Negative. Genitourinary: Negative. Musculoskeletal: Negative. Skin:  Positive for rash and wound. Neurological: Negative. Psychiatric/Behavioral: Negative. Physical Examination :     Physical Exam  Constitutional:       Appearance: He is well-developed. HENT:      Head: Normocephalic and atraumatic. Cardiovascular:      Rate and Rhythm: Normal rate. Heart sounds: Normal heart sounds. No friction rub. No gallop. Pulmonary:      Effort: Pulmonary effort is normal.      Breath sounds: Normal breath sounds. No wheezing. Abdominal:      General: Bowel sounds are normal.      Palpations: Abdomen is soft. There is no mass. Tenderness: There is no abdominal tenderness. Comments: Lower abdominal wall ulcerations-clean   Musculoskeletal:      Cervical back: Neck supple. Lymphadenopathy:      Cervical: No cervical adenopathy. Skin:     Comments: The patient has multiple lesions in the perineal area-bilateral groin, scrotal, gluteal fold with some intermittent areas of purulent drainage.    Neurological:      Mental Status: He is alert and oriented to person, place, and time. Laboratory data:   I have independently reviewed the followinglabs:  CBC with Differential:   Recent Labs     11/12/22 0022 11/13/22 0648 11/14/22 0614   WBC 12.6* 13.0* 12.8*   HGB 10.5* 9.5* 9.2*   HCT 34.4* 31.3* 30.4*   * 439 438   LYMPHOPCT 20*  --   --    MONOPCT 11  --   --      BMP:   Recent Labs     11/13/22 0648 11/14/22 0614    135   K 3.7 3.9    100   CO2 27 27   BUN 4* 5*   CREATININE 0.63* 0.66*     Hepatic Function Panel:   Recent Labs     11/12/22 0022   PROT 8.4*   LABALBU 2.8*   BILITOT 0.2*   ALKPHOS 133*   ALT 70*   AST 28         Lab Results   Component Value Date/Time    PROCAL 0.04 08/10/2022 09:07 PM     Lab Results   Component Value Date/Time    .4 10/09/2022 10:53 AM    CRP 35.5 08/10/2022 09:07 PM     Lab Results   Component Value Date    SEDRATE 52 (H) 10/09/2022         No results found for: DDIMER  Lab Results   Component Value Date/Time    FERRITIN 41 10/29/2020 02:15 PM    FERRITIN 38 07/10/2015 11:30 AM    FERRITIN 50 11/12/2014 06:10 AM    FERRITIN 254 07/25/2012 01:09 PM     No results found for: LDH  No results found for: FIBRINOGEN    No results found for requested labs within last 30 days. No results found for: COVID19    No results for input(s): VANCOTROUGH in the last 72 hours. Imaging Studies:   CT OF THE ABDOMEN AND PELVIS WITH CONTRAST 11/11/2022 11:58 pm  Impression   Redemonstration of atrophy/hypoplastic nonfunctional right kidney which has a   few cysts with calcified wall. The largest cyst with calcified wall measures   4.3 cm, similar to previous CT scan. Normal left kidney of prominent size without any abnormality. Evidence of moderate to large amount of mildly hyperdense material in the   gallbladder suggestive of significant sludge in gallbladder without obvious   radiopaque stones. Normal appendix visualized.        Nonspecific small to moderate amount of gas scattered in small bowel with multiple fluid levels. Acute enteritis not excluded. Thickened redundant subcutaneous fatty tissue suspended from the lower   abdominal wall and pelvic wall without any significant surrounding   inflammatory change. Inferior to the perineum evidence of irregular thickened tissues with   surrounding inflammatory changes involving the posteromedial aspect of the   gluteal folds mainly on the right side and extended to the posteromedial   aspect of the proximal right upper thigh. These soft tissue abnormalities   are  limited to the subcutaneous tissues without extending to muscle. No   evidence of any inflammatory changes involving the muscles of abdominal   walls, pelvic walls, bilateral gluteal muscles, and the visualized muscles of   the bilateral proximal thighs. Cultures:     Culture, Blood 1 [[id number]] Collected: 11/12/22 0022   Order Status: Completed Specimen: Blood Updated: 11/14/22 0913    Specimen Description . BLOOD    Special Requests 10ML RT AC    Culture NO GROWTH 2 DAYS   Culture, Blood 1 [[id number]] Collected: 11/12/22 0030   Order Status: Completed Specimen: Blood Updated: 11/14/22 0910    Specimen Description . BLOOD    Special Requests RT HAND 20ML    Culture NO GROWTH 2 DAYS   Culture, Wound [[id number]] (Abnormal) Collected: 11/12/22 0002   Order Status: Completed Specimen: Coccyx Updated: 11/13/22 1324    Specimen Description . COCCYX    Direct Exam This specimen contains greater than 9 Squamous Epithelial cells per LPF which is an indicator of a low quality specimen. Abnormal      RARE NEUTROPHILS Abnormal      MIXED BACTERIAL MORPHOTYPES SEEN ON GRAM STAIN.  Abnormal     Culture GRAM NEGATIVE RODS LIGHT GROWTH Abnormal      NORMAL SKIN PRAVEEN       Medications:      sodium chloride  80 mL IntraVENous Once    ketorolac  15 mg IntraVENous Once    carBAMazepine  200 mg Oral BID    haloperidol  1 mg Oral BID    sodium chloride flush  5-40 mL IntraVENous 2 times per day enoxaparin  30 mg SubCUTAneous BID    clindamycin (CLEOCIN) IV  600 mg IntraVENous Q8H    cefepime  2,000 mg IntraVENous Q12H           [facility]  Vane Duke MD  Perfect Serve messaging  OFFICE: [phone]      Electronically signed by Vane Duke MD on 11/14/2022 at 10:23 AM  Thank you for allowing us to participate in the care of this patient. Please call with questions. This note iscreated with the assistance of a speech recognition program.  While intending to generate a document that actually reflects the content of the visit, the document can still have some errors including those of syntax andsound a like substitutions which may escape proof reading. In such instances, actual meaning can be extrapolated by contextual diversion.

## 2022-11-14 NOTE — PROGRESS NOTES
Wound Ostomy Continence Nursing  Follow Up Visit      NAME:  [address] [place] [address] Drive RECORD NUMBER:  [id number]  AGE: 38 y.o. GENDER: male  : 1980  TODAY'S DATE:  2022    Subjective        [facility] nursing consulted for mgmt of hidradenitis suppurativa wounds. The patient initially reluctant to allow for wound care stating it was already complete today. Upon my encouragement to allow for wound care evaluation, he became angry and combative. Discussed case with RN, he has been refusing care intermittently or allowing for minimal care. I was unable to look at wounds today. Will reattempt tomorrow morning. Review of Systems:  The patient refused to answer questions this visit. He stated that the wounds do not need to be cleansed and cared for every day, just every other day. He was assured that this is incorrect, but he remained reluctant to allow care with adamant refusal.      Objective     Vitals:  /69   Pulse 96   Temp 98.2 °F (36.8 °C) (Oral)   Resp 16   Ht 5' 9\" (1.753 m)   Wt 214 lb (97.1 kg)   SpO2 93%   BMI 31.60 kg/m²    TEMPERATURE:  Current - Temp: 98.2 °F (36.8 °C);  Max - Temp  Av.3 °F (36.8 °C)  Min: 97.9 °F (36.6 °C)  Max: 99 °F (37.2 °C)    Roosevelt Risk Score Roosevelt Scale Score: 13    INTAKE/OUTPUT:      Intake/Output Summary (Last 24 hours) at 2022 1455  Last data filed at 2022 1358  Gross per 24 hour   Intake 508.25 ml   Output 1225 ml   Net -716.75 ml                 Physical Exam:  exam limited due to pt refusal  General Appearance: alert and oriented to person, place and time, appears thin, in no acute distress, angry demeanor  Head: normocephalic and atraumatic  Pulmonary/Chest: normal air movement, no respiratory distress  Skin: unable to evaluate due to pt refusal  Cardiovascular/Circulation: does not appear to have extremity edema, no cyanosis  Extremities: no cyanosis and no clubbing  Musculoskeletal: no joint deformity or tenderness, no extremity amputations  Neurologic: gait not evaluated, coordination normal but speech somewhat garbled      Data Review:  LABS:  CBC:   Recent Labs     11/12/22  0022 11/13/22  0648 11/14/22  0614   WBC 12.6* 13.0* 12.8*   HGB 10.5* 9.5* 9.2*   * 439 438     BMP:    Lab Results   Component Value Date/Time     11/14/2022 06:14 AM    K 3.9 11/14/2022 06:14 AM     11/14/2022 06:14 AM    CO2 27 11/14/2022 06:14 AM    BUN 5 11/14/2022 06:14 AM    LABALBU 2.8 11/12/2022 12:22 AM    CREATININE 0.66 11/14/2022 06:14 AM    CALCIUM 9.2 11/14/2022 06:14 AM    GFRAA >60 09/09/2022 03:26 PM    LABGLOM >60 11/14/2022 06:14 AM    GLUCOSE 99 11/14/2022 06:14 AM    GLUCOSE 89 02/09/2012 06:19 AM     Coagulation:   Recent Labs     11/12/22  0022   PROT 8.4*       Assessment       Patient Active Problem List   Diagnosis    Hidradenitis suppurativa    Acute psychosis ([address]    Abscess of axilla    Abscess of skin of abdomen    Blood per rectum    Hydradenitis    Trochanteric bursitis    Adrenal mass, right (HCC)    Hidradenitis axillaris    Schizoaffective disorder, bipolar type ([address]    Encounter for ostomy nurse consultation    Paranoid schizophrenia, chronic condition with acute exacerbation ([address]    Iron deficiency anemia    GERD (gastroesophageal reflux disease)    Tobacco abuse    Non-healing left groin open wound, subsequent encounter    Right groin wound, subsequent encounter    Open wound of left ear    Abscess    Sepsis ([address]    Constipation    Acute exacerbation of chronic paranoid schizophrenia (HCC)    Schizophrenia ([address]    Gram-positive bacteremia    Cellulitis of right thigh    Bandemia    Scrotal abscess    Immunosuppressed due to chemotherapy ([address]    Mild protein-calorie malnutrition ([address]    Hypovitaminosis D    Gluteal abscess    Noncompliance by declining service of wounds care at nursing facility           Plan       Pt refused to allow wound care evaluation/care today  Discussed with RN, will reattempt tomorrow morning  For now, recommend skin cleansing with CHG, rinse, pat dry. Use Maxorb ag/ABD pads to open wounds.

## 2022-11-14 NOTE — PROGRESS NOTES
Occupational [address]  Occupational Therapy Not Seen Note    Patient not available for Occupational Therapy due to:    [] Testing:    [] Hemodialysis    [] Cancelled by RN:    [x] Refusal by Patient:11/14: (CX) Attempted to see pt for OT eval this AM. Pt adamantly refusing therapy at this time. Will continue to follow.      [] Surgery:     [] Intubation:     [] Pain Medication:    [] Sedation:     [] Spine Precautions :    [] Medical Instability:    [] Other:      Loli Ocampo, OT

## 2022-11-14 NOTE — PLAN OF CARE
Problem: Discharge Planning  Goal: Discharge to home or other facility with appropriate resources  11/13/2022 2336 by Alba Mccarthy RN  Outcome: Progressing  Flowsheets (Taken 11/13/2022 2336)  Discharge to home or other facility with appropriate resources:   Identify barriers to discharge with patient and caregiver   Arrange for needed discharge resources and transportation as appropriate     Problem: Skin/Tissue Integrity  Goal: Absence of new skin breakdown  Description: 1. Monitor for areas of redness and/or skin breakdown  2. Assess vascular access sites hourly  3. Every 4-6 hours minimum:  Change oxygen saturation probe site  4. Every 4-6 hours:  If on nasal continuous positive airway pressure, respiratory therapy assess nares and determine need for appliance change or resting period. 11/13/2022 2336 by Alba Mccarthy RN  Outcome: Progressing  Note: Skin integrity maintained, no new skin abnormalities assessed.  Appropriate measures remain in place      Problem: Safety - Adult  Goal: Free from fall injury  11/13/2022 2336 by Alba Mccarthy RN  Outcome: Progressing  Flowsheets (Taken 11/13/2022 2336)  Free From Fall Injury:   Alpa Browning family/caregiver on patient safety   Based on caregiver fall risk screen, instruct family/caregiver to ask for assistance with transferring infant if caregiver noted to have fall risk factors     Problem: Pain  Goal: Verbalizes/displays adequate comfort level or baseline comfort level  11/13/2022 2336 by Alba Mccarthy RN  Outcome: Progressing  Flowsheets (Taken 11/13/2022 2336)  Verbalizes/displays adequate comfort level or baseline comfort level:   Encourage patient to monitor pain and request assistance   Assess pain using appropriate pain scale   Administer analgesics based on type and severity of pain and evaluate response   Implement non-pharmacological measures as appropriate and evaluate response     Problem: Neurosensory - Adult  Goal: Achieves stable or improved neurological status  11/13/2022 2336 by Aubrie Lamb RN  Outcome: Progressing  Flowsheets (Taken 11/13/2022 2336)  Achieves stable or improved neurological status:   Assess for and report changes in neurological status   Initiate measures to prevent increased intracranial pressure     Problem: Respiratory - Adult  Goal: Achieves optimal ventilation and oxygenation  11/13/2022 2336 by Aubrie Lamb RN  Outcome: Progressing  Flowsheets (Taken 11/13/2022 2336)  Achieves optimal ventilation and oxygenation:   Assess for changes in respiratory status   Assess for changes in mentation and behavior     Problem: Skin/Tissue Integrity - Adult  Goal: Skin integrity remains intact  Outcome: Progressing  Flowsheets (Taken 11/13/2022 2336)  Skin Integrity Remains Intact: Monitor for areas of redness and/or skin breakdown     Problem: Musculoskeletal - Adult  Goal: Return mobility to safest level of function  11/13/2022 2336 by Aubrie Lamb RN  Outcome: Progressing  Flowsheets (Taken 11/13/2022 2336)  Return Mobility to Safest Level of Function:   Assess patient stability and activity tolerance for standing, transferring and ambulating with or without assistive devices   Ensure adequate protection for wounds/incisions during mobilization     Problem: Gastrointestinal - Adult  Goal: Minimal or absence of nausea and vomiting  11/13/2022 2336 by Aubrie Lamb RN  Outcome: Progressing  Flowsheets (Taken 11/13/2022 2336)  Minimal or absence of nausea and vomiting:   Administer IV fluids as ordered to ensure adequate hydration   Administer ordered antiemetic medications as needed   Provide nonpharmacologic comfort measures as appropriate     Problem: Genitourinary - Adult  Goal: Absence of urinary retention  11/13/2022 2336 by Aubrie Lamb RN  Outcome: Progressing  Flowsheets (Taken 11/13/2022 2336)  Absence of urinary retention:   Assess patients ability to void and empty bladder   Monitor intake/output and perform bladder scan as needed     Problem: Infection - Adult  Goal: Absence of infection at discharge  11/13/2022 2336 by Dipesh Cruz RN  Outcome: Progressing  Flowsheets (Taken 11/13/2022 2336)  Absence of infection at discharge:   Assess and monitor for signs and symptoms of infection   Monitor lab/diagnostic results   Monitor all insertion sites i.e., indwelling lines, tubes and drains   Administer medications as ordered

## 2022-11-14 NOTE — PLAN OF CARE
Problem: Discharge Planning  Goal: Discharge to home or other facility with appropriate resources  11/14/2022 1120 by Kasia Dawkins RN  Outcome: Progressing  Flowsheets (Taken 11/14/2022 1882)  Discharge to home or other facility with appropriate resources:   Identify barriers to discharge with patient and caregiver   Refer to discharge planning if patient needs post-hospital services based on physician order or complex needs related to functional status, cognitive ability or social support system  11/13/2022 2336 by Aubrie Lamb RN  Outcome: Progressing  Flowsheets (Taken 11/13/2022 2336)  Discharge to home or other facility with appropriate resources:   Identify barriers to discharge with patient and caregiver   Arrange for needed discharge resources and transportation as appropriate     Problem: Skin/Tissue Integrity  Goal: Absence of new skin breakdown  Description: 1. Monitor for areas of redness and/or skin breakdown  2. Assess vascular access sites hourly  3. Every 4-6 hours minimum:  Change oxygen saturation probe site  4. Every 4-6 hours:  If on nasal continuous positive airway pressure, respiratory therapy assess nares and determine need for appliance change or resting period. 11/14/2022 1120 by Kasia Dawkins RN  Outcome: Progressing  11/13/2022 2336 by Aubrie Lamb RN  Outcome: Progressing  Note: Skin integrity maintained, no new skin abnormalities assessed.  Appropriate measures remain in place      Problem: Safety - Adult  Goal: Free from fall injury  11/14/2022 1120 by Kasia Dawkins RN  Outcome: Progressing  11/13/2022 2336 by Aubrie Lamb RN  Outcome: Progressing  Flowsheets (Taken 11/13/2022 2336)  Free From Fall Injury:   Dina Jamil family/caregiver on patient safety   Based on caregiver fall risk screen, instruct family/caregiver to ask for assistance with transferring infant if caregiver noted to have fall risk factors     Problem: Pain  Goal: Verbalizes/displays adequate comfort level or baseline comfort level  11/14/2022 1120 by Nancy Zapata RN  Outcome: Progressing  11/13/2022 2336 by Daniel Cobb RN  Outcome: Progressing  Flowsheets (Taken 11/13/2022 2336)  Verbalizes/displays adequate comfort level or baseline comfort level:   Encourage patient to monitor pain and request assistance   Assess pain using appropriate pain scale   Administer analgesics based on type and severity of pain and evaluate response   Implement non-pharmacological measures as appropriate and evaluate response     Problem: Neurosensory - Adult  Goal: Achieves stable or improved neurological status  11/14/2022 1120 by Nancy Zapata RN  Outcome: Progressing  Flowsheets (Taken 11/14/2022 0851)  Achieves stable or improved neurological status: Assess for and report changes in neurological status  11/13/2022 2336 by Daniel Cobb RN  Outcome: Progressing  Flowsheets (Taken 11/13/2022 2336)  Achieves stable or improved neurological status:   Assess for and report changes in neurological status   Initiate measures to prevent increased intracranial pressure     Problem: Respiratory - Adult  Goal: Achieves optimal ventilation and oxygenation  11/14/2022 1120 by Nancy Zapata RN  Outcome: Progressing  Flowsheets (Taken 11/14/2022 0851)  Achieves optimal ventilation and oxygenation: Assess for changes in respiratory status  11/13/2022 2336 by Daniel Cobb RN  Outcome: Progressing  Flowsheets (Taken 11/13/2022 2336)  Achieves optimal ventilation and oxygenation:   Assess for changes in respiratory status   Assess for changes in mentation and behavior     Problem: Skin/Tissue Integrity - Adult  Goal: Skin integrity remains intact  Recent Flowsheet Documentation  Taken 11/14/2022 0851 by Nancy Zapata RN  Skin Integrity Remains Intact: Monitor for areas of redness and/or skin breakdown  11/13/2022 2336 by Daniel Cobb RN  Outcome: Progressing  Flowsheets (Taken 11/13/2022 2336)  Skin Integrity Remains Intact: Monitor for areas of redness and/or skin breakdown     Problem: Musculoskeletal - Adult  Goal: Return mobility to safest level of function  11/14/2022 1120 by Kasia Dawkins RN  Outcome: Progressing  [address] (Taken 11/14/2022 0851)  Return Mobility to Safest Level of Function: Obtain physical therapy/occupational therapy consults as needed  11/13/2022 2336 by Aubrie Lamb RN  Outcome: Progressing  Flowsheets (Taken 11/13/2022 2336)  Return Mobility to Safest Level of Function:   Assess patient stability and activity tolerance for standing, transferring and ambulating with or without assistive devices   Ensure adequate protection for wounds/incisions during mobilization     Problem: Gastrointestinal - Adult  Goal: Minimal or absence of nausea and vomiting  11/14/2022 1120 by Kasia Dawkins RN  Outcome: Progressing  [address] (Taken 11/14/2022 0851)  Minimal or absence of nausea and vomiting: Administer IV fluids as ordered to ensure adequate hydration  11/13/2022 2336 by Aubrie Lamb RN  Outcome: Progressing  Flowsheets (Taken 11/13/2022 2336)  Minimal or absence of nausea and vomiting:   Administer IV fluids as ordered to ensure adequate hydration   Administer ordered antiemetic medications as needed   Provide nonpharmacologic comfort measures as appropriate     Problem: Genitourinary - Adult  Goal: Absence of urinary retention  11/14/2022 1120 by Kasia Dawkins RN  Outcome: Progressing  Flowsheets (Taken 11/14/2022 0851)  Absence of urinary retention:   Assess patients ability to void and empty bladder   Discuss catheterization for long term situations as appropriate  11/13/2022 2336 by Aubrie Lamb RN  Outcome: Progressing  Flowsheets (Taken 11/13/2022 2336)  Absence of urinary retention:   Assess patients ability to void and empty bladder   Monitor intake/output and perform bladder scan as needed     Problem: Infection - Adult  Goal: Absence of infection at discharge  11/14/2022 1120 by Jaelyn Maldonado RN  Outcome: Progressing  [address] (Taken 11/14/2022 0007)  Absence of infection at discharge: Assess and monitor for signs and symptoms of infection  11/13/2022 2336 by Sharon Lorenzo, PHILIP  Outcome: Progressing  Flowsheets (Taken 11/13/2022 2336)  Absence of infection at discharge:   Assess and monitor for signs and symptoms of infection   Monitor lab/diagnostic results   Monitor all insertion sites i.e., indwelling lines, tubes and drains   Administer medications as ordered

## 2022-11-14 NOTE — PROGRESS NOTES
Physical Therapy  DATE: 2022    NAME: Altagracia Mckeon  MRN: [id number]   : 1980    Patient not seen this date for Physical Therapy due to:      [] Cancel by RN or physician due to:    [] Hemodialysis    [] Critical Lab Value Level     [] Blood transfusion in progress    [] Acute or unstable cardiovascular status   _MAP < 55 or more than >115  _HR < 40 or > 130    [] Acute or unstable pulmonary status   -FiO2 > 60%   _RR < 5 or >40    _O2 sats < 85%    [] Strict Bedrest    [] Off Unit for surgery or procedure    [] Off Unit for testing       [] Pending imaging to R/O fracture    [x] Refusal by Patient:  Pt adamantly refusing to participate in therapy eval this morning stating \"I can't do that right now. \"  Pt unable to provide details when asked further. Writer explained the purpose of acute PT eval and importance of continued mobility and pt continuing to adamantly refuse. PT will continue to follow and ck back as able. [] Other      [] PT being discontinued at this time. Patient independent. No further needs. [] PT being discontinued at this time as the patient has been transferred to hospice care. No further needs.       Timothy Lin, PT

## 2022-11-14 NOTE — PROGRESS NOTES
[facility]  Office: [address], DO, Ashlyn Belle, DO, Morenita Pabon, DO, Wes Quinn Blood, DO, Casandra Hairston MD, Georges Ahmadi MD, Jordan Ayala MD, Anastasia Flowers MD,  Pedro Phillips MD, Jackie Lopez MD, Walter Marc DO, Wen Leung MD,  Ana M iRvera MD, Shahnaz Cuello MD, Az Johnson DO, Min Devine MD, Carol Avila MD, Candelaria Oneal DO, Power Solis MD, Carla Garcia MD, Butch Trent MD, Nadeen Barney MD, Laura Reyes DO, Anoop Barnes MD, Shonda Lamar MD, Jonatan Gabriel, David Gomes, CNP, Ezequiel Chapin, CNP, Roosevelt Lau, CNP,  Neda Machado, [facility], Ela Hidalgo, CNP, Darin Torrez, CNP, Roman Mcclendon, CNP, Merna Singleton, CNP, Kwasi Stinson, CNP, Tessa Garcia PA-C, Keren Pyle, CNS, Alpa Quiroga, [facility], Slick Swain, CNP, Aziza Villafana, CNP, Nikki Ramos, [facility]    Progress Note    11/14/2022    12:10 PM    Name:   Cailin Siddiqui  MRN:     [id number]     [place]:      [de-identified]   Room:   2007/2007-02  IP Day:  2  Admit Date:  11/11/2022 11:23 PM    PCP:   No primary care provider on file. Code Status:  Full Code    Subjective:     C/C:   Chief Complaint   Patient presents with    Wound Infection     Interval History Status: . Afebrile  Currently on cefepime and clindamycin  Wound culture showed normal skin blake   has refused Kowalski's catheter exchange which he is having for the last 4 months,   urology planning cystoscopy tomorrow on 11/15/2022  Brief History:   Cailin Siddiqui is a 38 y.o.  male presents emergency room by EMS for wounds to the perineal area and lower abdomen. Patient has history of schizophrenia.   He has hidradenitis suppurativa and has had recurrent issues with wounds to the thighs and perineal area in the past.  He is currently residing at a nursing facility the nursing facility had reported patient was refusing to allow them to care for his wounds. Here in the ED patient is chronically ill-appearing appears to be feeling comfortable requesting pain medications for the sores. From EMR records I am able to deduce that patient has had frequent admissions for sepsis related to the hidradenitis and skin wounds. Further nurses note in ER stated as follows:  Per EMS pt had maggots in his wounds; none was noted during wound care & linen change. Pt's wounds are malodorous & draining. Wounds presents to the inner thigh, groin, under the the folds of his abd, pt has multiple wounds to the coccyx & his gluteal folds. Patient had previously been on Humira which was working relatively well but was discontinued when he was incarcerated  Was recently admitted in [place] in October, was evaluated by surgery team and recommended medical management and plastic surgery evaluation. Patient was sent to [facility]  Surgical team has evaluated the patient here and they do not recommend any surgical intervention and there is no urgent need of emergent debridement, as per their note patient is being followed by both dermatology and plastic surgery possibly as outpatient      Review of Systems:     Constitutional:  negative for chills, fevers, sweats  Respiratory:  negative for cough, dyspnea on exertion, shortness of breath, wheezing  Cardiovascular:  negative for chest pain, chest pressure/discomfort, lower extremity edema, palpitations  Gastrointestinal:  negative for abdominal pain, constipation, diarrhea, nausea, vomiting  Neurological:  negative for dizziness, headache    Medications: Allergies:     Allergies   Allergen Reactions    Depakene [Valproic Acid]     Restoril [Temazepam]     Risperidone And Related      Seizure      Thorazine [Chlorpromazine]     Vancomycin Other (See Comments) and Rash       Current Meds:   Scheduled Meds:    sodium chloride  80 mL IntraVENous Once    ketorolac  15 mg IntraVENous Once carBAMazepine  200 mg Oral BID    haloperidol  1 mg Oral BID    sodium chloride flush  5-40 mL IntraVENous 2 times per day    enoxaparin  30 mg SubCUTAneous BID    clindamycin (CLEOCIN) IV  600 mg IntraVENous Q8H    cefepime  2,000 mg IntraVENous Q12H     Continuous Infusions:    sodium chloride      sodium chloride 75 mL/hr at 22 1128     PRN Meds: sodium chloride flush, sodium chloride flush, sodium chloride, potassium chloride **OR** potassium alternative oral replacement **OR** potassium chloride, magnesium sulfate, ondansetron **OR** ondansetron, polyethylene glycol, acetaminophen **OR** acetaminophen    Data:     Past Medical History:   has a past medical history of Bipolar 1 disorder ([facility] GERD (gastroesophageal reflux disease), Hidradenitis suppurativa, Polysubstance abuse ([facility] and Schizoaffective disorder ([facility] Social History:   reports that he has been smoking cigarettes. He has a 22.00 pack-year smoking history. He has never used smokeless tobacco. He reports current alcohol use. He reports current drug use. Family History:   Family History   Problem Relation Age of Onset    Cancer Mother         breast    High Blood Pressure Maternal Grandfather     Mental Illness Paternal Aunt     Substance Abuse Paternal Uncle     Substance Abuse Brother     Alcohol Abuse Father     Cancer Father         pancrease       Vitals:  /69   Pulse 96   Temp 98.2 °F (36.8 °C) (Oral)   Resp 16   Ht 5' 9\" (1.753 m)   Wt 214 lb (97.1 kg)   SpO2 93%   BMI 31.60 kg/m²   Temp (24hrs), Av.3 °F (36.8 °C), Min:97.9 °F (36.6 °C), Max:99 °F (37.2 °C)    No results for input(s): POCGLU in the last 72 hours. I/O (24Hr):     Intake/Output Summary (Last 24 hours) at 2022 1210  Last data filed at 2022 1146  Gross per 24 hour   Intake 2538.89 ml   Output 1225 ml   Net 1313.89 ml         Labs:  Hematology:  Recent Labs     22  0022 22  0648 22  0614   WBC 12.6* 13.0* 12.8*   RBC 3.77* 3.45* 3.28*   HGB 10.5* 9.5* 9.2*   HCT 34.4* 31.3* 30.4*   MCV 91.2 90.7 92.7   MCH 27.9 27.5 28.0   MCHC 30.5 30.4 30.3   RDW 14.1 14.1 14.2   * 439 438   MPV 8.3 8.2 8.0*       Chemistry:  Recent Labs     11/12/22  0022 11/13/22  0648 11/14/22  0614   * 136 135   K 4.0 3.7 3.9   CL 98 100 100   CO2 25 27 27   GLUCOSE 96 112* 99   BUN 7 4* 5*   CREATININE 0.63* 0.63* 0.66*   ANIONGAP 10 9 8*   LABGLOM >60 >60 >60   CALCIUM 9.7 9.3 9.2       Recent Labs     11/12/22  0022   PROT 8.4*   LABALBU 2.8*   AST 28   ALT 70*   ALKPHOS 133*   BILITOT 0.2*       ABG:No results found for: POCPH, PHART, PH, POCPCO2, ZJS9FFR, PCO2, POCPO2, PO2ART, PO2, POCHCO3, TJX4ZLT, HCO3, NBEA, PBEA, BEART, BE, THGBART, THB, JDT5JSA, YGFG4JCA, Y5HCUANP, O2SAT, FIO2  Lab Results   Component Value Date/Time    SPECIAL RT HAND 20ML 11/12/2022 12:30 AM     Lab Results   Component Value Date/Time    CULTURE NO GROWTH 2 DAYS 11/12/2022 12:30 AM       Radiology:  CT ABDOMEN PELVIS W IV CONTRAST Additional Contrast? None    Result Date: 11/12/2022  Redemonstration of atrophy/hypoplastic nonfunctional right kidney which has a few cysts with calcified wall. The largest cyst with calcified wall measures 4.3 cm, similar to previous CT scan. Normal left kidney of prominent size without any abnormality. Evidence of moderate to large amount of mildly hyperdense material in the gallbladder suggestive of significant sludge in gallbladder without obvious radiopaque stones. Normal appendix visualized. Nonspecific small to moderate amount of gas scattered in small bowel with multiple fluid levels. Acute enteritis not excluded. Thickened redundant subcutaneous fatty tissue suspended from the lower abdominal wall and pelvic wall without any significant surrounding inflammatory change.  Inferior to the perineum evidence of irregular thickened tissues with surrounding inflammatory changes involving the posteromedial aspect of the gluteal folds mainly on the right side and extended to the posteromedial aspect of the proximal right upper thigh. These soft tissue abnormalities are  limited to the subcutaneous tissues without extending to muscle. No evidence of any inflammatory changes involving the muscles of abdominal walls, pelvic walls, bilateral gluteal muscles, and the visualized muscles of the bilateral proximal thighs.        Physical Examination:        General appearance:  alert, cooperative and no distress  Mental Status:  oriented to person, place and time and normal affect  Lungs:  clear to auscultation bilaterally, normal effort  Heart:  regular rate and rhythm, no murmur  Abdomen:  soft, nontender, nondistended, normal bowel sounds, no masses, hepatomegaly, splenomegaly, + Kowalski's catheter in place  Extremities:  no edema, redness, tenderness in the calves  Skin:  + Multiple infected bilateral abdominal wall wounds/groins and buttocks-see pictures in media    Assessment:        Hospital Problems             Last Modified POA    * (Principal) Hidradenitis suppurativa 11/12/2022 Yes    Sepsis ([address] 11/12/2022 Yes    Schizophrenia ([address] 11/12/2022 Yes    Mild protein-calorie malnutrition ([address] 11/12/2022 Yes    Hypovitaminosis D 11/12/2022 Yes    Noncompliance by declining service of wounds care at nursing facility 11/12/2022 Yes    Hydradenitis 11/12/2022 Yes    Tobacco abuse 11/12/2022 Yes   Chronic indwelling Kowalski's cath-refusing to exchange    Plan:           Continue IV antibiotics cefepime and clindamycin, he is allergic to vancomycin  Consulted infectious disease, waiting for final decision regarding antibiotics  Surgical evaluation reviewed, no debridement planned  Resume home medications  DVT prophylaxis  Ostomy consult ordered  Consulted urology for difficult catheterization-patient refuses catheter change, planning cystoscopy tomorrow  Monitor daily labs  Patient will be sent back to [facility] when more stable    [address] , MD  11/14/2022  12:10 PM

## 2022-11-15 ENCOUNTER — ANESTHESIA (OUTPATIENT)
Dept: OPERATING ROOM | Age: 42
DRG: 872 | End: 2022-11-15
Payer: COMMERCIAL

## 2022-11-15 ENCOUNTER — ANESTHESIA EVENT (OUTPATIENT)
Dept: OPERATING ROOM | Age: 42
DRG: 872 | End: 2022-11-15
Payer: COMMERCIAL

## 2022-11-15 LAB
ANION GAP SERPL CALCULATED.3IONS-SCNC: 10 MMOL/L (ref 9–17)
BACTERIA: ABNORMAL
BILIRUBIN URINE: NEGATIVE
BUN BLDV-MCNC: 5 MG/DL (ref 6–20)
BUN/CREAT BLD: 8 (ref 9–20)
CALCIUM SERPL-MCNC: 9.1 MG/DL (ref 8.6–10.4)
CHLORIDE BLD-SCNC: 101 MMOL/L (ref 98–107)
CO2: 26 MMOL/L (ref 20–31)
COLOR: ABNORMAL
CREAT SERPL-MCNC: 0.59 MG/DL (ref 0.7–1.2)
CULTURE: ABNORMAL
CULTURE: ABNORMAL
DIRECT EXAM: ABNORMAL
EPITHELIAL CELLS UA: ABNORMAL /HPF (ref 0–5)
GFR SERPL CREATININE-BSD FRML MDRD: >60 ML/MIN/1.73M2
GLUCOSE BLD-MCNC: 91 MG/DL (ref 70–99)
GLUCOSE URINE: NEGATIVE
HCT VFR BLD CALC: 32.5 % (ref 40.7–50.3)
HEMOGLOBIN: 10.2 G/DL (ref 13–17)
KETONES, URINE: NEGATIVE
LEUKOCYTE ESTERASE, URINE: NEGATIVE
MCH RBC QN AUTO: 28.7 PG (ref 25.2–33.5)
MCHC RBC AUTO-ENTMCNC: 31.4 G/DL (ref 28.4–34.8)
MCV RBC AUTO: 91.5 FL (ref 82.6–102.9)
NITRITE, URINE: NEGATIVE
NRBC AUTOMATED: 0 PER 100 WBC
PDW BLD-RTO: 14.1 % (ref 11.8–14.4)
PH UA: 5.5 (ref 5–8)
PLATELET # BLD: 478 K/UL (ref 138–453)
PMV BLD AUTO: 8.1 FL (ref 8.1–13.5)
POTASSIUM SERPL-SCNC: 4.2 MMOL/L (ref 3.7–5.3)
PROTEIN UA: NEGATIVE
RBC # BLD: 3.55 M/UL (ref 4.21–5.77)
RBC UA: ABNORMAL /HPF (ref 0–2)
SODIUM BLD-SCNC: 137 MMOL/L (ref 135–144)
SPECIFIC GRAVITY UA: 1.01 (ref 1–1.03)
SPECIMEN DESCRIPTION: ABNORMAL
TURBIDITY: ABNORMAL
URINE HGB: ABNORMAL
UROBILINOGEN, URINE: NORMAL
WBC # BLD: 14.2 K/UL (ref 3.5–11.3)
WBC UA: ABNORMAL /HPF (ref 0–5)

## 2022-11-15 PROCEDURE — 36415 COLL VENOUS BLD VENIPUNCTURE: CPT

## 2022-11-15 PROCEDURE — 2580000003 HC RX 258: Performed by: UROLOGY

## 2022-11-15 PROCEDURE — 3700000001 HC ADD 15 MINUTES (ANESTHESIA): Performed by: UROLOGY

## 2022-11-15 PROCEDURE — 6360000002 HC RX W HCPCS: Performed by: INTERNAL MEDICINE

## 2022-11-15 PROCEDURE — 3600000002 HC SURGERY LEVEL 2 BASE: Performed by: UROLOGY

## 2022-11-15 PROCEDURE — 80048 BASIC METABOLIC PNL TOTAL CA: CPT

## 2022-11-15 PROCEDURE — 2500000003 HC RX 250 WO HCPCS: Performed by: NURSE ANESTHETIST, CERTIFIED REGISTERED

## 2022-11-15 PROCEDURE — 6360000002 HC RX W HCPCS: Performed by: NURSE ANESTHETIST, CERTIFIED REGISTERED

## 2022-11-15 PROCEDURE — 6370000000 HC RX 637 (ALT 250 FOR IP): Performed by: UROLOGY

## 2022-11-15 PROCEDURE — 2709999900 HC NON-CHARGEABLE SUPPLY: Performed by: UROLOGY

## 2022-11-15 PROCEDURE — 3600000012 HC SURGERY LEVEL 2 ADDTL 15MIN: Performed by: UROLOGY

## 2022-11-15 PROCEDURE — 2500000003 HC RX 250 WO HCPCS: Performed by: UROLOGY

## 2022-11-15 PROCEDURE — 2580000003 HC RX 258: Performed by: NURSE ANESTHETIST, CERTIFIED REGISTERED

## 2022-11-15 PROCEDURE — 2500000003 HC RX 250 WO HCPCS: Performed by: NURSE PRACTITIONER

## 2022-11-15 PROCEDURE — 81001 URINALYSIS AUTO W/SCOPE: CPT

## 2022-11-15 PROCEDURE — 0T2BX0Z CHANGE DRAINAGE DEVICE IN BLADDER, EXTERNAL APPROACH: ICD-10-PCS | Performed by: UROLOGY

## 2022-11-15 PROCEDURE — 85027 COMPLETE CBC AUTOMATED: CPT

## 2022-11-15 PROCEDURE — 2580000003 HC RX 258: Performed by: NURSE PRACTITIONER

## 2022-11-15 PROCEDURE — 99232 SBSQ HOSP IP/OBS MODERATE 35: CPT | Performed by: INTERNAL MEDICINE

## 2022-11-15 PROCEDURE — 7100000000 HC PACU RECOVERY - FIRST 15 MIN: Performed by: UROLOGY

## 2022-11-15 PROCEDURE — 99232 SBSQ HOSP IP/OBS MODERATE 35: CPT

## 2022-11-15 PROCEDURE — 3700000000 HC ANESTHESIA ATTENDED CARE: Performed by: UROLOGY

## 2022-11-15 PROCEDURE — 6360000002 HC RX W HCPCS: Performed by: ANESTHESIOLOGY

## 2022-11-15 PROCEDURE — 6360000002 HC RX W HCPCS: Performed by: NURSE PRACTITIONER

## 2022-11-15 PROCEDURE — 6360000002 HC RX W HCPCS: Performed by: UROLOGY

## 2022-11-15 PROCEDURE — 1200000000 HC SEMI PRIVATE

## 2022-11-15 PROCEDURE — 6370000000 HC RX 637 (ALT 250 FOR IP): Performed by: NURSE PRACTITIONER

## 2022-11-15 PROCEDURE — 7100000001 HC PACU RECOVERY - ADDTL 15 MIN: Performed by: UROLOGY

## 2022-11-15 RX ORDER — MORPHINE SULFATE 4 MG/ML
4 INJECTION, SOLUTION INTRAMUSCULAR; INTRAVENOUS
Status: DISCONTINUED | OUTPATIENT
Start: 2022-11-15 | End: 2022-11-17 | Stop reason: HOSPADM

## 2022-11-15 RX ORDER — SODIUM CHLORIDE 9 MG/ML
INJECTION, SOLUTION INTRAVENOUS CONTINUOUS PRN
Status: DISCONTINUED | OUTPATIENT
Start: 2022-11-15 | End: 2022-11-15 | Stop reason: SDUPTHER

## 2022-11-15 RX ORDER — LIDOCAINE HYDROCHLORIDE 20 MG/ML
INJECTION, SOLUTION INFILTRATION; PERINEURAL PRN
Status: DISCONTINUED | OUTPATIENT
Start: 2022-11-15 | End: 2022-11-15 | Stop reason: SDUPTHER

## 2022-11-15 RX ORDER — OXYCODONE HYDROCHLORIDE 5 MG/1
5 TABLET ORAL
Status: DISCONTINUED | OUTPATIENT
Start: 2022-11-15 | End: 2022-11-15 | Stop reason: HOSPADM

## 2022-11-15 RX ORDER — PROPOFOL 10 MG/ML
INJECTION, EMULSION INTRAVENOUS PRN
Status: DISCONTINUED | OUTPATIENT
Start: 2022-11-15 | End: 2022-11-15 | Stop reason: SDUPTHER

## 2022-11-15 RX ORDER — FENTANYL CITRATE 50 UG/ML
25 INJECTION, SOLUTION INTRAMUSCULAR; INTRAVENOUS EVERY 5 MIN PRN
Status: DISCONTINUED | OUTPATIENT
Start: 2022-11-15 | End: 2022-11-15 | Stop reason: HOSPADM

## 2022-11-15 RX ORDER — HYDROMORPHONE HYDROCHLORIDE 1 MG/ML
0.5 INJECTION, SOLUTION INTRAMUSCULAR; INTRAVENOUS; SUBCUTANEOUS EVERY 5 MIN PRN
Status: DISCONTINUED | OUTPATIENT
Start: 2022-11-15 | End: 2022-11-15 | Stop reason: HOSPADM

## 2022-11-15 RX ORDER — MIDAZOLAM HYDROCHLORIDE 1 MG/ML
INJECTION INTRAMUSCULAR; INTRAVENOUS PRN
Status: DISCONTINUED | OUTPATIENT
Start: 2022-11-15 | End: 2022-11-15 | Stop reason: SDUPTHER

## 2022-11-15 RX ORDER — SODIUM CHLORIDE 0.9 % (FLUSH) 0.9 %
5-40 SYRINGE (ML) INJECTION EVERY 12 HOURS SCHEDULED
Status: DISCONTINUED | OUTPATIENT
Start: 2022-11-15 | End: 2022-11-15 | Stop reason: HOSPADM

## 2022-11-15 RX ORDER — HALOPERIDOL 5 MG/ML
INJECTION INTRAMUSCULAR PRN
Status: DISCONTINUED | OUTPATIENT
Start: 2022-11-15 | End: 2022-11-15 | Stop reason: SDUPTHER

## 2022-11-15 RX ORDER — LANOLIN ALCOHOL/MO/W.PET/CERES
3 CREAM (GRAM) TOPICAL NIGHTLY PRN
Status: DISCONTINUED | OUTPATIENT
Start: 2022-11-15 | End: 2022-11-17 | Stop reason: HOSPADM

## 2022-11-15 RX ORDER — ONDANSETRON 2 MG/ML
4 INJECTION INTRAMUSCULAR; INTRAVENOUS
Status: DISCONTINUED | OUTPATIENT
Start: 2022-11-15 | End: 2022-11-15 | Stop reason: HOSPADM

## 2022-11-15 RX ORDER — SODIUM CHLORIDE 9 MG/ML
INJECTION, SOLUTION INTRAVENOUS PRN
Status: DISCONTINUED | OUTPATIENT
Start: 2022-11-15 | End: 2022-11-15 | Stop reason: HOSPADM

## 2022-11-15 RX ORDER — MORPHINE SULFATE 2 MG/ML
2 INJECTION, SOLUTION INTRAMUSCULAR; INTRAVENOUS
Status: DISCONTINUED | OUTPATIENT
Start: 2022-11-15 | End: 2022-11-17 | Stop reason: HOSPADM

## 2022-11-15 RX ORDER — SODIUM CHLORIDE 0.9 % (FLUSH) 0.9 %
5-40 SYRINGE (ML) INJECTION PRN
Status: DISCONTINUED | OUTPATIENT
Start: 2022-11-15 | End: 2022-11-15 | Stop reason: HOSPADM

## 2022-11-15 RX ADMIN — SODIUM CHLORIDE: 9 INJECTION, SOLUTION INTRAVENOUS at 16:53

## 2022-11-15 RX ADMIN — HALOPERIDOL 1 MG: 1 TABLET ORAL at 20:46

## 2022-11-15 RX ADMIN — PROPOFOL 50 MG: 10 INJECTION, EMULSION INTRAVENOUS at 17:05

## 2022-11-15 RX ADMIN — HALOPERIDOL LACTATE 1 MG: 5 INJECTION, SOLUTION INTRAMUSCULAR at 16:55

## 2022-11-15 RX ADMIN — SODIUM CHLORIDE: 9 INJECTION, SOLUTION INTRAVENOUS at 12:44

## 2022-11-15 RX ADMIN — PROPOFOL 50 MG: 10 INJECTION, EMULSION INTRAVENOUS at 17:08

## 2022-11-15 RX ADMIN — CLINDAMYCIN IN 5 PERCENT DEXTROSE 600 MG: 12 INJECTION, SOLUTION INTRAVENOUS at 20:50

## 2022-11-15 RX ADMIN — CLINDAMYCIN IN 5 PERCENT DEXTROSE 600 MG: 12 INJECTION, SOLUTION INTRAVENOUS at 12:45

## 2022-11-15 RX ADMIN — MIDAZOLAM 2 MG: 1 INJECTION INTRAMUSCULAR; INTRAVENOUS at 16:53

## 2022-11-15 RX ADMIN — PROPOFOL 50 MG: 10 INJECTION, EMULSION INTRAVENOUS at 17:21

## 2022-11-15 RX ADMIN — Medication 3 MG: at 20:46

## 2022-11-15 RX ADMIN — CEFEPIME 2000 MG: 2 INJECTION, POWDER, FOR SOLUTION INTRAVENOUS at 01:25

## 2022-11-15 RX ADMIN — PROPOFOL 50 MG: 10 INJECTION, EMULSION INTRAVENOUS at 17:03

## 2022-11-15 RX ADMIN — CEFEPIME 2000 MG: 2 INJECTION, POWDER, FOR SOLUTION INTRAVENOUS at 12:37

## 2022-11-15 RX ADMIN — HALOPERIDOL 1 MG: 1 TABLET ORAL at 10:06

## 2022-11-15 RX ADMIN — PROPOFOL 50 MG: 10 INJECTION, EMULSION INTRAVENOUS at 17:13

## 2022-11-15 RX ADMIN — LIDOCAINE HYDROCHLORIDE 50 MG: 20 INJECTION, SOLUTION INFILTRATION; PERINEURAL at 17:03

## 2022-11-15 RX ADMIN — MORPHINE SULFATE 4 MG: 4 INJECTION, SOLUTION INTRAMUSCULAR; INTRAVENOUS at 14:32

## 2022-11-15 RX ADMIN — CARBAMAZEPINE 200 MG: 200 TABLET ORAL at 10:06

## 2022-11-15 RX ADMIN — CARBAMAZEPINE 200 MG: 200 TABLET ORAL at 20:48

## 2022-11-15 RX ADMIN — SODIUM CHLORIDE: 9 INJECTION, SOLUTION INTRAVENOUS at 18:45

## 2022-11-15 RX ADMIN — SODIUM CHLORIDE: 9 INJECTION, SOLUTION INTRAVENOUS at 12:40

## 2022-11-15 RX ADMIN — CLINDAMYCIN IN 5 PERCENT DEXTROSE 600 MG: 12 INJECTION, SOLUTION INTRAVENOUS at 04:35

## 2022-11-15 ASSESSMENT — PAIN DESCRIPTION - PAIN TYPE: TYPE: ACUTE PAIN;CHRONIC PAIN

## 2022-11-15 ASSESSMENT — PAIN DESCRIPTION - DESCRIPTORS: DESCRIPTORS: STABBING

## 2022-11-15 ASSESSMENT — PAIN SCALES - GENERAL
PAINLEVEL_OUTOF10: 10
PAINLEVEL_OUTOF10: 10

## 2022-11-15 ASSESSMENT — PAIN DESCRIPTION - ORIENTATION: ORIENTATION: LEFT;RIGHT;LOWER

## 2022-11-15 ASSESSMENT — PAIN DESCRIPTION - FREQUENCY: FREQUENCY: CONTINUOUS

## 2022-11-15 ASSESSMENT — PAIN - FUNCTIONAL ASSESSMENT: PAIN_FUNCTIONAL_ASSESSMENT: PREVENTS OR INTERFERES SOME ACTIVE ACTIVITIES AND ADLS

## 2022-11-15 ASSESSMENT — ENCOUNTER SYMPTOMS
RESPIRATORY NEGATIVE: 1
GASTROINTESTINAL NEGATIVE: 1

## 2022-11-15 ASSESSMENT — PAIN DESCRIPTION - ONSET: ONSET: ON-GOING

## 2022-11-15 NOTE — ANESTHESIA PRE PROCEDURE
Department of Anesthesiology  Preprocedure Note       Name:  Nata Goldberg   Age:  38 y.o.  :  1980                                          MRN:  [id number]         Date:  11/15/2022      Surgeon: Doc Gaona):  Raymond Barrett MD    Procedure: Procedure(s):  CYSTOSCOPY  PHAN EXCHANGE    Medications prior to admission:   Prior to Admission medications    Medication Sig Start Date End Date Taking? Authorizing Provider   mineral oil-hydrophilic petrolatum (AQUAPHOR) ointment Apply topically as needed. 10/13/22   Cary Sheikh MD   zinc sulfate (ZINCATE) 220 (50 Zn) MG capsule Take 1 capsule by mouth daily 10/13/22   Cary Sheikh MD   ascorbic acid (VITAMIN C) 500 MG tablet Take 1 tablet by mouth 2 times daily 10/13/22   Cary Sheikh MD   Ergocalciferol (VITAMIN D) 24712 units CAPS Take 50,000 Units by mouth once a week 10/19/22   Cary Sheikh MD   carBAMazepine (TEGRETOL) 200 MG tablet Take 1 tablet by mouth 2 times daily 22   Bam Estrella MD   haloperidol (HALDOL) 1 MG tablet Take 1 tablet by mouth 2 times daily 22   Bam Estrella MD   benztropine (COGENTIN) 1 MG tablet Take 0.5 tablets by mouth in the morning and 0.5 tablets before bedtime. 22  Christofer Collins MD   adalimumab (HUMIRA) 40 MG/0.8ML injection Inject 40 mg into the skin once Patient takes 80 mg every two weeks; due this week  22  Historical Provider, MD   lithium (ESKALITH) 450 MG extended release tablet Take 450 mg by mouth 2 times daily  Patient not taking: Reported on 2022  Historical Provider, MD   Multiple Vitamins-Minerals (THERAPEUTIC MULTIVITAMIN-MINERALS) tablet Take 1 tablet by mouth daily.  14  Shannon Marroquin       Current medications:    Current Facility-Administered Medications   Medication Dose Route Frequency Provider Last Rate Last Admin    [MAR Hold] morphine (PF) injection 2 mg  2 mg IntraVENous Q2H PRN Darien Canovanas, DO        Or    [MAR Hold] morphine sulfate (PF) injection 4 mg  4 mg IntraVENous Q2H PRN Victorino Fragoso, DO   4 mg at 11/15/22 1432    [MAR Hold] 0.9 % sodium chloride bolus  80 mL IntraVENous Once Kia Kan MD        [facility] Hold] sodium chloride flush 0.9 % injection 10 mL  10 mL IntraVENous PRN Kia Kan MD   10 mL at 11/12/22 0049    [MAR Hold] ketorolac (TORADOL) injection 15 mg  15 mg IntraVENous Once Kia Kan MD        [facility] Hold] carBAMazepine (TEGRETOL) tablet 200 mg  200 mg Oral BID Andreinaathcamilo German, APRN - CNP   200 mg at 11/15/22 1006    [MAR Hold] haloperidol (HALDOL) tablet 1 mg  1 mg Oral BID Yazmin German, APURVA - CNP   1 mg at 11/15/22 1006    [MAR Hold] sodium chloride flush 0.9 % injection 5-40 mL  5-40 mL IntraVENous 2 times per day APURVA Lema CNP        [facility] Hold] sodium chloride flush 0.9 % injection 10 mL  10 mL IntraVENous PRN Yazmin German APRN - CNP        [facility] Hold] 0.9 % sodium chloride infusion   IntraVENous PRN APURVA Lema CNP 5 mL/hr at 11/15/22 5900 [facility] at 11/15/22 1240    [MAR Hold] potassium chloride (KLOR-CON M) extended release tablet 40 mEq  40 mEq Oral PRN APURVA Lema CNP        Or   [facility] Hold] potassium bicarb-citric acid (EFFER-K) effervescent tablet 40 mEq  40 mEq Oral PRN Taylera APURVA German - CNP        Or   [facility] Hold] potassium chloride 10 mEq/100 mL IVPB (Peripheral Line)  10 mEq IntraVENous PRN Yazmin German APRDIMAS - CNP        [facility] Hold] magnesium sulfate 1000 mg in dextrose 5% 100 mL IVPB  1,000 mg IntraVENous PRN APURVA Lema - CNP        [MAR Hold] enoxaparin Sodium (LOVENOX) injection 30 mg  30 mg SubCUTAneous BID APURVA Lema CNP   30 mg at 11/14/22 2130    [MAR Hold] ondansetron (ZOFRAN-ODT) disintegrating tablet 4 mg  4 mg Oral Q8H PRN APURVA Lema - CHRISTY        Or    [MAR Hold] ondansetron [facility]) injection 4 mg  4 mg IntraVENous Q6H PRN APURVA Lema - CHRISTY        [MAR Hold] polyethylene glycol (GLYCOLAX) packet 17 g  17 g Oral Daily PRN APURVA Costello CNP        [MAR Hold] acetaminophen (TYLENOL) tablet 650 mg  650 mg Oral Q6H PRN APURVA Costello CNP   650 mg at 11/12/22 1722    Or    [MAR Hold] acetaminophen (TYLENOL) suppository 650 mg  650 mg Rectal Q6H PRN APURVA Costello - CNP        [facility] Hold] 0.9 % sodium chloride infusion   IntraVENous Continuous APURVA Costello CNP 75 mL/hr at 11/15/22 [address] at 11/15/22 1244    [MAR Hold] clindamycin (CLEOCIN) 600 mg in dextrose 5 % 50 mL IVPB  600 mg IntraVENous Q8H APURVA Costello CNP   Stopped at 11/15/22 1315    [MAR Hold] cefepime (MAXIPIME) 2000 mg IVPB minibag  2,000 mg IntraVENous Q12H APURVA Costello CNP 12.5 mL/hr at 11/15/22 1237 2,000 mg at 11/15/22 1237       Allergies:     Allergies   Allergen Reactions    Depakene [Valproic Acid]     Restoril [Temazepam]     Risperidone And Related      Seizure      Thorazine [Chlorpromazine]     Vancomycin Other (See Comments) and Rash       Problem List:    Patient Active Problem List   Diagnosis Code    Hidradenitis suppurativa L73.2    Acute psychosis ([address] F23    Abscess of axilla L02.419    Abscess of skin of abdomen L02.211    Blood per rectum K62.5    Trochanteric bursitis M70.60    Adrenal mass, 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    Hidradenitis axillaris L73.2    Schizoaffective disorder, bipolar type ([address] F25.0    Encounter for ostomy nurse consultation Z71.89    Paranoid schizophrenia, chronic condition with acute exacerbation ([address] F20.0    Iron deficiency anemia D50.9    GERD (gastroesophageal reflux disease) K21.9    Tobacco abuse Z72.0    Non-healing left groin open wound, subsequent encounter S31.104D    Right groin wound, subsequent encounter S31.109D    Open wound of left ear S01.302A    Abscess L02.91    Sepsis (HCC) A41.9    Constipation K59.00    Acute exacerbation of chronic paranoid schizophrenia (HCC) F20.0    Schizophrenia (HCC) F20.9    Gram-positive bacteremia R78.81    Cellulitis of right thigh L03.115    Bandemia D72.825    Scrotal abscess N49.2    Immunosuppressed due to chemotherapy ([facility] D84.821, T45.1X5A, Z79.899    Mild protein-calorie malnutrition (HCC) E44.1    Hypovitaminosis D E55.9    Gluteal abscess L02.31    Noncompliance by declining service of wounds care at nursing facility Z91.199       Past Medical History:        Diagnosis Date    Bipolar 1 disorder ([facility]     GERD (gastroesophageal reflux disease) 8/13/2016    Hidradenitis suppurativa     Polysubstance abuse ([facility]     Schizoaffective disorder ([facility])        Past Surgical History:        Procedure Laterality Date    ABSCESS DRAINAGE  11/01/2013    left inguinal hydrodenitis/pilondal cyst    CYSTOSCOPY  11/15/2022    with Kowalski insertion    INFECTED SKIN DEBRIDEMENT  07/01/2011       Social History:    Social History     Tobacco Use    Smoking status: Every Day     Packs/day: 1.00     Years: 22.00     Pack years: 22.00     Types: Cigarettes    Smokeless tobacco: Never   Substance Use Topics    Alcohol use: Yes     Comment: occasional                                Ready to quit: Not Answered  Counseling given: Not Answered      Vital Signs (Current):   Vitals:    11/15/22 0749 11/15/22 1123 11/15/22 1542 11/15/22 1747   BP: 108/78 94/64 (!) 94/56 111/89   Pulse: 91 92 92 100   Resp: 18 16 16 18   Temp: 98.6 °F (37 °C) 98.2 °F (36.8 °C) 98.1 °F (36.7 °C) 97 °F (36.1 °C)   TempSrc: Oral Oral Oral Temporal   SpO2: 94% 94% 94% 99%   Weight:       Height:                                                  BP Readings from Last 3 Encounters:   11/15/22 111/89   10/26/22 102/75   08/12/22 114/76       NPO Status:                                                                                 BMI:   Wt Readings from Last 3 Encounters:   11/15/22 221 lb 9.6 oz (100.5 kg)   10/25/22 245 lb 6 oz (111.3 kg)   08/12/22 267 lb 3.2 oz (121.2 kg)     Body mass index is 32.72 CABG/stent        Rate: normal                    Neuro/Psych:   (+) psychiatric history:            GI/Hepatic/Renal:             Endo/Other:                     Abdominal:             Vascular: Other Findings:           Anesthesia Plan      MAC     ASA 3       Induction: intravenous. MIPS: Prophylactic antiemetics administered. Anesthetic plan and risks discussed with patient and mother. Plan discussed with CRNA.     Attending anesthesiologist reviewed and agrees with Preprocedure content                Rubia Cooney DO   11/15/2022

## 2022-11-15 NOTE — PROGRESS NOTES
[facility]  Progress Note    Mariah Urias  MRN: [id number]  Date: 11/15/2022  LOS: 3     Reason for F/U :   Hidradenitis suppurativa    Impression :   Hidradenitis suppurativa was previously on Humira  Perineal/gluteal hidradenitis with areas of purulent drainage  Schizophrenia/bipolar disorder  History of vancomycin allergy-rash  Infected right abdominal wall wounds    Recommendations: The patient continues on systemic antimicrobial therapy with cefepime and clindamycin  There is some induration/cellulitis in the perineal soft tissues and there is also some purulent drainage  The patient continues to refuse for us to evaluate the wounds and he reports pain as the reason for the dressing is not to be changed  We did discuss that noncompliance with the wound care could hamper his wound progress  The care was discussed with the primary service and from an infectious disease standpoint the patient should be okay for discharge on oral antimicrobial therapy with Levaquin and doxycycline    Infection Control Recommendations:   Universal precautions. Discharge Planning:   Patient will need Midline Catheter Insertion/ PICC line Insertion: No  Patient will need: Home IV , [place],  SNF,  LTAC: Undetermined  Patient willneed outpatient wound care: Yes    Medical Decision making / Summary of Stay:     Mariah Urias is a 38y.o.-year-old male presenting to the hospital with worsening pain to the groin, perineum and bilateral buttocks with increased drainage. He also had right abdominal wall wounds with purulent drainage. Symptoms are moderate to severe, no alleviating or aggravating factors. The patient is poor historian, Kowalski catheter in place. On admission he had a fever with temperature max of 100.9, WBC 12.6, lactic acid 1, renal function normal.  CT abdomen and pelvic showed soft tissue irregularity but no fluid collection.   The patient had history of severe hydradenitis suppurativa, used to be on Humira that was discontinued around 2 months ago due to incarceration. He used to follow-up with [facility] dermatology clinic. He was admitted to [place] 2022 was evaluated by surgery, no surgical intervention was recommended, was discharged on Levaquin and Augmentin for 2 weeks  Previous wound culture on 10/12/2022 grew Proteus mirabilis that was pansensitive and E. coli that was sensitive to cefazolin and Cipro, resistant to Bactrim. Current evaluation:11/15/2022    /78   Pulse 91   Temp 98.6 °F (37 °C) (Oral)   Resp 18   Ht 5' 9\" (1.753 m)   Wt 221 lb 9.6 oz (100.5 kg)   SpO2 94%   BMI 32.72 kg/m²     Temperature Range: Temp: 98.6 °F (37 °C) Temp  Av.5 °F (36.9 °C)  Min: 98.2 °F (36.8 °C)  Max: 98.8 °F (37.1 °C)  The patient is seen and evaluated at bedside he is awake and alert in no acute distress. The nurse was in the room at the time of my evaluation. We did discuss with the patient trying to evaluate his wounds in the perineal area and he again refused for the dressings to be removed today. He continues to report pain as the main issue and he reports that he wants to leave the dressings in place and we discussed the problem with not changing the dressings as he has purulent drainage in some areas and these would benefit from dressing changes daily. Review of Systems   Constitutional: Negative. HENT: Negative. Respiratory: Negative. Cardiovascular: Negative. Gastrointestinal: Negative. Genitourinary: Negative. Musculoskeletal: Negative. Skin:  Positive for rash and wound. Neurological: Negative. Psychiatric/Behavioral: Negative. Physical Examination :     Physical Exam  Constitutional:       Appearance: He is well-developed. HENT:      Head: Normocephalic and atraumatic. Cardiovascular:      Rate and Rhythm: Normal rate. Heart sounds: Normal heart sounds. No friction rub. No gallop.    Pulmonary: Effort: Pulmonary effort is normal.      Breath sounds: Normal breath sounds. No wheezing. Abdominal:      General: Bowel sounds are normal.      Palpations: Abdomen is soft. There is no mass. Tenderness: There is no abdominal tenderness. Comments: Lower abdominal wall ulcerations-clean   Musculoskeletal:      Cervical back: Neck supple. Lymphadenopathy:      Cervical: No cervical adenopathy. Skin:     Comments: The patient has multiple lesions in the perineal area-bilateral groin, scrotal, gluteal fold with some intermittent areas of purulent drainage. Neurological:      Mental Status: He is alert and oriented to person, place, and time. Laboratory data:   I have independently reviewed the followinglabs:  CBC with Differential:   Recent Labs     11/14/22  0614 11/15/22  0719   WBC 12.8* 14.2*   HGB 9.2* 10.2*   HCT 30.4* 32.5*    478*       BMP:   Recent Labs     11/14/22  0614 11/15/22  0719    137   K 3.9 4.2    101   CO2 27 26   BUN 5* 5*   CREATININE 0.66* 0.59*       Hepatic Function Panel:   No results for input(s): PROT, LABALBU, BILIDIR, IBILI, BILITOT, ALKPHOS, ALT, AST in the last 72 hours. Lab Results   Component Value Date/Time    PROCAL 0.04 08/10/2022 09:07 PM     Lab Results   Component Value Date/Time    .4 10/09/2022 10:53 AM    CRP 35.5 08/10/2022 09:07 PM     Lab Results   Component Value Date    SEDRATE 52 (H) 10/09/2022         No results found for: DDIMER  Lab Results   Component Value Date/Time    FERRITIN 41 10/29/2020 02:15 PM    FERRITIN 38 07/10/2015 11:30 AM    FERRITIN 50 11/12/2014 06:10 AM    FERRITIN 254 07/25/2012 01:09 PM     No results found for: LDH  No results found for: FIBRINOGEN    No results found for requested labs within last 30 days. No results found for: COVID19    No results for input(s): VANCOTROUGH in the last 72 hours.     Imaging Studies:   CT OF THE ABDOMEN AND PELVIS WITH CONTRAST 11/11/2022 11:58 pm  Impression   Redemonstration of atrophy/hypoplastic nonfunctional right kidney which has a   few cysts with calcified wall. The largest cyst with calcified wall measures   4.3 cm, similar to previous CT scan. Normal left kidney of prominent size without any abnormality. Evidence of moderate to large amount of mildly hyperdense material in the   gallbladder suggestive of significant sludge in gallbladder without obvious   radiopaque stones. Normal appendix visualized. Nonspecific small to moderate amount of gas scattered in small bowel with   multiple fluid levels. Acute enteritis not excluded. Thickened redundant subcutaneous fatty tissue suspended from the lower   abdominal wall and pelvic wall without any significant surrounding   inflammatory change. Inferior to the perineum evidence of irregular thickened tissues with   surrounding inflammatory changes involving the posteromedial aspect of the   gluteal folds mainly on the right side and extended to the posteromedial   aspect of the proximal right upper thigh. These soft tissue abnormalities   are  limited to the subcutaneous tissues without extending to muscle. No   evidence of any inflammatory changes involving the muscles of abdominal   walls, pelvic walls, bilateral gluteal muscles, and the visualized muscles of   the bilateral proximal thighs. Cultures:     Culture, Blood 1 [[id number]] Collected: 11/12/22 0022   Order Status: Completed Specimen: Blood Updated: 11/15/22 0913    Specimen Description . BLOOD    Special Requests 10ML RT AC    Culture NO GROWTH 3 DAYS   Culture, Wound [[id number]] (Abnormal)  Collected: 11/12/22 0002   Order Status: Completed Specimen: Coccyx Updated: 11/15/22 0912    Specimen Description . COCCYX    Direct Exam This specimen contains greater than 9 Squamous Epithelial cells per LPF which is an indicator of a low quality specimen.  Abnormal      RARE NEUTROPHILS Abnormal      MIXED BACTERIAL MORPHOTYPES SEEN ON GRAM STAIN. Abnormal     Culture PROTEUS MIRABILIS LIGHT GROWTH Abnormal      NORMAL SKIN PRAVENE   Culture, Blood 1 [[id number]] Collected: 11/12/22 0030   Order Status: Completed Specimen: Blood Updated: 11/15/22 0910    Specimen Description . BLOOD    Special Requests RT HAND 20ML    Culture NO GROWTH 3 DAYS         Medications:      sodium chloride  80 mL IntraVENous Once    ketorolac  15 mg IntraVENous Once    carBAMazepine  200 mg Oral BID    haloperidol  1 mg Oral BID    sodium chloride flush  5-40 mL IntraVENous 2 times per day    enoxaparin  30 mg SubCUTAneous BID    clindamycin (CLEOCIN) IV  600 mg IntraVENous Q8H    cefepime  2,000 mg IntraVENous Q12H           [facility]  Josh Sorto MD  [employer] messaging  OFFICE: [phone]      Electronically signed by Josh Sorto MD on 11/15/2022 at 9:00 AM  Thank you for allowing us to participate in the care of this patient. Please call with questions. This note iscreated with the assistance of a speech recognition program.  While intending to generate a document that actually reflects the content of the visit, the document can still have some errors including those of syntax andsound a like substitutions which may escape proof reading. In such instances, actual meaning can be extrapolated by contextual diversion.

## 2022-11-15 NOTE — PLAN OF CARE
Problem: Discharge Planning  Goal: Discharge to home or other facility with appropriate resources  Outcome: Progressing  Flowsheets (Taken 11/15/2022 0335)  Discharge to home or other facility with appropriate resources:   Identify barriers to discharge with patient and caregiver   Arrange for needed discharge resources and transportation as appropriate     Problem: Skin/Tissue Integrity  Goal: Absence of new skin breakdown  Description: 1. Monitor for areas of redness and/or skin breakdown  2. Assess vascular access sites hourly  3. Every 4-6 hours minimum:  Change oxygen saturation probe site  4. Every 4-6 hours:  If on nasal continuous positive airway pressure, respiratory therapy assess nares and determine need for appliance change or resting period. Outcome: Progressing  Note: Skin integrity maintained, no new skin abnormalities assessed. Appropriate measures remain in place Skin integrity intact. Mucous membranes pink, moist and intact. Patient turned every two hours. Skin and pressure ulcer assessment performed.        Problem: Safety - Adult  Goal: Free from fall injury  Outcome: Progressing  Flowsheets (Taken 11/15/2022 0335)  Free From Fall Injury: Instruct family/caregiver on patient safety     Problem: Pain  Goal: Verbalizes/displays adequate comfort level or baseline comfort level  Outcome: Progressing  Flowsheets (Taken 11/15/2022 0335)  Verbalizes/displays adequate comfort level or baseline comfort level:   Encourage patient to monitor pain and request assistance   Assess pain using appropriate pain scale     Problem: Neurosensory - Adult  Goal: Achieves stable or improved neurological status  Outcome: Progressing  Flowsheets (Taken 11/15/2022 0335)  Achieves stable or improved neurological status: Assess for and report changes in neurological status     Problem: Respiratory - Adult  Goal: Achieves optimal ventilation and oxygenation  Outcome: Progressing  Flowsheets (Taken 11/15/2022 0)  Achieves optimal ventilation and oxygenation:   Assess for changes in respiratory status   Assess for changes in mentation and behavior     Problem: Musculoskeletal - Adult  Goal: Return mobility to safest level of function  Outcome: Progressing  Flowsheets (Taken 11/15/2022 0335)  Return Mobility to Safest Level of Function:   Assess patient stability and activity tolerance for standing, transferring and ambulating with or without assistive devices   Assist with transfers and ambulation using safe patient handling equipment as needed     Problem: Gastrointestinal - Adult  Goal: Minimal or absence of nausea and vomiting  Outcome: Progressing  Flowsheets (Taken 11/15/2022 0335)  Minimal or absence of nausea and vomiting: Administer IV fluids as ordered to ensure adequate hydration     Problem: Genitourinary - Adult  Goal: Absence of urinary retention  Outcome: Progressing  Flowsheets (Taken 11/15/2022 0335)  Absence of urinary retention:   Assess patients ability to void and empty bladder   Monitor intake/output and perform bladder scan as needed     Problem: Infection - Adult  Goal: Absence of infection at discharge  Outcome: Progressing  Flowsheets (Taken 11/15/2022 0335)  Absence of infection at discharge:   Assess and monitor for signs and symptoms of infection   Monitor lab/diagnostic results   Administer medications as ordered
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
## 2022-11-15 NOTE — FLOWSHEET NOTE
11/15/22 0786   Mobility   Location Change/Off Floor Surgery  (patient's mother present at time of transfer)   Transport Method Bed

## 2022-11-15 NOTE — OP NOTE
Operative Note      Patient: Angela Mckeon  YOB: 1980  MRN: [id number]    Date of Procedure: 11/15/2022    Pre-Op Diagnosis: Urinary retention [R33.9]    Post-Op Diagnosis: Same     Multiple bladder stones      Neurogenic bladder  Procedure(s):  CYSTOSCOPY  PHAN EXCHANGE  Examination under anesthesia   Extensive cleansing of abdominal scrotal and perianal open wounds and abscesses    Surgeon(s):  Rivera Ortega MD    Assistant:   * No surgical staff found *    Anesthesia: Monitored Anesthesia Care    Estimated Blood Loss (mL): Minimal    Complications: None    Specimens:   * No specimens in log *    Implants:  * No implants in log *      Drains:   Urinary Catheter 11/15/22 (Active)       [REMOVED] Urinary Catheter 10/09/22 Phan-Temperature (Removed)   $ Urethral catheter insertion $ Not inserted for procedure 10/25/22 0800   Catheter Indications Assist in healing of open sacral or perineal wounds (Stage III, IV, or unstageable documented by a provider or enterostomal therapy) and/or full thickness perineal and lower extremity burns in incontinent patients 10/26/22 0400   Site Assessment FAITH 10/26/22 0400   Urine Color Enedina 10/26/22 0400   Urine Appearance Clear 10/26/22 0400   Urine Odor Malodorous 10/26/22 0400   Collection Container Standard 10/26/22 0000   Securement Method Leg strap 10/25/22 2000   Catheter Care Completed Yes 10/25/22 0800   Catheter Best Practices  Drainage tube clipped to bed;Catheter secured to thigh; Bag below bladder;Bag not on floor; Lack of dependent loop in tubing;Drainage bag less than half full 10/25/22 2000   Status Draining 10/25/22 2000   Output (mL) 400 mL 10/26/22 0000       [REMOVED] Urinary Catheter (Removed)   $ Urethral catheter insertion $ Not inserted for procedure 11/14/22 0540   Catheter Indications Assist in healing of open sacral or perineal wounds (Stage III, IV, or unstageable documented by a provider or enterostomal therapy) and/or full thickness perineal and lower extremity burns in incontinent patients 11/15/22 1622   Site Assessment No urethral drainage 11/15/22 1622   Urine Color Enedina 11/15/22 1622   Urine Appearance Sediment 11/15/22 1622   Urine Odor Malodorous 11/13/22 1200   Collection Container Standard 11/15/22 1622   Securement Method Securing device (Describe) 11/15/22 1622   Catheter Care Completed Yes 11/15/22 1248   Catheter Best Practices  Catheter secured to thigh;Drainage tube clipped to bed; Bag below bladder;Bag not on floor; Lack of dependent loop in tubing;Drainage bag less than half full 11/15/22 1622   Status Draining;Patent 11/15/22 1622   Manual Irrigation Volume Input (mL) 200 mL 11/13/22 1600   Output (mL) 300 mL 11/15/22 1622       Findings: 49-year-old male, with extensive hidradenitis suppurativa with multiple abscesses perineal suprapubic gluteal inner thighs and lower abdomen. Active drainage noted from most of the above-mentioned locations, patient too uncomfortable and in pain to have it cleansed. Decision was made under  anesthesia to proceed with extensive examination and cleansing with Hibiclens as well as manage the catheter that is obstructed with calcifications with hematuria catheter has been in the bladder greater than 3-month    Detailed Description of Procedure:   Patient was brought to the operating room, positioned in supine, we were not able to have the patient in stirrups because of significant contractions. We first proceeded with examination under anesthesia    With the help of multiple assistance and under deep monitored anesthesia provided by Dr. Maldonado Ground we proceeded to examine the lower abdominal wounds and suprapubic wounds, Hibiclens was used to cleanse and properly dressed the areas of concern. We then addressed the suprapubic swelling as well as the scrotal and perineal abscesses and they were cleansed in the same fashion using Hibiclens until clean.     We then applied dressings and covered the inner thighs also with clean dressing and ABD pads. Once this was completed the attention was then turned towards the bladder. Patient has gross hematuria, catheter not draining properly. The catheter was removed, encrustations were noted, we instilled lidocaine 2% gel per urethra, we entered the bladder with the cystoscope, multiple calcifications noted at the bladder neck and on the floor of the bladder were crushed and removed the bladder was thoroughly irrigated until clear multiple clots were also irrigated out and evacuated the patient has hemorrhagic cystitis as well as bladder stones. At the completion of the irrigation and the cleansing the trigone was examined and the ureteral orifices are normal at the completion of the cystoscopy the scope was removed, a size 16 catheter was inserted left indwelling and connected to straight drainage, patient returned to recovery room in stable condition. Findings discussed with the patient's mother who is his power of .     Further care as recommended by infectious disease services    Electronically signed by Carlos A Keita MD on 11/15/2022 at 5:43 PM

## 2022-11-15 NOTE — PLAN OF CARE
Problem: Discharge Planning  Goal: Discharge to home or other facility with appropriate resources  11/15/2022 1627 by Anita Arroyo RN  Outcome: Progressing  Flowsheets (Taken 11/15/2022 0815)  Discharge to home or other facility with appropriate resources:   Identify barriers to discharge with patient and caregiver   Arrange for needed discharge resources and transportation as appropriate   Identify discharge learning needs (meds, wound care, etc)   Refer to discharge planning if patient needs post-hospital services based on physician order or complex needs related to functional status, cognitive ability or social support system     Problem: Skin/Tissue Integrity  Goal: Absence of new skin breakdown  Description: 1. Monitor for areas of redness and/or skin breakdown  2. Assess vascular access sites hourly  3. Every 4-6 hours minimum:  Change oxygen saturation probe site  4. Every 4-6 hours:  If on nasal continuous positive airway pressure, respiratory therapy assess nares and determine need for appliance change or resting period.   11/15/2022 1627 by Anita Arroyo RN  Outcome: Progressing  Note: Patient refuses skin assessment and wound care      Problem: Safety - Adult  Goal: Free from fall injury  11/15/2022 1627 by Anita Arroyo RN  Outcome: Progressing  Flowsheets (Taken 11/15/2022 1623)  Free From Fall Injury: Instruct family/caregiver on patient safety     Problem: Pain  Goal: Verbalizes/displays adequate comfort level or baseline comfort level  11/15/2022 1627 by Anita Arroyo RN  Outcome: Progressing  Flowsheets (Taken 11/15/2022 1432)  Verbalizes/displays adequate comfort level or baseline comfort level:   Encourage patient to monitor pain and request assistance   Assess pain using appropriate pain scale   Administer analgesics based on type and severity of pain and evaluate response   Implement non-pharmacological measures as appropriate and evaluate response   Notify Licensed Independent Practitioner if interventions unsuccessful or patient reports new pain     Problem: Neurosensory - Adult  Goal: Achieves stable or improved neurological status  11/15/2022 1627 by Caprice Oneill RN  Outcome: Progressing  Flowsheets (Taken 11/15/2022 0815)  Achieves stable or improved neurological status: Assess for and report changes in neurological status     Problem: Respiratory - Adult  Goal: Achieves optimal ventilation and oxygenation  11/15/2022 1627 by Caprice Oneill RN  Outcome: Progressing  Flowsheets (Taken 11/15/2022 0815)  Achieves optimal ventilation and oxygenation:   Assess for changes in respiratory status   Assess for changes in mentation and behavior   Encourage broncho-pulmonary hygiene including cough, deep breathe, incentive spirometry   Assess and instruct to report shortness of breath or any respiratory difficulty     Problem: Skin/Tissue Integrity - Adult  Goal: Skin integrity remains intact  11/15/2022 1627 by Caprice Oneill RN  Outcome: Progressing  Flowsheets  Taken 11/15/2022 1607  Skin Integrity Remains Intact: (refuses skin assessment) Monitor for areas of redness and/or skin breakdown  Taken 11/15/2022 0815  Skin Integrity Remains Intact: Monitor for areas of redness and/or skin breakdown  Note: Patient refuses skin assessment and wound care      Problem: Musculoskeletal - Adult  Goal: Return mobility to safest level of function  11/15/2022 1627 by Caprice Oneill RN  Outcome: Progressing  Flowsheets (Taken 11/15/2022 0815)  Return Mobility to Safest Level of Function:   Assess patient stability and activity tolerance for standing, transferring and ambulating with or without assistive devices   Assist with transfers and ambulation using safe patient handling equipment as needed   Ensure adequate protection for wounds/incisions during mobilization   Obtain physical therapy/occupational therapy consults as needed   Apply continuous passive motion per provider or physical therapy orders to increase flexion toward goal   Instruct patient/family in ordered activity level     Problem: Gastrointestinal - Adult  Goal: Minimal or absence of nausea and vomiting  11/15/2022 1627 by Theron Osorio RN  Outcome: Progressing  Flowsheets (Taken 11/15/2022 0815)  Minimal or absence of nausea and vomiting:   Administer IV fluids as ordered to ensure adequate hydration   Provide nonpharmacologic comfort measures as appropriate   Nutrition consult to assist patient with adequate nutrition and appropriate food choices     Problem: Genitourinary - Adult  Goal: Absence of urinary retention  11/15/2022 1627 by Theron Osorio RN  Outcome: Progressing     Problem: Infection - Adult  Goal: Absence of infection at discharge  11/15/2022 1627 by Theron Osorio RN  Outcome: Progressing  Flowsheets (Taken 11/15/2022 0815)  Absence of infection at discharge:   Assess and monitor for signs and symptoms of infection   Monitor lab/diagnostic results   Monitor all insertion sites i.e., indwelling lines, tubes and drains   Administer medications as ordered   Instruct and encourage patient and family to use good hand hygiene technique   Identify and instruct in appropriate isolation precautions for identified infection/condition     Problem: ABCDS Injury Assessment  Goal: Absence of physical injury  Outcome: Progressing  Flowsheets (Taken 11/15/2022 1627)  Absence of Physical Injury: Implement safety measures based on patient assessment

## 2022-11-15 NOTE — FLOWSHEET NOTE
11/15/22 1827   Mobility   Location Change/Back on Floor Yes   Transport Method Bed   Patient alert and oriented, no c/o pain at this time. Orders released, message sent to physician for diet order.

## 2022-11-15 NOTE — PROGRESS NOTES
Aditya Colbert   Urology Progress Note            Subjective:  Follow-up urinary retention indwelling Kowalski   patient seen in conjunction with nursing staff    Patient Vitals for the past 24 hrs:   BP Temp Temp src Pulse Resp SpO2   11/14/22 2104 110/80 98.8 °F (37.1 °C) Oral (!) 111 16 99 %   11/14/22 1535 103/76 98.4 °F (36.9 °C) Oral (!) 109 16 97 %   11/14/22 1120 105/69 98.2 °F (36.8 °C) Oral 96 16 93 %   11/14/22 0711 111/70 97.9 °F (36.6 °C) Axillary 84 18 97 %       Intake/Output Summary (Last 24 hours) at 11/15/2022 0706  Last data filed at 11/15/2022 0703  Gross per 24 hour   Intake 933.31 ml   Output 2125 ml   Net -1191.69 ml       Recent Labs     11/13/22  0648 11/14/22  0614   WBC 13.0* 12.8*   HGB 9.5* 9.2*   HCT 31.3* 30.4*   MCV 90.7 92.7    438     Recent Labs     11/13/22  0648 11/14/22  0614    135   K 3.7 3.9    100   CO2 27 27   BUN 4* 5*   CREATININE 0.63* 0.66*       Recent Labs     11/13/22  1425   COLORU Red*   PHUR 7.0   WBCUA 2 TO 5   RBCUA TOO NUMEROUS TO COUNT   SPECGRAV 1.025   LEUKOCYTESUR NEGATIVE   UROBILINOGEN Normal   BILIRUBINUR NEGATIVE       Additional Lab/culture results:    Physical Exam: Advised the patient on catheter change and cystoscopy, possible calcifications catheter has been in for more than 4-month catheter change will be done under anesthesia as well as cystoscopy    Interval Imaging Findings:    Impression:    Patient Active Problem List   Diagnosis    Hidradenitis suppurativa    Acute psychosis ([address]    Abscess of axilla    Abscess of skin of abdomen    Blood per rectum    Trochanteric bursitis    Adrenal mass, right ([address]    Hidradenitis axillaris    Schizoaffective disorder, bipolar type ([address]    Encounter for ostomy nurse consultation    Paranoid schizophrenia, chronic condition with acute exacerbation ([address]    Iron deficiency anemia    GERD (gastroesophageal reflux disease)    Tobacco abuse    Non-healing left groin open wound, subsequent encounter    Right groin wound, subsequent encounter    Open wound of left ear    Abscess    Sepsis ([address]    Constipation    Acute exacerbation of chronic paranoid schizophrenia (HCC)    Schizophrenia (HCC)    Gram-positive bacteremia    Cellulitis of right thigh    Bandemia    Scrotal abscess    Immunosuppressed due to chemotherapy ([address]    Mild protein-calorie malnutrition ([address]    Hypovitaminosis D    Gluteal abscess    Noncompliance by declining service of wounds care at nursing facility       Plan: Cystoscopy s Kowalski catheter exchange   we will discuss the plan of care with the patient's mother who is the power of   Nursing staff advised to contact me when family is here to discuss plan of care  Autumn Grigsby MD  7:06 AM 11/15/2022
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
## 2022-11-15 NOTE — ANESTHESIA POSTPROCEDURE EVALUATION
Department of Anesthesiology  Postprocedure Note    Patient: Esther Boss  MRN: [id number]  YOB: 1980  Date of evaluation: 11/15/2022      Procedure Summary     Date: 11/15/22 Room / Location: [facility] - INPATIENT    Anesthesia Start: 6935 Anesthesia Stop: 0866    Procedure: [address] (Penis) Diagnosis:       Urinary retention      (Urinary retention [R33.9])    Surgeons: Derry Klinefelter, MD Responsible Provider: Cecil Dewitt DO    Anesthesia Type: MAC ASA Status: 3          Anesthesia Type: No value filed.     Eloise Phase I: Eloise Score: 9    Eloise Phase II:        Anesthesia Post Evaluation    Patient location during evaluation: PACU  Patient participation: complete - patient participated  Level of consciousness: awake and alert  Airway patency: patent  Nausea & Vomiting: no nausea and no vomiting  Complications: no  Cardiovascular status: hemodynamically stable  Respiratory status: acceptable  Hydration status: stable

## 2022-11-15 NOTE — PROGRESS NOTES
Transitions of Care Pharmacy Service   Medication Review    The patient's list of current home medications has been reviewed. Source(s) of information: Medication list from [facility] ([phone])    Based on information provided by the above source(s), no changes to the patient's home medication list were necessary. Please review the ACTION REQUESTED section of this note below for any discrepancies on current hospital orders. PROVIDER ACTION REQUESTED  Medications that need to be addressed by a physician/nurse practitioner:    Medication Action Requested        none         Please feel free to call me with any questions about this encounter. Thank you. Jenny Davis [facility]   Transitions of Care Pharmacy Service  Phone:  [phone]  Fax: [phone]      Electronically signed by Jenny Davis [facility] on 11/15/2022 at 11:22 AM         Medications Prior to Admission: mineral oil-hydrophilic petrolatum (AQUAPHOR) ointment, Apply topically as needed.   zinc sulfate (ZINCATE) 220 (50 Zn) MG capsule, Take 1 capsule by mouth daily  ascorbic acid (VITAMIN C) 500 MG tablet, Take 1 tablet by mouth 2 times daily  Ergocalciferol (VITAMIN D) 93374 units CAPS, Take 50,000 Units by mouth once a week  carBAMazepine (TEGRETOL) 200 MG tablet, Take 1 tablet by mouth 2 times daily  haloperidol (HALDOL) 1 MG tablet, Take 1 tablet by mouth 2 times daily

## 2022-11-16 PROCEDURE — 2500000003 HC RX 250 WO HCPCS: Performed by: UROLOGY

## 2022-11-16 PROCEDURE — 99232 SBSQ HOSP IP/OBS MODERATE 35: CPT | Performed by: FAMILY MEDICINE

## 2022-11-16 PROCEDURE — 2580000003 HC RX 258: Performed by: UROLOGY

## 2022-11-16 PROCEDURE — 1200000000 HC SEMI PRIVATE

## 2022-11-16 PROCEDURE — 6370000000 HC RX 637 (ALT 250 FOR IP): Performed by: NURSE PRACTITIONER

## 2022-11-16 PROCEDURE — 6360000002 HC RX W HCPCS: Performed by: UROLOGY

## 2022-11-16 PROCEDURE — 99232 SBSQ HOSP IP/OBS MODERATE 35: CPT | Performed by: NURSE PRACTITIONER

## 2022-11-16 PROCEDURE — 6370000000 HC RX 637 (ALT 250 FOR IP): Performed by: UROLOGY

## 2022-11-16 RX ORDER — CEFDINIR 300 MG/1
300 CAPSULE ORAL EVERY 12 HOURS SCHEDULED
Status: DISCONTINUED | OUTPATIENT
Start: 2022-11-16 | End: 2022-11-17 | Stop reason: HOSPADM

## 2022-11-16 RX ORDER — DOXYCYCLINE 100 MG/1
100 CAPSULE ORAL 2 TIMES DAILY
Status: DISCONTINUED | OUTPATIENT
Start: 2022-11-16 | End: 2022-11-17 | Stop reason: HOSPADM

## 2022-11-16 RX ORDER — LEVOFLOXACIN 500 MG/1
750 TABLET, FILM COATED ORAL DAILY
Status: DISCONTINUED | OUTPATIENT
Start: 2022-11-16 | End: 2022-11-16

## 2022-11-16 RX ADMIN — HALOPERIDOL 1 MG: 1 TABLET ORAL at 09:14

## 2022-11-16 RX ADMIN — CARBAMAZEPINE 200 MG: 200 TABLET ORAL at 20:32

## 2022-11-16 RX ADMIN — HALOPERIDOL 1 MG: 1 TABLET ORAL at 20:32

## 2022-11-16 RX ADMIN — CARBAMAZEPINE 200 MG: 200 TABLET ORAL at 09:14

## 2022-11-16 RX ADMIN — SODIUM CHLORIDE, PRESERVATIVE FREE 10 ML: 5 INJECTION INTRAVENOUS at 20:32

## 2022-11-16 RX ADMIN — CEFEPIME 2000 MG: 2 INJECTION, POWDER, FOR SOLUTION INTRAVENOUS at 00:55

## 2022-11-16 RX ADMIN — SODIUM CHLORIDE: 9 INJECTION, SOLUTION INTRAVENOUS at 09:15

## 2022-11-16 RX ADMIN — DOXYCYCLINE 100 MG: 100 CAPSULE ORAL at 20:32

## 2022-11-16 RX ADMIN — DOXYCYCLINE 100 MG: 100 CAPSULE ORAL at 14:59

## 2022-11-16 RX ADMIN — CLINDAMYCIN IN 5 PERCENT DEXTROSE 600 MG: 12 INJECTION, SOLUTION INTRAVENOUS at 05:09

## 2022-11-16 RX ADMIN — CEFDINIR 300 MG: 300 CAPSULE ORAL at 20:32

## 2022-11-16 RX ADMIN — ENOXAPARIN SODIUM 30 MG: 100 INJECTION SUBCUTANEOUS at 09:14

## 2022-11-16 ASSESSMENT — ENCOUNTER SYMPTOMS
ABDOMINAL PAIN: 0
VOMITING: 0
WHEEZING: 0
NAUSEA: 0
CHOKING: 0
BACK PAIN: 0
SINUS PRESSURE: 0
CONSTIPATION: 0
CHEST TIGHTNESS: 0
VOICE CHANGE: 0
GASTROINTESTINAL NEGATIVE: 1
RHINORRHEA: 0
DIARRHEA: 0
RESPIRATORY NEGATIVE: 1
COUGH: 0
SHORTNESS OF BREATH: 0

## 2022-11-16 ASSESSMENT — PAIN DESCRIPTION - LOCATION: LOCATION: BUTTOCKS;ABDOMEN

## 2022-11-16 NOTE — PROGRESS NOTES
[facility]  Progress Note    Mariah Urias  MRN: [id number]  Date: 11/16/2022  LOS: 4     Reason for F/U :   Hidradenitis suppurativa    Impression :   Hydradenitis suppurativa was previously on Humira  Perineal/gluteal hydradenitis with areas of purulent drainage  Schizophrenia/bipolar disorder  History of vancomycin allergy-rash  Infected right abdominal wall wounds    Recommendations: The patient continues on systemic antimicrobial therapy with cefepime and clindamycin  The patient has been refusing dressing changes and wound care, from my understanding this was completed during surgical procedure yesterday  From an infectious disease standpoint, patient can be discharged on oral antimicrobial therapy with cefdinir and doxycycline    Infection Control Recommendations:   Universal precautions    Discharge Planning:   Estimated Length of IV antimicrobials: While hospitalized  Patient will need Midline Catheter Insertion/ PICC line Insertion: No  Patient will need: Home IV , [place],  SNF,  LTAC: Undetermined  Patient willneed outpatient wound care: No    Medical Decision making / Summary of Stay:   Mariah Urias is a 38y.o.-year-old male presenting to the hospital with worsening pain to the groin, perineum and bilateral buttocks with increased drainage. He also had right abdominal wall wounds with purulent drainage. Symptoms are moderate to severe, no alleviating or aggravating factors. The patient is poor historian, Kowalski catheter in place. On admission he had a fever with temperature max of 100.9, WBC 12.6, lactic acid 1, renal function normal.  CT abdomen and pelvic showed soft tissue irregularity but no fluid collection. The patient had history of severe hydradenitis suppurativa, used to be on Humira that was discontinued around 2 months ago due to incarceration. He used to follow-up with [facility] dermatology clinic.   He was admitted to [facility] October 2022 was evaluated by surgery, no surgical intervention was recommended, was discharged on Levaquin and Augmentin for 2 weeks  Previous wound culture on 10/12/2022 grew Proteus mirabilis that was pansensitive and E. coli that was sensitive to cefazolin and Cipro, resistant to Bactrim. Current evaluation:2022    /62   Pulse 92   Temp 98.6 °F (37 °C) (Oral)   Resp 16   Ht 5' 9\" (1.753 m)   Wt 224 lb 6.4 oz (101.8 kg)   SpO2 96%   BMI 33.14 kg/m²     Temperature Range: Temp: 98.6 °F (37 °C) Temp  Av.9 °F (32.7 °C)  Min: 32 °F (0 °C)  Max: 99.3 °F (37.4 °C)    The patient was seen and evaluated lying in bed  He was about to let me assess his dressings then refused  He denies needs or pain at this time. Review of Systems   Constitutional: Negative. HENT: Negative. Respiratory: Negative. Cardiovascular: Negative. Gastrointestinal: Negative. Genitourinary: Negative. Musculoskeletal: Negative. Skin: Negative. Neurological: Negative. Psychiatric/Behavioral: Negative. Physical Examination :     Physical Exam  Constitutional:       Appearance: Normal appearance. He is normal weight. HENT:      Head: Normocephalic and atraumatic. Pulmonary:      Effort: Pulmonary effort is normal. No respiratory distress. Abdominal:      General: Abdomen is flat. Bowel sounds are normal.      Palpations: Abdomen is soft. Musculoskeletal:         General: Normal range of motion. Skin:     General: Skin is warm and dry. Neurological:      Mental Status: He is alert. Mental status is at baseline.        Laboratory data:   I have independently reviewed the followinglabs:  CBC with Differential:   Recent Labs     11/14/22  0614 11/15/22  0719   WBC 12.8* 14.2*   HGB 9.2* 10.2*   HCT 30.4* 32.5*    478*     BMP:   Recent Labs     2214 11/15/22  0719    137   K 3.9 4.2    101   CO2 27 26   BUN 5* 5*   CREATININE 0.66* 0.59*     Hepatic Function Panel: No results for input(s): PROT, LABALBU, BILIDIR, IBILI, BILITOT, ALKPHOS, ALT, AST in the last 72 hours. Lab Results   Component Value Date/Time    PROCAL 0.04 08/10/2022 09:07 PM     Lab Results   Component Value Date/Time    .4 10/09/2022 10:53 AM    CRP 35.5 08/10/2022 09:07 PM     Lab Results   Component Value Date    SEDRATE 52 (H) 10/09/2022         No results found for: DDIMER  Lab Results   Component Value Date/Time    FERRITIN 41 10/29/2020 02:15 PM    FERRITIN 38 07/10/2015 11:30 AM    FERRITIN 50 11/12/2014 06:10 AM    FERRITIN 254 07/25/2012 01:09 PM     No results found for: LDH  No results found for: FIBRINOGEN    No results found for requested labs within last 30 days. No results found for: COVID19    No results for input(s): VANCOTROUGH in the last 72 hours. Imaging Studies:   No new imaging    Cultures:     Component 11/12/22 0002    Specimen Description . COCCYX    Direct Exam  Abnormal   This specimen contains greater than 9 Squamous Epithelial cells per LPF which is an indicator of a low quality specimen. Direct Exam RARE NEUTROPHILS Abnormal     Direct Exam MIXED BACTERIAL MORPHOTYPES SEEN ON GRAM STAIN.  Abnormal     Culture PROTEUS MIRABILIS LIGHT GROWTH Abnormal     Culture NORMAL SKIN PRAVEEN    Resulting Agency [address]        Susceptibility    Proteus mirabilis (1)    Antibiotic Interpretation Microscan Method Status    ampicillin Resistant >=32 BACTERIAL SUSCEPTIBILITY PANEL LISA Final    aztreonam Sensitive <=1 BACTERIAL SUSCEPTIBILITY PANEL LISA Final    ceFAZolin Sensitive 8 BACTERIAL SUSCEPTIBILITY PANEL LISA Final    cefTRIAXone Sensitive <=1 BACTERIAL SUSCEPTIBILITY PANEL LISA Final    ciprofloxacin Resistant >=4 BACTERIAL SUSCEPTIBILITY PANEL LISA Final    gentamicin Sensitive <=1 BACTERIAL SUSCEPTIBILITY PANEL LISA Final    tobramycin Sensitive <=1 BACTERIAL SUSCEPTIBILITY PANEL LISA Final    trimethoprim-sulfamethoxazole Sensitive <=20 BACTERIAL SUSCEPTIBILITY PANEL LISA Final    piperacillin-tazobactam Sensitive <=4 BACTERIAL SUSCEPTIBILITY PANEL LISA Final          Specimen Collected: 11/12/22 00:02 EST Last Resulted: 11/15/22 09:12 EST           Culture, Blood 1 [[id number]] Collected: 11/12/22 0022   Order Status: Completed Specimen: Blood Updated: 11/16/22 0913    Specimen Description . BLOOD    Special Requests 10ML RT AC    Culture NO GROWTH 4 DAYS   Culture, Blood 1 [[id number]] Collected: 11/12/22 0030   Order Status: Completed Specimen: Blood Updated: 11/16/22 0910    Specimen Description . BLOOD    Special Requests RT HAND 20ML    Culture NO GROWTH 4 DAYS     Medications:      sodium chloride  80 mL IntraVENous Once    ketorolac  15 mg IntraVENous Once    carBAMazepine  200 mg Oral BID    haloperidol  1 mg Oral BID    sodium chloride flush  5-40 mL IntraVENous 2 times per day    enoxaparin  30 mg SubCUTAneous BID    clindamycin (CLEOCIN) IV  600 mg IntraVENous Q8H    cefepime  2,000 mg IntraVENous Q12H           [facility]  APURVA Soto CNP  Perfect Serve messaging  OFFICE: [phone]      Electronically signed by APURVA Soto CNP on 11/16/2022 at 9:39 AM  Thank you for allowing us to participate in the care of this patient. Please call with questions. This note iscreated with the assistance of a speech recognition program.  While intending to generate a document that actually reflects the content of the visit, the document can still have some errors including those of syntax andsound a like substitutions which may escape proof reading. In such instances, actual meaning can be extrapolated by contextual diversion.

## 2022-11-16 NOTE — PLAN OF CARE
Problem: Discharge Planning  Goal: Discharge to home or other facility with appropriate resources  11/15/2022 2359 by Marga Hurtado RN  Outcome: Progressing  Flowsheets (Taken 11/15/2022 2000)  Discharge to home or other facility with appropriate resources:   Identify barriers to discharge with patient and caregiver   Arrange for needed discharge resources and transportation as appropriate   Identify discharge learning needs (meds, wound care, etc)   Refer to discharge planning if patient needs post-hospital services based on physician order or complex needs related to functional status, cognitive ability or social support system     Problem: Skin/Tissue Integrity  Goal: Absence of new skin breakdown  Description: 1. Monitor for areas of redness and/or skin breakdown  2. Assess vascular access sites hourly  3. Every 4-6 hours minimum:  Change oxygen saturation probe site  4. Every 4-6 hours:  If on nasal continuous positive airway pressure, respiratory therapy assess nares and determine need for appliance change or resting period.   11/15/2022 2359 by Marga Hurtado RN  Outcome: Progressing     Problem: Safety - Adult  Goal: Free from fall injury  11/15/2022 2359 by Marga Hurtado RN  Outcome: Progressing     Problem: Pain  Goal: Verbalizes/displays adequate comfort level or baseline comfort level  11/15/2022 2359 by Marga Hurtado RN  Outcome: Progressing     Problem: Neurosensory - Adult  Goal: Achieves stable or improved neurological status  11/15/2022 2359 by Marga Hurtado RN  Outcome: Progressing  Flowsheets (Taken 11/15/2022 2000)  Achieves stable or improved neurological status: Assess for and report changes in neurological status     Problem: Respiratory - Adult  Goal: Achieves optimal ventilation and oxygenation  11/15/2022 2359 by Marga Hurtado RN  Outcome: Progressing  Flowsheets (Taken 11/15/2022 2000)  Achieves optimal ventilation and oxygenation:   Assess for changes in respiratory status   Assess for changes in mentation and behavior     Problem: Skin/Tissue Integrity - Adult  Goal: Skin integrity remains intact  11/15/2022 2359 by Ivory Alba RN  Outcome: Progressing  Flowsheets (Taken 11/15/2022 2000)  Skin Integrity Remains Intact: Monitor for areas of redness and/or skin breakdown     Problem: Musculoskeletal - Adult  Goal: Return mobility to safest level of function  11/15/2022 2359 by Ivory Alba RN  Outcome: Progressing  Flowsheets (Taken 11/15/2022 2000)  Return Mobility to Safest Level of Function:   Assess patient stability and activity tolerance for standing, transferring and ambulating with or without assistive devices   Assist with transfers and ambulation using safe patient handling equipment as needed   Ensure adequate protection for wounds/incisions during mobilization   Obtain physical therapy/occupational therapy consults as needed   Instruct patient/family in ordered activity level     Problem: Gastrointestinal - Adult  Goal: Minimal or absence of nausea and vomiting  11/15/2022 2359 by Ivory Alba RN  Outcome: Progressing  Flowsheets (Taken 11/15/2022 2000)  Minimal or absence of nausea and vomiting:   Administer IV fluids as ordered to ensure adequate hydration   Advance diet as tolerated, if ordered   Provide nonpharmacologic comfort measures as appropriate     Problem: Genitourinary - Adult  Goal: Absence of urinary retention  11/15/2022 2359 by Ivory Alba RN  Outcome: Progressing  Flowsheets (Taken 11/15/2022 2000)  Absence of urinary retention:   Assess patients ability to void and empty bladder   Discuss catheterization for long term situations as appropriate     Problem: Infection - Adult  Goal: Absence of infection at discharge  11/15/2022 2359 by Ivory Alba RN  Outcome: Progressing  Flowsheets (Taken 11/15/2022 2000)  Absence of infection at discharge:   Assess and monitor for signs and symptoms of infection   Monitor lab/diagnostic results   Monitor all insertion sites i.e., indwelling lines, tubes and drains     Problem: ABCDS Injury Assessment  Goal: Absence of physical injury  11/15/2022 [id number] by Eliud Olivares RN  Outcome: Progressing  Flowsheets (Taken 11/15/2022 7683)  Absence of Physical Injury: Implement safety measures based on patient assessment

## 2022-11-16 NOTE — FLOWSHEET NOTE
Patient resting in bed with eyes open mumbling to himself;denies any needs and refusing to be turned or repositioned

## 2022-11-16 NOTE — PLAN OF CARE
Problem: Discharge Planning  Goal: Discharge to home or other facility with appropriate resources  Outcome: Progressing  Flowsheets (Taken 11/16/2022 0915)  Discharge to home or other facility with appropriate resources:   Identify barriers to discharge with patient and caregiver   Arrange for needed discharge resources and transportation as appropriate   Identify discharge learning needs (meds, wound care, etc)   Refer to discharge planning if patient needs post-hospital services based on physician order or complex needs related to functional status, cognitive ability or social support system     Problem: Skin/Tissue Integrity  Goal: Absence of new skin breakdown  Description: 1. Monitor for areas of redness and/or skin breakdown  2. Assess vascular access sites hourly  3. Every 4-6 hours minimum:  Change oxygen saturation probe site  4. Every 4-6 hours:  If on nasal continuous positive airway pressure, respiratory therapy assess nares and determine need for appliance change or resting period.   Outcome: Progressing     Problem: Safety - Adult  Goal: Free from fall injury  Outcome: Progressing  Flowsheets (Taken 11/16/2022 1604)  Free From Fall Injury: Instruct family/caregiver on patient safety     Problem: Pain  Goal: Verbalizes/displays adequate comfort level or baseline comfort level  Outcome: Progressing  Flowsheets (Taken 11/16/2022 1310)  Verbalizes/displays adequate comfort level or baseline comfort level:   Encourage patient to monitor pain and request assistance   Assess pain using appropriate pain scale   Administer analgesics based on type and severity of pain and evaluate response   Implement non-pharmacological measures as appropriate and evaluate response   Notify Licensed Independent Practitioner if interventions unsuccessful or patient reports new pain     Problem: Neurosensory - Adult  Goal: Achieves stable or improved neurological status  Outcome: Progressing  Flowsheets (Taken 11/16/2022 0915)  Achieves stable or improved neurological status: Assess for and report changes in neurological status     Problem: Respiratory - Adult  Goal: Achieves optimal ventilation and oxygenation  Outcome: Progressing  Flowsheets (Taken 11/16/2022 0915)  Achieves optimal ventilation and oxygenation:   Assess for changes in mentation and behavior   Assess for changes in respiratory status   Encourage broncho-pulmonary hygiene including cough, deep breathe, incentive spirometry   Assess and instruct to report shortness of breath or any respiratory difficulty     Problem: Skin/Tissue Integrity - Adult  Goal: Skin integrity remains intact  Outcome: Progressing  Flowsheets (Taken 11/16/2022 0915)  Skin Integrity Remains Intact: Monitor for areas of redness and/or skin breakdown     Problem: Musculoskeletal - Adult  Goal: Return mobility to safest level of function  Outcome: Progressing  Flowsheets (Taken 11/16/2022 0915)  Return Mobility to Safest Level of Function:   Assess patient stability and activity tolerance for standing, transferring and ambulating with or without assistive devices   Assist with transfers and ambulation using safe patient handling equipment as needed   Ensure adequate protection for wounds/incisions during mobilization   Obtain physical therapy/occupational therapy consults as needed   Apply continuous passive motion per provider or physical therapy orders to increase flexion toward goal   Instruct patient/family in ordered activity level     Problem: Gastrointestinal - Adult  Goal: Minimal or absence of nausea and vomiting  Outcome: Progressing  Flowsheets (Taken 11/16/2022 0915)  Minimal or absence of nausea and vomiting:   Administer IV fluids as ordered to ensure adequate hydration   Advance diet as tolerated, if ordered     Problem: Genitourinary - Adult  Goal: Absence of urinary retention  Outcome: Progressing     Problem: Genitourinary - Adult  Goal: Urinary catheter remains patent  Outcome: Progressing  Flowsheets (Taken 11/16/2022 1608)  Urinary catheter remains patent:   Assess patency of urinary catheter   Irrigate catheter per Licensed Independent Practitioner order if indicated and notify Licensed Independent Practitioner if unable to irrigate     Problem: Infection - Adult  Goal: Absence of infection at discharge  Outcome: Progressing  Flowsheets (Taken 11/16/2022 0915)  Absence of infection at discharge:   Assess and monitor for signs and symptoms of infection   Monitor lab/diagnostic results   Monitor all insertion sites i.e., indwelling lines, tubes and drains   Instruct and encourage patient and family to use good hand hygiene technique   Administer medications as ordered   Identify and instruct in appropriate isolation precautions for identified infection/condition     Problem: ABCDS Injury Assessment  Goal: Absence of physical injury  Outcome: Progressing  Flowsheets (Taken 11/16/2022 1604)  Absence of Physical Injury: Implement safety measures based on patient assessment

## 2022-11-16 NOTE — PROGRESS NOTES
[facility]   Urology Progress Note            Subjective:  Follow-up urinary retention, bladder stones    Patient Vitals for the past 24 hrs:   BP Temp Temp src Pulse Resp SpO2 Weight   11/16/22 0420 109/68 98.1 °F (36.7 °C) Oral (!) 102 17 94 % --   11/16/22 0344 -- -- -- -- -- -- 224 lb 6.4 oz (101.8 kg)   11/16/22 0050 110/69 98.6 °F (37 °C) Oral (!) 103 17 93 % --   11/15/22 2122 108/76 99.3 °F (37.4 °C) Oral (!) 103 16 93 % --   11/15/22 2045 -- (!) 32 °F (0 °C) -- -- -- -- --   11/15/22 1831 108/63 98.1 °F (36.7 °C) Oral 94 16 98 % --   11/15/22 1815 104/63 -- -- 93 22 97 % --   11/15/22 1800 97/67 -- -- 96 17 95 % --   11/15/22 1747 111/89 97 °F (36.1 °C) Temporal 100 18 99 % --   11/15/22 1542 (!) 94/56 98.1 °F (36.7 °C) Oral 92 16 94 % --   11/15/22 1123 94/64 98.2 °F (36.8 °C) Oral 92 16 94 % --   11/15/22 0749 108/78 98.6 °F (37 °C) Oral 91 18 94 % --   11/15/22 0707 -- -- -- -- -- -- 221 lb 9.6 oz (100.5 kg)       Intake/Output Summary (Last 24 hours) at 11/16/2022 0704  Last data filed at 11/16/2022 9368  Gross per 24 hour   Intake 2055.22 ml   Output 1005 ml   Net 1050.22 ml       Recent Labs     11/14/22  0614 11/15/22  0719   WBC 12.8* 14.2*   HGB 9.2* 10.2*   HCT 30.4* 32.5*   MCV 92.7 91.5    478*     Recent Labs     11/14/22  0614 11/15/22  0719    137   K 3.9 4.2    101   CO2 27 26   BUN 5* 5*   CREATININE 0.66* 0.59*       Recent Labs     11/15/22  1745   COLORU STRAW   PHUR 5.5   WBCUA 0 TO 2   RBCUA 50    BACTERIA RARE*   SPECGRAV 1.010   LEUKOCYTESUR NEGATIVE   UROBILINOGEN Normal   BILIRUBINUR NEGATIVE       Additional Lab/culture results:    Physical Exam: Catheter was changed yesterday, bladder stones grasped and removed, urine culture pending, urine clearing up    Interval Imaging Findings:    Impression:    Patient Active Problem List   Diagnosis    Hidradenitis suppurativa    Acute psychosis ([facility]    Abscess of axilla Abscess of skin of abdomen    Blood per rectum    Trochanteric bursitis    Adrenal mass, right (HCC)    Hidradenitis axillaris    Schizoaffective disorder, bipolar type ([address]    Encounter for ostomy nurse consultation    Paranoid schizophrenia, chronic condition with acute exacerbation ([address]    Iron deficiency anemia    GERD (gastroesophageal reflux disease)    Tobacco abuse    Non-healing left groin open wound, subsequent encounter    Right groin wound, subsequent encounter    Open wound of left ear    Abscess    Sepsis ([address]    Constipation    Acute exacerbation of chronic paranoid schizophrenia (HCC)    Schizophrenia ([address]    Gram-positive bacteremia    Cellulitis of right thigh    Bandemia    Scrotal abscess    Immunosuppressed due to chemotherapy ([address]    Mild protein-calorie malnutrition ([address]    Hypovitaminosis D    Gluteal abscess    Noncompliance by declining service of wounds care at nursing facility       Plan: Discussed status with patient's mother who is the power of  we recommend to maintain the Kowalski indwelling because of neurogenic bladder dysfunction previously documented    Aishwarya Bryant MD  7:04 AM 11/16/2022

## 2022-11-16 NOTE — PROGRESS NOTES
Jose Wilkinson is 38 y.o. male  Who was admitted to the hospital on 11/11/2022 for treatment of Hidradenitis suppurativa. Patient presented to emergency room with nonhealing wounds with a malodor, drainage and coccygeal wound and gluteal folds with maggot infestation. He has underlying history of schizoaffective disorder, polysubstance abuse, suppurative hidradenitis. Patient had been on Humira before however has been discontinued since he was incarcerated. Patient is allergic to vancomycin. His previous wound cultures were positive for pansensitive Proteus mirabilis and E. coli resistant to Bactrim. He was treated with IV cefepime and clindamycin. Patient has history of neurogenic bladder. He also has decubitus ulcer and sacrococcygeal area. He has chronic indwelling Kowalski catheter and underwent cystoscopy with Kowalski catheter exchange by urology. PMH:  Past Medical History:   Diagnosis Date    B[facility]     GERD (gastroesophageal reflux disease) 8/13/2016    Hidradenitis suppurativa     Polysubstance abuse ([facility]     Schizoaffective disorder ([facility])       Allergies: Allergies   Allergen Reactions    Depakene [Valproic Acid]     Restoril [Temazepam]     Risperidone And Related      Seizure      Thorazine [Chlorpromazine]     Vancomycin Other (See Comments) and Rash      Medications :  sodium chloride, 80 mL, IntraVENous, Once  ketorolac, 15 mg, IntraVENous, Once  carBAMazepine, 200 mg, Oral, BID  haloperidol, 1 mg, Oral, BID  sodium chloride flush, 5-40 mL, IntraVENous, 2 times per day  enoxaparin, 30 mg, SubCUTAneous, BID  clindamycin (CLEOCIN) IV, 600 mg, IntraVENous, Q8H  cefepime, 2,000 mg, IntraVENous, Q12H        Review of Systems   Review of Systems   Constitutional:  Negative for activity change, appetite change, fatigue, fever and unexpected weight change. HENT:  Negative for congestion, nosebleeds, rhinorrhea, sinus pressure, sneezing and voice change.     Respiratory: Negative for cough, choking, chest tightness, shortness of breath and wheezing. Cardiovascular:  Negative for chest pain, palpitations and leg swelling. Gastrointestinal:  Negative for abdominal pain, constipation, diarrhea, nausea and vomiting. Genitourinary:  Negative for difficulty urinating, dysuria, frequency, penile discharge and testicular pain. Musculoskeletal:  Negative for back pain. Skin:  Negative for rash. Neurological:  Positive for weakness (bilateral lower ext weakness). Negative for dizziness, light-headedness and headaches. Hematological:  Does not bruise/bleed easily. Psychiatric/Behavioral:  Negative for agitation, behavioral problems, confusion, self-injury, sleep disturbance and suicidal ideas. Objective :      Current Vitals : Temp: 98.6 °F (37 °C),  Heart Rate: 92, Resp: 16, BP: 108/62, SpO2: 96 %  Last 24 Hrs Vitals   Patient Vitals for the past 24 hrs:   BP Temp Temp src Pulse Resp SpO2 Weight   11/16/22 0813 108/62 98.6 °F (37 °C) Oral 92 16 96 % --   11/16/22 0420 109/68 98.1 °F (36.7 °C) Oral (!) 102 17 94 % --   11/16/22 0344 -- -- -- -- -- -- 224 lb 6.4 oz (101.8 kg)   11/16/22 0050 110/69 98.6 °F (37 °C) Oral (!) 103 17 93 % --   11/15/22 2122 108/76 99.3 °F (37.4 °C) Oral (!) 103 16 93 % --   11/15/22 2045 -- (!) 32 °F (0 °C) -- -- -- -- --   11/15/22 1831 108/63 98.1 °F (36.7 °C) Oral 94 16 98 % --   11/15/22 1815 104/63 -- -- 93 22 97 % --   11/15/22 1800 97/67 -- -- 96 17 95 % --   11/15/22 1747 111/89 97 °F (36.1 °C) Temporal 100 18 99 % --   11/15/22 1542 (!) 94/56 98.1 °F (36.7 °C) Oral 92 16 94 % --   11/15/22 1123 94/64 98.2 °F (36.8 °C) Oral 92 16 94 % --     Intake / output   11/14 1901 - 11/16 0700  In: 2862.1 [P.O.:680; I.V.:1753]  Out: 9117 [Urine:1705]  Physical Exam:  Physical Exam  Vitals and nursing note reviewed. Constitutional:       General: He is not in acute distress. Appearance: He is not diaphoretic.    HENT:      Head: Normocephalic and atraumatic. Nose:      Right Sinus: No maxillary sinus tenderness or frontal sinus tenderness. Left Sinus: No maxillary sinus tenderness or frontal sinus tenderness. Mouth/Throat:      Pharynx: No oropharyngeal exudate. Eyes:      General: No scleral icterus. Conjunctiva/sclera: Conjunctivae normal.      Pupils: Pupils are equal, round, and reactive to light. Neck:      Thyroid: No thyromegaly. Vascular: No JVD. Cardiovascular:      Rate and Rhythm: Normal rate and regular rhythm. Pulses:           Dorsalis pedis pulses are 2+ on the right side and 2+ on the left side. Heart sounds: Normal heart sounds. No murmur heard. Pulmonary:      Effort: Pulmonary effort is normal.      Breath sounds: Normal breath sounds. No wheezing or rales. Chest:      Comments: Bilateral axillary scar likely from prior Hidradenitis surgery   Abdominal:      Palpations: Abdomen is soft. There is no mass. Tenderness: There is no abdominal tenderness. Musculoskeletal:      Cervical back: Full passive range of motion without pain and neck supple. Lymphadenopathy:      Head:      Right side of head: No submandibular adenopathy. Left side of head: No submandibular adenopathy. Cervical: No cervical adenopathy. Skin:     General: Skin is warm. Neurological:      Mental Status: He is alert and oriented to person, place, and time. Motor: No tremor. Comments: Bilateral lower extremity weakness. Hydradenitis perineal with extensive purulent drainage dressing in place. Psychiatric:         Behavior: Behavior is cooperative.          Laboratory findings:    Recent Labs     11/14/22 0614 11/15/22  0719   WBC 12.8* 14.2*   HGB 9.2* 10.2*   HCT 30.4* 32.5*    478*     Recent Labs     11/14/22 0614 11/15/22  0719    137   K 3.9 4.2    101   CO2 27 26   GLUCOSE 99 91   BUN 5* 5*   CREATININE 0.66* 0.59*   CALCIUM 9.2 9.1     No results for input(s): PROT, LABALBU, LABA1C, V2WRRSZ, Q2RYILI, FT4, TSH, AST, ALT, LDH, GGT, ALKPHOS, BILITOT, BILIDIR, AMMONIA, AMYLASE, LIPASE, LACTATE, CHOL, HDL, LDLCHOLESTEROL, CHOLHDLRATIO, TRIG, VLDL, BNP, TROPONINI, CKTOTAL, CKMB, CKMBINDEX, RF, CHASE in the last 72 hours. Specific Gravity, UA   Date Value Ref Range Status   11/15/2022 1.010 1.005 - 1.030 Final     Protein, UA   Date Value Ref Range Status   11/15/2022 NEGATIVE NEGATIVE Final     RBC, UA   Date Value Ref Range Status   11/15/2022 50  0 - 2 /HPF Final     Bacteria, UA   Date Value Ref Range Status   11/15/2022 RARE (A) None Final     Nitrite, Urine   Date Value Ref Range Status   11/15/2022 NEGATIVE NEGATIVE Final     WBC, UA   Date Value Ref Range Status   11/15/2022 0 TO 2 0 - 5 /HPF Final     Leukocyte Esterase, Urine   Date Value Ref Range Status   11/15/2022 NEGATIVE NEGATIVE Final       Imaging / Clinical Data :-   CT ABDOMEN PELVIS W IV CONTRAST Additional Contrast? None    Result Date: 11/12/2022  Redemonstration of atrophy/hypoplastic nonfunctional right kidney which has a few cysts with calcified wall. The largest cyst with calcified wall measures 4.3 cm, similar to previous CT scan. Normal left kidney of prominent size without any abnormality. Evidence of moderate to large amount of mildly hyperdense material in the gallbladder suggestive of significant sludge in gallbladder without obvious radiopaque stones. Normal appendix visualized. Nonspecific small to moderate amount of gas scattered in small bowel with multiple fluid levels. Acute enteritis not excluded. Thickened redundant subcutaneous fatty tissue suspended from the lower abdominal wall and pelvic wall without any significant surrounding inflammatory change.  Inferior to the perineum evidence of irregular thickened tissues with surrounding inflammatory changes involving the posteromedial aspect of the gluteal folds mainly on the right side and extended to the posteromedial aspect of the proximal right upper thigh. These soft tissue abnormalities are  limited to the subcutaneous tissues without extending to muscle. No evidence of any inflammatory changes involving the muscles of abdominal walls, pelvic walls, bilateral gluteal muscles, and the visualized muscles of the bilateral proximal thighs. Clinical Course : unchanged  Assessment and Plan  :        Sepsis secondary to wound infection and hidradenitis suppurativa -treated with cefepime and clindamycin. Stable for discharged on oral Cipro Doxy  Paranoid Schizophrenia/bipolar disorder -   Neurogenic bladder -indwelling Kowalski catheter. Functional paraplegia -     PT OT   Patient refusing dressing changes. Poor prognosis       Continue to monitor vitals , Intake / output ,  Cell count , HGB , Kidney function, oxygenation  as indicated . Plan and updates discussed with patient ,  answers  explained to satisfaction.    Plan discussed with Staff Tasia Claude RN     (Please note that portions of this note were completed with a voice recognition program. Efforts were made to edit the dictations but occasionally words are mis-transcribed.)      Cora Parker MD  11/16/2022

## 2022-11-17 VITALS
DIASTOLIC BLOOD PRESSURE: 54 MMHG | HEIGHT: 69 IN | HEART RATE: 95 BPM | SYSTOLIC BLOOD PRESSURE: 93 MMHG | BODY MASS INDEX: 33.09 KG/M2 | OXYGEN SATURATION: 97 % | RESPIRATION RATE: 18 BRPM | TEMPERATURE: 98.1 F | WEIGHT: 223.4 LBS

## 2022-11-17 PROCEDURE — 2580000003 HC RX 258: Performed by: UROLOGY

## 2022-11-17 PROCEDURE — 6370000000 HC RX 637 (ALT 250 FOR IP): Performed by: NURSE PRACTITIONER

## 2022-11-17 PROCEDURE — 6370000000 HC RX 637 (ALT 250 FOR IP): Performed by: UROLOGY

## 2022-11-17 PROCEDURE — 99239 HOSP IP/OBS DSCHRG MGMT >30: CPT | Performed by: FAMILY MEDICINE

## 2022-11-17 RX ORDER — DOXYCYCLINE 100 MG/1
100 CAPSULE ORAL 2 TIMES DAILY
Qty: 11 CAPSULE | Refills: 0 | Status: SHIPPED | OUTPATIENT
Start: 2022-11-17 | End: 2022-11-23

## 2022-11-17 RX ORDER — CEFDINIR 300 MG/1
300 CAPSULE ORAL EVERY 12 HOURS SCHEDULED
Qty: 20 CAPSULE | Refills: 0 | Status: SHIPPED | OUTPATIENT
Start: 2022-11-17 | End: 2022-11-27

## 2022-11-17 RX ADMIN — CARBAMAZEPINE 200 MG: 200 TABLET ORAL at 09:50

## 2022-11-17 RX ADMIN — DOXYCYCLINE 100 MG: 100 CAPSULE ORAL at 09:50

## 2022-11-17 RX ADMIN — CEFDINIR 300 MG: 300 CAPSULE ORAL at 09:50

## 2022-11-17 RX ADMIN — SODIUM CHLORIDE, PRESERVATIVE FREE 10 ML: 5 INJECTION INTRAVENOUS at 09:51

## 2022-11-17 RX ADMIN — HALOPERIDOL 1 MG: 1 TABLET ORAL at 09:50

## 2022-11-17 ASSESSMENT — ENCOUNTER SYMPTOMS
VOMITING: 0
NAUSEA: 0
COUGH: 0
WHEEZING: 0
CONSTIPATION: 0
BACK PAIN: 0
ABDOMINAL PAIN: 0
VOICE CHANGE: 0
SINUS PRESSURE: 0
CHEST TIGHTNESS: 0
SHORTNESS OF BREATH: 0
CHOKING: 0
RHINORRHEA: 0
DIARRHEA: 0

## 2022-11-17 NOTE — PROGRESS NOTES
Pt discharged to in condition with belongings  Discharge instructions given  Pt denies having any further questions at this time  Personal items given to patient at discharge  Patient/family state they have everything they were admitted with. Report called to Eulalia Alcazar.

## 2022-11-17 NOTE — PLAN OF CARE
Problem: Discharge Planning  Goal: Discharge to home or other facility with appropriate resources  11/17/2022 1004 by Mer Menard RN  Outcome: Not Progressing  Flowsheets (Taken 11/17/2022 0950)  Discharge to home or other facility with appropriate resources:   Identify barriers to discharge with patient and caregiver   Arrange for needed discharge resources and transportation as appropriate   Identify discharge learning needs (meds, wound care, etc)   Arrange for interpreters to assist at discharge as needed   Refer to discharge planning if patient needs post-hospital services based on physician order or complex needs related to functional status, cognitive ability or social support system  11/16/2022 2140 by Mariana Vizcaino RN  Outcome: Progressing  Flowsheets (Taken 11/16/2022 2000)  Discharge to home or other facility with appropriate resources:   Identify barriers to discharge with patient and caregiver   Arrange for needed discharge resources and transportation as appropriate   Identify discharge learning needs (meds, wound care, etc)   Refer to discharge planning if patient needs post-hospital services based on physician order or complex needs related to functional status, cognitive ability or social support system     Problem: Skin/Tissue Integrity  Goal: Absence of new skin breakdown  Description: 1. Monitor for areas of redness and/or skin breakdown  2. Assess vascular access sites hourly  3. Every 4-6 hours minimum:  Change oxygen saturation probe site  4. Every 4-6 hours:  If on nasal continuous positive airway pressure, respiratory therapy assess nares and determine need for appliance change or resting period.   11/17/2022 1004 by Mer Menard RN  Outcome: Not Progressing  11/16/2022 2140 by Mariana Vizcaino RN  Outcome: Progressing     Problem: Safety - Adult  Goal: Free from fall injury  11/17/2022 1004 by Mer Menard RN  Outcome: Not Progressing  Flowsheets (Taken 11/16/2022 1604 by Dolly Quinones RN)  Free From Fall Injury: Instruct family/caregiver on patient safety  11/16/2022 2140 by Susan Ramirez RN  Outcome: Progressing     Problem: Pain  Goal: Verbalizes/displays adequate comfort level or baseline comfort level  11/17/2022 1004 by Farshad Mireles RN  Outcome: Not Progressing  Flowsheets (Taken 11/16/2022 1310 by Dolly Quinones RN)  Verbalizes/displays adequate comfort level or baseline comfort level:   Encourage patient to monitor pain and request assistance   Assess pain using appropriate pain scale   Administer analgesics based on type and severity of pain and evaluate response   Implement non-pharmacological measures as appropriate and evaluate response   Notify Licensed Independent Practitioner if interventions unsuccessful or patient reports new pain  11/16/2022 2140 by Susan Ramirez RN  Outcome: Progressing     Problem: Neurosensory - Adult  Goal: Achieves stable or improved neurological status  11/17/2022 1004 by Farshad Mireles RN  Outcome: Not Progressing  Flowsheets (Taken 11/17/2022 0950)  Achieves stable or improved neurological status: Assess for and report changes in neurological status  11/16/2022 2140 by Susan Ramirez RN  Outcome: Progressing  [address] (Taken 11/16/2022 2000)  Achieves stable or improved neurological status: Assess for and report changes in neurological status     Problem: Respiratory - Adult  Goal: Achieves optimal ventilation and oxygenation  11/17/2022 1004 by Farshad Mireles RN  Outcome: Not Progressing  Flowsheets (Taken 11/16/2022 2000 by Susan Ramirez RN)  Achieves optimal ventilation and oxygenation:   Assess for changes in respiratory status   Assess and instruct to report shortness of breath or any respiratory difficulty  11/16/2022 2140 by Susan Ramirez RN  Outcome: Progressing  Flowsheets (Taken 11/16/2022 2000)  Achieves optimal ventilation and oxygenation:   Assess for changes in respiratory status   Assess and instruct to report shortness of breath or any respiratory difficulty     Problem: Skin/Tissue Integrity - Adult  Goal: Skin integrity remains intact  11/17/2022 1004 by Madeleine Oakley RN  Outcome: Not Progressing  Flowsheets (Taken 11/17/2022 0950)  Skin Integrity Remains Intact: Monitor for areas of redness and/or skin breakdown  11/16/2022 2140 by Melanie Mckenzie RN  Outcome: Progressing  Flowsheets  Taken 11/16/2022 2137  Skin Integrity Remains Intact: Monitor for areas of redness and/or skin breakdown  Taken 11/16/2022 2000  Skin Integrity Remains Intact: Monitor for areas of redness and/or skin breakdown     Problem: Musculoskeletal - Adult  Goal: Return mobility to safest level of function  11/17/2022 1004 by Madeleine Oakley RN  Outcome: Not Progressing  Flowsheets (Taken 11/16/2022 2000 by Melanie Mckenzie RN)  Return Mobility to Safest Level of Function:   Assess patient stability and activity tolerance for standing, transferring and ambulating with or without assistive devices   Ensure adequate protection for wounds/incisions during mobilization   Instruct patient/family in ordered activity level  11/16/2022 2140 by Melanie Mckenzie RN  Outcome: Progressing  Flowsheets (Taken 11/16/2022 2000)  Return Mobility to Safest Level of Function:   Assess patient stability and activity tolerance for standing, transferring and ambulating with or without assistive devices   Ensure adequate protection for wounds/incisions during mobilization   Instruct patient/family in ordered activity level     Problem: Gastrointestinal - Adult  Goal: Minimal or absence of nausea and vomiting  11/17/2022 1004 by Madeleine Oakley RN  Outcome: Not Progressing  Flowsheets (Taken 11/17/2022 0950)  Minimal or absence of nausea and vomiting: Provide nonpharmacologic comfort measures as appropriate  11/16/2022 2140 by Melanie Mckenzie RN  Outcome: Progressing  Flowsheets (Taken 11/16/2022 2000)  Minimal or absence of nausea and vomiting:   Provide nonpharmacologic comfort 11/16/2022 2137)  Absence of Physical Injury: Implement safety measures based on patient assessment     Problem: Discharge Planning  Goal: Discharge to home or other facility with appropriate resources  11/17/2022 1004 by Lydia Whalen RN  Outcome: Not Progressing  Flowsheets (Taken 11/17/2022 0950)  Discharge to home or other facility with appropriate resources:   Identify barriers to discharge with patient and caregiver   Arrange for needed discharge resources and transportation as appropriate   Identify discharge learning needs (meds, wound care, etc)   Arrange for interpreters to assist at discharge as needed   Refer to discharge planning if patient needs post-hospital services based on physician order or complex needs related to functional status, cognitive ability or social support system  11/16/2022 2140 by Anya Carrasco RN  Outcome: Progressing  Flowsheets (Taken 11/16/2022 2000)  Discharge to home or other facility with appropriate resources:   Identify barriers to discharge with patient and caregiver   Arrange for needed discharge resources and transportation as appropriate   Identify discharge learning needs (meds, wound care, etc)   Refer to discharge planning if patient needs post-hospital services based on physician order or complex needs related to functional status, cognitive ability or social support system     Problem: Skin/Tissue Integrity  Goal: Absence of new skin breakdown  Description: 1. Monitor for areas of redness and/or skin breakdown  2. Assess vascular access sites hourly  3. Every 4-6 hours minimum:  Change oxygen saturation probe site  4. Every 4-6 hours:  If on nasal continuous positive airway pressure, respiratory therapy assess nares and determine need for appliance change or resting period.   11/17/2022 1004 by Lydia Whalen RN  Outcome: Not Progressing  11/16/2022 2140 by Anya Carrasco RN  Outcome: Progressing     Problem: Safety - Adult  Goal: Free from fall injury  11/17/2022 1004 by Jena Campos RN  Outcome: Not Progressing  Flowsheets (Taken 11/16/2022 1604 by Argyle Dancer, RN)  Free From Fall Injury: Instruct family/caregiver on patient safety  11/16/2022 2140 by Osvaldo Johnson RN  Outcome: Progressing     Problem: Pain  Goal: Verbalizes/displays adequate comfort level or baseline comfort level  11/17/2022 1004 by Jena Campos RN  Outcome: Not Progressing  Flowsheets (Taken 11/16/2022 1310 by Argyle Dancer, RN)  Verbalizes/displays adequate comfort level or baseline comfort level:   Encourage patient to monitor pain and request assistance   Assess pain using appropriate pain scale   Administer analgesics based on type and severity of pain and evaluate response   Implement non-pharmacological measures as appropriate and evaluate response   Notify Licensed Independent Practitioner if interventions unsuccessful or patient reports new pain  11/16/2022 2140 by Osvaldo Johnson RN  Outcome: Progressing     Problem: Neurosensory - Adult  Goal: Achieves stable or improved neurological status  11/17/2022 1004 by Jena Campos RN  Outcome: Not Progressing  Flowsheets (Taken 11/17/2022 0950)  Achieves stable or improved neurological status: Assess for and report changes in neurological status  11/16/2022 2140 by Osvaldo Johnson RN  Outcome: Progressing  [address] (Taken 11/16/2022 2000)  Achieves stable or improved neurological status: Assess for and report changes in neurological status     Problem: Respiratory - Adult  Goal: Achieves optimal ventilation and oxygenation  11/17/2022 1004 by Jena Campos RN  Outcome: Not Progressing  Flowsheets (Taken 11/16/2022 2000 by Osvaldo Johnson RN)  Achieves optimal ventilation and oxygenation:   Assess for changes in respiratory status   Assess and instruct to report shortness of breath or any respiratory difficulty  11/16/2022 2140 by Osvaldo Johnson RN  Outcome: Progressing  Flowsheets (Taken 11/16/2022 2000)  Achieves optimal ventilation and oxygenation:   Assess for changes in respiratory status   Assess and instruct to report shortness of breath or any respiratory difficulty     Problem: Skin/Tissue Integrity - Adult  Goal: Skin integrity remains intact  11/17/2022 1004 by Severiano Blandon RN  Outcome: Not Progressing  Flowsheets (Taken 11/17/2022 0950)  Skin Integrity Remains Intact: Monitor for areas of redness and/or skin breakdown  11/16/2022 2140 by Lula Otriz RN  Outcome: Progressing  Flowsheets  Taken 11/16/2022 2137  Skin Integrity Remains Intact: Monitor for areas of redness and/or skin breakdown  Taken 11/16/2022 2000  Skin Integrity Remains Intact: Monitor for areas of redness and/or skin breakdown     Problem: Musculoskeletal - Adult  Goal: Return mobility to safest level of function  11/17/2022 1004 by Severiano Blandon RN  Outcome: Not Progressing  Flowsheets (Taken 11/16/2022 2000 by Lula Ortiz RN)  Return Mobility to Safest Level of Function:   Assess patient stability and activity tolerance for standing, transferring and ambulating with or without assistive devices   Ensure adequate protection for wounds/incisions during mobilization   Instruct patient/family in ordered activity level  11/16/2022 2140 by Lula Ortiz RN  Outcome: Progressing  Flowsheets (Taken 11/16/2022 2000)  Return Mobility to Safest Level of Function:   Assess patient stability and activity tolerance for standing, transferring and ambulating with or without assistive devices   Ensure adequate protection for wounds/incisions during mobilization   Instruct patient/family in ordered activity level     Problem: Gastrointestinal - Adult  Goal: Minimal or absence of nausea and vomiting  11/17/2022 1004 by Severiano Blandon RN  Outcome: Not Progressing  Flowsheets (Taken 11/17/2022 0950)  Minimal or absence of nausea and vomiting: Provide nonpharmacologic comfort measures as appropriate  11/16/2022 2140 by Lula Ortiz RN  Outcome: Progressing  Flowsheets (Taken 11/16/2022 2000)  Minimal or absence of nausea and vomiting:   Provide nonpharmacologic comfort measures as appropriate   Advance diet as tolerated, if ordered     Problem: Genitourinary - Adult  Goal: Absence of urinary retention  11/17/2022 1004 by Franklin Damico RN  Outcome: Not Progressing  Flowsheets (Taken 11/17/2022 0950)  Absence of urinary retention: Assess patients ability to void and empty bladder  11/16/2022 2140 by Heath Bumpers, RN  Outcome: Progressing  Flowsheets (Taken 11/16/2022 2000)  Absence of urinary retention:   Assess patients ability to void and empty bladder   Monitor intake/output and perform bladder scan as needed  Goal: Urinary catheter remains patent  11/17/2022 1004 by Franklin Damico RN  Outcome: Not Progressing  Flowsheets (Taken 11/17/2022 0950)  Urinary catheter remains patent: Assess patency of urinary catheter  11/16/2022 2140 by Heath Bumpers, RN  Outcome: Progressing  Flowsheets (Taken 11/16/2022 2000)  Urinary catheter remains patent: Assess patency of urinary catheter     Problem: Infection - Adult  Goal: Absence of infection at discharge  11/17/2022 1004 by Franklin Damico RN  Outcome: Not Progressing  Flowsheets (Taken 11/17/2022 0950)  Absence of infection at discharge: Assess and monitor for signs and symptoms of infection  11/16/2022 2140 by Heath Bumpers, RN  Outcome: Progressing  Flowsheets (Taken 11/16/2022 2000)  Absence of infection at discharge:   Assess and monitor for signs and symptoms of infection   Monitor lab/diagnostic results   Instruct and encourage patient and family to use good hand hygiene technique   Identify and instruct in appropriate isolation precautions for identified infection/condition     Problem: ABCDS Injury Assessment  Goal: Absence of physical injury  11/17/2022 1004 by Franklin Damico RN  Outcome: Not Progressing  Flowsheets (Taken 11/16/2022 2137 by Heath Bumpers, RN)  Absence of Physical Injury: Implement safety measures based on patient assessment  11/16/2022 2140 by Kuldeep Corrales, RN  Outcome: Progressing  Flowsheets (Taken 11/16/2022 2137)  Absence of Physical Injury: Implement safety measures based on patient assessment

## 2022-11-17 NOTE — FLOWSHEET NOTE
Patient once again refusing dressing changes to abd wounds at this time;wounds are very strong & malodorous.  Patient mumbling and hard to understand;denies any other needs at this time

## 2022-11-17 NOTE — PROGRESS NOTES
Patient has refused to have his dressing changed. He also refused lovenox injection. Dressing is dirty. Will continue to try.

## 2022-11-17 NOTE — PROGRESS NOTES
[facility]   Urology Progress Note            Subjective: follow-up urinary retention bladder stones    Patient Vitals for the past 24 hrs:   BP Temp Temp src Pulse Resp SpO2 Weight   11/17/22 0442 105/69 98.2 °F (36.8 °C) Oral (!) 108 16 97 % --   11/17/22 0051 -- -- -- -- -- -- 223 lb 6.4 oz (101.3 kg)   11/16/22 2006 113/78 99 °F (37.2 °C) Oral 100 16 97 % --   11/16/22 1310 104/77 98.4 °F (36.9 °C) Oral 96 15 92 % --   11/16/22 0813 108/62 98.6 °F (37 °C) Oral 92 16 96 % --       Intake/Output Summary (Last 24 hours) at 11/17/2022 0708  Last data filed at 11/17/2022 5216  Gross per 24 hour   Intake 907.4 ml   Output 2025 ml   Net -1117.6 ml       Recent Labs     11/15/22  0719   WBC 14.2*   HGB 10.2*   HCT 32.5*   MCV 91.5   *     Recent Labs     11/15/22  0719      K 4.2      CO2 26   BUN 5*   CREATININE 0.59*       Recent Labs     11/15/22  1745   COLORU STRAW   PHUR 5.5   WBCUA 0 TO 2   RBCUA 50    BACTERIA RARE*   SPECGRAV 1.010   LEUKOCYTESUR NEGATIVE   UROBILINOGEN Normal   BILIRUBINUR NEGATIVE       Additional Lab/culture results:    Physical Exam: patient alert not be acute distress urologically stable status post catheter exchange and removal of bladder stones    Interval Imaging Findings:    Impression:    Patient Active Problem List   Diagnosis    Hidradenitis suppurativa    Acute psychosis ([address]    Abscess of axilla    Abscess of skin of abdomen    Blood per rectum    Trochanteric bursitis    Adrenal mass, right (HCC)    Hidradenitis axillaris    Schizoaffective disorder, bipolar type ([address]    Encounter for ostomy nurse consultation    Paranoid schizophrenia, chronic condition with acute exacerbation ([address]    Iron deficiency anemia    GERD (gastroesophageal reflux disease)    Tobacco abuse    Non-healing left groin open wound, subsequent encounter    Right groin wound, subsequent encounter    Open wound of left ear    Abscess    Sepsis ([address]    Constipation    Acute exacerbation of chronic paranoid schizophrenia (HCC)    Schizophrenia (HCC)    Gram-positive bacteremia    Cellulitis of right thigh    Bandemia    Scrotal abscess    Immunosuppressed due to chemotherapy ([address]    Mild protein-calorie malnutrition ([address]    Hypovitaminosis D    Gluteal abscess    Noncompliance by declining service of wounds care at nursing facility       Plan: discussed discharge planning with the nursing staff also discussed status with the patient's mother who is the power of     Office follow-up will be scheduled in 3-4 weeks  if the patient is unable to see their primary urologist    Dillon Cavanaugh MD  7:08 AM 11/17/2022

## 2022-11-17 NOTE — PROGRESS NOTES
[facility]  Office: [address], DO, Rickie Colon, DO, Ananya Gonzalez, DO, Natalia Adams Blood, DO, Chandan Blount MD, Crys Raman MD, Fredo De La Garza MD, Marcell Helm MD,  Ligia Walsh MD, Anjel Verma MD, Pauline Aguilar, DO, Rodolfo Lomas MD,  Isabel Watson MD, Rae Hanson MD, Linda Chew, DO, Bhumika Casey MD, Gill Gross MD, Chalino Cruz, DO, Dior Hanson MD, Jon Gillette MD, Carlos Lamb MD, Josué Fan MD, Dmitri Castle, DO, Angela De Los Santos MD, Yasir Morris MD, Avtar Worrell [facility],  Carson Desai, CNP, Craig Sever, CNP, Pancho Weeks, CNP,  Karen Downs, [facility], Catracho Koo, CNP, Rasheed Saucedo, CNP, Denys Ghotra, CNP, Shirley Calhoun, CNP, Leonid Mayorga, CNP, Mylene Garay, PA-C, Ajay Alexander, CNS, Oumar Bentley, DNP, La English, CNP, Julia Acosta, CNP, Sarah Beth AlmonteThe [facility] Institute [facility]      Daily Progress Note     Admit Date: 11/11/2022  Bed/Room No.  2007/2007-02  Admitting Physician : Fredo De La Garza MD  Code Status :[address] [facility] Day:  LOS: 5 days   Chief Complaint:     Chief Complaint   Patient presents with    Wound Infection     Principal Problem:    Hidradenitis suppurativa  Active Problems:    Sepsis ([facility] [address]    Schizophrenia ([facility] [address]    Mild protein-calorie malnutrition ([facility] [address]    Hypovitaminosis D    Noncompliance by declining service of wounds care at nursing facility    Tobacco abuse  Resolved Problems:    Hydradenitis    Subjective : Interval History/Significant events :  11/17/22    Patient has been uncooperative for dressing changes. Patient luna afebrile. He continues to have greenish discharge from leg wounds. Patient has been eating and drinking well. He denies any pain, fever, chills. Patient is breathing on room air. Vitals - Stable afebrile  Labs -no new labs. Previous labs reviewed. Wound cultures reviewed which showed Proteus mirabilis light growth.   Nursing notes , Consults notes reviewed. Overnight events and updates discussed with Nursing staff . Background History:         Kyler Valdez is 38 y.o. male  Who was admitted to the hospital on 11/11/2022 for treatment of Hidradenitis suppurativa. Patient presented to emergency room with nonhealing wounds with a malodor, drainage and coccygeal wound and gluteal folds with maggot infestation. He has underlying history of schizoaffective disorder, polysubstance abuse, suppurative hidradenitis. Patient had been on Humira before however has been discontinued since he was incarcerated. Patient is allergic to vancomycin. His previous wound cultures were positive for pansensitive Proteus mirabilis and E. coli resistant to Bactrim. He was treated with IV cefepime and clindamycin. Patient has history of neurogenic bladder. He also has decubitus ulcer and sacrococcygeal area. He has chronic indwelling Kowalski catheter and underwent cystoscopy with Kowalski catheter exchange by urology. PMH:  Past Medical History:   Diagnosis Date    Bipolar 1 disorder ([facility] [address]     GERD (gastroesophageal reflux disease) 8/13/2016    Hidradenitis suppurativa     Polysubstance abuse ([facility] [address]     Schizoaffective disorder (HCC)       Allergies: Allergies   Allergen Reactions    Depakene [Valproic Acid]     Restoril [Temazepam]     Risperidone And Related      Seizure      Thorazine [Chlorpromazine]     Vancomycin Other (See Comments) and Rash      Medications :  doxycycline monohydrate, 100 mg, Oral, BID  cefdinir, 300 mg, Oral, 2 times per day  sodium chloride, 80 mL, IntraVENous, Once  carBAMazepine, 200 mg, Oral, BID  haloperidol, 1 mg, Oral, BID  sodium chloride flush, 5-40 mL, IntraVENous, 2 times per day  enoxaparin, 30 mg, SubCUTAneous, BID      Review of Systems   Review of Systems   Constitutional:  Negative for activity change, appetite change, fatigue, fever and unexpected weight change.    HENT:  Negative for congestion, nosebleeds, rhinorrhea, sinus pressure, sneezing and voice change. Respiratory:  Negative for cough, choking, chest tightness, shortness of breath and wheezing. Cardiovascular:  Negative for chest pain, palpitations and leg swelling. Gastrointestinal:  Negative for abdominal pain, constipation, diarrhea, nausea and vomiting. Genitourinary:  Negative for difficulty urinating, dysuria, frequency, penile discharge and testicular pain. Musculoskeletal:  Negative for back pain. Skin:  Negative for rash. Neurological:  Positive for weakness (bilateral lower ext weakness). Negative for dizziness, light-headedness and headaches. Hematological:  Does not bruise/bleed easily. Psychiatric/Behavioral:  Negative for agitation, behavioral problems, confusion, self-injury, sleep disturbance and suicidal ideas. Objective :      Current Vitals : Temp: 98.1 °F (36.7 °C),  Heart Rate: 95, Resp: 18, BP: (!) 93/54, SpO2: 97 %  Last 24 Hrs Vitals   Patient Vitals for the past 24 hrs:   BP Temp Temp src Pulse Resp SpO2 Weight   11/17/22 0812 (!) 93/54 98.1 °F (36.7 °C) Oral 95 18 97 % --   11/17/22 0442 105/69 98.2 °F (36.8 °C) Oral (!) 108 16 97 % --   11/17/22 0051 -- -- -- -- -- -- 223 lb 6.4 oz (101.3 kg)   11/16/22 2006 113/78 99 °F (37.2 °C) Oral 100 16 97 % --   11/16/22 1310 104/77 98.4 °F (36.9 °C) Oral 96 15 92 % --   11/16/22 0813 108/62 98.6 °F (37 °C) Oral 92 16 96 % --       Intake / output   11/15 1901 - 11/17 0700  In: 2282.9 [P.O.:1080; I.V.:1024.1]  Out: 4198 [Urine:2430]  Physical Exam:  Physical Exam  Vitals and nursing note reviewed. Constitutional:       General: He is not in acute distress. Appearance: He is not diaphoretic. HENT:      Head: Normocephalic and atraumatic. Nose:      Right Sinus: No maxillary sinus tenderness or frontal sinus tenderness. Left Sinus: No maxillary sinus tenderness or frontal sinus tenderness. Mouth/Throat:      Pharynx: No oropharyngeal exudate.    Eyes: General: No scleral icterus. Conjunctiva/sclera: Conjunctivae normal.      Pupils: Pupils are equal, round, and reactive to light. Neck:      Thyroid: No thyromegaly. Vascular: No JVD. Cardiovascular:      Rate and Rhythm: Normal rate and regular rhythm. Pulses:           Dorsalis pedis pulses are 2+ on the right side and 2+ on the left side. Heart sounds: Normal heart sounds. No murmur heard. Pulmonary:      Effort: Pulmonary effort is normal.      Breath sounds: Normal breath sounds. No wheezing or rales. Chest:      Comments: Bilateral axillary scar likely from prior Hidradenitis surgery   Abdominal:      Palpations: Abdomen is soft. There is no mass. Tenderness: There is no abdominal tenderness. Musculoskeletal:      Cervical back: Full passive range of motion without pain and neck supple. Lymphadenopathy:      Head:      Right side of head: No submandibular adenopathy. Left side of head: No submandibular adenopathy. Cervical: No cervical adenopathy. Skin:     General: Skin is warm. Neurological:      Mental Status: He is alert and oriented to person, place, and time. Motor: No tremor. Comments: Bilateral lower extremity weakness. Hydradenitis perineal with extensive purulent drainage dressing in place. Psychiatric:         Behavior: Behavior is cooperative. Laboratory findings:    Recent Labs     11/15/22  0719   WBC 14.2*   HGB 10.2*   HCT 32.5*   *       Recent Labs     11/15/22  0719      K 4.2      CO2 26   GLUCOSE 91   BUN 5*   CREATININE 0.59*   CALCIUM 9.1       No results for input(s): PROT, LABALBU, LABA1C, O5UAIGK, L2QJZRN, FT4, TSH, AST, ALT, LDH, GGT, ALKPHOS, BILITOT, BILIDIR, AMMONIA, AMYLASE, LIPASE, LACTATE, CHOL, HDL, LDLCHOLESTEROL, CHOLHDLRATIO, TRIG, VLDL, BNP, TROPONINI, CKTOTAL, CKMB, CKMBINDEX, RF, CHASE in the last 72 hours.        Specific Gravity, UA   Date Value Ref Range Status   11/15/2022 1.010 1.005 - 1.030 Final     Protein, UA   Date Value Ref Range Status   11/15/2022 NEGATIVE NEGATIVE Final     RBC, UA   Date Value Ref Range Status   11/15/2022 50  0 - 2 /HPF Final     Bacteria, UA   Date Value Ref Range Status   11/15/2022 RARE (A) None Final     Nitrite, Urine   Date Value Ref Range Status   11/15/2022 NEGATIVE NEGATIVE Final     WBC, UA   Date Value Ref Range Status   11/15/2022 0 TO 2 0 - 5 /HPF Final     Leukocyte Esterase, Urine   Date Value Ref Range Status   11/15/2022 NEGATIVE NEGATIVE Final       Imaging / Clinical Data :-   CT ABDOMEN PELVIS W IV CONTRAST Additional Contrast? None    Result Date: 11/12/2022  Redemonstration of atrophy/hypoplastic nonfunctional right kidney which has a few cysts with calcified wall. The largest cyst with calcified wall measures 4.3 cm, similar to previous CT scan. Normal left kidney of prominent size without any abnormality. Evidence of moderate to large amount of mildly hyperdense material in the gallbladder suggestive of significant sludge in gallbladder without obvious radiopaque stones. Normal appendix visualized. Nonspecific small to moderate amount of gas scattered in small bowel with multiple fluid levels. Acute enteritis not excluded. Thickened redundant subcutaneous fatty tissue suspended from the lower abdominal wall and pelvic wall without any significant surrounding inflammatory change. Inferior to the perineum evidence of irregular thickened tissues with surrounding inflammatory changes involving the posteromedial aspect of the gluteal folds mainly on the right side and extended to the posteromedial aspect of the proximal right upper thigh. These soft tissue abnormalities are  limited to the subcutaneous tissues without extending to muscle. No evidence of any inflammatory changes involving the muscles of abdominal walls, pelvic walls, bilateral gluteal muscles, and the visualized muscles of the bilateral proximal thighs.

## 2022-11-17 NOTE — PLAN OF CARE
Problem: Discharge Planning  Goal: Discharge to home or other facility with appropriate resources  11/16/2022 2140 by Kuldeep Corrales RN  Outcome: Progressing  Flowsheets (Taken 11/16/2022 2000)  Discharge to home or other facility with appropriate resources:   Identify barriers to discharge with patient and caregiver   Arrange for needed discharge resources and transportation as appropriate   Identify discharge learning needs (meds, wound care, etc)   Refer to discharge planning if patient needs post-hospital services based on physician order or complex needs related to functional status, cognitive ability or social support system     Problem: Skin/Tissue Integrity  Goal: Absence of new skin breakdown  Description: 1. Monitor for areas of redness and/or skin breakdown  2. Assess vascular access sites hourly  3. Every 4-6 hours minimum:  Change oxygen saturation probe site  4. Every 4-6 hours:  If on nasal continuous positive airway pressure, respiratory therapy assess nares and determine need for appliance change or resting period.   11/16/2022 2140 by Kuldeep Corrales RN  Outcome: Progressing       Problem: Safety - Adult  Goal: Free from fall injury  11/16/2022 2140 by Kuldeep Corrales RN  Outcome: Progressing     Problem: Neurosensory - Adult  Goal: Achieves stable or improved neurological status  11/16/2022 2140 by Kuldeep Corrales RN  Outcome: Progressing  Flowsheets (Taken 11/16/2022 2000)  Achieves stable or improved neurological status: Assess for and report changes in neurological status     Problem: Respiratory - Adult  Goal: Achieves optimal ventilation and oxygenation  11/16/2022 2140 by Kuldeep Corrales RN  Outcome: Progressing  Flowsheets (Taken 11/16/2022 2000)  Achieves optimal ventilation and oxygenation:   Assess for changes in respiratory status   Assess and instruct to report shortness of breath or any respiratory difficulty     Problem: Skin/Tissue Integrity - Adult  Goal: Skin integrity remains intact  11/16/2022 2140 by Mariana Vizcaino RN  Outcome: Progressing  Flowsheets  Taken 11/16/2022 2137  Skin Integrity Remains Intact: Monitor for areas of redness and/or skin breakdown    Problem: Musculoskeletal - Adult  Goal: Return mobility to safest level of function  11/16/2022 2140 by Mariana Vizcaino RN  Outcome: Progressing  Flowsheets (Taken 11/16/2022 2000)  Return Mobility to Safest Level of Function:   Assess patient stability and activity tolerance for standing, transferring and ambulating with or without assistive devices   Ensure adequate protection for wounds/incisions during mobilization   Instruct patient/family in ordered activity level     Problem: Gastrointestinal - Adult  Goal: Minimal or absence of nausea and vomiting  11/16/2022 2140 by Mariana Vizcaino RN  Outcome: Progressing  Flowsheets (Taken 11/16/2022 2000)  Minimal or absence of nausea and vomiting:   Provide nonpharmacologic comfort measures as appropriate   Advance diet as tolerated, if ordered     Problem: Genitourinary - Adult  Goal: Urinary catheter remains patent  11/16/2022 2140 by Mariana Vizcaino RN  Outcome: Progressing  Flowsheets (Taken 11/16/2022 2000)  Urinary catheter remains patent: Assess patency of urinary catheter     Problem: ABCDS Injury Assessment  Goal: Absence of physical injury  11/16/2022 2140 by Mariana Vizcaino RN  Outcome: Progressing  Flowsheets (Taken 11/16/2022 2137)  Absence of Physical Injury: Implement safety measures based on patient assessment

## 2022-11-17 NOTE — FLOWSHEET NOTE
Patient refused dressing change/wound care this evening. Patient did allow nursing to change bed pads & gown and turn to assess his buttock wound which had foul smelling drainage; new foam dressing applied to buttock wound.

## 2022-11-18 NOTE — DISCHARGE SUMMARY
[facility]  Office: [phone]  Percy Randall DO, Louis Chauhan MD, Ade Oneill MD, Laci Curtis MD, Heather Mcnamara MD, Jayson Brewer MD, Cathy Hagan MD, Papi Calhoun MD, Diann Gross MD, Yann Sage MD, Ty Fabian DO, Ed Sebastian MD, Raymond Hoyos MD, Farhan Meredith DO, Lori Hernandez MD,  Justin Toribio DO, Irais Jc MD, Ayla Pike MD, [facility], [facility], Jeni Turnerer CNP, Clay Mcgregor [facility], [facility], Rolan Belle CNP, All Crockett CNP, Enedina Jhaveri CNP, Perley Gilford CNP, Jessi Nye PA-C, Leilani Villaseñor CNP, Layla Officer [facility], [facility], Raisa Solorzano CNP, Hung Lazcano CNP, Perez Paniagua [facility]      Discharge Summary     Patient ID: Selma Romero  :  1980   MRN: [id number]     ACCOUNT:  [de-identified]   Patient Location :   Patient's PCP: No primary care provider on file. Admit Date: 2022   Discharge Date: 2022     Length of Stay: 5  Code Status:  Prior  Admitting Physician: No admitting provider for patient encounter.   Discharge Physician: Laci Curtis MD     Active Discharge Diagnosis :     Primary Problem  Hidradenitis suppurativa      Hospital Problems  Active Hospital Problems    Diagnosis Date Noted    Noncompliance by declining service of wounds care at nursing facility [Z91.199] 2022     Priority: Medium    Mild protein-calorie malnutrition ([facility] [address] [E44.1] 10/12/2022     Priority: Medium    Hypovitaminosis D [E55.9] 10/12/2022     Priority: Medium    Schizophrenia ([facility] [address] [F20.9] 10/10/2022     Priority: Medium    Sepsis ([facility] [address] [A41.9] 2022     Priority: Medium    Tobacco abuse [Z72.0] 2020    Paranoid schizophrenia, chronic condition with acute exacerbation [facility] [F20.0] 2016    Hidradenitis suppurativa [L73.2] 2012       Admission Condition:  fair     Discharged Condition: good    Hospital Stay:     Hospital Course:  Jono Bradshaw is a 38 y.o. male who was admitted for the management of   Hidradenitis suppurativa , presented to ER with Wound Infection  Patient presented to emergency room with nonhealing wounds with a malodor, drainage and coccygeal wound and gluteal folds with maggot infestation. He has underlying history of schizoaffective disorder, polysubstance abuse, suppurative hidradenitis. Patient had been on Humira before however has been discontinued since he was incarcerated. Patient is allergic to vancomycin. His previous wound cultures were positive for pansensitive Proteus mirabilis and E. coli resistant to Bactrim. He was treated with IV cefepime and clindamycin. Patient has history of neurogenic bladder. He also has decubitus ulcer and sacrococcygeal area. He has chronic indwelling Kowalski catheter and underwent cystoscopy with Kowalski catheter exchange by urology. Significant therapeutic interventions:   Sepsis secondary to wound infection and hidradenitis suppurativa -treated with cefepime and clindamycin. Refused wound care in hospital, discharged on oral Cipro and Doxy. Paranoid Schizophrenia/bipolar disorder -patient on Tegretol, Haldol. Neurogenic bladder -indwelling Kowalski catheter. Kowalski catheter exchanged by urology. Functional paraplegia -PT OT. Stage I sacral decubitus ulcer -present on admission. Aggressive physical therapy.     Significant Diagnostic Studies:   Labs / Micro:/Radiology  Recent Labs     11/15/22  0719 11/14/22  0614 11/13/22  0648   WBC 14.2* 12.8* 13.0*   HGB 10.2* 9.2* 9.5*   HCT 32.5* 30.4* 31.3*   MCV 91.5 92.7 90.7   * 438 439     Labs Renal Latest Ref Rng & Units 11/15/2022 11/14/2022 11/13/2022 11/12/2022 10/23/2022   BUN 6 - 20 mg/dL 5(L) 5(L) 4(L) 7 11   Cr 0.70 - 1.20 mg/dL 0.59(L) 0.66(L) 0.63(L) 0.63(L) 0.60(L)   K 3.7 - 5.3 mmol/L 4.2 3.9 3.7 4.0 4.3   Na 135 - 144 mmol/L 137 135 136 133(L) 134(L)     Lab Results   Component Value Date    ALT 70 (H) 11/12/2022    AST 28 11/12/2022    ALKPHOS 133 (H) 11/12/2022    BILITOT 0.2 (L) 11/12/2022     Lab Results   Component Value Date    TSH 0.75 03/03/2014     No results found for: HAV, HEPAIGM, HEPBIGM, HEPBCAB, HBEAG, HEPCAB  Lab Results   Component Value Date/Time    COLORU STRAW 11/15/2022 05:45 PM    NITRU NEGATIVE 11/15/2022 05:45 PM    GLUCOSEU NEGATIVE 11/15/2022 05:45 PM    KETUA NEGATIVE 11/15/2022 05:45 PM    UROBILINOGEN Normal 11/15/2022 05:45 PM    BILIRUBINUR NEGATIVE 11/15/2022 05:45 PM     Lab Results   Component Value Date    LABA1C 5.6 03/03/2014     Lab Results   Component Value Date     03/03/2014     Lab Results   Component Value Date    INR 1.1 10/10/2022    INR 1.0 08/12/2022    INR 0.9 08/10/2022    PROTIME 11.4 10/10/2022    PROTIME 10.3 08/12/2022    PROTIME 10.2 08/10/2022       CT ABDOMEN PELVIS W IV CONTRAST Additional Contrast? None    Result Date: 11/12/2022  Redemonstration of atrophy/hypoplastic nonfunctional right kidney which has a few cysts with calcified wall. The largest cyst with calcified wall measures 4.3 cm, similar to previous CT scan. Normal left kidney of prominent size without any abnormality. Evidence of moderate to large amount of mildly hyperdense material in the gallbladder suggestive of significant sludge in gallbladder without obvious radiopaque stones. Normal appendix visualized. Nonspecific small to moderate amount of gas scattered in small bowel with multiple fluid levels. Acute enteritis not excluded. Thickened redundant subcutaneous fatty tissue suspended from the lower abdominal wall and pelvic wall without any significant surrounding inflammatory change.  Inferior to the perineum evidence of irregular thickened tissues with surrounding inflammatory changes involving the posteromedial aspect of the gluteal folds mainly on the right side and extended to the posteromedial aspect of the proximal right upper thigh.  These soft tissue abnormalities are  limited to the subcutaneous tissues without extending to muscle. No evidence of any inflammatory changes involving the muscles of abdominal walls, pelvic walls, bilateral gluteal muscles, and the visualized muscles of the bilateral proximal thighs. Consultations:    Consults:     Final Specialist Recommendations/Findings:   IP CONSULT TO INTERNAL MEDICINE  IP CONSULT TO GENERAL SURGERY  IP CONSULT TO SPIRITUAL SERVICES  IP CONSULT TO INFECTIOUS DISEASES  IP CONSULT TO UROLOGY      The patient was seen and examined on day of discharge and this discharge summary is in conjunction with any daily progress note from day of discharge. Discharge plan:     Disposition: [facility]     Physician Follow Up: follow up with Dermatology, ID         Requiring Further Evaluation/Follow Up POST HOSPITALIZATION/Incidental Findings:     Diet: regular diet    Activity: As tolerated. Instructions to Patient: Follow up with ID and Dermatology. Discharge Medications:      Medication List        START taking these medications      cefdinir 300 MG capsule  Commonly known as: OMNICEF  Take 1 capsule by mouth every 12 hours for 10 days     doxycycline monohydrate 100 MG capsule  Commonly known as: MONODOX  Take 1 capsule by mouth 2 times daily for 11 doses            CONTINUE taking these medications      ascorbic acid 500 MG tablet  Commonly known as: VITAMIN C  Take 1 tablet by mouth 2 times daily     carBAMazepine 200 MG tablet  Commonly known as: TEGRETOL  Take 1 tablet by mouth 2 times daily     haloperidol 1 MG tablet  Commonly known as: HALDOL  Take 1 tablet by mouth 2 times daily     mineral oil-hydrophilic petrolatum ointment  Apply topically as needed.      Vitamin D (Ergocalciferol) 40306 units Caps  Take 50,000 Units by mouth once a week     zinc sulfate 220 (50 Zn) MG capsule  Commonly known as: ZINCATE  Take 1 capsule by mouth daily            STOP taking these medications      adalimumab 40 MG/0.8ML injection  Commonly known as: HUMIRA     benztropine 1 MG tablet  Commonly known as: COGENTIN     lithium 450 MG extended release tablet  Commonly known as: ESKALITH     therapeutic multivitamin-minerals tablet               Where to Get Your Medications        These medications were [facility] to [facility] [address]  [address]      Phone: [phone]   cefdinir 300 MG capsule  doxycycline monohydrate 100 MG capsule         Time Spent on discharge is  33 mins in patient examination, evaluation, counseling as well as medication reconciliation, prescriptions for required medications, discharge plan and follow up. Electronically signed by   Earl Anand MD  11/18/2022        Thank you Dr. Bob Srinivasan primary care provider on file. for the opportunity to be involved in this patient's care.

## 2023-04-27 ENCOUNTER — HOSPITAL ENCOUNTER (OUTPATIENT)
Age: 43
Setting detail: SPECIMEN
Discharge: HOME | End: 2023-04-27
Payer: COMMERCIAL

## 2023-04-27 LAB
INR PPP: 1.1
PROTHROMBIN TIME: 14.5 SEC (ref 11.5–14.2)

## 2023-04-27 PROCEDURE — 85610 PROTHROMBIN TIME: CPT

## 2023-04-27 PROCEDURE — P9603 ONE-WAY ALLOW PRORATED MILES: HCPCS

## 2023-04-27 PROCEDURE — 36415 COLL VENOUS BLD VENIPUNCTURE: CPT

## 2023-05-04 ENCOUNTER — HOSPITAL ENCOUNTER (OUTPATIENT)
Age: 43
Setting detail: SPECIMEN
Discharge: HOME | End: 2023-05-04
Payer: COMMERCIAL

## 2023-05-04 LAB
INR PPP: 1.1
PROTHROMBIN TIME: 14.4 SEC (ref 11.5–14.2)

## 2023-05-04 PROCEDURE — 85610 PROTHROMBIN TIME: CPT

## 2023-05-04 PROCEDURE — 36415 COLL VENOUS BLD VENIPUNCTURE: CPT

## 2023-05-04 PROCEDURE — P9603 ONE-WAY ALLOW PRORATED MILES: HCPCS

## 2023-05-05 ENCOUNTER — HOSPITAL ENCOUNTER (OUTPATIENT)
Age: 43
Setting detail: SPECIMEN
Discharge: HOME | End: 2023-05-05

## 2023-05-05 LAB
INR PPP: 1.1
PROTHROMBIN TIME: 13.6 SEC (ref 11.5–14.2)

## 2023-05-05 PROCEDURE — P9603 ONE-WAY ALLOW PRORATED MILES: HCPCS

## 2023-05-05 PROCEDURE — 85610 PROTHROMBIN TIME: CPT

## 2023-05-05 PROCEDURE — 36415 COLL VENOUS BLD VENIPUNCTURE: CPT

## 2023-05-09 ENCOUNTER — HOSPITAL ENCOUNTER (OUTPATIENT)
Age: 43
Setting detail: SPECIMEN
Discharge: HOME | End: 2023-05-09

## 2023-05-11 ENCOUNTER — HOSPITAL ENCOUNTER (OUTPATIENT)
Age: 43
Setting detail: SPECIMEN
Discharge: HOME | End: 2023-05-11

## 2023-05-18 ENCOUNTER — HOSPITAL ENCOUNTER (OUTPATIENT)
Age: 43
Setting detail: SPECIMEN
Discharge: HOME | End: 2023-05-18

## 2023-05-18 LAB
INR PPP: 1.5
PROTHROMBIN TIME: 17.6 SEC (ref 11.5–14.2)

## 2023-05-18 PROCEDURE — 36415 COLL VENOUS BLD VENIPUNCTURE: CPT

## 2023-05-18 PROCEDURE — 85610 PROTHROMBIN TIME: CPT

## 2023-05-18 PROCEDURE — P9603 ONE-WAY ALLOW PRORATED MILES: HCPCS

## 2023-05-26 ENCOUNTER — HOSPITAL ENCOUNTER (OUTPATIENT)
Age: 43
Setting detail: SPECIMEN
Discharge: HOME | End: 2023-05-26

## 2023-05-26 LAB
INR PPP: 1.6
PROTHROMBIN TIME: 18.4 SEC (ref 11.5–14.2)

## 2023-05-26 PROCEDURE — 36415 COLL VENOUS BLD VENIPUNCTURE: CPT

## 2023-05-26 PROCEDURE — P9603 ONE-WAY ALLOW PRORATED MILES: HCPCS

## 2023-05-26 PROCEDURE — 85610 PROTHROMBIN TIME: CPT

## 2023-06-01 ENCOUNTER — HOSPITAL ENCOUNTER (OUTPATIENT)
Age: 43
Setting detail: SPECIMEN
Discharge: HOME | End: 2023-06-01

## 2023-06-01 LAB
BASOPHILS # BLD: 0.03 K/UL (ref 0–0.2)
BASOPHILS NFR BLD: 0 % (ref 0–2)
CRP SERPL HS-MCNC: 165.4 MG/L (ref 0–5)
EOSINOPHIL # BLD: 0.52 K/UL (ref 0–0.44)
EOSINOPHILS RELATIVE PERCENT: 5 % (ref 1–4)
ERYTHROCYTE [DISTWIDTH] IN BLOOD BY AUTOMATED COUNT: 15.9 % (ref 11.8–14.4)
HCT VFR BLD AUTO: 28.9 % (ref 40.7–50.3)
HGB BLD-MCNC: 8.7 G/DL (ref 13–17)
IMM GRANULOCYTES # BLD AUTO: 0.04 K/UL (ref 0–0.3)
IMM GRANULOCYTES NFR BLD: 0 %
INR PPP: 2
LYMPHOCYTES # BLD: 19 % (ref 24–43)
LYMPHOCYTES NFR BLD: 1.91 K/UL (ref 1.1–3.7)
MCH RBC QN AUTO: 26.6 PG (ref 25.2–33.5)
MCHC RBC AUTO-ENTMCNC: 30.1 G/DL (ref 28.4–34.8)
MCV RBC AUTO: 88.4 FL (ref 82.6–102.9)
MONOCYTES NFR BLD: 0.85 K/UL (ref 0.1–1.2)
MONOCYTES NFR BLD: 8 % (ref 3–12)
NEUTROPHILS NFR BLD: 68 % (ref 36–65)
NEUTS SEG NFR BLD: 6.96 K/UL (ref 1.5–8.1)
NRBC AUTOMATED: 0 PER 100 WBC
PLATELET # BLD AUTO: 617 K/UL (ref 138–453)
PMV BLD AUTO: 8.1 FL (ref 8.1–13.5)
PROTHROMBIN TIME: 22.7 SEC (ref 11.5–14.2)
RBC # BLD AUTO: 3.27 M/UL (ref 4.21–5.77)
RBC # BLD: ABNORMAL 10*6/UL
WBC OTHER # BLD: 10.3 K/UL (ref 3.5–11.3)

## 2023-06-01 PROCEDURE — 85027 COMPLETE CBC AUTOMATED: CPT

## 2023-06-01 PROCEDURE — 36415 COLL VENOUS BLD VENIPUNCTURE: CPT

## 2023-06-01 PROCEDURE — P9603 ONE-WAY ALLOW PRORATED MILES: HCPCS

## 2023-06-01 PROCEDURE — 85610 PROTHROMBIN TIME: CPT

## 2023-06-01 PROCEDURE — 86140 C-REACTIVE PROTEIN: CPT

## 2023-06-08 ENCOUNTER — HOSPITAL ENCOUNTER (OUTPATIENT)
Age: 43
Setting detail: SPECIMEN
Discharge: HOME | End: 2023-06-08
Payer: COMMERCIAL

## 2023-06-08 LAB
INR PPP: 1.7
PROTHROMBIN TIME: 19.8 SEC (ref 11.5–14.2)

## 2023-06-08 PROCEDURE — P9603 ONE-WAY ALLOW PRORATED MILES: HCPCS

## 2023-06-08 PROCEDURE — 85610 PROTHROMBIN TIME: CPT

## 2023-06-08 PROCEDURE — 36415 COLL VENOUS BLD VENIPUNCTURE: CPT

## 2023-06-09 ENCOUNTER — HOSPITAL ENCOUNTER (OUTPATIENT)
Age: 43
Setting detail: SPECIMEN
Discharge: HOME | End: 2023-06-09

## 2023-06-09 LAB
ANION GAP SERPL CALCULATED.3IONS-SCNC: 10 MMOL/L (ref 9–17)
BUN SERPL-MCNC: 10 MG/DL (ref 6–20)
BUN/CREAT SERPL: 16 (ref 9–20)
CALCIUM SERPL-MCNC: 9.7 MG/DL (ref 8.6–10.4)
CHLORIDE SERPL-SCNC: 99 MMOL/L (ref 98–107)
CO2 SERPL-SCNC: 29 MMOL/L (ref 20–31)
CREAT SERPL-MCNC: 0.64 MG/DL (ref 0.7–1.2)
GFR SERPL CREATININE-BSD FRML MDRD: >60 ML/MIN/1.73M2
GLUCOSE SERPL-MCNC: 76 MG/DL (ref 70–99)
POTASSIUM SERPL-SCNC: 4.1 MMOL/L (ref 3.7–5.3)
SODIUM SERPL-SCNC: 138 MMOL/L (ref 135–144)

## 2023-06-09 PROCEDURE — P9603 ONE-WAY ALLOW PRORATED MILES: HCPCS

## 2023-06-09 PROCEDURE — 36415 COLL VENOUS BLD VENIPUNCTURE: CPT

## 2023-06-09 PROCEDURE — 80048 BASIC METABOLIC PNL TOTAL CA: CPT

## 2023-07-11 ENCOUNTER — HOSPITAL ENCOUNTER (OUTPATIENT)
Age: 43
Setting detail: SPECIMEN
Discharge: HOME | End: 2023-07-11
Payer: COMMERCIAL

## 2023-07-11 LAB
INR PPP: 1.9
PROTHROMBIN TIME: 21.5 SEC (ref 11.5–14.2)

## 2023-07-11 PROCEDURE — 36415 COLL VENOUS BLD VENIPUNCTURE: CPT

## 2023-07-11 PROCEDURE — P9603 ONE-WAY ALLOW PRORATED MILES: HCPCS

## 2023-07-11 PROCEDURE — 85610 PROTHROMBIN TIME: CPT

## 2023-07-20 ENCOUNTER — HOSPITAL ENCOUNTER (OUTPATIENT)
Age: 43
Setting detail: SPECIMEN
Discharge: HOME | End: 2023-07-20
Payer: COMMERCIAL

## 2023-07-20 LAB
INR PPP: 1.7
PROTHROMBIN TIME: 20 SEC (ref 11.5–14.2)

## 2023-07-20 PROCEDURE — P9603 ONE-WAY ALLOW PRORATED MILES: HCPCS

## 2023-07-20 PROCEDURE — 85610 PROTHROMBIN TIME: CPT

## 2023-07-20 PROCEDURE — 36415 COLL VENOUS BLD VENIPUNCTURE: CPT

## 2023-07-28 ENCOUNTER — HOSPITAL ENCOUNTER (OUTPATIENT)
Age: 43
Setting detail: SPECIMEN
Discharge: HOME | End: 2023-07-28

## 2023-07-28 LAB
25(OH)D3 SERPL-MCNC: 39.5 NG/ML
ALBUMIN SERPL-MCNC: 2.7 G/DL (ref 3.5–5.2)
ALP SERPL-CCNC: 119 U/L (ref 40–129)
ALT SERPL-CCNC: 15 U/L (ref 5–41)
ANION GAP SERPL CALCULATED.3IONS-SCNC: 9 MMOL/L (ref 9–17)
AST SERPL-CCNC: 8 U/L
BASOPHILS # BLD: 0.04 K/UL (ref 0–0.2)
BASOPHILS NFR BLD: 0 % (ref 0–2)
BILIRUB SERPL-MCNC: <0.1 MG/DL (ref 0.3–1.2)
BUN SERPL-MCNC: 9 MG/DL (ref 6–20)
BUN/CREAT SERPL: 15 (ref 9–20)
CALCIUM SERPL-MCNC: 9.3 MG/DL (ref 8.6–10.4)
CHLORIDE SERPL-SCNC: 103 MMOL/L (ref 98–107)
CO2 SERPL-SCNC: 27 MMOL/L (ref 20–31)
CREAT SERPL-MCNC: 0.6 MG/DL (ref 0.7–1.2)
CRP SERPL HS-MCNC: 121.2 MG/L (ref 0–5)
EOSINOPHIL # BLD: 0.51 K/UL (ref 0–0.44)
EOSINOPHILS RELATIVE PERCENT: 5 % (ref 1–4)
ERYTHROCYTE [DISTWIDTH] IN BLOOD BY AUTOMATED COUNT: 16.4 % (ref 11.8–14.4)
EST. AVERAGE GLUCOSE BLD GHB EST-MCNC: 103 MG/DL
FERRITIN SERPL-MCNC: 137 NG/ML (ref 30–400)
GFR SERPL CREATININE-BSD FRML MDRD: >60 ML/MIN/1.73M2
GLUCOSE SERPL-MCNC: 66 MG/DL (ref 70–99)
HBA1C MFR BLD: 5.2 % (ref 4–6)
HCT VFR BLD AUTO: 28.5 % (ref 40.7–50.3)
HGB BLD-MCNC: 8.4 G/DL (ref 13–17)
IMM GRANULOCYTES # BLD AUTO: 0.03 K/UL (ref 0–0.3)
IMM GRANULOCYTES NFR BLD: 0 %
IRON SATN MFR SERPL: 14 % (ref 20–55)
IRON SERPL-MCNC: 30 UG/DL (ref 59–158)
LYMPHOCYTES NFR BLD: 2.14 K/UL (ref 1.1–3.7)
LYMPHOCYTES RELATIVE PERCENT: 22 % (ref 24–43)
MCH RBC QN AUTO: 25.8 PG (ref 25.2–33.5)
MCHC RBC AUTO-ENTMCNC: 29.5 G/DL (ref 28.4–34.8)
MCV RBC AUTO: 87.4 FL (ref 82.6–102.9)
MONOCYTES NFR BLD: 0.96 K/UL (ref 0.1–1.2)
MONOCYTES NFR BLD: 10 % (ref 3–12)
NEUTROPHILS NFR BLD: 63 % (ref 36–65)
NEUTS SEG NFR BLD: 6.28 K/UL (ref 1.5–8.1)
NRBC BLD-RTO: 0 PER 100 WBC
PLATELET # BLD AUTO: 648 K/UL (ref 138–453)
PMV BLD AUTO: 7.9 FL (ref 8.1–13.5)
POTASSIUM SERPL-SCNC: 4.2 MMOL/L (ref 3.7–5.3)
PREALB SERPL-MCNC: 12 MG/DL (ref 20–40)
PROT SERPL-MCNC: 7.6 G/DL (ref 6.4–8.3)
RBC # BLD AUTO: 3.26 M/UL (ref 4.21–5.77)
RBC # BLD: ABNORMAL 10*6/UL
SODIUM SERPL-SCNC: 139 MMOL/L (ref 135–144)
TIBC SERPL-MCNC: 222 UG/DL (ref 250–450)
TSH SERPL DL<=0.05 MIU/L-ACNC: 3.15 UIU/ML (ref 0.3–5)
UNSATURATED IRON BINDING CAPACITY: 192 UG/DL (ref 112–347)
WBC OTHER # BLD: 10 K/UL (ref 3.5–11.3)

## 2023-07-28 PROCEDURE — 86140 C-REACTIVE PROTEIN: CPT

## 2023-07-28 PROCEDURE — 85027 COMPLETE CBC AUTOMATED: CPT

## 2023-07-28 PROCEDURE — 82728 ASSAY OF FERRITIN: CPT

## 2023-07-28 PROCEDURE — 84443 ASSAY THYROID STIM HORMONE: CPT

## 2023-07-28 PROCEDURE — 83036 HEMOGLOBIN GLYCOSYLATED A1C: CPT

## 2023-07-28 PROCEDURE — 84134 ASSAY OF PREALBUMIN: CPT

## 2023-07-28 PROCEDURE — 36415 COLL VENOUS BLD VENIPUNCTURE: CPT

## 2023-07-28 PROCEDURE — 82306 VITAMIN D 25 HYDROXY: CPT

## 2023-07-28 PROCEDURE — 83540 ASSAY OF IRON: CPT

## 2023-07-28 PROCEDURE — P9603 ONE-WAY ALLOW PRORATED MILES: HCPCS

## 2023-07-28 PROCEDURE — 83550 IRON BINDING TEST: CPT

## 2023-07-28 PROCEDURE — 80053 COMPREHEN METABOLIC PANEL: CPT

## 2023-10-10 ENCOUNTER — HOSPITAL ENCOUNTER (OUTPATIENT)
Age: 43
Setting detail: SPECIMEN
Discharge: HOME | End: 2023-10-10
Payer: COMMERCIAL

## 2023-10-10 LAB
CHOLEST SERPL-MCNC: 166 MG/DL (ref 0–199)
CHOLESTEROL/HDL RATIO: 5
HDLC SERPL-MCNC: 33 MG/DL
LDLC SERPL CALC-MCNC: 123 MG/DL (ref 0–100)
TRIGL SERPL-MCNC: 49 MG/DL (ref 0–149)
VLDLC SERPL CALC-MCNC: 10 MG/DL

## 2023-10-10 PROCEDURE — 80061 LIPID PANEL: CPT

## 2023-10-10 PROCEDURE — 36415 COLL VENOUS BLD VENIPUNCTURE: CPT

## 2023-10-10 PROCEDURE — P9603 ONE-WAY ALLOW PRORATED MILES: HCPCS

## 2023-10-26 ENCOUNTER — HOSPITAL ENCOUNTER (OUTPATIENT)
Age: 43
Setting detail: SPECIMEN
Discharge: HOME | End: 2023-10-26
Payer: COMMERCIAL

## 2023-10-26 LAB — PREALB SERPL-MCNC: 11.7 MG/DL (ref 20–40)

## 2023-10-26 PROCEDURE — 84134 ASSAY OF PREALBUMIN: CPT

## 2023-10-26 PROCEDURE — 36415 COLL VENOUS BLD VENIPUNCTURE: CPT

## 2023-10-26 PROCEDURE — P9603 ONE-WAY ALLOW PRORATED MILES: HCPCS

## 2024-01-04 ENCOUNTER — HOSPITAL ENCOUNTER (OUTPATIENT)
Age: 44
Setting detail: SPECIMEN
Discharge: HOME | End: 2024-01-04
Payer: COMMERCIAL

## 2024-01-04 LAB
CHOLEST SERPL-MCNC: 136 MG/DL (ref 0–199)
CHOLESTEROL/HDL RATIO: 4
HDLC SERPL-MCNC: 34 MG/DL
LDLC SERPL CALC-MCNC: 95 MG/DL (ref 0–100)
TRIGL SERPL-MCNC: 36 MG/DL
VLDLC SERPL CALC-MCNC: 7 MG/DL

## 2024-01-04 PROCEDURE — P9603 ONE-WAY ALLOW PRORATED MILES: HCPCS

## 2024-01-04 PROCEDURE — 36415 COLL VENOUS BLD VENIPUNCTURE: CPT

## 2024-01-04 PROCEDURE — 80061 LIPID PANEL: CPT

## 2024-02-05 ENCOUNTER — HOSPITAL ENCOUNTER (OUTPATIENT)
Age: 44
Setting detail: SPECIMEN
Discharge: HOME | End: 2024-02-05
Payer: COMMERCIAL

## 2024-02-05 LAB
ALBUMIN SERPL-MCNC: 2.7 G/DL (ref 3.5–5.2)
ALP SERPL-CCNC: 122 U/L (ref 40–129)
ALT SERPL-CCNC: 12 U/L (ref 5–41)
ANION GAP SERPL CALCULATED.3IONS-SCNC: 8 MMOL/L (ref 9–17)
AST SERPL-CCNC: 6 U/L
BACTERIA URNS QL MICRO: NORMAL
BASOPHILS # BLD: 0.04 K/UL (ref 0–0.2)
BASOPHILS NFR BLD: 0 % (ref 0–2)
BILIRUB SERPL-MCNC: 0.1 MG/DL (ref 0.3–1.2)
BILIRUB UR QL STRIP: NEGATIVE
BUN SERPL-MCNC: 12 MG/DL (ref 6–20)
BUN/CREAT SERPL: 24 (ref 9–20)
CALCIUM SERPL-MCNC: 9.2 MG/DL (ref 8.6–10.4)
CASTS #/AREA URNS LPF: NORMAL /LPF (ref 0–8)
CHLORIDE SERPL-SCNC: 101 MMOL/L (ref 98–107)
CLARITY UR: ABNORMAL
CO2 SERPL-SCNC: 28 MMOL/L (ref 20–31)
COLOR UR: ABNORMAL
CREAT SERPL-MCNC: 0.5 MG/DL (ref 0.7–1.2)
EOSINOPHIL # BLD: 0.41 K/UL (ref 0–0.44)
EOSINOPHILS RELATIVE PERCENT: 4 % (ref 1–4)
EPI CELLS #/AREA URNS HPF: NORMAL /HPF (ref 0–5)
ERYTHROCYTE [DISTWIDTH] IN BLOOD BY AUTOMATED COUNT: 17 % (ref 11.8–14.4)
GFR SERPL CREATININE-BSD FRML MDRD: >60 ML/MIN/1.73M2
GLUCOSE SERPL-MCNC: 101 MG/DL (ref 70–99)
GLUCOSE UR STRIP-MCNC: NEGATIVE MG/DL
HCT VFR BLD AUTO: 29.5 % (ref 40.7–50.3)
HGB BLD-MCNC: 8.9 G/DL (ref 13–17)
HGB UR QL STRIP.AUTO: NEGATIVE
IMM GRANULOCYTES # BLD AUTO: 0.05 K/UL (ref 0–0.3)
IMM GRANULOCYTES NFR BLD: 0 %
KETONES UR STRIP-MCNC: NEGATIVE MG/DL
LEUKOCYTE ESTERASE UR QL STRIP: ABNORMAL
LYMPHOCYTES NFR BLD: 2.03 K/UL (ref 1.1–3.7)
LYMPHOCYTES RELATIVE PERCENT: 17 % (ref 24–43)
MCH RBC QN AUTO: 25.4 PG (ref 25.2–33.5)
MCHC RBC AUTO-ENTMCNC: 30.2 G/DL (ref 28.4–34.8)
MCV RBC AUTO: 84 FL (ref 82.6–102.9)
MONOCYTES NFR BLD: 0.88 K/UL (ref 0.1–1.2)
MONOCYTES NFR BLD: 8 % (ref 3–12)
NEUTROPHILS NFR BLD: 71 % (ref 36–65)
NEUTS SEG NFR BLD: 8.28 K/UL (ref 1.5–8.1)
NITRITE UR QL STRIP: NEGATIVE
NRBC BLD-RTO: 0 PER 100 WBC
PH UR STRIP: 5.5 [PH] (ref 5–8)
PLATELET # BLD AUTO: 548 K/UL (ref 138–453)
PMV BLD AUTO: 8.3 FL (ref 8.1–13.5)
POTASSIUM SERPL-SCNC: 4.1 MMOL/L (ref 3.7–5.3)
PROT SERPL-MCNC: 7.6 G/DL (ref 6.4–8.3)
PROT UR STRIP-MCNC: ABNORMAL MG/DL
RBC # BLD AUTO: 3.51 M/UL (ref 4.21–5.77)
RBC # BLD: ABNORMAL 10*6/UL
RBC #/AREA URNS HPF: NORMAL /HPF (ref 0–4)
SODIUM SERPL-SCNC: 137 MMOL/L (ref 135–144)
SP GR UR STRIP: 1.03 (ref 1–1.03)
UROBILINOGEN UR STRIP-ACNC: NORMAL EU/DL (ref 0–1)
WBC #/AREA URNS HPF: NORMAL /HPF (ref 0–5)
WBC OTHER # BLD: 11.7 K/UL (ref 3.5–11.3)

## 2024-02-05 PROCEDURE — 87086 URINE CULTURE/COLONY COUNT: CPT

## 2024-02-05 PROCEDURE — P9603 ONE-WAY ALLOW PRORATED MILES: HCPCS

## 2024-02-05 PROCEDURE — 80053 COMPREHEN METABOLIC PANEL: CPT

## 2024-02-05 PROCEDURE — 85025 COMPLETE CBC W/AUTO DIFF WBC: CPT

## 2024-02-05 PROCEDURE — 86403 PARTICLE AGGLUT ANTBDY SCRN: CPT

## 2024-02-05 PROCEDURE — 36415 COLL VENOUS BLD VENIPUNCTURE: CPT

## 2024-02-05 PROCEDURE — 81001 URINALYSIS AUTO W/SCOPE: CPT

## 2024-02-06 LAB
MICROORGANISM SPEC CULT: ABNORMAL
SPECIMEN DESCRIPTION: ABNORMAL

## 2024-02-22 ENCOUNTER — HOSPITAL ENCOUNTER (OUTPATIENT)
Age: 44
Setting detail: SPECIMEN
Discharge: HOME | End: 2024-02-22
Payer: COMMERCIAL

## 2024-02-22 LAB
ALBUMIN SERPL-MCNC: 2.8 G/DL (ref 3.5–5.2)
ALP SERPL-CCNC: 124 U/L (ref 40–129)
ALT SERPL-CCNC: 17 U/L (ref 5–41)
ANION GAP SERPL CALCULATED.3IONS-SCNC: 12 MMOL/L (ref 9–17)
AST SERPL-CCNC: 8 U/L
BILIRUB SERPL-MCNC: <0.1 MG/DL (ref 0.3–1.2)
BUN SERPL-MCNC: 12 MG/DL (ref 6–20)
BUN/CREAT SERPL: 24 (ref 9–20)
CALCIUM SERPL-MCNC: 9.2 MG/DL (ref 8.6–10.4)
CHLORIDE SERPL-SCNC: 101 MMOL/L (ref 98–107)
CO2 SERPL-SCNC: 25 MMOL/L (ref 20–31)
CREAT SERPL-MCNC: 0.5 MG/DL (ref 0.7–1.2)
ERYTHROCYTE [DISTWIDTH] IN BLOOD BY AUTOMATED COUNT: 16.9 % (ref 11.8–14.4)
GFR SERPL CREATININE-BSD FRML MDRD: >60 ML/MIN/1.73M2
GLUCOSE SERPL-MCNC: 93 MG/DL (ref 70–99)
HCT VFR BLD AUTO: 30.7 % (ref 40.7–50.3)
HGB BLD-MCNC: 9 G/DL (ref 13–17)
MCH RBC QN AUTO: 25.4 PG (ref 25.2–33.5)
MCHC RBC AUTO-ENTMCNC: 29.3 G/DL (ref 28.4–34.8)
MCV RBC AUTO: 86.5 FL (ref 82.6–102.9)
NRBC BLD-RTO: 0 PER 100 WBC
PLATELET # BLD AUTO: 526 K/UL (ref 138–453)
PMV BLD AUTO: 8.3 FL (ref 8.1–13.5)
POTASSIUM SERPL-SCNC: 4.2 MMOL/L (ref 3.7–5.3)
PROT SERPL-MCNC: 7.6 G/DL (ref 6.4–8.3)
RBC # BLD AUTO: 3.55 M/UL (ref 4.21–5.77)
SODIUM SERPL-SCNC: 138 MMOL/L (ref 135–144)
TSH SERPL DL<=0.05 MIU/L-ACNC: 0.92 UIU/ML (ref 0.3–5)
URATE SERPL-MCNC: 4 MG/DL (ref 3.4–7)
WBC OTHER # BLD: 14.3 K/UL (ref 3.5–11.3)

## 2024-02-22 PROCEDURE — 84550 ASSAY OF BLOOD/URIC ACID: CPT

## 2024-02-22 PROCEDURE — 36415 COLL VENOUS BLD VENIPUNCTURE: CPT

## 2024-02-22 PROCEDURE — P9603 ONE-WAY ALLOW PRORATED MILES: HCPCS

## 2024-02-22 PROCEDURE — 80053 COMPREHEN METABOLIC PANEL: CPT

## 2024-02-22 PROCEDURE — 84443 ASSAY THYROID STIM HORMONE: CPT

## 2024-02-22 PROCEDURE — 85027 COMPLETE CBC AUTOMATED: CPT

## 2024-02-29 ENCOUNTER — HOSPITAL ENCOUNTER (OUTPATIENT)
Age: 44
Setting detail: SPECIMEN
Discharge: HOME | End: 2024-02-29
Payer: COMMERCIAL

## 2024-02-29 LAB
25(OH)D3 SERPL-MCNC: 34.6 NG/ML (ref 30–100)
APAP SERPL-MCNC: <10 UG/ML (ref 10–30)
CRP SERPL HS-MCNC: 73.2 MG/L (ref 0–5)
ERYTHROCYTE [SEDIMENTATION RATE] IN BLOOD BY PHOTOMETRIC METHOD: 118 MM/HR (ref 0–15)
FOLATE SERPL-MCNC: 4.8 NG/ML (ref 4.8–24.2)
VIT B12 SERPL-MCNC: 359 PG/ML (ref 232–1245)

## 2024-02-29 PROCEDURE — 86225 DNA ANTIBODY NATIVE: CPT

## 2024-02-29 PROCEDURE — P9603 ONE-WAY ALLOW PRORATED MILES: HCPCS

## 2024-02-29 PROCEDURE — 80143 DRUG ASSAY ACETAMINOPHEN: CPT

## 2024-02-29 PROCEDURE — 36415 COLL VENOUS BLD VENIPUNCTURE: CPT

## 2024-02-29 PROCEDURE — 82746 ASSAY OF FOLIC ACID SERUM: CPT

## 2024-02-29 PROCEDURE — 86140 C-REACTIVE PROTEIN: CPT

## 2024-02-29 PROCEDURE — 85652 RBC SED RATE AUTOMATED: CPT

## 2024-02-29 PROCEDURE — 82607 VITAMIN B-12: CPT

## 2024-02-29 PROCEDURE — 86038 ANTINUCLEAR ANTIBODIES: CPT

## 2024-02-29 PROCEDURE — 82306 VITAMIN D 25 HYDROXY: CPT

## 2024-03-05 LAB
ANA SER QL IA: NEGATIVE
DSDNA IGG SER QL IA: 0.7 IU/ML
NUCLEAR IGG SER IA-RTO: 0.2 U/ML

## 2024-05-10 ENCOUNTER — HOSPITAL ENCOUNTER (OUTPATIENT)
Age: 44
Setting detail: SPECIMEN
Discharge: HOME | End: 2024-05-10

## 2024-05-10 LAB
ANION GAP SERPL CALCULATED.3IONS-SCNC: 8 MMOL/L (ref 9–17)
BUN SERPL-MCNC: 8 MG/DL (ref 6–20)
BUN/CREAT SERPL: 16 (ref 9–20)
CALCIUM SERPL-MCNC: 9.2 MG/DL (ref 8.6–10.4)
CHLORIDE SERPL-SCNC: 102 MMOL/L (ref 98–107)
CO2 SERPL-SCNC: 27 MMOL/L (ref 20–31)
CREAT SERPL-MCNC: 0.5 MG/DL (ref 0.7–1.2)
ERYTHROCYTE [DISTWIDTH] IN BLOOD BY AUTOMATED COUNT: 16.9 % (ref 11.8–14.4)
GFR, ESTIMATED: >90 ML/MIN/1.73M2
GLUCOSE SERPL-MCNC: 87 MG/DL (ref 70–99)
HCT VFR BLD AUTO: 30.6 % (ref 40.7–50.3)
HGB BLD-MCNC: 9.1 G/DL (ref 13–17)
MCH RBC QN AUTO: 24.7 PG (ref 25.2–33.5)
MCHC RBC AUTO-ENTMCNC: 29.7 G/DL (ref 28.4–34.8)
MCV RBC AUTO: 83.2 FL (ref 82.6–102.9)
NRBC BLD-RTO: 0 PER 100 WBC
PLATELET # BLD AUTO: 504 K/UL (ref 138–453)
PMV BLD AUTO: 8.2 FL (ref 8.1–13.5)
POTASSIUM SERPL-SCNC: 4 MMOL/L (ref 3.7–5.3)
RBC # BLD AUTO: 3.68 M/UL (ref 4.21–5.77)
SODIUM SERPL-SCNC: 137 MMOL/L (ref 135–144)
WBC OTHER # BLD: 13.5 K/UL (ref 3.5–11.3)

## 2024-05-10 PROCEDURE — 80048 BASIC METABOLIC PNL TOTAL CA: CPT

## 2024-05-10 PROCEDURE — P9603 ONE-WAY ALLOW PRORATED MILES: HCPCS

## 2024-05-10 PROCEDURE — 36415 COLL VENOUS BLD VENIPUNCTURE: CPT

## 2024-05-10 PROCEDURE — 85027 COMPLETE CBC AUTOMATED: CPT

## 2024-07-10 ENCOUNTER — HOSPITAL ENCOUNTER (OUTPATIENT)
Age: 44
Setting detail: SPECIMEN
Discharge: HOME | End: 2024-07-10
Payer: COMMERCIAL

## 2024-07-10 LAB
ANION GAP SERPL CALCULATED.3IONS-SCNC: 11 MMOL/L (ref 9–17)
BUN SERPL-MCNC: 13 MG/DL (ref 6–20)
BUN/CREAT SERPL: 22 (ref 9–20)
CALCIUM SERPL-MCNC: 9.2 MG/DL (ref 8.6–10.4)
CHLORIDE SERPL-SCNC: 100 MMOL/L (ref 98–107)
CO2 SERPL-SCNC: 27 MMOL/L (ref 20–31)
CREAT SERPL-MCNC: 0.6 MG/DL (ref 0.7–1.2)
ERYTHROCYTE [DISTWIDTH] IN BLOOD BY AUTOMATED COUNT: 17.3 % (ref 11.8–14.4)
GFR, ESTIMATED: >90 ML/MIN/1.73M2
GLUCOSE SERPL-MCNC: 70 MG/DL (ref 70–99)
HCT VFR BLD AUTO: 28.9 % (ref 40.7–50.3)
HGB BLD-MCNC: 8.7 G/DL (ref 13–17)
MCH RBC QN AUTO: 24.9 PG (ref 25.2–33.5)
MCHC RBC AUTO-ENTMCNC: 30.1 G/DL (ref 28.4–34.8)
MCV RBC AUTO: 82.8 FL (ref 82.6–102.9)
NRBC BLD-RTO: 0 PER 100 WBC
PLATELET # BLD AUTO: 484 K/UL (ref 138–453)
PMV BLD AUTO: 8.2 FL (ref 8.1–13.5)
POTASSIUM SERPL-SCNC: 4 MMOL/L (ref 3.7–5.3)
RBC # BLD AUTO: 3.49 M/UL (ref 4.21–5.77)
SODIUM SERPL-SCNC: 138 MMOL/L (ref 135–144)
WBC OTHER # BLD: 12.4 K/UL (ref 3.5–11.3)

## 2024-07-10 PROCEDURE — P9603 ONE-WAY ALLOW PRORATED MILES: HCPCS

## 2024-07-10 PROCEDURE — 80048 BASIC METABOLIC PNL TOTAL CA: CPT

## 2024-07-10 PROCEDURE — 36415 COLL VENOUS BLD VENIPUNCTURE: CPT

## 2024-07-10 PROCEDURE — 85027 COMPLETE CBC AUTOMATED: CPT

## 2024-09-11 ENCOUNTER — HOSPITAL ENCOUNTER (OUTPATIENT)
Age: 44
Setting detail: SPECIMEN
Discharge: HOME | End: 2024-09-11

## 2024-09-11 LAB
ANION GAP SERPL CALCULATED.3IONS-SCNC: 9 MMOL/L (ref 9–17)
BUN SERPL-MCNC: 11 MG/DL (ref 6–20)
BUN/CREAT SERPL: 22 (ref 9–20)
CALCIUM SERPL-MCNC: 9.1 MG/DL (ref 8.6–10.4)
CHLORIDE SERPL-SCNC: 100 MMOL/L (ref 98–107)
CO2 SERPL-SCNC: 27 MMOL/L (ref 20–31)
CREAT SERPL-MCNC: 0.5 MG/DL (ref 0.7–1.2)
ERYTHROCYTE [DISTWIDTH] IN BLOOD BY AUTOMATED COUNT: 19.1 % (ref 11.8–14.4)
GFR, ESTIMATED: >90 ML/MIN/1.73M2
GLUCOSE SERPL-MCNC: 113 MG/DL (ref 70–99)
HCT VFR BLD AUTO: 31.5 % (ref 40.7–50.3)
HGB BLD-MCNC: 9.5 G/DL (ref 13–17)
MCH RBC QN AUTO: 26.1 PG (ref 25.2–33.5)
MCHC RBC AUTO-ENTMCNC: 30.2 G/DL (ref 28.4–34.8)
MCV RBC AUTO: 86.5 FL (ref 82.6–102.9)
NRBC BLD-RTO: 0 PER 100 WBC
PLATELET # BLD AUTO: 399 K/UL (ref 138–453)
PMV BLD AUTO: 8.1 FL (ref 8.1–13.5)
POTASSIUM SERPL-SCNC: 3.8 MMOL/L (ref 3.7–5.3)
RBC # BLD AUTO: 3.64 M/UL (ref 4.21–5.77)
SODIUM SERPL-SCNC: 136 MMOL/L (ref 135–144)
WBC OTHER # BLD: 8.4 K/UL (ref 3.5–11.3)

## 2024-09-11 PROCEDURE — 80048 BASIC METABOLIC PNL TOTAL CA: CPT

## 2024-09-11 PROCEDURE — P9603 ONE-WAY ALLOW PRORATED MILES: HCPCS

## 2024-09-11 PROCEDURE — 36415 COLL VENOUS BLD VENIPUNCTURE: CPT

## 2024-09-11 PROCEDURE — 85027 COMPLETE CBC AUTOMATED: CPT

## 2024-10-10 ENCOUNTER — HOSPITAL ENCOUNTER (OUTPATIENT)
Age: 44
Setting detail: SPECIMEN
Discharge: HOME | End: 2024-10-10

## 2024-10-10 LAB
FOLATE SERPL-MCNC: 6.6 NG/ML (ref 4.8–24.2)
ITYP INTERPRETATION: NORMAL
PATH REV: NORMAL
VIT B12 SERPL-MCNC: 580 PG/ML (ref 232–1245)

## 2024-10-10 PROCEDURE — 86334 IMMUNOFIX E-PHORESIS SERUM: CPT

## 2024-10-10 PROCEDURE — P9603 ONE-WAY ALLOW PRORATED MILES: HCPCS

## 2024-10-10 PROCEDURE — 84155 ASSAY OF PROTEIN SERUM: CPT

## 2024-10-10 PROCEDURE — 82607 VITAMIN B-12: CPT

## 2024-10-10 PROCEDURE — 36415 COLL VENOUS BLD VENIPUNCTURE: CPT

## 2024-10-10 PROCEDURE — 84425 ASSAY OF VITAMIN B-1: CPT

## 2024-10-10 PROCEDURE — 82746 ASSAY OF FOLIC ACID SERUM: CPT

## 2024-10-10 PROCEDURE — 84630 ASSAY OF ZINC: CPT

## 2024-10-10 PROCEDURE — 86038 ANTINUCLEAR ANTIBODIES: CPT

## 2024-10-10 PROCEDURE — 83921 ORGANIC ACID SINGLE QUANT: CPT

## 2024-10-10 PROCEDURE — 84207 ASSAY OF VITAMIN B-6: CPT

## 2024-10-10 PROCEDURE — 86225 DNA ANTIBODY NATIVE: CPT

## 2024-10-10 PROCEDURE — 84165 PROTEIN E-PHORESIS SERUM: CPT

## 2024-10-11 LAB
ALBUMIN PERCENT: 39 % (ref 45–65)
ALBUMIN SERPL-MCNC: 2.9 G/DL (ref 3.2–5.2)
ALPHA 2 PERCENT: 11 % (ref 6–13)
ALPHA1 GLOB SERPL ELPH-MCNC: 0.5 G/DL (ref 0.1–0.4)
ALPHA1 GLOB SERPL ELPH-MCNC: 6 % (ref 3–6)
ALPHA2 GLOB SERPL ELPH-MCNC: 0.8 G/DL (ref 0.5–0.9)
ANA SER QL IA: NEGATIVE
B-GLOBULIN SERPL ELPH-MCNC: 1.3 G/DL (ref 0.5–1.1)
B-GLOBULIN SERPL ELPH-MCNC: 17 % (ref 11–19)
DSDNA IGG SER QL IA: <0.5 IU/ML
GAMMA GLOB SERPL ELPH-MCNC: 2.1 G/DL (ref 0.5–1.5)
GAMMA GLOBULIN %: 28 % (ref 9–20)
NUCLEAR IGG SER IA-RTO: 0.1 U/ML
PATHOLOGIST: ABNORMAL
PROT PATTERN SERPL ELPH-IMP: ABNORMAL
PROT SERPL-MCNC: 7.5 G/DL (ref 6.6–8.7)
TOTAL PROT. SUM,%: 101 % (ref 98–102)
TOTAL PROT. SUM: 7.6 G/DL (ref 6.3–8.2)

## 2024-10-13 LAB — PYRIDOXAL PHOS SERPL-SCNC: 7.9 NMOL/L (ref 20–125)

## 2024-10-14 LAB
ALBUMIN PERCENT: 39 % (ref 45–65)
ALBUMIN SERPL-MCNC: 2.9 G/DL (ref 3.2–5.2)
ALPHA 2 PERCENT: 11 % (ref 6–13)
ALPHA1 GLOB SERPL ELPH-MCNC: 0.5 G/DL (ref 0.1–0.4)
ALPHA1 GLOB SERPL ELPH-MCNC: 6 % (ref 3–6)
ALPHA2 GLOB SERPL ELPH-MCNC: 0.8 G/DL (ref 0.5–0.9)
B-GLOBULIN SERPL ELPH-MCNC: 1.3 G/DL (ref 0.5–1.1)
B-GLOBULIN SERPL ELPH-MCNC: 17 % (ref 11–19)
GAMMA GLOB SERPL ELPH-MCNC: 2.1 G/DL (ref 0.5–1.5)
GAMMA GLOBULIN %: 28 % (ref 9–20)
ITYP INTERPRETATION: NORMAL
METHYLMALONATE SERPL-SCNC: <0.1 UMOL/L (ref 0–0.4)
PATH REV: NORMAL
PATHOLOGIST: ABNORMAL
PROT PATTERN SERPL ELPH-IMP: ABNORMAL
PROT SERPL-MCNC: 7.5 G/DL (ref 6.6–8.7)
TOTAL PROT. SUM,%: 101 % (ref 98–102)
TOTAL PROT. SUM: 7.6 G/DL (ref 6.3–8.2)
VIT B1 PYROPHOSHATE BLD-SCNC: 127 NMOL/L (ref 70–180)

## 2024-10-16 ENCOUNTER — HOSPITAL ENCOUNTER (OUTPATIENT)
Age: 44
Setting detail: SPECIMEN
Discharge: HOME | End: 2024-10-16

## 2024-10-16 PROCEDURE — 84630 ASSAY OF ZINC: CPT

## 2024-10-16 PROCEDURE — P9603 ONE-WAY ALLOW PRORATED MILES: HCPCS

## 2024-10-16 PROCEDURE — 36415 COLL VENOUS BLD VENIPUNCTURE: CPT

## 2024-10-18 LAB — ZINC SERPL-MCNC: 49.4 UG/DL (ref 60–120)

## 2025-01-10 NOTE — ED PROVIDER NOTES
[facility]  ED  Emergency Department  Emergency Medicine Resident Sign-out     Care of [facility] was assumed from Dr. Stevie Huntley and is being seen for Gastroesophageal Reflux  . The patient's initial evaluation and plan have been discussed with the prior provider who initially evaluated the patient.      EMERGENCY DEPARTMENT COURSE / MEDICAL DECISION MAKING:       MEDICATIONS GIVEN:  Orders Placed This Encounter   Medications    metoclopramide (REGLAN) injection 10 mg    0.9 % sodium chloride bolus    famotidine (PEPCID) injection 20 mg    iopamidol (ISOVUE-370) 76 % injection 75 mL    piperacillin-tazobactam (ZOSYN) 4,500 mg in dextrose 5 % 100 mL IVPB (mini-bag)     Order Specific Question:   Antimicrobial Indications     Answer:   Skin and Soft Tissue Infection    clindamycin (CLEOCIN) 600 mg in dextrose 5 % 50 mL IVPB     Order Specific Question:   Antimicrobial Indications     Answer:   Skin and Soft Tissue Infection    vancomycin (VANCOCIN) 1000 mg in dextrose 5% 200 mL IVPB     Order Specific Question:   Antimicrobial Indications     Answer:   Skin and Soft Tissue Infection     Order Specific Question:   Skin duration of therapy     Answer:   7 days    vancomycin (VANCOCIN) intermittent dosing (placeholder)     Order Specific Question:   Antimicrobial Indications     Answer:   Skin and Soft Tissue Infection     Order Specific Question:   Skin duration of therapy     Answer:   7 days    clindamycin (CLEOCIN) 600 MG/50ML IVPB     Abida Cunha: cabinet override       LABS / RADIOLOGY:     Labs Reviewed   CBC WITH AUTO DIFFERENTIAL - Abnormal; Notable for the following components:       Result Value    WBC 11.9 (*)     Hemoglobin 12.3 (*)     Hematocrit 39.3 (*)     RDW 15.2 (*)     Absolute Mono # 1.23 (*)     All other components within normal limits   COMPREHENSIVE METABOLIC PANEL - Abnormal; Notable for the following components:    Anion Gap 8 (*)     Total Bilirubin <0.10 (*) ingested foreign body. Pelvis: The urinary bladder is partially distended without contour abnormality. The prostate and seminal vesicles are without acute findings. Peritoneum/Retroperitoneum: Mild atherosclerotic plaque. No ascites or pneumoperitoneum. No retroperitoneal or mesenteric lymphadenopathy. The aorta and IVC are normal in caliber. Mild atherosclerotic plaque. Bones/Soft Tissues: Soft tissue enhancement is slightly suboptimal.  There are multiple pelvic abscesses with adjacent skin thickening. The most notable area is seen in the right buttock and in conglomerate measures approximately 14 x 4.7 x 11 cm. Low-attenuation focus in the left inguinal canal likely reflects a high position of the left testicle within the inguinal canal rather than abscess. Left gluteal skin thickening without overt abscess. Skin thickening is also noted along the midline posterior low back unchanged areas of skin thickening in the lower anterior abdominal wall. Subcentimeter bilateral inguinal lymph nodes are likely reactive. No abnormal gas density. No osseous erosive change to indicate acute osteomyelitis. No acute bony abnormality. 1. Large area of abscess/phlegmonous change centered in the right buttock which measures up to 14 cm. There is extension of skin thickening to the left buttock and midline low back as well as a fistulous connection to the skin in the medial right buttock. No subcutaneous gas to indicate necrotizing infection/Opal's gangrene. No evidence of acute osteomyelitis. 2. Area of low attenuation in the left inguinal canal is favored to reflect high position of the left testicle within the inguinal canal, rather than infection. This can be corroborated with physical exam. 3. Moderate to large amount of stool in the rectosigmoid colon. A small metallic density within a fecal ball in the rectal wall is favored to reflect an ingested foreign body.  4. Unchanged chronic findings outlined in the body of the report. RECENT VITALS:     Temp: 99.3 °F (37.4 °C),  Heart Rate: (!) 111, Resp: 18, BP: (!) 138/93, SpO2: 99 %      This patient is a 38 y.o. Male with worsening buttock pain with abscess that is progressively warning. Of note patient is suppressed, he is on Humira. His CT scan did show 14 cm buttock over his right buttock. He does have a white count, mildly febrile here in the department. The plan is for surgery team to take patient for OR for possible washout. Patient admitted to [address] / RECOMMENDATIONS:    Surgery recommendation  Admission to either surgery team or medicine to       FINAL IMPRESSION:     1. Abscess and cellulitis of gluteal region        DISPOSITION:         DISPOSITION:  []  Discharge   []  Transfer -    [x]  Admission - Intermed    []  Against Medical Advice   []  Eloped   FOLLOW-UP: No follow-up provider specified.    DISCHARGE MEDICATIONS: New Prescriptions    No medications on file          Bipin Bruno MD  Emergency Medicine Resident  [address] Biipn Bruno MD  Resident  08/11/22 8027 Statement Selected

## 2025-02-17 ENCOUNTER — HOSPITAL ENCOUNTER (OUTPATIENT)
Age: 45
Setting detail: SPECIMEN
Discharge: HOME | End: 2025-02-17

## 2025-02-17 LAB
ANION GAP SERPL CALCULATED.3IONS-SCNC: 8 MMOL/L (ref 9–16)
BUN SERPL-MCNC: 17 MG/DL (ref 6–20)
CALCIUM SERPL-MCNC: 9 MG/DL (ref 8.6–10.4)
CHLORIDE SERPL-SCNC: 103 MMOL/L (ref 98–107)
CO2 SERPL-SCNC: 28 MMOL/L (ref 20–31)
CREAT SERPL-MCNC: 0.8 MG/DL (ref 0.7–1.2)
ERYTHROCYTE [DISTWIDTH] IN BLOOD BY AUTOMATED COUNT: 17.3 % (ref 11.8–14.4)
GFR, ESTIMATED: >90 ML/MIN/1.73M2
GLUCOSE SERPL-MCNC: 80 MG/DL (ref 74–99)
HCT VFR BLD AUTO: 35.9 % (ref 40.7–50.3)
HGB BLD-MCNC: 11.4 G/DL (ref 13–17)
MCH RBC QN AUTO: 28.9 PG (ref 25.2–33.5)
MCHC RBC AUTO-ENTMCNC: 31.8 G/DL (ref 28.4–34.8)
MCV RBC AUTO: 90.9 FL (ref 82.6–102.9)
NRBC BLD-RTO: 0 PER 100 WBC
PLATELET # BLD AUTO: 315 K/UL (ref 138–453)
PMV BLD AUTO: 8.4 FL (ref 8.1–13.5)
POTASSIUM SERPL-SCNC: 4.6 MMOL/L (ref 3.7–5.3)
RBC # BLD AUTO: 3.95 M/UL (ref 4.21–5.77)
SODIUM SERPL-SCNC: 139 MMOL/L (ref 136–145)
WBC OTHER # BLD: 8.5 K/UL (ref 3.5–11.3)

## 2025-02-17 PROCEDURE — 36415 COLL VENOUS BLD VENIPUNCTURE: CPT

## 2025-02-17 PROCEDURE — 85027 COMPLETE CBC AUTOMATED: CPT

## 2025-02-17 PROCEDURE — 80048 BASIC METABOLIC PNL TOTAL CA: CPT

## 2025-02-24 ENCOUNTER — HOSPITAL ENCOUNTER (OUTPATIENT)
Age: 45
Setting detail: SPECIMEN
Discharge: HOME | End: 2025-02-24
Payer: COMMERCIAL

## 2025-02-24 LAB
25(OH)D3 SERPL-MCNC: 34.7 NG/ML (ref 30–100)
ALBUMIN SERPL-MCNC: 3.1 G/DL (ref 3.5–5.2)
ALBUMIN/GLOB SERPL: 0.7 {RATIO} (ref 1–2.5)
ALP SERPL-CCNC: 153 U/L (ref 40–129)
ALT SERPL-CCNC: 29 U/L (ref 10–50)
ANION GAP SERPL CALCULATED.3IONS-SCNC: 10 MMOL/L (ref 9–16)
AST SERPL-CCNC: 16 U/L (ref 10–50)
BASOPHILS # BLD: 0.05 K/UL (ref 0–0.2)
BASOPHILS NFR BLD: 1 % (ref 0–2)
BILIRUB SERPL-MCNC: <0.2 MG/DL (ref 0–1.2)
BUN SERPL-MCNC: 12 MG/DL (ref 6–20)
CALCIUM SERPL-MCNC: 9 MG/DL (ref 8.6–10.4)
CHLORIDE SERPL-SCNC: 102 MMOL/L (ref 98–107)
CHOLEST SERPL-MCNC: 171 MG/DL (ref 0–199)
CHOLESTEROL/HDL RATIO: 3.4
CO2 SERPL-SCNC: 23 MMOL/L (ref 20–31)
CREAT SERPL-MCNC: 0.8 MG/DL (ref 0.7–1.2)
EOSINOPHIL # BLD: 0.2 K/UL (ref 0–0.44)
EOSINOPHILS RELATIVE PERCENT: 2 % (ref 1–4)
ERYTHROCYTE [DISTWIDTH] IN BLOOD BY AUTOMATED COUNT: 18.1 % (ref 11.8–14.4)
EST. AVERAGE GLUCOSE BLD GHB EST-MCNC: 105 MG/DL
FERRITIN SERPL-MCNC: 57 NG/ML
FOLATE SERPL-MCNC: 9.3 NG/ML (ref 4.8–24.2)
GFR, ESTIMATED: >90 ML/MIN/1.73M2
GLUCOSE SERPL-MCNC: 72 MG/DL (ref 74–99)
HBA1C MFR BLD: 5.3 % (ref 4–6)
HCT VFR BLD AUTO: 35.6 % (ref 40.7–50.3)
HDLC SERPL-MCNC: 50 MG/DL
HGB BLD-MCNC: 11.2 G/DL (ref 13–17)
IMM GRANULOCYTES # BLD AUTO: 0.02 K/UL (ref 0–0.3)
IMM GRANULOCYTES NFR BLD: 0 %
IRON SATN MFR SERPL: 26 % (ref 20–55)
IRON SERPL-MCNC: 73 UG/DL (ref 61–157)
LDLC SERPL CALC-MCNC: 113 MG/DL (ref 0–100)
LYMPHOCYTES NFR BLD: 2.08 K/UL (ref 1.1–3.7)
LYMPHOCYTES RELATIVE PERCENT: 25 % (ref 24–43)
MAGNESIUM SERPL-MCNC: 2 MG/DL (ref 1.6–2.6)
MCH RBC QN AUTO: 29 PG (ref 25.2–33.5)
MCHC RBC AUTO-ENTMCNC: 31.5 G/DL (ref 28.4–34.8)
MCV RBC AUTO: 92.2 FL (ref 82.6–102.9)
MONOCYTES NFR BLD: 0.97 K/UL (ref 0.1–1.2)
MONOCYTES NFR BLD: 11 % (ref 3–12)
NEUTROPHILS NFR BLD: 61 % (ref 36–65)
NEUTS SEG NFR BLD: 5.16 K/UL (ref 1.5–8.1)
NRBC BLD-RTO: 0 PER 100 WBC
PLATELET # BLD AUTO: 324 K/UL (ref 138–453)
PMV BLD AUTO: 8.1 FL (ref 8.1–13.5)
POTASSIUM SERPL-SCNC: 4.7 MMOL/L (ref 3.7–5.3)
PROT SERPL-MCNC: 7.4 G/DL (ref 6.6–8.7)
RBC # BLD AUTO: 3.86 M/UL (ref 4.21–5.77)
RBC # BLD: ABNORMAL 10*6/UL
SODIUM SERPL-SCNC: 136 MMOL/L (ref 136–145)
T3FREE SERPL-MCNC: 2.52 PG/ML (ref 2–4.4)
T4 FREE SERPL-MCNC: 0.9 NG/DL (ref 0.93–1.7)
TIBC SERPL-MCNC: 280 UG/DL (ref 250–450)
TRIGL SERPL-MCNC: 40 MG/DL (ref 0–149)
TSH SERPL DL<=0.05 MIU/L-ACNC: 1.68 UIU/ML (ref 0.27–4.2)
UNSATURATED IRON BINDING CAPACITY: 207 UG/DL (ref 112–347)
VIT B12 SERPL-MCNC: 554 PG/ML (ref 232–1245)
VLDLC SERPL CALC-MCNC: 8 MG/DL (ref 1–30)
WBC OTHER # BLD: 8.5 K/UL (ref 3.5–11.3)

## 2025-02-24 PROCEDURE — 83540 ASSAY OF IRON: CPT

## 2025-02-24 PROCEDURE — 83036 HEMOGLOBIN GLYCOSYLATED A1C: CPT

## 2025-02-24 PROCEDURE — 82607 VITAMIN B-12: CPT

## 2025-02-24 PROCEDURE — 83550 IRON BINDING TEST: CPT

## 2025-02-24 PROCEDURE — 80061 LIPID PANEL: CPT

## 2025-02-24 PROCEDURE — 84443 ASSAY THYROID STIM HORMONE: CPT

## 2025-02-24 PROCEDURE — 82746 ASSAY OF FOLIC ACID SERUM: CPT

## 2025-02-24 PROCEDURE — 36415 COLL VENOUS BLD VENIPUNCTURE: CPT

## 2025-02-24 PROCEDURE — 82728 ASSAY OF FERRITIN: CPT

## 2025-02-24 PROCEDURE — 85025 COMPLETE CBC W/AUTO DIFF WBC: CPT

## 2025-02-24 PROCEDURE — 84439 ASSAY OF FREE THYROXINE: CPT

## 2025-02-24 PROCEDURE — 82306 VITAMIN D 25 HYDROXY: CPT

## 2025-02-24 PROCEDURE — 83735 ASSAY OF MAGNESIUM: CPT

## 2025-02-24 PROCEDURE — 84481 FREE ASSAY (FT-3): CPT

## 2025-02-24 PROCEDURE — 80053 COMPREHEN METABOLIC PANEL: CPT

## 2025-02-26 ENCOUNTER — HOSPITAL ENCOUNTER (OUTPATIENT)
Age: 45
Setting detail: SPECIMEN
Discharge: HOME | End: 2025-02-26

## 2025-02-26 LAB
25(OH)D3 SERPL-MCNC: 37.1 NG/ML (ref 30–100)
ALBUMIN SERPL-MCNC: 3.1 G/DL (ref 3.5–5.2)
ALBUMIN/GLOB SERPL: 0.7 {RATIO} (ref 1–2.5)
ALP SERPL-CCNC: 151 U/L (ref 40–129)
ALT SERPL-CCNC: 21 U/L (ref 10–50)
ANION GAP SERPL CALCULATED.3IONS-SCNC: 10 MMOL/L (ref 9–16)
AST SERPL-CCNC: 16 U/L (ref 10–50)
BASOPHILS # BLD: 0.03 K/UL (ref 0–0.2)
BASOPHILS NFR BLD: 0 % (ref 0–2)
BILIRUB SERPL-MCNC: <0.2 MG/DL (ref 0–1.2)
BUN SERPL-MCNC: 12 MG/DL (ref 6–20)
CALCIUM SERPL-MCNC: 9.1 MG/DL (ref 8.6–10.4)
CHLORIDE SERPL-SCNC: 103 MMOL/L (ref 98–107)
CHOLEST SERPL-MCNC: 174 MG/DL (ref 0–199)
CHOLESTEROL/HDL RATIO: 3.4
CO2 SERPL-SCNC: 24 MMOL/L (ref 20–31)
CREAT SERPL-MCNC: 0.7 MG/DL (ref 0.7–1.2)
EOSINOPHIL # BLD: 0.1 K/UL (ref 0–0.44)
EOSINOPHILS RELATIVE PERCENT: 1 % (ref 1–4)
ERYTHROCYTE [DISTWIDTH] IN BLOOD BY AUTOMATED COUNT: 17.8 % (ref 11.8–14.4)
FERRITIN SERPL-MCNC: 56 NG/ML
FOLATE SERPL-MCNC: 12.4 NG/ML (ref 4.8–24.2)
GFR, ESTIMATED: >90 ML/MIN/1.73M2
GLUCOSE SERPL-MCNC: 83 MG/DL (ref 74–99)
HCT VFR BLD AUTO: 35 % (ref 40.7–50.3)
HDLC SERPL-MCNC: 51 MG/DL
HGB BLD-MCNC: 11.2 G/DL (ref 13–17)
IMM GRANULOCYTES # BLD AUTO: 0.02 K/UL (ref 0–0.3)
IMM GRANULOCYTES NFR BLD: 0 %
IRON SATN MFR SERPL: 13 % (ref 20–55)
IRON SERPL-MCNC: 37 UG/DL (ref 61–157)
LDLC SERPL CALC-MCNC: 116 MG/DL (ref 0–100)
LYMPHOCYTES NFR BLD: 1.79 K/UL (ref 1.1–3.7)
LYMPHOCYTES RELATIVE PERCENT: 23 % (ref 24–43)
MAGNESIUM SERPL-MCNC: 1.8 MG/DL (ref 1.6–2.6)
MCH RBC QN AUTO: 29.1 PG (ref 25.2–33.5)
MCHC RBC AUTO-ENTMCNC: 32 G/DL (ref 28.4–34.8)
MCV RBC AUTO: 90.9 FL (ref 82.6–102.9)
MONOCYTES NFR BLD: 0.94 K/UL (ref 0.1–1.2)
MONOCYTES NFR BLD: 12 % (ref 3–12)
NEUTROPHILS NFR BLD: 64 % (ref 36–65)
NEUTS SEG NFR BLD: 4.78 K/UL (ref 1.5–8.1)
NRBC BLD-RTO: 0 PER 100 WBC
PLATELET # BLD AUTO: 337 K/UL (ref 138–453)
PMV BLD AUTO: 8.5 FL (ref 8.1–13.5)
POTASSIUM SERPL-SCNC: 4.3 MMOL/L (ref 3.7–5.3)
PROT SERPL-MCNC: 7.7 G/DL (ref 6.6–8.7)
RBC # BLD AUTO: 3.85 M/UL (ref 4.21–5.77)
RBC # BLD: ABNORMAL 10*6/UL
SODIUM SERPL-SCNC: 137 MMOL/L (ref 136–145)
T3FREE SERPL-MCNC: 2.63 PG/ML (ref 2–4.4)
T4 FREE SERPL-MCNC: 0.9 NG/DL (ref 0.93–1.7)
TIBC SERPL-MCNC: 287 UG/DL (ref 250–450)
TRIGL SERPL-MCNC: 37 MG/DL
TSH SERPL DL<=0.05 MIU/L-ACNC: 1.17 UIU/ML (ref 0.27–4.2)
UNSATURATED IRON BINDING CAPACITY: 250 UG/DL (ref 112–347)
VIT B12 SERPL-MCNC: 539 PG/ML (ref 232–1245)
VLDLC SERPL CALC-MCNC: 7 MG/DL (ref 1–30)
WBC OTHER # BLD: 7.7 K/UL (ref 3.5–11.3)

## 2025-02-26 PROCEDURE — 82728 ASSAY OF FERRITIN: CPT

## 2025-02-26 PROCEDURE — 82306 VITAMIN D 25 HYDROXY: CPT

## 2025-02-26 PROCEDURE — 80061 LIPID PANEL: CPT

## 2025-02-26 PROCEDURE — 82746 ASSAY OF FOLIC ACID SERUM: CPT

## 2025-02-26 PROCEDURE — 84481 FREE ASSAY (FT-3): CPT

## 2025-02-26 PROCEDURE — 84439 ASSAY OF FREE THYROXINE: CPT

## 2025-02-26 PROCEDURE — 82607 VITAMIN B-12: CPT

## 2025-02-26 PROCEDURE — 85025 COMPLETE CBC W/AUTO DIFF WBC: CPT

## 2025-02-26 PROCEDURE — 83540 ASSAY OF IRON: CPT

## 2025-02-26 PROCEDURE — 84443 ASSAY THYROID STIM HORMONE: CPT

## 2025-02-26 PROCEDURE — 83735 ASSAY OF MAGNESIUM: CPT

## 2025-02-26 PROCEDURE — 83550 IRON BINDING TEST: CPT

## 2025-02-26 PROCEDURE — 36415 COLL VENOUS BLD VENIPUNCTURE: CPT

## 2025-02-26 PROCEDURE — 80053 COMPREHEN METABOLIC PANEL: CPT

## 2025-04-10 ENCOUNTER — HOSPITAL ENCOUNTER (OUTPATIENT)
Age: 45
Setting detail: SPECIMEN
Discharge: HOME | End: 2025-04-10

## 2025-04-11 ENCOUNTER — HOSPITAL ENCOUNTER (OUTPATIENT)
Age: 45
Setting detail: SPECIMEN
Discharge: HOME | End: 2025-04-11

## 2025-04-14 ENCOUNTER — HOSPITAL ENCOUNTER (OUTPATIENT)
Age: 45
Setting detail: SPECIMEN
Discharge: HOME | End: 2025-04-14
Payer: COMMERCIAL

## 2025-04-14 LAB
25(OH)D3 SERPL-MCNC: 28.9 NG/ML (ref 30–100)
ALBUMIN SERPL-MCNC: 2.9 G/DL (ref 3.5–5.2)
ALBUMIN/GLOB SERPL: 0.7 {RATIO} (ref 1–2.5)
ALP SERPL-CCNC: 124 U/L (ref 40–129)
ALT SERPL-CCNC: 12 U/L (ref 10–50)
ANION GAP SERPL CALCULATED.3IONS-SCNC: 8 MMOL/L (ref 9–16)
AST SERPL-CCNC: 13 U/L (ref 10–50)
BILIRUB DIRECT SERPL-MCNC: <0.1 MG/DL (ref 0–0.2)
BILIRUB INDIRECT SERPL-MCNC: ABNORMAL MG/DL
BILIRUB SERPL-MCNC: <0.2 MG/DL (ref 0–1.2)
BUN SERPL-MCNC: 11 MG/DL (ref 6–20)
CALCIUM SERPL-MCNC: 8.8 MG/DL (ref 8.6–10.4)
CARBAMAZEPINE SERPL-MCNC: 4.9 UG/ML (ref 4–12)
CHLORIDE SERPL-SCNC: 103 MMOL/L (ref 98–107)
CO2 SERPL-SCNC: 28 MMOL/L (ref 20–31)
CREAT SERPL-MCNC: 0.7 MG/DL (ref 0.7–1.2)
ERYTHROCYTE [DISTWIDTH] IN BLOOD BY AUTOMATED COUNT: 16.1 % (ref 11.8–14.4)
GFR, ESTIMATED: >90 ML/MIN/1.73M2
GLUCOSE SERPL-MCNC: 83 MG/DL (ref 74–99)
HCT VFR BLD AUTO: 33.6 % (ref 40.7–50.3)
HGB BLD-MCNC: 10.8 G/DL (ref 13–17)
MAGNESIUM SERPL-MCNC: 1.9 MG/DL (ref 1.6–2.6)
MCH RBC QN AUTO: 29.8 PG (ref 25.2–33.5)
MCHC RBC AUTO-ENTMCNC: 32.1 G/DL (ref 28.4–34.8)
MCV RBC AUTO: 92.8 FL (ref 82.6–102.9)
NRBC BLD-RTO: 0 PER 100 WBC
PLATELET # BLD AUTO: 319 K/UL (ref 138–453)
PMV BLD AUTO: 8.5 FL (ref 8.1–13.5)
POTASSIUM SERPL-SCNC: 4.1 MMOL/L (ref 3.7–5.3)
PROT SERPL-MCNC: 7.3 G/DL (ref 6.6–8.7)
RBC # BLD AUTO: 3.62 M/UL (ref 4.21–5.77)
SODIUM SERPL-SCNC: 140 MMOL/L (ref 136–145)
WBC OTHER # BLD: 9 K/UL (ref 3.5–11.3)

## 2025-04-14 PROCEDURE — 80048 BASIC METABOLIC PNL TOTAL CA: CPT

## 2025-04-14 PROCEDURE — 85027 COMPLETE CBC AUTOMATED: CPT

## 2025-04-14 PROCEDURE — 36415 COLL VENOUS BLD VENIPUNCTURE: CPT

## 2025-04-14 PROCEDURE — 83735 ASSAY OF MAGNESIUM: CPT

## 2025-04-14 PROCEDURE — 82306 VITAMIN D 25 HYDROXY: CPT

## 2025-04-14 PROCEDURE — 80156 ASSAY CARBAMAZEPINE TOTAL: CPT

## 2025-04-14 PROCEDURE — 80076 HEPATIC FUNCTION PANEL: CPT

## 2025-04-18 ENCOUNTER — HOSPITAL ENCOUNTER (OUTPATIENT)
Age: 45
Setting detail: SPECIMEN
Discharge: HOME | End: 2025-04-18

## 2025-04-18 LAB — CARBAMAZEPINE SERPL-MCNC: 9.7 UG/ML (ref 4–12)

## 2025-04-18 PROCEDURE — 80156 ASSAY CARBAMAZEPINE TOTAL: CPT

## 2025-04-18 PROCEDURE — 36415 COLL VENOUS BLD VENIPUNCTURE: CPT

## (undated) DEVICE — PREP-RESISTANT MARKER W/ RULER: Brand: MEDLINE INDUSTRIES, INC.

## (undated) DEVICE — SOLUTION IRRIGATION STRL H2O 1000 ML UROMATIC CONTAINER

## (undated) DEVICE — 100% SILICONE FOLEY CATHETER,5-10 ML, 2-WAY: Brand: DOVER

## (undated) DEVICE — STRAP,CATHETER,ELASTIC,HOOK&LOOP: Brand: MEDLINE

## (undated) DEVICE — DRESSING FOAM W4XL4IN AG SIL FACE BORD IONIC ANTIMIC ADH

## (undated) DEVICE — GLOVE SURG SZ 7 CRM LTX FREE POLYISOPRENE POLYMER BEAD ANTI

## (undated) DEVICE — Device

## (undated) DEVICE — DRAINBAG,ANTI-REFLUX TOWER,L/F,2000ML,LL: Brand: MEDLINE